# Patient Record
Sex: FEMALE | Race: WHITE | Employment: OTHER | URBAN - METROPOLITAN AREA
[De-identification: names, ages, dates, MRNs, and addresses within clinical notes are randomized per-mention and may not be internally consistent; named-entity substitution may affect disease eponyms.]

---

## 2021-11-05 ENCOUNTER — TELEPHONE (OUTPATIENT)
Dept: FAMILY MEDICINE CLINIC | Age: 81
End: 2021-11-05

## 2021-11-05 NOTE — TELEPHONE ENCOUNTER
----- Message from Jonatan Upton sent at 11/3/2021  3:01 PM EDT -----  Subject: Appointment Request    Reason for Call: New Patient Request Appointment    QUESTIONS  Type of Appointment? New Patient/New to Provider  Reason for appointment request? No appointments available during search  Additional Information for Provider? New pt. establish care. Also needs   Pre Op for surgery on 11/10. Please call pt to schedule  ---------------------------------------------------------------------------  --------------  CALL BACK INFO  What is the best way for the office to contact you? OK to leave message on   voicemail  Preferred Call Back Phone Number? [phone]  ---------------------------------------------------------------------------  --------------  SCRIPT ANSWERS  Relationship to Patient? Self  Specialty Confirmation? Primary Care  Is this the first appointment to establish care for a ? No  Have you been diagnosed with, awaiting test results for, or told that you   are suspected of having COVID-19 (Coronavirus)? (If patient has tested   negative or was tested as a requirement for work, school, or travel and   not based on symptoms, answer no)? No  Within the past two weeks have you developed any of the following symptoms   (answer no if symptoms have been present longer than 2 weeks or began   more than 2 weeks ago)? Fever or Chills, Cough, Shortness of breath or   difficulty breathing, Loss of taste or smell, Sore throat, Nasal   congestion, Sneezing or runny nose, Fatigue or generalized body aches   (answer no if pain is specific to a body part e.g. back pain), Diarrhea,   Headache? No  Have you had close contact with someone with COVID-19 in the last 14 days? No  (Service Expert  click yes below to proceed with MeetMe As Usual   Scheduling)?  Yes

## 2021-11-09 ENCOUNTER — APPOINTMENT (OUTPATIENT)
Dept: CT IMAGING | Age: 81
DRG: 445 | End: 2021-11-09
Payer: MEDICARE

## 2021-11-09 ENCOUNTER — HOSPITAL ENCOUNTER (INPATIENT)
Age: 81
LOS: 2 days | Discharge: ANOTHER HOSPITAL | DRG: 445 | End: 2021-11-11
Attending: INTERNAL MEDICINE | Admitting: HOSPITALIST
Payer: MEDICARE

## 2021-11-09 ENCOUNTER — APPOINTMENT (OUTPATIENT)
Dept: ULTRASOUND IMAGING | Age: 81
DRG: 445 | End: 2021-11-09
Payer: MEDICARE

## 2021-11-09 DIAGNOSIS — K83.1 OBSTRUCTIVE JAUNDICE: Primary | ICD-10-CM

## 2021-11-09 LAB
ALBUMIN SERPL-MCNC: 3.9 G/DL (ref 3.5–5.1)
ALP BLD-CCNC: 483 U/L (ref 38–126)
ALT SERPL-CCNC: 271 U/L (ref 11–66)
AMMONIA: 50 UMOL/L (ref 11–60)
ANION GAP SERPL CALCULATED.3IONS-SCNC: 16 MEQ/L (ref 8–16)
AST SERPL-CCNC: 133 U/L (ref 5–40)
BACTERIA: ABNORMAL /HPF
BASOPHILS # BLD: 0.7 %
BASOPHILS ABSOLUTE: 0.1 THOU/MM3 (ref 0–0.1)
BILIRUB SERPL-MCNC: 19.5 MG/DL (ref 0.3–1.2)
BILIRUBIN DIRECT: > 10 MG/DL (ref 0–0.3)
BILIRUBIN URINE: ABNORMAL
BLOOD, URINE: ABNORMAL
BUN BLDV-MCNC: 13 MG/DL (ref 7–22)
CALCIUM SERPL-MCNC: 9.7 MG/DL (ref 8.5–10.5)
CASTS 2: ABNORMAL /LPF
CASTS UA: ABNORMAL /LPF
CHARACTER, URINE: ABNORMAL
CHLORIDE BLD-SCNC: 103 MEQ/L (ref 98–111)
CO2: 21 MEQ/L (ref 23–33)
COLOR: ABNORMAL
CREAT SERPL-MCNC: 0.8 MG/DL (ref 0.4–1.2)
CRYSTALS, UA: ABNORMAL
EOSINOPHIL # BLD: 0.5 %
EOSINOPHILS ABSOLUTE: 0 THOU/MM3 (ref 0–0.4)
EPITHELIAL CELLS, UA: ABNORMAL /HPF
ERYTHROCYTE [DISTWIDTH] IN BLOOD BY AUTOMATED COUNT: 14.3 % (ref 11.5–14.5)
ERYTHROCYTE [DISTWIDTH] IN BLOOD BY AUTOMATED COUNT: 50.1 FL (ref 35–45)
GFR SERPL CREATININE-BSD FRML MDRD: 69 ML/MIN/1.73M2
GLUCOSE BLD-MCNC: 136 MG/DL (ref 70–108)
GLUCOSE URINE: NEGATIVE MG/DL
HCT VFR BLD CALC: 39.3 % (ref 37–47)
HEMOGLOBIN: 12.7 GM/DL (ref 12–16)
ICTOTEST: POSITIVE
IMMATURE GRANS (ABS): 0.03 THOU/MM3 (ref 0–0.07)
IMMATURE GRANULOCYTES: 0.4 %
INR BLD: 1.04 (ref 0.85–1.13)
KETONES, URINE: NEGATIVE
LEUKOCYTE ESTERASE, URINE: ABNORMAL
LIPASE: 43.9 U/L (ref 5.6–51.3)
LYMPHOCYTES # BLD: 14.6 %
LYMPHOCYTES ABSOLUTE: 1.2 THOU/MM3 (ref 1–4.8)
MCH RBC QN AUTO: 31.1 PG (ref 26–33)
MCHC RBC AUTO-ENTMCNC: 32.3 GM/DL (ref 32.2–35.5)
MCV RBC AUTO: 96.1 FL (ref 81–99)
MISCELLANEOUS 2: ABNORMAL
MONOCYTES # BLD: 8.6 %
MONOCYTES ABSOLUTE: 0.7 THOU/MM3 (ref 0.4–1.3)
NITRITE, URINE: POSITIVE
NUCLEATED RED BLOOD CELLS: 0 /100 WBC
OSMOLALITY CALCULATION: 281.6 MOSMOL/KG (ref 275–300)
PH UA: 5.5 (ref 5–9)
PLATELET # BLD: 392 THOU/MM3 (ref 130–400)
PMV BLD AUTO: 10.9 FL (ref 9.4–12.4)
POTASSIUM SERPL-SCNC: 3.4 MEQ/L (ref 3.5–5.2)
PROTEIN UA: 100
RBC # BLD: 4.09 MILL/MM3 (ref 4.2–5.4)
RBC URINE: ABNORMAL /HPF
RENAL EPITHELIAL, UA: ABNORMAL
SEG NEUTROPHILS: 75.2 %
SEGMENTED NEUTROPHILS ABSOLUTE COUNT: 6 THOU/MM3 (ref 1.8–7.7)
SODIUM BLD-SCNC: 140 MEQ/L (ref 135–145)
SPECIFIC GRAVITY, URINE: 1.02 (ref 1–1.03)
TOTAL PROTEIN: 7.1 G/DL (ref 6.1–8)
UROBILINOGEN, URINE: 0.2 EU/DL (ref 0–1)
WBC # BLD: 8 THOU/MM3 (ref 4.8–10.8)
WBC UA: ABNORMAL /HPF
YEAST: ABNORMAL

## 2021-11-09 PROCEDURE — 87086 URINE CULTURE/COLONY COUNT: CPT

## 2021-11-09 PROCEDURE — 81001 URINALYSIS AUTO W/SCOPE: CPT

## 2021-11-09 PROCEDURE — 2580000003 HC RX 258: Performed by: INTERNAL MEDICINE

## 2021-11-09 PROCEDURE — 82248 BILIRUBIN DIRECT: CPT

## 2021-11-09 PROCEDURE — 85025 COMPLETE CBC W/AUTO DIFF WBC: CPT

## 2021-11-09 PROCEDURE — 80053 COMPREHEN METABOLIC PANEL: CPT

## 2021-11-09 PROCEDURE — 83690 ASSAY OF LIPASE: CPT

## 2021-11-09 PROCEDURE — 85610 PROTHROMBIN TIME: CPT

## 2021-11-09 PROCEDURE — 99285 EMERGENCY DEPT VISIT HI MDM: CPT

## 2021-11-09 PROCEDURE — 76705 ECHO EXAM OF ABDOMEN: CPT

## 2021-11-09 PROCEDURE — 82140 ASSAY OF AMMONIA: CPT

## 2021-11-09 PROCEDURE — 99223 1ST HOSP IP/OBS HIGH 75: CPT | Performed by: HOSPITALIST

## 2021-11-09 PROCEDURE — 36415 COLL VENOUS BLD VENIPUNCTURE: CPT

## 2021-11-09 PROCEDURE — 1200000003 HC TELEMETRY R&B

## 2021-11-09 PROCEDURE — 74176 CT ABD & PELVIS W/O CONTRAST: CPT

## 2021-11-09 RX ORDER — 0.9 % SODIUM CHLORIDE 0.9 %
1000 INTRAVENOUS SOLUTION INTRAVENOUS ONCE
Status: COMPLETED | OUTPATIENT
Start: 2021-11-09 | End: 2021-11-09

## 2021-11-09 RX ADMIN — SODIUM CHLORIDE 1000 ML: 9 INJECTION, SOLUTION INTRAVENOUS at 19:35

## 2021-11-10 ENCOUNTER — ANESTHESIA EVENT (OUTPATIENT)
Dept: ENDOSCOPY | Age: 81
DRG: 445 | End: 2021-11-10
Payer: MEDICARE

## 2021-11-10 ENCOUNTER — ANESTHESIA (OUTPATIENT)
Dept: ENDOSCOPY | Age: 81
DRG: 445 | End: 2021-11-10
Payer: MEDICARE

## 2021-11-10 ENCOUNTER — APPOINTMENT (OUTPATIENT)
Dept: MRI IMAGING | Age: 81
DRG: 445 | End: 2021-11-10
Payer: MEDICARE

## 2021-11-10 ENCOUNTER — APPOINTMENT (OUTPATIENT)
Dept: CT IMAGING | Age: 81
DRG: 445 | End: 2021-11-10
Payer: MEDICARE

## 2021-11-10 ENCOUNTER — APPOINTMENT (OUTPATIENT)
Dept: GENERAL RADIOLOGY | Age: 81
DRG: 445 | End: 2021-11-10
Payer: MEDICARE

## 2021-11-10 VITALS — DIASTOLIC BLOOD PRESSURE: 58 MMHG | SYSTOLIC BLOOD PRESSURE: 105 MMHG | OXYGEN SATURATION: 100 %

## 2021-11-10 LAB
ALBUMIN SERPL-MCNC: 3.4 G/DL (ref 3.5–5.1)
ALP BLD-CCNC: 421 U/L (ref 38–126)
ALT SERPL-CCNC: 228 U/L (ref 11–66)
ANION GAP SERPL CALCULATED.3IONS-SCNC: 13 MEQ/L (ref 8–16)
AST SERPL-CCNC: 107 U/L (ref 5–40)
AVERAGE GLUCOSE: 111 MG/DL (ref 70–126)
BASOPHILS # BLD: 0.8 %
BASOPHILS ABSOLUTE: 0 THOU/MM3 (ref 0–0.1)
BILIRUB SERPL-MCNC: 16.3 MG/DL (ref 0.3–1.2)
BILIRUBIN DIRECT: > 10 MG/DL (ref 0–0.3)
BUN BLDV-MCNC: 11 MG/DL (ref 7–22)
CALCIUM SERPL-MCNC: 9.1 MG/DL (ref 8.5–10.5)
CEA: 1.5 NG/ML (ref 0–5)
CHLORIDE BLD-SCNC: 108 MEQ/L (ref 98–111)
CO2: 22 MEQ/L (ref 23–33)
CREAT SERPL-MCNC: 0.6 MG/DL (ref 0.4–1.2)
EOSINOPHIL # BLD: 1.2 %
EOSINOPHILS ABSOLUTE: 0.1 THOU/MM3 (ref 0–0.4)
ERYTHROCYTE [DISTWIDTH] IN BLOOD BY AUTOMATED COUNT: 14.6 % (ref 11.5–14.5)
ERYTHROCYTE [DISTWIDTH] IN BLOOD BY AUTOMATED COUNT: 50.3 FL (ref 35–45)
GFR SERPL CREATININE-BSD FRML MDRD: > 90 ML/MIN/1.73M2
GLUCOSE BLD-MCNC: 97 MG/DL (ref 70–108)
HBA1C MFR BLD: 5.7 % (ref 4.4–6.4)
HCT VFR BLD CALC: 34 % (ref 37–47)
HEMOGLOBIN: 11.3 GM/DL (ref 12–16)
IMMATURE GRANS (ABS): 0.03 THOU/MM3 (ref 0–0.07)
IMMATURE GRANULOCYTES: 0.5 %
LYMPHOCYTES # BLD: 22.8 %
LYMPHOCYTES ABSOLUTE: 1.3 THOU/MM3 (ref 1–4.8)
MCH RBC QN AUTO: 31.5 PG (ref 26–33)
MCHC RBC AUTO-ENTMCNC: 33.2 GM/DL (ref 32.2–35.5)
MCV RBC AUTO: 94.7 FL (ref 81–99)
MONOCYTES # BLD: 12.7 %
MONOCYTES ABSOLUTE: 0.7 THOU/MM3 (ref 0.4–1.3)
NUCLEATED RED BLOOD CELLS: 0 /100 WBC
PLATELET # BLD: 330 THOU/MM3 (ref 130–400)
PMV BLD AUTO: 10.9 FL (ref 9.4–12.4)
POTASSIUM SERPL-SCNC: 3.9 MEQ/L (ref 3.5–5.2)
RBC # BLD: 3.59 MILL/MM3 (ref 4.2–5.4)
SEG NEUTROPHILS: 62 %
SEGMENTED NEUTROPHILS ABSOLUTE COUNT: 3.7 THOU/MM3 (ref 1.8–7.7)
SODIUM BLD-SCNC: 143 MEQ/L (ref 135–145)
TOTAL PROTEIN: 5.5 G/DL (ref 6.1–8)
WBC # BLD: 5.9 THOU/MM3 (ref 4.8–10.8)

## 2021-11-10 PROCEDURE — C9113 INJ PANTOPRAZOLE SODIUM, VIA: HCPCS | Performed by: STUDENT IN AN ORGANIZED HEALTH CARE EDUCATION/TRAINING PROGRAM

## 2021-11-10 PROCEDURE — 2580000003 HC RX 258: Performed by: STUDENT IN AN ORGANIZED HEALTH CARE EDUCATION/TRAINING PROGRAM

## 2021-11-10 PROCEDURE — 7100000000 HC PACU RECOVERY - FIRST 15 MIN: Performed by: INTERNAL MEDICINE

## 2021-11-10 PROCEDURE — 6360000002 HC RX W HCPCS

## 2021-11-10 PROCEDURE — 6360000002 HC RX W HCPCS: Performed by: NURSE ANESTHETIST, CERTIFIED REGISTERED

## 2021-11-10 PROCEDURE — 6360000002 HC RX W HCPCS: Performed by: INTERNAL MEDICINE

## 2021-11-10 PROCEDURE — 3609015100 HC ERCP STENT PLACEMENT BILIARY/PANCREATIC DUCT: Performed by: INTERNAL MEDICINE

## 2021-11-10 PROCEDURE — C1769 GUIDE WIRE: HCPCS | Performed by: INTERNAL MEDICINE

## 2021-11-10 PROCEDURE — 0FJD8ZZ INSPECTION OF PANCREATIC DUCT, VIA NATURAL OR ARTIFICIAL OPENING ENDOSCOPIC: ICD-10-PCS | Performed by: INTERNAL MEDICINE

## 2021-11-10 PROCEDURE — 74330 X-RAY BILE/PANC ENDOSCOPY: CPT

## 2021-11-10 PROCEDURE — 99232 SBSQ HOSP IP/OBS MODERATE 35: CPT | Performed by: FAMILY MEDICINE

## 2021-11-10 PROCEDURE — 36415 COLL VENOUS BLD VENIPUNCTURE: CPT

## 2021-11-10 PROCEDURE — 2580000003 HC RX 258: Performed by: NURSE PRACTITIONER

## 2021-11-10 PROCEDURE — 2580000003 HC RX 258: Performed by: INTERNAL MEDICINE

## 2021-11-10 PROCEDURE — 6360000002 HC RX W HCPCS: Performed by: ANESTHESIOLOGY

## 2021-11-10 PROCEDURE — 74150 CT ABDOMEN W/O CONTRAST: CPT

## 2021-11-10 PROCEDURE — 83036 HEMOGLOBIN GLYCOSYLATED A1C: CPT

## 2021-11-10 PROCEDURE — 82248 BILIRUBIN DIRECT: CPT

## 2021-11-10 PROCEDURE — A9579 GAD-BASE MR CONTRAST NOS,1ML: HCPCS | Performed by: STUDENT IN AN ORGANIZED HEALTH CARE EDUCATION/TRAINING PROGRAM

## 2021-11-10 PROCEDURE — 3700000000 HC ANESTHESIA ATTENDED CARE: Performed by: INTERNAL MEDICINE

## 2021-11-10 PROCEDURE — 2709999900 HC NON-CHARGEABLE SUPPLY: Performed by: INTERNAL MEDICINE

## 2021-11-10 PROCEDURE — 2100000000 HC CCU R&B

## 2021-11-10 PROCEDURE — 6360000002 HC RX W HCPCS: Performed by: STUDENT IN AN ORGANIZED HEALTH CARE EDUCATION/TRAINING PROGRAM

## 2021-11-10 PROCEDURE — 2500000003 HC RX 250 WO HCPCS: Performed by: NURSE ANESTHETIST, CERTIFIED REGISTERED

## 2021-11-10 PROCEDURE — 86301 IMMUNOASSAY TUMOR CA 19-9: CPT

## 2021-11-10 PROCEDURE — 84080 ASSAY ALKALINE PHOSPHATASES: CPT

## 2021-11-10 PROCEDURE — 6360000004 HC RX CONTRAST MEDICATION: Performed by: STUDENT IN AN ORGANIZED HEALTH CARE EDUCATION/TRAINING PROGRAM

## 2021-11-10 PROCEDURE — 80053 COMPREHEN METABOLIC PANEL: CPT

## 2021-11-10 PROCEDURE — 85025 COMPLETE CBC W/AUTO DIFF WBC: CPT

## 2021-11-10 PROCEDURE — 74183 MRI ABD W/O CNTR FLWD CNTR: CPT

## 2021-11-10 PROCEDURE — 7100000001 HC PACU RECOVERY - ADDTL 15 MIN: Performed by: INTERNAL MEDICINE

## 2021-11-10 PROCEDURE — 99221 1ST HOSP IP/OBS SF/LOW 40: CPT | Performed by: SURGERY

## 2021-11-10 PROCEDURE — 82378 CARCINOEMBRYONIC ANTIGEN: CPT

## 2021-11-10 PROCEDURE — 3700000001 HC ADD 15 MINUTES (ANESTHESIA): Performed by: INTERNAL MEDICINE

## 2021-11-10 PROCEDURE — 6370000000 HC RX 637 (ALT 250 FOR IP): Performed by: NURSE PRACTITIONER

## 2021-11-10 RX ORDER — DEXAMETHASONE SODIUM PHOSPHATE 4 MG/ML
INJECTION, SOLUTION INTRA-ARTICULAR; INTRALESIONAL; INTRAMUSCULAR; INTRAVENOUS; SOFT TISSUE PRN
Status: DISCONTINUED | OUTPATIENT
Start: 2021-11-10 | End: 2021-11-10 | Stop reason: SDUPTHER

## 2021-11-10 RX ORDER — SODIUM CHLORIDE 0.9 % (FLUSH) 0.9 %
5-40 SYRINGE (ML) INJECTION PRN
Status: DISCONTINUED | OUTPATIENT
Start: 2021-11-10 | End: 2021-11-11 | Stop reason: HOSPADM

## 2021-11-10 RX ORDER — HYDRALAZINE HYDROCHLORIDE 20 MG/ML
INJECTION INTRAMUSCULAR; INTRAVENOUS
Status: COMPLETED
Start: 2021-11-10 | End: 2021-11-10

## 2021-11-10 RX ORDER — ACETAMINOPHEN 325 MG/1
650 TABLET ORAL EVERY 6 HOURS PRN
Status: DISCONTINUED | OUTPATIENT
Start: 2021-11-10 | End: 2021-11-11 | Stop reason: HOSPADM

## 2021-11-10 RX ORDER — PANTOPRAZOLE SODIUM 40 MG/10ML
40 INJECTION, POWDER, LYOPHILIZED, FOR SOLUTION INTRAVENOUS DAILY
Status: DISCONTINUED | OUTPATIENT
Start: 2021-11-10 | End: 2021-11-11 | Stop reason: HOSPADM

## 2021-11-10 RX ORDER — SODIUM CHLORIDE 9 MG/ML
25 INJECTION, SOLUTION INTRAVENOUS PRN
Status: DISCONTINUED | OUTPATIENT
Start: 2021-11-10 | End: 2021-11-11 | Stop reason: HOSPADM

## 2021-11-10 RX ORDER — FENTANYL CITRATE 50 UG/ML
INJECTION, SOLUTION INTRAMUSCULAR; INTRAVENOUS PRN
Status: DISCONTINUED | OUTPATIENT
Start: 2021-11-10 | End: 2021-11-10 | Stop reason: SDUPTHER

## 2021-11-10 RX ORDER — SODIUM CHLORIDE, SODIUM LACTATE, POTASSIUM CHLORIDE, CALCIUM CHLORIDE 600; 310; 30; 20 MG/100ML; MG/100ML; MG/100ML; MG/100ML
INJECTION, SOLUTION INTRAVENOUS CONTINUOUS
Status: DISCONTINUED | OUTPATIENT
Start: 2021-11-10 | End: 2021-11-11 | Stop reason: HOSPADM

## 2021-11-10 RX ORDER — POLYETHYLENE GLYCOL 3350 17 G/17G
17 POWDER, FOR SOLUTION ORAL DAILY PRN
Status: DISCONTINUED | OUTPATIENT
Start: 2021-11-10 | End: 2021-11-11 | Stop reason: HOSPADM

## 2021-11-10 RX ORDER — ONDANSETRON 2 MG/ML
4 INJECTION INTRAMUSCULAR; INTRAVENOUS EVERY 6 HOURS PRN
Status: DISCONTINUED | OUTPATIENT
Start: 2021-11-10 | End: 2021-11-11 | Stop reason: HOSPADM

## 2021-11-10 RX ORDER — LIDOCAINE HYDROCHLORIDE 20 MG/ML
INJECTION, SOLUTION INFILTRATION; PERINEURAL PRN
Status: DISCONTINUED | OUTPATIENT
Start: 2021-11-10 | End: 2021-11-10 | Stop reason: SDUPTHER

## 2021-11-10 RX ORDER — DEXTROSE, SODIUM CHLORIDE, AND POTASSIUM CHLORIDE 5; .45; .15 G/100ML; G/100ML; G/100ML
INJECTION INTRAVENOUS CONTINUOUS
Status: DISCONTINUED | OUTPATIENT
Start: 2021-11-10 | End: 2021-11-10

## 2021-11-10 RX ORDER — HYDRALAZINE HYDROCHLORIDE 20 MG/ML
5 INJECTION INTRAMUSCULAR; INTRAVENOUS EVERY 10 MIN PRN
Status: DISCONTINUED | OUTPATIENT
Start: 2021-11-10 | End: 2021-11-10 | Stop reason: HOSPADM

## 2021-11-10 RX ORDER — PROPOFOL 10 MG/ML
INJECTION, EMULSION INTRAVENOUS PRN
Status: DISCONTINUED | OUTPATIENT
Start: 2021-11-10 | End: 2021-11-10 | Stop reason: SDUPTHER

## 2021-11-10 RX ORDER — SUCCINYLCHOLINE CHLORIDE 20 MG/ML
INJECTION INTRAMUSCULAR; INTRAVENOUS PRN
Status: DISCONTINUED | OUTPATIENT
Start: 2021-11-10 | End: 2021-11-10 | Stop reason: SDUPTHER

## 2021-11-10 RX ORDER — CIPROFLOXACIN 2 MG/ML
400 INJECTION, SOLUTION INTRAVENOUS ONCE
Status: COMPLETED | OUTPATIENT
Start: 2021-11-10 | End: 2021-11-10

## 2021-11-10 RX ORDER — SODIUM CHLORIDE 0.9 % (FLUSH) 0.9 %
5-40 SYRINGE (ML) INJECTION EVERY 12 HOURS SCHEDULED
Status: DISCONTINUED | OUTPATIENT
Start: 2021-11-10 | End: 2021-11-11 | Stop reason: HOSPADM

## 2021-11-10 RX ORDER — ACETAMINOPHEN 650 MG/1
650 SUPPOSITORY RECTAL EVERY 6 HOURS PRN
Status: DISCONTINUED | OUTPATIENT
Start: 2021-11-10 | End: 2021-11-11 | Stop reason: HOSPADM

## 2021-11-10 RX ORDER — ONDANSETRON 4 MG/1
4 TABLET, ORALLY DISINTEGRATING ORAL EVERY 8 HOURS PRN
Status: DISCONTINUED | OUTPATIENT
Start: 2021-11-10 | End: 2021-11-11 | Stop reason: HOSPADM

## 2021-11-10 RX ADMIN — SODIUM CHLORIDE, POTASSIUM CHLORIDE, SODIUM LACTATE AND CALCIUM CHLORIDE: 600; 310; 30; 20 INJECTION, SOLUTION INTRAVENOUS at 22:41

## 2021-11-10 RX ADMIN — INDOMETHACIN 100 MG: 50 SUPPOSITORY RECTAL at 14:58

## 2021-11-10 RX ADMIN — GLUCAGON HYDROCHLORIDE 0.5 MG: KIT at 15:26

## 2021-11-10 RX ADMIN — PROPOFOL 100 MG: 10 INJECTION, EMULSION INTRAVENOUS at 14:50

## 2021-11-10 RX ADMIN — CIPROFLOXACIN 400 MG: 2 INJECTION, SOLUTION INTRAVENOUS at 14:55

## 2021-11-10 RX ADMIN — DEXAMETHASONE SODIUM PHOSPHATE 10 MG: 4 INJECTION, SOLUTION INTRAMUSCULAR; INTRAVENOUS at 15:00

## 2021-11-10 RX ADMIN — PIPERACILLIN AND TAZOBACTAM 3375 MG: 3; .375 INJECTION, POWDER, LYOPHILIZED, FOR SOLUTION INTRAVENOUS at 23:50

## 2021-11-10 RX ADMIN — PIPERACILLIN AND TAZOBACTAM 3375 MG: 3; .375 INJECTION, POWDER, LYOPHILIZED, FOR SOLUTION INTRAVENOUS at 17:38

## 2021-11-10 RX ADMIN — SODIUM CHLORIDE: 9 INJECTION, SOLUTION INTRAVENOUS at 14:31

## 2021-11-10 RX ADMIN — GLUCAGON HYDROCHLORIDE 0.5 MG: KIT at 15:27

## 2021-11-10 RX ADMIN — SODIUM CHLORIDE, PRESERVATIVE FREE 10 ML: 5 INJECTION INTRAVENOUS at 08:03

## 2021-11-10 RX ADMIN — HYDRALAZINE HYDROCHLORIDE 5 MG: 20 INJECTION INTRAMUSCULAR; INTRAVENOUS at 17:00

## 2021-11-10 RX ADMIN — PROPOFOL 50 MG: 10 INJECTION, EMULSION INTRAVENOUS at 15:08

## 2021-11-10 RX ADMIN — PANTOPRAZOLE SODIUM 40 MG: 40 INJECTION, POWDER, FOR SOLUTION INTRAVENOUS at 05:12

## 2021-11-10 RX ADMIN — GADOTERIDOL 15 ML: 279.3 INJECTION, SOLUTION INTRAVENOUS at 09:19

## 2021-11-10 RX ADMIN — HYDRALAZINE HYDROCHLORIDE 5 MG: 20 INJECTION INTRAMUSCULAR; INTRAVENOUS at 17:13

## 2021-11-10 RX ADMIN — HYDRALAZINE HYDROCHLORIDE 5 MG: 20 INJECTION INTRAMUSCULAR; INTRAVENOUS at 16:50

## 2021-11-10 RX ADMIN — SODIUM CHLORIDE, PRESERVATIVE FREE 10 ML: 5 INJECTION INTRAVENOUS at 21:53

## 2021-11-10 RX ADMIN — FENTANYL CITRATE 50 MCG: 50 INJECTION, SOLUTION INTRAMUSCULAR; INTRAVENOUS at 15:00

## 2021-11-10 RX ADMIN — SUCCINYLCHOLINE CHLORIDE 120 MG: 20 INJECTION, SOLUTION INTRAMUSCULAR; INTRAVENOUS at 14:50

## 2021-11-10 RX ADMIN — FENTANYL CITRATE 50 MCG: 50 INJECTION, SOLUTION INTRAMUSCULAR; INTRAVENOUS at 14:45

## 2021-11-10 RX ADMIN — LIDOCAINE HYDROCHLORIDE 60 MG: 20 INJECTION, SOLUTION INFILTRATION; PERINEURAL at 14:50

## 2021-11-10 ASSESSMENT — PULMONARY FUNCTION TESTS
PIF_VALUE: 1
PIF_VALUE: 22
PIF_VALUE: 20
PIF_VALUE: 22
PIF_VALUE: 21
PIF_VALUE: 20
PIF_VALUE: 26
PIF_VALUE: 20
PIF_VALUE: 16
PIF_VALUE: 20
PIF_VALUE: 20
PIF_VALUE: 23
PIF_VALUE: 0
PIF_VALUE: 26
PIF_VALUE: 3
PIF_VALUE: 25
PIF_VALUE: 21
PIF_VALUE: 23
PIF_VALUE: 0
PIF_VALUE: 20
PIF_VALUE: 0
PIF_VALUE: 22
PIF_VALUE: 0
PIF_VALUE: 19
PIF_VALUE: 22
PIF_VALUE: 20
PIF_VALUE: 23
PIF_VALUE: 23
PIF_VALUE: 25
PIF_VALUE: 23
PIF_VALUE: 0
PIF_VALUE: 2
PIF_VALUE: 19
PIF_VALUE: 22
PIF_VALUE: 26
PIF_VALUE: 23
PIF_VALUE: 22
PIF_VALUE: 18
PIF_VALUE: 20
PIF_VALUE: 23
PIF_VALUE: 0
PIF_VALUE: 24
PIF_VALUE: 0
PIF_VALUE: 0
PIF_VALUE: 21
PIF_VALUE: 26
PIF_VALUE: 23
PIF_VALUE: 28
PIF_VALUE: 22
PIF_VALUE: 14
PIF_VALUE: 22
PIF_VALUE: 23
PIF_VALUE: 14
PIF_VALUE: 23
PIF_VALUE: 15
PIF_VALUE: 0
PIF_VALUE: 20
PIF_VALUE: 0
PIF_VALUE: 23
PIF_VALUE: 0
PIF_VALUE: 15
PIF_VALUE: 22
PIF_VALUE: 18
PIF_VALUE: 2
PIF_VALUE: 22
PIF_VALUE: 0
PIF_VALUE: 15
PIF_VALUE: 25
PIF_VALUE: 15
PIF_VALUE: 2
PIF_VALUE: 25
PIF_VALUE: 7
PIF_VALUE: 27
PIF_VALUE: 22
PIF_VALUE: 0
PIF_VALUE: 18
PIF_VALUE: 22
PIF_VALUE: 22
PIF_VALUE: 23
PIF_VALUE: 20
PIF_VALUE: 20
PIF_VALUE: 23
PIF_VALUE: 0
PIF_VALUE: 23
PIF_VALUE: 0
PIF_VALUE: 20
PIF_VALUE: 22
PIF_VALUE: 0
PIF_VALUE: 4
PIF_VALUE: 22
PIF_VALUE: 2
PIF_VALUE: 25
PIF_VALUE: 24
PIF_VALUE: 14
PIF_VALUE: 20
PIF_VALUE: 0
PIF_VALUE: 22
PIF_VALUE: 22
PIF_VALUE: 23
PIF_VALUE: 15
PIF_VALUE: 2
PIF_VALUE: 19
PIF_VALUE: 25
PIF_VALUE: 23

## 2021-11-10 ASSESSMENT — PAIN SCALES - GENERAL
PAINLEVEL_OUTOF10: 0

## 2021-11-10 ASSESSMENT — PAIN - FUNCTIONAL ASSESSMENT: PAIN_FUNCTIONAL_ASSESSMENT: 0-10

## 2021-11-10 ASSESSMENT — ENCOUNTER SYMPTOMS
DIARRHEA: 0
RESPIRATORY NEGATIVE: 1
ANAL BLEEDING: 0
BLOOD IN STOOL: 0

## 2021-11-10 NOTE — ANESTHESIA PRE PROCEDURE
Department of Anesthesiology  Preprocedure Note       Name:  Pavel Donohue   Age:  [de-identified] y.o.  :  1940                                          MRN:  [id number]         Date:  11/10/2021      Surgeon: Penelope Bryson):  Jarrett Cruz MD    Procedure: Procedure(s):  ERCP WITH STENT INSERTION    Medications prior to admission:   Prior to Admission medications    Not on File       Current medications:    Current Facility-Administered Medications   Medication Dose Route Frequency Provider Last Rate Last Admin    sodium chloride flush 0.9 % injection 5-40 mL  5-40 mL IntraVENous 2 times per day Patricia Bux, DO   10 mL at 11/10/21 0803    sodium chloride flush 0.9 % injection 5-40 mL  5-40 mL IntraVENous PRN Patricia Bux, DO        0.9 % sodium chloride infusion  25 mL IntraVENous PRN Patricia Bux, DO        ondansetron (ZOFRAN-ODT) disintegrating tablet 4 mg  4 mg Oral Q8H PRN Patricia Bux, DO        Or    ondansetron (ZOFRAN) injection 4 mg  4 mg IntraVENous Q6H PRN Patricia Bux, DO        polyethylene glycol (GLYCOLAX) packet 17 g  17 g Oral Daily PRN Patricia Bux, DO        acetaminophen (TYLENOL) tablet 650 mg  650 mg Oral Q6H PRN Patricia Bux, DO        Or    acetaminophen (TYLENOL) suppository 650 mg  650 mg Rectal Q6H PRN Patricia Bux, DO        pantoprazole (PROTONIX) injection 40 mg  40 mg IntraVENous Daily Patricia Bux, DO   40 mg at 11/10/21 0512    indomethacin (INDOCIN) 50 MG suppository 100 mg  100 mg Rectal Once Willian Hoang APRN - CNP        lactated ringers infusion   IntraVENous Continuous Willian Hoang APRN - CNP           Allergies:  No Known Allergies    Problem List:    Patient Active Problem List   Diagnosis Code    Obstructive jaundice K83.1       Past Medical History:  History reviewed. No pertinent past medical history. Past Surgical History:  History reviewed. No pertinent surgical history.     Social History:    Social History     Tobacco Use    Smoking status: Former Smoker    Smokeless tobacco: Not on file   Substance Use Topics    Alcohol use: Not Currently     Comment: rarely                                Counseling given: Not Answered      Vital Signs (Current):   Vitals:    11/10/21 0311 11/10/21 0759 11/10/21 1200 11/10/21 1356   BP: (!) 145/73 (!) 166/75 (!) 170/73 (!) 176/85   Pulse: 66 66 68 68   Resp: 16 16 14 16   Temp: 98.3 °F (36.8 °C) 97.7 °F (36.5 °C) 98.8 °F (37.1 °C)    TempSrc: Oral Oral Oral    SpO2: 97% 96% 96% 99%   Weight:       Height:                                                  BP Readings from Last 3 Encounters:   11/10/21 (!) 176/85       NPO Status: Time of last liquid consumption: 1130                        Time of last solid consumption: 1130                        Date of last liquid consumption: 11/09/21                        Date of last solid food consumption: 11/09/21    BMI:   Wt Readings from Last 3 Encounters:   11/10/21 163 lb 12.8 oz (74.3 kg)     Body mass index is 24.91 kg/m². CBC:   Lab Results   Component Value Date    WBC 5.9 11/10/2021    RBC 3.59 11/10/2021    HGB 11.3 11/10/2021    HCT 34.0 11/10/2021    MCV 94.7 11/10/2021     11/10/2021       CMP:   Lab Results   Component Value Date     11/10/2021    K 3.9 11/10/2021     11/10/2021    CO2 22 11/10/2021    BUN 11 11/10/2021    CREATININE 0.6 11/10/2021    LABGLOM >90 11/10/2021    GLUCOSE 97 11/10/2021    PROT 5.5 11/10/2021    CALCIUM 9.1 11/10/2021    BILITOT 16.3 11/10/2021    ALKPHOS 421 11/10/2021     11/10/2021     11/10/2021       POC Tests: No results for input(s): POCGLU, POCNA, POCK, POCCL, POCBUN, POCHEMO, POCHCT in the last 72 hours.     Coags:   Lab Results   Component Value Date    INR 1.04 11/09/2021       HCG (If Applicable): No results found for: PREGTESTUR, PREGSERUM, HCG, HCGQUANT     ABGs: No results found for: PHART, PO2ART, HKY9LBS, KNA7XNX, BEART, V9YSWKZR     Type & Screen (If Applicable):  No results found for: LABABO, [address]    Drug/Infectious Status (If Applicable):  No results found for: HIV, HEPCAB    COVID-19 Screening (If Applicable): No results found for: COVID19        Anesthesia Evaluation  Patient summary reviewed and Nursing notes reviewed no history of anesthetic complications:   Airway: Mallampati: II        Dental:          Pulmonary: breath sounds clear to auscultation                             Cardiovascular:            Rhythm: regular  Rate: normal                    Neuro/Psych:               GI/Hepatic/Renal:   (+) liver disease:,           Endo/Other:                     Abdominal:       Abdomen: soft. Vascular: Other Findings:             Anesthesia Plan      general     ASA 2       Induction: intravenous. MIPS: Postoperative opioids intended and Prophylactic antiemetics administered. Anesthetic plan and risks discussed with patient. Plan discussed with CRNA.                   [address] Faye Harris,    11/10/2021

## 2021-11-10 NOTE — PROGRESS NOTES
This RN called Denise Guy, patient's son, to update him on the plan of care and his mother being transferred from endoscopy. All questions answered and son appreciative of call.

## 2021-11-10 NOTE — PROGRESS NOTES
ERCP aborted. Conray dye lot [id number]. Expiration date 08/2022. 5 mL instilled. 45 mL wasted. Sphincterotomy completed with needle knife. Grounding pad removed. Skin intact. Fluro pictures taken. Scope  and  used.

## 2021-11-10 NOTE — ANESTHESIA POSTPROCEDURE EVALUATION
Department of Anesthesiology  Postprocedure Note    Patient: Jennifer Bhardwaj  MRN: [id number]  YOB: 1940  Date of evaluation: 11/10/2021  Time:  6:36 PM     Procedure Summary     Date: 11/10/21 Room / Location: [address] [place] [address] / [address] [place] [address]    Anesthesia Start: 7853 Anesthesia Stop: 4086    Procedure: ERCP WITH STENT INSERTION (N/A ) Diagnosis: (COMMON BILE DUCT STRICTURE)    Surgeons: Ritu Vicente MD Responsible Provider: Kiran Turner DO    Anesthesia Type: general ASA Status: 2          Anesthesia Type: general    Catrachito Phase I: Catrachito Score: 10    Catrachito Phase II:      Last vitals: Reviewed and per EMR flowsheets.        Anesthesia Post Evaluation    Patient location during evaluation: PACU  Patient participation: complete - patient participated  Level of consciousness: awake and alert  Airway patency: patent  Nausea & Vomiting: no nausea  Complications: no  Cardiovascular status: blood pressure returned to baseline and hemodynamically stable  Respiratory status: acceptable and spontaneous ventilation  Hydration status: euvolemic

## 2021-11-10 NOTE — FLOWSHEET NOTE
Patient arrived to unit from PACU via cart. Patient transferred to ICU bed and placed on continuous ICU bedside monitor. Patient admitted for Obstructive jaundice [K83.1]. Vitals obtained. See flowsheets. Patient's IV access includes 22G LAC. Current infusions and rates of infusion include NS @100, zosyn @12.5. Assessment completed by Curly. Two nurse skin assessment completed by curly and Ginette. See flowsheets for assessment details. Policies and procedures of ICU able to be explained to patient at this time. Family member(s)/representative(s) present at time of admission include none. Patient rights explained to family member(s)/representatives and patient, as able. Patient/patient's family member(s)/representative(s) N/A to have physician notified of their admission. All questions posed by patient's family member(s)/representative(s) and patient answered at this time. Pt alert and oriented, no distress.  Skin jaundice

## 2021-11-10 NOTE — H&P
History & Physical       Patient: Berneta Bence  YOB: 1940    MRN: [id number]     Acct: [de-identified]    PCP: No primary care provider on file. Date of Admission: 11/9/2021    Date of Service: Patient seen / examined on 11/10/21 and admitted to Inpatient with expected LOS greater than two midnights due to medical therapy. ASSESSMENT / PLAN:    Obstructive Jaundice: Secondary to choledocholithiasis. Patient notes jaundice the past weeks and worsening since Friday. Bilirubin on arrival 19.5. Cholecystic pattern with Alk Phos>AST, ALT consistent with diagnose. Notes excessive Pruritis. Denies any abd pain and has good oral intake. Does have dark urine and north colored stool. Gallbladder US shows distended gallbladder with gallbladder sludge both intra and extra hepatic biliary duct dilatation. No stones seen. Denies any liver disease. -GI consult for possible ERCP  -Gen Surgery Consult for consideration of elective cholecystectomy  -MRCP ordered  -NPO for possible ERCP today  -SCDS for AG  -No abx ordered due to normal WBC, afebrile, and no wall thickening seen and no para cholecystic edema     GERD- Resume PPI    Asymptomatic UTI: UA on arrival positive for UTI however due to being asymptomatic, will not treat. Chief Complaint:  Jaundice     History of Present Illness:  Berneta Bence is a [de-identified] y.o. female who presents to the emergency department because of jaundice. Patient noticed to have jaundice on Friday. Day before that patient started having pain in her right upper quadrant which lasted few moments. Patient also has been complaining of skin itching, dark urine and north colored stool. There is no fever or chills. There is no bleeding. Past Medical History:    History reviewed. No pertinent past medical history. Past Surgical History:    History reviewed. No pertinent surgical history.    Medications Prior to Admission:   No current facility-administered medications on file prior to encounter. No current outpatient medications on file prior to encounter. Allergies:   Patient has no known allergies. Social History:   Social History     Socioeconomic History    Marital status:      Spouse name: Not on file    Number of children: Not on file    Years of education: Not on file    Highest education level: Not on file   Occupational History    Not on file   Tobacco Use    Smoking status: Former Smoker    Smokeless tobacco: Not on file   Substance and Sexual Activity    Alcohol use: Not Currently     Comment: rarely    Drug use: Not on file    Sexual activity: Not on file   Other Topics Concern    Not on file   Social History Narrative    Not on file     Social Determinants of Health     Financial Resource Strain:     Difficulty of Paying Living Expenses: Not on file   Food Insecurity:     Worried About [address] in the Last Year: Not on file    Roberto of Food in the Last Year: Not on file   Transportation Needs:     Lack of Transportation (Medical): Not on file    Lack of Transportation (Non-Medical):  Not on file   Physical Activity:     Days of Exercise per Week: Not on file    Minutes of Exercise per Session: Not on file   Stress:     Feeling of Stress : Not on file   Social Connections:     Frequency of Communication with Friends and Family: Not on file    Frequency of Social Gatherings with Friends and Family: Not on file    Attends [employer]ism Services: Not on file    Active Member of Clubs or Organizations: Not on file    Attends Club or Organization Meetings: Not on file    Marital Status: Not on file   Intimate Partner Violence:     Fear of Current or Ex-Partner: Not on file    Emotionally Abused: Not on file    Physically Abused: Not on file    Sexually Abused: Not on file   Housing Stability:     Unable to Pay for Housing in the Last Year: Not on file    Number of [place] in the Last Year: Not on file    Unstable Housing in the Last Year: Not on file     Family History:    History reviewed. No pertinent family history. REVIEW OF SYSTEMS:  A 14-point ROS was obtained and negative, with the exception of pertinent positives as listed below    PHYSICAL EXAM:  Vitals:    11/09/21 2030 11/09/21 2136 11/10/21 0130 11/10/21 0311   BP:   (!) 161/83 (!) 145/73   Pulse: 77 71 70 66   Resp:   16 16   Temp:   98.5 °F (36.9 °C) 98.3 °F (36.8 °C)   TempSrc:   Oral Oral   SpO2: 98% 98% 98% 97%   Weight:   163 lb 12.8 oz (74.3 kg)    Height:         General appearance: Alert / well-appearing. Cooperative. NAD. HEENT:  Normocephalic / atraumatic. PERRL. EOM intact. Conjunctivae appear normal.  Neck: Supple. No JVD. Respiratory: Normal respiratory effort on RA. CTAB. No wheezes / rales / rhonchi. Cardiovascular: RRR. Normal S1/S2. No murmurs / rubs / gallops. Abdomen: Soft / non-tender / non-distended. BS present. Musculoskeletal: No cyanosis or edema. Skin: Warm / dry. Normal turgor. Jaundiced  Neurologic: A/O x 3. Speech normal. Answers questions appropriately. CN intact. No obvious focal neurologic deficits. Psychiatric: Thought content / judgment / insight appear appropriate. Capillary refill: Brisk bilaterally. Peripheral pulses: +2 bilaterally.     Labs:   Results for orders placed or performed during the hospital encounter of 11/09/21   CBC auto differential   Result Value Ref Range    WBC 8.0 4.8 - 10.8 thou/mm3    RBC 4.09 (L) 4.20 - 5.40 mill/mm3    Hemoglobin 12.7 12.0 - 16.0 gm/dl    Hematocrit 39.3 37.0 - 47.0 %    MCV 96.1 81.0 - 99.0 fL    MCH 31.1 26.0 - 33.0 pg    MCHC 32.3 32.2 - 35.5 gm/dl    RDW-CV 14.3 11.5 - 14.5 %    RDW-SD 50.1 (H) 35.0 - 45.0 fL    Platelets 119 831 - 912 thou/mm3    MPV 10.9 9.4 - 12.4 fL    Seg Neutrophils 75.2 %    Lymphocytes 14.6 %    Monocytes 8.6 %    Eosinophils 0.5 %    Basophils 0.7 %    Immature Granulocytes 0.4 %    Segs Absolute 6.0 1.8 - 7.7 thou/mm3 Lymphocytes Absolute 1.2 1.0 - 4.8 thou/mm3    Monocytes Absolute 0.7 0.4 - 1.3 thou/mm3    Eosinophils Absolute 0.0 0.0 - 0.4 thou/mm3    Basophils Absolute 0.1 0.0 - 0.1 thou/mm3    Immature Grans (Abs) 0.03 0.00 - 0.07 thou/mm3    nRBC 0 /100 wbc   Basic Metabolic Panel   Result Value Ref Range    Sodium 140 135 - 145 meq/L    Potassium 3.4 (L) 3.5 - 5.2 meq/L    Chloride 103 98 - 111 meq/L    CO2 21 (L) 23 - 33 meq/L    Glucose 136 (H) 70 - 108 mg/dL    BUN 13 7 - 22 mg/dL    CREATININE 0.8 0.4 - 1.2 mg/dL    Calcium 9.7 8.5 - 10.5 mg/dL   Hepatic function panel   Result Value Ref Range    Albumin 3.9 3.5 - 5.1 g/dL    Total Bilirubin 19.5 (H) 0.3 - 1.2 mg/dL    Bilirubin, Direct >10.0 (H) 0.0 - 0.3 mg/dL    Alkaline Phosphatase 483 (H) 38 - 126 U/L     (H) 5 - 40 U/L     (H) 11 - 66 U/L    Total Protein 7.1 6.1 - 8.0 g/dL   Lipase   Result Value Ref Range    Lipase 43.9 5.6 - 51.3 U/L   Ammonia   Result Value Ref Range    Ammonia 50 11 - 60 umol/L   Anion Gap   Result Value Ref Range    Anion Gap 16.0 8.0 - 16.0 meq/L   Glomerular Filtration Rate, Estimated   Result Value Ref Range    Est, Glom Filt Rate 69 (A) ml/min/1.73m2   Osmolality   Result Value Ref Range    Osmolality Calc 281.6 275.0 - 300.0 mOsmol/kg   Protime-INR   Result Value Ref Range    INR 1.04 0.85 - 1.13   Urine with Reflexed Micro   Result Value Ref Range    Glucose, Ur NEGATIVE NEGATIVE mg/dl    Bilirubin Urine LARGE (A) NEGATIVE    Ketones, Urine NEGATIVE NEGATIVE    Specific Gravity, Urine 1.019 1.002 - 1.030    Blood, Urine SMALL (A) NEGATIVE    pH, UA 5.5 5.0 - 9.0    Protein,  (A) NEGATIVE    Urobilinogen, Urine 0.2 0.0 - 1.0 eu/dl    Nitrite, Urine POSITIVE (A) NEGATIVE    Leukocyte Esterase, Urine LARGE (A) NEGATIVE    Color, UA DK YELLOW (A) STRAW-YELLOW    Character, Urine TURBID (A) CLEAR-SL CLOUD    RBC, UA 3-5 0-2/hpf /hpf    WBC, UA 25-50 0-4/hpf /hpf    Epithelial Cells, UA 0-2 3-5/hpf /hpf    Bacteria, UA MANY FEW/NONE SEEN /hpf    Casts UA 8-15 WAXY NONE SEEN /lpf    Crystals, UA NONE SEEN NONE SEEN    Renal Epithelial, UA 0-2 NONE SEEN    Yeast, UA NONE SEEN NONE SEEN    CASTS 2 0-4 WBC NONE SEEN /lpf    MISCELLANEOUS 2 NONE SEEN    Bile Acids, Total   Result Value Ref Range    Ictotest POSITIVE (A) NEGATIVE       EKG / Radiology:     EKG:  Reviewed by me --    CXR:   Reviewed by me --    CT ABDOMEN PELVIS WO CONTRAST Additional Contrast? None    Result Date: 11/9/2021  PROCEDURE: CT ABDOMEN PELVIS WO CONTRAST CLINICAL INFORMATION: Jaundice and abdominal pain . TECHNIQUE: 2-D multiplanar noncontrast images of the abdomen and pelvis All CT scans at this facility use dose modulation, iterative reconstruction, and/or weight-based dosing when appropriate to reduce radiation dose to as low as reasonably achievable. COMPARISON: No prior study. FINDINGS: Limitations: Solid organs and hollow viscera evaluation limited without contrast. Lung bases Lung bases are clear moderate-sized hiatal hernia. Mitral annular calcifications. Abdomen pelvis Hepatomegaly. Distended gallbladder with a fold in the Fundal portion. No stones. Common bile duct at the head of the pancreas is dilated. Spleen is normal. Pancreas is fatty infiltrated. Adrenals are normal. No renal calculi or hydronephrosis. Aorta is unremarkable. Pelvis Severe uncomplicated colonic diverticulosis. Urinary bladder is unremarkable. No abnormal fluid collections. Degenerative changes throughout the thoracic spine and pelvis no suspicious bone lesions dextroscoliosis. Distended gallbladder with dilated common bile duct but no stones. 2. Hepatomegaly. **This report has been created using voice recognition software. It may contain minor errors which are inherent in voice recognition technology. ** Final report electronically signed by Dr. Bin Cui on 11/9/2021 8:59 PM    US GALLBLADDER RUQ    Result Date: 11/9/2021  PROCEDURE: US GALLBLADDER RUQ CLINICAL INFORMATION: Jaundice . TECHNIQUE: Grayscale and color Doppler ultrasound COMPARISON: CT abdomen pelvis same-day FINDINGS: The gallbladder is very distended. There is sludge in the dependent portion of the gallbladder. There is no wall thickening. There is no para cholecystic edema. Common bile duct is dilated up to 1.4 cm. And there is intrahepatic biliary ductal dilatation. Liver - L= 17.6 cm Gallbladder - 9.9 x 4.2 x 4.5 cm Gallbladder Wall - 0.2 cm Common Duct - 1.4 cm Pena's Sign: No     Distended gallbladder with gallbladder sludge both intra and extrahepatic biliary duct dilatation. No stones no para cholecystic edema. **This report has been created using voice recognition software. It may contain minor errors which are inherent in voice recognition technology. ** Final report electronically signed by Dr. Navarro Hubbard on 11/9/2021 9:24 PM    FEN/GI/DVT:  IVF: None  Electrolytes: Monitor and replace per protocols  Diet: NPO  GI PPX: On PPI for GERD  DVT Prophylaxis: SCDs    CODE STATUS:  Full    Thank you No primary care provider on file. for the opportunity to be involved in this patient's care. Electronically signed by Kwame Umanzor DO on 11/10/2021 at 6:34 AM     Addendum: Patient's chart was reviewed, and I discussed care plans with Dr. Kwame Umanzor. I agree with her assessment plan as detailed above.

## 2021-11-10 NOTE — FLOWSHEET NOTE
11/10/21 0645   Provider Notification   Reason for Communication Review case   Provider Name Dr. Seymour Phelps   Provider Notification Physician   Method of Communication Secure Message   Response Waiting for response   Notification Time 0645   new consult sent at this time.

## 2021-11-10 NOTE — PROGRESS NOTES
Hospitalist Progress Note      Patient:  Kayode Abreu      Unit/Bed:6K-24/024-A    YOB: 1940    MRN: [id number]       Acct: [de-identified]     PCP: No primary care provider on file. Date of Admission: 11/9/2021    Assessment/Plan:    1. 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 per MRCP -- apprec GI and surgery assist -- MRCP 11/10/21 with dilated intra and extrahepatic bile ducts, distended gallbladder with a fold in the gallbladder with probable sludge within the gallbladder but no definite evidence of cholelithiasis or choledocholithiasis, possible 9.5 mm long stricture in the distal common bile duct--> thus Per GI ERCP 11/10 (p)  -- cont IVF, avoid hepatotoxins  -- currently denies abd pain  -- RUQ u/s 11/9 = distended GB with sludge and dilated both intra and extra hepatic biliary ductal dilation but no stones noted  -- CEA 1.5 on 11/10  -- afeb, WBC normal, no abd pain/RUQ TTP/GB wall edema on imaging thus hold any antibiotics as less likely ascending cholangitis  2. Elevated LFT/transamitis -- see above - apprec GI assist -- AST/ALT stable with elevated alk phos likely related to #1 -- CT abd 11/9 noted hepatomegaly and distended GB and dilated CBD but no stones --> MRCP with #1 and plan for ERCP 11/10 (p) -- per GI -- cont monitor   -- cont IVF, avoid hepatotoxins  -- INR 11/9 WNL  3. Hypokalemia -- improved to 3.9 on 11/10 w/o replacement from 3.4 on admission -- low likely due to decreased po intake - monitor  4. Pancreatic cystic lesions -- noted per MRCP 11/10/21 with 2.8 mm cystic lesion in the body of the pancreas. 5.7 and 4.6 mm cystic areas in the tail of the pancreas --lipase 11/9 WNL -??Repeat after ERCP  --CA 19-9 (p)  5. Normocytic anemia --hemoglobin down to 11.3 on 11/10 from 12.7 on admission 11/9 --no signs of loss --likely delusional but will continue to monitor closely with above  6.  Abnormal u/a -- u/a on admission with LE, nitrite, many bacteria but pt w/o urinary sx -- monitor off atbx unless sx, fever, leukocytosis  7. GERD/Hiatal hernia -- PPI  8. Unintentional weight loss -- ?related to #1,2 -- GI w/u with above in process - ?check TSH. ? dietician c/s  9. Lumbar spondylosis/dextroscoliosis -- noted on MRI abd 11/10/21 -- pt asx  10. Severe uncomplicated colonic diverticulosis--noted per CT abdomen 11/9    Dispo  -- 11/10/2021 -> apprec GI and surgery assist -- plan for ERCP this pm given MRCP results - cont trend LFT, lytes, renal fxn, BP, h/h,     Chief Complaint: Yellow skin    Hospital Course: Gurjit Bedolla is a [de-identified] y.o. female with no significant past medical history, remote smoker who presented with jaundice. LFT's elevated, bili 19.5 with direct >10CT abdomen noted distended gallbladder with dilated common bile duct but no stones, u/s with distended GB and sludge but not stones. Admitted for further eval and tx. Subjective/HPI:   -- 11/10/2021  --> pt denies any pain --denies any abdominal pain, diarrhea, constipation. She has no nausea or vomiting. States her itching is a little bit better. She is currently n.p.o. She denies any chest pain or shortness of breath. Denies f/c. On RA. Afebrile. Ambulating without difficulty -no lightheaded or dizziness      PMH, SURGICAL HX, FH, SOCIAL HX reviewed and updated as needed.     Medications:  Reviewed    Infusion Medications    sodium chloride      lactated ringers       Scheduled Medications    sodium chloride flush  5-40 mL IntraVENous 2 times per day    pantoprazole  40 mg IntraVENous Daily    indomethacin  100 mg Rectal Once     PRN Meds: sodium chloride flush, sodium chloride, ondansetron **OR** ondansetron, polyethylene glycol, acetaminophen **OR** acetaminophen      Intake/Output Summary (Last 24 hours) at 11/10/2021 1245  Last data filed at 11/10/2021 0311  Gross per 24 hour   Intake 0 ml   Output --   Net 0 ml       Diet:  Diet NPO    Exam:  BP (!) 170/73   Pulse 68   Temp 98.8 °F (37.1 °C) (Oral)   Resp 14   Ht 5' 8\" (1.727 m)   Wt 163 lb 12.8 oz (74.3 kg)   SpO2 96%   BMI 24.91 kg/m²     General appearance: No apparent distress, appears stated age and cooperative. Oriented x 3.  +jaundiced, non-ill appearing  HEENT: Pupils equal, round, and reactive to light.  +scleral icterus. MMM. Neck: Supple, with full range of motion. Trachea midline. Respiratory:  Normal respiratory effort. Clear to auscultation, bilaterally without Rales/Wheezes/Rhonchi. No respiratory distress or accessory muscle use. Cardiovascular: Regular rate and rhythm with normal S1/S2 without murmurs, rubs or gallops. No JVD. Abdomen: Soft, non-tender, non-distended with normal bowel sounds. No rebound or guarding  Musculoskeletal: No clubbing, cyanosis or edema bilaterally. Full range of motion without deformity. No calf tenderness palpation  Skin: Yellow skin diffusely No rashes or lesions. Neurologic:  Neurovascularly intact without any focal sensory/motor deficits. Cranial nerves: II-XII intact, grossly non-focal.  Psychiatric: Alert and oriented, thought content appropriate, normal insight  Capillary Refill: Brisk,< 3 seconds   Peripheral Pulses: +2 palpable, equal bilaterally       All labs reviewed and interpreted by me:  Labs:   Recent Labs     11/09/21  1845 11/10/21  0652   WBC 8.0 5.9   HGB 12.7 11.3*   HCT 39.3 34.0*    330     Recent Labs     11/09/21  1845 11/10/21  0652    143   K 3.4* 3.9    108   CO2 21* 22*   BUN 13 11   CREATININE 0.8 0.6   CALCIUM 9.7 9.1     Recent Labs     11/09/21  1845 11/10/21  0652 11/10/21  0934   * 107*  --    * 228*  --    BILIDIR >10.0*  --  >10.0*   BILITOT 19.5* 16.3*  --    ALKPHOS 483* 421*  --      Recent Labs     11/09/21  1940   INR 1.04     No results for input(s): CKTOTAL, TROPONINT in the last 72 hours.     Urinalysis:      Lab Results   Component Value Date    NITRU POSITIVE 11/09/2021    WBCUA 25-50 11/09/2021    BACTERIA MANY 11/09/2021    RBCUA 3-5 11/09/2021    BLOODU SMALL 11/09/2021    GLUCOSEU NEGATIVE 11/09/2021       All radiology images and reports reviewed and interpreted by me:  Radiology:  MRI ABDOMEN W WO CONTRAST MRCP   Final Result       1. Dilated intra and extrahepatic bile ducts. Distended gallbladder with a fold in the gallbladder. There is probable sludge within the gallbladder. No definite evidence of cholelithiasis or choledocholithiasis. 2. Possible 9.5 mm long stricture in the distal common bile duct. 3. 2.8 mm cystic lesion in the body of the pancreas. 5.7 and 4.6 mm cystic areas in the tail of the pancreas. No dilatation of the pancreatic duct. 4. Hiatal hernia. 5. Lumbar spondylosis. Dextroscoliosis. 6. Otherwise negative MRI scan of the abdomen and MRCP. **This report has been created using voice recognition software. It may contain minor errors which are inherent in voice recognition technology. **      Final report electronically signed by DR Mai Cr on 11/10/2021 10:03 AM      [employer]   Final Result   Distended gallbladder with gallbladder sludge both intra and extrahepatic biliary duct dilatation. No stones no para cholecystic edema. **This report has been created using voice recognition software. It may contain minor errors which are inherent in voice recognition technology. **      Final report electronically signed by Dr. Deangelo Prather on 11/9/2021 9:24 PM      CT ABDOMEN PELVIS WO CONTRAST Additional Contrast? None   Final Result   Distended gallbladder with dilated common bile duct but no stones. 2. Hepatomegaly. **This report has been created using voice recognition software. It may contain minor errors which are inherent in voice recognition technology. **      Final report electronically signed by Dr. Deangelo Prather on 11/9/2021 8:59 PM          Diet: Diet NPO    Colón: No    Microbiology: Urine culture 11/9 (p)    Tele review last 24 hrs:  SR    DVT prophylaxis: [] Lovenox                                 [x] SCDs                                 [] SQ Heparin                                 [] Encourage ambulation           [] Already on Anticoagulation     Disposition:    [] Home       [] TCU       [] Rehab       [] Psych       [] SNF       [] [place]       [] Other-    Code Status: Full Code    PT/OT Eval Status: not needed      Electronically signed by Rudy Capps MD on 11/10/2021 at 12:45 PM

## 2021-11-10 NOTE — PROGRESS NOTES
BRIEF H&P//fENDOSCOPY PREPROCEDURE REPORT     Patient: Mehran Glover  : 1940  Acct#: [de-identified]    Date: 11/10/2021  Indications/Brief History: Obstructive jaundice. TB 17  Anticipated Procedure: ERCP with possible stent    ASA class:  2  Airway Adequate? Yes___x__   No_______    Preprocedure Exam:   Neuro: Alert, oriented. Jaundiced  Heart:  Regular rate and rhythm   Lungs: Clear to ausculation bilaterally, no evidence respiratory distress  Abdomen:  soft. NT    PLAN:  EGD_____   COLONOSCOPY ______     ERCP__x______    The I/R/B/A of the procedure were discussed with the patient. She was informed that risks included but not limited to adv medication reaction, bleeding, perforation, infection and pancreatitis. Estimated risk of pancreatitis 1/10, other risks lower. Following discussion she expressed understanding and did wish to proceed.      Iglesia Upton MD MD  11/10/2021, 2:31 PM

## 2021-11-10 NOTE — CONSULTS
Consult History & Physical      Patient:  Bhavik Cortes  YOB: 1940  MRN: [id number]     Acct: [de-identified]    Chief Complaint:    Chief Complaint   Patient presents with    Jaundice    Heartburn       Date of Service: Pt seen/examined in consultation on 11/10/2021    History Of Present Illness:      [de-identified] y.o. female who we are asked to see/evaluate by Cody Miranda MD for medical management of suspect choledocholithiasis. Patient was admitted on 11/09/2021 for jaundice. PMH significant with . Patient has been notice of juandice since 11/05/21. Associated symptom includes RUQ pain, itching and dark urine with light color stool. Hospital day 2, GI was consulted for possible choledocholithiasis. Liver function test transaminitis with AST//228 and significant Alk phos elevate at 421. CT abdominal (11/9) show Distend gallbladder with di late common bile duct but no stone and hepatomegaly. RUQ US (11/09): show evidence of distened gall bladder with gallbladder sludge both intra and extra hepatic biliary duct dilation with no stone no para cholecystic edema. Upon visit, patient is rest on bed. Patient reported she has been feeling ill Oct 23. Her symptom began with heart burn which she didn't has before. She took 1 or 2 Tums daily but she heart burn didn't completely resolve. She also experience sharp pain in her RUQ which last for about 15 minutes. She recognized that her urine turn to darker color like bloody urine and her stool has pale color. She didn't reconized that her skin get jaundice since she lives at home by herself with yellow light. On Friday, she talk to her pharmacist in which he recognized her skin color change and recommend her to have it check up. Patient didn't have any medical history except cataract (which was schedule for last Monday and it was cancelled due to patient in ED). Take multivitamin daily.      Patient moved to [facility] 5 years ago and didn't have any PCP to to her medicare switch. He reported hx of 10 years smoking when she was young with 1/5 pack/day (5 pack years). Quited smoking  In 1969. Denied any alcohol use, receational drug use. Have 3 vaginal birth without any blood transfusion product. Patient reported has colonoscopy done in 1980 with 2 polyps was removed    Patient is afebrile with normal WBC count thus she will be stable for ERCP     Past Medical History:    History reviewed. No pertinent past medical history. Home Medications:  Prior to Admission medications    Not on File   Multivitamins    Surgical History:  Appendectomy     Family History: Mother with Colon cancer (diagnotic at age 79)  Father with lung and bladder cancer (heavy smoker)      Past GI History:  Colonoscopy (1980) with 2 polyps removal  Heart burn    Allergies:  Patient has no known allergies. Social History:   TOBACCO:   reports that she has quit smoking. She does not have any smokeless tobacco history on file. ETOH:   reports previous alcohol use. Review Of Systems  GENERAL: No fever, chills or weight loss. EYES:  Yes blurred vision, No double vision   CARDIOVASCULAR: No chest pain or palpitations. RESPIRATORY:  No dyspnea or cough. GI:  See HPI  MUSCULOSKELETAL: No new painful or swollen joints or myalgias. :   No dysuria or hematuria. SKIN:  Jaundice, no rash  NEUROLOGIC:  No headaches or seizures, numbness or tingling of arms, or legs. PSYCH:  No anxiety or depression. ENDOCRINE:  No polyuria or polydipsia. BLOOD:  No anemia, bleeding disorder, blood or blood product transfusion. PHYSICAL EXAM:  BP (!) 166/75   Pulse 66   Temp 97.7 °F (36.5 °C) (Oral)   Resp 16   Ht 5' 8\" (1.727 m)   Wt 163 lb 12.8 oz (74.3 kg)   SpO2 96%   BMI 24.91 kg/m²     General appearance: No apparent distress, appears stated age and cooperative. HEENT: Normal cephalic, atraumatic without obvious deformity.  Pupils equal, round, and reactive to light, sclera icterus bilateral  Neck: Supple, with full range of motion. No jugular venous distention. Trachea midline. Respiratory:  Normal respiratory effort. Clear to auscultation, bilaterally without Rales/Wheezes/Rhonchi. Cardiovascular: Regular rate and rhythm without murmurs, rubs or gallops. Abdomen: Soft, non-tender, non-distended with active bowel sounds. Musculoskeletal: No clubbing, cyanosis or edema bilaterally. Skin: Jaundice, warm, dry. No rashes or lesions. Psychiatric: Alert and oriented, thought content appropriate, normal insight    Labs:   Recent Labs     11/10/21  0652   WBC 5.9   HGB 11.3*   HCT 34.0*        Recent Labs     11/10/21  0652      K 3.9      CO2 22*   BUN 11   CREATININE 0.6   CALCIUM 9.1     Recent Labs     11/09/21  1845 11/09/21  1845 11/10/21  0652   *   < > 107*   *   < > 228*   BILIDIR >10.0*  --   --    BILITOT 19.5*   < > 16.3*   ALKPHOS 483*   < > 421*    < > = values in this interval not displayed. Recent Labs     11/09/21  1940   INR 1.04       Radiology:   MRI Abdo (11/10/21):   1. Dilated intra and extrahepatic bile ducts. Distended gallbladder with a fold in the gallbladder. There is probable sludge within the gallbladder. No definite evidence of cholelithiasis or choledocholithiasis. 2. Possible 9.5 mm long stricture in the distal common bile duct. 3. 2.8 mm cystic lesion in the body of the pancreas. 5.7 and 4.6 mm cystic areas in the tail of the pancreas. No dilatation of the pancreatic duct. 4. Hiatal hernia. 5. Lumbar spondylosis. Dextroscoliosis. 6. Otherwise negative MRI scan of the abdomen and MRCP. RUQ US (11/09/21):   1. Distended gallbladder with gallbladder sludge both intra and extrahepatic biliary duct dilatation. 2. No stones no para cholecystic edema. Dr. Sae Naranjo MD    CT abdo (11/9/21): Dr. Sae Naranjo MD  1. Impression Distended gallbladder with dilated common bile duct but no stones.    2. Hepatomegaly. Code Status: Full Code    ASSESSMENT:  1. Jaundice (likely secondary to obstruction)  2. Cystic lesions on Pancreas (2.8 mm cystic lesion in the body of the pancreas. 5.7 and 4.6 mm cystic areas in the tail of the pancreas)  3. Possible 9.5 mm long stricture in the distal common bile duct  4. Primary   5. Hiatal hernia  6. Hepatomegaly  7. Transaminitis  8. Normocytic Anemia   9. Asymptomatic UTI  10. Lumbar spindylosis    PLAN:     Direct and Indirect bili   Alkaline phosphate fraction   CA 19-9   ERCP with stent placement with brushing patho. If negative then may need EUS   Discuss with patient abut ERCP procedure include risks and benefit. All questions were answered.  Indomethacin suppository to be given in Endo         Case reviewed and impression/plan reviewed in collaboration with Dr. Jody Burton  Electronically signed by Yolanda Escalera DO on 11/10/2021 at 9:06 AM    [facility]  Thank you for the consultation.

## 2021-11-10 NOTE — ED PROVIDER NOTES
eMERGENCY dEPARTMENT eNCOUnter      [address] [facility]    Chief Complaint   Patient presents with    Jaundice    Heartburn       HPI    Gutierres Delay is a [de-identified] y.o. female who presents to the emergency department because of jaundice. Patient noticed to have jaundice on Friday. Day before that patient started having pain in her right upper quadrant which lasted few moments. Patient also has been complaining of skin itching, dark urine and north colored stool. There is no fever or chills. There is no bleeding. PAST MEDICAL HISTORY    History reviewed. No pertinent past medical history. SURGICAL HISTORY    History reviewed. No pertinent surgical history. CURRENT MEDICATIONS        ALLERGIES    No Known Allergies    FAMILY HISTORY    History reviewed. No pertinent family history. SOCIAL HISTORY    Social History     Socioeconomic History    Marital status:      Spouse name: None    Number of children: None    Years of education: None    Highest education level: None   Occupational History    None   Tobacco Use    Smoking status: Former Smoker    Smokeless tobacco: None   Substance and Sexual Activity    Alcohol use: Not Currently     Comment: rarely    Drug use: None    Sexual activity: None   Other Topics Concern    None   Social History Narrative    None     Social Determinants of Health     Financial Resource Strain:     Difficulty of Paying Living Expenses: Not on file   Food Insecurity:     Worried About Running Out of Food in the Last Year: Not on file    Roberto of Food in the Last Year: Not on file   Transportation Needs:     Lack of Transportation (Medical): Not on file    Lack of Transportation (Non-Medical):  Not on file   Physical Activity:     Days of Exercise per Week: Not on file    Minutes of Exercise per Session: Not on file   Stress:     Feeling of Stress : Not on file   Social Connections:     Frequency of Communication with Friends and Family: Not on file    Frequency of Social Gatherings with Friends and Family: Not on file    Attends [employer] Services: Not on file    Active Member of Clubs or Organizations: Not on file    Attends Club or Organization Meetings: Not on file    Marital Status: Not on file   Intimate Partner Violence:     Fear of Current or Ex-Partner: Not on file    Emotionally Abused: Not on file    Physically Abused: Not on file    Sexually Abused: Not on file   Housing Stability:     Unable to Pay for Housing in the Last Year: Not on file    Number of [place] in the Last Year: Not on file    Unstable Housing in the Last Year: Not on file       REVIEW OF SYSTEMS    Constitutional:  Denies fever, chills, weight loss or weakness   Eyes:  Denies photophobia or discharge   HENT:  Denies sore throat or ear pain   Respiratory:  Denies cough or shortness of breath   Cardiovascular:  Denies chest pain, palpitations or swelling   GI:  Denies abdominal pain, nausea, vomiting, or diarrhea   Musculoskeletal:  Denies back pain   Skin: Complaining of jaundice and skin  Neurologic:  Denies headache, focal weakness or sensory changes   Endocrine:  Denies polyuria or polydypsia   Lymphatic:  Denies swollen glands   Psychiatric:  Denies depression, suicidal ideation or homicidal ideation   All systems negative except as marked. PHYSICAL EXAM    VITAL SIGNS: BP (!) 148/71   Pulse 71   Temp 98.7 °F (37.1 °C) (Oral)   Resp 16   Ht 5' 8\" (1.727 m)   Wt 165 lb (74.8 kg)   SpO2 98%   BMI 25.09 kg/m²    Constitutional:  Well developed, Well nourished, No acute distress, Non-toxic appearance. HENT:  Normocephalic, Atraumatic, Bilateral external ears normal, Oropharynx moist, No oral exudates, Nose normal. Neck- Normal range of motion, No tenderness, Supple, No stridor. Eyes:  PERRL, EOMI, Conjunctiva normal, No discharge. Respiratory:  Normal breath sounds, No respiratory distress, No wheezing, No chest tenderness.    Cardiovascular: Normal heart rate, Normal rhythm, No murmurs, No rubs, No gallops. GI:  Bowel sounds normal, Soft, No tenderness, No masses, No pulsatile masses. :  No CVA tenderness. Musculoskeletal:  Intact distal pulses, No edema, No tenderness, No cyanosis, No clubbing. Good range of motion in all major joints. No tenderness to palpation or major deformities noted. Back- No tenderness. Integument:  Warm, Dry, No erythema, No rash. Jaundice  Lymphatic:  No lymphadenopathy noted. Neurologic:  Alert & oriented x 3, Normal motor function, Normal sensory function, No focal deficits noted. Psychiatric:  Affect normal, Judgment normal, Mood normal.         RADIOLOGY    US GALLBLADDER RUQ   Final Result   Distended gallbladder with gallbladder sludge both intra and extrahepatic biliary duct dilatation. No stones no para cholecystic edema. **This report has been created using voice recognition software. It may contain minor errors which are inherent in voice recognition technology. **      Final report electronically signed by Dr. Justyna Tan on 11/9/2021 9:24 PM      CT ABDOMEN PELVIS WO CONTRAST Additional Contrast? None   Final Result   Distended gallbladder with dilated common bile duct but no stones. 2. Hepatomegaly. **This report has been created using voice recognition software. It may contain minor errors which are inherent in voice recognition technology. **      Final report electronically signed by Dr. Justyna Tan on 11/9/2021 8:59 PM          LABS  Labs Reviewed   CBC WITH AUTO DIFFERENTIAL - Abnormal; Notable for the following components:       Result Value    RBC 4.09 (*)     RDW-SD 50.1 (*)     All other components within normal limits   BASIC METABOLIC PANEL - Abnormal; Notable for the following components:    Potassium 3.4 (*)     CO2 21 (*)     Glucose 136 (*)     All other components within normal limits   HEPATIC FUNCTION PANEL - Abnormal; Notable for the following components: Total Bilirubin 19.5 (*)     Bilirubin, Direct >10.0 (*)     Alkaline Phosphatase 483 (*)      (*)      (*)     All other components within normal limits   GLOMERULAR FILTRATION RATE, ESTIMATED - Abnormal; Notable for the following components:    Est, Glom Filt Rate 69 (*)     All other components within normal limits   URINE WITH REFLEXED MICRO - Abnormal; Notable for the following components:    Bilirubin Urine LARGE (*)     Blood, Urine SMALL (*)     Protein,  (*)     Nitrite, Urine POSITIVE (*)     Leukocyte Esterase, Urine LARGE (*)     Color, UA DK YELLOW (*)     Character, Urine TURBID (*)     All other components within normal limits   BILE ACIDS, TOTAL - Abnormal; Notable for the following components:    Ictotest POSITIVE (*)     All other components within normal limits   CULTURE, REFLEXED, URINE    Narrative:     Source: Specimen not received       Site:           Current Antibiotics:   LIPASE   AMMONIA   ANION GAP   OSMOLALITY   PROTIME-INR         CONSULTS  Dr Rach Grimaldo from general surgery  Dr Price Hinojosa from gastroenterology  Dr. Jamaal Ram from hospitalist group. ED COURSE & MEDICAL DECISION MAKING    Pertinent Labs & Imaging studies reviewed. (See chart for details)  History patient has obstructive jaundice. Patient CT scan of the abdomen and pelvis and ultrasound confirmed that, there was no stone seen. I talked to Dr. Seymour Phelps from general surgery for the consultation she also advised me that I should talk to the gastroenterologist.  All of patient's labs the testing were reviewed discussed with the findings. FINAL IMPRESSION    1.  Obstructive jaundice             Courtney Francisco MD  11/09/21 0267

## 2021-11-10 NOTE — PROGRESS NOTES
ENDOSCOPY POSTPROCEDURE REPORT     PT NAME: Ewa Brandon  MEDICAL RECORD NUMBER: [id number]  YOB: 1940    PROCEDURE PERFORMED BY : Dea Hubbard MD    PROCEDURE TYPE:   EGD ______   COLONOSCOPY _______   ERCP ________  MEDICATIONS USED :  VERSED _____   FENTANYL_____   PROPOFOL ______  Patient tolerated procedure well. No apparent complications. Estimated blood loss:  Nil  Specimens removed:  Yes_____  No______  Disposition of Specimens:  To Lab      BRIEF  FINDINGS:  1.  2.  3. PRELIMINARY PLAN:  1.  2.  3.    For complete findings and plan, see dictated report. Dea Hubbard MD, MD  11/10/2021  4:13 PM                                            ENDOSCOPY POSTPROCEDURE REPORT     PT NAME: Gregorio [facility]  MEDICAL RECORD NUMBER: [id number]  YOB: 1940    PROCEDURE PERFORMED BY : Dea Hubbard MD    PROCEDURE TYPE:   EGD ______   COLONOSCOPY _______   ERCP ____x____  MEDICATIONS USED :  VERSED _____   FENTANYL_____   GA ___x___  Patient tolerated procedure well. Estimated blood loss:  Nil  Specimens removed:  Yes_____  No__x____  Disposition of Specimens:  To Lab      BRIEF  FINDINGS:  1. Endoscopically normal ampulla, though mildly prominent. 2.  Narrowing of duodenum with erythema and inflammatory changes noted. Difficult initially to advance scope into descending duodenum, but achieved with gentle efforts. 3. Initial cannulation into PD. Appeared mildly dilated o/w unremarkable limited exam.  4. Unable to cannulate the CBD. Therefore, pre-cut needle knife endoscopic sphincterotomy done in standard fashion. However, with the final application of the needle, appearance suggested perforation. Tiny amount of contrast placed into area of sphincterotomy and appeared to be extra-ductal. Effort was made to place a hemoclip across the sphincterotomy site to close it, but unable to deploy the clip appropriately.

## 2021-11-10 NOTE — FLOWSHEET NOTE
11/10/21 0723   Provider Notification   Reason for Communication Review case   Provider Name Dr. Ace Hernandez   Provider Notification Physician   Method of Communication Secure Message   Response Waiting for response   Notification Time 0811   new consult sent at this time.

## 2021-11-10 NOTE — PROGRESS NOTES
ENDOSCOPY POSTPROCEDURE REPORT     PT NAME: Michelle Cruz  MEDICAL RECORD NUMBER: [id number]  YOB: 1940    PROCEDURE PERFORMED BY : Bernabe Dumont MD    PROCEDURE TYPE:   EGD ___x___   COLONOSCOPY _______   ERCP ________  MEDICATIONS USED :  VERSED _____   FENTANYL_____  Sedated on vent in ICU __x____  Patient tolerated procedure well. No apparent complications. Estimated blood loss:  Nil  Specimens removed:  Yes_____  No__x____  Disposition of Specimens:  To Lab      BRIEF  FINDINGS:  1. Mod-large distal esophageal varices. Two fibrin clots over variceal columns and blood in esophagus. However, no active variceal bleeding observed with close inspection. Banding of varices performed in standard fashion w/o difficulty. 4 columns banded, including the two sites suggesting small fibrin clots. 2. Large pool of blood in fundus. Unable to visualize whether gastric varices present. 3. O/w bland exam    PRELIMINARY PLAN:  1. Maintain intensive support in ICU  2. Continue Octreotide and PPI infusion  3. Prognosis remains very guarded    For complete findings and plan, see dictated report.      Bernabe Dumont MD, MD  11/10/2021  6:07 PM

## 2021-11-10 NOTE — CONSULTS
Doroteo markham MD  General Surgery consult   Pt Name: Susan Wakefield  MRN: [id number]  YOB: 1940  Date of evaluation: 11/10/2021  Primary Care Physician: No primary care provider on file. Consulting Physician:  Emergency department   Reason for evaluation: jaundice       Chief complaint:      Chief Complaint   Patient presents with    Jaundice    Heartburn        SUBJECTIVE:     HPI:    Sangeeta Renteria is a [de-identified] y. o.female who presented to the emergency department after noticing she had been jaundice for about 5 days. Patient states in hindsight for approximately a month she has had some intermittent symptoms that have been different. She has had some reflux she has had kind of decreased appetite 10 pound weight loss. Occasionally she will have some right upper quadrant twinges that she says are mild and self-limited. She has not really felt sick just not herself. She presented to the emergency department where she was found to be significantly jaundiced, ultrasound was performed demonstrating biliary sludge. Review of Systems:  Review of Systems   Constitutional: Positive for fatigue and unexpected weight change. Negative for appetite change and chills. HENT: Negative. Respiratory: Negative. Cardiovascular: Negative. Gastrointestinal: Negative for anal bleeding, blood in stool and diarrhea. Genitourinary: Negative. Musculoskeletal: Negative. Skin:        Change in color also exceptionally itchy. Neurological: Negative. Hematological: Negative. Psychiatric/Behavioral: Negative. Past Medical History  History reviewed. No pertinent past medical history. Past Surgical History  History reviewed. No pertinent surgical history.     Medications  Prior to Admission medications    Not on File    Scheduled Meds:   sodium chloride flush  5-40 mL IntraVENous 2 times per day    pantoprazole  40 mg IntraVENous Daily     Continuous Infusions:   sodium chloride       PRN Meds:.sodium chloride flush, sodium chloride, ondansetron **OR** ondansetron, polyethylene glycol, acetaminophen **OR** acetaminophen    Allergies  No Known Allergies     Family History  History reviewed. No pertinent family history. No family history of pancreatic cancer    Social History  Social History     Socioeconomic History    Marital status:      Spouse name: None    Number of children: None    Years of education: None    Highest education level: None   Occupational History    None   Tobacco Use    Smoking status: Former Smoker    Smokeless tobacco: None   Substance and Sexual Activity    Alcohol use: Not Currently     Comment: rarely    Drug use: None    Sexual activity: None   Other Topics Concern    None   Social History Narrative    None     Social Determinants of Health     Financial Resource Strain:     Difficulty of Paying Living Expenses: Not on file   Food Insecurity:     Worried About Running Out of Food in the Last Year: Not on file    Roberto of Food in the Last Year: Not on file   Transportation Needs:     Lack of Transportation (Medical): Not on file    Lack of Transportation (Non-Medical):  Not on file   Physical Activity:     Days of Exercise per Week: Not on file    Minutes of Exercise per Session: Not on file   Stress:     Feeling of Stress : Not on file   Social Connections:     Frequency of Communication with Friends and Family: Not on file    Frequency of Social Gatherings with Friends and Family: Not on file    Attends [employer] Services: Not on file    Active Member of Clubs or Organizations: Not on file    Attends Club or Organization Meetings: Not on file    Marital Status: Not on file   Intimate Partner Violence:     Fear of Current or Ex-Partner: Not on file    Emotionally Abused: Not on file    Physically Abused: Not on file    Sexually Abused: Not on file   Housing Stability:     Unable to Pay for Housing in the Last Year: Not on file    Number of Places Lived in the Last Year: Not on file    Unstable Housing in the Last Year: Not on file         OBJECTIVE:   CURRENT VITALS:  height is 5' 8\" (1.727 m) and weight is 163 lb 12.8 oz (74.3 kg). Her oral temperature is 97.7 °F (36.5 °C). Her blood pressure is 166/75 (abnormal) and her pulse is 66. Her respiration is 16 and oxygen saturation is 96%. Body mass index is 24.91 kg/m². Physical Exam  Vitals reviewed. Constitutional:       Appearance: Normal appearance. She is not ill-appearing. HENT:      Head: Normocephalic. Mouth/Throat:      Mouth: Mucous membranes are dry. Eyes:      General: Scleral icterus present. Pupils: Pupils are equal, round, and reactive to light. Cardiovascular:      Rate and Rhythm: Normal rate. Pulses: Normal pulses. Pulmonary:      Effort: Pulmonary effort is normal. No respiratory distress. Abdominal:      General: There is no distension. Palpations: Abdomen is soft. Tenderness: There is no abdominal tenderness. Musculoskeletal:         General: Normal range of motion. Skin:     General: Skin is warm. Neurological:      General: No focal deficit present. Mental Status: She is alert and oriented to person, place, and time.    Psychiatric:         Mood and Affect: Mood normal.         Behavior: Behavior normal.          LABS:     Recent Labs     11/09/21  1845 11/09/21  1940 11/09/21  1945 11/10/21  0652 11/10/21  0934   WBC 8.0  --   --  5.9  --    HGB 12.7  --   --  11.3*  --    HCT 39.3  --   --  34.0*  --      --   --  330  --      --   --  143  --    K 3.4*  --   --  3.9  --      --   --  108  --    CO2 21*  --   --  22*  --    BUN 13  --   --  11  --    CREATININE 0.8  --   --  0.6  --    CALCIUM 9.7  --   --  9.1  --    INR  --  1.04  --   --   --    *  --   --  107*  --    *  --   --  228*  --    BILITOT 19.5*  --   --  16.3*  --    BILIDIR >10.0*  --   --   -- >10.0*   LIPASE 43.9  --   --   --   --    NITRU  --   --  POSITIVE*  --   --    COLORU  --   --  DK YELLOW*  --   --    BACTERIA  --   --  MANY  --   --        RADIOLOGY:   I have personally reviewed the following films:  MRI ABDOMEN W WO CONTRAST MRCP   Final Result       1. Dilated intra and extrahepatic bile ducts. Distended gallbladder with a fold in the gallbladder. There is probable sludge within the gallbladder. No definite evidence of cholelithiasis or choledocholithiasis. 2. Possible 9.5 mm long stricture in the distal common bile duct. 3. 2.8 mm cystic lesion in the body of the pancreas. 5.7 and 4.6 mm cystic areas in the tail of the pancreas. No dilatation of the pancreatic duct. 4. Hiatal hernia. 5. Lumbar spondylosis. Dextroscoliosis. 6. Otherwise negative MRI scan of the abdomen and MRCP. **This report has been created using voice recognition software. It may contain minor errors which are inherent in voice recognition technology. **      Final report electronically signed by DR Dharmesh Fay on 11/10/2021 10:03 AM      [address] [employer]   Final Result   Distended gallbladder with gallbladder sludge both intra and extrahepatic biliary duct dilatation. No stones no para cholecystic edema. **This report has been created using voice recognition software. It may contain minor errors which are inherent in voice recognition technology. **      Final report electronically signed by Dr. Ben Flood on 11/9/2021 9:24 PM      CT ABDOMEN PELVIS WO CONTRAST Additional Contrast? None   Final Result   Distended gallbladder with dilated common bile duct but no stones. 2. Hepatomegaly. **This report has been created using voice recognition software. It may contain minor errors which are inherent in voice recognition technology. **      Final report electronically signed by Dr. Ben Flood on 11/9/2021 8:59 PM              Assement and recommendation   This is an 41-year-old female who presents with obstructive jaundice. Although she does have gallbladder sludge and it could be secondary to choledocholithiasis from the sludge her clinical begin picture in my opinion is much more concerning for a possible pancreatic malignancy as she presents with a classic painful jaundice. She also has had significant weight loss. She has had minimal to no biliary symptoms. Next back step would be to order a MRCP. Recommended a GI consult when patient was in the emergency department to evaluate for possible ERCP. We will continue to follow along. Should she ultimately be found to have choledocholithiasis ERCP will be done first to clear her ducts then will perform cholecystectomy.                 Electronically signed by Alejandro Wilson MD  on 11/10/2021 at 11:41 AM

## 2021-11-10 NOTE — PROGRESS NOTES
Hospitalist  --Pt seen earlier today by this provider prior to procedure ERCP --- Notified by 6K CARLOS Taylor See that concern for possible need for ICU bed per CT abd reading by radiologist of \"retroperitoneal perforation with a large amount of free air surrounding the right kidney, the head of the pancreas and proximal duodenum tracking inferiorly along the paracolic gutter. Lida Borjas The patient is being transferred to the intensive care unit for further evaluation and clinical management. \"  General surgery PA Izabella Bennett on call for Dr. Ev Brian notified who then talked to Dr. Ev Brian about results. Plans then made by them to transfer to ICU and likely will need transfer to [facility] for further hepatobiliary intervention but will need close monitoring until transfer can be arranged.   Per RN Dr. Anant Yen PA discussed the case with ICU attending Dr. Zac Nj and patient is transferred to the ICU from endoscopy thus attending service will be assumed by ICU team.    Electronically signed by Tyson De La Paz MD on 11/10/2021 at 6:05 PM

## 2021-11-10 NOTE — PROGRESS NOTES
BRIEF H&P//fENDOSCOPY PREPROCEDURE REPORT     Patient: Roscoe Cha  : 1940  Acct#: [de-identified]    Date: 11/10/2021  Indications/Brief History: Critically ill in ICU with ongoing bleeding. Cirrhosis with hx/o varices. Prognosis guarded. Intubated on vent. Pressors. Anticipated Procedure: EGD with possible endoscopic therapy    ASA class:  4  Airway Adequate? Yes__x___   No_______    Preprocedure Exam:   Neuro:Sedated on vent   Heart:  Regular rate and rhythm   Lungs: Clear anteriorly  Abdomen:  soft.       PLAN:  EGD___x__   COLONOSCOPY ______     ERCP________    Milagro Veronica MD MD  11/10/2021, 6:04 PM

## 2021-11-10 NOTE — PROGRESS NOTES
Patient admitted to [facility] Room 24 from ED  Complaint upon arrival to the room Jaundice  IV none infusing into the antecubital left, condition patent and no redness at a rate of 0 mls/ hour with about 0 mls in the bag still. IV site free of s/s of infection or infiltration. Vital signs obtained. Assessment and data collection initiated. Oriented to room. Policies and procedures for 4a explained All questions answered with no further questions at this time. Fall prevention and safety brochure discussed with patient. 2 person skin check completed with [facility].     Patient declines PCP notification  Patient declines

## 2021-11-11 VITALS
RESPIRATION RATE: 26 BRPM | HEIGHT: 68 IN | BODY MASS INDEX: 25.06 KG/M2 | WEIGHT: 165.34 LBS | SYSTOLIC BLOOD PRESSURE: 162 MMHG | OXYGEN SATURATION: 99 % | TEMPERATURE: 98.8 F | DIASTOLIC BLOOD PRESSURE: 68 MMHG | HEART RATE: 71 BPM

## 2021-11-11 LAB
ALBUMIN SERPL-MCNC: 3.6 G/DL (ref 3.5–5.1)
ALP BLD-CCNC: 467 U/L (ref 38–126)
ALT SERPL-CCNC: 237 U/L (ref 11–66)
ANION GAP SERPL CALCULATED.3IONS-SCNC: 16 MEQ/L (ref 8–16)
AST SERPL-CCNC: 117 U/L (ref 5–40)
BASOPHILS # BLD: 0.2 %
BASOPHILS ABSOLUTE: 0 THOU/MM3 (ref 0–0.1)
BILIRUB SERPL-MCNC: 19.9 MG/DL (ref 0.3–1.2)
BUN BLDV-MCNC: 15 MG/DL (ref 7–22)
CA 19-9: 263 U/ML (ref 0–35)
CALCIUM SERPL-MCNC: 9 MG/DL (ref 8.5–10.5)
CHLORIDE BLD-SCNC: 103 MEQ/L (ref 98–111)
CO2: 21 MEQ/L (ref 23–33)
CREAT SERPL-MCNC: 0.7 MG/DL (ref 0.4–1.2)
EOSINOPHIL # BLD: 0 %
EOSINOPHILS ABSOLUTE: 0 THOU/MM3 (ref 0–0.4)
ERYTHROCYTE [DISTWIDTH] IN BLOOD BY AUTOMATED COUNT: 14.6 % (ref 11.5–14.5)
ERYTHROCYTE [DISTWIDTH] IN BLOOD BY AUTOMATED COUNT: 50.5 FL (ref 35–45)
GFR SERPL CREATININE-BSD FRML MDRD: 80 ML/MIN/1.73M2
GLUCOSE BLD-MCNC: 119 MG/DL (ref 70–108)
HCT VFR BLD CALC: 35.8 % (ref 37–47)
HEMOGLOBIN: 11.9 GM/DL (ref 12–16)
IMMATURE GRANS (ABS): 0.04 THOU/MM3 (ref 0–0.07)
IMMATURE GRANULOCYTES: 0.4 %
LYMPHOCYTES # BLD: 10.8 %
LYMPHOCYTES ABSOLUTE: 1.1 THOU/MM3 (ref 1–4.8)
MCH RBC QN AUTO: 31.4 PG (ref 26–33)
MCHC RBC AUTO-ENTMCNC: 33.2 GM/DL (ref 32.2–35.5)
MCV RBC AUTO: 94.5 FL (ref 81–99)
MONOCYTES # BLD: 7.4 %
MONOCYTES ABSOLUTE: 0.8 THOU/MM3 (ref 0.4–1.3)
NUCLEATED RED BLOOD CELLS: 0 /100 WBC
ORGANISM: ABNORMAL
PLATELET # BLD: 390 THOU/MM3 (ref 130–400)
PMV BLD AUTO: 10.8 FL (ref 9.4–12.4)
POTASSIUM SERPL-SCNC: 3.7 MEQ/L (ref 3.5–5.2)
RBC # BLD: 3.79 MILL/MM3 (ref 4.2–5.4)
SEG NEUTROPHILS: 81.2 %
SEGMENTED NEUTROPHILS ABSOLUTE COUNT: 8.4 THOU/MM3 (ref 1.8–7.7)
SODIUM BLD-SCNC: 140 MEQ/L (ref 135–145)
TOTAL PROTEIN: 6 G/DL (ref 6.1–8)
URINE CULTURE REFLEX: ABNORMAL
WBC # BLD: 10.3 THOU/MM3 (ref 4.8–10.8)

## 2021-11-11 PROCEDURE — 80053 COMPREHEN METABOLIC PANEL: CPT

## 2021-11-11 PROCEDURE — 6360000002 HC RX W HCPCS: Performed by: STUDENT IN AN ORGANIZED HEALTH CARE EDUCATION/TRAINING PROGRAM

## 2021-11-11 PROCEDURE — 85025 COMPLETE CBC W/AUTO DIFF WBC: CPT

## 2021-11-11 PROCEDURE — 2580000003 HC RX 258: Performed by: STUDENT IN AN ORGANIZED HEALTH CARE EDUCATION/TRAINING PROGRAM

## 2021-11-11 PROCEDURE — 36415 COLL VENOUS BLD VENIPUNCTURE: CPT

## 2021-11-11 PROCEDURE — C9113 INJ PANTOPRAZOLE SODIUM, VIA: HCPCS | Performed by: STUDENT IN AN ORGANIZED HEALTH CARE EDUCATION/TRAINING PROGRAM

## 2021-11-11 PROCEDURE — 99223 1ST HOSP IP/OBS HIGH 75: CPT | Performed by: INTERNAL MEDICINE

## 2021-11-11 RX ADMIN — PANTOPRAZOLE SODIUM 40 MG: 40 INJECTION, POWDER, FOR SOLUTION INTRAVENOUS at 06:21

## 2021-11-11 RX ADMIN — PIPERACILLIN AND TAZOBACTAM 3375 MG: 3; .375 INJECTION, POWDER, LYOPHILIZED, FOR SOLUTION INTRAVENOUS at 04:47

## 2021-11-11 ASSESSMENT — PAIN DESCRIPTION - LOCATION: LOCATION: ABDOMEN

## 2021-11-11 ASSESSMENT — PAIN SCALES - GENERAL
PAINLEVEL_OUTOF10: 0
PAINLEVEL_OUTOF10: 5
PAINLEVEL_OUTOF10: 0

## 2021-11-11 ASSESSMENT — PAIN DESCRIPTION - ORIENTATION: ORIENTATION: ANTERIOR;MID

## 2021-11-11 ASSESSMENT — PAIN DESCRIPTION - DESCRIPTORS: DESCRIPTORS: DISCOMFORT

## 2021-11-11 ASSESSMENT — PAIN DESCRIPTION - PAIN TYPE: TYPE: ACUTE PAIN

## 2021-11-11 NOTE — CONSULTS
Patient:  Suzi Rosas    Unit/Bed:3A-12/012-A  MRN: [id number]   PCP: No primary care provider on file. Date of Admission: 11/9/2021    Assessment and Plan(All pulmonary edema, renal failure, PE, and respiratory failure diagnoses are acute in nature unless otherwise specified):        1. Retroperitoneal perforation: [place] during ERCP. Zosyn initiated 11/10/2021. Injury occurred 11/10/2021.  2. Obstructive jaundice: Concern for malignancy. Patient referred to higher level facility where more appropriate management can be performed. 3. Cystic pancreatic lesions: Located in the body of the pancreas. 4.6 mm x 5.7 mm. Concern for malignancy present. 4. Moderate protein calorie malnutrition: Currently NPO. Associated with weight loss of 10 pounds in 1 month. 5. Disposition: Awaiting transfer to . Does not require intensive care management. CC: Obstructive jaundice  HPI: Patient is an [de-identified] white female with no prior history of coronary artery disease or hypertension. She presented to the emergency room and was admitted on 11/9/2021 with a 5-day history of progressive jaundice. She has a history of reflux esophagitis and decreased appetite. She has a 10 pound weight loss of about 30 days duration. She has occasional right upper quadrant discomfort which is mild and lasts less than 10 minutes. There was no associated nausea or vomiting. In the emergency room she underwent ultrasound which showed biliary sludge. She was admitted to the hospital and surgery was consulted. There was concern for possible pancreatic malignancy and patient was ordered MRCP/ERCP. GI was consulted and patient underwent ERCP on 11/10/2021. Ampulla and major papula were of normal appearance on endoscopy. Procedure was complicated by retroperitoneal perforation. Procedure was terminated. Patient was started on Zosyn.   Patient was administered lactated Ringer's at 100 cc an hour and kept n.p.o.  Plans were made for transfer to . Patient was placed in the intensive care unit for closer supervision. For further details, please review the assessment and plan. ROS: Patient denies fevers chills nausea vomiting abdominal pain or diarrhea. She does complain that she is hungry and has not eaten since Monday. She complains of the NG tube discomfort. She denies shortness of breath. Does complain of weight loss over the past several weeks. She does complain of discoloration of her skin. No hallucinations. All other review of systems are otherwise negative. PMH:  Per HPI  SHX: Reformed smoker. . FHX: No known premature coronary artery disease  Allergies: No known drug allergies  Medications:     sodium chloride      lactated ringers 100 mL/hr at 11/11/21 0300      sodium chloride flush  5-40 mL IntraVENous 2 times per day    pantoprazole  40 mg IntraVENous Daily    piperacillin-tazobactam  3,375 mg IntraVENous Q6H       Vital Signs:   T: 98.8: P: 73 RR: 16 B/P: 133/89: FiO2: 4L: O2 Sat:100: I/O: 1227/1225 GCS: 15  Body mass index is 25.14 kg/m². Sofia Gull General:   Chronically ill and frail appearing white female with obvious weight loss. HEENT: Temporal wasting. Sofia Gull Positive scleral icterus. PERR  Neck: supple. No Thyromegaly. Loss of supraclavicular fat. Lungs: clear to auscultation. No retractions  Cardiac: RRR. No JVD. Abdomen: soft. Nontender. Nondistended. Extremities:  No clubbing, cyanosis x 4. Vasculature: capillary refill < 3 seconds. Palpable dorsalis pedis pulses. Skin:  warm and dry. Jaundiced. Diminished skin turgor. Psych:  Alert and oriented x3. Affect appropriate  Lymph:  No supraclavicular adenopathy. Neurologic:  No focal deficit. No seizures. No mild clonus. Muscle atrophy noted. Data: (All radiographs, tracings, PFTs, and imaging are personally viewed and interpreted unless otherwise noted).  Telemetry shows sinus rhythm.    CT scan abdomen report 11/10/2021: Retroperitoneal perforation with a large amount of free air surrounding the right kidney.  Sodium 140, potassium 3.7, chloride 103, bicarb 21, BUN 15, creatinine 0.7, glucose 119. Albumin 3.6. Alkaline phosphatase 167. . . Bilirubin 19.9. White blood cell count 10.3, hemoglobin 1.9, platelets 632.  CA 19-9 263. CEA 1.5. Case discussed with Dr. Fadi Boyd. Electronically signed by Jaz Wakefield M.D.

## 2021-11-11 NOTE — PROGRESS NOTES
1720- this RN heard from endo that patient may require ICU for bowel perf. 46- Dr Michele Syed notified. She asked if surgery had been contacted since they were on the case. 1- TAO Frankel notified with surgery and he stated he had not heard anything about this patient. He stated he would call Dr Myron Cuevas to update her about case. TAO Mckeon called this RN regarding patient needed transported to tertiary center when bed is available. Transfer order for ICU until transfer. He stated Dr Myron Cuevas talked with Dr Rosita Aquino, ICU intensivist regarding case. 1750- This RN called PACU to update them on transfer and receive report on procedure. 1755- This RN called report to [facility] on 3A.   1800- This RN updated Brandan José, patient's son, on the transfer and updated plan of care. This RN took patient's belongings to room including clothes, purse, cell phone, and folder with book.

## 2021-11-11 NOTE — CARE COORDINATION
11/11/21, 8:54 AM EST  DISCHARGE PLANNING EVALUATION:    Nadya Hernandez       Admitted: 11/9/2021/ [facility] day: 2   Location: -12/012-A Reason for admit: Obstructive jaundice [K83.1]   PMH:  has no past medical history on file. Procedure:   11/9 CT Abd/pelvis:   Distended gallbladder with dilated common bile duct but no stones. 2. Hepatomegaly     11/9 US GB/RUQ:   Distended gallbladder with gallbladder sludge both intra and extrahepatic biliary duct dilatation. No stones no para cholecystic edema. 11/10 MRCP:   1. Dilated intra and extrahepatic bile ducts. Distended gallbladder with a fold in the gallbladder. There is probable sludge within the gallbladder. No definite evidence of cholelithiasis or choledocholithiasis. 2. Possible 9.5 mm long stricture in the distal common bile duct. 3. 2.8 mm cystic lesion in the body of the pancreas. 5.7 and 4.6 mm cystic areas in the tail of the pancreas. No dilatation of the pancreatic duct. 4. Hiatal hernia. 5. Lumbar spondylosis. Dextroscoliosis. 6. Otherwise negative MRI scan of the abdomen and MRCP     11/10 ERCP: 1.  Endoscopically unremarkable ampulla and major papilla. 2.  Mucosal erythema and edema in the region of the duodenal bulb and  proximal most descending duodenum. Endoscopic appearance benign. 3.  Limited pancreatogram appeared grossly unremarkable though mild  dilation suspected. 4.  No biliary cholangiograms were able to be obtained. Suspected  retroperitoneal perforation    11/10 CT Abd/pelvis:   As suspected, there is retroperitoneal perforation with a large amount of free air surrounding the right kidney, the head of the pancreas and proximal duodenum tracking inferiorly along the paracolic gutter. No free fluid fluid collections are observed.     The patient is being transferred to the intensive care unit for further evaluation and clinical management     Barriers to Discharge: Presented to ED with 5-day history of progressive jaundice. Has had decreased appetite, and a 10# weight loss of about 30 days duration. Reports occasional RUQ discomfort. Admitted to [address] [place] [address]. GI consulted for suspected choledocholithiasis. General Surgery consulted for suspected choledocholithiasis and consideration of elective Cholecystectomy. Taken for ERCP yesterday; procedure was complicated by retroperitoneal perforation and procedure was terminated. Transferred to ICU for closer monitoring. Started on Zosyn. Plan for transfer to . Afebrile. NSR. On room air. Ox4. NPO. Telemetry, NG to LIWS, SCDs. LR 2 100 ml/hr, IV protonix, IV zosyn. Received IV cipro x1 yesterday. Alk phos 483- now 467.  - now 237, bili 19.5 - now 19.9, direct bili >10, hgb 12.7 - now 11.9, CA 19-9 263. Urine +nitrates and large leukocytes - sent for culture. PCP: No primary care provider on file. Readmission Risk Score: 6.8 ( )%    Patient Goals/Plan/Treatment Preferences: Patient was currently being loaded on stretcher for transport to  when this CM arrived to . Transportation/Food Security/Housekeeping Addressed:  No issues identified.

## 2021-11-11 NOTE — PROGRESS NOTES
0917-Received notification that [facility] en route to transport [facility] to [facility]. Patient declined this RN notifying family. States she will update them via personal phone on the way to the facility.

## 2021-11-11 NOTE — PROCEDURES
[address] [place] [address]                                 PROCEDURE NOTE    PATIENT NAME: Dong Cortez             :        1940  MED REC NO:   [id number]                           ROOM:       0012  ACCOUNT NO:   [de-identified]                           ADMIT DATE: 2021  PROVIDER:     MELANIA Becerril Button:  11/10/2021    PROCEDURE TYPE:  ERCP    SURGEON:  Awais Lee M.D. INSTRUMENT:  Olympus therapeutic video duodenoscope. MEDICATIONS:  The patient's procedure was done under general anesthesia. She did receive ciprofloxacin 400 mg IV piggyback and also Indocin  suppositories 50 mg x2 rectally just prior to the procedure. ESTIMATED BLOOD LOSS: nil    BRIEF HISTORY:  The patient is an [de-identified]year-old, currently admitted with  jaundice and a total bilirubin of around 17 mg/dL. MRCP suggested a  common bile duct stricture, being about a centimeter long. The patient  also had some suspected sludge in the gallbladder but no common bile  duct stones seen. Due to her obstructive jaundice, ERCP with endoscopic  sphincterotomy and stent placement was planned. I discussed the  indications, risks, benefits, and alternatives of the procedure with the  patient in the endoscopy department. She was informed that risks  included but were not limited to adverse medication reaction, bleeding,  perforation, infection and pancreatitis. The risk of pancreatitis  estimated to be 1 in 10, and the risk of other complications lower than  this. Following discussion, she expressed understanding and did wish to  proceed. DESCRIPTION OF PROCEDURE:  The patient was in room 14. She was seen by  the nurse anesthetist and placed under general anesthesia. She was  prone on the fluoroscopy table. Subsequently, the patient was given the  Indocin suppositories. She had been given IV Cipro.   Then the was  strongly suspected. Initially an effort was made to place a resolution  clip across the top portion of the endoscopic sphincterotomy in hopes of  closing this defect. However, this proved unsuccessful. An upper  endoscope was obtained and advanced into the descending duodenum, but  again the positioning was not adequate to place a resolution clip. Subsequently, the scope was removed as well as air was suctioned out and  the scope was withdrawn. The patient otherwise tolerated the procedure  well, but again suspected complication of perforation. Photographic documentation was obtained and will be printed. IMPRESSION  1. Endoscopically unremarkable ampulla and major papilla. 2.  Mucosal erythema and edema in the region of the duodenal bulb and  proximal most descending duodenum. Endoscopic appearance benign. 3.  Limited pancreatogram appeared grossly unremarkable though mild  dilation suspected. 4.  No biliary cholangiograms were able to be obtained. Suspected  retroperitoneal perforation. ASSESSMENT:  As described above, the procedure appears to have been  complicated by perforation. I did speak with Dr. Natacha Bean at [address] [place] [address]. The patient received IV antibiotics and will be placed  now on Zosyn 3.375 gm IV piggyback q. 6.  A nasogastric tube was placed  by the nurse anesthetist.  The patient is going down now for a stat CT  scan. I will discuss with her and her children transfer to [facility] for further evaluation and management. PLAN:  See discussion above.         Paco Carter M.D.    D: 11/10/2021 16:36:33       T: 11/10/2021 16:39:14     [email or web address]  Job#: [id number]     Doc#: [id number]    CC:

## 2021-11-12 LAB — ALKALINE PHOSPHATASE BONE FRACTION: NORMAL

## 2021-11-12 NOTE — DISCHARGE SUMMARY
Assessment and Plan(All pulmonary edema, renal failure, PE, and respiratory failure diagnoses are acute in nature unless otherwise specified):        1. Retroperitoneal perforation: [place] during ERCP. Zosyn initiated 11/10/2021. Injury occurred 11/10/2021.  2. Obstructive jaundice: Concern for malignancy. Patient referred to higher level facility where more appropriate management can be performed. 3. Cystic pancreatic lesions: Located in the body of the pancreas. 4.6 mm x 5.7 mm. Concern for malignancy present. 4. Moderate protein calorie malnutrition: Currently NPO. Associated with weight loss of 10 pounds in 1 month. 5. Disposition: Awaiting transfer to . Does not require intensive care management.     CC: Obstructive jaundice  HPI: Patient is an 56-year-old white female with no prior history of coronary artery disease or hypertension. She presented to the emergency room and was admitted on 11/9/2021 with a 5-day history of progressive jaundice. She has a history of reflux esophagitis and decreased appetite. She has a 10 pound weight loss of about 30 days duration. She has occasional right upper quadrant discomfort which is mild and lasts less than 10 minutes. There was no associated nausea or vomiting. In the emergency room she underwent ultrasound which showed biliary sludge. She was admitted to the hospital and surgery was consulted. There was concern for possible pancreatic malignancy and patient was ordered MRCP/ERCP. GI was consulted and patient underwent ERCP on 11/10/2021. Ampulla and major papula were of normal appearance on endoscopy. Procedure was complicated by retroperitoneal perforation. Procedure was terminated. Patient was started on Zosyn. Patient was administered lactated Ringer's at 100 cc an hour and kept n.p.o.  Plans were made for transfer to . Patient was placed in the intensive care unit for closer supervision.   Patient was transferred to  for higher level care.  Patient was stable to transfer. Electronically signed by Dean De Leon MD.

## 2021-12-28 ENCOUNTER — OFFICE VISIT (OUTPATIENT)
Dept: FAMILY MEDICINE CLINIC | Age: 81
End: 2021-12-28
Payer: MEDICARE

## 2021-12-28 ENCOUNTER — NURSE ONLY (OUTPATIENT)
Dept: LAB | Age: 81
End: 2021-12-28

## 2021-12-28 VITALS
BODY MASS INDEX: 24.17 KG/M2 | OXYGEN SATURATION: 96 % | HEART RATE: 72 BPM | TEMPERATURE: 97 F | WEIGHT: 150.4 LBS | DIASTOLIC BLOOD PRESSURE: 56 MMHG | RESPIRATION RATE: 16 BRPM | HEIGHT: 66 IN | SYSTOLIC BLOOD PRESSURE: 100 MMHG

## 2021-12-28 DIAGNOSIS — R79.89 ELEVATED LFTS: ICD-10-CM

## 2021-12-28 DIAGNOSIS — R68.2 DRY MOUTH: ICD-10-CM

## 2021-12-28 DIAGNOSIS — D64.9 NORMOCYTIC ANEMIA: ICD-10-CM

## 2021-12-28 DIAGNOSIS — E80.6 HYPERBILIRUBINEMIA: ICD-10-CM

## 2021-12-28 DIAGNOSIS — I48.91 ATRIAL FIBRILLATION, UNSPECIFIED TYPE (HCC): ICD-10-CM

## 2021-12-28 DIAGNOSIS — C25.9 MALIGNANT NEOPLASM OF PANCREAS, UNSPECIFIED LOCATION OF MALIGNANCY (HCC): Primary | ICD-10-CM

## 2021-12-28 LAB
ALBUMIN SERPL-MCNC: 3.6 G/DL (ref 3.5–5.1)
ALP BLD-CCNC: 192 U/L (ref 38–126)
ALT SERPL-CCNC: 24 U/L (ref 11–66)
ANION GAP SERPL CALCULATED.3IONS-SCNC: 11 MEQ/L (ref 8–16)
AST SERPL-CCNC: 25 U/L (ref 5–40)
BASOPHILS # BLD: 0.8 %
BASOPHILS ABSOLUTE: 0.1 THOU/MM3 (ref 0–0.1)
BILIRUB SERPL-MCNC: 1.9 MG/DL (ref 0.3–1.2)
BUN BLDV-MCNC: 17 MG/DL (ref 7–22)
CALCIUM SERPL-MCNC: 9.3 MG/DL (ref 8.5–10.5)
CHLORIDE BLD-SCNC: 100 MEQ/L (ref 98–111)
CO2: 24 MEQ/L (ref 23–33)
CREAT SERPL-MCNC: 1.4 MG/DL (ref 0.4–1.2)
EOSINOPHIL # BLD: 0.7 %
EOSINOPHILS ABSOLUTE: 0.1 THOU/MM3 (ref 0–0.4)
ERYTHROCYTE [DISTWIDTH] IN BLOOD BY AUTOMATED COUNT: 14.4 % (ref 11.5–14.5)
ERYTHROCYTE [DISTWIDTH] IN BLOOD BY AUTOMATED COUNT: 52.3 FL (ref 35–45)
GFR SERPL CREATININE-BSD FRML MDRD: 36 ML/MIN/1.73M2
GLUCOSE BLD-MCNC: 121 MG/DL (ref 70–108)
HCT VFR BLD CALC: 32.3 % (ref 37–47)
HEMOGLOBIN: 9.9 GM/DL (ref 12–16)
IMMATURE GRANS (ABS): 0.1 THOU/MM3 (ref 0–0.07)
IMMATURE GRANULOCYTES: 0.8 %
LYMPHOCYTES # BLD: 33.5 %
LYMPHOCYTES ABSOLUTE: 4.1 THOU/MM3 (ref 1–4.8)
MCH RBC QN AUTO: 30.2 PG (ref 26–33)
MCHC RBC AUTO-ENTMCNC: 30.7 GM/DL (ref 32.2–35.5)
MCV RBC AUTO: 98.5 FL (ref 81–99)
MONOCYTES # BLD: 9 %
MONOCYTES ABSOLUTE: 1.1 THOU/MM3 (ref 0.4–1.3)
NUCLEATED RED BLOOD CELLS: 0 /100 WBC
PLATELET # BLD: 565 THOU/MM3 (ref 130–400)
PMV BLD AUTO: 10.8 FL (ref 9.4–12.4)
POTASSIUM SERPL-SCNC: 3.8 MEQ/L (ref 3.5–5.2)
RBC # BLD: 3.28 MILL/MM3 (ref 4.2–5.4)
SEG NEUTROPHILS: 55.2 %
SEGMENTED NEUTROPHILS ABSOLUTE COUNT: 6.7 THOU/MM3 (ref 1.8–7.7)
SODIUM BLD-SCNC: 135 MEQ/L (ref 135–145)
T4 FREE: 1.88 NG/DL (ref 0.93–1.76)
TOTAL PROTEIN: 7.2 G/DL (ref 6.1–8)
TSH SERPL DL<=0.05 MIU/L-ACNC: 5.56 UIU/ML (ref 0.4–4.2)
WBC # BLD: 12.1 THOU/MM3 (ref 4.8–10.8)

## 2021-12-28 PROCEDURE — 93000 ELECTROCARDIOGRAM COMPLETE: CPT | Performed by: STUDENT IN AN ORGANIZED HEALTH CARE EDUCATION/TRAINING PROGRAM

## 2021-12-28 PROCEDURE — 99204 OFFICE O/P NEW MOD 45 MIN: CPT | Performed by: STUDENT IN AN ORGANIZED HEALTH CARE EDUCATION/TRAINING PROGRAM

## 2021-12-28 RX ORDER — MULTIVIT WITH MINERALS/LUTEIN
1000 TABLET ORAL DAILY
COMMUNITY
End: 2022-01-19

## 2021-12-28 RX ORDER — TRAMADOL HYDROCHLORIDE 50 MG/1
50 TABLET ORAL EVERY 6 HOURS PRN
COMMUNITY
End: 2022-01-19

## 2021-12-28 RX ORDER — ASPIRIN 81 MG/1
81 TABLET, CHEWABLE ORAL DAILY
COMMUNITY
End: 2022-05-04 | Stop reason: SDUPTHER

## 2021-12-28 RX ORDER — OXYBUTYNIN CHLORIDE 5 MG/1
5 TABLET ORAL 3 TIMES DAILY
COMMUNITY
End: 2022-03-07 | Stop reason: ALTCHOICE

## 2021-12-28 RX ORDER — CHLORAL HYDRATE 500 MG
CAPSULE ORAL
COMMUNITY
End: 2022-01-19

## 2021-12-28 RX ORDER — AMIODARONE HYDROCHLORIDE 200 MG/1
TABLET ORAL
COMMUNITY
Start: 2021-11-16 | End: 2022-01-12 | Stop reason: SDUPTHER

## 2021-12-28 RX ORDER — LANOLIN ALCOHOL/MO/W.PET/CERES
1 CREAM (GRAM) TOPICAL 3 TIMES DAILY
COMMUNITY
End: 2022-01-19

## 2021-12-28 RX ORDER — DOCUSATE SODIUM 100 MG/1
100 CAPSULE, LIQUID FILLED ORAL 2 TIMES DAILY
COMMUNITY

## 2021-12-28 RX ORDER — VITAMIN B COMPLEX
1 CAPSULE ORAL DAILY
COMMUNITY
End: 2022-01-19

## 2021-12-28 RX ORDER — PANTOPRAZOLE SODIUM 40 MG/10ML
40 INJECTION, POWDER, LYOPHILIZED, FOR SOLUTION INTRAVENOUS DAILY
COMMUNITY
End: 2022-01-13 | Stop reason: CLARIF

## 2021-12-28 RX ORDER — AMLODIPINE BESYLATE 10 MG/1
TABLET ORAL
COMMUNITY
Start: 2021-11-16 | End: 2022-01-12 | Stop reason: SDUPTHER

## 2021-12-28 RX ORDER — ONDANSETRON 4 MG/1
4 TABLET, FILM COATED ORAL EVERY 8 HOURS PRN
COMMUNITY
End: 2022-03-07 | Stop reason: SDUPTHER

## 2021-12-28 SDOH — ECONOMIC STABILITY: FOOD INSECURITY: WITHIN THE PAST 12 MONTHS, YOU WORRIED THAT YOUR FOOD WOULD RUN OUT BEFORE YOU GOT MONEY TO BUY MORE.: NEVER TRUE

## 2021-12-28 SDOH — ECONOMIC STABILITY: FOOD INSECURITY: WITHIN THE PAST 12 MONTHS, THE FOOD YOU BOUGHT JUST DIDN'T LAST AND YOU DIDN'T HAVE MONEY TO GET MORE.: NEVER TRUE

## 2021-12-28 ASSESSMENT — PATIENT HEALTH QUESTIONNAIRE - PHQ9
SUM OF ALL RESPONSES TO PHQ9 QUESTIONS 1 & 2: 0
SUM OF ALL RESPONSES TO PHQ QUESTIONS 1-9: 0
1. LITTLE INTEREST OR PLEASURE IN DOING THINGS: 0
2. FEELING DOWN, DEPRESSED OR HOPELESS: 0
SUM OF ALL RESPONSES TO PHQ QUESTIONS 1-9: 0
SUM OF ALL RESPONSES TO PHQ QUESTIONS 1-9: 0

## 2021-12-28 ASSESSMENT — ENCOUNTER SYMPTOMS
ABDOMINAL PAIN: 1
BACK PAIN: 0
DIARRHEA: 0
COUGH: 0
CONSTIPATION: 0
NAUSEA: 0
VOMITING: 0
WHEEZING: 0
SHORTNESS OF BREATH: 0

## 2021-12-28 ASSESSMENT — SOCIAL DETERMINANTS OF HEALTH (SDOH): HOW HARD IS IT FOR YOU TO PAY FOR THE VERY BASICS LIKE FOOD, HOUSING, MEDICAL CARE, AND HEATING?: NOT VERY HARD

## 2021-12-28 NOTE — PROGRESS NOTES
Health Maintenance Due   Topic Date Due    COVID-19 Vaccine (1) Never done    DTaP/Tdap/Td vaccine (1 - Tdap) Never done    Shingles Vaccine (1 of 2) Never done    DEXA (modify frequency per FRAX score)  Never done    Annual Wellness Visit (AWV)  Never done    Pneumococcal 65+ years Vaccine (2 of 2 - PPSV23) 12/21/2021

## 2021-12-28 NOTE — PATIENT INSTRUCTIONS
[facility] with [facility] as planned  Keep appointment with hematology/oncology as planned  Continue home health as planned  Check CMP, free T4/TSH, and CBC at this time  Obtain old records from [facility] regarding postoperative atrial fibrillation  Decrease amlodipine to 5 mg daily Home health to monitor blood pressures.   Decrease oxybutynin to 2.5 mg 3 times daily    Start taking Eliquis (blood thinner) 5 mg twice daily

## 2021-12-28 NOTE — PROGRESS NOTES
SUBJECTIVE     Kashif Peterson is a [de-identified] y. o.female    Chief Complaint   Patient presents with    New Patient     [place] in [place], [facility] care       Chief complaint, [place], and all pertinent details of the case reviewed with the resident. Please see resident's note for specific details discussed at today's visit. Patient Active Problem List   Diagnosis    Obstructive jaundice    Hyperbilirubinemia    Common bile duct stricture    Elevated LFTs    Hypokalemia    Pancreatic cyst    Normocytic anemia    Abnormal urinalysis    Unintentional weight loss    Hiatal hernia    Diverticulosis    Malignant neoplasm of pancreas (HCC)    Atrial fibrillation (HCC)       Current Outpatient Medications   Medication Sig Dispense Refill    amiodarone (CORDARONE) 200 MG tablet       amLODIPine (NORVASC) 10 MG tablet       metoprolol tartrate (LOPRESSOR) 25 MG tablet       aspirin 81 MG chewable tablet Take 81 mg by mouth daily      docusate sodium (COLACE) 100 MG capsule Take 100 mg by mouth 2 times daily      ondansetron (ZOFRAN) 4 MG tablet Take 4 mg by mouth every 8 hours as needed for Nausea or Vomiting      oxybutynin (DITROPAN) 5 MG tablet Take 5 mg by mouth 3 times daily      pantoprazole (PROTONIX) 40 MG injection Infuse 40 mg intravenously daily      traMADol (ULTRAM) 50 MG tablet Take 50 mg by mouth every 6 hours as needed for Pain.       Ascorbic Acid (VITAMIN C) 1000 MG tablet Take 1,000 mg by mouth daily      Cholecalciferol (VITAMIN D3) 125 MCG (5000 UT) CHEW Take by mouth      glucosamine-chondroitin 500-400 MG tablet Take 1 tablet by mouth 3 times daily      b complex vitamins capsule Take 1 capsule by mouth daily      CALCIUM MAGNESIUM ZINC PO Take by mouth      Omega-3 1000 MG CAPS Take by mouth      vitamin E 1000 units capsule Take 1,000 Units by mouth daily      apixaban (ELIQUIS) 5 MG TABS tablet Take 1 tablet by mouth 2 times daily 180 tablet 1     No current facility-administered medications for this visit. Review of Systems per Dr. Willam Metz     BP (!) 100/56 (Site: Right Upper Arm, Position: Sitting, Cuff Size: Medium Adult)   Pulse 72   Temp 97 °F (36.1 °C) (Skin)   Resp 16   Ht 5' 6\" (1.676 m)   Wt 150 lb 6.4 oz (68.2 kg)   SpO2 96%   BMI 24.28 kg/m²   BP Readings from Last 3 Encounters:   12/28/21 (!) 100/56   11/11/21 (!) 162/68   11/10/21 (!) 105/58       Wt Readings from Last 3 Encounters:   12/28/21 150 lb 6.4 oz (68.2 kg)   11/10/21 165 lb 5.5 oz (75 kg)     Body mass index is 24.28 kg/m². Physical Exam  Vitals and nursing note reviewed. Constitutional:       General: She is not in acute distress. Appearance: Normal appearance. She is normal weight. She is not ill-appearing, toxic-appearing or diaphoretic. HENT:      Head: Normocephalic and atraumatic. Nose: Nose normal.   Eyes:      General: No scleral icterus. Right eye: No discharge. Left eye: No discharge. Extraocular Movements: Extraocular movements intact. Conjunctiva/sclera: Conjunctivae normal.      Pupils: Pupils are equal, round, and reactive to light. Neck:      Vascular: No carotid bruit. Cardiovascular:      Rate and Rhythm: Normal rate and regular rhythm. Heart sounds: Normal heart sounds. No murmur heard. No friction rub. No gallop. Pulmonary:      Effort: Pulmonary effort is normal. No respiratory distress. Breath sounds: Normal breath sounds. No stridor. No wheezing, rhonchi or rales. Abdominal:      General: Abdomen is flat. Bowel sounds are normal. There is no distension. Palpations: Abdomen is soft. There is no mass. Tenderness: There is no abdominal tenderness. There is no guarding or rebound. Hernia: No hernia is present. Musculoskeletal:         General: No swelling or signs of injury. Normal range of motion. Cervical back: Normal range of motion. Right lower leg: No edema. Left lower leg: No edema. Skin:     General: Skin is warm and dry. Coloration: Skin is not jaundiced or pale. Findings: No bruising, erythema, lesion or rash. Neurological:      General: No focal deficit present. Mental Status: She is alert and oriented to person, place, and time. Psychiatric:         Mood and Affect: Mood normal.         Behavior: Behavior normal.         Thought Content: Thought content normal.         Judgment: Judgment normal.           No results found for this visit on 12/28/21. Lab Results   Component Value Date    LABA1C 5.7 11/10/2021       No results found for: CHOL, TRIG, HDL, LDLCALC, LDLDIRECT    The ASCVD Risk score (Sánchez Hernández., et al., 2013) failed to calculate for the following reasons: The 2013 ASCVD risk score is only valid for ages 36 to 78    Lab Results   Component Value Date     12/28/2021    K 3.8 12/28/2021     12/28/2021    CO2 24 12/28/2021    BUN 17 12/28/2021    CREATININE 1.4 (H) 12/28/2021    GLUCOSE 121 (H) 12/28/2021    CALCIUM 9.3 12/28/2021    PROT 7.2 12/28/2021    LABALBU 3.6 12/28/2021    BILITOT 1.9 (H) 12/28/2021    ALKPHOS 192 (H) 12/28/2021    AST 25 12/28/2021    ALT 24 12/28/2021    LABGLOM 36 (A) 12/28/2021         Lab Results   Component Value Date    TSH 5.560 (H) 12/28/2021    T4FREE 1.88 (H) 12/28/2021       Lab Results   Component Value Date    WBC 12.1 (H) 12/28/2021    HGB 9.9 (L) 12/28/2021    HCT 32.3 (L) 12/28/2021    MCV 98.5 12/28/2021     (H) 12/28/2021           There is no immunization history on file for this patient.     Health Maintenance   Topic Date Due    COVID-19 Vaccine (1) Never done    DTaP/Tdap/Td vaccine (1 - Tdap) Never done    Shingles Vaccine (1 of 2) Never done    DEXA (modify frequency per FRAX score)  Never done    Annual Wellness Visit (AWV)  Never done    Pneumococcal 65+ years Vaccine (2 of 2 - PPSV23) 12/21/2021    TSH testing  12/28/2022    Flu vaccine  Completed  Hepatitis A vaccine  Aged Out    Hepatitis B vaccine  Aged Out    Hib vaccine  Aged Out    Meningococcal (ACWY) vaccine  Aged Out            Future Appointments   Date Time Provider Jeanne Finch   1/5/2022  2:00 PM Juan Barney MD N Oncology [facility] [email or web address]   1/28/2022  9:20 AM Marcial Rose MD SRPX FM RES [facility] [place] [address]       ASSESSMENT       Diagnosis Orders   1. Malignant neoplasm of pancreas, unspecified location of malignancy ([facility] [address]     2. Hyperbilirubinemia  Comprehensive Metabolic Panel   3. Elevated LFTs  Comprehensive Metabolic Panel   4. Atrial fibrillation, unspecified type (HCC)  TSH    T4, Free    EKG 12 Lead   5. Normocytic anemia  CBC With Auto Differential   6. Dry mouth         PLAN      After discussion with pt and Dr. Humza Andersen, we agreed on plan as follows: [facility] with [facility] as planned  Keep appointment with hematology/oncology as planned  Continue home health as planned  Check CMP, free T4/TSH, and CBC at this time  Obtain old records from [facility] regarding postoperative atrial fibrillation  Decrease amlodipine to 5 mg daily Home health to monitor blood pressures. Decrease oxybutynin to 2.5 mg 3 times daily  Check 12-lead EKG in office today  Discussion had with patient and son regarding long-term anticoagulation in setting of paroxysmal atrial fibrillation and new onset pancreatic cancer-after discussion with patient and son, will start Eliquis 5 mg - 1 pill twice daily. Patient and son warned of risk of bleeding and falls and agree that they would like to proceed in this fashion. Follow-up in 1 month or sooner if any further problems. Attending Physician Statement  I have discussed the case, including pertinent history and exam findings with the resident. I also have seen the patient and performed key portions of the examination. I agree with the documented assessment and plan as documented by the resident.   GC modifier added to this encounter     Electronically signed by Addie Shipley MD on 12/28/2021 at 4:37 PM

## 2021-12-29 ENCOUNTER — TELEPHONE (OUTPATIENT)
Dept: FAMILY MEDICINE CLINIC | Age: 81
End: 2021-12-29

## 2021-12-29 DIAGNOSIS — R79.89 ELEVATED TSH: Primary | ICD-10-CM

## 2021-12-29 DIAGNOSIS — R79.89 ELEVATED SERUM FREE T4 LEVEL: ICD-10-CM

## 2021-12-29 NOTE — TELEPHONE ENCOUNTER
Pt is currently being admitted to Home health today. They have a discrepences with several medications from [place] list and Nursing home list.     1 List form [place] shows 10mg, but pt was taking 5mg at nursing home. Encourage pt to check BP 3 times a day and keep track and report #'s to the office on Monday. Home health will be faxing over nursing home list to compare to our office medication list.     2. Home health is asking if pt should complete the lovenox injections then switch to eliquis? Pt has the lovenox inj, has not picked up the eliquis yet.      8 [facility] care - call for questions [phone]

## 2021-12-29 NOTE — TELEPHONE ENCOUNTER
Please let me know when we receive the medication list from [address] [place] [address] and we will adjust medications as needed. Until then:  1: Yes, continue 5 mg Norvasc and monitor BP. Hold dose if BP <100/60.    2. Yes, ok to complete the Lovenox injections for post-op DVT prophylaxis then transition to Eliquis day after Lovenox is completed. Thank you!

## 2021-12-30 ENCOUNTER — TELEPHONE (OUTPATIENT)
Dept: FAMILY MEDICINE CLINIC | Age: 81
End: 2021-12-30

## 2021-12-30 NOTE — TELEPHONE ENCOUNTER
----- Message from Lazarus Codding, MD sent at 12/29/2021  3:53 PM EST -----  [place],    I have reviewed your lab work. Your liver enzymes and bilirubin look much better. Your thyroid levels are both high - which can be due to a few different reasons. Because of your atrial fibrillation, I would like to do some follow-up blood work and a thyroid ultrasound to further evaluate if this could be contributing. Please get these completed. Also, your sodium and kidney function are also slightly decreased. This could be because you are dehydrated. Please try to increase your water intake to help with this. We will plan to recheck labs with your next visit. Otherwise, the rest of your lab work looks overall fine. There is nothing else we need to change right away, but will continue monitoring moving forward. Please let us know if you have questions. Thank you!

## 2022-01-03 NOTE — TELEPHONE ENCOUNTER
Nurse from [facility] called states pt's BP:    Today 92/48 pulse 60 - dizzy/lightheaded (manual cuff by nurse)  Sunday evening /67 pulse 69 (patient electric cuff)  Sunday morning /77 pulse 66  Saturday evening /69  P 62  Saturday morning /76  P 68  Friday evening /78 P 66   Friday morning /64 P 81    Home health nurse states that does filled the pt's pill box and does not think the pt can follow the BP parameters. Please advise. nurse [phone]- Call with any changes.

## 2022-01-03 NOTE — TELEPHONE ENCOUNTER
Reviewed updated medication list. Please update with our Epic medication list. Would recommend monitoring BP - if it remains low and patient is symptomatic, then hold medication. Otherwise, BP log overall looks stable and I would recommend continuing Norvasc 5 mg daily. Thank you!

## 2022-01-03 NOTE — TELEPHONE ENCOUNTER
Spoke with Rupert with [facility]. Informed her of Dr Baljit Perkins response. Rupert verbalized understanding, no further questions. Tayo Kerr

## 2022-01-05 ENCOUNTER — HOSPITAL ENCOUNTER (OUTPATIENT)
Dept: INFUSION THERAPY | Age: 82
Discharge: HOME | End: 2022-01-05
Payer: MEDICARE

## 2022-01-05 ENCOUNTER — CLINICAL DOCUMENTATION (OUTPATIENT)
Dept: CASE MANAGEMENT | Age: 82
End: 2022-01-05

## 2022-01-05 ENCOUNTER — OFFICE VISIT (OUTPATIENT)
Dept: ONCOLOGY | Age: 82
End: 2022-01-05
Payer: MEDICARE

## 2022-01-05 VITALS
RESPIRATION RATE: 18 BRPM | TEMPERATURE: 98 F | OXYGEN SATURATION: 97 % | DIASTOLIC BLOOD PRESSURE: 51 MMHG | SYSTOLIC BLOOD PRESSURE: 103 MMHG | BODY MASS INDEX: 23.54 KG/M2 | HEART RATE: 68 BPM | WEIGHT: 146.5 LBS | HEIGHT: 66 IN

## 2022-01-05 DIAGNOSIS — Z90.410 HISTORY OF WHIPPLE PROCEDURE: ICD-10-CM

## 2022-01-05 DIAGNOSIS — Z90.49 HISTORY OF WHIPPLE PROCEDURE: ICD-10-CM

## 2022-01-05 DIAGNOSIS — C24.1 PRIMARY ADENOCARCINOMA OF AMPULLA OF VATER (HCC): Primary | ICD-10-CM

## 2022-01-05 PROCEDURE — 99205 OFFICE O/P NEW HI 60 MIN: CPT | Performed by: INTERNAL MEDICINE

## 2022-01-05 PROCEDURE — 99211 OFF/OP EST MAY X REQ PHY/QHP: CPT

## 2022-01-05 NOTE — PROGRESS NOTES
Name: Henrietta Castro  : 1940  MRN: [id number]    Oncology Navigation- Initial Note:    Intake-  Contact Type: Medical Oncology    Diagnosis: GI- malignant    Home Disposition: Home Care is [facility], Lives alone    Patient needs and barriers to care: Knowledge deficit, Symptom Management and Transportation     Referral Source: Outpatient    Receptive to Advanced Care Planning/ Palliative Care:  Deferred    Interventions-   General Interventions: Navigation Welcome Packet given and program explained. Continuum of Care: undecided at this time    Notes:   Met with Kassandra Erickharlan during consultation with Dr. Jessy Rios for her pancreatic cancer-late stage. She had surgery-Whipple- at . Home alone-has drain-very independent-does have Home Health. Son in [facility] and a daughter in [place]. No close friends here-lost son last year or so that was a support person locally. Has had conversation with son regarding moving back to [place] to be close to them prior to diagnosis. Dr. Jessy Rios has urged her to have conversation again and seriously think about doing this whether she has treatment or not. Plan is if chemotherapy, will just do one agent-Gemzar as side effects may not be as severe, but Dr. Jessy Rios is still hesitant do to her age and diagnosis since living alone. Kassandra Padgett drove here today-only lives a few blocks away, but very out of breath and weak upon getting to reception area. Assisted to wheel chair for consultation and remained in one and assisted to car. She will be going to  for follow up tomorrow and she got a ride through [employer]. She has follow up with Dr. Jessy Rios . Explained to her if she needs a ride to notify me and we will have one provided through [facility]. Contact information provided and she knows to call with any questions or concerns.     Electronically signed by Selma Moon RN on 2022 at 4:53 PM

## 2022-01-07 ENCOUNTER — HOSPITAL ENCOUNTER (OUTPATIENT)
Dept: ULTRASOUND IMAGING | Age: 82
Discharge: HOME | End: 2022-01-07
Payer: MEDICARE

## 2022-01-07 DIAGNOSIS — R79.89 ELEVATED SERUM FREE T4 LEVEL: ICD-10-CM

## 2022-01-07 DIAGNOSIS — R79.89 ELEVATED TSH: ICD-10-CM

## 2022-01-07 PROCEDURE — 76536 US EXAM OF HEAD AND NECK: CPT

## 2022-01-12 NOTE — TELEPHONE ENCOUNTER
MD Yris Gooden from [facility] is calling in with request for medication refills and also would like to discuss stopping Oxybutynin due to [facility] does not have any over active bladder issues and is still dizzy after taking 1/2 tab daily. Also for the Protonix script that will only need written for 30 days due to Dr. Virginia Mcgraw [facility] Sur[facility] will be discontinuing this medication on 03/01/2022. Please Advise. Any Questions please call Yris at [phone].

## 2022-01-13 RX ORDER — PANTOPRAZOLE SODIUM 40 MG/10ML
40 INJECTION, POWDER, LYOPHILIZED, FOR SOLUTION INTRAVENOUS DAILY
OUTPATIENT
Start: 2022-01-13

## 2022-01-13 RX ORDER — OXYBUTYNIN CHLORIDE 5 MG/1
2.5 TABLET ORAL 3 TIMES DAILY
Qty: 90 TABLET | OUTPATIENT
Start: 2022-01-13

## 2022-01-13 RX ORDER — PANTOPRAZOLE SODIUM 40 MG/1
40 TABLET, DELAYED RELEASE ORAL
Qty: 30 TABLET | Refills: 0 | Status: SHIPPED | OUTPATIENT
Start: 2022-01-13 | End: 2022-02-23

## 2022-01-13 RX ORDER — AMLODIPINE BESYLATE 5 MG/1
5 TABLET ORAL DAILY
Qty: 30 TABLET | Refills: 1 | Status: SHIPPED | OUTPATIENT
Start: 2022-01-13 | End: 2022-01-28

## 2022-01-13 RX ORDER — AMIODARONE HYDROCHLORIDE 200 MG/1
200 TABLET ORAL DAILY
Qty: 30 TABLET | Refills: 1 | Status: SHIPPED | OUTPATIENT
Start: 2022-01-13 | End: 2022-03-23

## 2022-01-14 ENCOUNTER — HOSPITAL ENCOUNTER (OUTPATIENT)
Age: 82
Discharge: HOME | End: 2022-01-14
Payer: MEDICARE

## 2022-01-14 DIAGNOSIS — R79.89 ELEVATED SERUM FREE T4 LEVEL: ICD-10-CM

## 2022-01-14 DIAGNOSIS — R79.89 ELEVATED TSH: ICD-10-CM

## 2022-01-14 PROCEDURE — 86376 MICROSOMAL ANTIBODY EACH: CPT

## 2022-01-14 PROCEDURE — 84445 ASSAY OF TSI GLOBULIN: CPT

## 2022-01-14 PROCEDURE — 36415 COLL VENOUS BLD VENIPUNCTURE: CPT

## 2022-01-14 PROCEDURE — 86800 THYROGLOBULIN ANTIBODY: CPT

## 2022-01-16 LAB
THYROGLOBULIN AB: < 0.9 IU/ML (ref 0–4)
THYROID PEROXIDASE ANTIBODY: 0.7 IU/ML (ref 0–9)

## 2022-01-17 ENCOUNTER — TELEPHONE (OUTPATIENT)
Dept: FAMILY MEDICINE CLINIC | Age: 82
End: 2022-01-17

## 2022-01-17 NOTE — TELEPHONE ENCOUNTER
----- Message from Corky Rosenthal MD sent at 1/14/2022  5:45 PM EST -----  Malorie Morales,    Your Thyroid ultrasound showed several nodules, but these were all benign appearing. We will wait and see what the additional blood work shows, but for now there is no other treatment needed for this. Thank you!

## 2022-01-19 ENCOUNTER — OFFICE VISIT (OUTPATIENT)
Dept: ONCOLOGY | Age: 82
End: 2022-01-19
Payer: MEDICARE

## 2022-01-19 ENCOUNTER — HOSPITAL ENCOUNTER (OUTPATIENT)
Dept: INFUSION THERAPY | Age: 82
Discharge: HOME | End: 2022-01-19
Payer: MEDICARE

## 2022-01-19 VITALS
DIASTOLIC BLOOD PRESSURE: 58 MMHG | TEMPERATURE: 98.2 F | OXYGEN SATURATION: 99 % | SYSTOLIC BLOOD PRESSURE: 121 MMHG | HEIGHT: 65 IN | BODY MASS INDEX: 24.79 KG/M2 | WEIGHT: 148.8 LBS | RESPIRATION RATE: 16 BRPM | HEART RATE: 58 BPM

## 2022-01-19 DIAGNOSIS — C24.1 PRIMARY ADENOCARCINOMA OF AMPULLA OF VATER (HCC): ICD-10-CM

## 2022-01-19 DIAGNOSIS — Z90.410 HISTORY OF WHIPPLE PROCEDURE: ICD-10-CM

## 2022-01-19 DIAGNOSIS — C25.9 MALIGNANT NEOPLASM OF PANCREAS, UNSPECIFIED LOCATION OF MALIGNANCY (HCC): Primary | ICD-10-CM

## 2022-01-19 DIAGNOSIS — Z90.49 HISTORY OF WHIPPLE PROCEDURE: ICD-10-CM

## 2022-01-19 LAB — THYROID STIMULATING IMMUNOGLOBULIN: < 0.1 IU/L

## 2022-01-19 PROCEDURE — 99211 OFF/OP EST MAY X REQ PHY/QHP: CPT

## 2022-01-19 PROCEDURE — 99214 OFFICE O/P EST MOD 30 MIN: CPT | Performed by: INTERNAL MEDICINE

## 2022-01-19 NOTE — PATIENT INSTRUCTIONS
1.  Referral to Dr. Korina Conner for Mediport placement. The patient is on aspirin and Eliquis she will have to stop in 5 days before port placement  2. RTC to see me for labs: CBC, BMP, LFTs, CEA, CA 19-9 and to start her FOLFOX in 1 week. Put the patient at 9:15 AM  3. Chemo teaching before RTC    Patient Education        fluorouracil (injection)  Pronunciation:  FLOOR oh URE a ad  Brand:  Adrucil  What is the most important information I should know about fluorouracil? Before using fluorouracil tell your doctor about all your medical conditions or allergies, all medicines you use, and if you are pregnant or breastfeeding. What is fluorouracil? Fluorouracil is used to treat cancer of the colon, rectum, breast, stomach, or pancreas. Fluorouracil is often given in combination chemotherapy with other cancer drugs. Fluorouracil may also be used for purposes not listed in this medication guide. What should I discuss with my healthcare provider before receiving fluorouracil? Tell your doctor if you have ever had:  · a metabolic disorder called DPD (dihydropyrimidine dehydrogenase) deficiency;  · heart problems; or  · bone marrow depression. Both men and women using this medicine should use effective birth control to prevent pregnancy. Fluorouracil can harm an unborn baby if the mother or father is using this medicine. Keep using birth control for at least 3 months after your last dose. Tell your doctor right away if a pregnancy occurs while either the mother or the father is using fluorouracil. This medicine may affect fertility (ability to have children) in both men and women. However, it is important to use birth control to prevent pregnancy because fluorouracil can harm an unborn baby. You should not breastfeed while using this medicine. How is fluorouracil given? Fluorouracil is given as an infusion into a vein. A healthcare provider will give you this injection.   You may receive your first dose in a hospital or clinic setting to quickly treat any serious side effects. Fluorouracil is often given in a treatment cycle, and you may need to use the medicine only on certain days of each cycle. Fluorouracil is sometimes given in a continuous infusion over 24 to 46 hours. How often you need fluorouracil injections will depend on many factors, including side effects and how your body responds to the medicine. Your doctor will determine how long to treat you with this medicine. Tell your caregivers if you feel any burning, pain, or swelling around the IV needle when fluorouracil is injected. Fluorouracil can increase your risk of bleeding or infection. You will need frequent medical tests. Your cancer treatments may be delayed based on the results of these tests. What happens if I miss a dose? Call your doctor for instructions if you miss an appointment for your fluorouracil injection. What happens if I overdose? Since this medication is given by a healthcare professional in a medical setting, an overdose is unlikely to occur. What should I avoid while receiving fluorouracil? Avoid being near people who are sick or have infections. Tell your doctor at once if you develop signs of infection. What are the possible side effects of fluorouracil? Get emergency medical help if you have signs of an allergic reaction:  hives; difficult breathing; swelling of your face, lips, tongue, or throat.   Call your doctor at once if you have:  · fever (take your temperature each day while receiving fluorouracil);  · severe or ongoing diarrhea;  · vision problems;  · confusion, problems with balance or muscle movement;  · painful mouth sores, red or swollen gums, trouble swallowing, talking, or eating;  · bone marrow suppression --dizziness, pale lips or fingernail beds, fast heart rate, getting easily tired or short of breath;  · \"hand and foot syndrome\" --pain, blisters, bleeding, or severe rash on the palms of your hands or the soles of your feet;  · heart problems --chest pain or pressure, pain spreading to your jaw or shoulder, irregular heartbeats, nausea, sweating, feeling dizzy or short of breath. Your cancer treatments may be delayed or permanently discontinued if you have certain side effects. Common side effects may include:  · diarrhea;  · mouth sores;  · heart problems; or  · bone marrow suppression. This is not a complete list of side effects and others may occur. Call your doctor for medical advice about side effects. You may report side effects to FDA at 4-923-FDA-9734. What other drugs will affect fluorouracil? If you take a blood thinner (warfarin, Coumadin, [place]), you may need to have more frequent \"INR\" or prothrombin time tests. Other drugs may affect fluorouracil, including prescription and over-the-counter medicines, vitamins, and herbal products. Tell your doctor about all your current medicines and any medicine you start or stop using. Where can I get more information? Your doctor or pharmacist can provide more information about fluorouracil. Remember, keep this and all other medicines out of the reach of children, never share your medicines with others, and use this medication only for the indication prescribed. Every effort has been made to ensure that the information provided by [address] is accurate, up-to-date, and complete, but no guarantee is made to that effect. Drug information contained herein may be time sensitive. [employer] information has been compiled for use by healthcare practitioners and consumers in the [place] and therefore [facility] does not warrant that uses outside of the [place] are appropriate, unless specifically indicated otherwise. [facility] information does not endorse drugs, diagnose patients or recommend therapy.  [facility]'s drug information is an informational resource designed to assist licensed healthcare practitioners in caring for their patients and/or to serve consumers viewing this service as a supplement to, and not a substitute for, the expertise, skill, knowledge and judgment of healthcare practitioners. The absence of a warning for a given drug or drug combination in no way should be construed to indicate that the drug or drug combination is safe, effective or appropriate for any given patient. [facility] does not assume any responsibility for any aspect of healthcare administered with the aid of information [facility] provides. The information contained herein is not intended to cover all possible uses, directions, precautions, warnings, drug interactions, allergic reactions, or adverse effects. If you have questions about the drugs you are taking, check with your doctor, nurse or pharmacist.  Copyright 0928-8151 [address]. Version: 5.01. Revision date: 12/3/2020. Care instructions adapted under license by [facility] ([facility]). If you have questions about a medical condition or this instruction, always ask your healthcare professional. [place] [address] any warranty or liability for your use of this information. Patient Education        oxaliplatin  Pronunciation:  ox AL i ANA tin  Brand:  Eloxatin  What is the most important information I should know about oxaliplatin? Oxaliplatin can cause a severe or life-threatening allergic reaction. Get emergency medical help if you have: rash, hives, itching, sweating; chest pain, warmth or redness in your face, feeling light-headed; sudden cough, difficult breathing; swelling of your face, lips, tongue, or throat. What is oxaliplatin? Oxaliplatin is used together with other cancer medications to treat colon and rectal cancer. Oxaliplatin may also be used for purposes not listed in this medication guide. What should I discuss with my healthcare provider before receiving oxaliplatin?   You should not be treated with this medicine if you have ever had an allergic reaction to oxaliplatin or similar medications such as carboplatin (Paraplatin) or cisplatin (Platinol). Tell your doctor if you have ever had:  · an active or recent infection;  · kidney disease;  · liver disease;  · heart disease, heart rhythm disorder;  · long QT syndrome (in you or a family member);  · an electrolyte imbalance (such as low levels of calcium, potassium, or magnesium in your blood);  · breathing disorder; or  · a nerve problem. Oxaliplatin can harm an unborn baby  if the mother or the father is using this medicine. · If you are a woman, do not use oxaliplatin if you are pregnant. Use effective birth control to prevent pregnancy while you are using this medicine and for at least 9 months after your last dose. · If you are a man, use effective birth control if your sex partner is able to get pregnant. Keep using birth control for at least 6 months after your last dose. · Tell your doctor right away if a pregnancy occurs while either the mother or the father is using oxaliplatin. This medicine may affect fertility (ability to have children) in both men and women. However, it is important to use birth control to prevent pregnancy because oxaliplatin can harm an unborn baby. Do not breastfeed while using this medicine,  and for at least 3 months after your last dose. How is oxaliplatin given? Oxaliplatin is given as an infusion into a vein. A healthcare provider will give you this injection. Oxaliplatin must be given slowly, and the infusion can take at least 2 hours to complete. Oxaliplatin is usually given once every 2 weeks. Your doctor will determine how long to treat you with this medicine. You may be given medication to prevent nausea or vomiting. Receiving oxaliplatin can make you more sensitive to cold, which can cause numbness, tingling, and muscle spasms. This includes exposure to cold temperature and coming into contact with cold objects.  To prevent discomfort, follow these steps:  · do not inhale deeply when you are exposed to cold air;  · cover your skin, head, and face when you are outside in cold temperatures;  · wear gloves when handling cold objects or refrigerated foods;  · do not run an air conditioner at very cool temperature in your home or car (even during hot weather);  · do not drink cold drinks or use ice cubes in drinks;  · do not put ice packs on your body. Chemotherapy often causes nausea or mouth sores. Do not eat ice chips to ease these discomforts because you will be more sensitive to cold. Talk to your doctor about other ways to treat nausea or mouth sores. You may be given other medications to prevent nausea or vomiting while you are receiving oxaliplatin. Oxaliplatin can lower your blood cell counts. Your blood will need to be tested often. Your cancer treatments may be delayed based on the results. Your heart function may need to be checked using an electrocardiograph or ECG (sometimes called an EKG). What happens if I miss a dose? Contact your doctor if you miss an appointment for your oxaliplatin injection. What happens if I overdose? Seek emergency medical attention or call the Poison Help line at 1-979.153.3206. What should I avoid while receiving oxaliplatin? Avoid cold temperatures and cold objects, including ice, cold drinks, and skin exposure to cold temperatures. Avoid being near people who are sick or have infections. Tell your doctor at once if you develop signs of infection. This medicine may cause blurred vision and may impair your reactions. Avoid driving or hazardous activity until you know how this medicine will affect you. What are the possible side effects of oxaliplatin? Oxaliplatin can cause a severe or life-threatening allergic reaction. Some people receiving a oxaliplatin injection have had a reaction to the infusion within minutes after the medicine is injected into the vein.  Tell your caregiver right away if you feel dizzy, short of breath, confused, sweaty, itchy, or have diarrhea, chest pain, warmth or redness in your face, or feel like you might pass out. Get emergency medical help if you have signs of an allergic reaction:  hives; difficult breathing; swelling of your face, lips, tongue, or throat. Call your doctor at once if you have:  · increased sensitivity to cold temperatures and cold objects;  · numbness, tingling, or burning pain that interferes with daily activities;  · severe or ongoing diarrhea or vomiting;  · confusion, change in mental status, vision problems, seizure (convulsions);  · pain or burning when you urinate;  · sudden chest pain or discomfort, wheezing, dry cough, feeling short of breath;  · pain, redness, swelling, or skin changes where the injection was given;  · dehydration symptoms --feeling very thirsty or hot, being unable to urinate, heavy sweating, or hot and dry skin;  · heart problems --headache with chest pain and severe dizziness, fainting, fast or pounding heartbeats;  · liver problems --nausea, upper stomach pain, itching, tiredness, loss of appetite, dark urine, north-colored stools, jaundice (yellowing of the skin or eyes);  · muscle problems --unexplained muscle pain, tenderness, or weakness especially if you also have fever, unusual tiredness, and dark colored urine;  · nerve problems --jaw or chest tightness, eye pain, strange feeling in your tongue, problems with speech or swallowing; or  · low blood cell counts --fever, chills, tiredness, mouth sores, skin sores, easy bruising, unusual bleeding, pale skin, cold hands and feet, feeling light-headed or short of breath. Common side effects may include:  · nausea, vomiting, diarrhea;  · numbness, tingling, burning pain;  · low blood cell counts;  · abnormal liver function tests;  · mouth sores; or  · feeling tired. This is not a complete list of side effects and others may occur. Call your doctor for medical advice about side effects.  You may report side effects to FDA at 8-161-CNX-2951. What other drugs will affect oxaliplatin? Other drugs may affect oxaliplatin, including prescription and over-the-counter medicines, vitamins, and herbal products. Tell your doctor about all your current medicines and any medicine you start or stop using. Where can I get more information? Your doctor or pharmacist can provide more information about oxaliplatin. Remember, keep this and all other medicines out of the reach of children, never share your medicines with others, and use this medication only for the indication prescribed. Every effort has been made to ensure that the information provided by [address] is accurate, up-to-date, and complete, but no guarantee is made to that effect. Drug information contained herein may be time sensitive. [employer] information has been compiled for use by healthcare practitioners and consumers in the [place] and therefore [employer] does not warrant that uses outside of the [place] are appropriate, unless specifically indicated otherwise. [facility]'s drug information does not endorse drugs, diagnose patients or recommend therapy. [employer] drug information is an informational resource designed to assist licensed healthcare practitioners in caring for their patients and/or to serve consumers viewing this service as a supplement to, and not a substitute for, the expertise, skill, knowledge and judgment of healthcare practitioners. The absence of a warning for a given drug or drug combination in no way should be construed to indicate that the drug or drug combination is safe, effective or appropriate for any given patient. [employer] does not assume any responsibility for any aspect of healthcare administered with the aid of information [employer] provides. The information contained herein is not intended to cover all possible uses, directions, precautions, warnings, drug interactions, allergic reactions, or adverse effects. If you have questions about the drugs you are taking, check with your doctor, nurse or pharmacist.  Copyright 6343-7638 [address]. Version: 10.01. Revision date: 7/24/2020. Care instructions adapted under license by [facility] ([facility]). If you have questions about a medical condition or this instruction, always ask your healthcare professional. [place] [address] any warranty or liability for your use of this information.

## 2022-01-20 ENCOUNTER — OFFICE VISIT (OUTPATIENT)
Dept: SURGERY | Age: 82
End: 2022-01-20
Payer: MEDICARE

## 2022-01-20 VITALS
BODY MASS INDEX: 25.01 KG/M2 | HEIGHT: 65 IN | TEMPERATURE: 97.5 F | HEART RATE: 112 BPM | SYSTOLIC BLOOD PRESSURE: 120 MMHG | OXYGEN SATURATION: 98 % | DIASTOLIC BLOOD PRESSURE: 60 MMHG | RESPIRATION RATE: 18 BRPM | WEIGHT: 150.1 LBS

## 2022-01-20 DIAGNOSIS — K44.9 HIATAL HERNIA: ICD-10-CM

## 2022-01-20 DIAGNOSIS — C22.1 CHOLANGIOCARCINOMA (HCC): Primary | ICD-10-CM

## 2022-01-20 DIAGNOSIS — I48.91 ATRIAL FIBRILLATION, UNSPECIFIED TYPE (HCC): ICD-10-CM

## 2022-01-20 PROCEDURE — 99213 OFFICE O/P EST LOW 20 MIN: CPT | Performed by: SURGERY

## 2022-01-20 NOTE — PROGRESS NOTES
Brooke Acuna MD   General Surgery  New Patient Evaluation in Office  Pt Name: Kayla Neal  Date of Birth 1940   Today's Date: 1/20/2022  Medical Record Number: [id number]  Referring Provider: Nidhi Montanez MD  Primary Care Provider: Corby Lehman MD  Chief Complaint:  Chief Complaint   Patient presents with    Surgical Consult     New patient referred Dr. Phani Peres-- Distal Cholangiocarcinoma-- needs mediport placement       ASSESSMENT      1. Cholangiocarcinoma ([address]    2. Atrial fibrillation, unspecified type ([address]    3. Hiatal hernia         PLANS      1. Schedule patient for port placement for chemotherapy with Dr. Phani Peres. Left subclavian placement preferred. 2.Techniques for long term IV access were discussed. Risks, complications and benefits of each were reviewed including bleeding, infection, thrombosis and lung injury were reviewed. The patient was given the opportunity to ask questions. Once answered, informed consent was obtained and the patient scheduled for the procedure. 3.  MAC anesthesia  4. Preoperative testing per anesthesia guidelines  5. Hold Eliquis 48 hours prior to scheduled procedure        Alcira Acosta is a [de-identified]y.o. year old female who is presenting today in the office for evaluation for port placement for planned chemotherapy. Patient presented I believe in November with obstructive jaundice. She was diagnosed with a distal common bile duct tumor. She was referred to [facility] after an ERCP here for surgical treatment. She underwent a pancreaticoduodenectomy there she did have some lymph node involvement. Plan is for single agent therapy with Gemzar due to her age. Request made for port placement. Patient discussed with her children and she believes she is going to start therapy. She has never had a history of deep vein thrombosis or prior upper extremity access. She has no renal issues.   She takes an oral anticoagulant for chronic atrial fibrillation. Past Medical History  Past Medical History:   Diagnosis Date    Cholangiocarcinoma ([address]     Mrozek    Hypertension        Past Surgical History  Past Surgical History:   Procedure Laterality Date    APPENDECTOMY  1959    CHOLECYSTECTOMY  12/04/2021    Dr. Scott Diego    ERCP N/A 11/10/2021    ERCP WITH STENT INSERTION performed by Loree Nagel MD at [facility]  12/04/2021    exp lap, radical celiac and portal lymph node dissection,pylorus preserving pancreatoduodenectomy-Dr Ewing IU       Medications  Current Outpatient Medications   Medication Sig Dispense Refill    amLODIPine (NORVASC) 5 MG tablet Take 1 tablet by mouth daily 30 tablet 1    amiodarone (CORDARONE) 200 MG tablet Take 1 tablet by mouth daily 30 tablet 1    metoprolol tartrate (LOPRESSOR) 25 MG tablet Take 0.5 tablets by mouth 2 times daily 60 tablet 1    pantoprazole (PROTONIX) 40 MG tablet Take 1 tablet by mouth every morning (before breakfast) 30 tablet 0    docusate sodium (COLACE) 100 MG capsule Take 100 mg by mouth 2 times daily      ondansetron (ZOFRAN) 4 MG tablet Take 4 mg by mouth every 8 hours as needed for Nausea or Vomiting       oxybutynin (DITROPAN) 5 MG tablet Take 5 mg by mouth 3 times daily       aspirin 81 MG chewable tablet Take 81 mg by mouth daily (Patient not taking: Reported on 1/20/2022)      apixaban (ELIQUIS) 5 MG TABS tablet Take 1 tablet by mouth 2 times daily (Patient not taking: Reported on 1/20/2022) 180 tablet 1     No current facility-administered medications for this visit.      Allergies   No Known Allergies    Family History  Family History   Problem Relation Age of Onset    Breast Cancer Mother     Colon Cancer Mother     Cancer Father         bladder and lung    Lung Cancer Father     Kidney Disease Brother     No Known Problems Maternal Grandmother     No Known Problems Maternal Grandfather     No Known Problems Paternal Grandmother     No Known Problems Paternal Grandfather        SocialHistory  Social History     Socioeconomic History    Marital status:      Spouse name: Not on file    Number of children: Not on file    Years of education: Not on file    Highest education level: Not on file   Occupational History    Not on file   Tobacco Use    Smoking status: Former Smoker    Smokeless tobacco: Never Used   Vaping Use    Vaping Use: Never used   Substance and Sexual Activity    Alcohol use: Not Currently    Drug use: Never     Types: Marijuana Teresaandreina Remy)    Sexual activity: Not on file   Other Topics Concern    Not on file   Social History Narrative    Not on file     Social Determinants of Health     Financial Resource Strain: Low Risk     Difficulty of Paying Living Expenses: Not very hard   Food Insecurity: No Food Insecurity    Worried About Running Out of Food in the Last Year: Never true    [address] in the Last Year: Never true   Transportation Needs:     Lack of Transportation (Medical): Not on file    Lack of Transportation (Non-Medical):  Not on file   Physical Activity:     Days of Exercise per Week: Not on file    Minutes of Exercise per Session: Not on file   Stress:     Feeling of Stress : Not on file   Social Connections:     Frequency of Communication with Friends and Family: Not on file    Frequency of Social Gatherings with Friends and Family: Not on file    Attends Jain Services: Not on file    Active Member of Clubs or Organizations: Not on file    Attends Club or Organization Meetings: Not on file    Marital Status: Not on file   Intimate Partner Violence:     Fear of Current or Ex-Partner: Not on file    Emotionally Abused: Not on file    Physically Abused: Not on file    Sexually Abused: Not on file   Housing Stability:     Unable to Pay for Housing in the Last Year: Not on file    Number of Places Lived in the Last Year: Not on file    Unstable Housing in the Last Year: Not on file           Review of Systems  Review of Systems   Constitutional: Positive for activity change and fatigue. Negative for chills and fever. HENT: Negative for congestion and trouble swallowing. Respiratory: Positive for shortness of breath. Negative for cough. Cardiovascular: Negative for chest pain and palpitations. Gastrointestinal: Negative for nausea and vomiting. Genitourinary: Negative for decreased urine volume. Musculoskeletal: Negative for back pain. Skin: Negative for color change and rash. Neurological: Negative for dizziness and headaches. Hematological: Negative for adenopathy. Bruises/bleeds easily. Psychiatric/Behavioral: Negative for agitation and confusion. OBJECTIVE     /60 (Site: Right Upper Arm, Position: Sitting, Cuff Size: Medium Adult)   Pulse 112   Temp 97.5 °F (36.4 °C) (Temporal)   Resp 18   Ht 5' 5\" (1.651 m)   Wt 150 lb 1.6 oz (68.1 kg)   SpO2 98%   BMI 24.98 kg/m²      Physical Exam  Vitals reviewed. Constitutional:       General: She is not in acute distress. Appearance: She is well-developed. She is not toxic-appearing or diaphoretic. HENT:      Head: Normocephalic and atraumatic. Nose: No congestion. Eyes:      General: No scleral icterus. Pupils: Pupils are equal, round, and reactive to light. Neck:      Thyroid: No thyromegaly. Vascular: No JVD. Trachea: No tracheal deviation. Cardiovascular:      Rate and Rhythm: Normal rate. Heart sounds: Normal heart sounds. Pulmonary:      Effort: Pulmonary effort is normal. No respiratory distress. Breath sounds: Normal breath sounds. No wheezing. Abdominal:      General: There is no distension. Palpations: Abdomen is soft. Tenderness: There is no abdominal tenderness. There is no guarding or rebound.       Comments: Surgical incision upper midline healing well no drainage or erythema   Musculoskeletal: Cervical back: Normal range of motion and neck supple. Right lower leg: No edema. Left lower leg: No edema. Lymphadenopathy:      Cervical: No cervical adenopathy. Skin:     General: Skin is warm and dry. Coloration: Skin is not jaundiced. Findings: No bruising or rash. Neurological:      Mental Status: She is alert and oriented to person, place, and time. Cranial Nerves: No cranial nerve deficit. Psychiatric:         Mood and Affect: Mood normal.         Thought Content:  Thought content normal.         Lab Results   Component Value Date    WBC 12.1 (H) 12/28/2021    HGB 9.9 (L) 12/28/2021    HCT 32.3 (L) 12/28/2021     (H) 12/28/2021    ALT 24 12/28/2021    AST 25 12/28/2021     12/28/2021    K 3.8 12/28/2021     12/28/2021    CREATININE 1.4 (H) 12/28/2021    BUN 17 12/28/2021    CO2 24 12/28/2021    TSH 5.560 (H) 12/28/2021    INR 1.04 11/09/2021    LABA1C 5.7 11/10/2021

## 2022-01-20 NOTE — PATIENT INSTRUCTIONS
Patient Education        Implanted [facility] for Chemotherapy: Care Instructions  Overview  An implanted port is a device that's placed, in most cases, under the skin of your chest below your collarbone. It's made of plastic, stainless steel, or titanium. The port is about the size of a quarter, but thicker. A thin, flexible tube called a catheter runs from the port under your skin into a large vein. A membrane (septum) similar to a pencil eraser is in the center of the port. A nurse uses a needle to put chemotherapy or other medicine and fluids through the septum into a blood vessel. The port may be used to draw blood for tests only if another vein, such as in the hand or arm, can't be used. An implanted port can be used for months. A special needle (called a Juarez needle) may stay in the port for a short time. The port needs regular care to make sure that it doesn't get blocked. Tell your doctor if you take aspirin or some other blood thinner. These medicines can increase the chance of bleeding inside your body. Follow-up care is a key part of your treatment and safety. Be sure to make and go to all appointments, and call your doctor if you are having problems. It's also a good idea to know your test results and keep a list of the medicines you take. How can you care for yourself at home? · You will probably need to take 1 day off from work and will be able return to normal activities shortly after. This depends on the type of work you do, why you have the port, and how you feel. · You probably will be able to bathe and swim. But you may need to avoid some activities if a Juarez needle is left in the port. Talk to your doctor about any limits on your activity. · Some clothes may rub the skin over the port. Do not wear a bra or suspenders that irritate your skin near the port. · You will get a medical alert card with information about your port. Carry this with you.  It will tell health care workers you have a port in case you need emergency care. · Your port will need regular flushing to keep it open. A nurse or other health professional will do this for you. When should you call for help? Call your doctor now or seek immediate medical care if:    · You have signs of infection, such as:  ? Increased pain, swelling, warmth, or redness near the port. ? Red streaks leading from the port. ? Pus draining from the port. ? A fever.     · You have pain or swelling in your neck or arm.     · You have trouble breathing. Watch closely for changes in your health, and be sure to contact your doctor if:    · You have any problems with your port. Where can you learn more? Go to [email or web address]://[email or web address] org and sign in to your [employer] account. Enter [phone] in the [facility] box to learn more about \"Implanted [facility] for Chemotherapy: Care Instructions. \"     If you do not have an account, please click on the \"Sign Up Now\" link. Current as of: July 1, 2021               Content Version: 13.1  © 7677-3272 Healthwise, Incorporated. Care instructions adapted under license by [facility] ([facility]). If you have questions about a medical condition or this instruction, always ask your healthcare professional. [address] any warranty or liability for your use of this information.

## 2022-01-22 ASSESSMENT — ENCOUNTER SYMPTOMS
SHORTNESS OF BREATH: 1
COUGH: 0
VOMITING: 0
COLOR CHANGE: 0
BACK PAIN: 0
TROUBLE SWALLOWING: 0
NAUSEA: 0

## 2022-01-24 ENCOUNTER — HOSPITAL ENCOUNTER (OUTPATIENT)
Dept: INFUSION THERAPY | Age: 82
Discharge: HOME | End: 2022-01-24
Payer: MEDICARE

## 2022-01-24 DIAGNOSIS — C25.9 MALIGNANT NEOPLASM OF PANCREAS, UNSPECIFIED LOCATION OF MALIGNANCY (HCC): ICD-10-CM

## 2022-01-24 PROCEDURE — 99212 OFFICE O/P EST SF 10 MIN: CPT

## 2022-01-24 NOTE — PROGRESS NOTES
New chemotherapy validation note:    Diagnosis for chemotherapy: Pancreatic adenocarcinoma    Regimen ordered:   oxaliplatin  (175 mg)- changed to 85 mg/m2 (150 mg)  Leucovorin 400 mg/m2 (700 mg)  Fluorouracil 400 mg/m2 (700 mg) and  (5275 mg)-changed to 2400 mg/m2 (4225 mg)      Reference or literature used for validation: NCCN     Date literature or guideline last updated:       Deviation from literature or guideline used: oxaliplatin and continuous fluorouracil dose are higher than recommended by NCCN. Summary of any verbal or telephone information obtained: Discussed with Dr. Jay Jay Jimenez. She would like doses adjusted to follow guideline.      Amie RandhawaD, BCPS 1/24/2022 2:02 PM

## 2022-01-25 ENCOUNTER — ANESTHESIA (OUTPATIENT)
Dept: OPERATING ROOM | Age: 82
End: 2022-01-25
Payer: MEDICARE

## 2022-01-25 ENCOUNTER — ANESTHESIA EVENT (OUTPATIENT)
Dept: OPERATING ROOM | Age: 82
End: 2022-01-25
Payer: MEDICARE

## 2022-01-25 ENCOUNTER — APPOINTMENT (OUTPATIENT)
Dept: GENERAL RADIOLOGY | Age: 82
End: 2022-01-25
Attending: SURGERY
Payer: MEDICARE

## 2022-01-25 ENCOUNTER — HOSPITAL ENCOUNTER (OUTPATIENT)
Age: 82
Setting detail: OUTPATIENT SURGERY
Discharge: HOME | End: 2022-01-25
Attending: SURGERY | Admitting: SURGERY
Payer: MEDICARE

## 2022-01-25 VITALS — SYSTOLIC BLOOD PRESSURE: 119 MMHG | OXYGEN SATURATION: 100 % | DIASTOLIC BLOOD PRESSURE: 54 MMHG

## 2022-01-25 VITALS
DIASTOLIC BLOOD PRESSURE: 57 MMHG | OXYGEN SATURATION: 100 % | HEART RATE: 67 BPM | RESPIRATION RATE: 18 BRPM | BODY MASS INDEX: 24.76 KG/M2 | WEIGHT: 148.6 LBS | SYSTOLIC BLOOD PRESSURE: 121 MMHG | HEIGHT: 65 IN | TEMPERATURE: 97.6 F

## 2022-01-25 PROCEDURE — 77001 FLUOROGUIDE FOR VEIN DEVICE: CPT | Performed by: SURGERY

## 2022-01-25 PROCEDURE — 3700000001 HC ADD 15 MINUTES (ANESTHESIA): Performed by: SURGERY

## 2022-01-25 PROCEDURE — 36561 INSERT TUNNELED CV CATH: CPT | Performed by: SURGERY

## 2022-01-25 PROCEDURE — 2580000003 HC RX 258: Performed by: SURGERY

## 2022-01-25 PROCEDURE — 7100000010 HC PHASE II RECOVERY - FIRST 15 MIN: Performed by: SURGERY

## 2022-01-25 PROCEDURE — 2709999900 HC NON-CHARGEABLE SUPPLY: Performed by: SURGERY

## 2022-01-25 PROCEDURE — 3700000000 HC ANESTHESIA ATTENDED CARE: Performed by: SURGERY

## 2022-01-25 PROCEDURE — 2500000003 HC RX 250 WO HCPCS: Performed by: SURGERY

## 2022-01-25 PROCEDURE — 6360000002 HC RX W HCPCS: Performed by: REGISTERED NURSE

## 2022-01-25 PROCEDURE — C1788 PORT, INDWELLING, IMP: HCPCS | Performed by: SURGERY

## 2022-01-25 PROCEDURE — 3600000012 HC SURGERY LEVEL 2 ADDTL 15MIN: Performed by: SURGERY

## 2022-01-25 PROCEDURE — 6360000002 HC RX W HCPCS: Performed by: SURGERY

## 2022-01-25 PROCEDURE — 77001 FLUOROGUIDE FOR VEIN DEVICE: CPT

## 2022-01-25 PROCEDURE — 3600000002 HC SURGERY LEVEL 2 BASE: Performed by: SURGERY

## 2022-01-25 PROCEDURE — 71045 X-RAY EXAM CHEST 1 VIEW: CPT

## 2022-01-25 PROCEDURE — 7100000011 HC PHASE II RECOVERY - ADDTL 15 MIN: Performed by: SURGERY

## 2022-01-25 DEVICE — PORT INFUS 6FR SLIM POLYUR ATTCH OPN END SGL LUMN VEN CATH: Type: IMPLANTABLE DEVICE | Site: CHEST | Status: FUNCTIONAL

## 2022-01-25 RX ORDER — EPINEPHRINE 1 MG/ML
0.3 INJECTION, SOLUTION, CONCENTRATE INTRAVENOUS PRN
Status: CANCELLED | OUTPATIENT
Start: 2022-01-26

## 2022-01-25 RX ORDER — SODIUM CHLORIDE 9 MG/ML
25 INJECTION, SOLUTION INTRAVENOUS PRN
Status: DISCONTINUED | OUTPATIENT
Start: 2022-01-25 | End: 2022-01-25 | Stop reason: HOSPADM

## 2022-01-25 RX ORDER — SODIUM CHLORIDE 9 MG/ML
INJECTION, SOLUTION INTRAVENOUS ONCE
Status: CANCELLED | OUTPATIENT
Start: 2022-01-26 | End: 2022-01-26

## 2022-01-25 RX ORDER — SODIUM CHLORIDE 9 MG/ML
25 INJECTION, SOLUTION INTRAVENOUS PRN
Status: CANCELLED | OUTPATIENT
Start: 2022-01-26

## 2022-01-25 RX ORDER — MEPERIDINE HYDROCHLORIDE 50 MG/ML
12.5 INJECTION INTRAMUSCULAR; INTRAVENOUS; SUBCUTANEOUS PRN
Status: CANCELLED | OUTPATIENT
Start: 2022-01-26

## 2022-01-25 RX ORDER — DIPHENHYDRAMINE HYDROCHLORIDE 50 MG/ML
50 INJECTION INTRAMUSCULAR; INTRAVENOUS
Status: CANCELLED | OUTPATIENT
Start: 2022-01-26

## 2022-01-25 RX ORDER — ONDANSETRON 2 MG/ML
8 INJECTION INTRAMUSCULAR; INTRAVENOUS
Status: CANCELLED | OUTPATIENT
Start: 2022-01-26

## 2022-01-25 RX ORDER — SODIUM CHLORIDE 9 MG/ML
INJECTION, SOLUTION INTRAVENOUS CONTINUOUS
Status: DISCONTINUED | OUTPATIENT
Start: 2022-01-25 | End: 2022-01-25 | Stop reason: HOSPADM

## 2022-01-25 RX ORDER — SODIUM CHLORIDE 9 MG/ML
5-40 INJECTION INTRAVENOUS PRN
Status: CANCELLED | OUTPATIENT
Start: 2022-01-26

## 2022-01-25 RX ORDER — SODIUM CHLORIDE 0.9 % (FLUSH) 0.9 %
5-40 SYRINGE (ML) INJECTION PRN
Status: CANCELLED | OUTPATIENT
Start: 2022-01-26

## 2022-01-25 RX ORDER — HEPARIN SODIUM (PORCINE) LOCK FLUSH IV SOLN 100 UNIT/ML 100 UNIT/ML
500 SOLUTION INTRAVENOUS PRN
Status: CANCELLED | OUTPATIENT
Start: 2022-01-26

## 2022-01-25 RX ORDER — LIDOCAINE HYDROCHLORIDE AND EPINEPHRINE 10; 10 MG/ML; UG/ML
INJECTION, SOLUTION INFILTRATION; PERINEURAL PRN
Status: DISCONTINUED | OUTPATIENT
Start: 2022-01-25 | End: 2022-01-25 | Stop reason: ALTCHOICE

## 2022-01-25 RX ORDER — MORPHINE SULFATE 2 MG/ML
2 INJECTION, SOLUTION INTRAMUSCULAR; INTRAVENOUS
Status: DISCONTINUED | OUTPATIENT
Start: 2022-01-25 | End: 2022-01-25 | Stop reason: HOSPADM

## 2022-01-25 RX ORDER — SODIUM CHLORIDE 0.9 % (FLUSH) 0.9 %
5-40 SYRINGE (ML) INJECTION EVERY 12 HOURS SCHEDULED
Status: DISCONTINUED | OUTPATIENT
Start: 2022-01-25 | End: 2022-01-25 | Stop reason: HOSPADM

## 2022-01-25 RX ORDER — ALBUTEROL SULFATE 90 UG/1
4 AEROSOL, METERED RESPIRATORY (INHALATION) PRN
Status: CANCELLED | OUTPATIENT
Start: 2022-01-26

## 2022-01-25 RX ORDER — ONDANSETRON 2 MG/ML
4 INJECTION INTRAMUSCULAR; INTRAVENOUS EVERY 6 HOURS PRN
Status: DISCONTINUED | OUTPATIENT
Start: 2022-01-25 | End: 2022-01-25 | Stop reason: HOSPADM

## 2022-01-25 RX ORDER — MORPHINE SULFATE 2 MG/ML
4 INJECTION, SOLUTION INTRAMUSCULAR; INTRAVENOUS
Status: DISCONTINUED | OUTPATIENT
Start: 2022-01-25 | End: 2022-01-25 | Stop reason: HOSPADM

## 2022-01-25 RX ORDER — PALONOSETRON 0.05 MG/ML
0.25 INJECTION, SOLUTION INTRAVENOUS ONCE
Status: CANCELLED | OUTPATIENT
Start: 2022-01-26 | End: 2022-01-26

## 2022-01-25 RX ORDER — ACETAMINOPHEN 325 MG/1
650 TABLET ORAL EVERY 4 HOURS PRN
Status: DISCONTINUED | OUTPATIENT
Start: 2022-01-25 | End: 2022-01-25 | Stop reason: HOSPADM

## 2022-01-25 RX ORDER — SODIUM CHLORIDE 0.9 % (FLUSH) 0.9 %
5-40 SYRINGE (ML) INJECTION PRN
Status: DISCONTINUED | OUTPATIENT
Start: 2022-01-25 | End: 2022-01-25 | Stop reason: HOSPADM

## 2022-01-25 RX ORDER — FLUOROURACIL 50 MG/ML
400 INJECTION, SOLUTION INTRAVENOUS ONCE
Status: CANCELLED | OUTPATIENT
Start: 2022-01-26

## 2022-01-25 RX ORDER — ONDANSETRON 4 MG/1
4 TABLET, ORALLY DISINTEGRATING ORAL EVERY 8 HOURS PRN
Status: DISCONTINUED | OUTPATIENT
Start: 2022-01-25 | End: 2022-01-25 | Stop reason: HOSPADM

## 2022-01-25 RX ORDER — ACETAMINOPHEN 325 MG/1
650 TABLET ORAL
Status: CANCELLED | OUTPATIENT
Start: 2022-01-26

## 2022-01-25 RX ORDER — PROPOFOL 10 MG/ML
INJECTION, EMULSION INTRAVENOUS CONTINUOUS PRN
Status: DISCONTINUED | OUTPATIENT
Start: 2022-01-25 | End: 2022-01-25 | Stop reason: SDUPTHER

## 2022-01-25 RX ORDER — HEPARIN SODIUM (PORCINE) LOCK FLUSH IV SOLN 100 UNIT/ML 100 UNIT/ML
SOLUTION INTRAVENOUS PRN
Status: DISCONTINUED | OUTPATIENT
Start: 2022-01-25 | End: 2022-01-25 | Stop reason: ALTCHOICE

## 2022-01-25 RX ORDER — HYDROCODONE BITARTRATE AND ACETAMINOPHEN 5; 325 MG/1; MG/1
2 TABLET ORAL EVERY 4 HOURS PRN
Status: DISCONTINUED | OUTPATIENT
Start: 2022-01-25 | End: 2022-01-25 | Stop reason: HOSPADM

## 2022-01-25 RX ORDER — CEFAZOLIN SODIUM 2 G/100ML
2000 INJECTION, SOLUTION INTRAVENOUS
Status: COMPLETED | OUTPATIENT
Start: 2022-01-25 | End: 2022-01-25

## 2022-01-25 RX ORDER — SODIUM CHLORIDE 9 MG/ML
INJECTION, SOLUTION INTRAVENOUS CONTINUOUS
Status: CANCELLED | OUTPATIENT
Start: 2022-01-26

## 2022-01-25 RX ORDER — DEXTROSE MONOHYDRATE 50 MG/ML
INJECTION, SOLUTION INTRAVENOUS ONCE
Status: CANCELLED | OUTPATIENT
Start: 2022-01-26 | End: 2022-01-26

## 2022-01-25 RX ORDER — MIDAZOLAM HYDROCHLORIDE 1 MG/ML
INJECTION INTRAMUSCULAR; INTRAVENOUS PRN
Status: DISCONTINUED | OUTPATIENT
Start: 2022-01-25 | End: 2022-01-25 | Stop reason: SDUPTHER

## 2022-01-25 RX ORDER — HYDROCODONE BITARTRATE AND ACETAMINOPHEN 5; 325 MG/1; MG/1
1 TABLET ORAL EVERY 4 HOURS PRN
Status: DISCONTINUED | OUTPATIENT
Start: 2022-01-25 | End: 2022-01-25 | Stop reason: HOSPADM

## 2022-01-25 RX ADMIN — SODIUM CHLORIDE: 9 INJECTION, SOLUTION INTRAVENOUS at 08:47

## 2022-01-25 RX ADMIN — MIDAZOLAM 2 MG: 1 INJECTION INTRAMUSCULAR; INTRAVENOUS at 09:21

## 2022-01-25 RX ADMIN — PROPOFOL 100 MCG/KG/MIN: 10 INJECTION, EMULSION INTRAVENOUS at 09:24

## 2022-01-25 RX ADMIN — CEFAZOLIN SODIUM 2000 MG: 2 INJECTION, SOLUTION INTRAVENOUS at 09:26

## 2022-01-25 ASSESSMENT — PULMONARY FUNCTION TESTS
PIF_VALUE: 1
PIF_VALUE: 0
PIF_VALUE: 1
PIF_VALUE: 0
PIF_VALUE: 2
PIF_VALUE: 0
PIF_VALUE: 1
PIF_VALUE: 2
PIF_VALUE: 0
PIF_VALUE: 2
PIF_VALUE: 0
PIF_VALUE: 1
PIF_VALUE: 0

## 2022-01-25 ASSESSMENT — PAIN SCALES - GENERAL
PAINLEVEL_OUTOF10: 0

## 2022-01-25 ASSESSMENT — PAIN - FUNCTIONAL ASSESSMENT: PAIN_FUNCTIONAL_ASSESSMENT: 0-10

## 2022-01-25 NOTE — PLAN OF CARE
Problem: Musculor/Skeletal Functional Status  Goal: Absence of falls  Outcome: Met This Shift  Note: No falls this admission   Intervention: Fall precautions  Note: Patient aware of fall precautions for here and at home -call light in reach while here       Problem: Intellectual/Education/Knowledge Deficit  Goal: Teaching initiated upon admission  Outcome: Met This Shift  Note: Chemotherapy Teaching     What is Chemotherapy   Drug action [x]   Method of Administration [x]   Handouts given [x]     Side Effects  Nausea/vomiting [x]   Diarrhea [x]   Fatigue [x]   Signs / Symptoms of infection [x]   Neutropenia [x]   Thrombocytopenia [x]   Alopecia [x]   neuropathy [x]   [place] diet &  the importance of fluids [x]       Micellaneous  Importance of nutrition [x]   Importance of oral hygiene [x]   When to call the MD [x]   Monitoring labs [x]   Use of supportive services [x]     Explanation of Drug Regimen / Frequency  Oxaliplatin lelucovorin and 5fu     Comments  Verbalized understanding to drug,action,side effects and when to call MD      Goal: Written Disposition Instruction form completed  Outcome: Met This Shift  Note: Discharge instructions given and reviewed with patient. All questions answered. Patient verbalized understanding   Intervention: Verbal/written education provided  Note: Discharge instruction sheets     Problem: Discharge Planning  Goal: Knowledge of discharge instructions  Description: Knowledge of discharge instructions  Outcome: Met This Shift  Note: Patient able to teach back follow up appointments and when to call the doctor.  Patient offers no questions at this time   Intervention: Interaction with patient/family and care team  Note: All questions and concerns addressed  Intervention: Discharge to appropriate level of care  Note: Discharge home

## 2022-01-25 NOTE — PROGRESS NOTES
Clarify with Dr de la rosa to resume eliquis in 2 days. Pt has met discharge criteria and states she is ready for discharge to home. IV removed, gauze and tape applied. Dressed in own clothes and personal belongings gathered. Discharge instructions (with opioid medication education information) given to pt and family; pt and family verbalized understanding of discharge instructions, prescriptions and follow up appointments. Pt transported to discharge lobby by [place] staff.

## 2022-01-25 NOTE — OP NOTE
[address]  RECORD OF OPERATION     PATIENT NAME: Jacque Carlos               MEDICAL RECORD NO. [id number]                DATE OF PROCEDURE: 1/25/2022  SURGEON: Daniella Woodard MD  PRIMARY CARE PHYSICIAN: Irma Wong MD        PREOPERATIVE DIAGNOSIS:  Need for IV access for chemotherapy . Carcinoma with lymph node metastasis     POSTOPERATIVE DIAGNOSIS:  Same    PROCEDURE PERFORMED: Left subclavian 6 Swedish Smart port with  fluoroscopic assistance. SURGEON:  Dr. Addie LYNCH     ANESTHESIA:  IV sedation with local.     BLOOD LOSS:   5 ml. SPECIMENS:  None. COMPLICATIONS:  None immediately appreciated. DISCUSSION:  Kenji Brown is a [de-identified]y.o.-year-old female who has a diagnosis of cholangiocarcinoma and is to undergo chemotherapy and required semipermanent IV  access for therapy. After a history and physical examination was  performed, potential diagnostic and therapeutic modalities were discussed  with the patient. Variations of IV access techniques were discussed. The  risks, complications and benefits were reviewed. she was given the  opportunity to ask questions. Once answered, informed consent was obtained. The patient was brought to the operating on 1/25/2022  procedure. PROCEDURE:  The patient was brought to the operating room, placed in the  supine position, placed under continuous cardiac telemetry, blood pressure,  pulse oximetry monitoring. Placed under IV sedation with the Anesthesia  department. The left subclavian region was prepped and draped in the  sterile fashion. The infraclavicular region was infiltrated with local  anesthetic and through the anesthetized region, the needle introduced and the  needle inserted into the subclavian vein returning dark red, non-pulsatile  blood. The guidewire was placed with use of the needle and directed into the  superior vena cava via fluoroscopic guidance.   A small incision was made in  the skin using a number 11 scalpel blade and tissue dilator and introducer  sheath placed over-the-top of the guidewire, again in the superior vena cava  via fluoroscopic guidance. The guidewire and dilator were then removed. Flushed catheter was placed down through the center of the introducer again in the  superior vena cava via fluoroscopic guidance. The sheath was then removed. After infiltrating with local anesthesia a pocket was fashioned on the left anterior chest wall after skin incision was made with #15 scalpel blade. The catheter was tunneled into the pocket, cut to appropriate length and attached to the port with the provided flange. The port was secured to the chest wall with 2-0 vicryl suture. The port was aspirated, and flushed appropriately. The skin incisions were then closed using 4-0 Vicryl suture. The wound was then cleansed, sterile dressings were applied. The patient was brought out of sedation. The patient tolerated the procedure well without any immediate  complications evident. All sponge, instrument and needle counts were correct  at the end of the procedure. Postprocedure chest x-ray was obtained. Catheter tip in the distal superior vena cava. There is no evidence of pneumothorax.      Jeffrey Roldan MD  Electronically signed 1/25/2022 at 10:11 AM

## 2022-01-25 NOTE — PROGRESS NOTES
Pt returned to [facility] room 8. Vitals and assessment as charted. 0.9 infusing, @250ml to count from PACU. Pt has juice and muffin. Family at the bedside. Pt and family verbalized understanding of discharge criteria and call light use. Call light in reach.

## 2022-01-25 NOTE — ANESTHESIA POSTPROCEDURE EVALUATION
Department of Anesthesiology  Postprocedure Note    Patient: Lisandro Alcala  MRN: [id number]  YOB: 1940  Date of evaluation: 1/25/2022  Time:  10:55 AM     Procedure Summary     Date: 01/25/22 Room / Location: [place] [id number] / Daniela Mims    Anesthesia Start: 0915 Anesthesia Stop: 9011    Procedure: SINGLE LUMEN SMARTPORT INSERTION (Left Chest) Diagnosis: (CHOLANGIOCARCINOMA)    Surgeons: Vika Ramos MD Responsible Provider: Rogerio Oakley DO    Anesthesia Type: MAC ASA Status: 2          Anesthesia Type: MAC    Catrachito Phase I: Catrachito Score: 10    Catrachito Phase II: Catrachito Score: 10    Last vitals: Reviewed and per EMR flowsheets.        Anesthesia Post Evaluation    Patient location during evaluation: bedside  Patient participation: complete - patient participated  Level of consciousness: awake  Airway patency: patent  Nausea & Vomiting: no nausea  Complications: no  Cardiovascular status: hemodynamically stable  Respiratory status: acceptable  Hydration status: stable

## 2022-01-25 NOTE — ANESTHESIA PRE PROCEDURE
Department of Anesthesiology  Preprocedure Note       Name:  Kenney Nava   Age:  [de-identified] y.o.  :  1940                                          MRN:  [id number]         Date:  2022      Surgeon: Roscoe Peñaloza):  Rambo Mcdowell MD    Procedure: Procedure(s):  SINGLE LUMEN KHUPTESHN INSERTION    Medications prior to admission:   Prior to Admission medications    Medication Sig Start Date End Date Taking?  Authorizing Provider   amLODIPine (NORVASC) 5 MG tablet Take 1 tablet by mouth daily 22   Hien Miller MD   amiodarone (CORDARONE) 200 MG tablet Take 1 tablet by mouth daily 1/13/22 3/14/22  Hien Miller MD   metoprolol tartrate (LOPRESSOR) 25 MG tablet Take 0.5 tablets by mouth 2 times daily 22   Hien Miller MD   pantoprazole (PROTONIX) 40 MG tablet Take 1 tablet by mouth every morning (before breakfast) 22  Hien Miller MD   aspirin 81 MG chewable tablet Take 81 mg by mouth daily     Historical Provider, MD   docusate sodium (COLACE) 100 MG capsule Take 100 mg by mouth 2 times daily    Historical Provider, MD   ondansetron (ZOFRAN) 4 MG tablet Take 4 mg by mouth every 8 hours as needed for Nausea or Vomiting     Historical Provider, MD   oxybutynin (DITROPAN) 5 MG tablet Take 5 mg by mouth 3 times daily     Historical Provider, MD   apixaban (ELIQUIS) 5 MG TABS tablet Take 1 tablet by mouth 2 times daily 21  Hien Miller MD       Current medications:    Current Facility-Administered Medications   Medication Dose Route Frequency Provider Last Rate Last Admin    0.9 % sodium chloride infusion   IntraVENous Continuous Rambo Mcdowell MD        sodium chloride flush 0.9 % injection 5-40 mL  5-40 mL IntraVENous 2 times per day Rambo Mcdowell MD        sodium chloride flush 0.9 % injection 5-40 mL  5-40 mL IntraVENous PRN Rambo Mcdowell MD        0.9 % sodium chloride infusion  25 mL IntraVENous PRN Rambo Mcdowell MD        ceFAZolin (ANCEF) 2000 mg in dextrose 4 % 100 mL IVPB (premix)  2,000 mg IntraVENous On Call to [address] [place] [address] [place], MD           Allergies:  No Known Allergies    Problem List:    Patient Active Problem List   Diagnosis Code    Obstructive jaundice K83.1    Hyperbilirubinemia E80.6    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    Elevated LFTs R79.89    Hypokalemia E87.6    Pancreatic cyst K86.2    Normocytic anemia D64.9    Abnormal urinalysis R82.90    Unintentional weight loss R63.4    Hiatal hernia K44.9    Diverticulosis K57.90    Malignant neoplasm of pancreas (HCC) C25.9    Atrial fibrillation (HCC) I48.91    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 Rosalie Cain    ERCP N/A 11/10/2021    ERCP WITH STENT INSERTION performed by Lily Espinoza MD at [address] [place] [address]  12/04/2021    exp lap, radical celiac and portal lymph node dissection,pylorus preserving pancreatoduodenectomy-Dr Ewing IU       Social History:    Social History     Tobacco Use    Smoking status: Former Smoker    Smokeless tobacco: Never Used   Substance Use Topics    Alcohol use: Not Currently                                Counseling given: Not Answered      Vital Signs (Current):   Vitals:    01/25/22 0800   BP: (!) 145/66   Pulse: 59   Resp: 18   SpO2: 98%   Weight: 148 lb 9.6 oz (67.4 kg)   Height: 5' 5\" (1.651 m)                                              BP Readings from Last 3 Encounters:   01/25/22 (!) 145/66   01/20/22 120/60   01/19/22 (!) 121/58       NPO Status:                                                                                 BMI:   Wt Readings from Last 3 Encounters:   01/25/22 148 lb 9.6 oz (67.4 kg)   01/20/22 150 lb 1.6 oz (68.1 kg)   01/19/22 148 lb 12.8 oz (67.5 kg) Body mass index is 24.73 kg/m². CBC:   Lab Results   Component Value Date    WBC 12.1 12/28/2021    RBC 3.28 12/28/2021    HGB 9.9 12/28/2021    HCT 32.3 12/28/2021    MCV 98.5 12/28/2021     12/28/2021       CMP:   Lab Results   Component Value Date     12/28/2021    K 3.8 12/28/2021     12/28/2021    CO2 24 12/28/2021    BUN 17 12/28/2021    CREATININE 1.4 12/28/2021    LABGLOM 36 12/28/2021    GLUCOSE 121 12/28/2021    PROT 7.2 12/28/2021    CALCIUM 9.3 12/28/2021    BILITOT 1.9 12/28/2021    ALKPHOS 192 12/28/2021    AST 25 12/28/2021    ALT 24 12/28/2021       POC Tests: No results for input(s): POCGLU, POCNA, POCK, POCCL, POCBUN, POCHEMO, POCHCT in the last 72 hours. Coags:   Lab Results   Component Value Date    INR 1.04 11/09/2021       HCG (If Applicable): No results found for: PREGTESTUR, PREGSERUM, HCG, HCGQUANT     ABGs: No results found for: PHART, PO2ART, JLB1YXN, CBH0BKL, BEART, P1TZHZGW     Type & Screen (If Applicable):  No results found for: LABABO, LABRH    Drug/Infectious Status (If Applicable):  No results found for: HIV, HEPCAB    COVID-19 Screening (If Applicable): No results found for: COVID19        Anesthesia Evaluation  Patient summary reviewed and Nursing notes reviewed no history of anesthetic complications:   Airway: Mallampati: II        Dental:          Pulmonary: breath sounds clear to auscultation                             Cardiovascular:  Exercise tolerance: good (>4 METS),   (+) hypertension:, dysrhythmias:,       ECG reviewed  Rhythm: regular  Rate: normal                    Neuro/Psych:               GI/Hepatic/Renal:   (+) hiatal hernia,           Endo/Other:    (+) malignancy/cancer. Abdominal:       Abdomen: soft. Vascular: Other Findings:             Anesthesia Plan      MAC     ASA 2       Induction: intravenous. Anesthetic plan and risks discussed with patient and child/children.       Plan discussed with CRNA.                  Marta Bryson,    1/25/2022

## 2022-01-25 NOTE — PROGRESS NOTES
ADMITTED TO SDS AND ORIENTED TO UNIT. SCDS ON. FALL BAND ON. PT VERBALIZED APPROVAL FOR FIRST NAME, LAST INITIAL AND PHYSICIAN NAME ON UNIT WHITEBOARD. Son aSmantha Carrizales is with the patient.

## 2022-01-25 NOTE — PROGRESS NOTES
Patient in Same Day Surgery with family member present. Patient verified surgical and anesthesia consent. Patient arrived to OR with no hearing aides, glasses, dentures, clothing or jewelry.

## 2022-01-25 NOTE — BRIEF OP NOTE
Brief Postoperative Note      Patient: Tammy Abreu  YOB: 1940  MRN: [id number]    Date of Procedure: 1/25/2022    Pre-Op Diagnosis: CHOLANGIOCARCINOMA    Post-Op Diagnosis: Same       Procedure(s):  SINGLE LUMEN OBRVJWLOC INSERTION    Surgeon(s):  Donta Mckeon MD    Assistant:  * No surgical staff found *    Anesthesia: Monitor Anesthesia Care    Estimated Blood Loss (mL): Minimal    Complications: None    Specimens:   * No specimens in log *    Implants:  Implant Name Type Inv.  Item Serial No.  Lot No. LRB No. Used Action   PORT INFUS 6FR SLIM POLYUR ATTCH OPN END SGL LUMN SREE CATH  PORT INFUS 6FR SLIM POLYUR ATTCH OPN END SGL LUMN SREE CATH  [facility] [id number] Left 1 Implanted         Drains:   [REMOVED] NG/OG/NJ/NE Tube Nasogastric Right nostril (Removed)         Electronically signed by Donta Mckeon MD on 1/25/2022 at 10:06 AM

## 2022-01-25 NOTE — PROGRESS NOTES
Patient instructed on oxaliplatin leucovorin and 5fu and their  Pre-medications. Drug information sheets including side effects, Chemotherapy and you, eating hints,understanding your blood counts, bland diet, importance of hydration, when to call physician sheet discussed and given to patient, reduce risk infection, bleeding precaution and neutropenia precaution sheets also given to patient. Patient instructed to have a  with them for at least the first visit. All questions answered satisfactorily and support given. Patient given a tour of facility.  Approximately 60 minutes spent with patient (daughter listened over the phone)

## 2022-01-25 NOTE — H&P
[address] [address]  History and Physical Update    Pt Name: Maynor Medina  MRN: [id number]  YOB: 1940  Date of evaluation: 1/25/2022    [x] I have examined the patient and reviewed the H&P/Consult and there are no changes to the patient or plans. [] I have examined the patient and reviewed the H&P/Consult and have noted the following changes:        Briana Mullins MD MD  Electronically signed 1/25/2022 at 8:46 Barbara To MD   General Surgery  New Patient Evaluation in Office  Pt Name: Maynor Medina  Date of Birth 1940   Today's Date: 1/20/2022  Medical Record Number: [id number]  Referring Provider: Miguel Uriostegui MD  Primary Care Provider: Nicci Lamb MD  Chief Complaint:       Chief Complaint   Patient presents with    Surgical Consult       New patient referred Dr. Doris Son-- Distal Cholangiocarcinoma-- needs mediport placement         ASSESSMENT   1. Cholangiocarcinoma ([facility] [address]    2. Atrial fibrillation, unspecified type ([facility] [address]    3. Hiatal hernia       PLANS   1. Schedule patient for port placement for chemotherapy with Dr. Doris Son. Left subclavian placement preferred. 2.Techniques for long term IV access were discussed. Risks, complications and benefits of each were reviewed including bleeding, infection, thrombosis and lung injury were reviewed. The patient was given the opportunity to ask questions. Once answered, informed consent was obtained and the patient scheduled for the procedure. 3.  MAC anesthesia  4. Preoperative testing per anesthesia guidelines  5. Hold Eliquis 48 hours prior to scheduled procedure         CAROLINE Nam is a [de-identified]y.o. year old female who is presenting today in the office for evaluation for port placement for planned chemotherapy. Patient presented I believe in November with obstructive jaundice. She was diagnosed with a distal common bile duct tumor.   She was referred to [facility] after an ERCP here for surgical treatment. She underwent a pancreaticoduodenectomy there she did have some lymph node involvement. Plan is for single agent therapy with Gemzar due to her age. Request made for port placement. Patient discussed with her children and she believes she is going to start therapy. She has never had a history of deep vein thrombosis or prior upper extremity access. She has no renal issues.   She takes an oral anticoagulant for chronic atrial fibrillation.     Past Medical History  Past Medical History        Past Medical History:   Diagnosis Date    Cholangiocarcinoma ([address]       [place]    Hypertension            Past Surgical History  Past Surgical History         Past Surgical History:   Procedure Laterality Date    APPENDECTOMY   66    CHOLECYSTECTOMY   12/04/2021     Dr. Mandy Aguilar    ERCP N/A 11/10/2021     ERCP WITH STENT INSERTION performed by Saira Leach MD at [facility]   12/04/2021     exp lap, radical celiac and portal lymph node dissection,pylorus preserving pancreatoduodenectomy-Dr Ewing IU          Medications  Current Facility-Administered Medications          Current Outpatient Medications   Medication Sig Dispense Refill    amLODIPine (NORVASC) 5 MG tablet Take 1 tablet by mouth daily 30 tablet 1    amiodarone (CORDARONE) 200 MG tablet Take 1 tablet by mouth daily 30 tablet 1    metoprolol tartrate (LOPRESSOR) 25 MG tablet Take 0.5 tablets by mouth 2 times daily 60 tablet 1    pantoprazole (PROTONIX) 40 MG tablet Take 1 tablet by mouth every morning (before breakfast) 30 tablet 0    docusate sodium (COLACE) 100 MG capsule Take 100 mg by mouth 2 times daily        ondansetron (ZOFRAN) 4 MG tablet Take 4 mg by mouth every 8 hours as needed for Nausea or Vomiting         oxybutynin (DITROPAN) 5 MG tablet Take 5 mg by mouth 3 times daily         aspirin 81 MG chewable tablet Take 81 mg by mouth daily (Patient not taking: Reported on 1/20/2022)        apixaban (ELIQUIS) 5 MG TABS tablet Take 1 tablet by mouth 2 times daily (Patient not taking: Reported on 1/20/2022) 180 tablet 1      No current facility-administered medications for this visit.         Allergies   No Known Allergies    Family History  Family History         Family History   Problem Relation Age of Onset    Breast Cancer Mother      Colon Cancer Mother      Cancer Father           bladder and lung    Lung Cancer Father      Kidney Disease Brother      No Known Problems Maternal Grandmother      No Known Problems Maternal Grandfather      No Known Problems Paternal Grandmother      No Known Problems Paternal Grandfather            SocialHistory  Social History               Socioeconomic History    Marital status:        Spouse name: Not on file    Number of children: Not on file    Years of education: Not on file    Highest education level: Not on file   Occupational History    Not on file   Tobacco Use    Smoking status: Former Smoker    Smokeless tobacco: Never Used   Vaping Use    Vaping Use: Never used   Substance and Sexual Activity    Alcohol use: Not Currently    Drug use: Never       Types: Marijuana Fredick Copping)    Sexual activity: Not on file   Other Topics Concern    Not on file   Social History Narrative    Not on file      Social Determinants of Health          Financial Resource Strain: Low Risk     Difficulty of Paying Living Expenses: Not very hard   Food Insecurity: No Food Insecurity    Worried About Running Out of Food in the Last Year: Never true    Roberto of Food in the Last Year: Never true   Transportation Needs:     Lack of Transportation (Medical): Not on file    Lack of Transportation (Non-Medical):  Not on file   Physical Activity:     Days of Exercise per Week: Not on file    Minutes of Exercise per Session: Not on file   Stress:     Feeling of Stress : Not on file   Social Connections:     Frequency of Communication with Friends and Family: Not on file    Frequency of Social Gatherings with Friends and Family: Not on file    Attends Quaker Services: Not on file    Active Member of Clubs or Organizations: Not on file    Attends Club or Organization Meetings: Not on file    Marital Status: Not on file   Intimate Partner Violence:     Fear of Current or Ex-Partner: Not on file    Emotionally Abused: Not on file    Physically Abused: Not on file    Sexually Abused: Not on file   Housing Stability:     Unable to Pay for Housing in the Last Year: Not on file    Number of [place] in the Last Year: Not on file    Unstable Housing in the Last Year: Not on file              Review of Systems  Review of Systems   Constitutional: Positive for activity change and fatigue. Negative for chills and fever. HENT: Negative for congestion and trouble swallowing. Respiratory: Positive for shortness of breath. Negative for cough. Cardiovascular: Negative for chest pain and palpitations. Gastrointestinal: Negative for nausea and vomiting. Genitourinary: Negative for decreased urine volume. Musculoskeletal: Negative for back pain. Skin: Negative for color change and rash. Neurological: Negative for dizziness and headaches. Hematological: Negative for adenopathy. Bruises/bleeds easily. Psychiatric/Behavioral: Negative for agitation and confusion.         OBJECTIVE      /60 (Site: Right Upper Arm, Position: Sitting, Cuff Size: Medium Adult)   Pulse 112   Temp 97.5 °F (36.4 °C) (Temporal)   Resp 18   Ht 5' 5\" (1.651 m)   Wt 150 lb 1.6 oz (68.1 kg)   SpO2 98%   BMI 24.98 kg/m²       Physical Exam  Vitals reviewed. Constitutional:       General: She is not in acute distress. Appearance: She is well-developed. She is not toxic-appearing or diaphoretic. HENT:      Head: Normocephalic and atraumatic. Nose: No congestion. Eyes:      General: No scleral icterus.      Pupils: Pupils are equal, round, and reactive to light. Neck:      Thyroid: No thyromegaly. Vascular: No JVD. Trachea: No tracheal deviation. Cardiovascular:      Rate and Rhythm: Normal rate. Heart sounds: Normal heart sounds. Pulmonary:      Effort: Pulmonary effort is normal. No respiratory distress. Breath sounds: Normal breath sounds. No wheezing. Abdominal:      General: There is no distension. Palpations: Abdomen is soft. Tenderness: There is no abdominal tenderness. There is no guarding or rebound. Comments: Surgical incision upper midline healing well no drainage or erythema   Musculoskeletal:      Cervical back: Normal range of motion and neck supple. Right lower leg: No edema. Left lower leg: No edema. Lymphadenopathy:      Cervical: No cervical adenopathy. Skin:     General: Skin is warm and dry. Coloration: Skin is not jaundiced. Findings: No bruising or rash. Neurological:      Mental Status: She is alert and oriented to person, place, and time. Cranial Nerves: No cranial nerve deficit. Psychiatric:         Mood and Affect: Mood normal.         Thought Content:  Thought content normal.                  Lab Results   Component Value Date     WBC 12.1 (H) 12/28/2021     HGB 9.9 (L) 12/28/2021     HCT 32.3 (L) 12/28/2021      (H) 12/28/2021     ALT 24 12/28/2021     AST 25 12/28/2021      12/28/2021     K 3.8 12/28/2021      12/28/2021     CREATININE 1.4 (H) 12/28/2021     BUN 17 12/28/2021     CO2 24 12/28/2021     TSH 5.560 (H) 12/28/2021     INR 1.04 11/09/2021     LABA1C 5.7 11/10/2021

## 2022-01-26 ENCOUNTER — HOSPITAL ENCOUNTER (OUTPATIENT)
Dept: INFUSION THERAPY | Age: 82
Discharge: HOME | End: 2022-01-26

## 2022-01-26 ENCOUNTER — OFFICE VISIT (OUTPATIENT)
Dept: ONCOLOGY | Age: 82
End: 2022-01-26
Payer: MEDICARE

## 2022-01-26 ENCOUNTER — HOSPITAL ENCOUNTER (OUTPATIENT)
Dept: INFUSION THERAPY | Age: 82
Discharge: HOME | End: 2022-01-26
Attending: SURGERY
Payer: MEDICARE

## 2022-01-26 VITALS
HEIGHT: 65 IN | SYSTOLIC BLOOD PRESSURE: 127 MMHG | HEART RATE: 57 BPM | OXYGEN SATURATION: 98 % | DIASTOLIC BLOOD PRESSURE: 60 MMHG | RESPIRATION RATE: 16 BRPM | BODY MASS INDEX: 25.16 KG/M2 | WEIGHT: 151 LBS | TEMPERATURE: 98.6 F

## 2022-01-26 VITALS
RESPIRATION RATE: 16 BRPM | HEIGHT: 65 IN | WEIGHT: 151 LBS | SYSTOLIC BLOOD PRESSURE: 133 MMHG | BODY MASS INDEX: 25.16 KG/M2 | TEMPERATURE: 97.6 F | HEART RATE: 61 BPM | OXYGEN SATURATION: 98 % | DIASTOLIC BLOOD PRESSURE: 64 MMHG

## 2022-01-26 DIAGNOSIS — C25.9 MALIGNANT NEOPLASM OF PANCREAS, UNSPECIFIED LOCATION OF MALIGNANCY (HCC): Primary | ICD-10-CM

## 2022-01-26 DIAGNOSIS — Z90.410 HISTORY OF WHIPPLE PROCEDURE: ICD-10-CM

## 2022-01-26 DIAGNOSIS — Z51.11 ENCOUNTER FOR CHEMOTHERAPY MANAGEMENT: ICD-10-CM

## 2022-01-26 DIAGNOSIS — Z90.49 HISTORY OF WHIPPLE PROCEDURE: ICD-10-CM

## 2022-01-26 LAB
ABSOLUTE IMMATURE GRANULOCYTE: 0.02 THOU/MM3 (ref 0–0.07)
ALBUMIN SERPL-MCNC: 3.7 G/DL (ref 3.5–5.1)
ALP BLD-CCNC: 93 U/L (ref 38–126)
ALT SERPL-CCNC: 17 U/L (ref 11–66)
AST SERPL-CCNC: 21 U/L (ref 5–40)
BASINOPHIL, AUTOMATED: 1 % (ref 0–3)
BASOPHILS ABSOLUTE: 0.1 THOU/MM3 (ref 0–0.1)
BILIRUB SERPL-MCNC: 1 MG/DL (ref 0.3–1.2)
BILIRUBIN DIRECT: 0.4 MG/DL (ref 0–0.3)
BUN, WHOLE BLOOD: 10 MG/DL (ref 8–26)
CHLORIDE, WHOLE BLOOD: 107 MEQ/L (ref 98–109)
CREATININE, WHOLE BLOOD: 1.2 MG/DL (ref 0.5–1.2)
EOSINOPHILS ABSOLUTE: 0.2 THOU/MM3 (ref 0–0.4)
EOSINOPHILS RELATIVE PERCENT: 3 % (ref 0–4)
FERRITIN: 496 NG/ML (ref 10–291)
GFR, ESTIMATED: 46 ML/MIN/1.73M2
GLUCOSE, WHOLE BLOOD: 119 MG/DL (ref 70–108)
HCT VFR BLD CALC: 32.6 % (ref 37–47)
HEMOGLOBIN: 9.9 GM/DL (ref 12–16)
IMMATURE GRANULOCYTES: 0 %
IONIZED CALCIUM, WHOLE BLOOD: 1.14 MMOL/L (ref 1.12–1.32)
IRON SATURATION: 21 % (ref 20–50)
IRON: 38 UG/DL (ref 50–170)
LYMPHOCYTES # BLD: 41 % (ref 15–47)
LYMPHOCYTES ABSOLUTE: 3.3 THOU/MM3 (ref 1–4.8)
MCH RBC QN AUTO: 30.1 PG (ref 26–33)
MCHC RBC AUTO-ENTMCNC: 30.4 GM/DL (ref 32.2–35.5)
MCV RBC AUTO: 99 FL (ref 81–99)
MONOCYTES ABSOLUTE: 0.8 THOU/MM3 (ref 0.4–1.3)
MONOCYTES: 10 % (ref 0–12)
PDW BLD-RTO: 15.1 % (ref 11.5–14.5)
PLATELET # BLD: 305 THOU/MM3 (ref 130–400)
PMV BLD AUTO: 9.8 FL (ref 9.4–12.4)
POTASSIUM, WHOLE BLOOD: 3.8 MEQ/L (ref 3.5–4.9)
RBC # BLD: 3.29 MILL/MM3 (ref 4.2–5.4)
SEG NEUTROPHILS: 45 % (ref 43–75)
SEGMENTED NEUTROPHILS ABSOLUTE COUNT: 3.6 THOU/MM3 (ref 1.8–7.7)
SODIUM, WHOLE BLOOD: 144 MEQ/L (ref 138–146)
TOTAL CO2, WHOLE BLOOD: 25 MEQ/L (ref 23–33)
TOTAL IRON BINDING CAPACITY: 178 UG/DL (ref 171–450)
TOTAL PROTEIN: 6.1 G/DL (ref 6.1–8)
WBC # BLD: 8.1 THOU/MM3 (ref 4.8–10.8)

## 2022-01-26 PROCEDURE — 96413 CHEMO IV INFUSION 1 HR: CPT

## 2022-01-26 PROCEDURE — 85025 COMPLETE CBC W/AUTO DIFF WBC: CPT

## 2022-01-26 PROCEDURE — 36591 DRAW BLOOD OFF VENOUS DEVICE: CPT

## 2022-01-26 PROCEDURE — 83550 IRON BINDING TEST: CPT

## 2022-01-26 PROCEDURE — 6360000002 HC RX W HCPCS: Performed by: INTERNAL MEDICINE

## 2022-01-26 PROCEDURE — 99215 OFFICE O/P EST HI 40 MIN: CPT | Performed by: INTERNAL MEDICINE

## 2022-01-26 PROCEDURE — 96368 THER/DIAG CONCURRENT INF: CPT

## 2022-01-26 PROCEDURE — 96415 CHEMO IV INFUSION ADDL HR: CPT

## 2022-01-26 PROCEDURE — 99211 OFF/OP EST MAY X REQ PHY/QHP: CPT

## 2022-01-26 PROCEDURE — 96375 TX/PRO/DX INJ NEW DRUG ADDON: CPT

## 2022-01-26 PROCEDURE — 83540 ASSAY OF IRON: CPT

## 2022-01-26 PROCEDURE — 82728 ASSAY OF FERRITIN: CPT

## 2022-01-26 PROCEDURE — 96367 TX/PROPH/DG ADDL SEQ IV INF: CPT

## 2022-01-26 PROCEDURE — 80047 BASIC METABLC PNL IONIZED CA: CPT

## 2022-01-26 PROCEDURE — 96411 CHEMO IV PUSH ADDL DRUG: CPT

## 2022-01-26 PROCEDURE — 2580000003 HC RX 258: Performed by: INTERNAL MEDICINE

## 2022-01-26 PROCEDURE — 80076 HEPATIC FUNCTION PANEL: CPT

## 2022-01-26 PROCEDURE — 96416 CHEMO PROLONG INFUSE W/PUMP: CPT

## 2022-01-26 RX ORDER — HEPARIN SODIUM (PORCINE) LOCK FLUSH IV SOLN 100 UNIT/ML 100 UNIT/ML
500 SOLUTION INTRAVENOUS PRN
Status: DISCONTINUED | OUTPATIENT
Start: 2022-01-26 | End: 2022-01-27 | Stop reason: HOSPADM

## 2022-01-26 RX ORDER — LIDOCAINE AND PRILOCAINE 25; 25 MG/G; MG/G
CREAM TOPICAL
Qty: 30 G | Refills: 1 | Status: SHIPPED | OUTPATIENT
Start: 2022-01-26

## 2022-01-26 RX ORDER — DEXAMETHASONE 4 MG/1
8 TABLET ORAL
Qty: 6 TABLET | Refills: 3 | Status: SHIPPED | OUTPATIENT
Start: 2022-01-26 | End: 2022-02-09 | Stop reason: SDUPTHER

## 2022-01-26 RX ORDER — DEXTROSE MONOHYDRATE 50 MG/ML
INJECTION, SOLUTION INTRAVENOUS ONCE
Status: COMPLETED | OUTPATIENT
Start: 2022-01-26 | End: 2022-01-26

## 2022-01-26 RX ORDER — ONDANSETRON 4 MG/1
4 TABLET, ORALLY DISINTEGRATING ORAL 3 TIMES DAILY PRN
Qty: 21 TABLET | Refills: 0 | Status: SHIPPED | OUTPATIENT
Start: 2022-01-26 | End: 2022-02-09

## 2022-01-26 RX ORDER — PALONOSETRON 0.05 MG/ML
0.25 INJECTION, SOLUTION INTRAVENOUS ONCE
Status: COMPLETED | OUTPATIENT
Start: 2022-01-26 | End: 2022-01-26

## 2022-01-26 RX ORDER — SODIUM CHLORIDE 0.9 % (FLUSH) 0.9 %
5-40 SYRINGE (ML) INJECTION PRN
Status: CANCELLED | OUTPATIENT
Start: 2022-01-26

## 2022-01-26 RX ORDER — SODIUM CHLORIDE 0.9 % (FLUSH) 0.9 %
5-40 SYRINGE (ML) INJECTION PRN
Status: DISCONTINUED | OUTPATIENT
Start: 2022-01-26 | End: 2022-01-27 | Stop reason: HOSPADM

## 2022-01-26 RX ORDER — FLUOROURACIL 50 MG/ML
400 INJECTION, SOLUTION INTRAVENOUS ONCE
Status: COMPLETED | OUTPATIENT
Start: 2022-01-26 | End: 2022-01-26

## 2022-01-26 RX ORDER — SODIUM CHLORIDE 9 MG/ML
25 INJECTION, SOLUTION INTRAVENOUS PRN
Status: CANCELLED | OUTPATIENT
Start: 2022-01-26

## 2022-01-26 RX ORDER — HEPARIN SODIUM (PORCINE) LOCK FLUSH IV SOLN 100 UNIT/ML 100 UNIT/ML
500 SOLUTION INTRAVENOUS PRN
Status: CANCELLED | OUTPATIENT
Start: 2022-01-26

## 2022-01-26 RX ADMIN — FLUOROURACIL 3500 MG: 50 INJECTION, SOLUTION INTRAVENOUS at 12:55

## 2022-01-26 RX ADMIN — LEUCOVORIN CALCIUM 350 MG: 350 INJECTION, POWDER, LYOPHILIZED, FOR SOLUTION INTRAMUSCULAR; INTRAVENOUS at 10:19

## 2022-01-26 RX ADMIN — PALONOSETRON 0.25 MG: 0.05 INJECTION, SOLUTION INTRAVENOUS at 10:10

## 2022-01-26 RX ADMIN — FLUOROURACIL 700 MG: 50 INJECTION, SOLUTION INTRAVENOUS at 12:45

## 2022-01-26 RX ADMIN — SODIUM CHLORIDE, PRESERVATIVE FREE 20 ML: 5 INJECTION INTRAVENOUS at 08:46

## 2022-01-26 RX ADMIN — SODIUM CHLORIDE, PRESERVATIVE FREE 10 ML: 5 INJECTION INTRAVENOUS at 08:45

## 2022-01-26 RX ADMIN — DEXAMETHASONE SODIUM PHOSPHATE 12 MG: 4 INJECTION, SOLUTION INTRAMUSCULAR; INTRAVENOUS at 09:52

## 2022-01-26 RX ADMIN — OXALIPLATIN 150 MG: 5 INJECTION, SOLUTION INTRAVENOUS at 10:21

## 2022-01-26 RX ADMIN — DEXTROSE MONOHYDRATE: 50 INJECTION, SOLUTION INTRAVENOUS at 09:51

## 2022-01-26 NOTE — PROGRESS NOTES
Patient assessed for the following post chemotherapy:    Dizziness   No  Lightheadedness  No      Acute nausea/vomiting No  Headache   No  Chest pain/pressure  No  Rash/itching   No  Shortness of breath  No    Patient kept for 20 minutes observation post infusion chemotherapy. Patient tolerated chemotherapy treatment Oxaliplatin/Leucovorin/5FU and CADD without any complications. Last vital signs:   /64   Pulse 61   Temp 97.6 °F (36.4 °C) (Oral)   Resp 16   Ht 5' 5\" (1.651 m)   Wt 151 lb (68.5 kg)   SpO2 98%   BMI 25.13 kg/m²     Physician instructed patient:  1. Proceed with cycle #1 of FOLFOX today. The dose of leucovorin is reduced from 400 mg/m² to 200 mg/m², the dose of continuous 5-FU infusion infusion is reduced from 2400 mg/m² to 2000 mg/m² due to patient's age and anemia. 2.  Patient was instructed to take dexamethasone 8 mg daily for 3 days starting tomorrow. 3.  Prescription is sent to the pharmacy for Zofran ODT and EMLA cream.  4.  She was instructed to use Biotene mouthwash. 5.  Patient was instructed to call us right away with uncontrolled diarrhea and fever above 100.3 °F.  6.  RTC to see me for labs: CBC, BMP, LFTs and next dose of FOLFOX in 2 weeks    Patient instructed if experience any of the above symptoms following today's infusion, she is to notify MD immediately or go to the emergency department. Patient instructed when to call related to CADD and alarms. Discharge instructions given to patient. Verbalizes understanding. Ambulated off unit per self, accompanied son, with belongings and CADD infusing at 5.4ml/hr.

## 2022-01-26 NOTE — PLAN OF CARE
Problem: Musculor/Skeletal Functional Status  Goal: Absence of falls  Outcome: Met This Shift  Note: Patient free from falls while in O.P. Oncology. Intervention: Fall precautions  Note: [facility] Fall band placed on patient. Discussed the need to use the call light for assistance prior to getting up out of chair/bed. Problem: Intellectual/Education/Knowledge Deficit  Goal: Teaching initiated upon admission  Outcome: Met This Shift  Note: Patient verbalizes understanding to verbal information given on Oxaliplatin/Leucovorin/5FU ,action and possible side effects. Aware to call MD if develop complications. Goal: Written Disposition Instruction form completed  Outcome: Met This Shift  Intervention: Verbal/written education provided  Note: Chemotherapy Teaching     What is Chemotherapy   Drug action [x]   Method of Administration [x]   Handouts given []     Side Effects  Nausea/vomiting [x]   Diarrhea [x]   Fatigue [x]   Signs / Symptoms of infection [x]   Neutropenia [x]   Thrombocytopenia [x]   Alopecia [x]   neuropathy [x]   Elwin diet &  the importance of fluids [x]       Micellaneous  Importance of nutrition [x]   Importance of oral hygiene [x]   When to call the MD [x]   Monitoring labs [x]   Use of supportive services []     Explanation of Drug Regimen / Frequency  Oxaliplatin/Leucovorin/5FU:  D1C1     Comments  Verbalized understanding to drug,action,side effects and when to call MD         Problem: Discharge Planning  Goal: Knowledge of discharge instructions  Description: Knowledge of discharge instructions  Outcome: Met This Shift  Note: Verbalized understanding of discharge instructions, follow-up appointments, and when to call the physician. Intervention: Interaction with patient/family and care team  Note: Discuss understanding of discharge instructions,follow-up appointments, and when to call the physician.       Problem: Infection - Central Venous Catheter-Associated Bloodstream Infection:  Goal: Will show no infection signs and symptoms  Description: Will show no infection signs and symptoms  Outcome: Met This Shift  Note: Mediport site with no redness or warmth. Skin over port site intact with no signs of breakdown noted. Patient verbalizes signs/symptoms of port infection and when to notify the physician. Intervention: Infection risk assessment  Description: Infection risk assessment  Note: Discuss port maintenance, infection prevention, signs and when to call Dr Shawn Agrawal reviewed with patient and son. Patient and son verbalize understanding of the plan of care and contribute to goal setting.

## 2022-01-26 NOTE — ONCOLOGY

## 2022-01-26 NOTE — TELEPHONE ENCOUNTER
Pt states medications were discussed at visit and need sent to pharmacy-zofran, emla cream, dexamethasone.

## 2022-01-26 NOTE — PATIENT INSTRUCTIONS
1. Proceed with cycle #1 of FOLFOX today. The dose of leucovorin is reduced from 400 mg/m² to 200 mg/m², the dose of continuous 5-FU infusion infusion is reduced from 2400 mg/m² to 2000 mg/m² due to patient's age and anemia. 2.  Patient was instructed to take dexamethasone 8 mg daily for 3 days starting tomorrow. 3.  Prescription is sent to the pharmacy for Zofran ODT and EMLA cream.  4.  She was instructed to use Biotene mouthwash.   5.  Patient was instructed to call us right away with uncontrolled diarrhea and fever above 100.3 °F.  6.  RTC to see me for labs: CBC, BMP, LFTs and next dose of FOLFOX in 2 weeks

## 2022-01-28 ENCOUNTER — TELEPHONE (OUTPATIENT)
Dept: FAMILY MEDICINE CLINIC | Age: 82
End: 2022-01-28

## 2022-01-28 ENCOUNTER — OFFICE VISIT (OUTPATIENT)
Dept: FAMILY MEDICINE CLINIC | Age: 82
End: 2022-01-28
Payer: MEDICARE

## 2022-01-28 ENCOUNTER — HOSPITAL ENCOUNTER (OUTPATIENT)
Dept: INFUSION THERAPY | Age: 82
Discharge: HOME | End: 2022-01-28
Payer: MEDICARE

## 2022-01-28 VITALS
BODY MASS INDEX: 25.72 KG/M2 | HEIGHT: 65 IN | TEMPERATURE: 97 F | DIASTOLIC BLOOD PRESSURE: 70 MMHG | WEIGHT: 154.4 LBS | HEART RATE: 64 BPM | RESPIRATION RATE: 16 BRPM | SYSTOLIC BLOOD PRESSURE: 122 MMHG | OXYGEN SATURATION: 99 %

## 2022-01-28 VITALS
OXYGEN SATURATION: 99 % | RESPIRATION RATE: 18 BRPM | HEIGHT: 65 IN | HEART RATE: 55 BPM | BODY MASS INDEX: 25.69 KG/M2 | DIASTOLIC BLOOD PRESSURE: 65 MMHG | SYSTOLIC BLOOD PRESSURE: 133 MMHG | TEMPERATURE: 98.6 F

## 2022-01-28 DIAGNOSIS — C25.9 MALIGNANT NEOPLASM OF PANCREAS, UNSPECIFIED LOCATION OF MALIGNANCY (HCC): Primary | ICD-10-CM

## 2022-01-28 DIAGNOSIS — E04.1 THYROID NODULE: ICD-10-CM

## 2022-01-28 DIAGNOSIS — I10 PRIMARY HYPERTENSION: ICD-10-CM

## 2022-01-28 DIAGNOSIS — I48.91 ATRIAL FIBRILLATION, UNSPECIFIED TYPE (HCC): ICD-10-CM

## 2022-01-28 DIAGNOSIS — D64.9 NORMOCYTIC ANEMIA: ICD-10-CM

## 2022-01-28 PROCEDURE — 2580000003 HC RX 258: Performed by: INTERNAL MEDICINE

## 2022-01-28 PROCEDURE — 6360000002 HC RX W HCPCS: Performed by: INTERNAL MEDICINE

## 2022-01-28 PROCEDURE — 99214 OFFICE O/P EST MOD 30 MIN: CPT | Performed by: STUDENT IN AN ORGANIZED HEALTH CARE EDUCATION/TRAINING PROGRAM

## 2022-01-28 RX ORDER — HEPARIN SODIUM (PORCINE) LOCK FLUSH IV SOLN 100 UNIT/ML 100 UNIT/ML
500 SOLUTION INTRAVENOUS PRN
Status: CANCELLED | OUTPATIENT
Start: 2022-01-28

## 2022-01-28 RX ORDER — SODIUM CHLORIDE 0.9 % (FLUSH) 0.9 %
5-40 SYRINGE (ML) INJECTION PRN
Status: CANCELLED | OUTPATIENT
Start: 2022-01-28

## 2022-01-28 RX ORDER — HEPARIN SODIUM (PORCINE) LOCK FLUSH IV SOLN 100 UNIT/ML 100 UNIT/ML
500 SOLUTION INTRAVENOUS PRN
Status: DISCONTINUED | OUTPATIENT
Start: 2022-01-28 | End: 2022-01-29 | Stop reason: HOSPADM

## 2022-01-28 RX ORDER — AMLODIPINE BESYLATE 5 MG/1
2.5 TABLET ORAL DAILY
Qty: 30 TABLET | Refills: 1
Start: 2022-01-28 | End: 2022-05-04 | Stop reason: SDUPTHER

## 2022-01-28 RX ORDER — SODIUM CHLORIDE 0.9 % (FLUSH) 0.9 %
5-40 SYRINGE (ML) INJECTION PRN
Status: DISCONTINUED | OUTPATIENT
Start: 2022-01-28 | End: 2022-01-29 | Stop reason: HOSPADM

## 2022-01-28 RX ORDER — SODIUM CHLORIDE 9 MG/ML
25 INJECTION, SOLUTION INTRAVENOUS PRN
Status: CANCELLED | OUTPATIENT
Start: 2022-01-28

## 2022-01-28 RX ADMIN — HEPARIN 500 UNITS: 100 SYRINGE at 11:43

## 2022-01-28 RX ADMIN — SODIUM CHLORIDE, PRESERVATIVE FREE 10 ML: 5 INJECTION INTRAVENOUS at 11:42

## 2022-01-28 ASSESSMENT — ENCOUNTER SYMPTOMS
DIARRHEA: 0
BACK PAIN: 0
CONSTIPATION: 0
ABDOMINAL PAIN: 0
COUGH: 0
NAUSEA: 0
VOMITING: 0
SHORTNESS OF BREATH: 0
WHEEZING: 0

## 2022-01-28 NOTE — TELEPHONE ENCOUNTER
Patient signed release of medical records from [facility] at visit 12/28/21. Can you please see if we are able to get her records from Cardiology or any Stress test or Echo results? I have received surgical records, but none of the above results. Thank you!

## 2022-01-28 NOTE — PATIENT INSTRUCTIONS
Thank you   1. Thank you for trusting us with your healthcare needs. You may receive a survey regarding today's visit. It would help us out if you would take a few moments to provide your feedback. We value your input. 2. Please bring in ALL medications BOTTLES, including inhalers, herbal supplements, over the counter, prescribed & non-prescribed medicine. The office would like actual medication bottles and a list.   3. Please note our OFFICE POLICIES:   a. Prior to getting your labs drawn, please check with your insurance company for benefits and eligibility of lab services. Often, insurance companies cover certain tests for preventative visits only. It is patient's responsibility to see what is covered. b. We are unable to change a diagnosis after the test has been performed. c. Lab orders will not be re-printed. Please hold onto your original lab orders and take them to your lab to be completed. d. If you no show your scheduled appointment three times, you will be dismissed from this practice. e. Connye Cassette must be completed 24 hours prior to your schedule appointment. 4. If the list below has been completed, PLEASE FAX RECORDS TO OUR OFFICE @ [phone].  Once the records have been received we will update your records at our office:  Health Maintenance Due   Topic Date Due    DTaP/Tdap/Td vaccine (1 - Tdap) Never done    Shingles Vaccine (1 of 2) Never done    DEXA (modify frequency per FRAX score)  Never done    Annual Wellness Visit (AWV)  Never done    Pneumococcal 65+ years Vaccine (2 of 2 - PPSV23) 12/21/2021

## 2022-01-28 NOTE — PROGRESS NOTES
S: [de-identified] y.o. female with   Chief Complaint   Patient presents with    1 Month Follow-Up     also having Foot swelling getting worse       HPI: please see resident note for HPI and ROS. BP Readings from Last 3 Encounters:   01/28/22 122/70   01/26/22 127/60   01/26/22 133/64     Wt Readings from Last 3 Encounters:   01/28/22 154 lb 6.4 oz (70 kg)   01/26/22 151 lb (68.5 kg)   01/26/22 151 lb (68.5 kg)       O: VS:  height is 5' 5\" (1.651 m) and weight is 154 lb 6.4 oz (70 kg). Her temporal temperature is 97 °F (36.1 °C). Her blood pressure is 122/70 and her pulse is 64. Her respiration is 16 and oxygen saturation is 99%. AAO/NAD, appropriate affect for mood       Diagnosis Orders   1. Malignant neoplasm of pancreas, unspecified location of malignancy ([facility] [address]     2. Primary hypertension     3. Atrial fibrillation, unspecified type (HCC)     4. Elevated TSH     5. Elevated serum free T4 level     6. Normocytic anemia         Plan:  Decrease amlodipine to 2.5mg daily. Obtain lexiscan results. Repeat thyroid labs. Continue oncology f/u. Health Maintenance Due   Topic Date Due    DTaP/Tdap/Td vaccine (1 - Tdap) Never done    Shingles Vaccine (1 of 2) Never done    DEXA (modify frequency per FRAX score)  Never done    Annual Wellness Visit (AWV)  Never done    Pneumococcal 65+ years Vaccine (2 of 2 - PPSV23) 12/21/2021       Attending Physician Statement  I have discussed the case, including pertinent history and exam findings with the resident. I also have seen the patient and performed key portions of the examination. I agree with the documented assessment and plan as documented by the resident.         Sulma Galarza DO 1/28/2022 10:32 AM

## 2022-01-28 NOTE — PROGRESS NOTES
Health Maintenance Due   Topic Date Due    DTaP/Tdap/Td vaccine (1 - Tdap) Never done    Shingles Vaccine (1 of 2) Never done    DEXA (modify frequency per FRAX score)  Never done    Annual Wellness Visit (AWV)  Never done    Pneumococcal 65+ years Vaccine (2 of 2 - PPSV23) 12/21/2021

## 2022-01-28 NOTE — PLAN OF CARE
Care plan reviewed with patient. Patient  verbalized understanding of the plan of care and contribute to goal setting. Problem: Intellectual/Education/Knowledge Deficit  Goal: Teaching initiated upon admission  Outcome: Met This Shift  Note: Patient verbalizes understanding to verbal information given on 5 Fu,action and possible side effects. Aware to call MD if develop complications. Intervention: Verbal/written education provided  Note: Chemotherapy Teaching     What is Chemotherapy   Drug action [x]   Method of Administration [x]   Handouts given []     Side Effects  Nausea/vomiting [x]   Diarrhea [x]   Fatigue [x]   Signs / Symptoms of infection [x]   Neutropenia [x]   Thrombocytopenia [x]   Alopecia [x]   neuropathy [x]   Edmonson diet &  the importance of fluids [x]       Micellaneous  Importance of nutrition [x]   Importance of oral hygiene [x]   When to call the MD [x]   Monitoring labs [x]   Use of supportive services []     Explanation of Drug Regimen / Frequency  5 FU     Comments  Verbalized understanding to drug,action,side effects and when to call MD         Problem: Discharge Planning  Goal: Knowledge of discharge instructions  Description: Knowledge of discharge instructions  Outcome: Met This Shift  Note: Verbalized understanding of discharge instructions, follow ups and when to call doctor   Intervention: Discharge to appropriate level of care  Note: Discuss discharge instructions, follow ups and when to call doctor. Problem: Falls - Risk of:  Goal: Will remain free from falls  Description: Will remain free from falls  Outcome: Met This Shift  Note: Free from falls while in infusion center.  Verbalized understanding of fall prevention and to ask for assistance with ambulation   Intervention: Assess risk factors for falls  Description: Assess risk factors for falls  Note: Assess for fall risk, instruct to ask for assistance with ambulation      Problem: Infection - Central Venous Catheter-Associated Bloodstream Infection:  Goal: Will show no infection signs and symptoms  Description: Will show no infection signs and symptoms  Outcome: Met This Shift  Note: Mediport site with no redness or warmth. Skin over port intact with no signs of breakdown noted. Patient verbalizes signs/symptoms of port infection and when to notify the physician.     Intervention: Infection risk assessment  Description: Infection risk assessment  Note: Discuss port maintenance, infection prevention, signs and when to call

## 2022-01-29 ASSESSMENT — ENCOUNTER SYMPTOMS
ABDOMINAL DISTENTION: 0
TROUBLE SWALLOWING: 0
WHEEZING: 0
FACIAL SWELLING: 0
ABDOMINAL PAIN: 0
CHEST TIGHTNESS: 0
BLOOD IN STOOL: 0
COLOR CHANGE: 0
DIARRHEA: 0
VOMITING: 0
COUGH: 0
BACK PAIN: 0
CONSTIPATION: 0
NAUSEA: 0
RECTAL PAIN: 0
EYE DISCHARGE: 0
SHORTNESS OF BREATH: 0
SORE THROAT: 0

## 2022-01-29 NOTE — PROGRESS NOTES
[facility] [address] [facility] [address] [facility] [address]  [facility]  Dept: [phone]  Dept Fax: [phone]: [phone]    Visit Date:1/5/2022     Ashley Morales is a [de-identified] y.o. 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 She is status post Whipple procedure. Patient was admitted to [address] [place] [address] on 11/09/2021 for jaundice and not feeling well. She noticed juandice since 11/05/21. Associated symptom includes RUQ pain, itching and dark urine with light color stool. Hospital day 2, GI was consulted for possible choledocholithiasis. Liver function test transaminitis with AST//228 and significant Alk phos elevate at 421. CT abdominal (11/9) show Distend gallbladder with dilated common bile duct but no stone and hepatomegaly. RUQ US (11/09): show evidence of distened gall bladder with gallbladder sludge both intra and extra hepatic biliary duct dilation with no stone no para cholecystic edema. She underwent ERCP on 11/10/2021. Ampulla and major papula were of normal appearance on endoscopy. Procedure was complicated by retroperitoneal perforation. Procedure was terminated. Patient was started on Zosyn. Patient was administered lactated Ringer's at 100 cc an hour and kept n.p.o. Srinivasa Flores was then transferred to [address] for higher level care on 11/11/21. She underwent initial exploratory surgery, where a bowel perforation was not noted. She was then monitored for elevated LFTs and hyperbilirubinemia. Patient then returned to  for Whipple surgery on 12/4/21 by Dr. Usha Nelson. 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 She tolerated Whipple without complications. She initially had 4 drains present. Currently only one pancreatic drain remains.   Incision is healing well, without drainage, erythema, or warmth.   After discharge, she was in Rehab facility in [place], recently discharged . She is currently back at home, her son is here to stay with her for a couple of days. Patient didn't have any medical history except cataract , run of A. fib during recent hospitalization at [facility].  (which was schedule for last Monday and it was cancelled due to patient in ED). Take multivitamin daily.      Patient moved to [facility] years ago to be with one of her sons, who unfortunately  last year. She reported hx of 10 years smoking when she was young with 1/5 pack/day (5 pack years). Quited smoking  In . Denied any alcohol use, receational drug use. Have 3 vaginal birth without any blood transfusion product.  Patient reported has colonoscopy done in  with 2 polyps was removed      HPI   Past Medical History:   Diagnosis Date    Cholangiocarcinoma ([address]     Mrozek    Hypertension       Past Surgical History:   Procedure Laterality Date    APPENDECTOMY      CHOLECYSTECTOMY  2021    Dr. Torres Sine    ERCP N/A 11/10/2021    ERCP WITH STENT INSERTION performed by Rajan Rivers MD at [address] [place] [address]  2021    exp lap, radical celiac and portal lymph node dissection,pylorus preserving pancreatoduodenectomy-Dr Ewing [facility]    PORT SURGERY Left 2022    SINGLE LUMEN PHKLTUVGU INSERTION performed by Coralie Meigs, MD at [address] [place] [address] History   Problem Relation Age of Onset    Breast Cancer Mother     Colon Cancer Mother     Cancer Father         bladder and lung    Lung Cancer Father     Kidney Disease Brother     No Known Problems Maternal Grandmother     No Known Problems Maternal Grandfather     No Known Problems Paternal Grandmother     No Known Problems Paternal Grandfather       Social History     Tobacco Use    Smoking status: Former Smoker     Years: 10.00     Types: Cigarettes Quit date: 1972     Years since quittin.1    Smokeless tobacco: Never Used    Tobacco comment: social smoker   Substance Use Topics    Alcohol use: Not Currently      Current Outpatient Medications   Medication Sig Dispense Refill    amLODIPine (NORVASC) 5 MG tablet Take 0.5 tablets by mouth daily 30 tablet 1    lidocaine-prilocaine (EMLA) 2.5-2.5 % cream Apply topically as needed. 30 g 1    ondansetron (ZOFRAN-ODT) 4 MG disintegrating tablet Take 1 tablet by mouth 3 times daily as needed for Nausea or Vomiting 21 tablet 0    amiodarone (CORDARONE) 200 MG tablet Take 1 tablet by mouth daily 30 tablet 1    metoprolol tartrate (LOPRESSOR) 25 MG tablet Take 0.5 tablets by mouth 2 times daily 60 tablet 1    pantoprazole (PROTONIX) 40 MG tablet Take 1 tablet by mouth every morning (before breakfast) 30 tablet 0    aspirin 81 MG chewable tablet Take 81 mg by mouth daily       docusate sodium (COLACE) 100 MG capsule Take 100 mg by mouth 2 times daily      ondansetron (ZOFRAN) 4 MG tablet Take 4 mg by mouth every 8 hours as needed for Nausea or Vomiting  (Patient not taking: Reported on 2022)      oxybutynin (DITROPAN) 5 MG tablet Take 5 mg by mouth 3 times daily  (Patient not taking: Reported on 2022)      apixaban (ELIQUIS) 5 MG TABS tablet Take 1 tablet by mouth 2 times daily 180 tablet 1     No current facility-administered medications for this visit.       No Known Allergies   Health Maintenance   Topic Date Due    DTaP/Tdap/Td vaccine (1 - Tdap) Never done    Shingles Vaccine (1 of 2) Never done    DEXA (modify frequency per FRAX score)  Never done    Annual Wellness Visit (AWV)  Never done    Pneumococcal 65+ years Vaccine (2 of 2 - PPSV23) 2021    Depression Screen  2022    TSH testing  2022    Flu vaccine  Completed    COVID-19 Vaccine  Completed    Hepatitis A vaccine  Aged Out    Hepatitis B vaccine  Aged Out    Hib vaccine  Aged C/ Balwinder Wili 19 Meningococcal (ACWY) vaccine  Aged Out        Subjective:   Review of Systems   Constitutional: Positive for appetite change and fatigue. Negative for activity change and fever. HENT: Negative for congestion, dental problem, facial swelling, hearing loss, mouth sores, nosebleeds, sore throat, tinnitus and trouble swallowing. Eyes: Negative for discharge and visual disturbance. Respiratory: Negative for cough, chest tightness, shortness of breath and wheezing. Cardiovascular: Negative for chest pain, palpitations and leg swelling. Gastrointestinal: Negative for abdominal distention, abdominal pain, blood in stool, constipation, diarrhea, nausea, rectal pain and vomiting. Endocrine: Negative for cold intolerance, polydipsia and polyuria. Genitourinary: Negative for decreased urine volume, difficulty urinating, dysuria, flank pain, hematuria and urgency. Musculoskeletal: Negative for arthralgias, back pain, gait problem, joint swelling, myalgias and neck stiffness. Skin: Negative for color change, rash and wound. Neurological: Negative for dizziness, tremors, seizures, speech difficulty, weakness, light-headedness, numbness and headaches. Hematological: Negative for adenopathy. Does not bruise/bleed easily. Psychiatric/Behavioral: Negative for confusion and sleep disturbance. The patient is not nervous/anxious. Objective:   Physical Exam  Vitals reviewed. Constitutional:       General: She is not in acute distress. Appearance: She is well-developed. HENT:      Head: Normocephalic. Mouth/Throat:      Pharynx: No oropharyngeal exudate. Eyes:      General: No scleral icterus. Right eye: No discharge. Left eye: No discharge. Pupils: Pupils are equal, round, and reactive to light. Neck:      Thyroid: No thyromegaly. Vascular: No JVD. Trachea: No tracheal deviation. Cardiovascular:      Rate and Rhythm: Normal rate.       Heart sounds: Normal heart sounds. No murmur heard. No friction rub. No gallop. Pulmonary:      Effort: Pulmonary effort is normal. No respiratory distress. Breath sounds: Normal breath sounds. No stridor. No wheezing or rales. Chest:      Chest wall: No tenderness. Abdominal:      General: Bowel sounds are normal. There is no distension. Palpations: Abdomen is soft. There is no mass. Tenderness: There is no abdominal tenderness. There is no rebound. Musculoskeletal:         General: Normal range of motion. Cervical back: Normal range of motion and neck supple. Comments: Good range of motion in all four extremities. Lymphadenopathy:      Cervical: No cervical adenopathy. Skin:     General: Skin is warm. Findings: No erythema or rash. Neurological:      Mental Status: She is alert and oriented to person, place, and time. Cranial Nerves: No cranial nerve deficit. Motor: No abnormal muscle tone. Deep Tendon Reflexes: Reflexes are normal and symmetric. Psychiatric:         Behavior: Behavior normal.         Thought Content: Thought content normal.         Judgment: Judgment normal.         BP (!) 103/51 (Site: Left Upper Arm, Position: Sitting, Cuff Size: Medium Adult)   Pulse 68   Temp 98 °F (36.7 °C) (Oral)   Resp 18   Ht 5' 6\" (1.676 m)   Wt 146 lb 8 oz (66.5 kg)   SpO2 97%   BMI 23.65 kg/m²      ECOG status is 1    Imaging studies and labs:   US THYROID    Result Date: 1/10/2022  PROCEDURE: US THYROID CLINICAL INFORMATION: Elevated TSH, Elevated serum free T4 level COMPARISON: No prior study. TECHNIQUE: Grayscale and color sonographic imaging of the thyroid gland performed in longitudinal and transverse planes. TECHNICAL DATA: Right Thyroid - 4.14 x 1.24 x 2.07 cm Left Thyroid -5.05 x 1.63 x 1.65 cm Isthmus -0.36 cm FINDINGS: THYROID SIZE: Thyroid gland is normal left thyroid lobe is prominent. NODULES: 1.  Mixed solid and cystic nodule 1.2 cm x 0.8 x 0.9 cm mid right thyroid lobe consistent with a tirad 2 lesion this is not considered suspicious. 2. Mixed solid and cystic nodule measuring 1.0 x 0.8 x 0.8 cm mid right thyroid lobe adjacent to the first nodule. Also a TR 2 lesion. 3. 0.6 x 0.5 x 0.5 cm mixed solid and cystic lesion mid left thyroid lobe. T are too lesion. 4. Completely cystic lesion medial left thyroid lobe 0.9 x 0.6 x 0.7 cm considered a benign lesion. TR 1 lesion. PARENCHYMAL ECHOGENICITY/VASCULARITY: Normal. MICROLITHIASIS: None. CERVICAL LYMPHADENOPATHY: 1. There are no pathologically enlarged lymph nodes adjacent to the thyroid gland. 1. Mild prominence the left thyroid lobe. Multiple mixed solid and cystic lesions which are not suspicious and do not require FNA. Consider follow-up in one to 2 years **This report has been created using voice recognition software. It may contain minor errors which are inherent in voice recognition technology. ** Final report electronically signed by Dr. Carlitos Whitney on 1/10/2022 7:38 AM    XR CHEST PORTABLE    Result Date: 1/25/2022  PROCEDURE: XR CHEST PORTABLE CLINICAL INFORMATION: s/p mediport insertion. COMPARISON: No prior study. TECHNIQUE: AP upright view of the chest. FINDINGS: There is a MediPort overlying the left hemithorax. The tip of the catheter is in the superior vena cava. There is cardiomegaly. .The mediastinum is not widened. There are no pulmonary infiltrates or effusions. The pulmonary vascularity is normal. There is thoracic spondylosis. 1. MediPort overlying the left hemithorax with the tip of the catheter in the superior vena cava. 2. Mild cardiomegaly. 3. Thoracic spondylosis. Marlin Neighbours **This report has been created using voice recognition software. It may contain minor errors which are inherent in voice recognition technology. ** Final report electronically signed by DR Regina Greenfield on 1/25/2022 11:12 AM    FL VASCULAR ACCESS GUIDANCE    Result Date: 1/25/2022  Radiology exam is complete.  No Radiologist dictation. Please follow up with ordering provider. CBC: No results for input(s): WBC, HGB, HCT, MCV, PLT in the last 72 hours. BMP:No results for input(s): NA, K, CL, CO2, PHOS, BUN, CREATININE, CA, GLUCOSE in the last 72 hours. PT/INR: No results for input(s): PROTIME, INR in the last 72 hours. APTT: No results for input(s): APTT in the last 72 hours. Assessment:  1. Stage III B periampullary adenocarcinoma, pancreaticobiliary type. Patient is s/p Whipple procedure on December 4, 2021. Alisson-ampullary cancers represent a group of malignancies distinct from those arising from other hepato-biliary structures. These cancers are pathologically adenocarcinomas, and in an  estimated 80% of cases are amenable to surgical [id number], 26. However this treatment regimen is often augmented by the use of adjuvant chemotherapy, especially amongst those with advanced disease. The pooled overall 5 year survival for alisson-ampullary cancers is between 30 to 50%. Treatment decisions are generally extrapolated from pancreatic chemotherapy protocols and consist mainly of traditional chemotherapy drugs. I discussed with the patient her potential treatment options. They include FOLFIRINOX, FOLFOX or Gemzar and Abraxane. I do not think that due to her age the patient will be able to tolerate FOLFIRINOX since this is a very aggressive regimen. I had a lengthy discussion with the patient about her social situation. She lives alone. She does not have good family support here since her family is in [facility]. I would like to meet with the patient and her family again in 2 weeks to make final recommendation and follow a decision about adjuvant treatment. Patient has follow-up with the surgeon at [facility] next week. .        Diagnosis Orders   1. Primary adenocarcinoma of ampulla of Vater ([facility] [address]     2. History of Whipple procedure          Plan:   Return in about 2 weeks (around 1/19/2022).      Orders Placed: No orders of the defined types were placed in this encounter. Medications Prescribed:   No orders of the defined types were placed in this encounter. Discussed use, benefit, and side effectsof prescribed medications. All patient questions answered. Pt voiced understanding. Instructed to continue current medications, diet and exercise. Patient agreed with treatment plan. Follow up as directed.     Electronically signed by Kun Nick MD on 1/29/22 at 1:58 PM EST

## 2022-01-31 ASSESSMENT — ENCOUNTER SYMPTOMS
COUGH: 0
VOMITING: 0
ABDOMINAL DISTENTION: 0
FACIAL SWELLING: 0
BLOOD IN STOOL: 0
EYE DISCHARGE: 0
SHORTNESS OF BREATH: 0
NAUSEA: 0
CONSTIPATION: 0
SORE THROAT: 0
TROUBLE SWALLOWING: 0
CHEST TIGHTNESS: 0
COLOR CHANGE: 0
ABDOMINAL PAIN: 0
DIARRHEA: 0
BACK PAIN: 0
WHEEZING: 0
RECTAL PAIN: 0

## 2022-02-01 ENCOUNTER — TELEPHONE (OUTPATIENT)
Dept: FAMILY MEDICINE CLINIC | Age: 82
End: 2022-02-01

## 2022-02-01 NOTE — PROGRESS NOTES
[facility], [address] [facility] [address] 200  [address] [facility]  Dept: [phone]  Dept Fax: [phone]: [phone]    Visit Date:1/19/2022     Winnie Dyer is a [de-identified] y.o. female who presents today for:   Chief Complaint   Patient presents with    Follow-up     malignant neoplasm of pancreas        HPI:   This is an 81-year-old patient diagnosed with periampullary adenocarcinoma. She is status post Whipple procedure. Patient was admitted to [facility] on 11/09/2021 for jaundice and not feeling well. She noticed juandice since 11/05/21. Associated symptom includes RUQ pain, itching and dark urine with light color stool. Hospital day 2, GI was consulted for possible choledocholithiasis. Liver function test transaminitis with AST//228 and significant Alk phos elevate at 421. CT abdominal (11/9) show Distend gallbladder with dilated common bile duct but no stone and hepatomegaly. RUQ US (11/09): show evidence of distened gall bladder with gallbladder sludge both intra and extra hepatic biliary duct dilation with no stone no para cholecystic edema. She underwent ERCP on 11/10/2021. Ampulla and major papula were of normal appearance on endoscopy. Procedure was complicated by retroperitoneal perforation. Procedure was terminated. Patient was started on Zosyn. Patient was administered lactated Ringer's at 100 cc an hour and kept n.p.o. Britney Simon was then transferred to [address] for higher level care on 11/11/21. She underwent initial exploratory surgery, where a bowel perforation was not noted. She was then monitored for elevated LFTs and hyperbilirubinemia. Patient then returned to  for Whipple surgery on 12/4/21 by Dr. Harinder Bergeron. 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 She tolerated Whipple without complications. She initially had 4 drains present. Currently only one pancreatic drain remains.   Incision is healing well, without drainage, erythema, or warmth.   After discharge, she was in Rehab facility in [place], recently discharged . She is currently back at home, her son is here to stay with her for a couple of days. Patient didn't have any medical history except cataract , run of A. fib during recent hospitalization at [facility].  (which was schedule for last Monday and it was cancelled due to patient in ED). Take multivitamin daily.      Patient moved to [facility] 5 years ago to be with one of her sons, who unfortunately  last year. She reported hx of 10 years smoking when she was young with 1/5 pack/day (5 pack years). Quited smoking  In . Denied any alcohol use, receational drug use. Have 3 vaginal birth without any blood transfusion product. Patient reported has colonoscopy done in  with 2 polyps was removed      History on 2022:  The patient presents to the medical oncology clinic with her daughter-in-law to discuss further management. The patient was seen by the surgeon at [facility] who remove the last drain and gave clearance for chemotherapy. Denies having any complaints since last visit with me. Her appetite is improving. She denies having any abdominal pain. She reports intermittent diarrhea but overall it is improving.    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 Gerry Singh ERCP N/A 11/10/2021    ERCP WITH STENT INSERTION performed by Ashley Vickers MD at [facility]  2021    exp lap, radical celiac and portal lymph node dissection,pylorus preserving pancreatoduodenectomy-Dr Ewing [facility]    PORT SURGERY Left 2022    SINGLE LUMEN OHJCLCTHE INSERTION performed by Teri Roe MD at [address] [facility] History   Problem Relation Age of Onset    Breast Cancer Mother     Colon Cancer Mother     Cancer Father         bladder and lung    Lung Cancer Father     Kidney Disease Brother     No Known Problems Maternal Grandmother     No Known Problems Maternal Grandfather     No Known Problems Paternal Grandmother     No Known Problems Paternal Grandfather       Social History     Tobacco Use    Smoking status: Former Smoker     Years: 10.00     Types: Cigarettes     Quit date: 1972     Years since quittin.1    Smokeless tobacco: Never Used    Tobacco comment: social smoker   Substance Use Topics    Alcohol use: Not Currently      Current Outpatient Medications   Medication Sig Dispense Refill    amiodarone (CORDARONE) 200 MG tablet Take 1 tablet by mouth daily 30 tablet 1    metoprolol tartrate (LOPRESSOR) 25 MG tablet Take 0.5 tablets by mouth 2 times daily 60 tablet 1    pantoprazole (PROTONIX) 40 MG tablet Take 1 tablet by mouth every morning (before breakfast) 30 tablet 0    aspirin 81 MG chewable tablet Take 81 mg by mouth daily       docusate sodium (COLACE) 100 MG capsule Take 100 mg by mouth 2 times daily      apixaban (ELIQUIS) 5 MG TABS tablet Take 1 tablet by mouth 2 times daily 180 tablet 1    amLODIPine (NORVASC) 5 MG tablet Take 0.5 tablets by mouth daily 30 tablet 1    lidocaine-prilocaine (EMLA) 2.5-2.5 % cream Apply topically as needed. 30 g 1    ondansetron (ZOFRAN-ODT) 4 MG disintegrating tablet Take 1 tablet by mouth 3 times daily as needed for Nausea or Vomiting 21 tablet 0    ondansetron (ZOFRAN) 4 MG tablet Take 4 mg by mouth every 8 hours as needed for Nausea or Vomiting  (Patient not taking: Reported on 2022)      oxybutynin (DITROPAN) 5 MG tablet Take 5 mg by mouth 3 times daily  (Patient not taking: Reported on 2022)       No current facility-administered medications for this visit.       No Known Allergies   Health Maintenance   Topic Date Due    DTaP/Tdap/Td vaccine (1 - Tdap) Never done    Shingles Vaccine (1 of 2) Never done    DEXA (modify frequency per FRAX score)  Never done    Annual Wellness Visit (AWV)  Never done    Pneumococcal 65+ years Vaccine (2 of 2 - PPSV23) 12/21/2021    Depression Screen  12/28/2022    TSH testing  12/28/2022    Flu vaccine  Completed    COVID-19 Vaccine  Completed    Hepatitis A vaccine  Aged Out    Hepatitis B vaccine  Aged Out    Hib vaccine  Aged Out    Meningococcal (ACWY) vaccine  Aged Out        Subjective:   Review of Systems   Constitutional: Positive for appetite change and fatigue. Negative for activity change and fever. HENT: Negative for congestion, dental problem, facial swelling, hearing loss, mouth sores, nosebleeds, sore throat, tinnitus and trouble swallowing. Eyes: Negative for discharge and visual disturbance. Respiratory: Negative for cough, chest tightness, shortness of breath and wheezing. Cardiovascular: Negative for chest pain, palpitations and leg swelling. Gastrointestinal: Negative for abdominal distention, abdominal pain, blood in stool, constipation, diarrhea, nausea, rectal pain and vomiting. Endocrine: Negative for cold intolerance, polydipsia and polyuria. Genitourinary: Negative for decreased urine volume, difficulty urinating, dysuria, flank pain, hematuria and urgency. Musculoskeletal: Negative for arthralgias, back pain, gait problem, joint swelling, myalgias and neck stiffness. Skin: Negative for color change, rash and wound. Neurological: Negative for dizziness, tremors, seizures, speech difficulty, weakness, light-headedness, numbness and headaches. Hematological: Negative for adenopathy. Does not bruise/bleed easily. Psychiatric/Behavioral: Negative for confusion and sleep disturbance. The patient is not nervous/anxious. Objective:   Physical Exam  Vitals reviewed. Constitutional:       General: She is not in acute distress.      Appearance: She is well-developed. HENT:      Head: Normocephalic. Mouth/Throat:      Pharynx: No oropharyngeal exudate. Eyes:      General: No scleral icterus. Right eye: No discharge. Left eye: No discharge. Pupils: Pupils are equal, round, and reactive to light. Neck:      Thyroid: No thyromegaly. Vascular: No JVD. Trachea: No tracheal deviation. Cardiovascular:      Rate and Rhythm: Normal rate. Heart sounds: Normal heart sounds. No murmur heard. No friction rub. No gallop. Pulmonary:      Effort: Pulmonary effort is normal. No respiratory distress. Breath sounds: Normal breath sounds. No stridor. No wheezing or rales. Chest:      Chest wall: No tenderness. Abdominal:      General: Bowel sounds are normal. There is no distension. Palpations: Abdomen is soft. There is no mass. Tenderness: There is no abdominal tenderness. There is no rebound. Musculoskeletal:         General: Normal range of motion. Cervical back: Normal range of motion and neck supple. Comments: Good range of motion in all four extremities. Lymphadenopathy:      Cervical: No cervical adenopathy. Skin:     General: Skin is warm. Findings: No erythema or rash. Neurological:      Mental Status: She is alert and oriented to person, place, and time. Cranial Nerves: No cranial nerve deficit. Motor: No abnormal muscle tone. Deep Tendon Reflexes: Reflexes are normal and symmetric. Psychiatric:         Behavior: Behavior normal.         Thought Content:  Thought content normal.         Judgment: Judgment normal.         BP (!) 121/58 (Site: Left Upper Arm, Position: Sitting, Cuff Size: Medium Adult)   Pulse 58   Temp 98.2 °F (36.8 °C) (Oral)   Resp 16   Ht 5' 5\" (1.651 m)   Wt 148 lb 12.8 oz (67.5 kg)   SpO2 99%   BMI 24.76 kg/m²      ECOG status is 1    Imaging studies and labs:   US THYROID    Result Date: 1/10/2022  PROCEDURE: US THYROID CLINICAL INFORMATION: Elevated TSH, Elevated serum free T4 level COMPARISON: No prior study. TECHNIQUE: Grayscale and color sonographic imaging of the thyroid gland performed in longitudinal and transverse planes. TECHNICAL DATA: Right Thyroid - 4.14 x 1.24 x 2.07 cm Left Thyroid -5.05 x 1.63 x 1.65 cm Isthmus -0.36 cm FINDINGS: THYROID SIZE: Thyroid gland is normal left thyroid lobe is prominent. NODULES: 1. Mixed solid and cystic nodule 1.2 cm x 0.8 x 0.9 cm mid right thyroid lobe consistent with a tirad 2 lesion this is not considered suspicious. 2. Mixed solid and cystic nodule measuring 1.0 x 0.8 x 0.8 cm mid right thyroid lobe adjacent to the first nodule. Also a TR 2 lesion. 3. 0.6 x 0.5 x 0.5 cm mixed solid and cystic lesion mid left thyroid lobe. T are too lesion. 4. Completely cystic lesion medial left thyroid lobe 0.9 x 0.6 x 0.7 cm considered a benign lesion. TR 1 lesion. PARENCHYMAL ECHOGENICITY/VASCULARITY: Normal. MICROLITHIASIS: None. CERVICAL LYMPHADENOPATHY: 1. There are no pathologically enlarged lymph nodes adjacent to the thyroid gland. 1. Mild prominence the left thyroid lobe. Multiple mixed solid and cystic lesions which are not suspicious and do not require FNA. Consider follow-up in one to 2 years **This report has been created using voice recognition software. It may contain minor errors which are inherent in voice recognition technology. ** Final report electronically signed by Dr. Kali Crandall on 1/10/2022 7:38 AM          CBC: No results for input(s): WBC, HGB, HCT, MCV, PLT in the last 72 hours. BMP:No results for input(s): NA, K, CL, CO2, PHOS, BUN, CREATININE, CA, GLUCOSE in the last 72 hours. PT/INR: No results for input(s): PROTIME, INR in the last 72 hours. APTT: No results for input(s): APTT in the last 72 hours. Assessment:  1. Stage III B periampullary adenocarcinoma, pancreaticobiliary type. Patient is s/p Whipple procedure on December 4, 2021.   Leela-ampullary cancers represent a group of malignancies distinct from those arising from other hepato-biliary structures. These cancers are pathologically adenocarcinomas, and in an  estimated 80% of cases are amenable to surgical [id number], 26. However this treatment regimen is often augmented by the use of adjuvant chemotherapy, especially amongst those with advanced disease. The pooled overall 5 year survival for alisson-ampullary cancers is between 30 to 50%. Treatment decisions are generally extrapolated from pancreatic chemotherapy protocols and consist mainly of traditional chemotherapy drugs. I discussed with the patient her potential treatment options. They include FOLFIRINOX, FOLFOX or Gemzar and Abraxane. I do not think that due to her age the patient will be able to tolerate FOLFIRINOX since this is a very aggressive regimen. I had a lengthy discussion with the patient and her daughter in law. I want to be sure that while the patient is going through chemotherapy treatment she has good social and family support. Patient had follow-up with the surgeon at [facility], last drain was removed and the patient received clearance for surgery. In preparation for chemotherapy the patient is referred to the surgeon for Mediport placement. Side effect of FOLFOX include:    Hair loss   Redness, pain or peeling of palms and soles   Rash, increased risk of sunburn, itching   Diarrhea, nausea, vomiting, constipation, loss of appetite   Difficulty swallowing   Sores in mouth   Heartburn   Infection, especially when white blood cell count is low   Anemia which may require a blood transfusion   Bruising, bleeding   Headache   Tiredness   Numbness, tingling or pain, \"pins and needles\" of the hands, feet, arms and legs   Tingling or a loss of feeling in your hands, feet, nose, or tightness in throat or jaw, or difficulty swallowing or breathing which may be made worse by exposure to cold   Cough   Fever, pain  . Diagnosis Orders   1. Malignant neoplasm of pancreas, unspecified location of malignancy [facility])  Ambulatory referral to General Surgery   2. History of Whipple procedure     3. Primary adenocarcinoma of ampulla of Vater [facility])          Plan:   Return in about 1 week (around 1/26/2022). Orders Placed:   Orders Placed This Encounter   Procedures    Ambulatory referral to General Surgery     Referral Priority:   Routine     Referral Type:   Surgical     Referral Reason:   Specialty Services Required     Referred to Provider:   Judi Luong MD     Requested Specialty:   General Surgery     Number of Visits Requested:   1        Medications Prescribed:   No orders of the defined types were placed in this encounter. Discussed use, benefit, and side effectsof prescribed medications. All patient questions answered. Pt voiced understanding. Instructed to continue current medications, diet and exercise. Patient agreed with treatment plan. Follow up as directed.

## 2022-02-01 NOTE — TELEPHONE ENCOUNTER
Yris from [facility] called and was requesting last office note from Dr. Lawyer Collins on 01/28/2022 to be faxed to them. Last Office note has been faxed.

## 2022-02-08 RX ORDER — FLUOROURACIL 50 MG/ML
400 INJECTION, SOLUTION INTRAVENOUS ONCE
Status: CANCELLED | OUTPATIENT
Start: 2022-02-09

## 2022-02-08 RX ORDER — PALONOSETRON 0.05 MG/ML
0.25 INJECTION, SOLUTION INTRAVENOUS ONCE
Status: CANCELLED | OUTPATIENT
Start: 2022-02-09 | End: 2022-02-09

## 2022-02-08 RX ORDER — DIPHENHYDRAMINE HYDROCHLORIDE 50 MG/ML
50 INJECTION INTRAMUSCULAR; INTRAVENOUS
Status: CANCELLED | OUTPATIENT
Start: 2022-02-09

## 2022-02-08 RX ORDER — ACETAMINOPHEN 325 MG/1
650 TABLET ORAL
Status: CANCELLED | OUTPATIENT
Start: 2022-02-09

## 2022-02-08 RX ORDER — ALBUTEROL SULFATE 90 UG/1
4 AEROSOL, METERED RESPIRATORY (INHALATION) PRN
Status: CANCELLED | OUTPATIENT
Start: 2022-02-09

## 2022-02-08 RX ORDER — HEPARIN SODIUM (PORCINE) LOCK FLUSH IV SOLN 100 UNIT/ML 100 UNIT/ML
500 SOLUTION INTRAVENOUS PRN
Status: CANCELLED | OUTPATIENT
Start: 2022-02-09

## 2022-02-08 RX ORDER — SODIUM CHLORIDE 9 MG/ML
INJECTION, SOLUTION INTRAVENOUS CONTINUOUS
Status: CANCELLED | OUTPATIENT
Start: 2022-02-09

## 2022-02-08 RX ORDER — ONDANSETRON 2 MG/ML
8 INJECTION INTRAMUSCULAR; INTRAVENOUS
Status: CANCELLED | OUTPATIENT
Start: 2022-02-09

## 2022-02-08 RX ORDER — MEPERIDINE HYDROCHLORIDE 50 MG/ML
12.5 INJECTION INTRAMUSCULAR; INTRAVENOUS; SUBCUTANEOUS PRN
Status: CANCELLED | OUTPATIENT
Start: 2022-02-09

## 2022-02-08 RX ORDER — DEXTROSE MONOHYDRATE 50 MG/ML
INJECTION, SOLUTION INTRAVENOUS ONCE
Status: CANCELLED | OUTPATIENT
Start: 2022-02-09 | End: 2022-02-09

## 2022-02-08 RX ORDER — SODIUM CHLORIDE 9 MG/ML
5-40 INJECTION INTRAVENOUS PRN
Status: CANCELLED | OUTPATIENT
Start: 2022-02-09

## 2022-02-08 RX ORDER — SODIUM CHLORIDE 9 MG/ML
INJECTION, SOLUTION INTRAVENOUS ONCE
Status: CANCELLED | OUTPATIENT
Start: 2022-02-09 | End: 2022-02-09

## 2022-02-08 RX ORDER — SODIUM CHLORIDE 9 MG/ML
25 INJECTION, SOLUTION INTRAVENOUS PRN
Status: CANCELLED | OUTPATIENT
Start: 2022-02-09

## 2022-02-08 RX ORDER — SODIUM CHLORIDE 0.9 % (FLUSH) 0.9 %
5-40 SYRINGE (ML) INJECTION PRN
Status: CANCELLED | OUTPATIENT
Start: 2022-02-09

## 2022-02-08 RX ORDER — EPINEPHRINE 1 MG/ML
0.3 INJECTION, SOLUTION, CONCENTRATE INTRAVENOUS PRN
Status: CANCELLED | OUTPATIENT
Start: 2022-02-09

## 2022-02-08 ASSESSMENT — ENCOUNTER SYMPTOMS
RECTAL PAIN: 0
BLOOD IN STOOL: 0
EYE DISCHARGE: 0
TROUBLE SWALLOWING: 0
TROUBLE SWALLOWING: 0
SHORTNESS OF BREATH: 0
SORE THROAT: 0
EYE DISCHARGE: 0
CHEST TIGHTNESS: 0
BACK PAIN: 0
COLOR CHANGE: 0
RECTAL PAIN: 0
CHEST TIGHTNESS: 0
BACK PAIN: 0
ABDOMINAL PAIN: 0
NAUSEA: 0
VOMITING: 0
WHEEZING: 0
DIARRHEA: 0
SHORTNESS OF BREATH: 0
COUGH: 0
CONSTIPATION: 0
ABDOMINAL DISTENTION: 0
COUGH: 0
NAUSEA: 0
WHEEZING: 0
ABDOMINAL DISTENTION: 0
DIARRHEA: 0
FACIAL SWELLING: 0
ABDOMINAL PAIN: 0
COLOR CHANGE: 0
CONSTIPATION: 0
FACIAL SWELLING: 0
VOMITING: 0
SORE THROAT: 0
BLOOD IN STOOL: 0

## 2022-02-08 NOTE — PROGRESS NOTES
[facility] [address] [facility], [address] [facility] [address]  Alexeymaury Alfaro  Dept: [phone]  Dept Fax: [phone]: [phone]    Visit Date:2/9/2022     Ras Adam is a [de-identified] y.o. 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 She is status post Whipple procedure. She presents to the medical oncology clinic to continue adjuvant  Patient was admitted to [facility] on 11/09/2021 for jaundice and not feeling well. She noticed juandice since 11/05/21. Associated symptom includes RUQ pain, itching and dark urine with light color stool. Hospital day 2, GI was consulted for possible choledocholithiasis. Liver function test transaminitis with AST//228 and significant Alk phos elevate at 421. CT abdominal (11/9) show Distend gallbladder with dilated common bile duct but no stone and hepatomegaly. RUQ US (11/09): show evidence of distened gall bladder with gallbladder sludge both intra and extra hepatic biliary duct dilation with no stone no para cholecystic edema. She underwent ERCP on 11/10/2021. Ampulla and major papula were of normal appearance on endoscopy. Procedure was complicated by retroperitoneal perforation. Procedure was terminated. Patient was started on Zosyn. Patient was administered lactated Ringer's at 100 cc an hour and kept n.p.o. Mary Gotti was then transferred to [address] for higher level care on 11/11/21. She underwent initial exploratory surgery, where a bowel perforation was not noted. She was then monitored for elevated LFTs and hyperbilirubinemia. Patient then returned to  for Whipple surgery on 12/4/21 by Dr. Jesús Wilde. 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   She tolerated Whipple without complications. She initially had 4 drains present. Currently only one pancreatic drain remains. Incision is healing well, without drainage, erythema, or warmth.   After discharge, she was in Rehab facility in [place], recently discharged . She is currently back at home, her son is here to stay with her for a couple of days. Patient didn't have any medical history except cataract , run of A. fib during recent hospitalization at [facility].  (which was schedule for last Monday and it was cancelled due to patient in ED). Take multivitamin daily.      Patient moved to [facility] 5 years ago to be with one of her sons, who unfortunately  last year. She reported hx of 10 years smoking when she was young with 1/5 pack/day (5 pack years). Quited smoking  In . Denied any alcohol use, receational drug use. Have 3 vaginal birth without any blood transfusion product. Patient reported has colonoscopy done in  with 2 polyps was removed      History on 2022:  The patient presents to the medical oncology clinic for cycle #2 of FOLFOX. She reports that she tolerated FOLFOX very well. She denies having any oral mucositis, no nausea no vomiting. She denies having any peripheral neuropathy. No diarrhea no abdominal pain. Patient denies having any fevers. No skin rash, no palmar and plantar erythema.  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 Kimber Short ERCP N/A 11/10/2021    ERCP WITH STENT INSERTION performed by Loree Nagel MD at [address] [facility]  2021    exp lap, radical celiac and portal lymph node dissection,pylorus preserving pancreatoduodenectomy-Dr Ewing [facility]    PORT SURGERY Left 2022    SINGLE LUMEN IWKBMYKLO INSERTION performed by Ngozi Mcdonald MD at [facility] History   Problem Relation Age of Onset    Breast Cancer Mother     Colon Cancer Mother     Cancer Father         bladder and lung    Lung Cancer Father     Kidney Disease Brother     No Known Problems Maternal Grandmother     No Known Problems Maternal Grandfather     No Known Problems Paternal Grandmother     No Known Problems Paternal Grandfather       Social History     Tobacco Use    Smoking status: Former Smoker     Years: 10.00     Types: Cigarettes     Quit date: 1972     Years since quittin.1    Smokeless tobacco: Never Used    Tobacco comment: social smoker   Substance Use Topics    Alcohol use: Not Currently      Current Outpatient Medications   Medication Sig Dispense Refill    dexamethasone (DECADRON) 4 MG tablet Take 2 tablets by mouth daily (with breakfast) for 3 doses 6 tablet 3    amLODIPine (NORVASC) 5 MG tablet Take 0.5 tablets by mouth daily 30 tablet 1    lidocaine-prilocaine (EMLA) 2.5-2.5 % cream Apply topically as needed.  30 g 1    amiodarone (CORDARONE) 200 MG tablet Take 1 tablet by mouth daily 30 tablet 1    metoprolol tartrate (LOPRESSOR) 25 MG tablet Take 0.5 tablets by mouth 2 times daily 60 tablet 1    pantoprazole (PROTONIX) 40 MG tablet Take 1 tablet by mouth every morning (before breakfast) 30 tablet 0    aspirin 81 MG chewable tablet Take 81 mg by mouth daily       docusate sodium (COLACE) 100 MG capsule Take 100 mg by mouth 2 times daily      apixaban (ELIQUIS) 5 MG TABS tablet Take 1 tablet by mouth 2 times daily 180 tablet 1    ondansetron (ZOFRAN) 4 MG tablet Take 4 mg by mouth every 8 hours as needed for Nausea or Vomiting  (Patient not taking: Reported on 2022)      oxybutynin (DITROPAN) 5 MG tablet Take 5 mg by mouth 3 times daily  (Patient not taking: Reported on 2022)       No current facility-administered medications for this visit. Facility-Administered Medications Ordered in Other Visits   Medication Dose Route Frequency Provider Last Rate Last Admin    sodium chloride flush 0.9 % injection 5-40 mL  5-40 mL IntraVENous PRN Ricco Hernandez MD   20 mL at 02/09/22 0823    heparin flush 100 UNIT/ML injection 500 Units  500 Units IntraCATHeter PRN Ricco Hernandez MD        dextrose 5 % solution   IntraVENous Once Ricco Hernandez MD 20 mL/hr at 02/09/22 0909 New Bag at 02/09/22 0909    oxaliplatin (ELOXATIN) 150 mg in dextrose 5 % 250 mL chemo IVPB  85 mg/m2 (Treatment Plan Recorded) IntraVENous Once Ricco Hernandez  mL/hr at 02/09/22 0943 150 mg at 02/09/22 0943    leucovorin calcium (WELLCOVORIN) 700 mg in dextrose 5 % 250 mL IVPB  400 mg/m2 (Treatment Plan Recorded) IntraVENous Once Ricco Hernandez .5 mL/hr at 02/09/22 0941 700 mg at 02/09/22 0941    fluorouracil (ADRUCIL) chemo syringe 700 mg  400 mg/m2 (Treatment Plan Recorded) IntraVENous Once Ricco Hernandez MD        fluorouracil (ADRUCIL) 4,225 mg in sodium chloride 0.9 % 250 mL chemo infusion  2,400 mg/m2 (Treatment Plan Recorded) IntraVENous Over 46 hours Ricco Hernandez MD          No Known Allergies   Health Maintenance   Topic Date Due    DTaP/Tdap/Td vaccine (1 - Tdap) Never done    Shingles Vaccine (1 of 2) Never done    DEXA (modify frequency per FRAX score)  Never done    Annual Wellness Visit (AWV)  Never done    Pneumococcal 65+ years Vaccine (2 of 2 - PPSV23) 12/21/2021    Depression Screen  12/28/2022    TSH testing  12/28/2022    Flu vaccine  Completed    COVID-19 Vaccine  Completed    Hepatitis A vaccine  Aged Out    Hepatitis B vaccine  Aged Out    Hib vaccine  Aged Out    Meningococcal (ACWY) vaccine  Aged Out        Subjective:   Review of Systems   Constitutional: Positive for appetite change and fatigue. Negative for activity change and fever.    HENT: Negative for congestion, dental problem, facial swelling, hearing loss, mouth sores, nosebleeds, sore throat, tinnitus and trouble swallowing. Eyes: Negative for discharge and visual disturbance. Respiratory: Negative for cough, chest tightness, shortness of breath and wheezing. Cardiovascular: Negative for chest pain, palpitations and leg swelling. Gastrointestinal: Negative for abdominal distention, abdominal pain, blood in stool, constipation, diarrhea, nausea, rectal pain and vomiting. Endocrine: Negative for cold intolerance, polydipsia and polyuria. Genitourinary: Negative for decreased urine volume, difficulty urinating, dysuria, flank pain, hematuria and urgency. Musculoskeletal: Negative for arthralgias, back pain, gait problem, joint swelling, myalgias and neck stiffness. Skin: Negative for color change, rash and wound. Neurological: Negative for dizziness, tremors, seizures, speech difficulty, weakness, light-headedness, numbness and headaches. Hematological: Negative for adenopathy. Does not bruise/bleed easily. Psychiatric/Behavioral: Negative for confusion and sleep disturbance. The patient is not nervous/anxious. Objective:   Physical Exam  Vitals reviewed. Constitutional:       General: She is not in acute distress. Appearance: She is well-developed. HENT:      Head: Normocephalic. Mouth/Throat:      Pharynx: No oropharyngeal exudate. Eyes:      General: No scleral icterus. Right eye: No discharge. Left eye: No discharge. Pupils: Pupils are equal, round, and reactive to light. Neck:      Thyroid: No thyromegaly. Vascular: No JVD. Trachea: No tracheal deviation. Cardiovascular:      Rate and Rhythm: Normal rate. Heart sounds: Normal heart sounds. No murmur heard. No friction rub. No gallop. Pulmonary:      Effort: Pulmonary effort is normal. No respiratory distress. Breath sounds: Normal breath sounds. No stridor. No wheezing or rales.    Chest:      Chest wall: No tenderness. Abdominal:      General: Bowel sounds are normal. There is no distension. Palpations: Abdomen is soft. There is no mass. Tenderness: There is no abdominal tenderness. There is no rebound. Musculoskeletal:         General: Normal range of motion. Cervical back: Normal range of motion and neck supple. Comments: Good range of motion in all four extremities. Lymphadenopathy:      Cervical: No cervical adenopathy. Skin:     General: Skin is warm. Findings: No erythema or rash. Neurological:      Mental Status: She is alert and oriented to person, place, and time. Cranial Nerves: No cranial nerve deficit. Motor: No abnormal muscle tone. Deep Tendon Reflexes: Reflexes are normal and symmetric. Psychiatric:         Behavior: Behavior normal.         Thought Content: Thought content normal.         Judgment: Judgment normal.         BP (!) 114/56 (Site: Right Upper Arm, Position: Sitting, Cuff Size: Medium Adult)   Pulse 55   Temp 98.2 °F (36.8 °C) (Oral)   Resp 16   Ht 5' 5\" (1.651 m)   Wt 145 lb 9.6 oz (66 kg)   SpO2 96%   BMI 24.23 kg/m²      ECOG status is 1    Imaging studies and labs:   US THYROID    Result Date: 1/10/2022  PROCEDURE: US THYROID CLINICAL INFORMATION: Elevated TSH, Elevated serum free T4 level COMPARISON: No prior study. TECHNIQUE: Grayscale and color sonographic imaging of the thyroid gland performed in longitudinal and transverse planes. TECHNICAL DATA: Right Thyroid - 4.14 x 1.24 x 2.07 cm Left Thyroid -5.05 x 1.63 x 1.65 cm Isthmus -0.36 cm FINDINGS: THYROID SIZE: Thyroid gland is normal left thyroid lobe is prominent. NODULES: 1. Mixed solid and cystic nodule 1.2 cm x 0.8 x 0.9 cm mid right thyroid lobe consistent with a tirad 2 lesion this is not considered suspicious. 2. Mixed solid and cystic nodule measuring 1.0 x 0.8 x 0.8 cm mid right thyroid lobe adjacent to the first nodule. Also a TR 2 lesion.  3. 0.6 x 0.5 x 0.5 cm mixed solid and cystic lesion mid left thyroid lobe. T are too lesion. 4. Completely cystic lesion medial left thyroid lobe 0.9 x 0.6 x 0.7 cm considered a benign lesion. TR 1 lesion. PARENCHYMAL ECHOGENICITY/VASCULARITY: Normal. MICROLITHIASIS: None. CERVICAL LYMPHADENOPATHY: 1. There are no pathologically enlarged lymph nodes adjacent to the thyroid gland. 1. Mild prominence the left thyroid lobe. Multiple mixed solid and cystic lesions which are not suspicious and do not require FNA. Consider follow-up in one to 2 years **This report has been created using voice recognition software. It may contain minor errors which are inherent in voice recognition technology. ** Final report electronically signed by Dr. Sara Walls on 1/10/2022 7:38 AM          CBC:   Recent Labs     02/09/22  0830   WBC 5.4   HGB 10.0*   HCT 32.3*   MCV 98        BMP:  Recent Labs     02/09/22  0830      K 3.7   CREATININE 1.2     PT/INR: No results for input(s): PROTIME, INR in the last 72 hours. APTT: No results for input(s): APTT in the last 72 hours. Assessment:  1. Stage III B periampullary adenocarcinoma, pancreaticobiliary type. Patient is s/p Whipple procedure on December 4, 2021. Alisson-ampullary cancers represent a group of malignancies distinct from those arising from other hepato-biliary structures. These cancers are pathologically adenocarcinomas, and in an  estimated 80% of cases are amenable to surgical [id number], 26. However this treatment regimen is often augmented by the use of adjuvant chemotherapy, especially amongst those with advanced disease. The pooled overall 5 year survival for alisson-ampullary cancers is between 30 to 50%. Treatment decisions are generally extrapolated from pancreatic chemotherapy protocols and consist mainly of traditional chemotherapy drugs. I discussed with the patient her potential treatment options. They include FOLFIRINOX, FOLFOX or Gemzar and Abraxane.   I do not think that due to her age the patient will be able to tolerate FOLFIRINOX since this is a very aggressive regimen. I had a lengthy discussion with the patient and her daughter in law. I want to be sure that while the patient is going through chemotherapy treatment she has good social and family support. Patient had follow-up with the surgeon at [facility], last drain was removed and the patient received clearance for surgery. In preparation for chemotherapy the patient underwent successful and uncomplicated Mediport placement. She started FOLFOX on January 26, 2022.  2.  Adjuvant chemotherapy with FOLFOX. Patient tolerated first cycle exceptionally well. She denies having any side effects, specifically no nausea no vomiting no oral mucositis. Patient denies having any diarrhea no abdominal pain. She denies having any skin rash no hand-and-foot erythema. No fevers. Her vital signs are stable her labs are within normal range. We will proceed with cycle #2. Chemotherapy orders were reviewed and signed. The patient was instructed to continue dexamethasone 8 mg daily for 3 days. .   3.  Weight loss. The patient will have dietitian,       Diagnosis Orders   1. Malignant neoplasm of pancreas, unspecified location of malignancy [facility] Medicine - Dietitian   2. History of Whipple procedure  [facility]. [facility] Internal Medicine - Dietitian   3. Encounter for chemotherapy management  [facility]. [facility] Internal Medicine - Dietitian   4. Weight loss          Plan:   Return in about 2 weeks (around 2/23/2022).      Orders Placed:   Orders Placed This Encounter   Procedures   [facility]. [facility] Internal Medicine - Dietitian     Referral Priority:   Routine     Referral Type:   Eval and Treat     Referral Reason:   Specialty Services Required     Requested Specialty:   Dietitian     Number of Visits Requested:   1        Medications Prescribed:   Orders Placed This Encounter

## 2022-02-09 ENCOUNTER — OFFICE VISIT (OUTPATIENT)
Dept: ONCOLOGY | Age: 82
End: 2022-02-09
Payer: MEDICARE

## 2022-02-09 ENCOUNTER — HOSPITAL ENCOUNTER (OUTPATIENT)
Dept: INFUSION THERAPY | Age: 82
Discharge: HOME | End: 2022-02-09
Payer: MEDICARE

## 2022-02-09 VITALS
OXYGEN SATURATION: 98 % | BODY MASS INDEX: 24.26 KG/M2 | HEIGHT: 65 IN | WEIGHT: 145.6 LBS | RESPIRATION RATE: 16 BRPM | SYSTOLIC BLOOD PRESSURE: 148 MMHG | DIASTOLIC BLOOD PRESSURE: 72 MMHG | TEMPERATURE: 98.8 F | HEART RATE: 68 BPM

## 2022-02-09 VITALS
WEIGHT: 145.6 LBS | HEIGHT: 65 IN | HEART RATE: 55 BPM | SYSTOLIC BLOOD PRESSURE: 114 MMHG | DIASTOLIC BLOOD PRESSURE: 56 MMHG | TEMPERATURE: 98.2 F | BODY MASS INDEX: 24.26 KG/M2 | OXYGEN SATURATION: 96 % | RESPIRATION RATE: 16 BRPM

## 2022-02-09 DIAGNOSIS — C25.9 MALIGNANT NEOPLASM OF PANCREAS, UNSPECIFIED LOCATION OF MALIGNANCY (HCC): Primary | ICD-10-CM

## 2022-02-09 DIAGNOSIS — Z51.11 ENCOUNTER FOR CHEMOTHERAPY MANAGEMENT: ICD-10-CM

## 2022-02-09 DIAGNOSIS — Z90.49 HISTORY OF WHIPPLE PROCEDURE: ICD-10-CM

## 2022-02-09 DIAGNOSIS — R63.4 WEIGHT LOSS: ICD-10-CM

## 2022-02-09 DIAGNOSIS — Z90.410 HISTORY OF WHIPPLE PROCEDURE: ICD-10-CM

## 2022-02-09 LAB
ABSOLUTE IMMATURE GRANULOCYTE: 0 THOU/MM3 (ref 0–0.07)
ALBUMIN SERPL-MCNC: 3.5 G/DL (ref 3.5–5.1)
ALP BLD-CCNC: 108 U/L (ref 38–126)
ALT SERPL-CCNC: 33 U/L (ref 11–66)
AST SERPL-CCNC: 37 U/L (ref 5–40)
BASINOPHIL, AUTOMATED: 1 % (ref 0–3)
BASOPHILS ABSOLUTE: 0.1 THOU/MM3 (ref 0–0.1)
BILIRUB SERPL-MCNC: 1.1 MG/DL (ref 0.3–1.2)
BILIRUBIN DIRECT: 0.4 MG/DL (ref 0–0.3)
BUN, WHOLE BLOOD: 17 MG/DL (ref 8–26)
CHLORIDE, WHOLE BLOOD: 107 MEQ/L (ref 98–109)
CREATININE, WHOLE BLOOD: 1.2 MG/DL (ref 0.5–1.2)
EOSINOPHILS ABSOLUTE: 0 THOU/MM3 (ref 0–0.4)
EOSINOPHILS RELATIVE PERCENT: 1 % (ref 0–4)
GFR, ESTIMATED: 46 ML/MIN/1.73M2
GLUCOSE, WHOLE BLOOD: 135 MG/DL (ref 70–108)
HCT VFR BLD CALC: 32.3 % (ref 37–47)
HEMOGLOBIN: 10 GM/DL (ref 12–16)
IMMATURE GRANULOCYTES: 0 %
IONIZED CALCIUM, WHOLE BLOOD: 1.15 MMOL/L (ref 1.12–1.32)
LYMPHOCYTES # BLD: 39 % (ref 15–47)
LYMPHOCYTES ABSOLUTE: 2.1 THOU/MM3 (ref 1–4.8)
MCH RBC QN AUTO: 30.2 PG (ref 26–33)
MCHC RBC AUTO-ENTMCNC: 31 GM/DL (ref 32.2–35.5)
MCV RBC AUTO: 98 FL (ref 81–99)
MONOCYTES ABSOLUTE: 0.5 THOU/MM3 (ref 0.4–1.3)
MONOCYTES: 10 % (ref 0–12)
PDW BLD-RTO: 14.6 % (ref 11.5–14.5)
PLATELET # BLD: 221 THOU/MM3 (ref 130–400)
PMV BLD AUTO: 9.9 FL (ref 9.4–12.4)
POTASSIUM, WHOLE BLOOD: 3.7 MEQ/L (ref 3.5–4.9)
RBC # BLD: 3.31 MILL/MM3 (ref 4.2–5.4)
SEG NEUTROPHILS: 50 % (ref 43–75)
SEGMENTED NEUTROPHILS ABSOLUTE COUNT: 2.7 THOU/MM3 (ref 1.8–7.7)
SODIUM, WHOLE BLOOD: 141 MEQ/L (ref 138–146)
TOTAL CO2, WHOLE BLOOD: 23 MEQ/L (ref 23–33)
TOTAL PROTEIN: 6 G/DL (ref 6.1–8)
WBC # BLD: 5.4 THOU/MM3 (ref 4.8–10.8)

## 2022-02-09 PROCEDURE — 96413 CHEMO IV INFUSION 1 HR: CPT

## 2022-02-09 PROCEDURE — 99211 OFF/OP EST MAY X REQ PHY/QHP: CPT

## 2022-02-09 PROCEDURE — 96416 CHEMO PROLONG INFUSE W/PUMP: CPT

## 2022-02-09 PROCEDURE — 85025 COMPLETE CBC W/AUTO DIFF WBC: CPT

## 2022-02-09 PROCEDURE — 96375 TX/PRO/DX INJ NEW DRUG ADDON: CPT

## 2022-02-09 PROCEDURE — 96411 CHEMO IV PUSH ADDL DRUG: CPT

## 2022-02-09 PROCEDURE — 99214 OFFICE O/P EST MOD 30 MIN: CPT | Performed by: INTERNAL MEDICINE

## 2022-02-09 PROCEDURE — 96367 TX/PROPH/DG ADDL SEQ IV INF: CPT

## 2022-02-09 PROCEDURE — 2580000003 HC RX 258: Performed by: INTERNAL MEDICINE

## 2022-02-09 PROCEDURE — 6360000002 HC RX W HCPCS: Performed by: INTERNAL MEDICINE

## 2022-02-09 PROCEDURE — 80076 HEPATIC FUNCTION PANEL: CPT

## 2022-02-09 PROCEDURE — 36591 DRAW BLOOD OFF VENOUS DEVICE: CPT

## 2022-02-09 PROCEDURE — 96415 CHEMO IV INFUSION ADDL HR: CPT

## 2022-02-09 PROCEDURE — 80047 BASIC METABLC PNL IONIZED CA: CPT

## 2022-02-09 PROCEDURE — 96368 THER/DIAG CONCURRENT INF: CPT

## 2022-02-09 RX ORDER — FLUOROURACIL 50 MG/ML
400 INJECTION, SOLUTION INTRAVENOUS ONCE
Status: COMPLETED | OUTPATIENT
Start: 2022-02-09 | End: 2022-02-09

## 2022-02-09 RX ORDER — SODIUM CHLORIDE 9 MG/ML
25 INJECTION, SOLUTION INTRAVENOUS PRN
Status: CANCELLED | OUTPATIENT
Start: 2022-02-09

## 2022-02-09 RX ORDER — DEXTROSE MONOHYDRATE 50 MG/ML
INJECTION, SOLUTION INTRAVENOUS ONCE
Status: COMPLETED | OUTPATIENT
Start: 2022-02-09 | End: 2022-02-09

## 2022-02-09 RX ORDER — SODIUM CHLORIDE 0.9 % (FLUSH) 0.9 %
5-40 SYRINGE (ML) INJECTION PRN
Status: DISCONTINUED | OUTPATIENT
Start: 2022-02-09 | End: 2022-02-10 | Stop reason: HOSPADM

## 2022-02-09 RX ORDER — HEPARIN SODIUM (PORCINE) LOCK FLUSH IV SOLN 100 UNIT/ML 100 UNIT/ML
500 SOLUTION INTRAVENOUS PRN
Status: DISCONTINUED | OUTPATIENT
Start: 2022-02-09 | End: 2022-02-10 | Stop reason: HOSPADM

## 2022-02-09 RX ORDER — DEXAMETHASONE 4 MG/1
8 TABLET ORAL
Qty: 6 TABLET | Refills: 3 | Status: SHIPPED | OUTPATIENT
Start: 2022-02-09 | End: 2022-04-06 | Stop reason: SDUPTHER

## 2022-02-09 RX ORDER — HEPARIN SODIUM (PORCINE) LOCK FLUSH IV SOLN 100 UNIT/ML 100 UNIT/ML
500 SOLUTION INTRAVENOUS PRN
Status: CANCELLED | OUTPATIENT
Start: 2022-02-09

## 2022-02-09 RX ORDER — PALONOSETRON 0.05 MG/ML
0.25 INJECTION, SOLUTION INTRAVENOUS ONCE
Status: COMPLETED | OUTPATIENT
Start: 2022-02-09 | End: 2022-02-09

## 2022-02-09 RX ORDER — SODIUM CHLORIDE 0.9 % (FLUSH) 0.9 %
5-40 SYRINGE (ML) INJECTION PRN
Status: CANCELLED | OUTPATIENT
Start: 2022-02-09

## 2022-02-09 RX ADMIN — SODIUM CHLORIDE, PRESERVATIVE FREE 10 ML: 5 INJECTION INTRAVENOUS at 08:22

## 2022-02-09 RX ADMIN — FLUOROURACIL 700 MG: 50 INJECTION, SOLUTION INTRAVENOUS at 12:00

## 2022-02-09 RX ADMIN — DEXTROSE MONOHYDRATE: 50 INJECTION, SOLUTION INTRAVENOUS at 09:09

## 2022-02-09 RX ADMIN — PALONOSETRON HYDROCHLORIDE 0.25 MG: 0.25 INJECTION, SOLUTION INTRAVENOUS at 09:13

## 2022-02-09 RX ADMIN — FLUOROURACIL 4225 MG: 50 INJECTION, SOLUTION INTRAVENOUS at 12:05

## 2022-02-09 RX ADMIN — SODIUM CHLORIDE, PRESERVATIVE FREE 20 ML: 5 INJECTION INTRAVENOUS at 12:05

## 2022-02-09 RX ADMIN — SODIUM CHLORIDE, PRESERVATIVE FREE 20 ML: 5 INJECTION INTRAVENOUS at 08:23

## 2022-02-09 RX ADMIN — LEUCOVORIN CALCIUM 700 MG: 350 INJECTION, POWDER, LYOPHILIZED, FOR SOLUTION INTRAMUSCULAR; INTRAVENOUS at 09:41

## 2022-02-09 RX ADMIN — OXALIPLATIN 150 MG: 5 INJECTION, SOLUTION, CONCENTRATE INTRAVENOUS at 09:43

## 2022-02-09 RX ADMIN — DEXAMETHASONE SODIUM PHOSPHATE 12 MG: 4 INJECTION, SOLUTION INTRAMUSCULAR; INTRAVENOUS at 09:17

## 2022-02-09 NOTE — PLAN OF CARE
Problem: Intellectual/Education/Knowledge Deficit  Goal: Teaching initiated upon admission  Outcome: Met This Shift  Note: Patient verbalizes understanding to verbal information given on oxaliplatin, leucovorin and 5FU, including action and possible side effects. Aware to call MD if develop complications. Intervention: Verbal/written education provided  Note: Chemotherapy Teaching     What is Chemotherapy   Drug action [x]   Method of Administration [x]   Handouts given []     Side Effects  Nausea/vomiting [x]   Diarrhea [x]   Fatigue [x]   Signs / Symptoms of infection [x]   Neutropenia [x]   Thrombocytopenia [x]   Alopecia [x]   neuropathy [x]   [place] diet &  the importance of fluids [x]       Micellaneous  Importance of nutrition [x]   Importance of oral hygiene [x]   When to call the MD [x]   Monitoring labs [x]   Use of supportive services []     Explanation of Drug Regimen / Frequency  Oxaliplatin, leucovorin and 5FU     Comments  Verbalized understanding to drug,action,side effects and when to call MD         Problem: Discharge Planning  Goal: Knowledge of discharge instructions  Description: Knowledge of discharge instructions  Outcome: Met This Shift  Note: Patient verbalizes understanding of discharge instructions, follow up appointment, and when to call physician if needed   Intervention: Discharge to appropriate level of care  Note: Discharge instructions given to pt and reviewed. Follow up appointments discussed. Problem: Falls - Risk of:  Goal: Will remain free from falls  Description: Will remain free from falls  Outcome: Met This Shift  Note: Patient free of falls this visit. Intervention: Assess risk factors for falls  Note: Fall risks assessed. Precautions discussed. Call light within reach during visit.       Problem: Infection - Central Venous Catheter-Associated Bloodstream Infection:  Goal: Will show no infection signs and symptoms  Description: Will show no infection signs and symptoms  Outcome: Met This Shift  Note: Mediport site with no redness or warmth. Skin over port site intact with no signs of breakdown noted. Patient verbalizes signs/symptoms of port infection and when to notify the physician. Intervention: Infection risk assessment  Note: Discuss port maintenance, infection prevention, signs of infection, and when to call the doctor. Care plan reviewed with patient. Patient verbalizes understanding of the plan of care and contributes to goal setting.

## 2022-02-09 NOTE — PROGRESS NOTES
Patient tolerated Oxaliplatin, leucovorin , 5FU push and CADD pump without any complications. Denies dizziness, lightheadedness, acute nausea or vomiting, headache, heart palpitations, rash/itching or increased SOB. Last vital signs  BP (!) 148/72   Pulse 68   Temp 98.8 °F (37.1 °C) (Oral)   Resp 16   Ht 5' 5\" (1.651 m)   Wt 145 lb 9.6 oz (66 kg)   SpO2 98%   BMI 24.23 kg/m²     Patient instructed if they experience any of the above symptoms following today's visit, he/she is to notify the Physician or go to the Emergency Dept. Discharge instructions given to patient, Verbalizes understanding. Ambulated off unit per self in stable condition with all belongings.

## 2022-02-09 NOTE — PROGRESS NOTES
[facility], [address] [facility] [address]  [address]  Dept: [phone]  Dept Fax: [phone]: [phone]    Visit Date:1/26/2022     García Zaidi is a [de-identified] y.o. 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 She is status post Whipple procedure. Patient was admitted to [facility] on 11/09/2021 for jaundice and not feeling well. She noticed juandice since 11/05/21. Associated symptom includes RUQ pain, itching and dark urine with light color stool. Hospital day 2, GI was consulted for possible choledocholithiasis. Liver function test transaminitis with AST//228 and significant Alk phos elevate at 421. CT abdominal (11/9) show Distend gallbladder with dilated common bile duct but no stone and hepatomegaly. RUQ US (11/09): show evidence of distened gall bladder with gallbladder sludge both intra and extra hepatic biliary duct dilation with no stone no para cholecystic edema. She underwent ERCP on 11/10/2021. Ampulla and major papula were of normal appearance on endoscopy. Procedure was complicated by retroperitoneal perforation. Procedure was terminated. Patient was started on Zosyn. Patient was administered lactated Ringer's at 100 cc an hour and kept n.p.o. [facility] was then transferred to [address] for higher level care on 11/11/21. She underwent initial exploratory surgery, where a bowel perforation was not noted. She was then monitored for elevated LFTs and hyperbilirubinemia. Patient then returned to  for Whipple surgery on 12/4/21 by Dr. Pari Olguin. 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 She tolerated Whipple without complications. She initially had 4 drains present.   Currently only one pancreatic drain remains. Incision is healing well, without drainage, erythema, or warmth.   After discharge, she was in Rehab facility in [place], recently discharged . She is currently back at home, her son is here to stay with her for a couple of days. Patient didn't have any medical history except cataract , run of A. fib during recent hospitalization at [facility].  (which was schedule for last Monday and it was cancelled due to patient in ED). Take multivitamin daily.      Patient moved to [facility] 5 years ago to be with one of her sons, who unfortunately  last year. She reported hx of 10 years smoking when she was young with 1/5 pack/day (5 pack years). Quited smoking  In . Denied any alcohol use, receational drug use. Have 3 vaginal birth without any blood transfusion product. Patient reported has colonoscopy done in  with 2 polyps was removed      History on 2022:  The patient presents to the medical oncology clinic with her daughter-in-law to start adjuvant chemotherapy. In preparation for chemotherapy the patient underwent successful and uncomplicated Mediport placement. Denies having any complaints since last visit with me. Her appetite is improving. She denies having any abdominal pain. She reports intermittent diarrhea but overall it is improving.    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 Gerry Singh ERCP N/A 11/10/2021    ERCP WITH STENT INSERTION performed by Ashley Vickers MD at [address] [facility]  2021    exp lap, radical celiac and portal lymph node dissection,pylorus preserving pancreatoduodenectomy-Dr Ewing IU    PORT SURGERY Left 2022    SINGLE LUMEN AKAXJTNXS INSERTION performed by Teri Roe MD at [address] [place] [address] History   Problem Relation Age of Onset    Breast Cancer Mother     Colon Cancer Mother     Cancer Father         bladder and lung    Lung Cancer Father     Kidney Disease Brother     No Known Problems Maternal Grandmother     No Known Problems Maternal Grandfather     No Known Problems Paternal Grandmother     No Known Problems Paternal Grandfather       Social History     Tobacco Use    Smoking status: Former Smoker     Years: 10.00     Types: Cigarettes     Quit date: 1972     Years since quittin.1    Smokeless tobacco: Never Used    Tobacco comment: social smoker   Substance Use Topics    Alcohol use: Not Currently      Current Outpatient Medications   Medication Sig Dispense Refill    lidocaine-prilocaine (EMLA) 2.5-2.5 % cream Apply topically as needed. 30 g 1    ondansetron (ZOFRAN-ODT) 4 MG disintegrating tablet Take 1 tablet by mouth 3 times daily as needed for Nausea or Vomiting 21 tablet 0    amiodarone (CORDARONE) 200 MG tablet Take 1 tablet by mouth daily 30 tablet 1    metoprolol tartrate (LOPRESSOR) 25 MG tablet Take 0.5 tablets by mouth 2 times daily 60 tablet 1    pantoprazole (PROTONIX) 40 MG tablet Take 1 tablet by mouth every morning (before breakfast) 30 tablet 0    aspirin 81 MG chewable tablet Take 81 mg by mouth daily       docusate sodium (COLACE) 100 MG capsule Take 100 mg by mouth 2 times daily      ondansetron (ZOFRAN) 4 MG tablet Take 4 mg by mouth every 8 hours as needed for Nausea or Vomiting  (Patient not taking: Reported on 2022)      apixaban (ELIQUIS) 5 MG TABS tablet Take 1 tablet by mouth 2 times daily 180 tablet 1    amLODIPine (NORVASC) 5 MG tablet Take 0.5 tablets by mouth daily 30 tablet 1    oxybutynin (DITROPAN) 5 MG tablet Take 5 mg by mouth 3 times daily  (Patient not taking: Reported on 2022)       No current facility-administered medications for this visit.       No Known Allergies   Health Maintenance   Topic Date Due    DTaP/Tdap/Td vaccine (1 - Tdap) Never done    Shingles Vaccine (1 of 2) Never done    DEXA (modify frequency per FRAX score)  Never done    Annual Wellness Visit (AWV)  Never done    Pneumococcal 65+ years Vaccine (2 of 2 - PPSV23) 12/21/2021    Depression Screen  12/28/2022    TSH testing  12/28/2022    Flu vaccine  Completed    COVID-19 Vaccine  Completed    Hepatitis A vaccine  Aged Out    Hepatitis B vaccine  Aged Out    Hib vaccine  Aged Out    Meningococcal (ACWY) vaccine  Aged Out        Subjective:   Review of Systems   Constitutional: Positive for appetite change and fatigue. Negative for activity change and fever. HENT: Negative for congestion, dental problem, facial swelling, hearing loss, mouth sores, nosebleeds, sore throat, tinnitus and trouble swallowing. Eyes: Negative for discharge and visual disturbance. Respiratory: Negative for cough, chest tightness, shortness of breath and wheezing. Cardiovascular: Negative for chest pain, palpitations and leg swelling. Gastrointestinal: Negative for abdominal distention, abdominal pain, blood in stool, constipation, diarrhea, nausea, rectal pain and vomiting. Endocrine: Negative for cold intolerance, polydipsia and polyuria. Genitourinary: Negative for decreased urine volume, difficulty urinating, dysuria, flank pain, hematuria and urgency. Musculoskeletal: Negative for arthralgias, back pain, gait problem, joint swelling, myalgias and neck stiffness. Skin: Negative for color change, rash and wound. Neurological: Negative for dizziness, tremors, seizures, speech difficulty, weakness, light-headedness, numbness and headaches. Hematological: Negative for adenopathy. Does not bruise/bleed easily. Psychiatric/Behavioral: Negative for confusion and sleep disturbance. The patient is not nervous/anxious. Objective:   Physical Exam  Vitals reviewed. Constitutional:       General: She is not in acute distress. Appearance: She is well-developed. HENT:      Head: Normocephalic. Mouth/Throat:      Pharynx: No oropharyngeal exudate. Eyes:      General: No scleral icterus. Right eye: No discharge. Left eye: No discharge. Pupils: Pupils are equal, round, and reactive to light. Neck:      Thyroid: No thyromegaly. Vascular: No JVD. Trachea: No tracheal deviation. Cardiovascular:      Rate and Rhythm: Normal rate. Heart sounds: Normal heart sounds. No murmur heard. No friction rub. No gallop. Pulmonary:      Effort: Pulmonary effort is normal. No respiratory distress. Breath sounds: Normal breath sounds. No stridor. No wheezing or rales. Chest:      Chest wall: No tenderness. Abdominal:      General: Bowel sounds are normal. There is no distension. Palpations: Abdomen is soft. There is no mass. Tenderness: There is no abdominal tenderness. There is no rebound. Musculoskeletal:         General: Normal range of motion. Cervical back: Normal range of motion and neck supple. Comments: Good range of motion in all four extremities. Lymphadenopathy:      Cervical: No cervical adenopathy. Skin:     General: Skin is warm. Findings: No erythema or rash. Neurological:      Mental Status: She is alert and oriented to person, place, and time. Cranial Nerves: No cranial nerve deficit. Motor: No abnormal muscle tone. Deep Tendon Reflexes: Reflexes are normal and symmetric. Psychiatric:         Behavior: Behavior normal.         Thought Content:  Thought content normal.         Judgment: Judgment normal.         /60 (Site: Right Upper Arm, Position: Sitting, Cuff Size: Medium Adult)   Pulse 57   Temp 98.6 °F (37 °C) (Oral)   Resp 16   Ht 5' 5\" (1.651 m)   Wt 151 lb (68.5 kg)   SpO2 98%   BMI 25.13 kg/m²      ECOG status is 1    Imaging studies and labs:   US THYROID    Result Date: 1/10/2022  PROCEDURE: US THYROID CLINICAL INFORMATION: Elevated TSH, Elevated serum free T4 level COMPARISON: No prior study. TECHNIQUE: Grayscale and color sonographic imaging of the thyroid gland performed in longitudinal and transverse planes. TECHNICAL DATA: Right Thyroid - 4.14 x 1.24 x 2.07 cm Left Thyroid -5.05 x 1.63 x 1.65 cm Isthmus -0.36 cm FINDINGS: THYROID SIZE: Thyroid gland is normal left thyroid lobe is prominent. NODULES: 1. Mixed solid and cystic nodule 1.2 cm x 0.8 x 0.9 cm mid right thyroid lobe consistent with a tirad 2 lesion this is not considered suspicious. 2. Mixed solid and cystic nodule measuring 1.0 x 0.8 x 0.8 cm mid right thyroid lobe adjacent to the first nodule. Also a TR 2 lesion. 3. 0.6 x 0.5 x 0.5 cm mixed solid and cystic lesion mid left thyroid lobe. T are too lesion. 4. Completely cystic lesion medial left thyroid lobe 0.9 x 0.6 x 0.7 cm considered a benign lesion. TR 1 lesion. PARENCHYMAL ECHOGENICITY/VASCULARITY: Normal. MICROLITHIASIS: None. CERVICAL LYMPHADENOPATHY: 1. There are no pathologically enlarged lymph nodes adjacent to the thyroid gland. 1. Mild prominence the left thyroid lobe. Multiple mixed solid and cystic lesions which are not suspicious and do not require FNA. Consider follow-up in one to 2 years **This report has been created using voice recognition software. It may contain minor errors which are inherent in voice recognition technology. ** Final report electronically signed by Dr. Benjamin Burgess on 1/10/2022 7:38 AM          CBC: No results for input(s): WBC, HGB, HCT, MCV, PLT in the last 72 hours. BMP:No results for input(s): NA, K, CL, CO2, PHOS, BUN, CREATININE, CA, GLUCOSE in the last 72 hours. PT/INR: No results for input(s): PROTIME, INR in the last 72 hours. APTT: No results for input(s): APTT in the last 72 hours. Assessment:  1. Stage III B periampullary adenocarcinoma, pancreaticobiliary type. Patient is s/p Whipple procedure on December 4, 2021.   Leela-ampullary cancers especially when white blood cell count is low   Anemia which may require a blood transfusion   Bruising, bleeding   Headache   Tiredness   Numbness, tingling or pain, \"pins and needles\" of the hands, feet, arms and legs   Tingling or a loss of feeling in your hands, feet, nose, or tightness in throat or jaw, or difficulty swallowing or breathing which may be made worse by exposure to cold   Cough   Fever, pain    Patient was instructed to take dexamethasone 8 mg daily for 3 days starting tomorrow. Prescription is sent to the pharmacy for Zofran ODT and EMLA cream.   She was instructed to use Biotene mouthwash. Patient was instructed to call us right away with uncontrolled diarrhea and fever above 100.3 °F. RTC to see me for labs: CBC, BMP, LFTs and next dose of FOLFOX in 2 weeks. Diagnosis Orders   1. Malignant neoplasm of pancreas, unspecified location of malignancy ([address]  CBC Auto Differential    POC PANEL BMP W/IOCA    Hepatic Function Panel   2. History of Whipple procedure     3. Encounter for chemotherapy management          Plan:   No follow-ups on file. Orders Placed:   Orders Placed This Encounter   Procedures    CBC Auto Differential     Standing Status:   Standing     Number of Occurrences:   10     Standing Expiration Date:   1/26/2023    POC PANEL BMP W/IOCA     Standing Status:   Standing     Number of Occurrences:   10     Standing Expiration Date:   1/26/2023    Hepatic Function Panel     Standing Status:   Standing     Number of Occurrences:   10     Standing Expiration Date:   1/26/2023        Medications Prescribed:   Orders Placed This Encounter   Medications    dexamethasone (DECADRON) 4 MG tablet     Sig: Take 2 tablets by mouth daily (with breakfast) for 3 doses     Dispense:  6 tablet     Refill:  3    lidocaine-prilocaine (EMLA) 2.5-2.5 % cream     Sig: Apply topically as needed.      Dispense:  30 g     Refill:  1    ondansetron (ZOFRAN-ODT) 4 MG disintegrating tablet     Sig: Take 1 tablet by mouth 3 times daily as needed for Nausea or Vomiting     Dispense:  21 tablet     Refill:  0            Discussed use, benefit, and side effectsof prescribed medications. All patient questions answered. Pt voiced understanding. Instructed to continue current medications, diet and exercise. Patient agreed with treatment plan. Follow up as directed.

## 2022-02-09 NOTE — ONCOLOGY
Chemotherapy Administration    Pre-assessment Data: Antineoplastic Agents  See toxicity flow sheet for assessment                                          [x]         Interventions:   Chemotherapy SQ injection given []   Taxol administered-VS per protocol []   Blood pressure meds held 12 hours prior to Rituxan/Ruxience []   Rituxan/Ruxience administered- VS and precautions per guidelines []   Emergency drugs available as appropriate [x]   Anaphylaxis assessment completed [x]   Pre-medications administered as ordered [x]   Blood return noted upon initiation of chemotherapy [x]   Blood return noted each 1-2ml of a vesicant medication if given IV push []   Navelbine, Vincristine and Velban given as a monitored wide open drip, blood return noted before during and after infusion.  []   Blood return noted each 2-3ml of a non-vesicant medication if given IV push []   Patient aware of potential Immunotherapy toxicities []   Monitor for signs / symptoms of hypersensitivity reaction [x]   Chemotherapy orders (drug/dose/rate) verified by 2 Chemo certified RNs [x]   Monitor IV site and blood return throughout the infusion of the medication [x]   Document IV site checks on the IV assessment form [x]   Document chemotherapy teaching on the Patient Education tab [x]   Document patient verbalizes understanding of medications being administered- Oxaliplatin, leucovorin, 5FU push and CADD pump [x]   If IV infiltration, see ONS Guidelines []   Other:      []

## 2022-02-09 NOTE — PATIENT INSTRUCTIONS
1. Proceed with cycle #2 of FOLFOX today. 2.  Dietitian consult today.   3.  RTC to see me for labs: CBC, BMP, LFTs and next cycle of chemotherapy in 2 months

## 2022-02-10 ENCOUNTER — CLINICAL DOCUMENTATION (OUTPATIENT)
Dept: NUTRITION | Age: 82
End: 2022-02-10

## 2022-02-11 ENCOUNTER — HOSPITAL ENCOUNTER (OUTPATIENT)
Dept: INFUSION THERAPY | Age: 82
Discharge: HOME | End: 2022-02-11
Payer: MEDICARE

## 2022-02-11 VITALS
RESPIRATION RATE: 18 BRPM | SYSTOLIC BLOOD PRESSURE: 155 MMHG | DIASTOLIC BLOOD PRESSURE: 68 MMHG | TEMPERATURE: 99 F | OXYGEN SATURATION: 96 % | HEART RATE: 52 BPM

## 2022-02-11 DIAGNOSIS — C25.9 MALIGNANT NEOPLASM OF PANCREAS, UNSPECIFIED LOCATION OF MALIGNANCY (HCC): Primary | ICD-10-CM

## 2022-02-11 PROCEDURE — 2580000003 HC RX 258: Performed by: INTERNAL MEDICINE

## 2022-02-11 PROCEDURE — 6360000002 HC RX W HCPCS: Performed by: INTERNAL MEDICINE

## 2022-02-11 RX ORDER — FERROUS SULFATE 325(65) MG
325 TABLET ORAL
COMMUNITY

## 2022-02-11 RX ORDER — SODIUM CHLORIDE 9 MG/ML
25 INJECTION, SOLUTION INTRAVENOUS PRN
Status: CANCELLED | OUTPATIENT
Start: 2022-02-11

## 2022-02-11 RX ORDER — HEPARIN SODIUM (PORCINE) LOCK FLUSH IV SOLN 100 UNIT/ML 100 UNIT/ML
500 SOLUTION INTRAVENOUS PRN
Status: CANCELLED | OUTPATIENT
Start: 2022-02-11

## 2022-02-11 RX ORDER — HEPARIN SODIUM (PORCINE) LOCK FLUSH IV SOLN 100 UNIT/ML 100 UNIT/ML
500 SOLUTION INTRAVENOUS PRN
Status: DISCONTINUED | OUTPATIENT
Start: 2022-02-11 | End: 2022-02-12 | Stop reason: HOSPADM

## 2022-02-11 RX ORDER — SODIUM CHLORIDE 0.9 % (FLUSH) 0.9 %
5-40 SYRINGE (ML) INJECTION PRN
Status: CANCELLED | OUTPATIENT
Start: 2022-02-11

## 2022-02-11 RX ORDER — SODIUM CHLORIDE 0.9 % (FLUSH) 0.9 %
5-40 SYRINGE (ML) INJECTION PRN
Status: DISCONTINUED | OUTPATIENT
Start: 2022-02-11 | End: 2022-02-12 | Stop reason: HOSPADM

## 2022-02-11 RX ADMIN — HEPARIN 500 UNITS: 100 SYRINGE at 11:43

## 2022-02-11 RX ADMIN — SODIUM CHLORIDE, PRESERVATIVE FREE 10 ML: 5 INJECTION INTRAVENOUS at 11:43

## 2022-02-11 NOTE — PROGRESS NOTES
Patient assessed for the following post chemotherapy:    Dizziness   No  Lightheadedness  No      Acute nausea/vomiting No  Headache   No  Chest pain/pressure  No  Rash/itching   No  Shortness of breath  No      Patient tolerated chemotherapy treatment with 5fu without any complications. Last vital signs:   BP (!) 155/68   Pulse 52   Temp 99 °F (37.2 °C) (Oral)   Resp 18   SpO2 96%     . Patient instructed if experience any of the above symptoms following today's infusion,she is to notify MD immediately or go to the emergency department. Discharge instructions given to patient. Verbalizes understanding. Ambulated off unit with daughter and  with belongings.

## 2022-02-11 NOTE — PLAN OF CARE
Problem: Musculor/Skeletal Functional Status  Goal: Absence of falls  Outcome: Met This Shift  Note: No falls this admission   Intervention: Fall precautions  Note: Patient aware of fall precautions for here and at home -call light in reach while here       Problem: Intellectual/Education/Knowledge Deficit  Goal: Teaching initiated upon admission  Outcome: Met This Shift  Note: Reviewed Cadd pump discontinuation and mediport flush with patient, patient offered no questions or concerns. Patient verbalized understanding of drug being administered. Goal: Written Disposition Instruction form completed  Outcome: Met This Shift  Note: Discharge instructions given and reviewed with patient. All questions answered. Patient verbalized understanding   Intervention: Verbal/written education provided  Note: Discharge instruction sheets     Problem: Discharge Planning  Goal: Knowledge of discharge instructions  Description: Knowledge of discharge instructions  Outcome: Met This Shift  Note: Patient and family member able to teach back follow up appointments and when to call the doctor. Patient offers no questions at this time   Intervention: Interaction with patient/family and care team  Note: All questions and concerns addressed  Intervention: Discharge to appropriate level of care  Note: Discharge home     Problem: Infection - Central Venous Catheter-Associated Bloodstream Infection:  Goal: Will show no infection signs and symptoms  Description: Will show no infection signs and symptoms  Outcome: Met This Shift  Note: No signs of infection noted at [facility] site    Intervention: Central line needs assessment  Note:  Patient currently under going chemotherapy with poor venous access   Intervention: Infection risk assessment  Note: Patient aware that is of increased risk for infection due to receiving chemotherapy and having a central venous catheter.  Patient aware of signs and symptoms of infection and when to call the doctor    Care plan reviewed with patient and daughter verbalized understanding of the plan of care and contributed to goal setting.

## 2022-02-15 ENCOUNTER — CLINICAL DOCUMENTATION (OUTPATIENT)
Dept: NUTRITION | Age: 82
End: 2022-02-15

## 2022-02-23 ENCOUNTER — OFFICE VISIT (OUTPATIENT)
Dept: ONCOLOGY | Age: 82
End: 2022-02-23
Payer: MEDICARE

## 2022-02-23 ENCOUNTER — HOSPITAL ENCOUNTER (OUTPATIENT)
Dept: INFUSION THERAPY | Age: 82
Discharge: HOME | End: 2022-02-23
Payer: MEDICARE

## 2022-02-23 VITALS
SYSTOLIC BLOOD PRESSURE: 160 MMHG | HEART RATE: 55 BPM | OXYGEN SATURATION: 99 % | BODY MASS INDEX: 23.86 KG/M2 | RESPIRATION RATE: 16 BRPM | WEIGHT: 143.2 LBS | HEIGHT: 65 IN | DIASTOLIC BLOOD PRESSURE: 74 MMHG | TEMPERATURE: 98.5 F

## 2022-02-23 VITALS
DIASTOLIC BLOOD PRESSURE: 53 MMHG | WEIGHT: 143.2 LBS | TEMPERATURE: 97.4 F | RESPIRATION RATE: 16 BRPM | SYSTOLIC BLOOD PRESSURE: 112 MMHG | HEART RATE: 55 BPM | OXYGEN SATURATION: 99 % | HEIGHT: 65 IN | BODY MASS INDEX: 23.86 KG/M2

## 2022-02-23 DIAGNOSIS — C25.9 MALIGNANT NEOPLASM OF PANCREAS, UNSPECIFIED LOCATION OF MALIGNANCY (HCC): Primary | ICD-10-CM

## 2022-02-23 DIAGNOSIS — C25.9 MALIGNANT NEOPLASM OF PANCREAS, UNSPECIFIED LOCATION OF MALIGNANCY (HCC): ICD-10-CM

## 2022-02-23 DIAGNOSIS — Z90.410 HISTORY OF WHIPPLE PROCEDURE: ICD-10-CM

## 2022-02-23 DIAGNOSIS — Z90.49 HISTORY OF WHIPPLE PROCEDURE: ICD-10-CM

## 2022-02-23 DIAGNOSIS — Z51.11 ENCOUNTER FOR CHEMOTHERAPY MANAGEMENT: Primary | ICD-10-CM

## 2022-02-23 LAB
ABSOLUTE IMMATURE GRANULOCYTE: 0 THOU/MM3 (ref 0–0.07)
ALBUMIN SERPL-MCNC: 3.6 G/DL (ref 3.5–5.1)
ALP BLD-CCNC: 141 U/L (ref 38–126)
ALT SERPL-CCNC: 39 U/L (ref 11–66)
AST SERPL-CCNC: 19 U/L (ref 5–40)
BASINOPHIL, AUTOMATED: 2 % (ref 0–3)
BASOPHILS ABSOLUTE: 0.1 THOU/MM3 (ref 0–0.1)
BILIRUB SERPL-MCNC: 0.9 MG/DL (ref 0.3–1.2)
BILIRUBIN DIRECT: 0.3 MG/DL (ref 0–0.3)
BUN, WHOLE BLOOD: 14 MG/DL (ref 8–26)
CHLORIDE, WHOLE BLOOD: 109 MEQ/L (ref 98–109)
CREATININE, WHOLE BLOOD: 1.5 MG/DL (ref 0.5–1.2)
EOSINOPHILS ABSOLUTE: 0 THOU/MM3 (ref 0–0.4)
EOSINOPHILS RELATIVE PERCENT: 1 % (ref 0–4)
GFR, ESTIMATED: 35 ML/MIN/1.73M2
GLUCOSE, WHOLE BLOOD: 164 MG/DL (ref 70–108)
HCT VFR BLD CALC: 36 % (ref 37–47)
HEMOGLOBIN: 11.1 GM/DL (ref 12–16)
IMMATURE GRANULOCYTES: 0 %
IONIZED CALCIUM, WHOLE BLOOD: 1.16 MMOL/L (ref 1.12–1.32)
LYMPHOCYTES # BLD: 63 % (ref 15–47)
LYMPHOCYTES ABSOLUTE: 2.5 THOU/MM3 (ref 1–4.8)
MCH RBC QN AUTO: 30.4 PG (ref 26–33)
MCHC RBC AUTO-ENTMCNC: 30.8 GM/DL (ref 32.2–35.5)
MCV RBC AUTO: 99 FL (ref 81–99)
MONOCYTES ABSOLUTE: 0.4 THOU/MM3 (ref 0.4–1.3)
MONOCYTES: 11 % (ref 0–12)
PDW BLD-RTO: 16 % (ref 11.5–14.5)
PLATELET # BLD: 208 THOU/MM3 (ref 130–400)
PMV BLD AUTO: 9.4 FL (ref 9.4–12.4)
POTASSIUM, WHOLE BLOOD: 4 MEQ/L (ref 3.5–4.9)
RBC # BLD: 3.65 MILL/MM3 (ref 4.2–5.4)
SEG NEUTROPHILS: 25 % (ref 43–75)
SEGMENTED NEUTROPHILS ABSOLUTE COUNT: 1 THOU/MM3 (ref 1.8–7.7)
SODIUM, WHOLE BLOOD: 144 MEQ/L (ref 138–146)
TOTAL CO2, WHOLE BLOOD: 23 MEQ/L (ref 23–33)
TOTAL PROTEIN: 6.4 G/DL (ref 6.1–8)
WBC # BLD: 3.9 THOU/MM3 (ref 4.8–10.8)

## 2022-02-23 PROCEDURE — 96413 CHEMO IV INFUSION 1 HR: CPT

## 2022-02-23 PROCEDURE — 96415 CHEMO IV INFUSION ADDL HR: CPT

## 2022-02-23 PROCEDURE — 96368 THER/DIAG CONCURRENT INF: CPT

## 2022-02-23 PROCEDURE — 96367 TX/PROPH/DG ADDL SEQ IV INF: CPT

## 2022-02-23 PROCEDURE — 36591 DRAW BLOOD OFF VENOUS DEVICE: CPT

## 2022-02-23 PROCEDURE — 99211 OFF/OP EST MAY X REQ PHY/QHP: CPT

## 2022-02-23 PROCEDURE — 96416 CHEMO PROLONG INFUSE W/PUMP: CPT

## 2022-02-23 PROCEDURE — 96375 TX/PRO/DX INJ NEW DRUG ADDON: CPT

## 2022-02-23 PROCEDURE — 96411 CHEMO IV PUSH ADDL DRUG: CPT

## 2022-02-23 PROCEDURE — 80076 HEPATIC FUNCTION PANEL: CPT

## 2022-02-23 PROCEDURE — 6360000002 HC RX W HCPCS: Performed by: INTERNAL MEDICINE

## 2022-02-23 PROCEDURE — 85025 COMPLETE CBC W/AUTO DIFF WBC: CPT

## 2022-02-23 PROCEDURE — 2580000003 HC RX 258: Performed by: INTERNAL MEDICINE

## 2022-02-23 PROCEDURE — 80047 BASIC METABLC PNL IONIZED CA: CPT

## 2022-02-23 PROCEDURE — 99214 OFFICE O/P EST MOD 30 MIN: CPT | Performed by: INTERNAL MEDICINE

## 2022-02-23 RX ORDER — SODIUM CHLORIDE 0.9 % (FLUSH) 0.9 %
5-40 SYRINGE (ML) INJECTION PRN
Status: DISCONTINUED | OUTPATIENT
Start: 2022-02-23 | End: 2022-02-24 | Stop reason: HOSPADM

## 2022-02-23 RX ORDER — ACETAMINOPHEN 325 MG/1
650 TABLET ORAL
Status: CANCELLED | OUTPATIENT
Start: 2022-02-23

## 2022-02-23 RX ORDER — ALBUTEROL SULFATE 90 UG/1
4 AEROSOL, METERED RESPIRATORY (INHALATION) PRN
Status: CANCELLED | OUTPATIENT
Start: 2022-02-23

## 2022-02-23 RX ORDER — ONDANSETRON 2 MG/ML
8 INJECTION INTRAMUSCULAR; INTRAVENOUS
Status: CANCELLED | OUTPATIENT
Start: 2022-02-23

## 2022-02-23 RX ORDER — SODIUM CHLORIDE 9 MG/ML
5-40 INJECTION INTRAVENOUS PRN
Status: CANCELLED | OUTPATIENT
Start: 2022-02-23

## 2022-02-23 RX ORDER — SODIUM CHLORIDE 0.9 % (FLUSH) 0.9 %
5-40 SYRINGE (ML) INJECTION PRN
Status: CANCELLED | OUTPATIENT
Start: 2022-02-23

## 2022-02-23 RX ORDER — MEPERIDINE HYDROCHLORIDE 50 MG/ML
12.5 INJECTION INTRAMUSCULAR; INTRAVENOUS; SUBCUTANEOUS PRN
Status: CANCELLED | OUTPATIENT
Start: 2022-02-23

## 2022-02-23 RX ORDER — DEXTROSE MONOHYDRATE 50 MG/ML
INJECTION, SOLUTION INTRAVENOUS ONCE
Status: COMPLETED | OUTPATIENT
Start: 2022-02-23 | End: 2022-02-23

## 2022-02-23 RX ORDER — EPINEPHRINE 1 MG/ML
0.3 INJECTION, SOLUTION, CONCENTRATE INTRAVENOUS PRN
Status: CANCELLED | OUTPATIENT
Start: 2022-02-23

## 2022-02-23 RX ORDER — SODIUM CHLORIDE 9 MG/ML
25 INJECTION, SOLUTION INTRAVENOUS PRN
Status: CANCELLED | OUTPATIENT
Start: 2022-02-23

## 2022-02-23 RX ORDER — HEPARIN SODIUM (PORCINE) LOCK FLUSH IV SOLN 100 UNIT/ML 100 UNIT/ML
500 SOLUTION INTRAVENOUS PRN
Status: CANCELLED | OUTPATIENT
Start: 2022-02-23

## 2022-02-23 RX ORDER — SODIUM CHLORIDE 9 MG/ML
INJECTION, SOLUTION INTRAVENOUS CONTINUOUS
Status: CANCELLED | OUTPATIENT
Start: 2022-02-23

## 2022-02-23 RX ORDER — FLUOROURACIL 50 MG/ML
400 INJECTION, SOLUTION INTRAVENOUS ONCE
Status: CANCELLED | OUTPATIENT
Start: 2022-02-23

## 2022-02-23 RX ORDER — FLUOROURACIL 50 MG/ML
200 INJECTION, SOLUTION INTRAVENOUS ONCE
Status: COMPLETED | OUTPATIENT
Start: 2022-02-23 | End: 2022-02-23

## 2022-02-23 RX ORDER — SODIUM CHLORIDE 9 MG/ML
INJECTION, SOLUTION INTRAVENOUS ONCE
Status: CANCELLED | OUTPATIENT
Start: 2022-02-23 | End: 2022-02-23

## 2022-02-23 RX ORDER — PALONOSETRON 0.05 MG/ML
0.25 INJECTION, SOLUTION INTRAVENOUS ONCE
Status: CANCELLED | OUTPATIENT
Start: 2022-02-23 | End: 2022-02-23

## 2022-02-23 RX ORDER — DEXTROSE MONOHYDRATE 50 MG/ML
INJECTION, SOLUTION INTRAVENOUS ONCE
Status: CANCELLED | OUTPATIENT
Start: 2022-02-23 | End: 2022-02-23

## 2022-02-23 RX ORDER — PALONOSETRON 0.05 MG/ML
0.25 INJECTION, SOLUTION INTRAVENOUS ONCE
Status: COMPLETED | OUTPATIENT
Start: 2022-02-23 | End: 2022-02-23

## 2022-02-23 RX ORDER — DIPHENHYDRAMINE HYDROCHLORIDE 50 MG/ML
50 INJECTION INTRAMUSCULAR; INTRAVENOUS
Status: CANCELLED | OUTPATIENT
Start: 2022-02-23

## 2022-02-23 RX ORDER — HEPARIN SODIUM (PORCINE) LOCK FLUSH IV SOLN 100 UNIT/ML 100 UNIT/ML
500 SOLUTION INTRAVENOUS PRN
Status: DISCONTINUED | OUTPATIENT
Start: 2022-02-23 | End: 2022-02-24 | Stop reason: HOSPADM

## 2022-02-23 RX ORDER — FLUOROURACIL 50 MG/ML
200 INJECTION, SOLUTION INTRAVENOUS ONCE
Status: CANCELLED | OUTPATIENT
Start: 2022-02-23

## 2022-02-23 RX ADMIN — PALONOSETRON 0.25 MG: 0.05 INJECTION, SOLUTION INTRAVENOUS at 11:56

## 2022-02-23 RX ADMIN — LEUCOVORIN CALCIUM 350 MG: 350 INJECTION, POWDER, LYOPHILIZED, FOR SOLUTION INTRAMUSCULAR; INTRAVENOUS at 12:00

## 2022-02-23 RX ADMIN — DEXTROSE MONOHYDRATE: 50 INJECTION, SOLUTION INTRAVENOUS at 11:33

## 2022-02-23 RX ADMIN — SODIUM CHLORIDE, PRESERVATIVE FREE 10 ML: 5 INJECTION INTRAVENOUS at 10:25

## 2022-02-23 RX ADMIN — SODIUM CHLORIDE, PRESERVATIVE FREE 20 ML: 5 INJECTION INTRAVENOUS at 10:26

## 2022-02-23 RX ADMIN — OXALIPLATIN 125 MG: 5 INJECTION, SOLUTION INTRAVENOUS at 12:02

## 2022-02-23 RX ADMIN — DEXAMETHASONE SODIUM PHOSPHATE 12 MG: 4 INJECTION, SOLUTION INTRAMUSCULAR; INTRAVENOUS at 11:34

## 2022-02-23 RX ADMIN — FLUOROURACIL 350 MG: 50 INJECTION, SOLUTION INTRAVENOUS at 14:07

## 2022-02-23 RX ADMIN — FLUOROURACIL 3500 MG: 50 INJECTION, SOLUTION INTRAVENOUS at 14:17

## 2022-02-23 ASSESSMENT — ENCOUNTER SYMPTOMS
NAUSEA: 0
BACK PAIN: 0
FACIAL SWELLING: 0
COUGH: 0
CONSTIPATION: 0
DIARRHEA: 0
RECTAL PAIN: 0
ABDOMINAL DISTENTION: 0
CHEST TIGHTNESS: 0
VOMITING: 0
TROUBLE SWALLOWING: 0
SHORTNESS OF BREATH: 0
EYE DISCHARGE: 0
ABDOMINAL PAIN: 0
BLOOD IN STOOL: 0
SORE THROAT: 0
COLOR CHANGE: 0
WHEEZING: 0

## 2022-02-23 NOTE — PROGRESS NOTES
[facility] [address] [facility] [facility] [address]  [place] [address]  Dept: [phone]  Dept Fax: [phone]: [phone]    Visit Date:2/23/2022     Kenney Nava is a [de-identified] y.o. female who presents today for:   Chief Complaint   Patient presents with    Follow-up     Malignant neoplasm of pancreas, unspecified location of malignancy         HPI:   This is an 70-year-old patient diagnosed with periampullary adenocarcinoma. She is status post Whipple procedure. She presents to the medical oncology clinic to continue adjuvant  Patient was admitted to [address] [place] [address] on 11/09/2021 for jaundice and not feeling well. She noticed juandice since 11/05/21. Associated symptom includes RUQ pain, itching and dark urine with light color stool. Hospital day 2, GI was consulted for possible choledocholithiasis. Liver function test transaminitis with AST//228 and significant Alk phos elevate at 421. CT abdominal (11/9) show Distend gallbladder with dilated common bile duct but no stone and hepatomegaly. RUQ US (11/09): show evidence of distened gall bladder with gallbladder sludge both intra and extra hepatic biliary duct dilation with no stone no para cholecystic edema. She underwent ERCP on 11/10/2021. Ampulla and major papula were of normal appearance on endoscopy. Procedure was complicated by retroperitoneal perforation. Procedure was terminated. Patient was started on Zosyn. Patient was administered lactated Ringer's at 100 cc an hour and kept n.p.o. Marlee Delay was then transferred to [address] for higher level care on 11/11/21. She underwent initial exploratory surgery, where a bowel perforation was not noted. She was then monitored for elevated LFTs and hyperbilirubinemia. Patient then returned to  for Whipple surgery on 12/4/21 by Dr. Dinh Horn. 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 She tolerated Whipple without complications. She initially had 4 drains present. Currently only one pancreatic drain remains. Incision is healing well, without drainage, erythema, or warmth.   After discharge, she was in Rehab facility in [place], recently discharged . She is currently back at home, her son is here to stay with her for a couple of days. Patient didn't have any medical history except cataract , run of A. fib during recent hospitalization at [facility].  (which was schedule for last Monday and it was cancelled due to patient in ED). Take multivitamin daily.      Patient moved to [facility] 5 years ago to be with one of her sons, who unfortunately  last year. She reported hx of 10 years smoking when she was young with 1/5 pack/day (5 pack years). Quited smoking  In . Denied any alcohol use, receational drug use. Have 3 vaginal birth without any blood transfusion product. Patient reported has colonoscopy done in  with 2 polyps was removed      History on 2022:  The patient presents to the medical oncology clinic for cycle #3 of FOLFOX. She reports that she tolerated FOLFOX very well. She denies having any oral mucositis, no nausea, no vomiting. She denies having any peripheral neuropathy. No diarrhea no abdominal pain. Patient denies having any fevers. No skin rash, no palmar and plantar erythema. She reports good appetite however she lost 5 pounds since last visit.   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 Castellon [facility]    ERCP N/A 11/10/2021    ERCP WITH STENT INSERTION performed by Micah Irene MD at [facility]  2021    exp lap, radical celiac and portal lymph node dissection,pylorus preserving pancreatoduodenectomy-Dr Ewing [facility]    PORT SURGERY Left 2022    SINGLE LUMEN SMARTPORT INSERTION performed by Magdiel Plasencia MD at [facility] History   Problem Relation Age of Onset    Breast Cancer Mother     Colon Cancer Mother     Cancer Father         bladder and lung    Lung Cancer Father     Kidney Disease Brother     No Known Problems Maternal Grandmother     No Known Problems Maternal Grandfather     No Known Problems Paternal Grandmother     No Known Problems Paternal Grandfather       Social History     Tobacco Use    Smoking status: Former Smoker     Years: 10.00     Types: Cigarettes     Quit date: 1972     Years since quittin.1    Smokeless tobacco: Never Used    Tobacco comment: social smoker   Substance Use Topics    Alcohol use: Not Currently      Current Outpatient Medications   Medication Sig Dispense Refill    ferrous sulfate (IRON 325) 325 (65 Fe) MG tablet Take 325 mg by mouth daily (with breakfast)      amLODIPine (NORVASC) 5 MG tablet Take 0.5 tablets by mouth daily 30 tablet 1    lidocaine-prilocaine (EMLA) 2.5-2.5 % cream Apply topically as needed. 30 g 1    amiodarone (CORDARONE) 200 MG tablet Take 1 tablet by mouth daily 30 tablet 1    metoprolol tartrate (LOPRESSOR) 25 MG tablet Take 0.5 tablets by mouth 2 times daily 60 tablet 1    aspirin 81 MG chewable tablet Take 81 mg by mouth daily       docusate sodium (COLACE) 100 MG capsule Take 100 mg by mouth 2 times daily      ondansetron (ZOFRAN) 4 MG tablet Take 4 mg by mouth every 8 hours as needed for Nausea or Vomiting       apixaban (ELIQUIS) 5 MG TABS tablet Take 1 tablet by mouth 2 times daily 180 tablet 1    oxybutynin (DITROPAN) 5 MG tablet Take 5 mg by mouth 3 times daily  (Patient not taking: Reported on 2022)       No current facility-administered medications for this visit.       No Known Allergies   Health Maintenance   Topic Date Due    DTaP/Tdap/Td vaccine (1 - Tdap) Never done    Shingles Vaccine (1 of 2) Never done    DEXA (modify frequency per FRAX score)  Never done    Annual Wellness Visit (AWV)  Never done    Pneumococcal 65+ years Vaccine (2 of 2 - PPSV23) 12/21/2021    Depression Screen  12/28/2022    TSH testing  12/28/2022    Flu vaccine  Completed    COVID-19 Vaccine  Completed    Hepatitis A vaccine  Aged Out    Hepatitis B vaccine  Aged Out    Hib vaccine  Aged Out    Meningococcal (ACWY) vaccine  Aged Out        Subjective:   Review of Systems   Constitutional: Positive for appetite change and fatigue. Negative for activity change and fever. HENT: Negative for congestion, dental problem, facial swelling, hearing loss, mouth sores, nosebleeds, sore throat, tinnitus and trouble swallowing. Eyes: Negative for discharge and visual disturbance. Respiratory: Negative for cough, chest tightness, shortness of breath and wheezing. Cardiovascular: Negative for chest pain, palpitations and leg swelling. Gastrointestinal: Negative for abdominal distention, abdominal pain, blood in stool, constipation, diarrhea, nausea, rectal pain and vomiting. Endocrine: Negative for cold intolerance, polydipsia and polyuria. Genitourinary: Negative for decreased urine volume, difficulty urinating, dysuria, flank pain, hematuria and urgency. Musculoskeletal: Negative for arthralgias, back pain, gait problem, joint swelling, myalgias and neck stiffness. Skin: Negative for color change, rash and wound. Neurological: Negative for dizziness, tremors, seizures, speech difficulty, weakness, light-headedness, numbness and headaches. Hematological: Negative for adenopathy. Does not bruise/bleed easily. Psychiatric/Behavioral: Negative for confusion and sleep disturbance. The patient is not nervous/anxious. Objective:   Physical Exam  Vitals reviewed. Constitutional:       General: She is not in acute distress. Appearance: She is well-developed. HENT:      Head: Normocephalic. Mouth/Throat:      Pharynx: No oropharyngeal exudate. Eyes:      General: No scleral icterus. Right eye: No discharge. Left eye: No discharge. Pupils: Pupils are equal, round, and reactive to light. Neck:      Thyroid: No thyromegaly. Vascular: No JVD. Trachea: No tracheal deviation. Cardiovascular:      Rate and Rhythm: Normal rate. Heart sounds: Normal heart sounds. No murmur heard. No friction rub. No gallop. Pulmonary:      Effort: Pulmonary effort is normal. No respiratory distress. Breath sounds: Normal breath sounds. No stridor. No wheezing or rales. Chest:      Chest wall: No tenderness. Abdominal:      General: Bowel sounds are normal. There is no distension. Palpations: Abdomen is soft. There is no mass. Tenderness: There is no abdominal tenderness. There is no rebound. Musculoskeletal:         General: Normal range of motion. Cervical back: Normal range of motion and neck supple. Comments: Good range of motion in all four extremities. Lymphadenopathy:      Cervical: No cervical adenopathy. Skin:     General: Skin is warm. Findings: No erythema or rash. Neurological:      Mental Status: She is alert and oriented to person, place, and time. Cranial Nerves: No cranial nerve deficit. Motor: No abnormal muscle tone. Deep Tendon Reflexes: Reflexes are normal and symmetric. Psychiatric:         Behavior: Behavior normal.         Thought Content:  Thought content normal.         Judgment: Judgment normal.         BP (!) 112/53 (Site: Left Upper Arm, Position: Sitting, Cuff Size: Medium Adult)   Pulse 55   Temp 97.4 °F (36.3 °C) (Oral)   Resp 16   Ht 5' 5\" (1.651 m)   Wt 143 lb 3.2 oz (65 kg)   SpO2 99%   BMI 23.83 kg/m²      ECOG status is 1    Imaging studies and labs:   US THYROID    Result Date: 1/10/2022  PROCEDURE: US THYROID CLINICAL INFORMATION: Elevated TSH, Elevated serum free T4 level COMPARISON: No prior study. TECHNIQUE: Grayscale and color sonographic imaging of the thyroid gland performed in longitudinal and transverse planes. TECHNICAL DATA: Right Thyroid - 4.14 x 1.24 x 2.07 cm Left Thyroid -5.05 x 1.63 x 1.65 cm Isthmus -0.36 cm FINDINGS: THYROID SIZE: Thyroid gland is normal left thyroid lobe is prominent. NODULES: 1. Mixed solid and cystic nodule 1.2 cm x 0.8 x 0.9 cm mid right thyroid lobe consistent with a tirad 2 lesion this is not considered suspicious. 2. Mixed solid and cystic nodule measuring 1.0 x 0.8 x 0.8 cm mid right thyroid lobe adjacent to the first nodule. Also a TR 2 lesion. 3. 0.6 x 0.5 x 0.5 cm mixed solid and cystic lesion mid left thyroid lobe. T are too lesion. 4. Completely cystic lesion medial left thyroid lobe 0.9 x 0.6 x 0.7 cm considered a benign lesion. TR 1 lesion. PARENCHYMAL ECHOGENICITY/VASCULARITY: Normal. MICROLITHIASIS: None. CERVICAL LYMPHADENOPATHY: 1. There are no pathologically enlarged lymph nodes adjacent to the thyroid gland. 1. Mild prominence the left thyroid lobe. Multiple mixed solid and cystic lesions which are not suspicious and do not require FNA. Consider follow-up in one to 2 years **This report has been created using voice recognition software. It may contain minor errors which are inherent in voice recognition technology. ** Final report electronically signed by Dr. Lonnie Deluna on 1/10/2022 7:38 AM          CBC:   No results for input(s): WBC, HGB, HCT, MCV, PLT in the last 72 hours. BMP:  No results for input(s): NA, K, CL, CO2, PHOS, BUN, CREATININE, CA, GLUCOSE in the last 72 hours. Assessment:  1. Stage III B periampullary adenocarcinoma, pancreaticobiliary type. Patient is s/p Whipple procedure on December 4, 2021. Leela-ampullary cancers represent a group of malignancies distinct from those arising from other hepato-biliary structures.  These cancers are pathologically adenocarcinomas, and in an  estimated 80% of cases are amenable to surgical [id number], 26. However this treatment regimen is often augmented by the use of adjuvant chemotherapy, especially amongst those with advanced disease. The pooled overall 5 year survival for alisson-ampullary cancers is between 30 to 50%. Treatment decisions are generally extrapolated from pancreatic chemotherapy protocols and consist mainly of traditional chemotherapy drugs. I discussed with the patient her potential treatment options. They include FOLFIRINOX, FOLFOX or Gemzar and Abraxane. I do not think that due to her age the patient will be able to tolerate FOLFIRINOX since this is a very aggressive regimen. I had a lengthy discussion with the patient and her daughter in law. I want to be sure that while the patient is going through chemotherapy treatment she has good social and family support. Patient had follow-up with the surgeon at [facility], last drain was removed and the patient received clearance for surgery. In preparation for chemotherapy the patient underwent successful and uncomplicated Mediport placement. She started FOLFOX on January 26, 2022.  2.  Adjuvant chemotherapy with FOLFOX. Patient tolerated last cycle well. She denies having any side effects, specifically no nausea no vomiting no oral mucositis. Patient denies having any diarrhea no abdominal pain. She denies having any skin rash no hand-and-foot erythema. No fevers. She reports gradually worsening fatigue. Today, her vital signs are stable, her labs are within range allowing for treatment. We will proceed with cycle #3 of FOLFOX. The dose of 5-FU bolus and leucovorin is reduced from 400 mg/m² to 200 mg/m² due to mild leukopenia. Oxaliplatin is reduced from 85 mg/m²  to 70 mg/m² due to mild neutropenia. 5-FU continuous infusion is reduced from 2400 mg/m² to 2000 mg/m² for mild neutropenia         Diagnosis Orders   1.  Encounter for chemotherapy management 2. Malignant neoplasm of pancreas, unspecified location of malignancy ([facility] [address]     3. History of Whipple procedure          Plan:   Return in about 2 weeks (around 3/9/2022). Orders Placed:   No orders of the defined types were placed in this encounter. Medications Prescribed:   No orders of the defined types were placed in this encounter. Discussed use, benefit, and side effectsof prescribed medications. All patient questions answered. Pt voiced understanding. Instructed to continue current medications, diet and exercise. Patient agreed with treatment plan. Follow up as directed.

## 2022-02-23 NOTE — PROGRESS NOTES
Chemotherapy Dose Adjustment Documentation    Day 1 of Cycle 3    Deviation from validated regimen: Decreased oxaliplatin 70 mg/m2  Leucovorin 200 mg/m2  5- mg/mg & 2000 mg/m2    Reason for deviation: Leukopenia and neutropenia     Summary of any verbal, telephone, or guideline information obtained: Dr. Amado Krishnan called to discuss and placed in her appt note    Validated on 2/23/2022 by Varghese Guerrero PharmD, BCPS 2/23/2022 11:25 AM

## 2022-02-23 NOTE — PATIENT INSTRUCTIONS
1. Proceed with cycle #3 of FOLFOX. The dose of 5-FU bolus and leucovorin is reduced from 400 mg/m² to 200 mg/m² due to mild leukopenia  2. Oxaliplatin is reduced from 85 mg/m² percent 70 mg/m² due to mild neutropenia  3.  5-FU continuous infusion is reduced from 2400 mg/m² to 2000 mg/m² for mild neutropenia  4.   RTC to see me for labs: CBC, BMP, LFTs and cycle #4 of FOLFOX in 2 weeks

## 2022-02-23 NOTE — PROGRESS NOTES
Patient returned to the unit at this time from office appointment, accompanied by Dr. Stacey Curry, per self.

## 2022-02-23 NOTE — PROGRESS NOTES
Patient assessed for the following post chemotherapy:    Dizziness   No  Lightheadedness  No      Acute nausea/vomiting No  Headache   No  Chest pain/pressure  No  Rash/itching   No  Shortness of breath  No    Patient kept for 20 minutes observation post infusion chemotherapy. Patient tolerated chemotherapy treatment Oxaliplatin/Leucovorin/5FU and CADD without any complications. Last vital signs:   BP (!) 112/53   Pulse 55   Temp 97.4 °F (36.3 °C) (Oral)   Resp 16   Ht 5' 5\" (1.651 m)   Wt 143 lb 3.2 oz (65 kg)   SpO2 99%   BMI 23.83 kg/m²     Physician instructed patient:  1. Proceed with cycle #3 of FOLFOX. The dose of 5-FU bolus and leucovorin is reduced from 400 mg/m² to 200 mg/m² due to mild leukopenia  2. Oxaliplatin is reduced from 85 mg/m² percent 70 mg/m² due to mild neutropenia  3.  5-FU continuous infusion is reduced from 2400 mg/m² to 2000 mg/m² for mild neutropenia  4. RTC to see me for labs: CBC, BMP, LFTs and cycle #4 of FOLFOX in 2 weeks    Patient instructed if experience any of the above symptoms following today's infusion, she is to notify MD immediately or go to the emergency department. Discharge instructions given to patient. Verbalizes understanding. Ambulated off unit per self, accompanied by family, with belongings and CADD infusing at 5.4ml/hr.

## 2022-02-23 NOTE — PROGRESS NOTES
Lab draw completed from [address] [facility] [facility] on arrival to unit. Patient off the unit at this time to office appointment, accompanied by office staff, per self.

## 2022-02-23 NOTE — PLAN OF CARE
Problem: Musculor/Skeletal Functional Status  Goal: Absence of falls  Note: Patient free from falls while in [facility].P. Oncology. Intervention: Fall precautions  Note: Patient assessed for fall risk [facility]. Fall band placed on patient. Discussed the need to use the call light for assistance prior to getting up out of chair/bed. Problem: Intellectual/Education/Knowledge Deficit  Goal: Teaching initiated upon admission  Note: Patient verbalizes understanding to verbal information given on Oxaliplatin/Leucovorin/5FU ,action and possible side effects. Aware to call MD if develop complications. Intervention: Verbal/written education provided  Note: Chemotherapy Teaching     What is Chemotherapy   Drug action [x]   Method of Administration [x]   Handouts given []     Side Effects  Nausea/vomiting [x]   Diarrhea [x]   Fatigue [x]   Signs / Symptoms of infection [x]   Neutropenia [x]   Thrombocytopenia [x]   Alopecia [x]   neuropathy [x]   [place] diet &  the importance of fluids [x]       Micellaneous  Importance of nutrition [x]   Importance of oral hygiene [x]   When to call the MD [x]   Monitoring labs [x]   Use of supportive services []     Explanation of Drug Regimen / Frequency  Oxaliplatin/Leucovorin/5FU:  [id number]     Comments  Verbalized understanding to drug,action,side effects and when to call MD         Problem: Discharge Planning  Goal: Knowledge of discharge instructions  Description: Knowledge of discharge instructions  Note: Verbalized understanding of discharge instructions, follow-up appointments, and when to call the physician. Intervention: Interaction with patient/family and care team  Note: Discuss understanding of discharge instructions,follow-up appointments, and when to call the physician.       Problem: Infection - Central Venous Catheter-Associated Bloodstream Infection:  Goal: Will show no infection signs and symptoms  Description: Will show no infection signs and symptoms  Note: Mediport site with no redness or warmth. Skin over port site intact with no signs of breakdown noted. Patient verbalizes signs/symptoms of port infection and when to notify the physician. Intervention: Central line needs assessment  Description: Central line needs assessment  Note: Discuss port maintenance, infection prevention, signs and when to call Dr Colten Harper reviewed with patient and family. Patient and family verbalize understanding of the plan of care and contribute to goal setting.

## 2022-02-23 NOTE — ONCOLOGY

## 2022-02-25 ENCOUNTER — HOSPITAL ENCOUNTER (OUTPATIENT)
Dept: INFUSION THERAPY | Age: 82
Discharge: HOME | End: 2022-02-25
Payer: MEDICARE

## 2022-02-25 VITALS
HEART RATE: 55 BPM | TEMPERATURE: 97.6 F | DIASTOLIC BLOOD PRESSURE: 31 MMHG | OXYGEN SATURATION: 99 % | SYSTOLIC BLOOD PRESSURE: 131 MMHG | RESPIRATION RATE: 18 BRPM

## 2022-02-25 DIAGNOSIS — C25.9 MALIGNANT NEOPLASM OF PANCREAS, UNSPECIFIED LOCATION OF MALIGNANCY (HCC): Primary | ICD-10-CM

## 2022-02-25 PROCEDURE — 6360000002 HC RX W HCPCS: Performed by: INTERNAL MEDICINE

## 2022-02-25 PROCEDURE — 2580000003 HC RX 258: Performed by: INTERNAL MEDICINE

## 2022-02-25 RX ORDER — SODIUM CHLORIDE 0.9 % (FLUSH) 0.9 %
5-40 SYRINGE (ML) INJECTION PRN
Status: DISCONTINUED | OUTPATIENT
Start: 2022-02-25 | End: 2022-02-26 | Stop reason: HOSPADM

## 2022-02-25 RX ORDER — SODIUM CHLORIDE 9 MG/ML
25 INJECTION, SOLUTION INTRAVENOUS PRN
Status: CANCELLED | OUTPATIENT
Start: 2022-02-25

## 2022-02-25 RX ORDER — HEPARIN SODIUM (PORCINE) LOCK FLUSH IV SOLN 100 UNIT/ML 100 UNIT/ML
500 SOLUTION INTRAVENOUS PRN
Status: DISCONTINUED | OUTPATIENT
Start: 2022-02-25 | End: 2022-02-26 | Stop reason: HOSPADM

## 2022-02-25 RX ORDER — SODIUM CHLORIDE 0.9 % (FLUSH) 0.9 %
5-40 SYRINGE (ML) INJECTION PRN
Status: CANCELLED | OUTPATIENT
Start: 2022-02-25

## 2022-02-25 RX ORDER — HEPARIN SODIUM (PORCINE) LOCK FLUSH IV SOLN 100 UNIT/ML 100 UNIT/ML
500 SOLUTION INTRAVENOUS PRN
Status: CANCELLED | OUTPATIENT
Start: 2022-02-25

## 2022-02-25 RX ADMIN — Medication 500 UNITS: at 13:38

## 2022-02-25 RX ADMIN — SODIUM CHLORIDE, PRESERVATIVE FREE 10 ML: 5 INJECTION INTRAVENOUS at 13:38

## 2022-02-25 NOTE — PLAN OF CARE
Care plan reviewed with patient. Patient  verbalized understanding of the plan of care and contribute to goal setting. Problem: Intellectual/Education/Knowledge Deficit  Goal: Teaching initiated upon admission  Outcome: Met This Shift  Note: Patient verbalizes understanding to verbal information given on 5 FU,action and possible side effects. Aware to call MD if develop complications. Intervention: Verbal/written education provided  Note: Chemotherapy Teaching     What is Chemotherapy   Drug action [x]   Method of Administration [x]   Handouts given []     Side Effects  Nausea/vomiting [x]   Diarrhea [x]   Fatigue [x]   Signs / Symptoms of infection [x]   Neutropenia [x]   Thrombocytopenia [x]   Alopecia [x]   neuropathy [x]   [place] diet &  the importance of fluids [x]       Micellaneous  Importance of nutrition [x]   Importance of oral hygiene [x]   When to call the MD [x]   Monitoring labs [x]   Use of supportive services []     Explanation of Drug Regimen / Frequency  5 FU     Comments  Verbalized understanding to drug,action,side effects and when to call MD         Problem: Discharge Planning  Goal: Knowledge of discharge instructions  Description: Knowledge of discharge instructions  Outcome: Met This Shift  Note: Verbalized understanding of discharge instructions, follow ups and when to call doctor   Intervention: Discharge to appropriate level of care  Note: Discuss discharge instructions, follow ups and when to call doctor. Problem: Falls - Risk of:  Goal: Will remain free from falls  Description: Will remain free from falls  Outcome: Met This Shift  Note: Free from falls while in infusion center.  Verbalized understanding of fall prevention and to ask for assistance with ambulation   Intervention: Assess risk factors for falls  Description: Assess risk factors for falls  Note: Assess for fall risk, instruct to ask for assistance with ambulation      Problem: Infection - Central Venous Catheter-Associated Bloodstream Infection:  Goal: Will show no infection signs and symptoms  Description: Will show no infection signs and symptoms  Outcome: Met This Shift  Note: Mediport site with no redness or warmth. Skin over port intact with no signs of breakdown noted. Patient verbalizes signs/symptoms of port infection and when to notify the physician.     Intervention: Infection risk assessment  Description: Infection risk assessment  Note: Discuss port maintenance, infection prevention, signs and when to call

## 2022-03-07 ENCOUNTER — OFFICE VISIT (OUTPATIENT)
Dept: FAMILY MEDICINE CLINIC | Age: 82
End: 2022-03-07
Payer: MEDICARE

## 2022-03-07 VITALS
SYSTOLIC BLOOD PRESSURE: 120 MMHG | TEMPERATURE: 96.9 F | WEIGHT: 142.6 LBS | HEIGHT: 65 IN | HEART RATE: 48 BPM | RESPIRATION RATE: 18 BRPM | DIASTOLIC BLOOD PRESSURE: 74 MMHG | BODY MASS INDEX: 23.76 KG/M2 | OXYGEN SATURATION: 99 %

## 2022-03-07 DIAGNOSIS — I48.91 ATRIAL FIBRILLATION, UNSPECIFIED TYPE (HCC): ICD-10-CM

## 2022-03-07 DIAGNOSIS — E04.1 THYROID NODULE: ICD-10-CM

## 2022-03-07 DIAGNOSIS — D64.9 NORMOCYTIC ANEMIA: ICD-10-CM

## 2022-03-07 DIAGNOSIS — C25.9 MALIGNANT NEOPLASM OF PANCREAS, UNSPECIFIED LOCATION OF MALIGNANCY (HCC): Primary | ICD-10-CM

## 2022-03-07 DIAGNOSIS — I10 PRIMARY HYPERTENSION: ICD-10-CM

## 2022-03-07 PROCEDURE — 99214 OFFICE O/P EST MOD 30 MIN: CPT | Performed by: STUDENT IN AN ORGANIZED HEALTH CARE EDUCATION/TRAINING PROGRAM

## 2022-03-07 RX ORDER — ONDANSETRON 4 MG/1
4 TABLET, FILM COATED ORAL EVERY 8 HOURS PRN
Qty: 10 TABLET | Refills: 0 | Status: SHIPPED | OUTPATIENT
Start: 2022-03-07 | End: 2022-03-07

## 2022-03-07 ASSESSMENT — ENCOUNTER SYMPTOMS
DIARRHEA: 0
BACK PAIN: 0
COUGH: 0
NAUSEA: 0
ABDOMINAL PAIN: 0
WHEEZING: 0
SHORTNESS OF BREATH: 0
VOMITING: 0
CONSTIPATION: 0

## 2022-03-07 NOTE — PROGRESS NOTES
S: [de-identified] y.o. female with   Chief Complaint   Patient presents with    1 Month Follow-Up     Pleasant patient with h/o pancreatic CA s/p whipple -- following with oncology. Doing well except for Some fatigue for weeks. 4/12 sessions done for chemo. Some weight loss noted. HTN, Afib with RVR. On amiodarone + BB + amlodipine. Previously not on medication. Wt Readings from Last 10 Encounters:   03/07/22 142 lb 9.6 oz (64.7 kg)   02/23/22 143 lb 3.2 oz (65 kg)   02/23/22 143 lb 3.2 oz (65 kg)   02/09/22 145 lb 9.6 oz (66 kg)   02/09/22 145 lb 9.6 oz (66 kg)   01/28/22 154 lb 6.4 oz (70 kg)   01/26/22 151 lb (68.5 kg)   01/26/22 151 lb (68.5 kg)   01/25/22 148 lb 9.6 oz (67.4 kg)   01/20/22 150 lb 1.6 oz (68.1 kg)       BP Readings from Last 10 Encounters:   03/07/22 120/74   02/25/22 (!) 131/31   02/23/22 (!) 160/74   02/23/22 (!) 112/53   02/11/22 (!) 155/68   02/09/22 (!) 114/56   02/09/22 (!) 148/72   01/28/22 133/65   01/28/22 122/70   01/26/22 127/60     Lab Results   Component Value Date    TSH 5.560 (H) 12/28/2021         O: VS:   Vitals:    03/07/22 1000   BP: 120/74   Site: Left Upper Arm   Position: Sitting   Cuff Size: Medium Adult   Pulse: (!) 48   Resp: 18   Temp: 96.9 °F (36.1 °C)   TempSrc: Skin   SpO2: 99%   Weight: 142 lb 9.6 oz (64.7 kg)   Height: 5' 5\" (1.651 m)     Body mass index is 23.73 kg/m².     AAO/NAD, appropriate affect for mood  Normocephalic, atraumatic, eyes - conjunctiva and sclera normal,   skin no rashes on exposed areas   Insight, judgement normal and in no acute distress    Lab Results   Component Value Date    WBC 3.9 (L) 02/23/2022    HGB 11.1 (L) 02/23/2022    HCT 36.0 (L) 02/23/2022     02/23/2022    AST 19 02/23/2022     02/23/2022    K 4.0 02/23/2022     12/28/2021    CREATININE 1.5 (H) 02/23/2022    BUN 17 12/28/2021    CO2 24 12/28/2021    TSH 5.560 (H) 12/28/2021    INR 1.04 11/09/2021    LABA1C 5.7 11/10/2021    LABGLOM 36 (A) 12/28/2021    CALCIUM 9.3 12/28/2021       No results found. Diagnosis Orders   1. Malignant neoplasm of pancreas, unspecified location of malignancy ([facility] [address]     2. Primary hypertension     3. [facility]     4. Thyroid nodule     5. Normocytic anemia         Plan    First time noted below 50. Besides some fatigue which has been ongoing, no new symptoms. Also under stress with chemotherapy. -- monitor closely, patient may check at home as well if able  -- she will be closely monitored with chemo treatments as well. -- consider removing beta blocker (improve rate and BP) vs removing amiodarone (improve rate but not affect BP too much). Trial off Beta-blockade  -- thyroid follow up as well with amiodarone on board. Return in about 4 weeks (around 4/4/2022). Orders Placed:  No orders of the defined types were placed in this encounter. Medications Prescribed:  Orders Placed This Encounter   Medications    DISCONTD: ondansetron (ZOFRAN) 4 MG tablet     Sig: Take 1 tablet by mouth every 8 hours as needed for Nausea or Vomiting     Dispense:  10 tablet     Refill:  0       Future Appointments   Date Time Provider Jeanne Finch   3/9/2022  9:15 AM STR OUT PT ONC INJ RM 02 STRZ OP ONC [place] HOD   3/9/2022  9:40 AM Fanta Ellsworth MD  Oncology [place] [facility] [place]   4/27/2022  1:00 PM Irina Norman MD [address] [facility] [place] Maintenance Due   Topic Date Due    DTaP/Tdap/Td vaccine (1 - Tdap) Never done    Shingles Vaccine (1 of 2) Never done    DEXA (modify frequency per FRAX score)  Never done    Annual Wellness Visit (AWV)  Never done    Pneumococcal 65+ years Vaccine (2 of 2 - PPSV23) 12/21/2021         Attending Physician Statement  I have discussed the case, including pertinent history and exam findings with the resident. I also have seen the patient and performed key portions of the examination. I agree with the documented assessment and plan as documented by the resident.   GE modifier added to this encounter      Norbert Clark MD 3/7/2022 11:44 AM

## 2022-03-07 NOTE — PATIENT INSTRUCTIONS
Stop Metoprolol  Check with Dr. Brianne Cordova office to see if Thyroid labs can be drawn on Wednesday  If not, please go to lab to have this done

## 2022-03-07 NOTE — PROGRESS NOTES
Ceci Green is a [de-identified] y.o. female who presents today for:  Chief Complaint   Patient presents with    1 Month Follow-Up         HPI:   Ceci Green is [de-identified] y.o. who presents today for 1 month follow-up. She has completed therapy with home health. Living on her own currently and doing well. Driving again without problems. Pancreatic Cancer: Had chemo port placed 1/25/22 with Dr. Yanique Woodson. Following with Dr. Carmelita Murphy for [address] for a course 48 hours q2 weeks - total 12 rounds. Saw Dr. LANGSTON [facility] at  last month - will follow-up PRN from here. He recommended discontinuing Pantoprazole and she is doing well with this. BP: Taking Norvasc 2.5 mg and Lopressor daily. Not checking BP at home currently as home cuff is not accurate. Patient reports only occasional lightheadedness when standing up too quickly, maybe once a week. No side effects with medications. Leg swelling R>L: unchanged from prior. Reports this is worse after standing most of the day. No change since decrease amlodipine last visit. A-fib: Taking Amiodarone and Eliquis daily. No concerns with these medications. Reviewed outside records from , showing Stress Test with perfusion imaging - no ischemic findings, LVEF >65%. Thyroid US: Several nodules, recommend follow-up in 1 year. Thyroid antibody studies negative. No symptoms currently. Repeat thyroid studies to be completed. Anemia: Hgb 11.1 on most recent labs - improving since starting iron supplementation. No increased bleeding. Dr. Carmelita Murphy following.       Objective:     Vitals:    03/07/22 1000   BP: 120/74   Site: Left Upper Arm   Position: Sitting   Cuff Size: Medium Adult   Pulse: (!) 48   Resp: 18   Temp: 96.9 °F (36.1 °C)   TempSrc: Skin   SpO2: 99%   Weight: 142 lb 9.6 oz (64.7 kg)   Height: 5' 5\" (1.651 m)       Wt Readings from Last 3 Encounters:   03/07/22 142 lb 9.6 oz (64.7 kg)   02/23/22 143 lb 3.2 oz (65 kg)   02/23/22 143 lb 3.2 oz (65 kg) BP Readings from Last 3 Encounters:   03/07/22 120/74   02/25/22 (!) 131/31   02/23/22 (!) 160/74       Lab Results   Component Value Date    WBC 3.9 (L) 02/23/2022    HGB 11.1 (L) 02/23/2022    HCT 36.0 (L) 02/23/2022    MCV 99 02/23/2022     02/23/2022     Lab Results   Component Value Date     02/23/2022    K 4.0 02/23/2022     12/28/2021    CO2 24 12/28/2021    BUN 17 12/28/2021    CREATININE 1.5 (H) 02/23/2022    GLUCOSE 121 (H) 12/28/2021    CALCIUM 9.3 12/28/2021    PROT 6.4 02/23/2022    LABALBU 3.6 02/23/2022    BILITOT 0.9 02/23/2022    ALKPHOS 141 (H) 02/23/2022    AST 19 02/23/2022    ALT 39 02/23/2022    LABGLOM 36 (A) 12/28/2021     Lab Results   Component Value Date    TSH 5.560 (H) 12/28/2021    T4FREE 1.88 (H) 12/28/2021     Lab Results   Component Value Date    LABA1C 5.7 11/10/2021     No results found for: EAG  No results found for: CHOL  No results found for: TRIG  No results found for: HDL  No results found for: LDLCHOLESTEROL, LDLCALC    No results found for: LABMICR, [id number]    Review of Systems   Constitutional: Positive for fatigue (improving). Negative for activity change, chills and fever. HENT: Negative for congestion. Eyes: Negative for visual disturbance. Respiratory: Negative for cough, shortness of breath and wheezing. Cardiovascular: Positive for leg swelling (R>L). Negative for chest pain and palpitations. Gastrointestinal: Negative for abdominal pain, constipation, diarrhea, nausea and vomiting. Genitourinary: Negative for dysuria. Musculoskeletal: Negative for back pain. Neurological: Positive for light-headedness (occasional). Negative for dizziness, syncope and headaches. Psychiatric/Behavioral: Negative for dysphoric mood. The patient is not nervous/anxious. Physical Exam  Vitals and nursing note reviewed. Constitutional:       Appearance: Normal appearance. She is normal weight.    HENT:      Head: Normocephalic and atraumatic. Nose: Nose normal.      Mouth/Throat:      Mouth: Mucous membranes are moist.      Pharynx: No oropharyngeal exudate or posterior oropharyngeal erythema. Eyes:      Extraocular Movements: Extraocular movements intact. Conjunctiva/sclera: Conjunctivae normal.      Pupils: Pupils are equal, round, and reactive to light. Cardiovascular:      Rate and Rhythm: Regular rhythm. Bradycardia present. Pulses: Normal pulses. Heart sounds: Murmur (8-6/9 systolic) heard. Pulmonary:      Effort: Pulmonary effort is normal. No respiratory distress. Breath sounds: Normal breath sounds. No wheezing. Abdominal:      General: Abdomen is flat. Bowel sounds are normal. There is no distension. Palpations: Abdomen is soft. There is no mass. Tenderness: There is no abdominal tenderness. Comments: Prior abdominal incisions healing well without signs of infection. No drainage noted. Musculoskeletal:      Cervical back: Neck supple. Right lower leg: Edema (1+ pitting to mid-tibia) present. Left lower leg: Edema (trace pitting to mid-tibia) present. Skin:     General: Skin is warm and dry. Coloration: Skin is not jaundiced or pale. Findings: No bruising or lesion. Neurological:      General: No focal deficit present. Mental Status: She is alert and oriented to person, place, and time.    Psychiatric:         Mood and Affect: Mood normal.         Behavior: Behavior normal.         Immunization History   Administered Date(s) Administered    COVID-19, Reyes Meiers, Primary or Immunocompromised, PF, 100mcg/0.5mL 02/05/2021, 03/05/2021, 01/19/2022    Influenza, High Dose (Fluzone 65 yrs and older) 12/01/2017, 11/12/2018, 11/08/2019    Influenza, High-dose, Quadv, 65 yrs +, IM (Fluzone) 10/27/2020, 10/15/2021    Pneumococcal Conjugate 13-valent (Katherine Crape) 12/21/2020       Health Maintenance Due   Topic Date Due    DTaP/Tdap/Td vaccine (1 - Tdap) Never done    Shingles Vaccine (1 of 2) Never done    DEXA (modify frequency per FRAX score)  Never done    Annual Wellness Visit (AWV)  Never done    Pneumococcal 65+ years Vaccine (2 of 2 - PPSV23) 12/21/2021          Food Insecurity: No Food Insecurity    Worried About Running Out of Food in the Last Year: Never true    Roberto of Food in the Last Year: Never true       Assessment / Plan:   1. Malignant neoplasm of pancreas, unspecified location of malignancy ([facility] [address]  - Follow up with Dr. Siva Dyer as scheduled for Chemotherapy management    2. Primary hypertension  BP well controlled today, having occasional episodes of lightheadedness  - Continue Norvasc to 2.5 mg daily  - Trial discontinuation of Lopressor due to bradycardia, monitor HR and follow-up next visit    3. Atrial fibrillation, unspecified type (HCC)  HR regular today, sinus bradycardia noted with HR 48  - Continue Amiodarone, and Eliquis  - Discontinue Lopressor due to bradycardia  - Nuclear Stress test reviewed from [facility] - showed no notable ischemia, LVEF 65%    4. Thyroid nodule  Noted on US, recommend follow-up in 1 year. TSH and T4 both elevated on prior labs, thyroid antibodies negative  - Will recheck thyroid labs with next scheduled blood work  - TSH; Future  - T4, Free; Future    5. Normocytic anemia, improving  Hgb stable at 11.1, iron studies show low iron, elevated Ferritin. - Continue iron supplementation  - Follow-up with Dr. Siva Dyer as scheduled       Return in about 4 weeks (around 4/4/2022).       Medications Prescribed:  Orders Placed This Encounter   Medications    DISCONTD: ondansetron (ZOFRAN) 4 MG tablet     Sig: Take 1 tablet by mouth every 8 hours as needed for Nausea or Vomiting     Dispense:  10 tablet     Refill:  0       Future Appointments   Date Time Provider Jeanne Finch   3/9/2022  9:15 AM STR OUT PT ONC INJ RM 02 STRZ OP ONC [place] HOD   3/9/2022  9:40 AM Nura Hirsch MD N Oncology [facility] AM [address] [address]   4/27/2022  1:00 PM Gabino Jackson [employer]  [employer]       Patient given educational materials - see patient instructions. Discussed use, benefit, and sideeffects of prescribed medications. All patient questions answered. Pt voiced understanding. Reviewed health maintenance. Instructed to continue current medications, diet and exercise. Patient agreed with treatment plan. Follow up as directed.      Electronically signed by Vida Burger MD on 3/7/2022 at 11:58 AM

## 2022-03-09 ENCOUNTER — OFFICE VISIT (OUTPATIENT)
Dept: ONCOLOGY | Age: 82
End: 2022-03-09
Payer: MEDICARE

## 2022-03-09 ENCOUNTER — HOSPITAL ENCOUNTER (OUTPATIENT)
Dept: INFUSION THERAPY | Age: 82
Discharge: HOME | End: 2022-03-09
Payer: MEDICARE

## 2022-03-09 VITALS
HEART RATE: 65 BPM | RESPIRATION RATE: 16 BRPM | WEIGHT: 143.6 LBS | HEIGHT: 65 IN | OXYGEN SATURATION: 99 % | SYSTOLIC BLOOD PRESSURE: 160 MMHG | BODY MASS INDEX: 23.93 KG/M2 | TEMPERATURE: 97.8 F | DIASTOLIC BLOOD PRESSURE: 70 MMHG

## 2022-03-09 VITALS
HEIGHT: 65 IN | TEMPERATURE: 98.1 F | DIASTOLIC BLOOD PRESSURE: 60 MMHG | OXYGEN SATURATION: 99 % | BODY MASS INDEX: 23.93 KG/M2 | RESPIRATION RATE: 16 BRPM | WEIGHT: 143.6 LBS | SYSTOLIC BLOOD PRESSURE: 141 MMHG | HEART RATE: 66 BPM

## 2022-03-09 DIAGNOSIS — Z90.49 HISTORY OF WHIPPLE PROCEDURE: ICD-10-CM

## 2022-03-09 DIAGNOSIS — Z51.11 ENCOUNTER FOR CHEMOTHERAPY MANAGEMENT: ICD-10-CM

## 2022-03-09 DIAGNOSIS — C25.9 MALIGNANT NEOPLASM OF PANCREAS, UNSPECIFIED LOCATION OF MALIGNANCY (HCC): Primary | ICD-10-CM

## 2022-03-09 DIAGNOSIS — Z90.410 HISTORY OF WHIPPLE PROCEDURE: ICD-10-CM

## 2022-03-09 LAB
ABSOLUTE IMMATURE GRANULOCYTE: 0.01 THOU/MM3 (ref 0–0.07)
ALBUMIN SERPL-MCNC: 3.4 G/DL (ref 3.5–5.1)
ALP BLD-CCNC: 145 U/L (ref 38–126)
ALT SERPL-CCNC: 59 U/L (ref 11–66)
AST SERPL-CCNC: 55 U/L (ref 5–40)
BASINOPHIL, AUTOMATED: 1 % (ref 0–3)
BASOPHILS ABSOLUTE: 0.1 THOU/MM3 (ref 0–0.1)
BILIRUB SERPL-MCNC: 1.2 MG/DL (ref 0.3–1.2)
BILIRUBIN DIRECT: 0.4 MG/DL (ref 0–0.3)
BUN, WHOLE BLOOD: 14 MG/DL (ref 8–26)
CHLORIDE, WHOLE BLOOD: 108 MEQ/L (ref 98–109)
CREATININE, WHOLE BLOOD: 1.3 MG/DL (ref 0.5–1.2)
EOSINOPHILS ABSOLUTE: 0 THOU/MM3 (ref 0–0.4)
EOSINOPHILS RELATIVE PERCENT: 0 % (ref 0–4)
GFR, ESTIMATED: 42 ML/MIN/1.73M2
GLUCOSE, WHOLE BLOOD: 147 MG/DL (ref 70–108)
HCT VFR BLD CALC: 36.3 % (ref 37–47)
HEMOGLOBIN: 11.3 GM/DL (ref 12–16)
IMMATURE GRANULOCYTES: 0 %
IONIZED CALCIUM, WHOLE BLOOD: 1.14 MMOL/L (ref 1.12–1.32)
LYMPHOCYTES # BLD: 57 % (ref 15–47)
LYMPHOCYTES ABSOLUTE: 2.5 THOU/MM3 (ref 1–4.8)
MCH RBC QN AUTO: 30.4 PG (ref 26–33)
MCHC RBC AUTO-ENTMCNC: 31.1 GM/DL (ref 32.2–35.5)
MCV RBC AUTO: 98 FL (ref 81–99)
MONOCYTES ABSOLUTE: 0.6 THOU/MM3 (ref 0.4–1.3)
MONOCYTES: 13 % (ref 0–12)
PDW BLD-RTO: 16.9 % (ref 11.5–14.5)
PLATELET # BLD: 145 THOU/MM3 (ref 130–400)
PMV BLD AUTO: 9.4 FL (ref 9.4–12.4)
POTASSIUM, WHOLE BLOOD: 3.7 MEQ/L (ref 3.5–4.9)
RBC # BLD: 3.72 MILL/MM3 (ref 4.2–5.4)
SEG NEUTROPHILS: 28 % (ref 43–75)
SEGMENTED NEUTROPHILS ABSOLUTE COUNT: 1.2 THOU/MM3 (ref 1.8–7.7)
SODIUM, WHOLE BLOOD: 144 MEQ/L (ref 138–146)
TOTAL CO2, WHOLE BLOOD: 24 MEQ/L (ref 23–33)
TOTAL PROTEIN: 6 G/DL (ref 6.1–8)
WBC # BLD: 4.5 THOU/MM3 (ref 4.8–10.8)

## 2022-03-09 PROCEDURE — 99214 OFFICE O/P EST MOD 30 MIN: CPT | Performed by: INTERNAL MEDICINE

## 2022-03-09 PROCEDURE — 36591 DRAW BLOOD OFF VENOUS DEVICE: CPT

## 2022-03-09 PROCEDURE — 96411 CHEMO IV PUSH ADDL DRUG: CPT

## 2022-03-09 PROCEDURE — 96375 TX/PRO/DX INJ NEW DRUG ADDON: CPT

## 2022-03-09 PROCEDURE — 96416 CHEMO PROLONG INFUSE W/PUMP: CPT

## 2022-03-09 PROCEDURE — 96413 CHEMO IV INFUSION 1 HR: CPT

## 2022-03-09 PROCEDURE — 96367 TX/PROPH/DG ADDL SEQ IV INF: CPT

## 2022-03-09 PROCEDURE — 99211 OFF/OP EST MAY X REQ PHY/QHP: CPT

## 2022-03-09 PROCEDURE — 96368 THER/DIAG CONCURRENT INF: CPT

## 2022-03-09 PROCEDURE — 2580000003 HC RX 258: Performed by: INTERNAL MEDICINE

## 2022-03-09 PROCEDURE — 6360000002 HC RX W HCPCS: Performed by: INTERNAL MEDICINE

## 2022-03-09 PROCEDURE — 80047 BASIC METABLC PNL IONIZED CA: CPT

## 2022-03-09 PROCEDURE — 80076 HEPATIC FUNCTION PANEL: CPT

## 2022-03-09 PROCEDURE — 96415 CHEMO IV INFUSION ADDL HR: CPT

## 2022-03-09 PROCEDURE — 85025 COMPLETE CBC W/AUTO DIFF WBC: CPT

## 2022-03-09 RX ORDER — SODIUM CHLORIDE 9 MG/ML
25 INJECTION, SOLUTION INTRAVENOUS PRN
Status: CANCELLED | OUTPATIENT
Start: 2022-03-09

## 2022-03-09 RX ORDER — SODIUM CHLORIDE 0.9 % (FLUSH) 0.9 %
5-40 SYRINGE (ML) INJECTION PRN
Status: CANCELLED | OUTPATIENT
Start: 2022-03-09

## 2022-03-09 RX ORDER — DIPHENHYDRAMINE HYDROCHLORIDE 50 MG/ML
50 INJECTION INTRAMUSCULAR; INTRAVENOUS
Status: CANCELLED | OUTPATIENT
Start: 2022-03-09

## 2022-03-09 RX ORDER — DEXTROSE MONOHYDRATE 50 MG/ML
INJECTION, SOLUTION INTRAVENOUS ONCE
Status: CANCELLED | OUTPATIENT
Start: 2022-03-09 | End: 2022-03-09

## 2022-03-09 RX ORDER — SODIUM CHLORIDE 9 MG/ML
INJECTION, SOLUTION INTRAVENOUS ONCE
Status: CANCELLED | OUTPATIENT
Start: 2022-03-09 | End: 2022-03-09

## 2022-03-09 RX ORDER — DEXTROSE MONOHYDRATE 50 MG/ML
INJECTION, SOLUTION INTRAVENOUS ONCE
Status: COMPLETED | OUTPATIENT
Start: 2022-03-09 | End: 2022-03-09

## 2022-03-09 RX ORDER — ONDANSETRON 2 MG/ML
8 INJECTION INTRAMUSCULAR; INTRAVENOUS
Status: CANCELLED | OUTPATIENT
Start: 2022-03-09

## 2022-03-09 RX ORDER — PALONOSETRON 0.05 MG/ML
0.25 INJECTION, SOLUTION INTRAVENOUS ONCE
Status: CANCELLED | OUTPATIENT
Start: 2022-03-09 | End: 2022-03-09

## 2022-03-09 RX ORDER — FLUOROURACIL 50 MG/ML
200 INJECTION, SOLUTION INTRAVENOUS ONCE
Status: CANCELLED | OUTPATIENT
Start: 2022-03-09

## 2022-03-09 RX ORDER — MEPERIDINE HYDROCHLORIDE 50 MG/ML
12.5 INJECTION INTRAMUSCULAR; INTRAVENOUS; SUBCUTANEOUS PRN
Status: CANCELLED | OUTPATIENT
Start: 2022-03-09

## 2022-03-09 RX ORDER — FLUOROURACIL 50 MG/ML
200 INJECTION, SOLUTION INTRAVENOUS ONCE
Status: COMPLETED | OUTPATIENT
Start: 2022-03-09 | End: 2022-03-09

## 2022-03-09 RX ORDER — HEPARIN SODIUM (PORCINE) LOCK FLUSH IV SOLN 100 UNIT/ML 100 UNIT/ML
500 SOLUTION INTRAVENOUS PRN
Status: CANCELLED | OUTPATIENT
Start: 2022-03-09

## 2022-03-09 RX ORDER — SODIUM CHLORIDE 9 MG/ML
5-40 INJECTION INTRAVENOUS PRN
Status: CANCELLED | OUTPATIENT
Start: 2022-03-09

## 2022-03-09 RX ORDER — HEPARIN SODIUM (PORCINE) LOCK FLUSH IV SOLN 100 UNIT/ML 100 UNIT/ML
500 SOLUTION INTRAVENOUS PRN
Status: DISCONTINUED | OUTPATIENT
Start: 2022-03-09 | End: 2022-03-10 | Stop reason: HOSPADM

## 2022-03-09 RX ORDER — PALONOSETRON 0.05 MG/ML
0.25 INJECTION, SOLUTION INTRAVENOUS ONCE
Status: COMPLETED | OUTPATIENT
Start: 2022-03-09 | End: 2022-03-09

## 2022-03-09 RX ORDER — ALBUTEROL SULFATE 90 UG/1
4 AEROSOL, METERED RESPIRATORY (INHALATION) PRN
Status: CANCELLED | OUTPATIENT
Start: 2022-03-09

## 2022-03-09 RX ORDER — SODIUM CHLORIDE 9 MG/ML
INJECTION, SOLUTION INTRAVENOUS CONTINUOUS
Status: CANCELLED | OUTPATIENT
Start: 2022-03-09

## 2022-03-09 RX ORDER — ACETAMINOPHEN 325 MG/1
650 TABLET ORAL
Status: CANCELLED | OUTPATIENT
Start: 2022-03-09

## 2022-03-09 RX ORDER — EPINEPHRINE 1 MG/ML
0.3 INJECTION, SOLUTION, CONCENTRATE INTRAVENOUS PRN
Status: CANCELLED | OUTPATIENT
Start: 2022-03-09

## 2022-03-09 RX ORDER — SODIUM CHLORIDE 0.9 % (FLUSH) 0.9 %
5-40 SYRINGE (ML) INJECTION PRN
Status: DISCONTINUED | OUTPATIENT
Start: 2022-03-09 | End: 2022-03-10 | Stop reason: HOSPADM

## 2022-03-09 RX ADMIN — PALONOSETRON 0.25 MG: 0.05 INJECTION, SOLUTION INTRAVENOUS at 10:37

## 2022-03-09 RX ADMIN — SODIUM CHLORIDE, PRESERVATIVE FREE 20 ML: 5 INJECTION INTRAVENOUS at 09:26

## 2022-03-09 RX ADMIN — FLUOROURACIL 3500 MG: 50 INJECTION, SOLUTION INTRAVENOUS at 13:14

## 2022-03-09 RX ADMIN — LEUCOVORIN CALCIUM 350 MG: 350 INJECTION, POWDER, LYOPHILIZED, FOR SOLUTION INTRAMUSCULAR; INTRAVENOUS at 10:42

## 2022-03-09 RX ADMIN — SODIUM CHLORIDE, PRESERVATIVE FREE 10 ML: 5 INJECTION INTRAVENOUS at 09:25

## 2022-03-09 RX ADMIN — OXALIPLATIN 125 MG: 5 INJECTION, SOLUTION, CONCENTRATE INTRAVENOUS at 10:41

## 2022-03-09 RX ADMIN — FLUOROURACIL 350 MG: 50 INJECTION, SOLUTION INTRAVENOUS at 13:00

## 2022-03-09 RX ADMIN — DEXAMETHASONE SODIUM PHOSPHATE 12 MG: 4 INJECTION, SOLUTION INTRAMUSCULAR; INTRAVENOUS at 10:15

## 2022-03-09 RX ADMIN — DEXTROSE MONOHYDRATE: 50 INJECTION, SOLUTION INTRAVENOUS at 10:14

## 2022-03-09 ASSESSMENT — ENCOUNTER SYMPTOMS
RECTAL PAIN: 0
SORE THROAT: 0
ABDOMINAL PAIN: 0
NAUSEA: 0
ABDOMINAL DISTENTION: 0
CONSTIPATION: 0
DIARRHEA: 0
TROUBLE SWALLOWING: 0
FACIAL SWELLING: 0
CHEST TIGHTNESS: 0
EYE DISCHARGE: 0
COLOR CHANGE: 0
BLOOD IN STOOL: 0
WHEEZING: 0
COUGH: 0
SHORTNESS OF BREATH: 0
BACK PAIN: 0
VOMITING: 0

## 2022-03-09 NOTE — PATIENT INSTRUCTIONS
1. Proceed with cycle #4 of FOLFOX today.   2.  RTC to see me for labs: CBC, BMP, LFTs and a next dose of chemo in 2 weeks

## 2022-03-09 NOTE — PROGRESS NOTES
Lab draw completed from [address] [facility] [address] [facility] on arrival to unit. Patient off the unit at this time to office appointment, accompanied by office staff, per self.

## 2022-03-09 NOTE — PROGRESS NOTES
[facility], [address] [facility] [address]  [address]  Dept: [phone]  Dept Fax: [phone]: [phone]    Visit Date:3/9/2022     Ashley Morales is a [de-identified] y.o. 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 She is status post Whipple procedure. She presents to the medical oncology clinic to continue adjuvant  Patient was admitted to [facility] on 11/09/2021 for jaundice and not feeling well. She noticed juandice since 11/05/21. Associated symptom includes RUQ pain, itching and dark urine with light color stool. Hospital day 2, GI was consulted for possible choledocholithiasis. Liver function test transaminitis with AST//228 and significant Alk phos elevate at 421. CT abdominal (11/9) show Distend gallbladder with dilated common bile duct but no stone and hepatomegaly. RUQ US (11/09): show evidence of distened gall bladder with gallbladder sludge both intra and extra hepatic biliary duct dilation with no stone no para cholecystic edema. She underwent ERCP on 11/10/2021. Ampulla and major papula were of normal appearance on endoscopy. Procedure was complicated by retroperitoneal perforation. Procedure was terminated. Patient was started on Zosyn. Patient was administered lactated Ringer's at 100 cc an hour and kept n.p.o. Srinivasa Flores was then transferred to [address] for higher level care on 11/11/21. She underwent initial exploratory surgery, where a bowel perforation was not noted. She was then monitored for elevated LFTs and hyperbilirubinemia. Patient then returned to  for Whipple surgery on 12/4/21 by Dr. Usha Nelson. 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   She tolerated Whipple without complications. She initially had 4 drains present. Currently only one pancreatic drain remains. Incision is healing well, without drainage, erythema, or warmth.   After discharge, she was in Rehab facility in [place], recently discharged . She is currently back at home, her son is here to stay with her for a couple of days. Patient didn't have any medical history except cataract , run of A. fib during recent hospitalization at [facility].  (which was schedule for last Monday and it was cancelled due to patient in ED). Take multivitamin daily.      Patient moved to [facility] 5 years ago to be with one of her sons, who unfortunately  last year. She reported hx of 10 years smoking when she was young with 1/5 pack/day (5 pack years). Quited smoking  In . Denied any alcohol use, receational drug use. Have 3 vaginal birth without any blood transfusion product. Patient reported has colonoscopy done in  with 2 polyps was removed      History on 2022:  The patient presents to the medical oncology clinic for cycle # 4 of FOLFOX. She reports that she tolerated last FOLFOX well. She denies having any oral mucositis, no nausea, no vomiting. She denies having any peripheral neuropathy. No diarrhea no abdominal pain. Patient denies having any fevers. No skin rash, no palmar and plantar erythema. She reports fair appetite, her weight is stable.   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 Rubi Ceja    ERCP N/A 11/10/2021    ERCP WITH STENT INSERTION performed by Ashley Vickers MD at [address] [facility]  2021    exp lap, radical celiac and portal lymph node dissection,pylorus preserving pancreatoduodenectomy-Dr Ewing [facility]    PORT SURGERY Left 2022    SINGLE LUMEN SMARTPORT INSERTION performed by Deo Warren MD at [address] History   Problem Relation Age of Onset    Breast Cancer Mother     Colon Cancer Mother     Cancer Father         bladder and lung    Lung Cancer Father     Kidney Disease Brother     No Known Problems Maternal Grandmother     No Known Problems Maternal Grandfather     No Known Problems Paternal Grandmother     No Known Problems Paternal Grandfather       Social History     Tobacco Use    Smoking status: Former Smoker     Packs/day: 0.00     Years: 10.00     Pack years: 0.00     Types: Cigarettes     Quit date: 1972     Years since quittin.2    Smokeless tobacco: Never Used    Tobacco comment: social smoker   Substance Use Topics    Alcohol use: Not Currently      Current Outpatient Medications   Medication Sig Dispense Refill    ferrous sulfate (IRON 325) 325 (65 Fe) MG tablet Take 325 mg by mouth daily (with breakfast)      amLODIPine (NORVASC) 5 MG tablet Take 0.5 tablets by mouth daily 30 tablet 1    lidocaine-prilocaine (EMLA) 2.5-2.5 % cream Apply topically as needed. 30 g 1    amiodarone (CORDARONE) 200 MG tablet Take 1 tablet by mouth daily 30 tablet 1    aspirin 81 MG chewable tablet Take 81 mg by mouth daily       docusate sodium (COLACE) 100 MG capsule Take 100 mg by mouth 2 times daily      apixaban (ELIQUIS) 5 MG TABS tablet Take 1 tablet by mouth 2 times daily 180 tablet 1     No current facility-administered medications for this visit.       No Known Allergies   Health Maintenance   Topic Date Due    DTaP/Tdap/Td vaccine (1 - Tdap) Never done    Shingles Vaccine (1 of 2) Never done    DEXA (modify frequency per FRAX score)  Never done    Annual Wellness Visit (AWV)  Never done    Pneumococcal 65+ years Vaccine (2 of 2 - PPSV23) 2021    Depression Screen  2022    Flu vaccine  Completed    COVID-19 Vaccine  Completed    Hepatitis A vaccine  Aged Out    Hepatitis B vaccine  Aged Out    Hib vaccine  Aged Out    Meningococcal (ACWY) vaccine  Aged Out        Subjective:   Review of Systems   Constitutional: Positive for appetite change and fatigue. Negative for activity change and fever. HENT: Negative for congestion, dental problem, facial swelling, hearing loss, mouth sores, nosebleeds, sore throat, tinnitus and trouble swallowing. Eyes: Negative for discharge and visual disturbance. Respiratory: Negative for cough, chest tightness, shortness of breath and wheezing. Cardiovascular: Negative for chest pain, palpitations and leg swelling. Gastrointestinal: Negative for abdominal distention, abdominal pain, blood in stool, constipation, diarrhea, nausea, rectal pain and vomiting. Endocrine: Negative for cold intolerance, polydipsia and polyuria. Genitourinary: Negative for decreased urine volume, difficulty urinating, dysuria, flank pain, hematuria and urgency. Musculoskeletal: Negative for arthralgias, back pain, gait problem, joint swelling, myalgias and neck stiffness. Skin: Negative for color change, rash and wound. Neurological: Negative for dizziness, tremors, seizures, speech difficulty, weakness, light-headedness, numbness and headaches. Hematological: Negative for adenopathy. Does not bruise/bleed easily. Psychiatric/Behavioral: Negative for confusion and sleep disturbance. The patient is not nervous/anxious. Objective:   Physical Exam  Vitals reviewed. Constitutional:       General: She is not in acute distress. Appearance: She is well-developed. HENT:      Head: Normocephalic. Mouth/Throat:      Pharynx: No oropharyngeal exudate. Eyes:      General: No scleral icterus. Right eye: No discharge. Left eye: No discharge. Pupils: Pupils are equal, round, and reactive to light. Neck:      Thyroid: No thyromegaly.       Vascular: No JVD. Trachea: No tracheal deviation. Cardiovascular:      Rate and Rhythm: Normal rate. Heart sounds: Normal heart sounds. No murmur heard. No friction rub. No gallop. Pulmonary:      Effort: Pulmonary effort is normal. No respiratory distress. Breath sounds: Normal breath sounds. No stridor. No wheezing or rales. Chest:      Chest wall: No tenderness. Abdominal:      General: Bowel sounds are normal. There is no distension. Palpations: Abdomen is soft. There is no mass. Tenderness: There is no abdominal tenderness. There is no rebound. Musculoskeletal:         General: Normal range of motion. Cervical back: Normal range of motion and neck supple. Comments: Good range of motion in all four extremities. Lymphadenopathy:      Cervical: No cervical adenopathy. Skin:     General: Skin is warm. Findings: No erythema or rash. Neurological:      Mental Status: She is alert and oriented to person, place, and time. Cranial Nerves: No cranial nerve deficit. Motor: No abnormal muscle tone. Deep Tendon Reflexes: Reflexes are normal and symmetric. Psychiatric:         Behavior: Behavior normal.         Thought Content: Thought content normal.         Judgment: Judgment normal.         BP (!) 141/60 (Site: Left Upper Arm, Position: Sitting, Cuff Size: Medium Adult)   Pulse 66   Temp 98.1 °F (36.7 °C) (Oral)   Resp 16   Ht 5' 5\" (1.651 m)   Wt 143 lb 9.6 oz (65.1 kg)   SpO2 99%   BMI 23.90 kg/m²      ECOG status is 1    Imaging studies and labs:   US THYROID    Result Date: 1/10/2022  PROCEDURE: US THYROID CLINICAL INFORMATION: Elevated TSH, Elevated serum free T4 level COMPARISON: No prior study. TECHNIQUE: Grayscale and color sonographic imaging of the thyroid gland performed in longitudinal and transverse planes.  TECHNICAL DATA: Right Thyroid - 4.14 x 1.24 x 2.07 cm Left Thyroid -5.05 x 1.63 x 1.65 cm Isthmus -0.36 cm FINDINGS: THYROID SIZE: Thyroid gland is normal left thyroid lobe is prominent. NODULES: 1. Mixed solid and cystic nodule 1.2 cm x 0.8 x 0.9 cm mid right thyroid lobe consistent with a tirad 2 lesion this is not considered suspicious. 2. Mixed solid and cystic nodule measuring 1.0 x 0.8 x 0.8 cm mid right thyroid lobe adjacent to the first nodule. Also a TR 2 lesion. 3. 0.6 x 0.5 x 0.5 cm mixed solid and cystic lesion mid left thyroid lobe. T are too lesion. 4. Completely cystic lesion medial left thyroid lobe 0.9 x 0.6 x 0.7 cm considered a benign lesion. TR 1 lesion. PARENCHYMAL ECHOGENICITY/VASCULARITY: Normal. MICROLITHIASIS: None. CERVICAL LYMPHADENOPATHY: 1. There are no pathologically enlarged lymph nodes adjacent to the thyroid gland. 1. Mild prominence the left thyroid lobe. Multiple mixed solid and cystic lesions which are not suspicious and do not require FNA. Consider follow-up in one to 2 years **This report has been created using voice recognition software. It may contain minor errors which are inherent in voice recognition technology. ** Final report electronically signed by Dr. Brenda Luna on 1/10/2022 7:38 AM    Lab Results   Component Value Date    WBC 4.5 (L) 03/09/2022    HGB 11.3 (L) 03/09/2022    HCT 36.3 (L) 03/09/2022    MCV 98 03/09/2022     03/09/2022       Chemistry        Component Value Date/Time     03/09/2022 0931     12/28/2021 1031    K 3.7 03/09/2022 0931    K 3.8 12/28/2021 1031     12/28/2021 1031    CO2 24 12/28/2021 1031    BUN 17 12/28/2021 1031    CREATININE 1.3 (H) 03/09/2022 0931    CREATININE 1.4 (H) 12/28/2021 1031        Component Value Date/Time    CALCIUM 9.3 12/28/2021 1031    ALKPHOS 145 (H) 03/09/2022 0930    AST 55 (H) 03/09/2022 0930    ALT 59 03/09/2022 0930    BILITOT 1.2 03/09/2022 0930            Assessment:  1. Stage III B periampullary adenocarcinoma, pancreaticobiliary type. Patient is s/p Whipple procedure on December 4, 2021.   Leela-ampullary neutropenia. 5-FU continuous infusion is reduced from 2400 mg/m² to 2000 mg/m² for mild neutropenia         Diagnosis Orders   1. Malignant neoplasm of pancreas, unspecified location of malignancy ([facility] [address]  CBC with Auto Differential    POC PANEL BMP W/IOCA    Hepatic Function Panel   2. Encounter for chemotherapy management     3. History of Whipple procedure          Plan:   Return in about 2 weeks (around 3/23/2022). Orders Placed:   Orders Placed This Encounter   Procedures    CBC with Auto Differential     Standing Status:   Standing     Number of Occurrences:   6     Standing Expiration Date:   3/9/2023    POC PANEL BMP W/IOCA     Standing Status:   Standing     Number of Occurrences:   6     Standing Expiration Date:   3/9/2023    Hepatic Function Panel     Standing Status:   Standing     Number of Occurrences:   6     Standing Expiration Date:   3/9/2023        Medications Prescribed:   No orders of the defined types were placed in this encounter. Discussed use, benefit, and side effectsof prescribed medications. All patient questions answered. Pt voiced understanding. Instructed to continue current medications, diet and exercise. Patient agreed with treatment plan. Follow up as directed.

## 2022-03-09 NOTE — PROGRESS NOTES
Patient assessed for the following post chemotherapy:    Dizziness   No  Lightheadedness  No      Acute nausea/vomiting No  Headache   No  Chest pain/pressure  No  Rash/itching   No  Shortness of breath  No    Patient kept for 20 minutes observation post infusion chemotherapy. Patient tolerated chemotherapy treatment Oxaliplatin/Leucovorin/5FU /CADD without any complications. Last vital signs:   BP (!) 141/60   Pulse 66   Temp 98.1 °F (36.7 °C) (Oral)   Resp 16   Ht 5' 5\" (1.651 m)   Wt 143 lb 9.6 oz (65.1 kg)   SpO2 99%   BMI 23.90 kg/m²     Physician instructed patient:      Patient instructed if experience any of the above symptoms following today's infusion, she is to notify MD immediately or go to the emergency department. Discharge instructions given to patient. Verbalizes understanding. Ambulated off unit per self, with belongings.

## 2022-03-11 ENCOUNTER — HOSPITAL ENCOUNTER (OUTPATIENT)
Dept: INFUSION THERAPY | Age: 82
Discharge: HOME | End: 2022-03-11
Payer: MEDICARE

## 2022-03-11 VITALS
OXYGEN SATURATION: 97 % | TEMPERATURE: 98.3 F | HEART RATE: 63 BPM | DIASTOLIC BLOOD PRESSURE: 69 MMHG | RESPIRATION RATE: 18 BRPM | SYSTOLIC BLOOD PRESSURE: 148 MMHG

## 2022-03-11 DIAGNOSIS — C25.9 MALIGNANT NEOPLASM OF PANCREAS, UNSPECIFIED LOCATION OF MALIGNANCY (HCC): Primary | ICD-10-CM

## 2022-03-11 PROCEDURE — 6360000002 HC RX W HCPCS: Performed by: INTERNAL MEDICINE

## 2022-03-11 PROCEDURE — 2580000003 HC RX 258: Performed by: INTERNAL MEDICINE

## 2022-03-11 RX ORDER — SODIUM CHLORIDE 0.9 % (FLUSH) 0.9 %
5-40 SYRINGE (ML) INJECTION PRN
Status: DISCONTINUED | OUTPATIENT
Start: 2022-03-11 | End: 2022-03-12 | Stop reason: HOSPADM

## 2022-03-11 RX ORDER — HEPARIN SODIUM (PORCINE) LOCK FLUSH IV SOLN 100 UNIT/ML 100 UNIT/ML
500 SOLUTION INTRAVENOUS PRN
Status: DISCONTINUED | OUTPATIENT
Start: 2022-03-11 | End: 2022-03-12 | Stop reason: HOSPADM

## 2022-03-11 RX ORDER — SODIUM CHLORIDE 0.9 % (FLUSH) 0.9 %
5-40 SYRINGE (ML) INJECTION PRN
Status: CANCELLED | OUTPATIENT
Start: 2022-03-11

## 2022-03-11 RX ORDER — HEPARIN SODIUM (PORCINE) LOCK FLUSH IV SOLN 100 UNIT/ML 100 UNIT/ML
500 SOLUTION INTRAVENOUS PRN
Status: CANCELLED | OUTPATIENT
Start: 2022-03-11

## 2022-03-11 RX ORDER — SODIUM CHLORIDE 9 MG/ML
25 INJECTION, SOLUTION INTRAVENOUS PRN
Status: CANCELLED | OUTPATIENT
Start: 2022-03-11

## 2022-03-11 RX ADMIN — SODIUM CHLORIDE, PRESERVATIVE FREE 10 ML: 5 INJECTION INTRAVENOUS at 11:47

## 2022-03-11 RX ADMIN — HEPARIN 500 UNITS: 100 SYRINGE at 11:47

## 2022-03-11 NOTE — PLAN OF CARE
Problem: Discharge Planning  Goal: Knowledge of discharge instructions  Description: Knowledge of discharge instructions  Outcome: Met This Shift  Note: Verbalized understanding of discharge instructions, follow-up appointments, and when to call the physician. Intervention: Discharge to appropriate level of care  Note: Discuss understanding of discharge instructions,follow-up appointments, and when to call the physician. Problem: Falls - Risk of:  Goal: Will remain free from falls  Description: Will remain free from falls  Outcome: Met This Shift  Note: Patient free from falls while in [facility].P. Oncology. Intervention: Assess risk factors for falls  Description: Assess risk factors for falls  Note: Patient assessed for fall risk on admission to [facility]. Fall band placed on patient. Discussed the need to use the call light for assistance prior to getting up out of chair/bed. Problem: Infection - Central Venous Catheter-Associated Bloodstream Infection:  Goal: Will show no infection signs and symptoms  Description: Will show no infection signs and symptoms  Outcome: Met This Shift  Note: Mediport site with no redness or warmth. Skin over port site intact with no signs of breakdown noted. Patient verbalizes signs/symptoms of port infection and when to notify the physician. Intervention: Infection risk assessment  Description: Infection risk assessment  Note: Discuss port maintenance, infection prevention, signs and when to call Dr Ed Geller reviewed with patient and daughter. Patient and daughter verbalize understanding of the plan of care and contribute to goal setting.

## 2022-03-11 NOTE — PROGRESS NOTES
Patient tolerated chemotherapy treatment pump off without any complications. Last vital signs:   BP (!) 148/69   Pulse 63   Temp 98.3 °F (36.8 °C) (Oral)   Resp 18   SpO2 97%     Patient instructed if experience any symptoms following today's infusion pump off ,she is to notify MD immediately or go to the emergency department. Discharge instructions given to patient. Verbalizes understanding. Ambulated off unit per self, accompanied by daughter, with belongings.

## 2022-03-15 ENCOUNTER — CLINICAL DOCUMENTATION (OUTPATIENT)
Dept: NUTRITION | Age: 82
End: 2022-03-15

## 2022-03-15 NOTE — PROGRESS NOTES
Nutrition Note:  3/15/22 - Patient phoned and reports that she has been busy and did not make our scheduled appointment. Patient reports that her weight has leveled off and is pretty stable now (143# on 3/8/22) but would like to put some weight back on. Patient also reports that her taste and appetite have improved some. Patient admits that she does like ONS (ensure and carnation breakfast essential) and says that she just needs to get in the habit of drinking them. Patient also report that she has found that she can drink propel water and citrus tea well for hydration. Patient denies specific nutrition questions at this time. Patient is open to receive Nutrition and Pancreatic Cancer booklet via mail along with ensure coupons. Encouraged to call RD with needs/questions; patient agrees. Electronically signed by Oliver Espinal RD, LAURA on 3/15/2022 at 10:40 AM    2/15/22 - Patient did not show for scheduled appointment today. Phone call placed to patient to reschedule, no answer to call so voice message left with RD return call information. Electronically signed by Oliver Espinal RD, LAURA on 2/15/2022 at 2:26 PM        2/10/22 - Received referral on 2/9 for patient regarding weight loss; phoned patient on 2/10 to schedule appointment.  Patient reached and appointment set for Tues 2/15 at 1:30.   Electronically signed Fred Chao, [address] [place] [address], [place] [phone] 2/10/2022 at 3:45 PM

## 2022-03-22 NOTE — TELEPHONE ENCOUNTER
Patient's last appointment was : 3/7/2022  Patient's next appointment is : 4/27/2022  Last refilled:1/13/22

## 2022-03-23 ENCOUNTER — HOSPITAL ENCOUNTER (OUTPATIENT)
Dept: INFUSION THERAPY | Age: 82
Discharge: HOME | End: 2022-03-23
Payer: MEDICARE

## 2022-03-23 ENCOUNTER — OFFICE VISIT (OUTPATIENT)
Dept: ONCOLOGY | Age: 82
End: 2022-03-23
Payer: MEDICARE

## 2022-03-23 VITALS
SYSTOLIC BLOOD PRESSURE: 167 MMHG | BODY MASS INDEX: 23.49 KG/M2 | RESPIRATION RATE: 16 BRPM | OXYGEN SATURATION: 98 % | WEIGHT: 141 LBS | HEIGHT: 65 IN | HEART RATE: 69 BPM | DIASTOLIC BLOOD PRESSURE: 74 MMHG | TEMPERATURE: 98.3 F

## 2022-03-23 VITALS
BODY MASS INDEX: 23.49 KG/M2 | RESPIRATION RATE: 18 BRPM | HEART RATE: 89 BPM | DIASTOLIC BLOOD PRESSURE: 56 MMHG | OXYGEN SATURATION: 98 % | SYSTOLIC BLOOD PRESSURE: 115 MMHG | HEIGHT: 65 IN | WEIGHT: 141 LBS | TEMPERATURE: 97.7 F

## 2022-03-23 DIAGNOSIS — Z51.11 ENCOUNTER FOR CHEMOTHERAPY MANAGEMENT: ICD-10-CM

## 2022-03-23 DIAGNOSIS — D64.9 ANEMIA, UNSPECIFIED TYPE: ICD-10-CM

## 2022-03-23 DIAGNOSIS — D72.819 LEUKOPENIA, UNSPECIFIED TYPE: ICD-10-CM

## 2022-03-23 DIAGNOSIS — C25.9 MALIGNANT NEOPLASM OF PANCREAS, UNSPECIFIED LOCATION OF MALIGNANCY (HCC): Primary | ICD-10-CM

## 2022-03-23 PROBLEM — D69.6 THROMBOCYTOPENIA (HCC): Status: ACTIVE | Noted: 2022-03-23

## 2022-03-23 LAB
ABSOLUTE IMMATURE GRANULOCYTE: 0 THOU/MM3 (ref 0–0.07)
ALBUMIN SERPL-MCNC: 3.5 G/DL (ref 3.5–5.1)
ALP BLD-CCNC: 125 U/L (ref 38–126)
ALT SERPL-CCNC: 38 U/L (ref 11–66)
AST SERPL-CCNC: 37 U/L (ref 5–40)
BASINOPHIL, AUTOMATED: 1 % (ref 0–3)
BASOPHILS ABSOLUTE: 0 THOU/MM3 (ref 0–0.1)
BILIRUB SERPL-MCNC: 0.9 MG/DL (ref 0.3–1.2)
BILIRUBIN DIRECT: 0.3 MG/DL (ref 0–0.3)
BUN, WHOLE BLOOD: 14 MG/DL (ref 8–26)
CHLORIDE, WHOLE BLOOD: 111 MEQ/L (ref 98–109)
CREATININE, WHOLE BLOOD: 1.2 MG/DL (ref 0.5–1.2)
EOSINOPHILS ABSOLUTE: 0 THOU/MM3 (ref 0–0.4)
EOSINOPHILS RELATIVE PERCENT: 1 % (ref 0–4)
GFR, ESTIMATED: 46 ML/MIN/1.73M2
GLUCOSE, WHOLE BLOOD: 126 MG/DL (ref 70–108)
HCT VFR BLD CALC: 35.5 % (ref 37–47)
HEMOGLOBIN: 11.1 GM/DL (ref 12–16)
IMMATURE GRANULOCYTES: 0 %
IONIZED CALCIUM, WHOLE BLOOD: 1.17 MMOL/L (ref 1.12–1.32)
LYMPHOCYTES # BLD: 60 % (ref 15–47)
LYMPHOCYTES ABSOLUTE: 2.6 THOU/MM3 (ref 1–4.8)
MCH RBC QN AUTO: 31.2 PG (ref 26–33)
MCHC RBC AUTO-ENTMCNC: 31.3 GM/DL (ref 32.2–35.5)
MCV RBC AUTO: 100 FL (ref 81–99)
MONOCYTES ABSOLUTE: 0.6 THOU/MM3 (ref 0.4–1.3)
MONOCYTES: 14 % (ref 0–12)
PDW BLD-RTO: 18.5 % (ref 11.5–14.5)
PLATELET # BLD: 159 THOU/MM3 (ref 130–400)
PMV BLD AUTO: 9.7 FL (ref 9.4–12.4)
POTASSIUM, WHOLE BLOOD: 3.7 MEQ/L (ref 3.5–4.9)
RBC # BLD: 3.56 MILL/MM3 (ref 4.2–5.4)
SEG NEUTROPHILS: 25 % (ref 43–75)
SEGMENTED NEUTROPHILS ABSOLUTE COUNT: 1.1 THOU/MM3 (ref 1.8–7.7)
SODIUM, WHOLE BLOOD: 147 MEQ/L (ref 138–146)
TOTAL CO2, WHOLE BLOOD: 25 MEQ/L (ref 23–33)
TOTAL PROTEIN: 5.9 G/DL (ref 6.1–8)
WBC # BLD: 4.3 THOU/MM3 (ref 4.8–10.8)

## 2022-03-23 PROCEDURE — 96367 TX/PROPH/DG ADDL SEQ IV INF: CPT

## 2022-03-23 PROCEDURE — 80047 BASIC METABLC PNL IONIZED CA: CPT

## 2022-03-23 PROCEDURE — 96375 TX/PRO/DX INJ NEW DRUG ADDON: CPT

## 2022-03-23 PROCEDURE — 99211 OFF/OP EST MAY X REQ PHY/QHP: CPT

## 2022-03-23 PROCEDURE — 96368 THER/DIAG CONCURRENT INF: CPT

## 2022-03-23 PROCEDURE — 96411 CHEMO IV PUSH ADDL DRUG: CPT

## 2022-03-23 PROCEDURE — 85025 COMPLETE CBC W/AUTO DIFF WBC: CPT

## 2022-03-23 PROCEDURE — 96416 CHEMO PROLONG INFUSE W/PUMP: CPT

## 2022-03-23 PROCEDURE — 96415 CHEMO IV INFUSION ADDL HR: CPT

## 2022-03-23 PROCEDURE — 36591 DRAW BLOOD OFF VENOUS DEVICE: CPT

## 2022-03-23 PROCEDURE — 99214 OFFICE O/P EST MOD 30 MIN: CPT | Performed by: NURSE PRACTITIONER

## 2022-03-23 PROCEDURE — 2580000003 HC RX 258: Performed by: INTERNAL MEDICINE

## 2022-03-23 PROCEDURE — 96413 CHEMO IV INFUSION 1 HR: CPT

## 2022-03-23 PROCEDURE — 6360000002 HC RX W HCPCS: Performed by: INTERNAL MEDICINE

## 2022-03-23 PROCEDURE — 80076 HEPATIC FUNCTION PANEL: CPT

## 2022-03-23 RX ORDER — MEPERIDINE HYDROCHLORIDE 25 MG/ML
12.5 INJECTION INTRAMUSCULAR; INTRAVENOUS; SUBCUTANEOUS PRN
Status: CANCELLED | OUTPATIENT
Start: 2022-03-23

## 2022-03-23 RX ORDER — ALBUTEROL SULFATE 90 UG/1
4 AEROSOL, METERED RESPIRATORY (INHALATION) PRN
Status: CANCELLED | OUTPATIENT
Start: 2022-03-23

## 2022-03-23 RX ORDER — SODIUM CHLORIDE 9 MG/ML
25 INJECTION, SOLUTION INTRAVENOUS PRN
Status: CANCELLED | OUTPATIENT
Start: 2022-03-23

## 2022-03-23 RX ORDER — SODIUM CHLORIDE 0.9 % (FLUSH) 0.9 %
5-40 SYRINGE (ML) INJECTION PRN
Status: DISCONTINUED | OUTPATIENT
Start: 2022-03-23 | End: 2022-03-24 | Stop reason: HOSPADM

## 2022-03-23 RX ORDER — ACETAMINOPHEN 325 MG/1
650 TABLET ORAL
Status: CANCELLED | OUTPATIENT
Start: 2022-03-23

## 2022-03-23 RX ORDER — SODIUM CHLORIDE 9 MG/ML
INJECTION, SOLUTION INTRAVENOUS CONTINUOUS
Status: CANCELLED | OUTPATIENT
Start: 2022-03-23

## 2022-03-23 RX ORDER — DEXTROSE MONOHYDRATE 50 MG/ML
INJECTION, SOLUTION INTRAVENOUS ONCE
Status: COMPLETED | OUTPATIENT
Start: 2022-03-23 | End: 2022-03-23

## 2022-03-23 RX ORDER — SODIUM CHLORIDE 0.9 % (FLUSH) 0.9 %
5-40 SYRINGE (ML) INJECTION PRN
Status: CANCELLED | OUTPATIENT
Start: 2022-03-23

## 2022-03-23 RX ORDER — ONDANSETRON 2 MG/ML
8 INJECTION INTRAMUSCULAR; INTRAVENOUS
Status: CANCELLED | OUTPATIENT
Start: 2022-03-23

## 2022-03-23 RX ORDER — AMIODARONE HYDROCHLORIDE 200 MG/1
TABLET ORAL
Qty: 30 TABLET | Refills: 0 | Status: SHIPPED | OUTPATIENT
Start: 2022-03-23 | End: 2022-05-04 | Stop reason: SDUPTHER

## 2022-03-23 RX ORDER — SODIUM CHLORIDE 9 MG/ML
INJECTION, SOLUTION INTRAVENOUS ONCE
Status: CANCELLED | OUTPATIENT
Start: 2022-03-23 | End: 2022-03-23

## 2022-03-23 RX ORDER — HEPARIN SODIUM (PORCINE) LOCK FLUSH IV SOLN 100 UNIT/ML 100 UNIT/ML
500 SOLUTION INTRAVENOUS PRN
Status: DISCONTINUED | OUTPATIENT
Start: 2022-03-23 | End: 2022-03-24 | Stop reason: HOSPADM

## 2022-03-23 RX ORDER — HEPARIN SODIUM (PORCINE) LOCK FLUSH IV SOLN 100 UNIT/ML 100 UNIT/ML
500 SOLUTION INTRAVENOUS PRN
Status: CANCELLED | OUTPATIENT
Start: 2022-03-23

## 2022-03-23 RX ORDER — PALONOSETRON 0.05 MG/ML
0.25 INJECTION, SOLUTION INTRAVENOUS ONCE
Status: COMPLETED | OUTPATIENT
Start: 2022-03-23 | End: 2022-03-23

## 2022-03-23 RX ORDER — DIPHENHYDRAMINE HYDROCHLORIDE 50 MG/ML
50 INJECTION INTRAMUSCULAR; INTRAVENOUS
Status: CANCELLED | OUTPATIENT
Start: 2022-03-23

## 2022-03-23 RX ORDER — FLUOROURACIL 50 MG/ML
200 INJECTION, SOLUTION INTRAVENOUS ONCE
Status: COMPLETED | OUTPATIENT
Start: 2022-03-23 | End: 2022-03-23

## 2022-03-23 RX ORDER — SODIUM CHLORIDE 9 MG/ML
5-40 INJECTION INTRAVENOUS PRN
Status: CANCELLED | OUTPATIENT
Start: 2022-03-23

## 2022-03-23 RX ADMIN — DEXAMETHASONE SODIUM PHOSPHATE 12 MG: 4 INJECTION, SOLUTION INTRAMUSCULAR; INTRAVENOUS at 09:20

## 2022-03-23 RX ADMIN — DEXTROSE MONOHYDRATE: 50 INJECTION, SOLUTION INTRAVENOUS at 09:19

## 2022-03-23 RX ADMIN — OXALIPLATIN 125 MG: 5 INJECTION, SOLUTION INTRAVENOUS at 09:46

## 2022-03-23 RX ADMIN — FLUOROURACIL 350 MG: 50 INJECTION, SOLUTION INTRAVENOUS at 12:00

## 2022-03-23 RX ADMIN — LEUCOVORIN CALCIUM 350 MG: 350 INJECTION, POWDER, LYOPHILIZED, FOR SOLUTION INTRAMUSCULAR; INTRAVENOUS at 09:44

## 2022-03-23 RX ADMIN — FLUOROURACIL 3500 MG: 50 INJECTION, SOLUTION INTRAVENOUS at 12:08

## 2022-03-23 RX ADMIN — SODIUM CHLORIDE, PRESERVATIVE FREE 20 ML: 5 INJECTION INTRAVENOUS at 08:46

## 2022-03-23 RX ADMIN — SODIUM CHLORIDE, PRESERVATIVE FREE 10 ML: 5 INJECTION INTRAVENOUS at 08:45

## 2022-03-23 RX ADMIN — PALONOSETRON 0.25 MG: 0.05 INJECTION, SOLUTION INTRAVENOUS at 09:39

## 2022-03-23 NOTE — PATIENT INSTRUCTIONS
1.  Ok to proceed with treatment today   2. Return to clinic on Friday for pump removal  3. Return to clinic next week for labs, nurse eval  4. Return to clinic in 2 weeks to see Dr. Geronimo Cote with labs:  CBC, BMP, LFTs  5.   Return to clinic in 2 weeks for next cycle of treatment

## 2022-03-23 NOTE — PROGRESS NOTES
[facility] [address] [facility], [address] [facility] [address]  [address] [address]  Dept: [phone]  Dept Fax: [phone]: [phone]      Visit Date:3/23/2022     Jose Castro is a [de-identified] y.o. female who presents today for:   Chief Complaint   Patient presents with    Follow-up     Malignant neoplasm of pancreas, unspecified location of malignancy [facility])        HPI:   Jose Castro is a [de-identified] y.o. female who follows with Dr. Helen Gonsalez with pancreatic cancer. HPI per Dr. 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 She presents to the medical oncology clinic to continue adjuvant  Patient was admitted to [facility] on 11/09/2021 for jaundice and not feeling well. She noticed juandice since 11/05/21. Associated symptom includes RUQ pain, itching and dark urine with light color stool. Hospital day 2, GI was consulted for possible choledocholithiasis. Liver function test transaminitis with AST//228 and significant Alk phos elevate at 421. CT abdominal (11/9) show Distend gallbladder with dilated common bile duct but no stone and hepatomegaly. RUQ US (11/09): show evidence of distened gall bladder with gallbladder sludge both intra and extra hepatic biliary duct dilation with no stone no para cholecystic edema. She underwent ERCP on 11/10/2021.  Ampulla and major papula were of normal appearance on endoscopy.  Procedure was complicated by retroperitoneal perforation.  Procedure was terminated.  Patient was started on Zosyn.  Patient was administered lactated Ringer's at 100 cc an hour and kept n.p.o. Alice Duran was then transferred to [address] for higher level care on 11/11/21. She underwent initial exploratory surgery, where a bowel perforation was not noted. She was then monitored for elevated LFTs and hyperbilirubinemia.  Patient then returned to  for Whipple surgery on 12/4/21 by  Deepak Gildardo.  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  She tolerated Whipple without complications.  She initially had 4 drains present.  Currently only one pancreatic drain remains.  Incision is healing well, without drainage, erythema, or warmth.   After discharge, she was in [place], recently discharged .Leeanna Ricci is currently back at home, her son is here to stay with her for a couple of days.    Patient didn't have any medical history except cataract , run of A. fib during recent hospitalization at [facility].  (which was schedule for last Monday and it was cancelled due to patient in ED). Take multivitamin daily.      Patient moved to [facility] 5 years ago to be with one of her sons, who unfortunately  last year. She reported hx of 10 years smoking when she was young with 1/5 pack/day (5 pack years). Quited smoking  In . Denied any alcohol use, receational drug use. Have 3 vaginal birth without any blood transfusion product. Patient reported has colonoscopy done in  with 2 polyps was removed. Interval History 3/23/2022:   The patient presents to the office today for follow up and evaluation of pancreatic cancer, as well as cycle #5 of FOLFOX. The patient reports she feels well. She is down 2#s but eating/drinking ok at home. She reports chronic peripheral edema, slightly worsened from baseline. She reports her dr recently stopped one of her meds and she needs to call to see if he wants it restarted. Advised pt to call today. She reports mild tingling in bilateral hands, very mild. She reports mild blood noted in nares, no gushing. She denies fever/chills, s/s infections, headaches, dizziness, cough, SOB, CP, heart palpitations, abdominal pain, N/V/C/D, other s/s bleeding, mouth sores, skin rash. No palmar/plantar erythema.         PMH, SH, and FH:  I reviewed the patient's medication and allergy lists as noted on the electronic medical record. The PMH, SH, and FH were also reviewed as noted on the EMR. 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 Merry Mcardle    ERCP N/A 11/10/2021    ERCP WITH STENT INSERTION performed by Gustavo Sullivan MD at [address] [facility]  2021    exp lap, radical celiac and portal lymph node dissection,pylorus preserving pancreatoduodenectomy-Dr Ewing [facility]    PORT SURGERY Left 2022    SINGLE LUMEN GSOSTUMDP INSERTION performed by Edwardo Bagley MD at [facility] History   Problem Relation Age of Onset    Breast Cancer Mother     Colon Cancer Mother     Cancer Father         bladder and lung    Lung Cancer Father     Kidney Disease Brother     No Known Problems Maternal Grandmother     No Known Problems Maternal Grandfather     No Known Problems Paternal Grandmother     No Known Problems Paternal Grandfather       Social History     Tobacco Use    Smoking status: Former Smoker     Packs/day: 0.00     Years: 10.00     Pack years: 0.00     Types: Cigarettes     Quit date: 1972     Years since quittin.2    Smokeless tobacco: Never Used    Tobacco comment: social smoker   Substance Use Topics    Alcohol use: Not Currently      Current Outpatient Medications   Medication Sig Dispense Refill    amiodarone (CORDARONE) 200 MG tablet Take 1 tablet by mouth once daily 30 tablet 0    ferrous sulfate (IRON 325) 325 (65 Fe) MG tablet Take 325 mg by mouth daily (with breakfast)      amLODIPine (NORVASC) 5 MG tablet Take 0.5 tablets by mouth daily 30 tablet 1    lidocaine-prilocaine (EMLA) 2.5-2.5 % cream Apply topically as needed.  30 g 1    aspirin 81 MG chewable tablet Take 81 mg by mouth daily       docusate sodium (COLACE) 100 MG capsule Take 100 mg by mouth 2 times daily      apixaban (ELIQUIS) 5 MG TABS tablet Take 1 tablet by mouth 2 times daily 180 tablet 1     No current facility-administered medications for this visit. Facility-Administered Medications Ordered in Other Visits   Medication Dose Route Frequency Provider Last Rate Last Admin    fluorouracil (ADRUCIL) 3,500 mg in sodium chloride 0.9 % 250 mL chemo infusion  3,500 mg IntraVENous Over 46 hours Lexi Barnes MD   3,500 mg at 03/23/22 1208    sodium chloride flush 0.9 % injection 5-40 mL  5-40 mL IntraVENous PRN Lexi Barnes MD   20 mL at 03/23/22 0846    heparin flush 100 UNIT/ML injection 500 Units  500 Units IntraCATHeter PRN Lexi Barnes MD          No Known Allergies       Review of Systems:   Review of Systems   Pertinent review of systems noted in HPI, all other ROS negative. Objective:   Physical Exam   BP (!) 115/56 (Site: Left Upper Arm, Position: Sitting, Cuff Size: Medium Adult)   Pulse 89   Temp 97.7 °F (36.5 °C) (Oral)   Resp 18   Ht 5' 5\" (1.651 m)   Wt 141 lb (64 kg)   SpO2 98%   BMI 23.46 kg/m²    General appearance: No apparent distress, calm and cooperative. HEENT: Pupils equal, round, and reactive to light. Conjunctivae/corneas clear. Oral mucosa moist.  Neck: Supple, with full range of motion. Trachea midline. Respiratory:  Normal respiratory effort. Clear to auscultation all lung fields. Cardiovascular:  RRR, S1/S2. Abdomen: Soft, non-tender, non-distended with active BS x 4. Musculoskeletal: No clubbing, cyanosis bilaterally. Peripheral edema to BLE noted, chronic but worsened from baseline. She is able to ambulate in office without difficulty. Skin: Skin color, texture normal.  No visible rashes or lesions. Neurologic:  Neurovascularly intact without any focal sensory/motor deficits.  Cranial nerves: II-XII intact, grossly non-focal.  Psychiatric: Alert and oriented x 3, thought content appropriate, normal insight  Capillary Refill: < 3 seconds Peripheral Pulses: +2 palpable, equal bilaterally       Imaging Studies and Labs:   CBC:   Lab Results   Component Value Date    WBC 4.3 (L) 03/23/2022    HGB 11.1 (L) 03/23/2022    HCT 35.5 (L) 03/23/2022     (H) 03/23/2022     03/23/2022     BMP:   Lab Results   Component Value Date     03/23/2022     12/28/2021    K 3.7 03/23/2022    K 3.8 12/28/2021     12/28/2021    CO2 24 12/28/2021    BUN 17 12/28/2021    CREATININE 1.2 03/23/2022    CREATININE 1.4 12/28/2021    GLUCOSE 121 12/28/2021    CALCIUM 9.3 12/28/2021      LFT:   Lab Results   Component Value Date    ALT 38 03/23/2022    AST 37 03/23/2022    ALKPHOS 125 03/23/2022    BILITOT 0.9 03/23/2022         Assessment and Plan:     1. Malignant neoplasm of pancreas, unspecified location of malignancy ([facility] [address]  Initially presented with jaundice, RUQ pain, itching, dark urine and light colored stool. She underwent ERCP on 38/65/2527 with complication of possible retroperitoneal perforation. She was transferred to higher level of care and underwent exploratory laparotomy, bowel perforation not found. S/p Whipple procedure with pathology (+) pre-ampullary adenocarcinoma with 7/27 lymph nodes (+) for metastatic disease. 2. Encounter for chemotherapy management  Current treatment recommendations include FOLFOX. She is tolerating treatment well without significant side effects. Labs today:  WBC 4.3, ANC ~ 1075. H/H 11.1/35. 5. Platelets 763. After discussion with Dr. Emanuel Peterson regarding neutropenia/ANC ~ [phone], ok to proceed with treatment. No need for dose reduction. Will see patient back for lab check next week. 3. Leukopenia, unspecified type  WBC 4.3, ANC ~ 1075. She denies fever/chills, s/s infections. Trend. 4. Anemia, unspecified type  H/H 11.1/35.5. She denies s/s bleeding. Trend. Return in about 1 week (around 3/30/2022). All patient questions answered. Pt voiced understanding.  Patient agreed with treatment plan. Follow up as directed. Patient instructed to call for questions or concerns.       Electronically signed by   ASIA Abraham CNP

## 2022-03-23 NOTE — PROGRESS NOTES
Patient returned to the unit at this time from office appointment, accompanied by Tina Obregon CNP, per self/cane.

## 2022-03-23 NOTE — ONCOLOGY

## 2022-03-23 NOTE — PROGRESS NOTES
Lab draw completed from [address] [facility] [address] [facility] on arrival to unit. Patient off the unit at this time to office appointment, accompanied by office staff, per self.

## 2022-03-23 NOTE — PROGRESS NOTES
Patient assessed for the following post chemotherapy:    Dizziness   No  Lightheadedness  No      Acute nausea/vomiting No  Headache   No  Chest pain/pressure  No  Rash/itching   No  Shortness of breath  No    Patient kept for 20 minutes observation post infusion chemotherapy. Patient tolerated chemotherapy treatment Oxaliplatin/Leucovorin/5FU/CADD without any complications. Last vital signs:   BP (!) 167/74   Pulse 69   Temp 98.3 °F (36.8 °C) (Oral)   Resp 16   Ht 5' 5\" (1.651 m)   Wt 141 lb (64 kg)   SpO2 98%   BMI 23.46 kg/m²     Physician instructed patient:  1. Ok to proceed with treatment today   2. Return to clinic on Friday for pump removal  3. Return to clinic next week for labs, nurse eval  4. Return to clinic in 2 weeks to see Dr. Sol Bautista with labs:  CBC, BMP, LFTs  5. Return to clinic in 2 weeks for next cycle of treatment    Patient instructed if experience any of the above symptoms following today's infusion, she is to notify MD immediately or go to the emergency department. Discharge instructions given to patient. Verbalizes understanding. Ambulated off unit per self/cane, with belongings and CADD infusing at 5.4ml/hr.

## 2022-03-23 NOTE — PLAN OF CARE
Problem: Discharge Planning  Goal: Knowledge of discharge instructions  Description: Knowledge of discharge instructions  Outcome: Met This Shift  Note: Verbalized understanding of discharge instructions, follow-up appointments, and when to call the physician. Intervention: Discharge to appropriate level of care  Note: Discuss understanding of discharge instructions,follow-up appointments, and when to call the physician. Problem: Intellectual/Education/Knowledge Deficit  Goal: Teaching initiated upon admission  Outcome: Met This Shift  Note: Patient verbalizes understanding to verbal information given on Oxaliplatin /Leucovorin /5FU /CADD ,action and possible side effects. Aware to call MD if develop complications. Intervention: Verbal/written education provided  Note: Chemotherapy Teaching     What is Chemotherapy   Drug action [x]   Method of Administration [x]   Handouts given []     Side Effects  Nausea/vomiting [x]   Diarrhea [x]   Fatigue [x]   Signs / Symptoms of infection [x]   Neutropenia [x]   Thrombocytopenia [x]   Alopecia [x]   neuropathy [x]   [place] diet &  the importance of fluids [x]       Micellaneous  Importance of nutrition [x]   Importance of oral hygiene [x]   When to call the MD [x]   Monitoring labs [x]   Use of supportive services []     Explanation of Drug Regimen / Frequency  Oxaliplatin /Leucovorin/5FU/CADD:  [id number]     Comments  Verbalized understanding to drug,action,side effects and when to call MD         Problem: Falls - Risk of:  Goal: Will remain free from falls  Description: Will remain free from falls  Outcome: Met This Shift  Note: Patient free from falls while in [facility].P. Oncology. Intervention: Assess risk factors for falls  Description: Assess risk factors for falls  Note: Patient assessed for fall risk on admission to [facility]. Fall band placed on patient. Discussed the need to use the call light for assistance prior to getting up out of chair/bed.       Problem: Infection - Central Venous Catheter-Associated Bloodstream Infection:  Goal: Will show no infection signs and symptoms  Description: Will show no infection signs and symptoms  Outcome: Met This Shift  Note: Mediport site with no redness or warmth. Skin over port site intact with no signs of breakdown noted. Patient verbalizes signs/symptoms of port infection and when to notify the physician. Intervention: Infection risk assessment  Description: Infection risk assessment  Note: Discuss port maintenance, infection prevention, signs and when to call Dr Netta Soriano reviewed with patient. Patient verbalize understanding of the plan of care and contribute to goal setting.

## 2022-03-25 ENCOUNTER — HOSPITAL ENCOUNTER (OUTPATIENT)
Dept: INFUSION THERAPY | Age: 82
Discharge: HOME | End: 2022-03-25
Payer: MEDICARE

## 2022-03-25 VITALS
OXYGEN SATURATION: 99 % | TEMPERATURE: 98.9 F | RESPIRATION RATE: 16 BRPM | SYSTOLIC BLOOD PRESSURE: 135 MMHG | HEART RATE: 71 BPM | DIASTOLIC BLOOD PRESSURE: 67 MMHG

## 2022-03-25 DIAGNOSIS — C25.9 MALIGNANT NEOPLASM OF PANCREAS, UNSPECIFIED LOCATION OF MALIGNANCY (HCC): Primary | ICD-10-CM

## 2022-03-25 PROCEDURE — 6360000002 HC RX W HCPCS: Performed by: INTERNAL MEDICINE

## 2022-03-25 PROCEDURE — 2580000003 HC RX 258: Performed by: INTERNAL MEDICINE

## 2022-03-25 RX ORDER — HEPARIN SODIUM (PORCINE) LOCK FLUSH IV SOLN 100 UNIT/ML 100 UNIT/ML
500 SOLUTION INTRAVENOUS PRN
Status: CANCELLED | OUTPATIENT
Start: 2022-03-25

## 2022-03-25 RX ORDER — HEPARIN SODIUM (PORCINE) LOCK FLUSH IV SOLN 100 UNIT/ML 100 UNIT/ML
500 SOLUTION INTRAVENOUS PRN
Status: DISCONTINUED | OUTPATIENT
Start: 2022-03-25 | End: 2022-03-26 | Stop reason: HOSPADM

## 2022-03-25 RX ORDER — SODIUM CHLORIDE 0.9 % (FLUSH) 0.9 %
5-40 SYRINGE (ML) INJECTION PRN
Status: CANCELLED | OUTPATIENT
Start: 2022-03-25

## 2022-03-25 RX ORDER — SODIUM CHLORIDE 9 MG/ML
25 INJECTION, SOLUTION INTRAVENOUS PRN
Status: CANCELLED | OUTPATIENT
Start: 2022-03-25

## 2022-03-25 RX ORDER — SODIUM CHLORIDE 0.9 % (FLUSH) 0.9 %
5-40 SYRINGE (ML) INJECTION PRN
Status: DISCONTINUED | OUTPATIENT
Start: 2022-03-25 | End: 2022-03-26 | Stop reason: HOSPADM

## 2022-03-25 RX ADMIN — HEPARIN 500 UNITS: 100 SYRINGE at 09:14

## 2022-03-25 RX ADMIN — SODIUM CHLORIDE, PRESERVATIVE FREE 20 ML: 5 INJECTION INTRAVENOUS at 09:14

## 2022-03-25 NOTE — PROGRESS NOTES
Patient tolerated CADD pump off without any complications. Discharge instructions given to patient-verbalizes understanding. Ambulated off unit per self with belongings.

## 2022-03-25 NOTE — PLAN OF CARE
Problem: Discharge Planning  Goal: Knowledge of discharge instructions  Description: Knowledge of discharge instructions  Outcome: Met This Shift  Note: Verbalize understanding of discharge instructions, follow up appointments, and when to call Physician. Intervention: Discharge to appropriate level of care  Note: Discuss understanding of discharge instructions, follow up appointments and when to call Physician. Problem: Falls - Risk of:  Goal: Will remain free from falls  Description: Will remain free from falls  Outcome: Met This Shift  Note: No falls occurred with visit today. Intervention: Assess risk factors for falls  Note: Discussed the need to use the call light for assistance when getting up to ambulate. Problem: Infection - Central Venous Catheter-Associated Bloodstream Infection:  Goal: Will show no infection signs and symptoms  Description: Will show no infection signs and symptoms  Outcome: Met This Shift  Note: Mediport site with no redness or warmth. Skin over port site intact with no signs of breakdown noted. Patient verbalizes signs/symptoms of port infection and when to notify the physician. Intervention: Infection risk assessment  Note: Discuss port maintenance,infection prevention, sign of infection and when to call MD.     Care plan reviewed with patient. Patient verbalizes understanding of the plan of care and contributes to goal setting.

## 2022-03-28 ENCOUNTER — HOSPITAL ENCOUNTER (OUTPATIENT)
Dept: INFUSION THERAPY | Age: 82
Discharge: HOME | End: 2022-03-28
Payer: MEDICARE

## 2022-03-28 VITALS
BODY MASS INDEX: 24.07 KG/M2 | SYSTOLIC BLOOD PRESSURE: 138 MMHG | WEIGHT: 144.44 LBS | HEIGHT: 65 IN | HEART RATE: 75 BPM | RESPIRATION RATE: 16 BRPM | DIASTOLIC BLOOD PRESSURE: 61 MMHG | TEMPERATURE: 97.6 F | OXYGEN SATURATION: 99 %

## 2022-03-28 DIAGNOSIS — C25.9 MALIGNANT NEOPLASM OF PANCREAS, UNSPECIFIED LOCATION OF MALIGNANCY (HCC): Primary | ICD-10-CM

## 2022-03-28 LAB
ABSOLUTE IMMATURE GRANULOCYTE: 0.01 THOU/MM3 (ref 0–0.07)
ALBUMIN SERPL-MCNC: 3.1 G/DL (ref 3.5–5.1)
ALP BLD-CCNC: 156 U/L (ref 38–126)
ALT SERPL-CCNC: 63 U/L (ref 11–66)
AST SERPL-CCNC: 56 U/L (ref 5–40)
BASINOPHIL, AUTOMATED: 0 % (ref 0–3)
BASOPHILS ABSOLUTE: 0 THOU/MM3 (ref 0–0.1)
BILIRUB SERPL-MCNC: 1.2 MG/DL (ref 0.3–1.2)
BILIRUBIN DIRECT: 0.5 MG/DL (ref 0–0.3)
BUN, WHOLE BLOOD: 13 MG/DL (ref 8–26)
CHLORIDE, WHOLE BLOOD: 106 MEQ/L (ref 98–109)
CREATININE, WHOLE BLOOD: 0.8 MG/DL (ref 0.5–1.2)
EOSINOPHILS ABSOLUTE: 0 THOU/MM3 (ref 0–0.4)
EOSINOPHILS RELATIVE PERCENT: 0 % (ref 0–4)
GFR, ESTIMATED: 73 ML/MIN/1.73M2
GLUCOSE, WHOLE BLOOD: 183 MG/DL (ref 70–108)
HCT VFR BLD CALC: 34.5 % (ref 37–47)
HEMOGLOBIN: 10.9 GM/DL (ref 12–16)
IMMATURE GRANULOCYTES: 0 %
IONIZED CALCIUM, WHOLE BLOOD: 1.14 MMOL/L (ref 1.12–1.32)
LYMPHOCYTES # BLD: 65 % (ref 15–47)
LYMPHOCYTES ABSOLUTE: 1.9 THOU/MM3 (ref 1–4.8)
MCH RBC QN AUTO: 31.3 PG (ref 26–33)
MCHC RBC AUTO-ENTMCNC: 31.6 GM/DL (ref 32.2–35.5)
MCV RBC AUTO: 99 FL (ref 81–99)
MONOCYTES ABSOLUTE: 0.1 THOU/MM3 (ref 0.4–1.3)
MONOCYTES: 4 % (ref 0–12)
PDW BLD-RTO: 17.6 % (ref 11.5–14.5)
PLATELET # BLD: 121 THOU/MM3 (ref 130–400)
PMV BLD AUTO: 10.4 FL (ref 9.4–12.4)
POTASSIUM, WHOLE BLOOD: 3.1 MEQ/L (ref 3.5–4.9)
RBC # BLD: 3.48 MILL/MM3 (ref 4.2–5.4)
SEG NEUTROPHILS: 30 % (ref 43–75)
SEGMENTED NEUTROPHILS ABSOLUTE COUNT: 0.9 THOU/MM3 (ref 1.8–7.7)
SODIUM, WHOLE BLOOD: 146 MEQ/L (ref 138–146)
TOTAL CO2, WHOLE BLOOD: 27 MEQ/L (ref 23–33)
TOTAL PROTEIN: 5.4 G/DL (ref 6.1–8)
WBC # BLD: 3 THOU/MM3 (ref 4.8–10.8)

## 2022-03-28 PROCEDURE — 36591 DRAW BLOOD OFF VENOUS DEVICE: CPT

## 2022-03-28 PROCEDURE — 85025 COMPLETE CBC W/AUTO DIFF WBC: CPT

## 2022-03-28 PROCEDURE — 2580000003 HC RX 258: Performed by: INTERNAL MEDICINE

## 2022-03-28 PROCEDURE — 80076 HEPATIC FUNCTION PANEL: CPT

## 2022-03-28 PROCEDURE — 6360000002 HC RX W HCPCS: Performed by: INTERNAL MEDICINE

## 2022-03-28 PROCEDURE — 80047 BASIC METABLC PNL IONIZED CA: CPT

## 2022-03-28 RX ORDER — SODIUM CHLORIDE 0.9 % (FLUSH) 0.9 %
5-40 SYRINGE (ML) INJECTION PRN
Status: DISCONTINUED | OUTPATIENT
Start: 2022-03-28 | End: 2022-03-29 | Stop reason: HOSPADM

## 2022-03-28 RX ORDER — SODIUM CHLORIDE 9 MG/ML
25 INJECTION, SOLUTION INTRAVENOUS PRN
Status: CANCELLED | OUTPATIENT
Start: 2022-03-28

## 2022-03-28 RX ORDER — HEPARIN SODIUM (PORCINE) LOCK FLUSH IV SOLN 100 UNIT/ML 100 UNIT/ML
500 SOLUTION INTRAVENOUS PRN
Status: DISCONTINUED | OUTPATIENT
Start: 2022-03-28 | End: 2022-03-29 | Stop reason: HOSPADM

## 2022-03-28 RX ORDER — POTASSIUM CHLORIDE 20 MEQ/1
20 TABLET, EXTENDED RELEASE ORAL DAILY
Qty: 10 TABLET | Refills: 2 | Status: SHIPPED | OUTPATIENT
Start: 2022-03-28 | End: 2022-05-02

## 2022-03-28 RX ORDER — HEPARIN SODIUM (PORCINE) LOCK FLUSH IV SOLN 100 UNIT/ML 100 UNIT/ML
500 SOLUTION INTRAVENOUS PRN
Status: CANCELLED | OUTPATIENT
Start: 2022-03-28

## 2022-03-28 RX ORDER — SODIUM CHLORIDE 0.9 % (FLUSH) 0.9 %
5-40 SYRINGE (ML) INJECTION PRN
Status: CANCELLED | OUTPATIENT
Start: 2022-03-28

## 2022-03-28 RX ADMIN — SODIUM CHLORIDE, PRESERVATIVE FREE 10 ML: 5 INJECTION INTRAVENOUS at 10:29

## 2022-03-28 RX ADMIN — SODIUM CHLORIDE, PRESERVATIVE FREE 20 ML: 5 INJECTION INTRAVENOUS at 10:30

## 2022-03-28 RX ADMIN — HEPARIN 500 UNITS: 100 SYRINGE at 11:24

## 2022-03-28 NOTE — PROGRESS NOTES
Patient tolerated  Lab draw from [address] [facility] [address] [facility] without any complications. Discharge instructions given to patient-verbalizes understanding. Patient instructed to take oral potassium pills that were e-scribed to her pharmacy. Ambulated off unit per self with belongings.

## 2022-04-06 ENCOUNTER — HOSPITAL ENCOUNTER (OUTPATIENT)
Dept: INFUSION THERAPY | Age: 82
Discharge: HOME | End: 2022-04-06
Payer: MEDICARE

## 2022-04-06 ENCOUNTER — HOSPITAL ENCOUNTER (OUTPATIENT)
Dept: INTERVENTIONAL RADIOLOGY/VASCULAR | Age: 82
Discharge: HOME | End: 2022-04-06
Payer: MEDICARE

## 2022-04-06 ENCOUNTER — TELEPHONE (OUTPATIENT)
Dept: ONCOLOGY | Age: 82
End: 2022-04-06

## 2022-04-06 ENCOUNTER — OFFICE VISIT (OUTPATIENT)
Dept: ONCOLOGY | Age: 82
End: 2022-04-06
Payer: MEDICARE

## 2022-04-06 VITALS
SYSTOLIC BLOOD PRESSURE: 140 MMHG | HEIGHT: 65 IN | OXYGEN SATURATION: 100 % | RESPIRATION RATE: 16 BRPM | WEIGHT: 143.6 LBS | HEART RATE: 78 BPM | BODY MASS INDEX: 23.93 KG/M2 | TEMPERATURE: 98.1 F | DIASTOLIC BLOOD PRESSURE: 62 MMHG

## 2022-04-06 VITALS
OXYGEN SATURATION: 100 % | RESPIRATION RATE: 16 BRPM | HEIGHT: 65 IN | BODY MASS INDEX: 23.82 KG/M2 | DIASTOLIC BLOOD PRESSURE: 58 MMHG | SYSTOLIC BLOOD PRESSURE: 122 MMHG | WEIGHT: 143 LBS | HEART RATE: 80 BPM | TEMPERATURE: 98 F

## 2022-04-06 DIAGNOSIS — C25.9 MALIGNANT NEOPLASM OF PANCREAS, UNSPECIFIED LOCATION OF MALIGNANCY (HCC): Primary | ICD-10-CM

## 2022-04-06 DIAGNOSIS — Z51.11 ENCOUNTER FOR CHEMOTHERAPY MANAGEMENT: ICD-10-CM

## 2022-04-06 DIAGNOSIS — C25.9 MALIGNANT NEOPLASM OF PANCREAS, UNSPECIFIED LOCATION OF MALIGNANCY (HCC): ICD-10-CM

## 2022-04-06 DIAGNOSIS — R22.43 LOCALIZED SWELLING OF BOTH LOWER LEGS: ICD-10-CM

## 2022-04-06 DIAGNOSIS — Z90.49 HISTORY OF WHIPPLE PROCEDURE: ICD-10-CM

## 2022-04-06 DIAGNOSIS — Z90.410 HISTORY OF WHIPPLE PROCEDURE: ICD-10-CM

## 2022-04-06 DIAGNOSIS — D64.9 ANEMIA, UNSPECIFIED TYPE: ICD-10-CM

## 2022-04-06 LAB
ABSOLUTE IMMATURE GRANULOCYTE: 0.01 THOU/MM3 (ref 0–0.07)
ALBUMIN SERPL-MCNC: 2.9 G/DL (ref 3.5–5.1)
ALP BLD-CCNC: 162 U/L (ref 38–126)
ALT SERPL-CCNC: 31 U/L (ref 11–66)
AST SERPL-CCNC: 32 U/L (ref 5–40)
BASINOPHIL, AUTOMATED: 1 % (ref 0–3)
BASOPHILS ABSOLUTE: 0 THOU/MM3 (ref 0–0.1)
BILIRUB SERPL-MCNC: 0.9 MG/DL (ref 0.3–1.2)
BILIRUBIN DIRECT: 0.3 MG/DL (ref 0–0.3)
BUN, WHOLE BLOOD: 11 MG/DL (ref 8–26)
CHLORIDE, WHOLE BLOOD: 109 MEQ/L (ref 98–109)
CREATININE, WHOLE BLOOD: 1.2 MG/DL (ref 0.5–1.2)
EOSINOPHILS ABSOLUTE: 0 THOU/MM3 (ref 0–0.4)
EOSINOPHILS RELATIVE PERCENT: 0 % (ref 0–4)
GFR, ESTIMATED: 46 ML/MIN/1.73M2
GLUCOSE, WHOLE BLOOD: 126 MG/DL (ref 70–108)
HCT VFR BLD CALC: 34.3 % (ref 37–47)
HEMOGLOBIN: 10.9 GM/DL (ref 12–16)
IMMATURE GRANULOCYTES: 0 %
IONIZED CALCIUM, WHOLE BLOOD: 1.17 MMOL/L (ref 1.12–1.32)
LYMPHOCYTES # BLD: 64 % (ref 15–47)
LYMPHOCYTES ABSOLUTE: 2.7 THOU/MM3 (ref 1–4.8)
MCH RBC QN AUTO: 32 PG (ref 26–33)
MCHC RBC AUTO-ENTMCNC: 31.8 GM/DL (ref 32.2–35.5)
MCV RBC AUTO: 101 FL (ref 81–99)
MONOCYTES ABSOLUTE: 0.5 THOU/MM3 (ref 0.4–1.3)
MONOCYTES: 12 % (ref 0–12)
PDW BLD-RTO: 17.4 % (ref 11.5–14.5)
PLATELET # BLD: 181 THOU/MM3 (ref 130–400)
PMV BLD AUTO: 9.6 FL (ref 9.4–12.4)
POTASSIUM, WHOLE BLOOD: 4.4 MEQ/L (ref 3.5–4.9)
RBC # BLD: 3.41 MILL/MM3 (ref 4.2–5.4)
SEG NEUTROPHILS: 24 % (ref 43–75)
SEGMENTED NEUTROPHILS ABSOLUTE COUNT: 1 THOU/MM3 (ref 1.8–7.7)
SODIUM, WHOLE BLOOD: 144 MEQ/L (ref 138–146)
TOTAL CO2, WHOLE BLOOD: 23 MEQ/L (ref 23–33)
TOTAL PROTEIN: 5.6 G/DL (ref 6.1–8)
WBC # BLD: 4.2 THOU/MM3 (ref 4.8–10.8)

## 2022-04-06 PROCEDURE — 80076 HEPATIC FUNCTION PANEL: CPT

## 2022-04-06 PROCEDURE — 36591 DRAW BLOOD OFF VENOUS DEVICE: CPT

## 2022-04-06 PROCEDURE — 96375 TX/PRO/DX INJ NEW DRUG ADDON: CPT

## 2022-04-06 PROCEDURE — 93970 EXTREMITY STUDY: CPT

## 2022-04-06 PROCEDURE — 96367 TX/PROPH/DG ADDL SEQ IV INF: CPT

## 2022-04-06 PROCEDURE — 85025 COMPLETE CBC W/AUTO DIFF WBC: CPT

## 2022-04-06 PROCEDURE — 96416 CHEMO PROLONG INFUSE W/PUMP: CPT

## 2022-04-06 PROCEDURE — 96413 CHEMO IV INFUSION 1 HR: CPT

## 2022-04-06 PROCEDURE — 2580000003 HC RX 258: Performed by: INTERNAL MEDICINE

## 2022-04-06 PROCEDURE — 96411 CHEMO IV PUSH ADDL DRUG: CPT

## 2022-04-06 PROCEDURE — 96368 THER/DIAG CONCURRENT INF: CPT

## 2022-04-06 PROCEDURE — 99214 OFFICE O/P EST MOD 30 MIN: CPT | Performed by: INTERNAL MEDICINE

## 2022-04-06 PROCEDURE — 6360000002 HC RX W HCPCS: Performed by: INTERNAL MEDICINE

## 2022-04-06 PROCEDURE — 99211 OFF/OP EST MAY X REQ PHY/QHP: CPT

## 2022-04-06 PROCEDURE — 80047 BASIC METABLC PNL IONIZED CA: CPT

## 2022-04-06 PROCEDURE — 86301 IMMUNOASSAY TUMOR CA 19-9: CPT

## 2022-04-06 PROCEDURE — 96415 CHEMO IV INFUSION ADDL HR: CPT

## 2022-04-06 RX ORDER — SODIUM CHLORIDE 9 MG/ML
INJECTION, SOLUTION INTRAVENOUS ONCE
Status: CANCELLED | OUTPATIENT
Start: 2022-04-06 | End: 2022-04-06

## 2022-04-06 RX ORDER — HEPARIN SODIUM (PORCINE) LOCK FLUSH IV SOLN 100 UNIT/ML 100 UNIT/ML
500 SOLUTION INTRAVENOUS PRN
Status: CANCELLED | OUTPATIENT
Start: 2022-04-06

## 2022-04-06 RX ORDER — MEPERIDINE HYDROCHLORIDE 50 MG/ML
12.5 INJECTION INTRAMUSCULAR; INTRAVENOUS; SUBCUTANEOUS PRN
Status: CANCELLED | OUTPATIENT
Start: 2022-04-06

## 2022-04-06 RX ORDER — ACETAMINOPHEN 325 MG/1
650 TABLET ORAL
Status: CANCELLED | OUTPATIENT
Start: 2022-04-06

## 2022-04-06 RX ORDER — ALBUTEROL SULFATE 90 UG/1
4 AEROSOL, METERED RESPIRATORY (INHALATION) PRN
Status: CANCELLED | OUTPATIENT
Start: 2022-04-06

## 2022-04-06 RX ORDER — SODIUM CHLORIDE 9 MG/ML
25 INJECTION, SOLUTION INTRAVENOUS PRN
Status: CANCELLED | OUTPATIENT
Start: 2022-04-06

## 2022-04-06 RX ORDER — SODIUM CHLORIDE 9 MG/ML
5-40 INJECTION INTRAVENOUS PRN
Status: CANCELLED | OUTPATIENT
Start: 2022-04-06

## 2022-04-06 RX ORDER — SODIUM CHLORIDE 0.9 % (FLUSH) 0.9 %
5-40 SYRINGE (ML) INJECTION PRN
Status: DISCONTINUED | OUTPATIENT
Start: 2022-04-06 | End: 2022-04-07 | Stop reason: HOSPADM

## 2022-04-06 RX ORDER — DEXTROSE MONOHYDRATE 50 MG/ML
INJECTION, SOLUTION INTRAVENOUS ONCE
Status: COMPLETED | OUTPATIENT
Start: 2022-04-06 | End: 2022-04-06

## 2022-04-06 RX ORDER — DIPHENHYDRAMINE HYDROCHLORIDE 50 MG/ML
50 INJECTION INTRAMUSCULAR; INTRAVENOUS
Status: CANCELLED | OUTPATIENT
Start: 2022-04-06

## 2022-04-06 RX ORDER — PALONOSETRON 0.05 MG/ML
0.25 INJECTION, SOLUTION INTRAVENOUS ONCE
Status: CANCELLED | OUTPATIENT
Start: 2022-04-06

## 2022-04-06 RX ORDER — SODIUM CHLORIDE 0.9 % (FLUSH) 0.9 %
5-40 SYRINGE (ML) INJECTION PRN
Status: CANCELLED | OUTPATIENT
Start: 2022-04-06

## 2022-04-06 RX ORDER — DEXTROSE MONOHYDRATE 50 MG/ML
INJECTION, SOLUTION INTRAVENOUS ONCE
Status: CANCELLED | OUTPATIENT
Start: 2022-04-06

## 2022-04-06 RX ORDER — HEPARIN SODIUM (PORCINE) LOCK FLUSH IV SOLN 100 UNIT/ML 100 UNIT/ML
500 SOLUTION INTRAVENOUS PRN
Status: DISCONTINUED | OUTPATIENT
Start: 2022-04-06 | End: 2022-04-07 | Stop reason: HOSPADM

## 2022-04-06 RX ORDER — SODIUM CHLORIDE 9 MG/ML
INJECTION, SOLUTION INTRAVENOUS CONTINUOUS
Status: CANCELLED | OUTPATIENT
Start: 2022-04-06

## 2022-04-06 RX ORDER — PALONOSETRON 0.05 MG/ML
0.25 INJECTION, SOLUTION INTRAVENOUS ONCE
Status: COMPLETED | OUTPATIENT
Start: 2022-04-06 | End: 2022-04-06

## 2022-04-06 RX ORDER — FLUOROURACIL 50 MG/ML
200 INJECTION, SOLUTION INTRAVENOUS ONCE
Status: CANCELLED | OUTPATIENT
Start: 2022-04-06

## 2022-04-06 RX ORDER — ONDANSETRON 2 MG/ML
8 INJECTION INTRAMUSCULAR; INTRAVENOUS
Status: CANCELLED | OUTPATIENT
Start: 2022-04-06

## 2022-04-06 RX ORDER — EPINEPHRINE 1 MG/ML
0.3 INJECTION, SOLUTION, CONCENTRATE INTRAVENOUS PRN
Status: CANCELLED | OUTPATIENT
Start: 2022-04-06

## 2022-04-06 RX ORDER — DEXAMETHASONE 4 MG/1
8 TABLET ORAL
Qty: 6 TABLET | Refills: 3 | Status: SHIPPED | OUTPATIENT
Start: 2022-04-06 | End: 2022-04-09

## 2022-04-06 RX ORDER — FLUOROURACIL 50 MG/ML
200 INJECTION, SOLUTION INTRAVENOUS ONCE
Status: COMPLETED | OUTPATIENT
Start: 2022-04-06 | End: 2022-04-06

## 2022-04-06 RX ADMIN — FLUOROURACIL 350 MG: 50 INJECTION, SOLUTION INTRAVENOUS at 13:04

## 2022-04-06 RX ADMIN — FLUOROURACIL 3500 MG: 50 INJECTION, SOLUTION INTRAVENOUS at 13:10

## 2022-04-06 RX ADMIN — PALONOSETRON 0.25 MG: 0.05 INJECTION, SOLUTION INTRAVENOUS at 10:23

## 2022-04-06 RX ADMIN — LEUCOVORIN CALCIUM 350 MG: 350 INJECTION, POWDER, LYOPHILIZED, FOR SOLUTION INTRAMUSCULAR; INTRAVENOUS at 10:30

## 2022-04-06 RX ADMIN — SODIUM CHLORIDE, PRESERVATIVE FREE 20 ML: 5 INJECTION INTRAVENOUS at 08:35

## 2022-04-06 RX ADMIN — SODIUM CHLORIDE, PRESERVATIVE FREE 10 ML: 5 INJECTION INTRAVENOUS at 08:34

## 2022-04-06 RX ADMIN — DEXAMETHASONE SODIUM PHOSPHATE 12 MG: 4 INJECTION, SOLUTION INTRAMUSCULAR; INTRAVENOUS at 09:56

## 2022-04-06 RX ADMIN — SODIUM CHLORIDE, PRESERVATIVE FREE 20 ML: 5 INJECTION INTRAVENOUS at 13:10

## 2022-04-06 RX ADMIN — DEXTROSE MONOHYDRATE: 50 INJECTION, SOLUTION INTRAVENOUS at 09:52

## 2022-04-06 RX ADMIN — OXALIPLATIN 125 MG: 5 INJECTION, SOLUTION INTRAVENOUS at 10:31

## 2022-04-06 ASSESSMENT — ENCOUNTER SYMPTOMS
RECTAL PAIN: 0
ABDOMINAL DISTENTION: 0
CHEST TIGHTNESS: 0
CONSTIPATION: 0
WHEEZING: 0
SORE THROAT: 0
COUGH: 0
BACK PAIN: 0
FACIAL SWELLING: 0
EYE DISCHARGE: 0
VOMITING: 0
SHORTNESS OF BREATH: 0
BLOOD IN STOOL: 0
DIARRHEA: 0
NAUSEA: 0
ABDOMINAL PAIN: 0
TROUBLE SWALLOWING: 0
COLOR CHANGE: 0

## 2022-04-06 NOTE — PROGRESS NOTES
[facility] [address] [facility], [address] [facility] [address]  Ibrahima Alfonso  Dept: [phone]  Dept Fax: [phone]: [phone]    Visit Date:4/6/2022     Tamir Godwin is a [de-identified] y.o. 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 She is status post Whipple procedure. She presents to the medical oncology clinic to continue adjuvant  Patient was admitted to [facility] on 11/09/2021 for jaundice and not feeling well. She noticed juandice since 11/05/21. Associated symptom includes RUQ pain, itching and dark urine with light color stool. Hospital day 2, GI was consulted for possible choledocholithiasis. Liver function test transaminitis with AST//228 and significant Alk phos elevate at 421. CT abdominal (11/9) show Distend gallbladder with dilated common bile duct but no stone and hepatomegaly. RUQ US (11/09): show evidence of distened gall bladder with gallbladder sludge both intra and extra hepatic biliary duct dilation with no stone no para cholecystic edema. She underwent ERCP on 11/10/2021. Ampulla and major papula were of normal appearance on endoscopy. Procedure was complicated by retroperitoneal perforation. Procedure was terminated. Patient was started on Zosyn. Patient was administered lactated Ringer's at 100 cc an hour and kept n.p.o. Diana Padilla was then transferred to [address] for higher level care on 11/11/21. She underwent initial exploratory surgery, where a bowel perforation was not noted. She was then monitored for elevated LFTs and hyperbilirubinemia. Patient then returned to  for Whipple surgery on 12/4/21 by Dr. Jhonny Hensley. 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   She tolerated Whipple without complications. She initially had 4 drains present. Currently only one pancreatic drain remains. Incision is healing well, without drainage, erythema, or warmth.   After discharge, she was in Rehab facility in [place], recently discharged . She is currently back at home, her son is here to stay with her for a couple of days. Patient didn't have any medical history except cataract , run of A. fib during recent hospitalization at [facility].  (which was schedule for last Monday and it was cancelled due to patient in ED). Take multivitamin daily.      Patient moved to [facility] 5 years ago to be with one of her sons, who unfortunately  last year. She reported hx of 10 years smoking when she was young with 1/5 pack/day (5 pack years). Quited smoking  In . Denied any alcohol use, receational drug use. Have 3 vaginal birth without any blood transfusion product. Patient reported has colonoscopy done in  with 2 polyps was removed      History on 2022:  The patient presents to the medical oncology clinic for cycle # 6 of FOLFOX. She reports that she tolerated last FOLFOX well. She denies having any oral mucositis, no nausea, no vomiting. She denies having any peripheral neuropathy. No diarrhea no abdominal pain. Patient denies having any fevers. No skin rash, no palmar and plantar erythema. She reports fair appetite, her weight is stable.   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 Erin Clay    ERCP N/A 11/10/2021    ERCP WITH STENT INSERTION performed by Day Ledesma MD at [facility]  2021    exp lap, radical celiac and portal lymph node dissection,pylorus preserving pancreatoduodenectomy-Dr Ewing IU    PORT SURGERY Left 2022    SINGLE LUMEN SMARTPORT INSERTION performed by Keren Rojo MD at [facility] History   Problem Relation Age of Onset    Breast Cancer Mother     Colon Cancer Mother     Cancer Father         bladder and lung    Lung Cancer Father     Kidney Disease Brother     No Known Problems Maternal Grandmother     No Known Problems Maternal Grandfather     No Known Problems Paternal Grandmother     No Known Problems Paternal Grandfather       Social History     Tobacco Use    Smoking status: Former Smoker     Packs/day: 0.00     Years: 10.00     Pack years: 0.00     Types: Cigarettes     Quit date: 1972     Years since quittin.3    Smokeless tobacco: Never Used    Tobacco comment: social smoker   Substance Use Topics    Alcohol use: Not Currently      Current Outpatient Medications   Medication Sig Dispense Refill    potassium chloride (KLOR-CON M) 20 MEQ extended release tablet Take 1 tablet by mouth daily for 10 days (Patient not taking: Reported on 2022) 10 tablet 2    amiodarone (CORDARONE) 200 MG tablet Take 1 tablet by mouth once daily 30 tablet 0    ferrous sulfate (IRON 325) 325 (65 Fe) MG tablet Take 325 mg by mouth daily (with breakfast)      amLODIPine (NORVASC) 5 MG tablet Take 0.5 tablets by mouth daily 30 tablet 1    lidocaine-prilocaine (EMLA) 2.5-2.5 % cream Apply topically as needed. 30 g 1    aspirin 81 MG chewable tablet Take 81 mg by mouth daily       docusate sodium (COLACE) 100 MG capsule Take 100 mg by mouth 2 times daily      apixaban (ELIQUIS) 5 MG TABS tablet Take 1 tablet by mouth 2 times daily 180 tablet 1    ondansetron (ZOFRAN) 4 MG tablet Take 4 mg by mouth       No current facility-administered medications for this visit.      Facility-Administered Medications Ordered in Other Visits   Medication Dose Route Frequency Provider Last Rate Last Admin    sodium chloride flush 0.9 % injection 5-40 mL  5-40 mL IntraVENous PRN Madison Ramos MD   20 mL at 04/20/22 1109    heparin flush 100 UNIT/ML injection 500 Units  500 Units IntraCATHeter PRN Madison Ramos MD          No Known Allergies   Health Maintenance   Topic Date Due    DTaP/Tdap/Td vaccine (1 - Tdap) Never done    Shingles Vaccine (1 of 2) Never done    DEXA (modify frequency per FRAX score)  Never done    Annual Wellness Visit (AWV)  Never done    Pneumococcal 65+ years Vaccine (2 - PPSV23 or PCV20) 12/21/2021    Depression Screen  12/28/2022    TSH  12/28/2022    Flu vaccine  Completed    COVID-19 Vaccine  Completed    Hepatitis A vaccine  Aged Out    Hepatitis B vaccine  Aged Out    Hib vaccine  Aged Out    Meningococcal (ACWY) vaccine  Aged Out        Subjective:   Review of Systems   Constitutional: Positive for appetite change and fatigue. Negative for activity change and fever. HENT: Negative for congestion, dental problem, facial swelling, hearing loss, mouth sores, nosebleeds, sore throat, tinnitus and trouble swallowing. Eyes: Negative for discharge and visual disturbance. Respiratory: Negative for cough, chest tightness, shortness of breath and wheezing. Cardiovascular: Negative for chest pain, palpitations and leg swelling. Gastrointestinal: Negative for abdominal distention, abdominal pain, blood in stool, constipation, diarrhea, nausea, rectal pain and vomiting. Endocrine: Negative for cold intolerance, polydipsia and polyuria. Genitourinary: Negative for decreased urine volume, difficulty urinating, dysuria, flank pain, hematuria and urgency. Musculoskeletal: Negative for arthralgias, back pain, gait problem, joint swelling, myalgias and neck stiffness. Skin: Negative for color change, rash and wound. Neurological: Negative for dizziness, tremors, seizures, speech difficulty, weakness, light-headedness, numbness and headaches. Hematological: Negative for adenopathy.  Does not bruise/bleed easily. Psychiatric/Behavioral: Negative for confusion and sleep disturbance. The patient is not nervous/anxious. Objective:   Physical Exam  Vitals reviewed. Constitutional:       General: She is not in acute distress. Appearance: She is well-developed. HENT:      Head: Normocephalic. Mouth/Throat:      Pharynx: No oropharyngeal exudate. Eyes:      General: No scleral icterus. Right eye: No discharge. Left eye: No discharge. Pupils: Pupils are equal, round, and reactive to light. Neck:      Thyroid: No thyromegaly. Vascular: No JVD. Trachea: No tracheal deviation. Cardiovascular:      Rate and Rhythm: Normal rate. Heart sounds: Normal heart sounds. No murmur heard. No friction rub. No gallop. Pulmonary:      Effort: Pulmonary effort is normal. No respiratory distress. Breath sounds: Normal breath sounds. No stridor. No wheezing or rales. Chest:      Chest wall: No tenderness. Abdominal:      General: Bowel sounds are normal. There is no distension. Palpations: Abdomen is soft. There is no mass. Tenderness: There is no abdominal tenderness. There is no rebound. Musculoskeletal:         General: Normal range of motion. Cervical back: Normal range of motion and neck supple. Comments: Good range of motion in all four extremities. Lymphadenopathy:      Cervical: No cervical adenopathy. Skin:     General: Skin is warm. Findings: No erythema or rash. Neurological:      Mental Status: She is alert and oriented to person, place, and time. Cranial Nerves: No cranial nerve deficit. Motor: No abnormal muscle tone. Deep Tendon Reflexes: Reflexes are normal and symmetric. Psychiatric:         Behavior: Behavior normal.         Thought Content:  Thought content normal.         Judgment: Judgment normal.         BP (!) 122/58   Pulse 80   Temp 98 °F (36.7 °C) (Oral)   Resp 16   Ht 5' 5\" (1.651 m)   Wt 143 lb (64.9 kg)   SpO2 100%   BMI 23.80 kg/m²      ECOG status is 1    Imaging studies and labs:   US THYROID    Result Date: 1/10/2022  PROCEDURE: US THYROID CLINICAL INFORMATION: Elevated TSH, Elevated serum free T4 level COMPARISON: No prior study. TECHNIQUE: Grayscale and color sonographic imaging of the thyroid gland performed in longitudinal and transverse planes. TECHNICAL DATA: Right Thyroid - 4.14 x 1.24 x 2.07 cm Left Thyroid -5.05 x 1.63 x 1.65 cm Isthmus -0.36 cm FINDINGS: THYROID SIZE: Thyroid gland is normal left thyroid lobe is prominent. NODULES: 1. Mixed solid and cystic nodule 1.2 cm x 0.8 x 0.9 cm mid right thyroid lobe consistent with a tirad 2 lesion this is not considered suspicious. 2. Mixed solid and cystic nodule measuring 1.0 x 0.8 x 0.8 cm mid right thyroid lobe adjacent to the first nodule. Also a TR 2 lesion. 3. 0.6 x 0.5 x 0.5 cm mixed solid and cystic lesion mid left thyroid lobe. T are too lesion. 4. Completely cystic lesion medial left thyroid lobe 0.9 x 0.6 x 0.7 cm considered a benign lesion. TR 1 lesion. PARENCHYMAL ECHOGENICITY/VASCULARITY: Normal. MICROLITHIASIS: None. CERVICAL LYMPHADENOPATHY: 1. There are no pathologically enlarged lymph nodes adjacent to the thyroid gland. 1. Mild prominence the left thyroid lobe. Multiple mixed solid and cystic lesions which are not suspicious and do not require FNA. Consider follow-up in one to 2 years **This report has been created using voice recognition software. It may contain minor errors which are inherent in voice recognition technology. ** Final report electronically signed by Dr. Jerad Saucedo on 1/10/2022 7:38 AM    Lab Results   Component Value Date    WBC 3.0 (L) 04/20/2022    HGB 10.4 (L) 04/20/2022    HCT 32.3 (L) 04/20/2022     (H) 04/20/2022     04/20/2022       Chemistry        Component Value Date/Time     (H) 04/20/2022 1108     12/28/2021 1031    K 3.6 04/20/2022 1108    K 3.8 12/28/2021 1031     12/28/2021 1031    CO2 24 12/28/2021 1031    BUN 17 12/28/2021 1031    CREATININE 1.3 (H) 04/20/2022 1108    CREATININE 1.4 (H) 12/28/2021 1031        Component Value Date/Time    CALCIUM 9.3 12/28/2021 1031    ALKPHOS 162 (H) 04/06/2022 0837    AST 32 04/06/2022 0837    ALT 31 04/06/2022 0837    BILITOT 0.9 04/06/2022 0837            Assessment:  1. Stage III B periampullary adenocarcinoma, pancreaticobiliary type. Patient is s/p Whipple procedure on December 4, 2021. Alisson-ampullary cancers represent a group of malignancies distinct from those arising from other hepato-biliary structures. These cancers are pathologically adenocarcinomas, and in an  estimated 80% of cases are amenable to surgical [id number], 26. However this treatment regimen is often augmented by the use of adjuvant chemotherapy, especially amongst those with advanced disease. The pooled overall 5 year survival for alisson-ampullary cancers is between 30 to 50%. Treatment decisions are generally extrapolated from pancreatic chemotherapy protocols and consist mainly of traditional chemotherapy drugs. I discussed with the patient her potential treatment options. They include FOLFIRINOX, FOLFOX or Gemzar and Abraxane. I do not think that due to her age the patient will be able to tolerate FOLFIRINOX since this is a very aggressive regimen. I had a lengthy discussion with the patient and her daughter in law. I want to be sure that while the patient is going through chemotherapy treatment she has good social and family support. Patient had follow-up with the surgeon at [facility], last drain was removed and the patient received clearance for surgery. In preparation for chemotherapy the patient underwent successful and uncomplicated Mediport placement. She started FOLFOX on January 26, 2022.  2.  Adjuvant chemotherapy with FOLFOX. Patient tolerated last cycle well.   She denies having any side effects, specifically no nausea no vomiting no oral mucositis. Patient denies having any diarrhea no abdominal pain. She denies having any skin rash no hand-and-foot erythema. No fevers. She reports gradually worsening fatigue. Today, her vital signs are stable, her labs are within range allowing for treatment. We will proceed with cycle #6 of FOLFOX. The dose of 5-FU bolus and leucovorin is reduced from 400 mg/m² to 200 mg/m² due to mild leukopenia. Oxaliplatin is reduced from 85 mg/m²  to 70 mg/m² due to mild neutropenia. 5-FU continuous infusion is reduced from 2400 mg/m² to 2000 mg/m² for mild neutropenia. Proceed with treatment today. 3. Bilateral leg swelling. The patient had venous Doppler today that was negative for evidence of DVT. CT of the abdomen pelvis and chest in 10 days    RTC to see me for labs: CBC, BMP, LFTs and next dose of chemotherapy         Diagnosis Orders   1. Malignant neoplasm of pancreas, unspecified location of malignancy ([facility] [address]  VL DUP LOWER EXTREMITY VENOUS BILATERAL    CT ABDOMEN PELVIS W IV CONTRAST Additional Contrast? None    CT CHEST W CONTRAST   2. Localized swelling of both lower legs  VL DUP LOWER EXTREMITY VENOUS BILATERAL   3. Encounter for chemotherapy management     4. Anemia, unspecified type     5. History of Whipple procedure          Plan:   Return in about 2 weeks (around 4/20/2022).      Orders Placed:   Orders Placed This Encounter   Procedures    VL DUP LOWER EXTREMITY VENOUS BILATERAL     Standing Status:   Future     Number of Occurrences:   1     Standing Expiration Date:   4/6/2023    CT ABDOMEN PELVIS W IV CONTRAST Additional Contrast? None     Standing Status:   Future     Number of Occurrences:   1     Standing Expiration Date:   4/6/2023     Order Specific Question:   Additional Contrast?     Answer:   None     Order Specific Question:   STAT Creatinine as needed:     Answer:   No    CT CHEST W CONTRAST     Standing Status:   Future     Number of Occurrences:   1     Standing Expiration Date:   4/6/2023     Order Specific Question:   STAT Creatinine as needed:     Answer:   No        Medications Prescribed:   No orders of the defined types were placed in this encounter. Discussed use, benefit, and side effectsof prescribed medications. All patient questions answered. Pt voiced understanding. Instructed to continue current medications, diet and exercise. Patient agreed with treatment plan. Follow up as directed.

## 2022-04-06 NOTE — PLAN OF CARE
Problem: Discharge Planning  Goal: Knowledge of discharge instructions  Description: Knowledge of discharge instructions  Outcome: Met This Shift  Note: Verbalize understanding of discharge instructions, follow up appointments, and when to call Physician. Intervention: Discharge to appropriate level of care  Note: Discuss understanding of discharge instructions, follow up appointments and when to call Physician. Problem: Intellectual/Education/Knowledge Deficit  Goal: Teaching initiated upon admission  Outcome: Met This Shift  Note: Patient verbalizes understanding to verbal information given on Oxaliplatin, 5FU push and CADD pump,action and possible side effects. Aware to call MD if develop complications. Intervention: Verbal/written education provided  Note: Chemotherapy Teaching     What is Chemotherapy   Drug action [x]   Method of Administration [x]   Handouts given []     Side Effects  Nausea/vomiting [x]   Diarrhea [x]   Fatigue [x]   Signs / Symptoms of infection [x]   Neutropenia [x]   Thrombocytopenia [x]   Alopecia [x]   neuropathy [x]   Shelby diet &  the importance of fluids [x]       Micellaneous  Importance of nutrition [x]   Importance of oral hygiene [x]   When to call the MD [x]   Monitoring labs [x]   Use of supportive services []     Explanation of Drug Regimen / Frequency  Day 1 cycle 6  Oxaliplatin, 5FU push and CADD pump     Comments  Verbalized understanding to drug,action,side effects and when to call MD         Problem: Falls - Risk of:  Goal: Will remain free from falls  Description: Will remain free from falls  Outcome: Met This Shift  Note: Free from falls while in O.P. Oncology. Intervention: Assess risk factors for falls  Note: Discussed the need to use the call light for assistance when getting up to ambulate.       Problem: Infection - Central Venous Catheter-Associated Bloodstream Infection:  Goal: Will show no infection signs and symptoms  Description: Will show no infection signs and symptoms  Outcome: Met This Shift  Note: Discuss port maintenance,infection prevention, sign of infection and when to call MD.   Intervention: Infection risk assessment  Note: Mediport site with no redness or warmth. Skin over port site intact with no signs of breakdown noted. Patient verbalizes signs/symptoms of port infection and when to notify the physician. Care plan reviewed with patient. Patient verbalize understanding of the plan of care and contribute to goal setting.

## 2022-04-06 NOTE — ONCOLOGY
Chemotherapy Administration    Pre-assessment Data: Antineoplastic Agents  See toxicity flow sheet for assessment                                          [x]         Interventions:   Chemotherapy SQ injection given []   Taxol administered-VS per protocol []   Blood pressure meds held 12 hours prior to Rituxan/Ruxience []   Rituxan/Ruxience administered- VS and precautions per guidelines []   Emergency drugs available as appropriate [x]   Anaphylaxis assessment completed [x]   Pre-medications administered as ordered [x]   Blood return noted upon initiation of chemotherapy [x]   Blood return noted each 1-2ml of a vesicant medication if given IV push []   Navelbine, Vincristine and Velban given as a monitored wide open drip, blood return noted before during and after infusion.  []   Blood return noted each 2-3ml of a non-vesicant medication if given IV push [x]   Patient aware of potential Immunotherapy toxicities []   Monitor for signs / symptoms of hypersensitivity reaction [x]   Chemotherapy orders (drug/dose/rate) verified by 2 Chemo certified RNs [x]   Monitor IV site and blood return throughout the infusion of the medication [x]   Document IV site checks on the IV assessment form [x]   Document chemotherapy teaching on the Patient Education tab [x]   Document patient verbalizes understanding of medications being administered-   Oxaliplatin, 5FU push and CADD pump [x]   If IV infiltration, see ONS Guidelines []   Other:      []

## 2022-04-06 NOTE — PROGRESS NOTES
Patient tolerated  Oxaliplatin, 5FU push and CADD pump without any complications. Denies dizziness, lightheadedness, acute nausea or vomiting, headache, heart palpitations, rash/itching or increased SOB. CADD pump 5FU added via Medi-port to infuse at 5.4ml/hr over 46 hours. Connections secured and tape to chest. Patient and family instructed on pump- it's usage,what to do if alarm goes off, and who to call if any questions. Verified pump running before discharge. Last vital signs  BP (!) 140/62   Pulse 78   Temp 98.1 °F (36.7 °C) (Oral)   Resp 16   Ht 5' 5\" (1.651 m)   Wt 143 lb 9.6 oz (65.1 kg)   SpO2 100%   BMI 23.90 kg/m²     Patient instructed if they experience any of the above symptoms following today's visit, he/she is to notify the Physician or go to the Emergency Dept. Discharge instructions given to patient, Verbalizes understanding. Ambulated off unit per self in stable condition with all belongings.

## 2022-04-06 NOTE — PATIENT INSTRUCTIONS
1. Proceed with treatment today  2. Bilateral venous Doppler because of bilateral leg swelling today or tomorrow  3. CT of the abdomen pelvis and chest in 10 days  4.   RTC to see me for labs: CBC, BMP, LFTs and next dose of chemotherapy

## 2022-04-07 LAB — CA 19-9: 39 U/ML (ref 0–35)

## 2022-04-08 ENCOUNTER — HOSPITAL ENCOUNTER (OUTPATIENT)
Dept: INFUSION THERAPY | Age: 82
Discharge: HOME | End: 2022-04-08
Payer: MEDICARE

## 2022-04-08 VITALS
OXYGEN SATURATION: 100 % | DIASTOLIC BLOOD PRESSURE: 74 MMHG | HEART RATE: 74 BPM | TEMPERATURE: 98.2 F | RESPIRATION RATE: 16 BRPM | SYSTOLIC BLOOD PRESSURE: 139 MMHG

## 2022-04-08 DIAGNOSIS — C25.9 MALIGNANT NEOPLASM OF PANCREAS, UNSPECIFIED LOCATION OF MALIGNANCY (HCC): Primary | ICD-10-CM

## 2022-04-08 PROCEDURE — 2580000003 HC RX 258: Performed by: INTERNAL MEDICINE

## 2022-04-08 PROCEDURE — 6360000002 HC RX W HCPCS: Performed by: INTERNAL MEDICINE

## 2022-04-08 RX ORDER — HEPARIN SODIUM (PORCINE) LOCK FLUSH IV SOLN 100 UNIT/ML 100 UNIT/ML
500 SOLUTION INTRAVENOUS PRN
Status: DISCONTINUED | OUTPATIENT
Start: 2022-04-08 | End: 2022-04-09 | Stop reason: HOSPADM

## 2022-04-08 RX ORDER — SODIUM CHLORIDE 9 MG/ML
25 INJECTION, SOLUTION INTRAVENOUS PRN
Status: CANCELLED | OUTPATIENT
Start: 2022-04-08

## 2022-04-08 RX ORDER — HEPARIN SODIUM (PORCINE) LOCK FLUSH IV SOLN 100 UNIT/ML 100 UNIT/ML
500 SOLUTION INTRAVENOUS PRN
Status: CANCELLED | OUTPATIENT
Start: 2022-04-08

## 2022-04-08 RX ORDER — SODIUM CHLORIDE 0.9 % (FLUSH) 0.9 %
5-40 SYRINGE (ML) INJECTION PRN
Status: DISCONTINUED | OUTPATIENT
Start: 2022-04-08 | End: 2022-04-09 | Stop reason: HOSPADM

## 2022-04-08 RX ORDER — SODIUM CHLORIDE 0.9 % (FLUSH) 0.9 %
5-40 SYRINGE (ML) INJECTION PRN
Status: CANCELLED | OUTPATIENT
Start: 2022-04-08

## 2022-04-08 RX ADMIN — SODIUM CHLORIDE, PRESERVATIVE FREE 20 ML: 5 INJECTION INTRAVENOUS at 12:16

## 2022-04-08 RX ADMIN — Medication 500 UNITS: at 12:16

## 2022-04-08 NOTE — PLAN OF CARE
Problem: Discharge Planning  Goal: Knowledge of discharge instructions  Description: Knowledge of discharge instructions  Outcome: Met This Shift  Note: Verbalized understanding of discharge instructions, follow-up appointments, and when to call the physician. Intervention: Discharge to appropriate level of care  Note: Discuss understanding of discharge instructions,follow-up appointments, and when to call the physician. Problem: Falls - Risk of:  Goal: Will remain free from falls  Description: Will remain free from falls  Outcome: Met This Shift  Note: Patient free from falls while in O.P. Oncology. Intervention: Assess risk factors for falls  Description: Assess risk factors for falls  Note: Patient assessed for fall risk on admission to [address] [place] [address]. Fall band placed on patient. Discussed the need to use the call light for assistance prior to getting up out of chair/bed. Problem: Infection - Central Venous Catheter-Associated Bloodstream Infection:  Goal: Will show no infection signs and symptoms  Description: Will show no infection signs and symptoms  Outcome: Met This Shift  Note: Mediport site with no redness or warmth. Skin over port site intact with no signs of breakdown noted. Patient verbalizes signs/symptoms of port infection and when to notify the physician. Intervention: Infection risk assessment  Description: Infection risk assessment  Note: Discuss port maintenance, infection prevention, signs and when to call Dr Deena Hurd reviewed with patient. Patient verbalize understanding of the plan of care and contribute to goal setting.

## 2022-04-13 ENCOUNTER — HOSPITAL ENCOUNTER (OUTPATIENT)
Dept: CT IMAGING | Age: 82
Discharge: HOME | End: 2022-04-13
Payer: MEDICARE

## 2022-04-13 DIAGNOSIS — C25.9 MALIGNANT NEOPLASM OF PANCREAS, UNSPECIFIED LOCATION OF MALIGNANCY (HCC): ICD-10-CM

## 2022-04-13 PROCEDURE — 71260 CT THORAX DX C+: CPT

## 2022-04-13 PROCEDURE — 74177 CT ABD & PELVIS W/CONTRAST: CPT

## 2022-04-13 PROCEDURE — 6360000004 HC RX CONTRAST MEDICATION: Performed by: INTERNAL MEDICINE

## 2022-04-13 RX ADMIN — IOPAMIDOL 100 ML: 755 INJECTION, SOLUTION INTRAVENOUS at 13:45

## 2022-04-19 RX ORDER — SODIUM CHLORIDE 9 MG/ML
5-40 INJECTION INTRAVENOUS PRN
Status: CANCELLED | OUTPATIENT
Start: 2022-05-02

## 2022-04-19 RX ORDER — SODIUM CHLORIDE 9 MG/ML
INJECTION, SOLUTION INTRAVENOUS ONCE
Status: CANCELLED | OUTPATIENT
Start: 2022-05-02 | End: 2022-04-20

## 2022-04-19 RX ORDER — SODIUM CHLORIDE 9 MG/ML
INJECTION, SOLUTION INTRAVENOUS CONTINUOUS
Status: CANCELLED | OUTPATIENT
Start: 2022-05-02

## 2022-04-19 RX ORDER — FLUOROURACIL 50 MG/ML
200 INJECTION, SOLUTION INTRAVENOUS ONCE
Status: CANCELLED | OUTPATIENT
Start: 2022-05-02

## 2022-04-19 RX ORDER — ACETAMINOPHEN 325 MG/1
650 TABLET ORAL
Status: CANCELLED | OUTPATIENT
Start: 2022-05-02

## 2022-04-19 RX ORDER — DIPHENHYDRAMINE HYDROCHLORIDE 50 MG/ML
50 INJECTION INTRAMUSCULAR; INTRAVENOUS
Status: CANCELLED | OUTPATIENT
Start: 2022-05-02

## 2022-04-19 RX ORDER — ONDANSETRON 2 MG/ML
8 INJECTION INTRAMUSCULAR; INTRAVENOUS
Status: CANCELLED | OUTPATIENT
Start: 2022-05-02

## 2022-04-19 RX ORDER — EPINEPHRINE 1 MG/ML
0.3 INJECTION, SOLUTION, CONCENTRATE INTRAVENOUS PRN
Status: CANCELLED | OUTPATIENT
Start: 2022-05-02

## 2022-04-19 RX ORDER — SODIUM CHLORIDE 0.9 % (FLUSH) 0.9 %
5-40 SYRINGE (ML) INJECTION PRN
Status: CANCELLED | OUTPATIENT
Start: 2022-05-02

## 2022-04-19 RX ORDER — ALBUTEROL SULFATE 90 UG/1
4 AEROSOL, METERED RESPIRATORY (INHALATION) PRN
Status: CANCELLED | OUTPATIENT
Start: 2022-05-02

## 2022-04-19 RX ORDER — DEXTROSE MONOHYDRATE 50 MG/ML
INJECTION, SOLUTION INTRAVENOUS ONCE
Status: CANCELLED | OUTPATIENT
Start: 2022-05-02

## 2022-04-19 RX ORDER — SODIUM CHLORIDE 9 MG/ML
25 INJECTION, SOLUTION INTRAVENOUS PRN
Status: CANCELLED | OUTPATIENT
Start: 2022-05-02

## 2022-04-19 RX ORDER — PALONOSETRON 0.05 MG/ML
0.25 INJECTION, SOLUTION INTRAVENOUS ONCE
Status: CANCELLED | OUTPATIENT
Start: 2022-05-02

## 2022-04-19 RX ORDER — MEPERIDINE HYDROCHLORIDE 50 MG/ML
12.5 INJECTION INTRAMUSCULAR; INTRAVENOUS; SUBCUTANEOUS PRN
Status: CANCELLED | OUTPATIENT
Start: 2022-05-02

## 2022-04-19 RX ORDER — HEPARIN SODIUM (PORCINE) LOCK FLUSH IV SOLN 100 UNIT/ML 100 UNIT/ML
500 SOLUTION INTRAVENOUS PRN
Status: CANCELLED | OUTPATIENT
Start: 2022-05-02

## 2022-04-20 ENCOUNTER — OFFICE VISIT (OUTPATIENT)
Dept: ONCOLOGY | Age: 82
End: 2022-04-20
Payer: MEDICARE

## 2022-04-20 ENCOUNTER — HOSPITAL ENCOUNTER (OUTPATIENT)
Dept: INFUSION THERAPY | Age: 82
Discharge: HOME | End: 2022-04-20
Payer: MEDICARE

## 2022-04-20 VITALS
SYSTOLIC BLOOD PRESSURE: 135 MMHG | HEIGHT: 65 IN | TEMPERATURE: 98.1 F | RESPIRATION RATE: 16 BRPM | HEART RATE: 64 BPM | OXYGEN SATURATION: 99 % | DIASTOLIC BLOOD PRESSURE: 69 MMHG | BODY MASS INDEX: 22.59 KG/M2 | WEIGHT: 135.6 LBS

## 2022-04-20 VITALS
OXYGEN SATURATION: 99 % | HEART RATE: 67 BPM | RESPIRATION RATE: 16 BRPM | SYSTOLIC BLOOD PRESSURE: 144 MMHG | BODY MASS INDEX: 22.59 KG/M2 | HEIGHT: 65 IN | WEIGHT: 135.6 LBS | DIASTOLIC BLOOD PRESSURE: 68 MMHG | TEMPERATURE: 98.1 F

## 2022-04-20 DIAGNOSIS — N17.9 AKI (ACUTE KIDNEY INJURY) (HCC): ICD-10-CM

## 2022-04-20 DIAGNOSIS — Z90.410 HISTORY OF WHIPPLE PROCEDURE: ICD-10-CM

## 2022-04-20 DIAGNOSIS — C25.9 MALIGNANT NEOPLASM OF PANCREAS, UNSPECIFIED LOCATION OF MALIGNANCY (HCC): Primary | ICD-10-CM

## 2022-04-20 DIAGNOSIS — D72.819 LEUKOPENIA, UNSPECIFIED TYPE: ICD-10-CM

## 2022-04-20 DIAGNOSIS — Z51.11 ENCOUNTER FOR CHEMOTHERAPY MANAGEMENT: ICD-10-CM

## 2022-04-20 DIAGNOSIS — Z90.49 HISTORY OF WHIPPLE PROCEDURE: ICD-10-CM

## 2022-04-20 LAB
ABSOLUTE IMMATURE GRANULOCYTE: 0 THOU/MM3 (ref 0–0.07)
ALBUMIN SERPL-MCNC: 2.8 G/DL (ref 3.5–5.1)
ALP BLD-CCNC: 128 U/L (ref 38–126)
ALT SERPL-CCNC: 23 U/L (ref 11–66)
AST SERPL-CCNC: 25 U/L (ref 5–40)
BASINOPHIL, AUTOMATED: 1 % (ref 0–3)
BASOPHILS ABSOLUTE: 0 THOU/MM3 (ref 0–0.1)
BILIRUB SERPL-MCNC: 0.9 MG/DL (ref 0.3–1.2)
BILIRUBIN DIRECT: 0.4 MG/DL (ref 0–0.3)
BUN, WHOLE BLOOD: 19 MG/DL (ref 8–26)
CHLORIDE, WHOLE BLOOD: 113 MEQ/L (ref 98–109)
CREATININE, WHOLE BLOOD: 1.3 MG/DL (ref 0.5–1.2)
EOSINOPHILS ABSOLUTE: 0 THOU/MM3 (ref 0–0.4)
EOSINOPHILS RELATIVE PERCENT: 0 % (ref 0–4)
GFR, ESTIMATED: 42 ML/MIN/1.73M2
GLUCOSE, WHOLE BLOOD: 108 MG/DL (ref 70–108)
HCT VFR BLD CALC: 32.3 % (ref 37–47)
HEMOGLOBIN: 10.4 GM/DL (ref 12–16)
IMMATURE GRANULOCYTES: 0 %
IONIZED CALCIUM, WHOLE BLOOD: 1.2 MMOL/L (ref 1.12–1.32)
LYMPHOCYTES # BLD: 64 % (ref 15–47)
LYMPHOCYTES ABSOLUTE: 1.9 THOU/MM3 (ref 1–4.8)
MCH RBC QN AUTO: 32.9 PG (ref 26–33)
MCHC RBC AUTO-ENTMCNC: 32.2 GM/DL (ref 32.2–35.5)
MCV RBC AUTO: 102 FL (ref 81–99)
MONOCYTES ABSOLUTE: 0.4 THOU/MM3 (ref 0.4–1.3)
MONOCYTES: 13 % (ref 0–12)
PDW BLD-RTO: 17.3 % (ref 11.5–14.5)
PLATELET # BLD: 186 THOU/MM3 (ref 130–400)
PMV BLD AUTO: 9.4 FL (ref 9.4–12.4)
POTASSIUM, WHOLE BLOOD: 3.6 MEQ/L (ref 3.5–4.9)
RBC # BLD: 3.16 MILL/MM3 (ref 4.2–5.4)
SEG NEUTROPHILS: 22 % (ref 43–75)
SEGMENTED NEUTROPHILS ABSOLUTE COUNT: 0.7 THOU/MM3 (ref 1.8–7.7)
SODIUM, WHOLE BLOOD: 148 MEQ/L (ref 138–146)
TOTAL CO2, WHOLE BLOOD: 23 MEQ/L (ref 23–33)
TOTAL PROTEIN: 5.6 G/DL (ref 6.1–8)
WBC # BLD: 3 THOU/MM3 (ref 4.8–10.8)

## 2022-04-20 PROCEDURE — 80047 BASIC METABLC PNL IONIZED CA: CPT

## 2022-04-20 PROCEDURE — 6360000002 HC RX W HCPCS: Performed by: INTERNAL MEDICINE

## 2022-04-20 PROCEDURE — 36591 DRAW BLOOD OFF VENOUS DEVICE: CPT

## 2022-04-20 PROCEDURE — 2580000003 HC RX 258: Performed by: INTERNAL MEDICINE

## 2022-04-20 PROCEDURE — 99211 OFF/OP EST MAY X REQ PHY/QHP: CPT

## 2022-04-20 PROCEDURE — 99214 OFFICE O/P EST MOD 30 MIN: CPT | Performed by: INTERNAL MEDICINE

## 2022-04-20 PROCEDURE — 85025 COMPLETE CBC W/AUTO DIFF WBC: CPT

## 2022-04-20 PROCEDURE — 80076 HEPATIC FUNCTION PANEL: CPT

## 2022-04-20 PROCEDURE — 96360 HYDRATION IV INFUSION INIT: CPT

## 2022-04-20 RX ORDER — SODIUM CHLORIDE 9 MG/ML
25 INJECTION, SOLUTION INTRAVENOUS PRN
Status: CANCELLED | OUTPATIENT
Start: 2022-04-20

## 2022-04-20 RX ORDER — SODIUM CHLORIDE 0.9 % (FLUSH) 0.9 %
5-40 SYRINGE (ML) INJECTION PRN
Status: CANCELLED | OUTPATIENT
Start: 2022-04-20

## 2022-04-20 RX ORDER — 0.9 % SODIUM CHLORIDE 0.9 %
500 INTRAVENOUS SOLUTION INTRAVENOUS ONCE
Status: COMPLETED | OUTPATIENT
Start: 2022-04-20 | End: 2022-04-20

## 2022-04-20 RX ORDER — 0.9 % SODIUM CHLORIDE 0.9 %
500 INTRAVENOUS SOLUTION INTRAVENOUS ONCE
Status: CANCELLED | OUTPATIENT
Start: 2022-04-20 | End: 2022-04-20

## 2022-04-20 RX ORDER — HEPARIN SODIUM (PORCINE) LOCK FLUSH IV SOLN 100 UNIT/ML 100 UNIT/ML
500 SOLUTION INTRAVENOUS PRN
Status: CANCELLED | OUTPATIENT
Start: 2022-04-20

## 2022-04-20 RX ORDER — SODIUM CHLORIDE 0.9 % (FLUSH) 0.9 %
5-40 SYRINGE (ML) INJECTION PRN
Status: DISCONTINUED | OUTPATIENT
Start: 2022-04-20 | End: 2022-04-21 | Stop reason: HOSPADM

## 2022-04-20 RX ORDER — ONDANSETRON 4 MG/1
4 TABLET, FILM COATED ORAL EVERY 8 HOURS PRN
COMMUNITY
Start: 2022-03-07

## 2022-04-20 RX ORDER — HEPARIN SODIUM (PORCINE) LOCK FLUSH IV SOLN 100 UNIT/ML 100 UNIT/ML
500 SOLUTION INTRAVENOUS PRN
Status: DISCONTINUED | OUTPATIENT
Start: 2022-04-20 | End: 2022-04-21 | Stop reason: HOSPADM

## 2022-04-20 RX ADMIN — SODIUM CHLORIDE, PRESERVATIVE FREE 10 ML: 5 INJECTION INTRAVENOUS at 11:04

## 2022-04-20 RX ADMIN — Medication 500 UNITS: at 13:19

## 2022-04-20 RX ADMIN — SODIUM CHLORIDE 500 ML: 9 INJECTION, SOLUTION INTRAVENOUS at 12:00

## 2022-04-20 RX ADMIN — SODIUM CHLORIDE, PRESERVATIVE FREE 20 ML: 5 INJECTION INTRAVENOUS at 11:09

## 2022-04-20 ASSESSMENT — ENCOUNTER SYMPTOMS
TROUBLE SWALLOWING: 0
COUGH: 0
EYE DISCHARGE: 0
CONSTIPATION: 0
CHEST TIGHTNESS: 0
ABDOMINAL DISTENTION: 0
ABDOMINAL PAIN: 0
SORE THROAT: 0
FACIAL SWELLING: 0
BACK PAIN: 0
DIARRHEA: 0
VOMITING: 0
NAUSEA: 0
COLOR CHANGE: 0
BLOOD IN STOOL: 0
RECTAL PAIN: 0
WHEEZING: 0
SHORTNESS OF BREATH: 0

## 2022-04-20 NOTE — PLAN OF CARE
Problem: Discharge Planning  Goal: Knowledge of discharge instructions  Description: Knowledge of discharge instructions  Outcome: Adequate for Discharge  Note: Verbalize understanding of discharge instructions, follow up appointments, and when to call Physician. Intervention: Discharge to appropriate level of care  Note: Discuss understanding of discharge instructions, follow up appointments and when to call Physician. Problem: Falls - Risk of:  Goal: Will remain free from falls  Description: Will remain free from falls  Outcome: Adequate for Discharge  Note: No falls occurred with visit today. Intervention: Assess risk factors for falls  Note: Discussed the need to use the call light for assistance when getting up to ambulate. Problem: Infection - Central Venous Catheter-Associated Bloodstream Infection:  Goal: Will show no infection signs and symptoms  Description: Will show no infection signs and symptoms  Outcome: Adequate for Discharge  Note: Mediport site with no redness or warmth. Skin over port site intact with no signs of breakdown noted. Patient verbalizes signs/symptoms of port infection and when to notify the physician. Intervention: Infection risk assessment  Note: Discuss port maintenance,infection prevention, sign of infection and when to call MD.    Care plan reviewed with patient. Patient verbalizes understanding of the plan of care and contributes to goal setting.

## 2022-04-20 NOTE — PATIENT INSTRUCTIONS
1.  Hold treatment today due to neutropenia. Will receive 500 MLS of normal saline over 1 hour due to rising creatinine. 2.  She will return to chemotherapy suite on Monday, April 25, 2022 for labs: CBC CMP start next cycle of FOLFOX.    3.  I will see the patient on May 9, 2022 on the day labs: CBC, BMP, LFT

## 2022-04-20 NOTE — PROGRESS NOTES
[facility] [address] [facility] [address], [address] [facility] [address]  [facility]  Dept: [phone]  Dept Fax: [phone]: [phone]    Visit Date:4/20/2022     Cameron Vallejo is a 81 y.o. 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 She is status post Whipple procedure. She presents to the medical oncology clinic to continue adjuvant  Patient was admitted to [facility] on 11/09/2021 for jaundice and not feeling well. She noticed juandice since 11/05/21. Associated symptom includes RUQ pain, itching and dark urine with light color stool. Hospital day 2, GI was consulted for possible choledocholithiasis. Liver function test transaminitis with AST//228 and significant Alk phos elevate at 421. CT abdominal (11/9) show Distend gallbladder with dilated common bile duct but no stone and hepatomegaly. RUQ US (11/09): show evidence of distened gall bladder with gallbladder sludge both intra and extra hepatic biliary duct dilation with no stone no para cholecystic edema. She underwent ERCP on 11/10/2021. Ampulla and major papula were of normal appearance on endoscopy. Procedure was complicated by retroperitoneal perforation. Procedure was terminated. Patient was started on Zosyn. Patient was administered lactated Ringer's at 100 cc an hour and kept n.p.o. Nilo White was then transferred to [address] for higher level care on 11/11/21. She underwent initial exploratory surgery, where a bowel perforation was not noted. She was then monitored for elevated LFTs and hyperbilirubinemia. Patient then returned to  for Whipple surgery on 12/4/21 by Dr. Elly Clark. 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 She tolerated Whipple without complications. She initially had 4 drains present. Currently only one pancreatic drain remains. Incision is healing well, without drainage, erythema, or warmth.   After discharge, she was in Rehab facility in [place], recently discharged . She is currently back at home, her son is here to stay with her for a couple of days. Patient didn't have any medical history except cataract , run of A. fib during recent hospitalization at [facility].  (which was schedule for last Monday and it was cancelled due to patient in ED). Take multivitamin daily.      Patient moved to [address] [place] [address] 5 years ago to be with one of her sons, who unfortunately  last year. She reported hx of 10 years smoking when she was young with 1/5 pack/day (5 pack years). Quited smoking  In . Denied any alcohol use, receational drug use. Have 3 vaginal birth without any blood transfusion product. Patient reported has colonoscopy done in  with 2 polyps was removed      History on 2022:  The patient presents to the medical oncology clinic for cycle #74 of FOLFOX. She reports that she tolerated last FOLFOX well. She denies having any oral mucositis, no nausea, no vomiting. She denies having any peripheral neuropathy. No diarrhea no abdominal pain. Patient denies having any fevers. No skin rash, no palmar and plantar erythema. She reports fair appetite, her weight is stable. She reports grade 2 fatigue.   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 Niko Silverio ERCP N/A 11/10/2021    ERCP WITH STENT INSERTION performed by Day Ledesma MD at [facility]  2021    exp lap, radical celiac and portal lymph node dissection,pylorus preserving pancreatoduodenectomy-Dr Ewing [facility]    PORT SURGERY Left 2022    SINGLE LUMEN UNRXRUQOE INSERTION performed by Elizabeth Leary MD at [address] [place] [address] History   Problem Relation Age of Onset    Breast Cancer Mother     Colon Cancer Mother     Cancer Father         bladder and lung    Lung Cancer Father     Kidney Disease Brother     No Known Problems Maternal Grandmother     No Known Problems Maternal Grandfather     No Known Problems Paternal Grandmother     No Known Problems Paternal Grandfather       Social History     Tobacco Use    Smoking status: Former Smoker     Packs/day: 0.00     Years: 10.00     Pack years: 0.00     Types: Cigarettes     Quit date: 1972     Years since quittin.4    Smokeless tobacco: Never Used    Tobacco comment: social smoker   Substance Use Topics    Alcohol use: Not Currently      Current Outpatient Medications   Medication Sig Dispense Refill    ondansetron (ZOFRAN) 4 MG tablet Take 4 mg by mouth every 8 hours as needed       ferrous sulfate (IRON 325) 325 (65 Fe) MG tablet Take 325 mg by mouth daily (with breakfast)      lidocaine-prilocaine (EMLA) 2.5-2.5 % cream Apply topically as needed. 30 g 1    docusate sodium (COLACE) 100 MG capsule Take 100 mg by mouth 2 times daily      dexamethasone (DECADRON) 4 MG tablet Finish 2022      apixaban (ELIQUIS) 5 MG TABS tablet Take 1 tablet by mouth 2 times daily 180 tablet 1    amLODIPine (NORVASC) 2.5 MG tablet Take 1 tablet by mouth daily 90 tablet 1    amiodarone (CORDARONE) 200 MG tablet Take 1 tablet by mouth daily 90 tablet 1    aspirin 81 MG chewable tablet Take 1 tablet by mouth daily 90 tablet 1     No current facility-administered medications for this visit.       No Known Allergies   Health Maintenance   Topic Date Due    Annual Wellness Visit (AWV)  Never done    DTaP/Tdap/Td vaccine (1 - Tdap) Never done    Shingles vaccine (1 of 2) Never done    DEXA (modify frequency per FRAX score) Never done    Pneumococcal 65+ years Vaccine (2 - PPSV23 or PCV20) 12/21/2021    Depression Screen  12/28/2022    Flu vaccine  Completed    COVID-19 Vaccine  Completed    Hepatitis A vaccine  Aged Out    Hepatitis B vaccine  Aged Out    Hib vaccine  Aged Out    Meningococcal (ACWY) vaccine  Aged Out        Subjective:   Review of Systems   Constitutional: Positive for appetite change and fatigue. Negative for activity change and fever. HENT: Negative for congestion, dental problem, facial swelling, hearing loss, mouth sores, nosebleeds, sore throat, tinnitus and trouble swallowing. Eyes: Negative for discharge and visual disturbance. Respiratory: Negative for cough, chest tightness, shortness of breath and wheezing. Cardiovascular: Negative for chest pain, palpitations and leg swelling. Gastrointestinal: Negative for abdominal distention, abdominal pain, blood in stool, constipation, diarrhea, nausea, rectal pain and vomiting. Endocrine: Negative for cold intolerance, polydipsia and polyuria. Genitourinary: Negative for decreased urine volume, difficulty urinating, dysuria, flank pain, hematuria and urgency. Musculoskeletal: Negative for arthralgias, back pain, gait problem, joint swelling, myalgias and neck stiffness. Skin: Negative for color change, rash and wound. Neurological: Negative for dizziness, tremors, seizures, speech difficulty, weakness, light-headedness, numbness and headaches. Hematological: Negative for adenopathy. Does not bruise/bleed easily. Psychiatric/Behavioral: Negative for confusion and sleep disturbance. The patient is not nervous/anxious. Objective:   Physical Exam  Vitals reviewed. Constitutional:       General: She is not in acute distress. Appearance: She is well-developed. HENT:      Head: Normocephalic. Mouth/Throat:      Pharynx: No oropharyngeal exudate. Eyes:      General: No scleral icterus. Right eye: No discharge. Left eye: No discharge. Pupils: Pupils are equal, round, and reactive to light. Neck:      Thyroid: No thyromegaly. Vascular: No JVD. Trachea: No tracheal deviation. Cardiovascular:      Rate and Rhythm: Normal rate. Heart sounds: Normal heart sounds. No murmur heard. No friction rub. No gallop. Pulmonary:      Effort: Pulmonary effort is normal. No respiratory distress. Breath sounds: Normal breath sounds. No stridor. No wheezing or rales. Chest:      Chest wall: No tenderness. Abdominal:      General: Bowel sounds are normal. There is no distension. Palpations: Abdomen is soft. There is no mass. Tenderness: There is no abdominal tenderness. There is no rebound. Musculoskeletal:         General: Normal range of motion. Cervical back: Normal range of motion and neck supple. Comments: Good range of motion in all four extremities. Lymphadenopathy:      Cervical: No cervical adenopathy. Skin:     General: Skin is warm. Findings: No erythema or rash. Neurological:      Mental Status: She is alert and oriented to person, place, and time. Cranial Nerves: No cranial nerve deficit. Motor: No abnormal muscle tone. Deep Tendon Reflexes: Reflexes are normal and symmetric. Psychiatric:         Behavior: Behavior normal.         Thought Content: Thought content normal.         Judgment: Judgment normal.         BP (!) 144/68 (Site: Right Upper Arm, Position: Sitting, Cuff Size: Medium Adult)   Pulse 67   Temp 98.1 °F (36.7 °C) (Oral)   Resp 16   Ht 5' 5\" (1.651 m)   Wt 135 lb 9.6 oz (61.5 kg)   SpO2 99%   BMI 22.57 kg/m²      ECOG status is 1    Imaging studies and labs:   US THYROID    Result Date: 1/10/2022  PROCEDURE: US THYROID CLINICAL INFORMATION: Elevated TSH, Elevated serum free T4 level COMPARISON: No prior study.  TECHNIQUE: Grayscale and color sonographic imaging of the thyroid gland performed in longitudinal and transverse planes. TECHNICAL DATA: Right Thyroid - 4.14 x 1.24 x 2.07 cm Left Thyroid -5.05 x 1.63 x 1.65 cm Isthmus -0.36 cm FINDINGS: THYROID SIZE: Thyroid gland is normal left thyroid lobe is prominent. NODULES: 1. Mixed solid and cystic nodule 1.2 cm x 0.8 x 0.9 cm mid right thyroid lobe consistent with a tirad 2 lesion this is not considered suspicious. 2. Mixed solid and cystic nodule measuring 1.0 x 0.8 x 0.8 cm mid right thyroid lobe adjacent to the first nodule. Also a TR 2 lesion. 3. 0.6 x 0.5 x 0.5 cm mixed solid and cystic lesion mid left thyroid lobe. T are too lesion. 4. Completely cystic lesion medial left thyroid lobe 0.9 x 0.6 x 0.7 cm considered a benign lesion. TR 1 lesion. PARENCHYMAL ECHOGENICITY/VASCULARITY: Normal. MICROLITHIASIS: None. CERVICAL LYMPHADENOPATHY: 1. There are no pathologically enlarged lymph nodes adjacent to the thyroid gland. 1. Mild prominence the left thyroid lobe. Multiple mixed solid and cystic lesions which are not suspicious and do not require FNA. Consider follow-up in one to 2 years **This report has been created using voice recognition software. It may contain minor errors which are inherent in voice recognition technology. ** Final report electronically signed by Dr. Deandre Smith on 1/10/2022 7:38 AM    Lab Results   Component Value Date    WBC 6.5 05/02/2022    HGB 11.5 (L) 05/02/2022    HCT 36.4 (L) 05/02/2022     (H) 05/02/2022     05/02/2022       Chemistry        Component Value Date/Time     05/02/2022 0954     12/28/2021 1031    K 3.5 05/02/2022 0954    K 3.8 12/28/2021 1031     12/28/2021 1031    CO2 24 12/28/2021 1031    BUN 17 12/28/2021 1031    CREATININE 1.2 05/02/2022 0954    CREATININE 1.4 (H) 12/28/2021 1031        Component Value Date/Time    CALCIUM 9.3 12/28/2021 1031    ALKPHOS 154 (H) 05/02/2022 0954    AST 31 05/02/2022 0954    ALT 22 05/02/2022 0954    BILITOT 0.9 05/02/2022 0954        CT of the chest, abdomen and pelvis on April 13, 2022 showed:  1. Postoperative changes are seen in the upper abdomen involving the gallbladder, pancreas and duodenum. A gastroenterostomy is noted. .   2. Extensive pneumobilia. 3. A 1.5 cm left ovarian cyst. Follow-up pelvic ultrasound can be obtained. 4. Colonic diverticulosis. 5. Other findings as described above. Assessment:  1. Stage III B periampullary adenocarcinoma, pancreaticobiliary type. Patient is s/p Whipple procedure on December 4, 2021. Alisson-ampullary cancers represent a group of malignancies distinct from those arising from other hepato-biliary structures. These cancers are pathologically adenocarcinomas, and in an  estimated 80% of cases are amenable to surgical [id number], 26. However this treatment regimen is often augmented by the use of adjuvant chemotherapy, especially amongst those with advanced disease. The pooled overall 5 year survival for alisson-ampullary cancers is between 30 to 50%. Treatment decisions are generally extrapolated from pancreatic chemotherapy protocols and consist mainly of traditional chemotherapy drugs. I discussed with the patient her potential treatment options. They include FOLFIRINOX, FOLFOX or Gemzar and Abraxane. I do not think that due to her age the patient will be able to tolerate FOLFIRINOX since this is a very aggressive regimen. I had a lengthy discussion with the patient and her daughter in law. I want to be sure that while the patient is going through chemotherapy treatment she has good social and family support. Patient had follow-up with the surgeon at [facility], last drain was removed and the patient received clearance for surgery. In preparation for chemotherapy the patient underwent successful and uncomplicated Mediport placement. She started FOLFOX on January 26, 2022.  2.  Adjuvant chemotherapy with FOLFOX. Patient tolerated last cycle well.   She denies having any side effects, specifically no nausea no vomiting no oral mucositis. Patient denies having any diarrhea no abdominal pain. She denies having any skin rash no hand-and-foot erythema. No fevers. She reports gradually worsening fatigue. Today, her vital signs are stable, her labs are within range allowing for treatment. We will proceed with cycle #4 of FOLFOX. The dose of 5-FU bolus and leucovorin was reduced from 400 mg/m² to 200 mg/m² due to mild leukopenia. Oxaliplatin was reduced from 85 mg/m²  to 70 mg/m² due to mild neutropenia. 5-FU continuous infusion reduced from 2400 mg/m² to 2000 mg/m² for mild neutropenia. She had restaging CT scan after 6 cycles of adjuvant chemotherapy. No evidence of recurrent or metastatic disease. Results were reviewed and discussed with the patient. We will continue adjuvant therapy, however no treatment today due to neutropenia. 3.  Elevated creatinine. 1.3 today. She will receive 500 MLS of normal saline over 1 hour. Instructed to increase oral fluid intake. She will return to chemotherapy suite on Monday, April 25, 2022 for labs: CBC CMP start next cycle of FOLFOX. Diagnosis Orders   1. Malignant neoplasm of pancreas, unspecified location of malignancy ([facility] [address]  CBC with Auto Differential    Hepatic Function Panel   2. Encounter for chemotherapy management     3. History of Whipple procedure     4. Leukopenia, unspecified type     5. SIRIA (acute kidney injury) ([facility] [address]          Plan:   Return in about 19 days (around 5/9/2022). Orders Placed:   Orders Placed This Encounter   Procedures    CBC with Auto Differential     Standing Status:   Future     Number of Occurrences:   1     Standing Expiration Date:   4/20/2023    Hepatic Function Panel     Standing Status:   Future     Number of Occurrences:   1     Standing Expiration Date:   4/20/2023        Medications Prescribed:   No orders of the defined types were placed in this encounter.            Discussed use, benefit, and side effectsof prescribed medications. All patient questions answered. Pt voiced understanding. Instructed to continue current medications, diet and exercise. Patient agreed with treatment plan. Follow up as directed.

## 2022-04-20 NOTE — PROGRESS NOTES
Patient assessed for the following post 500 ML FLUID bolus:    Dizziness   No  Lightheadedness  No      Acute nausea/vomiting No  Headache   No  Chest pain/pressure  No  Rash/itching   No  Shortness of breath  No    Patient kept for 20 minutes observation post infusion. Patient tolerated fluid bolus  without any complications. Last vital signs:   /69   Pulse 64   Temp 98.1 °F (36.7 °C) (Oral)   Resp 16   Ht 5' 5\" (1.651 m)   Wt 135 lb 9.6 oz (61.5 kg)   SpO2 99%   BMI 22.57 kg/m²         Patient instructed if experience any of the above symptoms following today's infusion,he/she is to notify MD immediately or go to the emergency department. Discharge instructions given to patient. Verbalizes understanding. Ambulated off unit per self with belongings. 1.  Hold treatment today due to neutropenia. Will receive 500 MLS of normal saline over 1 hour due to rising creatinine. 2.  She will return to chemotherapy suite on Monday, April 25, 2022 for labs: CBC CMP start next cycle of FOLFOX.    3.  I will see the patient on May 9, 2022 on the day labs: CBC, BMP, LFT

## 2022-04-20 NOTE — PROGRESS NOTES
Patient to be receiving 500 ml of normal saline over 1 hour today. Fluid hydration ordered today due to rising creatinine and poor PO intake at home. Patient symptomatic with complaints of weakness and fatigue.  Electronically signed by Nydia Lucio RN on 4/20/2022 at 12:02 PM  .

## 2022-04-25 ENCOUNTER — HOSPITAL ENCOUNTER (OUTPATIENT)
Dept: INFUSION THERAPY | Age: 82
Discharge: HOME | End: 2022-04-25
Payer: MEDICARE

## 2022-04-25 VITALS
HEIGHT: 65 IN | RESPIRATION RATE: 16 BRPM | OXYGEN SATURATION: 98 % | WEIGHT: 139 LBS | BODY MASS INDEX: 23.16 KG/M2 | DIASTOLIC BLOOD PRESSURE: 60 MMHG | TEMPERATURE: 98 F | HEART RATE: 80 BPM | SYSTOLIC BLOOD PRESSURE: 109 MMHG

## 2022-04-25 DIAGNOSIS — N17.9 AKI (ACUTE KIDNEY INJURY) (HCC): ICD-10-CM

## 2022-04-25 DIAGNOSIS — C25.9 MALIGNANT NEOPLASM OF PANCREAS, UNSPECIFIED LOCATION OF MALIGNANCY (HCC): Primary | ICD-10-CM

## 2022-04-25 LAB
ABSOLUTE IMMATURE GRANULOCYTE: 0.01 THOU/MM3 (ref 0–0.07)
ALBUMIN SERPL-MCNC: 3 G/DL (ref 3.5–5.1)
ALP BLD-CCNC: 135 U/L (ref 38–126)
ALT SERPL-CCNC: 20 U/L (ref 11–66)
AST SERPL-CCNC: 30 U/L (ref 5–40)
BASINOPHIL, AUTOMATED: 1 % (ref 0–3)
BASOPHILS ABSOLUTE: 0 THOU/MM3 (ref 0–0.1)
BILIRUB SERPL-MCNC: 0.9 MG/DL (ref 0.3–1.2)
BILIRUBIN DIRECT: 0.3 MG/DL (ref 0–0.3)
BUN, WHOLE BLOOD: 12 MG/DL (ref 8–26)
CHLORIDE, WHOLE BLOOD: 113 MEQ/L (ref 98–109)
CREATININE, WHOLE BLOOD: 1.2 MG/DL (ref 0.5–1.2)
EOSINOPHILS ABSOLUTE: 0 THOU/MM3 (ref 0–0.4)
EOSINOPHILS RELATIVE PERCENT: 0 % (ref 0–4)
GFR, ESTIMATED: 46 ML/MIN/1.73M2
GLUCOSE, WHOLE BLOOD: 181 MG/DL (ref 70–108)
HCT VFR BLD CALC: 35.3 % (ref 37–47)
HEMOGLOBIN: 11.2 GM/DL (ref 12–16)
IMMATURE GRANULOCYTES: 0 %
IONIZED CALCIUM, WHOLE BLOOD: 1.19 MMOL/L (ref 1.12–1.32)
LYMPHOCYTES # BLD: 60 % (ref 15–47)
LYMPHOCYTES ABSOLUTE: 2.5 THOU/MM3 (ref 1–4.8)
MCH RBC QN AUTO: 32.6 PG (ref 26–33)
MCHC RBC AUTO-ENTMCNC: 31.7 GM/DL (ref 32.2–35.5)
MCV RBC AUTO: 103 FL (ref 81–99)
MONOCYTES ABSOLUTE: 0.5 THOU/MM3 (ref 0.4–1.3)
MONOCYTES: 13 % (ref 0–12)
PDW BLD-RTO: 16.1 % (ref 11.5–14.5)
PLATELET # BLD: 251 THOU/MM3 (ref 130–400)
PMV BLD AUTO: 9.9 FL (ref 9.4–12.4)
POTASSIUM, WHOLE BLOOD: 3.5 MEQ/L (ref 3.5–4.9)
RBC # BLD: 3.44 MILL/MM3 (ref 4.2–5.4)
SEG NEUTROPHILS: 25 % (ref 43–75)
SEGMENTED NEUTROPHILS ABSOLUTE COUNT: 1.1 THOU/MM3 (ref 1.8–7.7)
SODIUM, WHOLE BLOOD: 148 MEQ/L (ref 138–146)
TOTAL CO2, WHOLE BLOOD: 22 MEQ/L (ref 23–33)
TOTAL PROTEIN: 5.8 G/DL (ref 6.1–8)
WBC # BLD: 4.2 THOU/MM3 (ref 4.8–10.8)

## 2022-04-25 PROCEDURE — 36591 DRAW BLOOD OFF VENOUS DEVICE: CPT

## 2022-04-25 PROCEDURE — 85025 COMPLETE CBC W/AUTO DIFF WBC: CPT

## 2022-04-25 PROCEDURE — 2580000003 HC RX 258: Performed by: INTERNAL MEDICINE

## 2022-04-25 PROCEDURE — 80047 BASIC METABLC PNL IONIZED CA: CPT

## 2022-04-25 PROCEDURE — 6360000002 HC RX W HCPCS: Performed by: INTERNAL MEDICINE

## 2022-04-25 PROCEDURE — 80076 HEPATIC FUNCTION PANEL: CPT

## 2022-04-25 RX ORDER — HEPARIN SODIUM (PORCINE) LOCK FLUSH IV SOLN 100 UNIT/ML 100 UNIT/ML
500 SOLUTION INTRAVENOUS PRN
Status: CANCELLED | OUTPATIENT
Start: 2022-04-25

## 2022-04-25 RX ORDER — SODIUM CHLORIDE 0.9 % (FLUSH) 0.9 %
5-40 SYRINGE (ML) INJECTION PRN
Status: DISCONTINUED | OUTPATIENT
Start: 2022-04-25 | End: 2022-04-26 | Stop reason: HOSPADM

## 2022-04-25 RX ORDER — SODIUM CHLORIDE 0.9 % (FLUSH) 0.9 %
5-40 SYRINGE (ML) INJECTION PRN
Status: CANCELLED | OUTPATIENT
Start: 2022-04-25

## 2022-04-25 RX ORDER — SODIUM CHLORIDE 9 MG/ML
25 INJECTION, SOLUTION INTRAVENOUS PRN
Status: CANCELLED | OUTPATIENT
Start: 2022-04-25

## 2022-04-25 RX ORDER — HEPARIN SODIUM (PORCINE) LOCK FLUSH IV SOLN 100 UNIT/ML 100 UNIT/ML
500 SOLUTION INTRAVENOUS PRN
Status: DISCONTINUED | OUTPATIENT
Start: 2022-04-25 | End: 2022-04-26 | Stop reason: HOSPADM

## 2022-04-25 RX ADMIN — SODIUM CHLORIDE, PRESERVATIVE FREE 10 ML: 5 INJECTION INTRAVENOUS at 09:25

## 2022-04-25 RX ADMIN — SODIUM CHLORIDE, PRESERVATIVE FREE 20 ML: 5 INJECTION INTRAVENOUS at 09:26

## 2022-04-25 RX ADMIN — Medication 500 UNITS: at 10:10

## 2022-04-25 NOTE — PROGRESS NOTES
Patient tolerated  Lab work from [employer] without any complications. Discharge instructions given to patient-verbalizes understanding. Ambulated off unit per self with belongings.

## 2022-04-25 NOTE — PLAN OF CARE
Problem: Discharge Planning  Goal: Knowledge of discharge instructions  Description: Knowledge of discharge instructions  Outcome: Adequate for Discharge  Note: Verbalize understanding of discharge instructions, follow up appointments, and when to call Physician. Intervention: Discharge to appropriate level of care  Note: Discuss understanding of discharge instructions, follow up appointments and when to call Physician. Problem: Falls - Risk of:  Goal: Will remain free from falls  Description: Will remain free from falls  Outcome: Adequate for Discharge  Note: Free from falls while in [facility].P. Oncology. Intervention: Assess risk factors for falls  Note: Discussed the need to use the call light for assistance when getting up to ambulate. Problem: Infection - Central Venous Catheter-Associated Bloodstream Infection:  Goal: Will show no infection signs and symptoms  Description: Will show no infection signs and symptoms  Outcome: Adequate for Discharge  Note: Discuss port maintenance,infection prevention, sign of infection and when to call MD.   Intervention: Infection risk assessment  Note: Mediport site with no redness or warmth. Skin over port site intact with no signs of breakdown noted. Patient verbalizes signs/symptoms of port infection and when to notify the physician. Care plan reviewed with patient. Patient verbalize understanding of the plan of care and contribute to goal setting.
No

## 2022-04-27 ENCOUNTER — HOSPITAL ENCOUNTER (OUTPATIENT)
Dept: INFUSION THERAPY | Age: 82
Discharge: HOME | End: 2022-04-27
Payer: MEDICARE

## 2022-04-27 VITALS
BODY MASS INDEX: 23.63 KG/M2 | TEMPERATURE: 98.5 F | HEIGHT: 65 IN | OXYGEN SATURATION: 99 % | HEART RATE: 62 BPM | DIASTOLIC BLOOD PRESSURE: 62 MMHG | SYSTOLIC BLOOD PRESSURE: 129 MMHG | WEIGHT: 141.8 LBS | RESPIRATION RATE: 18 BRPM

## 2022-04-27 DIAGNOSIS — C25.9 MALIGNANT NEOPLASM OF PANCREAS, UNSPECIFIED LOCATION OF MALIGNANCY (HCC): Primary | ICD-10-CM

## 2022-04-27 LAB
ABSOLUTE IMMATURE GRANULOCYTE: 0.01 THOU/MM3 (ref 0–0.07)
ALBUMIN SERPL-MCNC: 2.8 G/DL (ref 3.5–5.1)
ALP BLD-CCNC: 126 U/L (ref 38–126)
ALT SERPL-CCNC: 18 U/L (ref 11–66)
AST SERPL-CCNC: 28 U/L (ref 5–40)
BASINOPHIL, AUTOMATED: 1 % (ref 0–3)
BASOPHILS ABSOLUTE: 0 THOU/MM3 (ref 0–0.1)
BILIRUB SERPL-MCNC: 0.9 MG/DL (ref 0.3–1.2)
BILIRUBIN DIRECT: 0.3 MG/DL (ref 0–0.3)
BUN, WHOLE BLOOD: 12 MG/DL (ref 8–26)
CHLORIDE, WHOLE BLOOD: 111 MEQ/L (ref 98–109)
CREATININE, WHOLE BLOOD: 1.1 MG/DL (ref 0.5–1.2)
EOSINOPHILS ABSOLUTE: 0 THOU/MM3 (ref 0–0.4)
EOSINOPHILS RELATIVE PERCENT: 0 % (ref 0–4)
GFR, ESTIMATED: 51 ML/MIN/1.73M2
GLUCOSE, WHOLE BLOOD: 130 MG/DL (ref 70–108)
HCT VFR BLD CALC: 33.5 % (ref 37–47)
HEMOGLOBIN: 10.6 GM/DL (ref 12–16)
IMMATURE GRANULOCYTES: 0 %
IONIZED CALCIUM, WHOLE BLOOD: 1.21 MMOL/L (ref 1.12–1.32)
LYMPHOCYTES # BLD: 58 % (ref 15–47)
LYMPHOCYTES ABSOLUTE: 1.8 THOU/MM3 (ref 1–4.8)
MCH RBC QN AUTO: 32.5 PG (ref 26–33)
MCHC RBC AUTO-ENTMCNC: 31.6 GM/DL (ref 32.2–35.5)
MCV RBC AUTO: 103 FL (ref 81–99)
MONOCYTES ABSOLUTE: 0.4 THOU/MM3 (ref 0.4–1.3)
MONOCYTES: 15 % (ref 0–12)
PDW BLD-RTO: 16 % (ref 11.5–14.5)
PLATELET # BLD: 195 THOU/MM3 (ref 130–400)
PMV BLD AUTO: 10 FL (ref 9.4–12.4)
POTASSIUM, WHOLE BLOOD: 3.5 MEQ/L (ref 3.5–4.9)
RBC # BLD: 3.26 MILL/MM3 (ref 4.2–5.4)
SEG NEUTROPHILS: 26 % (ref 43–75)
SEGMENTED NEUTROPHILS ABSOLUTE COUNT: 0.8 THOU/MM3 (ref 1.8–7.7)
SODIUM, WHOLE BLOOD: 148 MEQ/L (ref 138–146)
TOTAL CO2, WHOLE BLOOD: 24 MEQ/L (ref 23–33)
TOTAL PROTEIN: 5.3 G/DL (ref 6.1–8)
WBC # BLD: 3 THOU/MM3 (ref 4.8–10.8)

## 2022-04-27 PROCEDURE — 36591 DRAW BLOOD OFF VENOUS DEVICE: CPT

## 2022-04-27 PROCEDURE — 99211 OFF/OP EST MAY X REQ PHY/QHP: CPT

## 2022-04-27 PROCEDURE — 2580000003 HC RX 258: Performed by: INTERNAL MEDICINE

## 2022-04-27 PROCEDURE — 80047 BASIC METABLC PNL IONIZED CA: CPT

## 2022-04-27 PROCEDURE — 6360000002 HC RX W HCPCS: Performed by: INTERNAL MEDICINE

## 2022-04-27 PROCEDURE — 80076 HEPATIC FUNCTION PANEL: CPT

## 2022-04-27 PROCEDURE — 85025 COMPLETE CBC W/AUTO DIFF WBC: CPT

## 2022-04-27 RX ORDER — HEPARIN SODIUM (PORCINE) LOCK FLUSH IV SOLN 100 UNIT/ML 100 UNIT/ML
500 SOLUTION INTRAVENOUS PRN
Status: CANCELLED | OUTPATIENT
Start: 2022-04-27

## 2022-04-27 RX ORDER — SODIUM CHLORIDE 0.9 % (FLUSH) 0.9 %
5-40 SYRINGE (ML) INJECTION PRN
Status: CANCELLED | OUTPATIENT
Start: 2022-04-27

## 2022-04-27 RX ORDER — SODIUM CHLORIDE 9 MG/ML
25 INJECTION, SOLUTION INTRAVENOUS PRN
Status: CANCELLED | OUTPATIENT
Start: 2022-04-27

## 2022-04-27 RX ORDER — SODIUM CHLORIDE 0.9 % (FLUSH) 0.9 %
5-40 SYRINGE (ML) INJECTION PRN
Status: DISCONTINUED | OUTPATIENT
Start: 2022-04-27 | End: 2022-04-28 | Stop reason: HOSPADM

## 2022-04-27 RX ORDER — HEPARIN SODIUM (PORCINE) LOCK FLUSH IV SOLN 100 UNIT/ML 100 UNIT/ML
500 SOLUTION INTRAVENOUS PRN
Status: DISCONTINUED | OUTPATIENT
Start: 2022-04-27 | End: 2022-04-28 | Stop reason: HOSPADM

## 2022-04-27 RX ADMIN — SODIUM CHLORIDE, PRESERVATIVE FREE 10 ML: 5 INJECTION INTRAVENOUS at 09:55

## 2022-04-27 RX ADMIN — Medication 500 UNITS: at 11:09

## 2022-04-27 RX ADMIN — SODIUM CHLORIDE, PRESERVATIVE FREE 10 ML: 5 INJECTION INTRAVENOUS at 11:08

## 2022-04-27 NOTE — PLAN OF CARE
Problem: Discharge Planning  Goal: Knowledge of discharge instructions  Description: Knowledge of discharge instructions  Outcome: Adequate for Discharge  Note: Patient able to teach back follow up appointments and when to call the doctor. Patient offers no questions at this time   Intervention: Discharge to appropriate level of care  Note: Discharge instruction sheets      Problem: Falls - Risk of:  Goal: Will remain free from falls  Description: Will remain free from falls  Outcome: Adequate for Discharge  Note: No falls this admission   Intervention: Assess risk factors for falls  Note: Patient aware of fall precautions for here and at home -call light in reach while here       Problem: Infection - Central Venous Catheter-Associated Bloodstream Infection:  Goal: Will show no infection signs and symptoms  Description: Will show no infection signs and symptoms  Outcome: Adequate for Discharge  Note: No signs of infection noted at [facility] site    Intervention: Infection risk assessment  Note: Patient aware that is of increased risk for infection due to receiving chemotherapy and having a central venous catheter. Patient aware of signs and symptoms of infection and when to call the doctor      Problem: Chronic Conditions and Co-morbidities  Goal: Patient's chronic conditions and co-morbidity symptoms are monitored and maintained or improved  Outcome: Adequate for Discharge  Flowsheets (Taken 4/27/2022 [id number])  Care Plan - Patient's Chronic Conditions and Co-Morbidity Symptoms are Monitored and Maintained or Improved: Monitor and assess patient's chronic conditions and comorbid symptoms for stability, deterioration, or improvement  Note: Reviewed [facility] blood draw and flush  with patient, patient offered no questions or concerns. Patient verbalized understanding of drug being administered.  Reviewed neutropenia precautions and when to call the doctor   Care plan reviewed with patient and she verbalized understanding of the plan of care and contributed to goal setting.

## 2022-04-27 NOTE — PROGRESS NOTES
Patient informed that white blood cell count is even lower than Monday and will not be getting treatment today. Patient ;informed that will be moving patient's appointment up with Dr Mary Schilling to next week due to continued low white blood count.  Patient verbalized understanding of this and is agreeable to plan of care

## 2022-04-27 NOTE — PROGRESS NOTES
Pt discharged in stable condition with verbalization of discharge instructions all questions answered and all  belongings sent with patient. Patient tolerated mediport blood draw and fluslh  without any complications or signs of a reaction. Reviewed neutropenia precautions with patient prior to discharge.

## 2022-05-01 DIAGNOSIS — C25.9 MALIGNANT NEOPLASM OF PANCREAS, UNSPECIFIED LOCATION OF MALIGNANCY (HCC): Primary | ICD-10-CM

## 2022-05-01 DIAGNOSIS — Z51.11 ENCOUNTER FOR CHEMOTHERAPY MANAGEMENT: ICD-10-CM

## 2022-05-02 ENCOUNTER — HOSPITAL ENCOUNTER (OUTPATIENT)
Dept: INFUSION THERAPY | Age: 82
Discharge: HOME | End: 2022-05-02
Payer: MEDICARE

## 2022-05-02 ENCOUNTER — OFFICE VISIT (OUTPATIENT)
Dept: ONCOLOGY | Age: 82
End: 2022-05-02
Payer: MEDICARE

## 2022-05-02 VITALS
OXYGEN SATURATION: 98 % | TEMPERATURE: 97.9 F | SYSTOLIC BLOOD PRESSURE: 113 MMHG | RESPIRATION RATE: 16 BRPM | HEART RATE: 83 BPM | DIASTOLIC BLOOD PRESSURE: 59 MMHG | WEIGHT: 144 LBS | HEIGHT: 65 IN | BODY MASS INDEX: 23.99 KG/M2

## 2022-05-02 VITALS
DIASTOLIC BLOOD PRESSURE: 83 MMHG | HEART RATE: 69 BPM | BODY MASS INDEX: 23.99 KG/M2 | WEIGHT: 144 LBS | RESPIRATION RATE: 16 BRPM | HEIGHT: 65 IN | TEMPERATURE: 98.8 F | SYSTOLIC BLOOD PRESSURE: 157 MMHG | OXYGEN SATURATION: 96 %

## 2022-05-02 DIAGNOSIS — D64.9 ANEMIA, UNSPECIFIED TYPE: ICD-10-CM

## 2022-05-02 DIAGNOSIS — Z90.49 HISTORY OF WHIPPLE PROCEDURE: ICD-10-CM

## 2022-05-02 DIAGNOSIS — Z51.11 ENCOUNTER FOR CHEMOTHERAPY MANAGEMENT: ICD-10-CM

## 2022-05-02 DIAGNOSIS — C25.9 MALIGNANT NEOPLASM OF PANCREAS, UNSPECIFIED LOCATION OF MALIGNANCY (HCC): Primary | ICD-10-CM

## 2022-05-02 DIAGNOSIS — Z90.410 HISTORY OF WHIPPLE PROCEDURE: ICD-10-CM

## 2022-05-02 LAB
ABSOLUTE IMMATURE GRANULOCYTE: 0.01 THOU/MM3 (ref 0–0.07)
ALBUMIN SERPL-MCNC: 2.8 G/DL (ref 3.5–5.1)
ALP BLD-CCNC: 154 U/L (ref 38–126)
ALT SERPL-CCNC: 22 U/L (ref 11–66)
AST SERPL-CCNC: 31 U/L (ref 5–40)
BASINOPHIL, AUTOMATED: 1 % (ref 0–3)
BASOPHILS ABSOLUTE: 0.1 THOU/MM3 (ref 0–0.1)
BILIRUB SERPL-MCNC: 0.9 MG/DL (ref 0.3–1.2)
BILIRUBIN DIRECT: 0.3 MG/DL (ref 0–0.3)
BUN, WHOLE BLOOD: 11 MG/DL (ref 8–26)
CHLORIDE, WHOLE BLOOD: 111 MEQ/L (ref 98–109)
CREATININE, WHOLE BLOOD: 1.2 MG/DL (ref 0.5–1.2)
EOSINOPHILS ABSOLUTE: 0 THOU/MM3 (ref 0–0.4)
EOSINOPHILS RELATIVE PERCENT: 0 % (ref 0–4)
GFR, ESTIMATED: 46 ML/MIN/1.73M2
GLUCOSE, WHOLE BLOOD: 112 MG/DL (ref 70–108)
HCT VFR BLD CALC: 36.4 % (ref 37–47)
HEMOGLOBIN: 11.5 GM/DL (ref 12–16)
IMMATURE GRANULOCYTES: 0 %
IONIZED CALCIUM, WHOLE BLOOD: 1.17 MMOL/L (ref 1.12–1.32)
LYMPHOCYTES # BLD: 43 % (ref 15–47)
LYMPHOCYTES ABSOLUTE: 2.8 THOU/MM3 (ref 1–4.8)
MCH RBC QN AUTO: 32.4 PG (ref 26–33)
MCHC RBC AUTO-ENTMCNC: 31.6 GM/DL (ref 32.2–35.5)
MCV RBC AUTO: 103 FL (ref 81–99)
MONOCYTES ABSOLUTE: 0.8 THOU/MM3 (ref 0.4–1.3)
MONOCYTES: 12 % (ref 0–12)
PDW BLD-RTO: 14.8 % (ref 11.5–14.5)
PLATELET # BLD: 226 THOU/MM3 (ref 130–400)
PMV BLD AUTO: 10.2 FL (ref 9.4–12.4)
POTASSIUM, WHOLE BLOOD: 3.5 MEQ/L (ref 3.5–4.9)
RBC # BLD: 3.55 MILL/MM3 (ref 4.2–5.4)
SEG NEUTROPHILS: 44 % (ref 43–75)
SEGMENTED NEUTROPHILS ABSOLUTE COUNT: 2.9 THOU/MM3 (ref 1.8–7.7)
SODIUM, WHOLE BLOOD: 146 MEQ/L (ref 138–146)
TOTAL CO2, WHOLE BLOOD: 21 MEQ/L (ref 23–33)
TOTAL PROTEIN: 5.5 G/DL (ref 6.1–8)
WBC # BLD: 6.5 THOU/MM3 (ref 4.8–10.8)

## 2022-05-02 PROCEDURE — 96415 CHEMO IV INFUSION ADDL HR: CPT

## 2022-05-02 PROCEDURE — 99211 OFF/OP EST MAY X REQ PHY/QHP: CPT

## 2022-05-02 PROCEDURE — 36591 DRAW BLOOD OFF VENOUS DEVICE: CPT

## 2022-05-02 PROCEDURE — 96375 TX/PRO/DX INJ NEW DRUG ADDON: CPT

## 2022-05-02 PROCEDURE — 96368 THER/DIAG CONCURRENT INF: CPT

## 2022-05-02 PROCEDURE — 6360000002 HC RX W HCPCS: Performed by: INTERNAL MEDICINE

## 2022-05-02 PROCEDURE — 96367 TX/PROPH/DG ADDL SEQ IV INF: CPT

## 2022-05-02 PROCEDURE — 96413 CHEMO IV INFUSION 1 HR: CPT

## 2022-05-02 PROCEDURE — 96416 CHEMO PROLONG INFUSE W/PUMP: CPT

## 2022-05-02 PROCEDURE — 96411 CHEMO IV PUSH ADDL DRUG: CPT

## 2022-05-02 PROCEDURE — 80076 HEPATIC FUNCTION PANEL: CPT

## 2022-05-02 PROCEDURE — 2580000003 HC RX 258: Performed by: INTERNAL MEDICINE

## 2022-05-02 PROCEDURE — 80047 BASIC METABLC PNL IONIZED CA: CPT

## 2022-05-02 PROCEDURE — 85025 COMPLETE CBC W/AUTO DIFF WBC: CPT

## 2022-05-02 PROCEDURE — 99214 OFFICE O/P EST MOD 30 MIN: CPT | Performed by: INTERNAL MEDICINE

## 2022-05-02 RX ORDER — HEPARIN SODIUM (PORCINE) LOCK FLUSH IV SOLN 100 UNIT/ML 100 UNIT/ML
500 SOLUTION INTRAVENOUS PRN
Status: CANCELLED | OUTPATIENT
Start: 2022-05-02

## 2022-05-02 RX ORDER — DEXTROSE MONOHYDRATE 50 MG/ML
INJECTION, SOLUTION INTRAVENOUS ONCE
Status: COMPLETED | OUTPATIENT
Start: 2022-05-02 | End: 2022-05-02

## 2022-05-02 RX ORDER — PALONOSETRON 0.05 MG/ML
0.25 INJECTION, SOLUTION INTRAVENOUS ONCE
Status: COMPLETED | OUTPATIENT
Start: 2022-05-02 | End: 2022-05-02

## 2022-05-02 RX ORDER — HEPARIN SODIUM (PORCINE) LOCK FLUSH IV SOLN 100 UNIT/ML 100 UNIT/ML
500 SOLUTION INTRAVENOUS PRN
Status: DISCONTINUED | OUTPATIENT
Start: 2022-05-02 | End: 2022-05-03 | Stop reason: HOSPADM

## 2022-05-02 RX ORDER — SODIUM CHLORIDE 0.9 % (FLUSH) 0.9 %
5-40 SYRINGE (ML) INJECTION PRN
Status: DISCONTINUED | OUTPATIENT
Start: 2022-05-02 | End: 2022-05-03 | Stop reason: HOSPADM

## 2022-05-02 RX ORDER — FLUOROURACIL 50 MG/ML
200 INJECTION, SOLUTION INTRAVENOUS ONCE
Status: COMPLETED | OUTPATIENT
Start: 2022-05-02 | End: 2022-05-02

## 2022-05-02 RX ORDER — SODIUM CHLORIDE 9 MG/ML
25 INJECTION, SOLUTION INTRAVENOUS PRN
Status: CANCELLED | OUTPATIENT
Start: 2022-05-02

## 2022-05-02 RX ORDER — SODIUM CHLORIDE 0.9 % (FLUSH) 0.9 %
5-40 SYRINGE (ML) INJECTION PRN
Status: CANCELLED | OUTPATIENT
Start: 2022-05-02

## 2022-05-02 RX ADMIN — OXALIPLATIN 125 MG: 5 INJECTION, SOLUTION INTRAVENOUS at 12:12

## 2022-05-02 RX ADMIN — DEXAMETHASONE SODIUM PHOSPHATE 12 MG: 4 INJECTION, SOLUTION INTRAMUSCULAR; INTRAVENOUS at 11:33

## 2022-05-02 RX ADMIN — DEXTROSE MONOHYDRATE: 50 INJECTION, SOLUTION INTRAVENOUS at 11:31

## 2022-05-02 RX ADMIN — SODIUM CHLORIDE, PRESERVATIVE FREE 20 ML: 5 INJECTION INTRAVENOUS at 09:46

## 2022-05-02 RX ADMIN — FLUOROURACIL 3000 MG: 50 INJECTION, SOLUTION INTRAVENOUS at 14:50

## 2022-05-02 RX ADMIN — SODIUM CHLORIDE, PRESERVATIVE FREE 10 ML: 5 INJECTION INTRAVENOUS at 09:45

## 2022-05-02 RX ADMIN — LEUCOVORIN CALCIUM 350 MG: 200 INJECTION, POWDER, LYOPHILIZED, FOR SUSPENSION INTRAMUSCULAR; INTRAVENOUS at 12:10

## 2022-05-02 RX ADMIN — FLUOROURACIL 350 MG: 50 INJECTION, SOLUTION INTRAVENOUS at 14:39

## 2022-05-02 RX ADMIN — PALONOSETRON 0.25 MG: 0.25 INJECTION, SOLUTION INTRAVENOUS at 11:57

## 2022-05-02 NOTE — ONCOLOGY
Chemotherapy Administration    Pre-assessment Data: Antineoplastic Agents  See toxicity flow sheet for assessment                                          [x]         Interventions:   Chemotherapy SQ injection given []   Taxol administered-VS per protocol []   Blood pressure meds held 12 hours prior to Rituxan/Ruxience []   Rituxan/Ruxience administered- VS and precautions per guidelines []   Emergency drugs available as appropriate [x]   Anaphylaxis assessment completed [x]   Pre-medications administered as ordered [x]   Blood return noted upon initiation of chemotherapy [x]   Blood return noted each 1-2ml of a vesicant medication if given IV push []   Navelbine, Vincristine and Velban given as a monitored wide open drip, blood return noted before during and after infusion.  []   Blood return noted each 2-3ml of a non-vesicant medication if given IV push [x]   Patient aware of potential Immunotherapy toxicities []   Monitor for signs / symptoms of hypersensitivity reaction [x]   Chemotherapy orders (drug/dose/rate) verified by 2 Chemo certified RNs [x]   Monitor IV site and blood return throughout the infusion of the medication [x]   Document IV site checks on the IV assessment form [x]   Document chemotherapy teaching on the Patient Education tab [x]   Document patient verbalizes understanding of medications being administered [x]   If IV infiltration, see ONS Guidelines []   Other:      []

## 2022-05-02 NOTE — PATIENT INSTRUCTIONS
1. Proceed with treatment today. The dose of 5-FU is reduced to 1800 mg/m² due to neutropenia. 2.  RTC to see me in 2 weeks.   On RTC labs: CBC, BMP, LFT

## 2022-05-02 NOTE — PLAN OF CARE
Problem: Discharge Planning  Goal: Knowledge of discharge instructions  Description: Knowledge of discharge instructions  Outcome: Adequate for Discharge  Note: Verbalize understanding of discharge instructions, follow up appointments, and when to call Physician. Intervention: Discharge to appropriate level of care  Note: Discuss understanding of discharge instructions, follow up appointments and when to call Physician. Problem: Intellectual/Education/Knowledge Deficit  Goal: Teaching initiated upon admission  Outcome: Adequate for Discharge  Note: Patient verbalizes understanding to verbal information given on Oxaliplatin and Adrucil,action and possible side effects. Aware to call MD if develop complications. Intervention: Verbal/written education provided  Note: Chemotherapy Teaching     What is Chemotherapy   Drug action [x]   Method of Administration [x]   Handouts given []     Side Effects  Nausea/vomiting [x]   Diarrhea [x]   Fatigue [x]   Signs / Symptoms of infection [x]   Neutropenia [x]   Thrombocytopenia [x]   Alopecia [x]   neuropathy [x]   McClain diet &  the importance of fluids [x]       Micellaneous  Importance of nutrition [x]   Importance of oral hygiene [x]   When to call the MD [x]   Monitoring labs [x]   Use of supportive services []     Explanation of Drug Regimen / Frequency  Oxaliplatin and Adrucil     Comments  Verbalized understanding to drug,action,side effects and when to call MD         Problem: Falls - Risk of:  Goal: Will remain free from falls  Description: Will remain free from falls  Outcome: Adequate for Discharge  Note: Free from falls while in O.P. Oncology. Intervention: Assess risk factors for falls  Note: Discussed the need to use the call light for assistance when getting up to ambulate.       Problem: Infection - Central Venous Catheter-Associated Bloodstream Infection:  Goal: Will show no infection signs and symptoms  Description: Will show no infection signs and symptoms  Outcome: Adequate for Discharge  Note: Mediport site with no redness or warmth. Skin over port site intact with no signs of breakdown noted. Patient verbalizes signs/symptoms of port infection and when to notify the physician. Intervention: Infection risk assessment  Note: Discuss port maintenance,infection prevention, sign of infection and when to call MD.    Care plan reviewed with patient. Patient  verbalize understanding of the plan of care and contribute to goal setting.

## 2022-05-02 NOTE — PROGRESS NOTES
Patient assessed for the following post chemotherapy:    Dizziness   No  Lightheadedness  No      Acute nausea/vomiting No  Headache   No  Chest pain/pressure  No  Rash/itching   No  Shortness of breath  No    Patient kept for 20 minutes observation post infusion chemotherapy. Patient tolerated chemotherapy treatment Eloxatin and Adrucil without any complications. Last vital signs:   BP (!) 113/59   Pulse 83   Temp 97.9 °F (36.6 °C) (Oral)   Resp 16   Ht 5' 5\" (1.651 m)   Wt 144 lb (65.3 kg)   SpO2 98%   BMI 23.96 kg/m²     CADD pump 5FU added vi3a mediport to infuse at 5.4ml/hr over 46 hours. Connections secured and tape to chest. Patient and family instructed on pump- it's usage,what to do if alarm goes off, and who to call if any questions. Verified pump running before discharge. Patient instructed if experience any of the above symptoms following today's infusion,he/she is to notify MD immediately or go to the emergency department. Discharge instructions given to patient. Verbalizes understanding. Ambulated off unit per self with belongings.

## 2022-05-04 ENCOUNTER — HOSPITAL ENCOUNTER (OUTPATIENT)
Dept: INFUSION THERAPY | Age: 82
Discharge: HOME | End: 2022-05-04
Payer: MEDICARE

## 2022-05-04 ENCOUNTER — OFFICE VISIT (OUTPATIENT)
Dept: FAMILY MEDICINE CLINIC | Age: 82
End: 2022-05-04
Payer: MEDICARE

## 2022-05-04 VITALS
DIASTOLIC BLOOD PRESSURE: 77 MMHG | WEIGHT: 144 LBS | HEART RATE: 71 BPM | OXYGEN SATURATION: 96 % | HEIGHT: 65 IN | RESPIRATION RATE: 16 BRPM | TEMPERATURE: 98.7 F | BODY MASS INDEX: 23.99 KG/M2 | SYSTOLIC BLOOD PRESSURE: 167 MMHG

## 2022-05-04 VITALS
BODY MASS INDEX: 24.22 KG/M2 | TEMPERATURE: 98.1 F | HEIGHT: 65 IN | HEART RATE: 75 BPM | RESPIRATION RATE: 16 BRPM | DIASTOLIC BLOOD PRESSURE: 72 MMHG | SYSTOLIC BLOOD PRESSURE: 130 MMHG | OXYGEN SATURATION: 99 % | WEIGHT: 145.4 LBS

## 2022-05-04 DIAGNOSIS — L60.8 ACQUIRED DEFORMITY OF TOENAIL: ICD-10-CM

## 2022-05-04 DIAGNOSIS — C25.9 MALIGNANT NEOPLASM OF PANCREAS, UNSPECIFIED LOCATION OF MALIGNANCY (HCC): Primary | ICD-10-CM

## 2022-05-04 DIAGNOSIS — D64.9 NORMOCYTIC ANEMIA: ICD-10-CM

## 2022-05-04 DIAGNOSIS — I10 PRIMARY HYPERTENSION: ICD-10-CM

## 2022-05-04 DIAGNOSIS — I48.91 ATRIAL FIBRILLATION, UNSPECIFIED TYPE (HCC): ICD-10-CM

## 2022-05-04 DIAGNOSIS — E04.1 THYROID NODULE: ICD-10-CM

## 2022-05-04 PROCEDURE — 6360000002 HC RX W HCPCS: Performed by: INTERNAL MEDICINE

## 2022-05-04 PROCEDURE — 2580000003 HC RX 258: Performed by: INTERNAL MEDICINE

## 2022-05-04 PROCEDURE — 99214 OFFICE O/P EST MOD 30 MIN: CPT | Performed by: STUDENT IN AN ORGANIZED HEALTH CARE EDUCATION/TRAINING PROGRAM

## 2022-05-04 RX ORDER — AMIODARONE HYDROCHLORIDE 200 MG/1
200 TABLET ORAL DAILY
Qty: 90 TABLET | Refills: 1 | Status: SHIPPED | OUTPATIENT
Start: 2022-05-04 | End: 2022-10-31

## 2022-05-04 RX ORDER — HEPARIN SODIUM (PORCINE) LOCK FLUSH IV SOLN 100 UNIT/ML 100 UNIT/ML
500 SOLUTION INTRAVENOUS PRN
Status: CANCELLED | OUTPATIENT
Start: 2022-05-04

## 2022-05-04 RX ORDER — SODIUM CHLORIDE 0.9 % (FLUSH) 0.9 %
5-40 SYRINGE (ML) INJECTION PRN
Status: CANCELLED | OUTPATIENT
Start: 2022-05-04

## 2022-05-04 RX ORDER — HEPARIN SODIUM (PORCINE) LOCK FLUSH IV SOLN 100 UNIT/ML 100 UNIT/ML
500 SOLUTION INTRAVENOUS PRN
Status: DISCONTINUED | OUTPATIENT
Start: 2022-05-04 | End: 2022-05-05 | Stop reason: HOSPADM

## 2022-05-04 RX ORDER — AMLODIPINE BESYLATE 2.5 MG/1
2.5 TABLET ORAL DAILY
Qty: 90 TABLET | Refills: 1 | Status: SHIPPED | OUTPATIENT
Start: 2022-05-04

## 2022-05-04 RX ORDER — SODIUM CHLORIDE 9 MG/ML
25 INJECTION, SOLUTION INTRAVENOUS PRN
Status: CANCELLED | OUTPATIENT
Start: 2022-05-04

## 2022-05-04 RX ORDER — SODIUM CHLORIDE 0.9 % (FLUSH) 0.9 %
5-40 SYRINGE (ML) INJECTION PRN
Status: DISCONTINUED | OUTPATIENT
Start: 2022-05-04 | End: 2022-05-05 | Stop reason: HOSPADM

## 2022-05-04 RX ORDER — DEXAMETHASONE 4 MG/1
TABLET ORAL
COMMUNITY
Start: 2022-05-02 | End: 2022-06-30 | Stop reason: ALTCHOICE

## 2022-05-04 RX ORDER — ASPIRIN 81 MG/1
81 TABLET, CHEWABLE ORAL DAILY
Qty: 90 TABLET | Refills: 1 | Status: SHIPPED | OUTPATIENT
Start: 2022-05-04

## 2022-05-04 RX ADMIN — HEPARIN 500 UNITS: 100 SYRINGE at 13:18

## 2022-05-04 RX ADMIN — SODIUM CHLORIDE, PRESERVATIVE FREE 10 ML: 5 INJECTION INTRAVENOUS at 13:18

## 2022-05-04 ASSESSMENT — ENCOUNTER SYMPTOMS
BACK PAIN: 0
COUGH: 0
WHEEZING: 0
CONSTIPATION: 0
DIARRHEA: 0
VOMITING: 0
NAUSEA: 0
SHORTNESS OF BREATH: 0
ABDOMINAL PAIN: 0

## 2022-05-04 NOTE — PROGRESS NOTES
[id number] [address]  Dept: [phone]  Loc: [phone]      Please see Resident HPI. ROS per Resident    Lab Results   Component Value Date    WBC 6.5 05/02/2022    HGB 11.5 (L) 05/02/2022    HCT 36.4 (L) 05/02/2022     (H) 05/02/2022     05/02/2022     Lab Results   Component Value Date     05/02/2022    K 3.5 05/02/2022     12/28/2021    CO2 24 12/28/2021    BUN 17 12/28/2021    CREATININE 1.2 05/02/2022    GLUCOSE 121 (H) 12/28/2021    CALCIUM 9.3 12/28/2021    PROT 5.5 (L) 05/02/2022    LABALBU 2.8 (L) 05/02/2022    BILITOT 0.9 05/02/2022    ALKPHOS 154 (H) 05/02/2022    AST 31 05/02/2022    ALT 22 05/02/2022    LABGLOM 36 (A) 12/28/2021     Lab Results   Component Value Date    LABA1C 5.7 11/10/2021     No results found for: EAG  No results found for: LABMICR, [id number]  Lab Results   Component Value Date    TSH 5.560 (H) 12/28/2021    T4FREE 1.88 (H) 12/28/2021     No results found for: CHOL  No results found for: TRIG  No results found for: HDL  No results found for: LDLCHOLESTEROL, LDLCALC  No results found for: LABVLDL, VLDL  No results found for: CHOLHDLRATIO  No results found for: PSA, PSADIA    Health Maintenance Due   Topic Date Due    Annual Wellness Visit (AWV)  Never done    DTaP/Tdap/Td vaccine (1 - Tdap) Never done    Shingles vaccine (1 of 2) Never done    DEXA (modify frequency per FRAX score)  Never done    Pneumococcal 65+ years Vaccine (2 - PPSV23 or PCV20) 12/21/2021     Physical Exam per Resident       ICD-10-CM    1. Malignant neoplasm of pancreas, unspecified location of malignancy ([facility] [address]  C25.9    2. Primary hypertension  I10 amLODIPine (NORVASC) 2.5 MG tablet   3. Atrial fibrillation, unspecified type (HCC)  I48.91 apixaban (ELIQUIS) 5 MG TABS tablet     amiodarone (CORDARONE) 200 MG tablet     aspirin 81 MG chewable tablet   4.  Thyroid nodule E04.1    5. Normocytic anemia  D64.9    6.  Acquired deformity of toenail  L60.8 CHELSEA - Joshua Marks DPM, Podiatry, [employer]  I participated in the discussion and care of this patient   Pancreatic cancer mangament per Dr. Adrianna Ridley   BP controlled, Continue amlodipine  Afib controlled, continue amiodarone and eliquis  Repeat thyroid labs   Refer to Podiatry

## 2022-05-04 NOTE — PATIENT INSTRUCTIONS
Thank you   1. Thank you for trusting us with your healthcare needs. You may receive a survey regarding today's visit. It would help us out if you would take a few moments to provide your feedback. We value your input. 2. Please bring in ALL medications BOTTLES, including inhalers, herbal supplements, over the counter, prescribed & non-prescribed medicine. The office would like actual medication bottles and a list.   3. Please note our OFFICE POLICIES:   a. Prior to getting your labs drawn, please check with your insurance company for benefits and eligibility of lab services. Often, insurance companies cover certain tests for preventative visits only. It is patient's responsibility to see what is covered. b. We are unable to change a diagnosis after the test has been performed. c. Lab orders will not be re-printed. Please hold onto your original lab orders and take them to your lab to be completed. d. If you no show your scheduled appointment three times, you will be dismissed from this practice. e. Tania Enter must be completed 24 hours prior to your schedule appointment. 4. If the list below has been completed, PLEASE FAX RECORDS TO OUR OFFICE @ [phone].  Once the records have been received we will update your records at our office:  Health Maintenance Due   Topic Date Due    Annual Wellness Visit (AWV)  Never done    DTaP/Tdap/Td vaccine (1 - Tdap) Never done    Shingles vaccine (1 of 2) Never done    DEXA (modify frequency per FRAX score)  Never done    Pneumococcal 65+ years Vaccine (2 - PPSV23 or PCV20) 12/21/2021

## 2022-05-04 NOTE — PROGRESS NOTES
Health Maintenance Due   Topic Date Due    Annual Wellness Visit (AWV)  Never done    DTaP/Tdap/Td vaccine (1 - Tdap) Never done    Shingles vaccine (1 of 2) Never done    DEXA (modify frequency per FRAX score)  Never done    Pneumococcal 65+ years Vaccine (2 - PPSV23 or PCV20) 12/21/2021

## 2022-05-04 NOTE — PROGRESS NOTES
S: 81 y.o. female with   Chief Complaint   Patient presents with    1 Month Follow-Up     still having bilateral ankle swelling and discuss Eliquis due to cost       HPI: please see resident note for HPI and ROS. BP Readings from Last 3 Encounters:   05/04/22 130/72   05/04/22 (!) 167/77   05/02/22 (!) 113/59     Wt Readings from Last 3 Encounters:   05/04/22 145 lb 6.4 oz (66 kg)   05/04/22 144 lb (65.3 kg)   05/02/22 144 lb (65.3 kg)       O: VS:  height is 5' 5\" (1.651 m) and weight is 145 lb 6.4 oz (66 kg). Her oral temperature is 98.1 °F (36.7 °C). Her blood pressure is 130/72 and her pulse is 75. Her respiration is 16 and oxygen saturation is 99%. AAO/NAD, appropriate affect for mood       Diagnosis Orders   1. Malignant neoplasm of pancreas, unspecified location of malignancy ([facility] [address]     2. Primary hypertension  amLODIPine (NORVASC) 2.5 MG tablet   3. Atrial fibrillation, unspecified type (HCC)  apixaban (ELIQUIS) 5 MG TABS tablet    amiodarone (CORDARONE) 200 MG tablet    aspirin 81 MG chewable tablet   4. Thyroid nodule     5. Normocytic anemia     6. Acquired deformity of toenail  CHELSEA - Joshua Marks DPM, Podiatry, [facility] [place]       Plan:  Continue amiodarone and eliquis and amlodipine. Use compression stockings. Refer to podiatry. Labs as ordered. Health Maintenance Due   Topic Date Due    Annual Wellness Visit (AWV)  Never done    DTaP/Tdap/Td vaccine (1 - Tdap) Never done    Shingles vaccine (1 of 2) Never done    DEXA (modify frequency per FRAX score)  Never done    Pneumococcal 65+ years Vaccine (2 - PPSV23 or PCV20) 12/21/2021       Attending Physician Statement  I have discussed the case, including pertinent history and exam findings with the resident. I also have seen the patient and performed key portions of the examination. I agree with the documented assessment and plan as documented by the resident.         Fidel Toure DO 5/4/2022 3:32 PM

## 2022-05-04 NOTE — PROGRESS NOTES
Kathleen Borwn is a 81 y.o. female who presents today for:  Chief Complaint   Patient presents with    1 Month Follow-Up     still having bilateral ankle swelling and discuss Eliquis due to cost         HPI:   Kathleen Brown is 81 y.o. who presents today for follow-up. Pancreatic Cancer: Follows with Dr. Pita Blanco. Finished round 7 of chemo today. Dr. Pita Blanco monitoring labs. She is doing well with this. Notes mild numbness/tingling in hands after this treatment, but this is tolerable for her. Atrial fibrillation: Patient has been feeling well since stopping Lopressor. She denies palpitations, lightheadedness, or fatigue. She is taking Amiodarone and Eliquis as prescribed without issue. HTN: Not monitoring at home. BP well controlled today. She continues to take 2.5 mg Norvasc. Patient does note increased bilateral leg swelling. Swelling is chronic, but has mildly worsened lately. She has compression stockings, and is planning to try this. Otherwise, patient notes losing 2 toenails recently. She is wondering about seeing a foot doctor. Patient also due for follow-up thyroid labs, but has not yet had these completed.       Objective:     Vitals:    05/04/22 1428   BP: 130/72   Site: Right Upper Arm   Position: Sitting   Cuff Size: Medium Adult   Pulse: 75   Resp: 16   Temp: 98.1 °F (36.7 °C)   TempSrc: Oral   SpO2: 99%   Weight: 145 lb 6.4 oz (66 kg)   Height: 5' 5\" (1.651 m)       Wt Readings from Last 3 Encounters:   05/04/22 145 lb 6.4 oz (66 kg)   05/04/22 144 lb (65.3 kg)   05/02/22 144 lb (65.3 kg)       BP Readings from Last 3 Encounters:   05/04/22 130/72   05/04/22 (!) 167/77   05/02/22 (!) 113/59       Lab Results   Component Value Date    WBC 6.5 05/02/2022    HGB 11.5 (L) 05/02/2022    HCT 36.4 (L) 05/02/2022     (H) 05/02/2022     05/02/2022     Lab Results   Component Value Date     05/02/2022    K 3.5 05/02/2022     12/28/2021    CO2 24 12/28/2021    BUN 17 12/28/2021    CREATININE 1.2 05/02/2022    GLUCOSE 121 (H) 12/28/2021    CALCIUM 9.3 12/28/2021    PROT 5.5 (L) 05/02/2022    LABALBU 2.8 (L) 05/02/2022    BILITOT 0.9 05/02/2022    ALKPHOS 154 (H) 05/02/2022    AST 31 05/02/2022    ALT 22 05/02/2022    LABGLOM 36 (A) 12/28/2021     Lab Results   Component Value Date    TSH 5.560 (H) 12/28/2021    T4FREE 1.88 (H) 12/28/2021     Lab Results   Component Value Date    LABA1C 5.7 11/10/2021     No results found for: EAG  No results found for: CHOL  No results found for: TRIG  No results found for: HDL  No results found for: LDLCHOLESTEROL, LDLCALC    No results found for: LABMICR, XILH20LYL    Review of Systems   Constitutional: Negative for activity change, chills, fatigue and fever. HENT: Negative for congestion. Eyes: Negative for visual disturbance. Respiratory: Negative for cough, shortness of breath and wheezing. Cardiovascular: Positive for leg swelling (bilateral). Negative for chest pain and palpitations. Gastrointestinal: Negative for abdominal pain, constipation, diarrhea, nausea and vomiting. Genitourinary: Negative for dysuria. Musculoskeletal: Negative for back pain. Neurological: Positive for numbness (bilateral hands). Negative for dizziness, syncope, light-headedness and headaches. Psychiatric/Behavioral: Negative for dysphoric mood. The patient is not nervous/anxious. Physical Exam  Vitals and nursing note reviewed. Constitutional:       Appearance: Normal appearance. She is normal weight. HENT:      Head: Normocephalic and atraumatic. Nose: Nose normal.      Mouth/Throat:      Mouth: Mucous membranes are moist.      Pharynx: No oropharyngeal exudate or posterior oropharyngeal erythema. Eyes:      Extraocular Movements: Extraocular movements intact. Conjunctiva/sclera: Conjunctivae normal.      Pupils: Pupils are equal, round, and reactive to light.    Cardiovascular:      Rate and Rhythm: Normal rate and regular rhythm. Pulses: Normal pulses. Heart sounds: Murmur (5-3/0 systolic) heard. Pulmonary:      Effort: Pulmonary effort is normal. No respiratory distress. Breath sounds: Normal breath sounds. No wheezing. Abdominal:      General: Abdomen is flat. Bowel sounds are normal. There is no distension. Palpations: Abdomen is soft. There is no mass. Tenderness: There is no abdominal tenderness. Comments: Abdominal incisions well healed   Musculoskeletal:      Cervical back: Neck supple. Right lower leg: Edema (2+ pitting to mid tibia) present. Left lower leg: Edema (2+ pitting to mid tibia) present. Skin:     General: Skin is warm and dry. Neurological:      General: No focal deficit present. Mental Status: She is alert and oriented to person, place, and time. Psychiatric:         Mood and Affect: Mood normal.         Behavior: Behavior normal.         Immunization History   Administered Date(s) Administered    COVID-19, Melina Ok, Primary or Immunocompromised, PF, 100mcg/0.5mL 02/05/2021, 03/05/2021, 01/19/2022    Influenza, High Dose (Fluzone 65 yrs and older) 12/01/2017, 11/12/2018, 11/08/2019    Influenza, High-dose, Quadv, 65 yrs +, IM (Fluzone) 10/27/2020, 10/15/2021    Pneumococcal Conjugate 13-valent Ric Dine) 12/21/2020       Health Maintenance Due   Topic Date Due    Annual Wellness Visit (AWV)  Never done    DTaP/Tdap/Td vaccine (1 - Tdap) Never done    Shingles vaccine (1 of 2) Never done    DEXA (modify frequency per FRAX score)  Never done    Pneumococcal 65+ years Vaccine (2 - PPSV23 or PCV20) 12/21/2021          Food Insecurity: No Food Insecurity    Worried About Running Out of Food in the Last Year: Never true    Roberto of Food in the Last Year: Never true       Assessment / Plan:   1. Malignant neoplasm of pancreas, unspecified location of malignancy [facility])  Patient doing well.    - Continue following up with Oncology as scheduled    2. Primary hypertension  BP well controlled currently. Will monitor BP with leg swelling and may need to adjust medications at the next visit  - Encourage patient to try Compression Stockings for leg swelling  - amLODIPine (NORVASC) 2.5 MG tablet; Take 1 tablet by mouth daily  Dispense: 90 tablet; Refill: 1    3. Atrial fibrillation, unspecified type [facility])  Patient doing well off Lopressor. Will continue Eliquis and Amiodarone  - apixaban (ELIQUIS) 5 MG TABS tablet; Take 1 tablet by mouth 2 times daily  Dispense: 180 tablet; Refill: 1  - amiodarone (CORDARONE) 200 MG tablet; Take 1 tablet by mouth daily  Dispense: 90 tablet; Refill: 1  - aspirin 81 MG chewable tablet; Take 1 tablet by mouth daily  Dispense: 90 tablet; Refill: 1    4. Thyroid nodule  - Encouraged patient to complete thyroid labs as ordered previously    5. Normocytic anemia  Follow up with Oncology as scheduled for labs    6. Acquired deformity of toenail  - AFL - Joshua Marks DPM, [facility]iatry, [facility] [place] [place] [place]           Return in about 8 weeks (around 6/29/2022) for AWV. Medications Prescribed:  Orders Placed This Encounter   Medications    apixaban (ELIQUIS) 5 MG TABS tablet     Sig: Take 1 tablet by mouth 2 times daily     Dispense:  180 tablet     Refill:  1    amLODIPine (NORVASC) 2.5 MG tablet     Sig: Take 1 tablet by mouth daily     Dispense:  90 tablet     Refill:  1    amiodarone (CORDARONE) 200 MG tablet     Sig: Take 1 tablet by mouth daily     Dispense:  90 tablet     Refill:  1    aspirin 81 MG chewable tablet     Sig: Take 1 tablet by mouth daily     Dispense:  90 tablet     Refill:  1       Future Appointments   Date Time Provider Jeanne Finch   5/16/2022  9:15 AM STR OUT PT ONC INJ RM [address] HOD   5/16/2022  9:40 AM Mk Barraza MD N Oncology [facility] [place]   6/30/2022  2:20 PM Mirella Armenta MD SRPX FM RES [facility] [place] [address]       Patient given educational materials - see patient instructions. Discussed use, benefit, and sideeffects of prescribed medications. All patient questions answered. Pt voiced understanding. Reviewed health maintenance. Instructed to continue current medications, diet and exercise. Patient agreed with treatment plan. Follow up as directed.      Electronically signed by Tricia Waite MD on 5/4/2022 at 5:26 PM

## 2022-05-04 NOTE — PROGRESS NOTES
Patient tolerated  Pump off without any complications. Discharge instructions given to patient-verbalizes understanding. Ambulated off unit per self with belongings.

## 2022-05-15 RX ORDER — FAMOTIDINE 10 MG/ML
20 INJECTION, SOLUTION INTRAVENOUS
OUTPATIENT
Start: 2022-07-06

## 2022-05-15 RX ORDER — FLUOROURACIL 50 MG/ML
200 INJECTION, SOLUTION INTRAVENOUS ONCE
OUTPATIENT
Start: 2022-07-06

## 2022-05-15 RX ORDER — PALONOSETRON 0.05 MG/ML
0.25 INJECTION, SOLUTION INTRAVENOUS ONCE
OUTPATIENT
Start: 2022-07-06

## 2022-05-15 RX ORDER — DIPHENHYDRAMINE HYDROCHLORIDE 50 MG/ML
50 INJECTION INTRAMUSCULAR; INTRAVENOUS
OUTPATIENT
Start: 2022-07-06

## 2022-05-15 RX ORDER — ACETAMINOPHEN 325 MG/1
650 TABLET ORAL
OUTPATIENT
Start: 2022-07-06

## 2022-05-15 RX ORDER — EPINEPHRINE 1 MG/ML
0.3 INJECTION, SOLUTION, CONCENTRATE INTRAVENOUS PRN
OUTPATIENT
Start: 2022-07-06

## 2022-05-15 RX ORDER — SODIUM CHLORIDE 9 MG/ML
5-40 INJECTION INTRAVENOUS PRN
OUTPATIENT
Start: 2022-07-06

## 2022-05-15 RX ORDER — ALBUTEROL SULFATE 90 UG/1
4 AEROSOL, METERED RESPIRATORY (INHALATION) PRN
OUTPATIENT
Start: 2022-07-06

## 2022-05-15 RX ORDER — HEPARIN SODIUM (PORCINE) LOCK FLUSH IV SOLN 100 UNIT/ML 100 UNIT/ML
500 SOLUTION INTRAVENOUS PRN
OUTPATIENT
Start: 2022-07-06

## 2022-05-15 RX ORDER — DEXTROSE MONOHYDRATE 50 MG/ML
INJECTION, SOLUTION INTRAVENOUS ONCE
OUTPATIENT
Start: 2022-07-06

## 2022-05-15 RX ORDER — SODIUM CHLORIDE 9 MG/ML
25 INJECTION, SOLUTION INTRAVENOUS PRN
OUTPATIENT
Start: 2022-07-06

## 2022-05-15 RX ORDER — ONDANSETRON 2 MG/ML
8 INJECTION INTRAMUSCULAR; INTRAVENOUS
OUTPATIENT
Start: 2022-07-06

## 2022-05-15 RX ORDER — SODIUM CHLORIDE 9 MG/ML
INJECTION, SOLUTION INTRAVENOUS ONCE
OUTPATIENT
Start: 2022-07-06 | End: 2022-05-16

## 2022-05-15 RX ORDER — SODIUM CHLORIDE 0.9 % (FLUSH) 0.9 %
5-40 SYRINGE (ML) INJECTION PRN
OUTPATIENT
Start: 2022-07-06

## 2022-05-15 RX ORDER — SODIUM CHLORIDE 9 MG/ML
INJECTION, SOLUTION INTRAVENOUS CONTINUOUS
OUTPATIENT
Start: 2022-07-06

## 2022-05-15 RX ORDER — MEPERIDINE HYDROCHLORIDE 50 MG/ML
12.5 INJECTION INTRAMUSCULAR; INTRAVENOUS; SUBCUTANEOUS PRN
OUTPATIENT
Start: 2022-07-06

## 2022-05-16 ENCOUNTER — HOSPITAL ENCOUNTER (OUTPATIENT)
Dept: INFUSION THERAPY | Age: 82
Discharge: HOME | DRG: 441 | End: 2022-05-16
Payer: MEDICARE

## 2022-05-16 ENCOUNTER — OFFICE VISIT (OUTPATIENT)
Dept: ONCOLOGY | Age: 82
End: 2022-05-16
Payer: MEDICARE

## 2022-05-16 ENCOUNTER — HOSPITAL ENCOUNTER (INPATIENT)
Age: 82
LOS: 3 days | Discharge: HOME HEALTH | DRG: 441 | End: 2022-05-19
Attending: HOSPITALIST | Admitting: HOSPITALIST
Payer: MEDICARE

## 2022-05-16 VITALS
BODY MASS INDEX: 23.29 KG/M2 | HEART RATE: 79 BPM | HEIGHT: 65 IN | WEIGHT: 139.8 LBS | RESPIRATION RATE: 16 BRPM | TEMPERATURE: 98.4 F | DIASTOLIC BLOOD PRESSURE: 55 MMHG | OXYGEN SATURATION: 97 % | SYSTOLIC BLOOD PRESSURE: 109 MMHG

## 2022-05-16 VITALS
TEMPERATURE: 98.4 F | WEIGHT: 139.8 LBS | BODY MASS INDEX: 23.29 KG/M2 | HEIGHT: 65 IN | DIASTOLIC BLOOD PRESSURE: 55 MMHG | SYSTOLIC BLOOD PRESSURE: 109 MMHG | RESPIRATION RATE: 16 BRPM | OXYGEN SATURATION: 97 % | HEART RATE: 79 BPM

## 2022-05-16 DIAGNOSIS — Z90.410 HISTORY OF WHIPPLE PROCEDURE: ICD-10-CM

## 2022-05-16 DIAGNOSIS — Z51.11 ENCOUNTER FOR CHEMOTHERAPY MANAGEMENT: ICD-10-CM

## 2022-05-16 DIAGNOSIS — E87.6 HYPOKALEMIA: ICD-10-CM

## 2022-05-16 DIAGNOSIS — Z90.49 HISTORY OF WHIPPLE PROCEDURE: ICD-10-CM

## 2022-05-16 DIAGNOSIS — C25.9 MALIGNANT NEOPLASM OF PANCREAS, UNSPECIFIED LOCATION OF MALIGNANCY (HCC): Primary | ICD-10-CM

## 2022-05-16 DIAGNOSIS — R17 JAUNDICE: ICD-10-CM

## 2022-05-16 DIAGNOSIS — D64.9 ANEMIA, UNSPECIFIED TYPE: ICD-10-CM

## 2022-05-16 LAB
ABSOLUTE IMMATURE GRANULOCYTE: 0.02 THOU/MM3 (ref 0–0.07)
ALBUMIN SERPL-MCNC: 2.9 G/DL (ref 3.5–5.1)
ALP BLD-CCNC: 248 U/L (ref 38–126)
ALT SERPL-CCNC: 79 U/L (ref 11–66)
AST SERPL-CCNC: 90 U/L (ref 5–40)
BASINOPHIL, AUTOMATED: 1 % (ref 0–3)
BASOPHILS ABSOLUTE: 0 THOU/MM3 (ref 0–0.1)
BILIRUB SERPL-MCNC: 4.2 MG/DL (ref 0.3–1.2)
BILIRUBIN DIRECT: 3 MG/DL (ref 0–0.3)
BUN, WHOLE BLOOD: 12 MG/DL (ref 8–26)
CHLORIDE, WHOLE BLOOD: 104 MEQ/L (ref 98–109)
CREATININE, WHOLE BLOOD: 1.2 MG/DL (ref 0.5–1.2)
EOSINOPHILS ABSOLUTE: 0 THOU/MM3 (ref 0–0.4)
EOSINOPHILS RELATIVE PERCENT: 1 % (ref 0–4)
GFR, ESTIMATED: 46 ML/MIN/1.73M2
GLUCOSE, WHOLE BLOOD: 98 MG/DL (ref 70–108)
HCT VFR BLD CALC: 31.9 % (ref 37–47)
HEMOGLOBIN: 10.3 GM/DL (ref 12–16)
IMMATURE GRANULOCYTES: 0 %
IONIZED CALCIUM, WHOLE BLOOD: 1.11 MMOL/L (ref 1.12–1.32)
LYMPHOCYTES # BLD: 26 % (ref 15–47)
LYMPHOCYTES ABSOLUTE: 1.5 THOU/MM3 (ref 1–4.8)
MCH RBC QN AUTO: 33.1 PG (ref 26–33)
MCHC RBC AUTO-ENTMCNC: 32.3 GM/DL (ref 32.2–35.5)
MCV RBC AUTO: 103 FL (ref 81–99)
MONOCYTES ABSOLUTE: 0.9 THOU/MM3 (ref 0.4–1.3)
MONOCYTES: 15 % (ref 0–12)
PDW BLD-RTO: 14.6 % (ref 11.5–14.5)
PLATELET # BLD: 199 THOU/MM3 (ref 130–400)
PMV BLD AUTO: 10.7 FL (ref 9.4–12.4)
POTASSIUM, WHOLE BLOOD: 2.9 MEQ/L (ref 3.5–4.9)
RBC # BLD: 3.11 MILL/MM3 (ref 4.2–5.4)
SEG NEUTROPHILS: 58 % (ref 43–75)
SEGMENTED NEUTROPHILS ABSOLUTE COUNT: 3.3 THOU/MM3 (ref 1.8–7.7)
SODIUM, WHOLE BLOOD: 141 MEQ/L (ref 138–146)
TOTAL CO2, WHOLE BLOOD: 21 MEQ/L (ref 23–33)
TOTAL PROTEIN: 5.5 G/DL (ref 6.1–8)
WBC # BLD: 5.8 THOU/MM3 (ref 4.8–10.8)

## 2022-05-16 PROCEDURE — 1200000000 HC SEMI PRIVATE

## 2022-05-16 PROCEDURE — 96365 THER/PROPH/DIAG IV INF INIT: CPT

## 2022-05-16 PROCEDURE — 2580000003 HC RX 258: Performed by: INTERNAL MEDICINE

## 2022-05-16 PROCEDURE — 2580000003 HC RX 258: Performed by: PHYSICIAN ASSISTANT

## 2022-05-16 PROCEDURE — 96366 THER/PROPH/DIAG IV INF ADDON: CPT

## 2022-05-16 PROCEDURE — 36591 DRAW BLOOD OFF VENOUS DEVICE: CPT

## 2022-05-16 PROCEDURE — 80076 HEPATIC FUNCTION PANEL: CPT

## 2022-05-16 PROCEDURE — 80047 BASIC METABLC PNL IONIZED CA: CPT

## 2022-05-16 PROCEDURE — 99222 1ST HOSP IP/OBS MODERATE 55: CPT | Performed by: PHYSICIAN ASSISTANT

## 2022-05-16 PROCEDURE — 99211 OFF/OP EST MAY X REQ PHY/QHP: CPT

## 2022-05-16 PROCEDURE — 6360000002 HC RX W HCPCS: Performed by: INTERNAL MEDICINE

## 2022-05-16 PROCEDURE — 85025 COMPLETE CBC W/AUTO DIFF WBC: CPT

## 2022-05-16 PROCEDURE — 99215 OFFICE O/P EST HI 40 MIN: CPT | Performed by: INTERNAL MEDICINE

## 2022-05-16 RX ORDER — HEPARIN SODIUM (PORCINE) LOCK FLUSH IV SOLN 100 UNIT/ML 100 UNIT/ML
500 SOLUTION INTRAVENOUS PRN
OUTPATIENT
Start: 2022-05-16

## 2022-05-16 RX ORDER — SODIUM CHLORIDE 9 MG/ML
INJECTION, SOLUTION INTRAVENOUS PRN
Status: DISCONTINUED | OUTPATIENT
Start: 2022-05-16 | End: 2022-05-19 | Stop reason: HOSPADM

## 2022-05-16 RX ORDER — SODIUM CHLORIDE 9 MG/ML
5-250 INJECTION, SOLUTION INTRAVENOUS PRN
OUTPATIENT
Start: 2022-06-13

## 2022-05-16 RX ORDER — SODIUM CHLORIDE 0.9 % (FLUSH) 0.9 %
5-40 SYRINGE (ML) INJECTION PRN
Status: CANCELLED | OUTPATIENT
Start: 2022-05-16

## 2022-05-16 RX ORDER — POTASSIUM CHLORIDE 7.45 MG/ML
10 INJECTION INTRAVENOUS PRN
Status: DISCONTINUED | OUTPATIENT
Start: 2022-05-16 | End: 2022-05-19 | Stop reason: HOSPADM

## 2022-05-16 RX ORDER — ONDANSETRON 2 MG/ML
4 INJECTION INTRAMUSCULAR; INTRAVENOUS EVERY 6 HOURS PRN
Status: DISCONTINUED | OUTPATIENT
Start: 2022-05-16 | End: 2022-05-19 | Stop reason: HOSPADM

## 2022-05-16 RX ORDER — ACETAMINOPHEN 650 MG/1
650 SUPPOSITORY RECTAL EVERY 6 HOURS PRN
Status: DISCONTINUED | OUTPATIENT
Start: 2022-05-16 | End: 2022-05-17

## 2022-05-16 RX ORDER — FERROUS SULFATE 325(65) MG
325 TABLET ORAL
Status: DISCONTINUED | OUTPATIENT
Start: 2022-05-17 | End: 2022-05-19 | Stop reason: HOSPADM

## 2022-05-16 RX ORDER — ONDANSETRON 4 MG/1
4 TABLET, ORALLY DISINTEGRATING ORAL EVERY 8 HOURS PRN
Status: DISCONTINUED | OUTPATIENT
Start: 2022-05-16 | End: 2022-05-19 | Stop reason: HOSPADM

## 2022-05-16 RX ORDER — SODIUM CHLORIDE 9 MG/ML
5-250 INJECTION, SOLUTION INTRAVENOUS PRN
Status: DISCONTINUED | OUTPATIENT
Start: 2022-05-16 | End: 2022-05-17 | Stop reason: HOSPADM

## 2022-05-16 RX ORDER — HEPARIN SODIUM (PORCINE) LOCK FLUSH IV SOLN 100 UNIT/ML 100 UNIT/ML
500 SOLUTION INTRAVENOUS PRN
OUTPATIENT
Start: 2022-06-13

## 2022-05-16 RX ORDER — ASPIRIN 81 MG/1
81 TABLET, CHEWABLE ORAL DAILY
Status: DISCONTINUED | OUTPATIENT
Start: 2022-05-17 | End: 2022-05-19 | Stop reason: HOSPADM

## 2022-05-16 RX ORDER — SODIUM CHLORIDE 0.9 % (FLUSH) 0.9 %
5-40 SYRINGE (ML) INJECTION PRN
Status: DISCONTINUED | OUTPATIENT
Start: 2022-05-16 | End: 2022-05-19 | Stop reason: HOSPADM

## 2022-05-16 RX ORDER — SODIUM CHLORIDE 9 MG/ML
25 INJECTION, SOLUTION INTRAVENOUS PRN
Status: CANCELLED | OUTPATIENT
Start: 2022-05-16

## 2022-05-16 RX ORDER — SODIUM CHLORIDE 0.9 % (FLUSH) 0.9 %
5-40 SYRINGE (ML) INJECTION PRN
Status: DISCONTINUED | OUTPATIENT
Start: 2022-05-16 | End: 2022-05-17 | Stop reason: HOSPADM

## 2022-05-16 RX ORDER — MAGNESIUM SULFATE IN WATER 40 MG/ML
2000 INJECTION, SOLUTION INTRAVENOUS PRN
Status: DISCONTINUED | OUTPATIENT
Start: 2022-05-16 | End: 2022-05-19 | Stop reason: HOSPADM

## 2022-05-16 RX ORDER — HEPARIN SODIUM (PORCINE) LOCK FLUSH IV SOLN 100 UNIT/ML 100 UNIT/ML
500 SOLUTION INTRAVENOUS PRN
Status: DISCONTINUED | OUTPATIENT
Start: 2022-05-16 | End: 2022-05-17 | Stop reason: HOSPADM

## 2022-05-16 RX ORDER — AMIODARONE HYDROCHLORIDE 200 MG/1
200 TABLET ORAL DAILY
Status: DISCONTINUED | OUTPATIENT
Start: 2022-05-17 | End: 2022-05-19 | Stop reason: HOSPADM

## 2022-05-16 RX ORDER — HEPARIN SODIUM (PORCINE) LOCK FLUSH IV SOLN 100 UNIT/ML 100 UNIT/ML
500 SOLUTION INTRAVENOUS PRN
Status: CANCELLED | OUTPATIENT
Start: 2022-05-16

## 2022-05-16 RX ORDER — AMLODIPINE BESYLATE 2.5 MG/1
2.5 TABLET ORAL DAILY
Status: DISCONTINUED | OUTPATIENT
Start: 2022-05-17 | End: 2022-05-19 | Stop reason: HOSPADM

## 2022-05-16 RX ORDER — POLYETHYLENE GLYCOL 3350 17 G/17G
17 POWDER, FOR SOLUTION ORAL DAILY PRN
Status: DISCONTINUED | OUTPATIENT
Start: 2022-05-16 | End: 2022-05-19 | Stop reason: HOSPADM

## 2022-05-16 RX ORDER — SODIUM CHLORIDE 0.9 % (FLUSH) 0.9 %
5-40 SYRINGE (ML) INJECTION PRN
OUTPATIENT
Start: 2022-06-13

## 2022-05-16 RX ORDER — SODIUM CHLORIDE 0.9 % (FLUSH) 0.9 %
5-40 SYRINGE (ML) INJECTION EVERY 12 HOURS SCHEDULED
Status: DISCONTINUED | OUTPATIENT
Start: 2022-05-16 | End: 2022-05-19 | Stop reason: HOSPADM

## 2022-05-16 RX ORDER — ACETAMINOPHEN 325 MG/1
650 TABLET ORAL EVERY 6 HOURS PRN
Status: DISCONTINUED | OUTPATIENT
Start: 2022-05-16 | End: 2022-05-17

## 2022-05-16 RX ORDER — SODIUM CHLORIDE 0.9 % (FLUSH) 0.9 %
5-40 SYRINGE (ML) INJECTION PRN
OUTPATIENT
Start: 2022-05-16

## 2022-05-16 RX ORDER — DOCUSATE SODIUM 100 MG/1
100 CAPSULE, LIQUID FILLED ORAL 2 TIMES DAILY
Status: DISCONTINUED | OUTPATIENT
Start: 2022-05-16 | End: 2022-05-19 | Stop reason: HOSPADM

## 2022-05-16 RX ORDER — POTASSIUM CHLORIDE 20 MEQ/1
40 TABLET, EXTENDED RELEASE ORAL PRN
Status: DISCONTINUED | OUTPATIENT
Start: 2022-05-16 | End: 2022-05-19 | Stop reason: HOSPADM

## 2022-05-16 RX ADMIN — SODIUM CHLORIDE, PRESERVATIVE FREE 20 ML: 5 INJECTION INTRAVENOUS at 09:16

## 2022-05-16 RX ADMIN — SODIUM CHLORIDE, PRESERVATIVE FREE 20 ML: 5 INJECTION INTRAVENOUS at 13:16

## 2022-05-16 RX ADMIN — POTASSIUM CHLORIDE: 2 INJECTION, SOLUTION, CONCENTRATE INTRAVENOUS at 11:08

## 2022-05-16 RX ADMIN — SODIUM CHLORIDE, PRESERVATIVE FREE 10 ML: 5 INJECTION INTRAVENOUS at 09:15

## 2022-05-16 RX ADMIN — SODIUM CHLORIDE 20 ML/HR: 9 INJECTION, SOLUTION INTRAVENOUS at 10:41

## 2022-05-16 RX ADMIN — SODIUM CHLORIDE, PRESERVATIVE FREE 10 ML: 5 INJECTION INTRAVENOUS at 22:58

## 2022-05-16 RX ADMIN — HEPARIN 500 UNITS: 100 SYRINGE at 13:16

## 2022-05-16 ASSESSMENT — ENCOUNTER SYMPTOMS
TROUBLE SWALLOWING: 0
VOMITING: 0
EYE DISCHARGE: 0
SORE THROAT: 0
ABDOMINAL DISTENTION: 0
COLOR CHANGE: 0
DIARRHEA: 0
BACK PAIN: 0
CONSTIPATION: 0
FACIAL SWELLING: 0
COUGH: 0
ABDOMINAL PAIN: 0
NAUSEA: 0
BLOOD IN STOOL: 0
CHEST TIGHTNESS: 0
RECTAL PAIN: 0
SHORTNESS OF BREATH: 0
WHEEZING: 0

## 2022-05-16 NOTE — H&P
[email or web address]    Smokeless tobacco: Never Used    Tobacco comment: social smoker   Vaping Use    Vaping Use: Never used   Substance and Sexual Activity    Alcohol use: Yes     Comment: social- been a long time since last drink    Drug use: Never    Sexual activity: Not on file   Other Topics Concern    Not on file   Social History Narrative    Not on file     Social Determinants of Health     Financial Resource Strain: Low Risk     Difficulty of Paying Living Expenses: Not very hard   Food Insecurity: No Food Insecurity    Worried About Running Out of Food in the Last Year: Never true    Roberto of Food in the Last Year: Never true   Transportation Needs:     Lack of Transportation (Medical): Not on file    Lack of Transportation (Non-Medical):  Not on file   Physical Activity:     Days of Exercise per Week: Not on file    Minutes of Exercise per Session: Not on file   Stress:     Feeling of Stress : Not on file   Social Connections:     Frequency of Communication with Friends and Family: Not on file    Frequency of Social Gatherings with Friends and Family: Not on file    Attends [employer] Services: Not on file    Active Member of Clubs or Organizations: Not on file    Attends Club or Organization Meetings: Not on file    Marital Status: Not on file   Intimate Partner Violence:     Fear of Current or Ex-Partner: Not on file    Emotionally Abused: Not on file    Physically Abused: Not on file    Sexually Abused: Not on file   Housing Stability:     Unable to Pay for Housing in the Last Year: Not on file    Number of [place] in the Last Year: Not on file    Unstable Housing in the Last Year: Not on file     FHX:   Family History   Problem Relation Age of Onset    Breast Cancer Mother     Colon Cancer Mother     Cancer Father         bladder and lung    Lung Cancer Father     Kidney Disease Brother     No Known Problems Maternal Grandmother     No Known Problems Maternal Grandfather     No Known Problems Paternal Grandmother     No Known Problems Paternal Grandfather      Allergies: No Known Allergies  Medications:     sodium chloride        amiodarone  200 mg Oral Daily    amLODIPine  2.5 mg Oral Daily    apixaban  5 mg Oral BID    aspirin  81 mg Oral Daily    docusate sodium  100 mg Oral BID    [START ON 5/17/2022] ferrous sulfate  325 mg Oral Daily with breakfast    sodium chloride flush  5-40 mL IntraVENous 2 times per day     sodium chloride flush, 5-40 mL, PRN  sodium chloride, , PRN  ondansetron, 4 mg, Q8H PRN   Or  ondansetron, 4 mg, Q6H PRN  polyethylene glycol, 17 g, Daily PRN  acetaminophen, 650 mg, Q6H PRN   Or  acetaminophen, 650 mg, Q6H PRN  potassium chloride, 40 mEq, PRN   Or  potassium alternative oral replacement, 40 mEq, PRN   Or  potassium chloride, 10 mEq, PRN  magnesium sulfate, 2,000 mg, PRN        Labs:   Recent Results (from the past 24 hour(s))   CBC with Auto Differential    Collection Time: 05/16/22  9:23 AM   Result Value Ref Range    WBC 5.8 4.8 - 10.8 thou/mm3    RBC 3.11 (L) 4.20 - 5.40 mill/mm3    Hemoglobin 10.3 (L) 12.0 - 16.0 gm/dl    Hematocrit 31.9 (L) 37.0 - 47.0 %     (H) 81 - 99 fL    MCH 33.1 (H) 26.0 - 33.0 pg    MCHC 32.3 32.2 - 35.5 gm/dl    RDW 14.6 (H) 11.5 - 14.5 %    Platelets 373 824 - 722 thou/mm3    MPV 10.7 9.4 - 12.4 fL    Seg Neutrophils 58 43 - 75 %    Lymphocytes 26 15 - 47 %    Monocytes 15 (H) 0 - 12 %    Eosinophils % 1 0 - 4 %    BASINOPHIL, AUTOMATED 1 0 - 3 %    Immature Granulocytes 0 %    Segs Absolute 3.3 1.8 - 7.7 thou/mm3    Lymphocytes Absolute 1.5 1.0 - 4.8 thou/mm3    Monocytes Absolute 0.9 0.4 - 1.3 thou/mm3    Eosinophils Absolute 0.0 0.0 - 0.4 thou/mm3    Basophils Absolute 0.0 0.0 - 0.1 thou/mm3    Absolute Immature Granulocyte 0.02 0.00 - 0.07 thou/mm3   Hepatic Function Panel    Collection Time: 05/16/22  9:23 AM   Result Value Ref Range    Albumin 2.9 (L) 3.5 - 5.1 g/dL    Total Bilirubin 4.2 (H) 0.3 - 1.2 mg/dL    Bilirubin, Direct 3.0 (H) 0.0 - 0.3 mg/dL    Alkaline Phosphatase 248 (H) 38 - 126 U/L    AST 90 (H) 5 - 40 U/L    ALT 79 (H) 11 - 66 U/L    Total Protein 5.5 (L) 6.1 - 8.0 g/dL   POC PANEL BMP W/IOCA    Collection Time: 05/16/22  9:24 AM   Result Value Ref Range    Sodium, Whole Blood 141 138 - 146 meq/l    Potassium, Whole Blood 2.9 (L) 3.5 - 4.9 meq/l    Chloride, Whole Blood 104 98 - 109 meq/l    TOTAL CO2, WHOLE BLOOD 21 (L) 23 - 33 meq/l    Glucose, Whole Blood 98 70 - 108 mg/dl    BUN, WHOLE BLOOD 12 8 - 26 mg/dl    CREATININE, WHOLE BLOOD 1.2 0.5 - 1.2 mg/dl    Ionized Calcium, WB 1.11 (L) 1.12 - 1.32 mmol/L   Glomerular Filtration Rate, Estimated    Collection Time: 05/16/22  9:24 AM   Result Value Ref Range    GFR, Estimated 46 ml/min/1.73m2         Vital Signs: T: 99.2F P: 76 RR: 16 B/P: 116/73: FiO2: RA: O2 Sat:99%: I/O: No intake or output data in the 24 hours ending 05/16/22 1914      General:   Thin, no acute distress  HEENT:  normocephalic and atraumatic. No scleral icterus. PEARLA, mucous membranes moist  Neck: supple. Trachea midline. No JVD. Full ROM, no meningismus. Lungs: clear to auscultation. No retractions, no accessory muscle use. Cardiac: RRR, no murmur, 2+ pulses  Abdomen: soft. Nontender. Bowel sounds active  Extremities:  No clubbing, cyanosis x 4, no edema    Vasculature: capillary refill < 3 seconds. Skin:  warm and dry. no visible rashes  Psych:  Alert and oriented x3. Affect appropriate  Lymph:  No supraclavicular adenopathy. Neurologic:  CN II-XII grossly intact. No focal deficit. Data: (All radiographs, tracings, PFTs, and imaging are personally viewed and interpreted unless otherwise noted).     EKG: none this encounter        Electronically signed by  Yrn Montanez PA-C Oriented - self; Oriented - place; Oriented - time

## 2022-05-16 NOTE — PLAN OF CARE
Problem: Discharge Planning  Goal: Knowledge of discharge instructions  Description: Knowledge of discharge instructions  Outcome: Adequate for Discharge  Note: Verbalize understanding of discharge instructions, follow up appointments, and when to call Physician. Intervention: Discharge to appropriate level of care  Note: Discuss understanding of discharge instructions, follow up appointments and when to call Physician. Problem: Falls - Risk of:  Goal: Will remain free from falls  Description: Will remain free from falls  Outcome: Adequate for Discharge  Note: Free from falls while in O.P. Oncology. Intervention: Assess risk factors for falls  Note: Discussed the need to use the call light for assistance when getting up to ambulate. Problem: Infection - Central Venous Catheter-Associated Bloodstream Infection:  Goal: Will show no infection signs and symptoms  Description: Will show no infection signs and symptoms  Outcome: Adequate for Discharge  Note: Mediport site with no redness or warmth. Skin over port site intact with no signs of breakdown noted. Patient verbalizes signs/symptoms of port infection and when to notify the physician. Intervention: Infection risk assessment  Note: Discuss port maintenance,infection prevention, sign of infection and when to call MD.    Care plan reviewed with patient. Patient  verbalize understanding of the plan of care and contribute to goal setting.

## 2022-05-16 NOTE — PROGRESS NOTES
Patient here for IV hydration of 40meq of potassium  ml over 2 hr's,  Due to potassium of 2.9.   Per Dr. Chandrika Taylor

## 2022-05-16 NOTE — PROGRESS NOTES
Patient coming in as direct admit. Shayna Stewart conferenced with Dr. Enoch Chen to accept the patient.

## 2022-05-16 NOTE — PROGRESS NOTES
Please contact your Oncologist if you have any questions regarding the IV infusion of potassium that you received today. Patient instructed if experience any of the symptoms following today's infusion / to notify MD immediately or go to emergency department.     * dizziness/lightheadedness  *acute nausea/vomiting - not relieved with medication  *headache - not relieved from Tylenol/pain medication  *chest pain/pressure  *rash/itching  *shortness of breath        Drink fluids - 48oz fluids daily  Call if develop fever/ chills/ signs or symptoms of infection

## 2022-05-16 NOTE — PROGRESS NOTES
Patient tolerated infusion of 40meq of IV potassium over two hours. Patient will be admitted to the hospital per Dr. Osborn Shone for further testing. Patient stated she would continue to drink fluids. Patient denies pain or any complaints at this time. Vitals logged.

## 2022-05-17 ENCOUNTER — APPOINTMENT (OUTPATIENT)
Dept: MRI IMAGING | Age: 82
DRG: 441 | End: 2022-05-17
Attending: HOSPITALIST
Payer: MEDICARE

## 2022-05-17 LAB
ALBUMIN SERPL-MCNC: 2.3 G/DL (ref 3.5–5.1)
ALP BLD-CCNC: 199 U/L (ref 38–126)
ALT SERPL-CCNC: 56 U/L (ref 11–66)
ANION GAP SERPL CALCULATED.3IONS-SCNC: 10 MEQ/L (ref 8–16)
AST SERPL-CCNC: 58 U/L (ref 5–40)
BASOPHILS # BLD: 0.7 %
BASOPHILS ABSOLUTE: 0 THOU/MM3 (ref 0–0.1)
BILIRUB SERPL-MCNC: 2.9 MG/DL (ref 0.3–1.2)
BUN BLDV-MCNC: 13 MG/DL (ref 7–22)
CALCIUM SERPL-MCNC: 8.2 MG/DL (ref 8.5–10.5)
CHLORIDE BLD-SCNC: 108 MEQ/L (ref 98–111)
CO2: 23 MEQ/L (ref 23–33)
CREAT SERPL-MCNC: 0.9 MG/DL (ref 0.4–1.2)
EOSINOPHIL # BLD: 1.1 %
EOSINOPHILS ABSOLUTE: 0 THOU/MM3 (ref 0–0.4)
ERYTHROCYTE [DISTWIDTH] IN BLOOD BY AUTOMATED COUNT: 14.7 % (ref 11.5–14.5)
ERYTHROCYTE [DISTWIDTH] IN BLOOD BY AUTOMATED COUNT: 55.2 FL (ref 35–45)
GFR SERPL CREATININE-BSD FRML MDRD: 60 ML/MIN/1.73M2
GLUCOSE BLD-MCNC: 72 MG/DL (ref 70–108)
HCT VFR BLD CALC: 25.9 % (ref 37–47)
HEMOGLOBIN: 8.4 GM/DL (ref 12–16)
IMMATURE GRANS (ABS): 0.01 THOU/MM3 (ref 0–0.07)
IMMATURE GRANULOCYTES: 0.4 %
LYMPHOCYTES # BLD: 45.6 %
LYMPHOCYTES ABSOLUTE: 1.3 THOU/MM3 (ref 1–4.8)
MAGNESIUM: 1.4 MG/DL (ref 1.6–2.4)
MCH RBC QN AUTO: 32.9 PG (ref 26–33)
MCHC RBC AUTO-ENTMCNC: 32.4 GM/DL (ref 32.2–35.5)
MCV RBC AUTO: 101.6 FL (ref 81–99)
MONOCYTES # BLD: 16.5 %
MONOCYTES ABSOLUTE: 0.5 THOU/MM3 (ref 0.4–1.3)
NUCLEATED RED BLOOD CELLS: 0 /100 WBC
PLATELET # BLD: 136 THOU/MM3 (ref 130–400)
PMV BLD AUTO: 10.3 FL (ref 9.4–12.4)
POTASSIUM REFLEX MAGNESIUM: 3.3 MEQ/L (ref 3.5–5.2)
RBC # BLD: 2.55 MILL/MM3 (ref 4.2–5.4)
SEG NEUTROPHILS: 35.7 %
SEGMENTED NEUTROPHILS ABSOLUTE COUNT: 1 THOU/MM3 (ref 1.8–7.7)
SODIUM BLD-SCNC: 141 MEQ/L (ref 135–145)
TOTAL PROTEIN: 4.5 G/DL (ref 6.1–8)
WBC # BLD: 2.9 THOU/MM3 (ref 4.8–10.8)

## 2022-05-17 PROCEDURE — 6360000004 HC RX CONTRAST MEDICATION: Performed by: PHYSICIAN ASSISTANT

## 2022-05-17 PROCEDURE — 80053 COMPREHEN METABOLIC PANEL: CPT

## 2022-05-17 PROCEDURE — 6370000000 HC RX 637 (ALT 250 FOR IP): Performed by: PHYSICIAN ASSISTANT

## 2022-05-17 PROCEDURE — 85025 COMPLETE CBC W/AUTO DIFF WBC: CPT

## 2022-05-17 PROCEDURE — 99222 1ST HOSP IP/OBS MODERATE 55: CPT | Performed by: PHYSICIAN ASSISTANT

## 2022-05-17 PROCEDURE — A9579 GAD-BASE MR CONTRAST NOS,1ML: HCPCS | Performed by: PHYSICIAN ASSISTANT

## 2022-05-17 PROCEDURE — 83735 ASSAY OF MAGNESIUM: CPT

## 2022-05-17 PROCEDURE — 1200000000 HC SEMI PRIVATE

## 2022-05-17 PROCEDURE — 74183 MRI ABD W/O CNTR FLWD CNTR: CPT

## 2022-05-17 PROCEDURE — 2580000003 HC RX 258: Performed by: PHYSICIAN ASSISTANT

## 2022-05-17 PROCEDURE — 99232 SBSQ HOSP IP/OBS MODERATE 35: CPT | Performed by: HOSPITALIST

## 2022-05-17 PROCEDURE — 2580000003 HC RX 258: Performed by: HOSPITALIST

## 2022-05-17 RX ORDER — ACETAMINOPHEN 650 MG/1
325 SUPPOSITORY RECTAL EVERY 6 HOURS PRN
Status: DISCONTINUED | OUTPATIENT
Start: 2022-05-17 | End: 2022-05-19 | Stop reason: HOSPADM

## 2022-05-17 RX ORDER — ACETAMINOPHEN 500 MG
500 TABLET ORAL EVERY 6 HOURS PRN
Status: DISCONTINUED | OUTPATIENT
Start: 2022-05-17 | End: 2022-05-19 | Stop reason: HOSPADM

## 2022-05-17 RX ORDER — SODIUM CHLORIDE 9 MG/ML
INJECTION, SOLUTION INTRAVENOUS CONTINUOUS
Status: DISCONTINUED | OUTPATIENT
Start: 2022-05-17 | End: 2022-05-18

## 2022-05-17 RX ADMIN — DOCUSATE SODIUM 100 MG: 100 CAPSULE, LIQUID FILLED ORAL at 08:32

## 2022-05-17 RX ADMIN — SODIUM CHLORIDE: 9 INJECTION, SOLUTION INTRAVENOUS at 09:40

## 2022-05-17 RX ADMIN — SODIUM CHLORIDE, PRESERVATIVE FREE 10 ML: 5 INJECTION INTRAVENOUS at 23:05

## 2022-05-17 RX ADMIN — ASPIRIN 81 MG 81 MG: 81 TABLET ORAL at 08:32

## 2022-05-17 RX ADMIN — POTASSIUM CHLORIDE 40 MEQ: 1500 TABLET, EXTENDED RELEASE ORAL at 08:31

## 2022-05-17 RX ADMIN — SODIUM CHLORIDE, PRESERVATIVE FREE 10 ML: 5 INJECTION INTRAVENOUS at 08:32

## 2022-05-17 RX ADMIN — GADOTERIDOL 10 ML: 279.3 INJECTION, SOLUTION INTRAVENOUS at 17:34

## 2022-05-17 RX ADMIN — APIXABAN 5 MG: 5 TABLET, FILM COATED ORAL at 22:59

## 2022-05-17 RX ADMIN — AMIODARONE HYDROCHLORIDE 200 MG: 200 TABLET ORAL at 08:32

## 2022-05-17 RX ADMIN — APIXABAN 5 MG: 5 TABLET, FILM COATED ORAL at 08:32

## 2022-05-17 RX ADMIN — AMLODIPINE BESYLATE 2.5 MG: 2.5 TABLET ORAL at 08:32

## 2022-05-17 RX ADMIN — FERROUS SULFATE TAB 325 MG (65 MG ELEMENTAL FE) 325 MG: 325 (65 FE) TAB at 23:00

## 2022-05-17 RX ADMIN — DOCUSATE SODIUM 100 MG: 100 CAPSULE, LIQUID FILLED ORAL at 22:58

## 2022-05-17 ASSESSMENT — ENCOUNTER SYMPTOMS
ALLERGIC/IMMUNOLOGIC NEGATIVE: 1
COLOR CHANGE: 1
CONSTIPATION: 1
RESPIRATORY NEGATIVE: 1
EYES NEGATIVE: 1

## 2022-05-17 NOTE — PLAN OF CARE
Problem: Safety - Adult  Goal: Free from fall injury  Outcome: Progressing  Note: Call light within reach, bed alarm on, non skid socks when ambulating. No falls     Problem: Pain  Goal: Verbalizes/displays adequate comfort level or baseline comfort level  Outcome: Progressing  Note: Pain assessed, patient reports no pain, pain reassessed     Problem: Discharge Planning  Goal: Discharge to home or other facility with appropriate resources  Outcome: Progressing  Note: Patient awaits MRI/MRCP. Anticipates to discharge back home.

## 2022-05-17 NOTE — FLOWSHEET NOTE
Advance Care Planning     Advance Care Planning Inpatient Note  [facility] Department    Today's Date: 2022  Unit: [facility]    Received request from IDT Member and family. Upon review of chart and communication with care team, patient's decision making abilities are not in question. . Patient was/were present in the room during visit. Goals of ACP Conversation:  Discuss advance care planning documents  Facilitate a discussion related to patient's goals of care as they align with the patient's values and beliefs. Health Care Decision Makers:     Summary:  Documented Next of Kin, per patient report    Advance Care Planning Documents (Patient Wishes):  None  Considering options. Assessment:  Consult from both family and via nursing- AD completion. Ashwini Cavazos is an 82 yo unmarried lady with a Hx of cancer who is currently in chemo. She has two surviving children after losing her son who lived locally last year. She realized that this son and her  brother were the executor and successor executor of her will, and this got her and her daughter thinking about updating documents. Ashwini Cavazos was unfamiliar with the HCPOA, so we had an extensive conversation about the document and it's role in her healthcare. Discussed conversation with her children/agents about her wishes including both EOL and non-EOL choices. Ashwini Cavazos spent some time discussing the many people she has lost over the past ten years, most importantly her son. Interventions:  Reviewed but did not complete ACP document   See above. Provided Care notes on decision making as well as grief ( loss of adult child, getting through annual reminders), OP  brochure, s/s grief, and a blank copy of the HCPOA document. Care Preferences Communicated:   Patient has not considered this prior. Reviewed Code Status options for her consideration and further conversation with her providers.      Outcomes/Plan:  ACP Discussion: Completed   Patient wants to review with her children. She may complete while here or possibly follow up as OP.      Electronically signed by Lauren Tavera on 5/16/2022 at 10:45 PM

## 2022-05-17 NOTE — CONSULTS
Consult History & Physical      Patient:  Brianna Mckinney  YOB: 1940  MRN: [id number]     Acct: [de-identified]    Chief Complaint:  Hyperbilirubinemia    Date of Service: Pt seen/examined in consultation on 5/17/2022    History Of Present Illness:      81 y.o. female who we are asked to see/evaluate by Velvet Daniel MD for medical management of hyperbilirubinemia. She was sent to the ED yesterday by oncology for jaundice and hyperbilirubinemia. 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 She follows with Dr. Ranjeet Rdz. Noted to have elevated LFTs: alk phos 248, ALT/AST 79/90, bilirubin 4.2 which are trending down. Denies abdominal pain, nausea, vomiting, diarrhea, constipation, melena, and hematochezia. She endorses a decreased appetite and poor oral intake. She endorses generalize weakness. Her daughter is interested in setting up home health services, therapy, and help with keeping her \"hydrated and increasing her oral intake. 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 ERCP was complicated by possible perforation therefore she was transferred to . However, upon evaluation at  a perforation was not found. Past Medical History:    Past Medical History:   Diagnosis Date    Cholangiocarcinoma ([address]     [place]    Hypertension        Home Medications:  Prior to Admission medications    Medication Sig Start Date End Date Taking?  Authorizing Provider   dexamethasone (DECADRON) 4 MG tablet Finish 05/05/2022 5/2/22   Historical Provider, MD   apixaban (ELIQUIS) 5 MG TABS tablet Take 1 tablet by mouth 2 times daily 5/4/22 10/31/22  Talia Kwan MD   amLODIPine (NORVASC) 2.5 MG tablet Take 1 tablet by mouth daily 5/4/22   Talia Kwan MD   amiodarone (CORDARONE) 200 MG tablet Take 1 tablet by mouth daily 5/4/22   Talia Kwan MD   aspirin 81 MG chewable tablet Take 1 tablet by mouth daily 5/4/22   Neha Grady MD   ondansetron (ZOFRAN) 4 MG tablet Take 4 mg by mouth every 8 hours as needed  3/7/22   Historical Provider, MD   ferrous sulfate (IRON 325) 325 (65 Fe) MG tablet Take 325 mg by mouth daily (with breakfast)    Historical Provider, MD   lidocaine-prilocaine (EMLA) 2.5-2.5 % cream Apply topically as needed. 1/26/22   Dixon Maddox MD   docusate sodium (COLACE) 100 MG capsule Take 100 mg by mouth 2 times daily    Historical Provider, MD       Surgical History:  Past Surgical History:   Procedure Laterality Date   [address]  12/04/2021    Dr. Wing Morse    ERCP N/A 11/10/2021    ERCP WITH STENT INSERTION performed by Concha Echavarria MD at [address] [place] [address]  12/04/2021    exp lap, radical celiac and portal lymph node dissection,pylorus preserving pancreatoduodenectomy-Dr [facility]    PORT SURGERY Left 1/25/2022    SINGLE LUMEN [id number] INSERTION performed by Yolanda Boss MD at [place] [address] History:  Family History   Problem Relation Age of Onset    Breast Cancer Mother     Colon Cancer Mother     Cancer Father         bladder and lung    Lung Cancer Father     Kidney Disease Brother     No Known Problems Maternal Grandmother     No Known Problems Maternal Grandfather     No Known Problems Paternal Grandmother     No Known Problems Paternal Grandfather        Past GI History:  Periampullary adenocarcinoma s/p Whipple, ERCP, GERD, polyps, colonoscopy    Dr. Rosaline Osler patient  Whipple done at     Allergies:  Patient has no known allergies. Social History:   TOBACCO:   reports that she quit smoking about 50 years ago. Her smoking use included cigarettes. She smoked 0.00 packs per day for 10.00 years. She has never used smokeless tobacco.  ETOH:   reports current alcohol use. Review Of Systems  GENERAL: No fever, chills or weight loss.   EYES:  No blurred vision, double vision   CARDIOVASCULAR: No chest pain or palpitations. RESPIRATORY:  No dyspnea or cough. GI:  See HPI  MUSCULOSKELETAL: No new painful or swollen joints or myalgias. :   No dysuria or hematuria. SKIN:  No rashes or jaundice. NEUROLOGIC:  No headaches or seizures, numbness or tingling of arms, or legs. PSYCH:  No anxiety or depression. ENDOCRINE:  No polyuria or polydipsia. BLOOD:  +anemia    PHYSICAL EXAM:  BP (!) 166/71   Pulse 65   Temp 97.8 °F (36.6 °C) (Oral)   Resp 16   SpO2 98%     General appearance: Chronically ill appearing female  HEENT: Normal cephalic, atraumatic without obvious deformity. Pupils equal, round, and reactive to light. Dry mucus membranes  Neck: Supple, with full range of motion. No jugular venous distention. Trachea midline. Respiratory:  Normal respiratory effort. Clear to auscultation, bilaterally without Rales/Wheezes/Rhonchi. Cardiovascular: Regular rate and rhythm without murmurs, rubs or gallops. Mediport to left upper chest.  Abdomen: Soft, non-tender, non-distended with active bowel sounds. Musculoskeletal: No clubbing, cyanosis bilaterally. BLE edema  Skin: Pink, warm, dry. No rashes or lesions. Psychiatric: Alert and oriented, thought content appropriate, normal insight    Labs:   Recent Labs     05/17/22  0600   WBC 2.9*   HGB 8.4*   HCT 25.9*        Recent Labs     05/17/22  0600      K 3.3*      CO2 23   BUN 13   CREATININE 0.9   CALCIUM 8.2*     Recent Labs     05/16/22  0923 05/16/22  0923 05/17/22  0600   AST 90*   < > 58*   ALT 79*   < > 56   BILIDIR 3.0*  --   --    BILITOT 4.2*   < > 2.9*   ALKPHOS 248*   < > 199*    < > = values in this interval not displayed. No results for input(s): INR in the last 72 hours. Radiology:   None  Code Status: Full Code    ASSESSMENT:  1. Painless jaundice- resolved  2. Elevated LFTs- trending down  3. Hyperbilirubinemia- trending down  4. Hypoalbuminemia  5. Malnutrition  6. Hypokalemia  7. Leukopenia  8. Macrocytic anemia- no GI bleeding noted  9. Hypomagnesemia  10. Hypocalcemia  11. Afib- on Eliquis  12. 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 H/O HTN  14. Poor oral intake  15. Fluid overload with BLE edema    PLAN:     Monitor H & H, transfuse prn   NPO for MRCP, okay for diet after MRCP   Avoid hepatotoxic meds, okay for Tylenol 2g limit   Await MRCP results   Monitor HFP   Consult dietitian   Consult social work   PT/OT   Oncology on board   RN updated   Supportive care per primary team  Will follow       Case reviewed and impression/plan reviewed in collaboration with Dr. Melvin Ball  Electronically signed by ASIA Green - CNP on 5/17/2022 at 10:53 AM    [facility]  Thank you for the consultation.

## 2022-05-17 NOTE — CARE COORDINATION
5/17/22, 1:20 PM EDT  DISCHARGE PLANNING EVALUATION:    Gabbi Richter       Admitted: 5/16/2022/ 4 Dorothy Luis Armandodianagavi day: 1   Location: [facility]16/016-A Reason for admit: Hyperbilirubinemia [E80.6]   PMH:  has a past medical history of Cholangiocarcinoma ([address] and Hypertension. Procedure: MRCP planned  Barriers to Discharge:  Patient was a direct admit from Dr. Vida Juárez office due to hyperbilirubinemia, bilirubin 4.2 on admission, 2.9 today. Sherry Nuñez has a history of pancreatic cancer, S/P whipple procedure. She follows with Dr. Kiah Webb. Chemotherapy now on hold. Consults to GI and Oncology, IV fluids, Eliquis, prn Tylenol and Zofran, Potassium replacement protocol, daily CBC and CMP, PT/OT, SS, Dietician, up with assistance. PCP: Sam Acevedo MD  Readmission Risk Score: 17.9 ( )%  Patient's Healthcare Decision Maker: Named in [facility] just completed with [facility] care. Patient Goals/Plan/Treatment Preferences: Met with Sherry Nuñez and her daughter present at bedside. Sherry Nuñez resides at home alone. Her daughter lives in [facility] (she is driving home today) and her son resides in [place]. She has a sister that resides in [place] and is not in good health. Sherry Nuñez verifies her insurance and PCP. She is able to afford her medications and has the DME needed at home, walker, cane, shower chair and blood pressure cuff. Sherry Nuñez is an active . She is able to get her groceries and takes herself to her appointments. She requests [facility] at discharge through [facility]. She has had their services in the past. SS has been consulted. Transportation/Food Security/Housekeeping Addressed:  No issues identified.

## 2022-05-17 NOTE — PROGRESS NOTES
Hospitalist Progress Note    Patient:  Pina Paniagua      Unit/Bed:5K-16/016-A    YOB: 1940    MRN: [id number]       Acct: [de-identified]     PCP: Shannon Vasquez MD    Date of Admission: 5/16/2022    Assessment/Plan:    1. Pancreatic cancer: Oncology consulted. Chemotherapy held for jaundice. 2. Hyperbilirubinemia: Patient is jaundiced. History of pancreatic cancer status post Whipple procedure on chemotherapy. GI has been consulted. MRCP pending this afternoon. Currently NPO. We will start IV fluid hydration. 3. Atrial fibrillation:  Continue with amiodarone. Eliquis for anticoagulation. 4. Hypertension: BP elevated, recheck after morning meds. Continue with amlodipine and just as needed. 5. Hypomagnesemia: Magnesium replacement  6. Hypokalemia: Potassium replacement protocol. Subjective (past 24 hours):   Patient seen and examined at bedside. Patient denies any abdominal pain, nausea or vomiting. No new complaints or acute overnight events. Awaiting MRCP later today.       Medications:  Reviewed    Infusion Medications    sodium chloride 75 mL/hr at 05/17/22 0940    sodium chloride       Scheduled Medications    amiodarone  200 mg Oral Daily    amLODIPine  2.5 mg Oral Daily    apixaban  5 mg Oral BID    aspirin  81 mg Oral Daily    docusate sodium  100 mg Oral BID    ferrous sulfate  325 mg Oral Daily with breakfast    sodium chloride flush  5-40 mL IntraVENous 2 times per day     PRN Meds: sodium chloride flush, sodium chloride, ondansetron **OR** ondansetron, polyethylene glycol, acetaminophen **OR** acetaminophen, potassium chloride **OR** potassium alternative oral replacement **OR** potassium chloride, magnesium sulfate      Intake/Output Summary (Last 24 hours) at 5/17/2022 1047  Last data filed at 5/17/2022 1045  Gross per 24 hour   Intake 5 ml   Output 400 ml   Net -395 ml       Diet:  Diet NPO    Exam:  BP (!) 166/71   Pulse 65   Temp 97.8 °F (36.6 °C) (Oral)   Resp 16   SpO2 98%     General appearance: No apparent distress, appears stated age and cooperative. Respiratory:  Normal respiratory effort. Clear to auscultation, bilaterally without Rales/Wheezes/Rhonchi. Cardiovascular: Regular rate and rhythm with normal S1/S2 without murmurs, rubs or gallops. Abdomen: Soft, non-tender, non-distended with normal bowel sounds. Extremities: no pedal edema  Skin:  jaundice    Labs:   Recent Labs     05/16/22 0923 05/17/22  0600   WBC 5.8 2.9*   HGB 10.3* 8.4*   HCT 31.9* 25.9*    136     Recent Labs     05/16/22 0924 05/17/22  0600    141   K 2.9* 3.3*   CL  --  108   CO2  --  23   BUN  --  13   CREATININE 1.2 0.9   CALCIUM  --  8.2*     Recent Labs     05/16/22 0923 05/17/22  0600   AST 90* 58*   ALT 79* 56   BILIDIR 3.0*  --    BILITOT 4.2* 2.9*   ALKPHOS 248* 199*     No results for input(s): INR in the last 72 hours. No results for input(s): Teagan Beat in the last 72 hours. No results for input(s): PROCAL in the last 72 hours. Microbiology:      Urinalysis:      Lab Results   Component Value Date    NITRU POSITIVE 11/09/2021    WBCUA 25-50 11/09/2021    BACTERIA MANY 11/09/2021    RBCUA 3-5 11/09/2021    BLOODU SMALL 11/09/2021    GLUCOSEU NEGATIVE 11/09/2021       Radiology:  No results found.     DVT prophylaxis: [] Lovenox                                 [] SCDs                                 [] SQ Heparin                                 [] Encourage ambulation           [x] Already on Anticoagulation     Code Status: Full Code    Tele:   [x] yes             [] no    Active Hospital Problems    Diagnosis Date Noted    Malignant neoplasm of [address] [address] [C25.9] 12/28/2021    Atrial fibrillation ([facility] [address] [I48.91] 12/28/2021    Hyperbilirubinemia [E80.6]        Electronically signed by Heidi Kang MD on 5/17/2022 at 10:47 AM

## 2022-05-17 NOTE — PROGRESS NOTES
MRI ABDOMEN W WO CONTRAST MRCP- order placed 5/16. MRI screening completed this AM and MRI team notified. MRI told this RN that scan would happen this afternoon. Patient has been told that when MRI results, if no intervention is necessary that patient can have a diet order. Call placed to MRI around 1300 to check on timing of MRI, and notified that patient was next on list. Patient has not been down to MRI as of 1530. Call placed to MRI again to check on timing. This RN was notified that there are 3 ER holds that need scanned prior to this patient and time can not be provided. Patient updated.

## 2022-05-17 NOTE — CARE COORDINATION
DISCHARGE/PLANNING EVALUATION  5/17/22, 2:41 PM EDT    Reason for Referral: requesting home health, had [facility] in the past, requesting meals on wheels  Mental Status: alert and answers questions appropriately  Decision Making: self  Family/Social/Home Environment: lives at home alone, has a daughter that lives in [facility] and a son in [address] [place] [address] including food security, transportation and housekeeping: patient reports she does do her own driving, reports her legs have been weak lately and she has a hard time with cooking. SW did provide information on home delivered meals. Current Equipment:walker, cane, shower chair  Payment Source:[facility] Medicare  Concerns or Barriers to Discharge: none  Post acute provider list with quality measures, geographic area and applicable managed care information provided. Questions regarding selection process answered:provided, would like to use [facility], has used them in the past    Teach Back Method used with Fara Huertasfrank regarding care plan and needs  Patient verbalize understanding of the plan of care and contribute to goal setting. Patient goals, treatment preferences and discharge plan: Return home with new [address] 18 did call [facility], referral made, they asked to be updated on when pt is going home. WALT did ask pt if she is seeing a patient navigator at cancer center, reports she is unsure, reports she may have had information at first, but also explains there was so much to take in at that time. WALT did let pt know she would reach out to patient nativigator there to see if they can visit on her next appointment, pt appreciative of this. WALT did call [facility], left a voicemail regarding pt. WALT did provide pt with information on [address] as well.      Electronically signed by MEGAN Bae on 5/17/2022 at 2:41 PM

## 2022-05-17 NOTE — ACP (ADVANCE CARE PLANNING)
Advance Care Planning     Advance Care Planning Inpatient Note  [facility] Department    Today's Date: 5/17/2022  Unit: [facility] 5K    Received request from IDT Member. Upon review of chart and communication with care team, patient's decision making abilities are not in question. . Patient and Child/Children was/were present in the room during visit. Goals of ACP Conversation:  Discuss advance care planning documents  Facilitate a discussion related to patient's goals of care as they align with the patient's values and beliefs. Health Care Decision Makers:       Primary Decision Maker: Andreas Heck - Child - [phone]    Secondary Decision Maker: Keyona Naranjo - Child - [phone]    Summary:  Completed New Documents    Advance Care Planning Documents (Patient Wishes):  Healthcare Power of /Advance Directive Appointment of Health Care Agent     Assessment:   responded to call from pt's nurse requesting that  come and complete AD forms with pt while pt's daughter was present.  completed HCPOA with pt. Pt's daughter is the primary agent. Original and two copies were returned to pt. Copy placed on pt's chart and faxed to medical records. Interventions:  Provided education on documents for clarity and greater understanding  Assisted in the completion of documents according to patient's wishes at this time  Encouraged ongoing ACP conversation with future decision makers and loved ones    Care Preferences Communicated:   Ventilation:   If the patient, in their present state of health, suddenly became very ill and unable to breathe on their own,     the patient would desire the use of a ventilator (breathing machine). If their health worsens and it becomes clear that the change of recovery is unlikely,     the patient would desire the use of a ventilator (breathing machine).     Resuscitation:  In the event the patient's heart stopped as a result of an underlying serious health condition, the patient communicates a preference for      resuscitative attempts (CPR). Outcomes/Plan:  New advance directive completed. Returned original document(s) to patient, as well as copies for distribution to appointed agents  Copy of advance directive given to staff to scan into medical record. Routed ACP note to attending provider or other IDT member.   Teach Back Method used to verify the patient's and/or Healthcare Decision Maker's understanding of key information in the advance directive documents    Electronically signed by Chaplain Carmelita on 5/17/2022 at 2:46 PM

## 2022-05-17 NOTE — PLAN OF CARE
Problem: Discharge Planning  Goal: Discharge to home or other facility with appropriate resources  5/17/2022 1438 by MEGAN Carrion  Outcome: Progressing  Flowsheets (Taken 5/17/2022 0827 by Radha Ennis, CARLOS)  Discharge to home or other facility with appropriate resources:   Identify barriers to discharge with patient and caregiver   Arrange for needed discharge resources and transportation as appropriate   Identify discharge learning needs (meds, wound care, etc)  5/17/2022 0253 by Isidro Padilla, CARLOS  Outcome: Progressing  Note: Patient awaits MRI/MRCP. Anticipates to discharge back home. SW consult received. See SW note 5/17/22.

## 2022-05-18 PROBLEM — E43 SEVERE MALNUTRITION (HCC): Chronic | Status: ACTIVE | Noted: 2022-05-18

## 2022-05-18 LAB
ALBUMIN SERPL-MCNC: 2.4 G/DL (ref 3.5–5.1)
ALP BLD-CCNC: 215 U/L (ref 38–126)
ALT SERPL-CCNC: 46 U/L (ref 11–66)
ANION GAP SERPL CALCULATED.3IONS-SCNC: 10 MEQ/L (ref 8–16)
AST SERPL-CCNC: 41 U/L (ref 5–40)
BASOPHILS # BLD: 1 %
BASOPHILS ABSOLUTE: 0 THOU/MM3 (ref 0–0.1)
BILIRUB SERPL-MCNC: 2.2 MG/DL (ref 0.3–1.2)
BUN BLDV-MCNC: 11 MG/DL (ref 7–22)
CALCIUM SERPL-MCNC: 8 MG/DL (ref 8.5–10.5)
CHLORIDE BLD-SCNC: 110 MEQ/L (ref 98–111)
CO2: 22 MEQ/L (ref 23–33)
CREAT SERPL-MCNC: 0.8 MG/DL (ref 0.4–1.2)
EOSINOPHIL # BLD: 1 %
EOSINOPHILS ABSOLUTE: 0 THOU/MM3 (ref 0–0.4)
ERYTHROCYTE [DISTWIDTH] IN BLOOD BY AUTOMATED COUNT: 14.7 % (ref 11.5–14.5)
ERYTHROCYTE [DISTWIDTH] IN BLOOD BY AUTOMATED COUNT: 55.1 FL (ref 35–45)
GFR SERPL CREATININE-BSD FRML MDRD: 69 ML/MIN/1.73M2
GLUCOSE BLD-MCNC: 101 MG/DL (ref 70–108)
HCT VFR BLD CALC: 28.3 % (ref 37–47)
HEMOGLOBIN: 8.8 GM/DL (ref 12–16)
IMMATURE GRANS (ABS): 0 THOU/MM3 (ref 0–0.07)
IMMATURE GRANULOCYTES: 0 %
LYMPHOCYTES # BLD: 41 %
LYMPHOCYTES ABSOLUTE: 0.8 THOU/MM3 (ref 1–4.8)
MCH RBC QN AUTO: 32 PG (ref 26–33)
MCHC RBC AUTO-ENTMCNC: 31.1 GM/DL (ref 32.2–35.5)
MCV RBC AUTO: 102.9 FL (ref 81–99)
MONOCYTES # BLD: 17.9 %
MONOCYTES ABSOLUTE: 0.4 THOU/MM3 (ref 0.4–1.3)
NUCLEATED RED BLOOD CELLS: 0 /100 WBC
PATHOLOGIST REVIEW: ABNORMAL
PLATELET # BLD: 151 THOU/MM3 (ref 130–400)
PLATELET ESTIMATE: ADEQUATE
PMV BLD AUTO: 10.3 FL (ref 9.4–12.4)
POTASSIUM SERPL-SCNC: 3.6 MEQ/L (ref 3.5–5.2)
RBC # BLD: 2.75 MILL/MM3 (ref 4.2–5.4)
SCAN OF BLOOD SMEAR: NORMAL
SEG NEUTROPHILS: 39.1 %
SEGMENTED NEUTROPHILS ABSOLUTE COUNT: 0.8 THOU/MM3 (ref 1.8–7.7)
SODIUM BLD-SCNC: 142 MEQ/L (ref 135–145)
TOTAL PROTEIN: 4.6 G/DL (ref 6.1–8)
WBC # BLD: 2 THOU/MM3 (ref 4.8–10.8)

## 2022-05-18 PROCEDURE — 80053 COMPREHEN METABOLIC PANEL: CPT

## 2022-05-18 PROCEDURE — 6360000002 HC RX W HCPCS

## 2022-05-18 PROCEDURE — 85025 COMPLETE CBC W/AUTO DIFF WBC: CPT

## 2022-05-18 PROCEDURE — 99232 SBSQ HOSP IP/OBS MODERATE 35: CPT

## 2022-05-18 PROCEDURE — 94760 N-INVAS EAR/PLS OXIMETRY 1: CPT

## 2022-05-18 PROCEDURE — 1200000000 HC SEMI PRIVATE

## 2022-05-18 PROCEDURE — 2580000003 HC RX 258: Performed by: PHYSICIAN ASSISTANT

## 2022-05-18 PROCEDURE — 6370000000 HC RX 637 (ALT 250 FOR IP): Performed by: PHYSICIAN ASSISTANT

## 2022-05-18 PROCEDURE — 2580000003 HC RX 258: Performed by: HOSPITALIST

## 2022-05-18 RX ORDER — FUROSEMIDE 10 MG/ML
20 INJECTION INTRAMUSCULAR; INTRAVENOUS ONCE
Status: COMPLETED | OUTPATIENT
Start: 2022-05-18 | End: 2022-05-18

## 2022-05-18 RX ADMIN — FUROSEMIDE 20 MG: 10 INJECTION, SOLUTION INTRAMUSCULAR; INTRAVENOUS at 11:57

## 2022-05-18 RX ADMIN — APIXABAN 5 MG: 5 TABLET, FILM COATED ORAL at 21:52

## 2022-05-18 RX ADMIN — DOCUSATE SODIUM 100 MG: 100 CAPSULE, LIQUID FILLED ORAL at 08:59

## 2022-05-18 RX ADMIN — DOCUSATE SODIUM 100 MG: 100 CAPSULE, LIQUID FILLED ORAL at 21:50

## 2022-05-18 RX ADMIN — ASPIRIN 81 MG 81 MG: 81 TABLET ORAL at 08:59

## 2022-05-18 RX ADMIN — FERROUS SULFATE TAB 325 MG (65 MG ELEMENTAL FE) 325 MG: 325 (65 FE) TAB at 09:00

## 2022-05-18 RX ADMIN — SODIUM CHLORIDE: 9 INJECTION, SOLUTION INTRAVENOUS at 03:39

## 2022-05-18 RX ADMIN — AMIODARONE HYDROCHLORIDE 200 MG: 200 TABLET ORAL at 09:00

## 2022-05-18 RX ADMIN — SODIUM CHLORIDE, PRESERVATIVE FREE 10 ML: 5 INJECTION INTRAVENOUS at 21:53

## 2022-05-18 RX ADMIN — SODIUM CHLORIDE, PRESERVATIVE FREE 10 ML: 5 INJECTION INTRAVENOUS at 09:00

## 2022-05-18 RX ADMIN — APIXABAN 5 MG: 5 TABLET, FILM COATED ORAL at 09:00

## 2022-05-18 RX ADMIN — AMLODIPINE BESYLATE 2.5 MG: 2.5 TABLET ORAL at 09:00

## 2022-05-18 NOTE — PROGRESS NOTES
Hospitalist Progress Note      Patient:  Desmond Whitfield    Unit/Bed:5K-16/016-A  YOB: 1940  MRN: [id number]   Acct: [de-identified]   PCP: Stephania Vicente MD  Date of Admission: 5/16/2022    Assessment/Plan:    1. Pancreatic cancer:   · Oncology consulted on admission. Chemotherapy held for jaundice. 2. Hyperbilirubinemia:   · Patient jaundice on admission, history of pancreatic cancer s/p Whipple procedure on chemotherapy. GI was consulted on admission. MRCP obtained 05/17. Bilirubin has trended down. GI okay with discharge. 3. Atrial fibrillation:   · Continue home amiodarone, Eliquis OAC. 4. Essential HTN:   · BP stable, continue medications and adjust as needed  5. Hypokalemia, resolved:   · Replaced now WNL, repeat BMP in the morning. 6. Physical deconditioning:  · Patient reports that she has been having difficulty obtaining food as she does not have family members around to help her grocery shop. Patient notes that she has generalized weakness, PT/OT consulted to eval and treat. Patient may need PT/OT with home health orders on discharge. Patient is medically stable we are awaiting PT/OT to evaluate. Chief Complaint: Hyperbilirubinemia    Initial H and P:-    Per chart review: \"Patient admitted from home with jaundice and abnormal labs. The patient had outpatient chemotherapy today. She was noted to be jaundice. She had labs that revealed elevated bilirubin - 4.2 from 0.9 two weeks prior.     Patient is not having any new pain.     Patient admitted for MRCP and further evaluation of abnormal labs. \"    Subjective (past 24 hours):   Patient resting in bed in no acute distress this morning, she notes that she is feeling well. Denying abdominal pain, nausea, vomiting, chest pain, shortness of breath. Patient does endorse generalized weakness, would like PT/OT to evaluate. May need PT/OT home health on discharge.   She is medically stable for discharge per GI standpoint, awaiting PT OT to evaluate. Past medical history, family history, social history and allergies reviewed again and is unchanged since admission. ROS (All review of systems completed. Pertinent positives noted. Otherwise All other systems reviewed and negative.)     Medications:  Reviewed    Infusion Medications    sodium chloride 75 mL/hr at 05/18/22 7471    sodium chloride       Scheduled Medications    amiodarone  200 mg Oral Daily    amLODIPine  2.5 mg Oral Daily    apixaban  5 mg Oral BID    aspirin  81 mg Oral Daily    docusate sodium  100 mg Oral BID    ferrous sulfate  325 mg Oral Daily with breakfast    sodium chloride flush  5-40 mL IntraVENous 2 times per day     PRN Meds: acetaminophen **OR** acetaminophen, sodium chloride flush, sodium chloride, ondansetron **OR** ondansetron, polyethylene glycol, potassium chloride **OR** potassium alternative oral replacement **OR** potassium chloride, magnesium sulfate      Intake/Output Summary (Last 24 hours) at 5/18/2022 0839  Last data filed at 5/18/2022 3368  Gross per 24 hour   Intake 1421.18 ml   Output 500 ml   Net 921.18 ml       Diet:  ADULT DIET; Regular    Exam:  /63   Pulse 62   Temp 98.3 °F (36.8 °C) (Oral)   Resp 17   SpO2 97%   General appearance: No apparent distress, appears stated age and cooperative. HEENT: Pupils equal, round, and reactive to light. Conjunctivae/corneas clear. Neck: Supple, with full range of motion. No jugular venous distention. Trachea midline. Respiratory:  Normal respiratory effort. Clear to auscultation, bilaterally without Rales/Wheezes/Rhonchi. Cardiovascular: Regular rate and rhythm with normal S1/S2 without murmurs, rubs or gallops. Abdomen: Soft, non-tender, non-distended with normal bowel sounds. Musculoskeletal: passive and active ROM x 4 extremities. Skin: Skin color, texture, turgor normal.  No rashes or lesions.   Neurologic: Neurovascularly intact without any focal sensory/motor deficits. Cranial nerves: II-XII intact, grossly non-focal.  Psychiatric: Alert and oriented, thought content appropriate, normal insight  Capillary Refill: Brisk,< 3 seconds   Peripheral Pulses: +2 palpable, equal bilaterally     Labs:   Recent Labs     05/16/22 0923 05/17/22  0600   WBC 5.8 2.9*   HGB 10.3* 8.4*   HCT 31.9* 25.9*    136     Recent Labs     05/16/22 0924 05/17/22 0600 05/18/22  0755    141 142   K 2.9* 3.3* 3.6   CL  --  108 110   CO2  --  23 22*   BUN  --  13 11   CREATININE 1.2 0.9 0.8   CALCIUM  --  8.2* 8.0*     Recent Labs     05/16/22 0923 05/17/22 0600 05/18/22  0755   AST 90* 58* 41*   ALT 79* 56 46   BILIDIR 3.0*  --   --    BILITOT 4.2* 2.9* 2.2*   ALKPHOS 248* 199* 215*     No results for input(s): INR in the last 72 hours. No results for input(s): [place] [employer] in the last 72 hours. Microbiology:    Blood culture #1: No results found for: BC    Blood culture #2:No results found for: BLOODCULT2    Organism:  Lab Results   Component Value Date    ORG Mixed Growth 11/09/2021       No results found for: LABGRAM    MRSA culture only:No results found for: [facility]    Urine culture: No results found for: LABURIN    Respiratory culture: No results found for: CULTRESP    Aerobic and Anaerobic :  No results found for: LABAERO  No results found for: LABANAE    Urinalysis:      Lab Results   Component Value Date    NITRU POSITIVE 11/09/2021    WBCUA 25-50 11/09/2021    BACTERIA MANY 11/09/2021    RBCUA 3-5 11/09/2021    BLOODU SMALL 11/09/2021    GLUCOSEU NEGATIVE 11/09/2021       Radiology:  MRI ABDOMEN W WO CONTRAST MRCP   Final Result   Impression:   1. Status post Whipple procedure. 2. Moderate ascites with worsening. Edema of the soft tissues with    worsening. 3. Mildly dilated intrahepatic and extrahepatic biliary tree. Pneumobilia    is present and this does not appear to represent an obstructive process. 4. There is apparent edema of the pancreatic mesentery. This may be    secondary to generalized increased fluid status or pancreatitis. 5. There is thickening of the visualized: This may be secondary to    increased fluid status or colitis. 6. There are 2 small pancreatic cysts. This document has been electronically signed by: Kaz Mcneil MD on    05/17/2022 06:14 PM        MRI ABDOMEN W WO CONTRAST MRCP    Result Date: 5/17/2022  MRI MRCP without contrast Comparison: 4/13/2022 Findings: There has been a prior Whipple procedure. There are 2 small cysts within the proximal pancreas, the larger measuring approximately 8 mm in size. There is no pancreatic duct dilatation. There is edema of the pancreatic mesentery. There is severe pneumobilia and mild dilatation of the intra-and extrahepatic biliary tree. There is no evidence of biliary obstruction. The liver, spleen and adrenal glands are unremarkable. There are small bilateral kidney cysts. No hydronephrosis. Probable prior hiatal hernia repair. Mild colonic thickening. Borderline dilatation of segments of small bowel within the right upper quadrant without overall findings to suggest an obstructive process. There is a moderate amount of free fluid with interval worsening. No suspicious bone lesion. Impression: 1. Status post Whipple procedure. 2. Moderate ascites with worsening. Edema of the soft tissues with worsening. 3. Mildly dilated intrahepatic and extrahepatic biliary tree. Pneumobilia is present and this does not appear to represent an obstructive process. 4. There is apparent edema of the pancreatic mesentery. This may be secondary to generalized increased fluid status or pancreatitis. 5. There is thickening of the visualized: This may be secondary to increased fluid status or colitis. 6. There are 2 small pancreatic cysts.  This document has been electronically signed by: Kaz Mcneil MD on 05/17/2022 06:14 PM      Electronically signed by Bill Hamilton PA-C on 5/18/2022 at 8:39 AM

## 2022-05-18 NOTE — PROGRESS NOTES
Gastroenterology Progress Note:     Patient Name:  Surendra Barry   MRN: [id number]  [id number]  YOB: 1940  Admit Date: 5/16/2022  6:05 PM  Primary Care Physician: Eugenio Thompson MD   5K-16/016-A     Patient seen and examined. 24 hours events and chart reviewed. Subjective: Patient resting in bed. Denies abd pain, n/v. She ate 75% of her breakfast. She endorses a good appetite, just poor access to food. MRCP results reviewed. Social work is setting up home health, awaiting PT/OT evals. Objective:  BP (!) 178/77   Pulse 66   Temp 99.1 °F (37.3 °C) (Oral)   Resp 18   SpO2 98%     Physical Exam:    General:  Chronically ill appearing female  HEENT: Atraumatic, normocephalic. Moist oral mucous membranes. Neck: Supple without adenopathy, JVD, thyromegaly or masses. Trachea midline. CV: Heart RRR, no murmurs, rubs, gallops. Mediport to left upper chest.  Resp: Even, easy without cough or accessory use. Lungs clear to ascultation bilaterally. Abd: Round, soft, nontender. No hepatosplenomegaly or mass present. Active bowel sounds heard. No distention noted. Ext:  Without cyanosis, clubbing. BLE edema. Skin: Pink, warm, dry  Neuro:  Alert, oriented x 3 with no obvious deficits.        Rectal: deferred    Labs:   CBC:   Lab Results   Component Value Date    WBC 2.9 05/17/2022    HGB 8.4 05/17/2022    HCT 25.9 05/17/2022    .6 05/17/2022     05/17/2022     BMP:   Lab Results   Component Value Date     05/18/2022    K 3.6 05/18/2022    K 3.3 05/17/2022     05/18/2022    CO2 22 05/18/2022    BUN 11 05/18/2022    CREATININE 0.8 05/18/2022    CALCIUM 8.0 05/18/2022     PT/INR:   Lab Results   Component Value Date    INR 1.04 11/09/2021     Lipids:   Lab Results   Component Value Date    ALKPHOS 215 05/18/2022    ALT 46 05/18/2022    AST 41 05/18/2022    BILITOT 2.2 05/18/2022    BILIDIR 3.0 05/16/2022    LABALBU 2.4 05/18/2022    LIPASE 43.9 11/09/2021 Significant Diagnostic Studies:   MRCP 05/17/22  Findings: There has been a prior Whipple procedure. There are 2 small cysts within    the proximal pancreas, the larger measuring approximately 8 mm in size. There is no pancreatic duct dilatation. There is edema of the pancreatic    mesentery. There is severe pneumobilia and mild dilatation of the intra-and    extrahepatic biliary tree. There is no evidence of biliary obstruction. The liver, spleen and adrenal glands are unremarkable. There are small bilateral kidney cysts. No hydronephrosis. Probable prior hiatal hernia repair. Mild colonic thickening. Borderline    dilatation of segments of small bowel within the right upper quadrant    without overall findings to suggest an obstructive process. There is a moderate amount of free fluid with interval worsening.       No suspicious bone lesion.           Impression   Impression:   1. Status post Whipple procedure. 2. Moderate ascites with worsening. Edema of the soft tissues with    worsening. 3. Mildly dilated intrahepatic and extrahepatic biliary tree. Pneumobilia    is present and this does not appear to represent an obstructive process. 4. There is apparent edema of the pancreatic mesentery. This may be    secondary to generalized increased fluid status or pancreatitis. 5. There is thickening of the visualized: This may be secondary to    increased fluid status or colitis. 6. There are 2 small pancreatic cysts. Current Meds:  Scheduled Meds:   amiodarone  200 mg Oral Daily    amLODIPine  2.5 mg Oral Daily    apixaban  5 mg Oral BID    aspirin  81 mg Oral Daily    docusate sodium  100 mg Oral BID    ferrous sulfate  325 mg Oral Daily with breakfast    sodium chloride flush  5-40 mL IntraVENous 2 times per day     Continuous Infusions:   sodium chloride 75 mL/hr at 05/18/22 0639    sodium chloride         Assessment:  1. Painless jaundice- resolved  2.  Elevated LFTs- trending down, likely secondary to hepatic congestion  3. Hyperbilirubinemia- trending down, likely secondary to hepatic congestion  4. Hypoalbuminemia  5. Malnutrition  6. Hypokalemia- resolved  7. Leukopenia  8. Macrocytic anemia- no GI bleeding noted  9. Hypomagnesemia  10. Hypocalcemia  11. Afib- on Eliquis  12. 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 H/O HTN  14. Poor oral intake- patient states it is from no access to food  15.  Fluid overload with BLE edema, ascites    Plan:    · Monitor H & H, transfuse prn  · Diet as tolerated  · Avoid hepatotoxic meds, okay for Tylenol 2g limit  · Monitor HFP  · Consult dietitian  · Recommend IV diuresis  · PT/OT  · Oncology on board  · RN updated  · Case discussed at length with primary team, Janae BURGER  · Supportive care per primary team   Okay to discharge from GI standpoint   Follow-up with GI as needed  GI signing off    Case reviewed and impression/plan reviewed in collaboration with Dr. Charlene Marcos  Electronically signed by Weyman Schirmer, APRN - CNP on 5/18/2022 at 9:36 AM    [address]

## 2022-05-18 NOTE — PROGRESS NOTES
Comprehensive Nutrition Assessment    Type and Reason for Visit:  Initial,Positive Nutrition Screen,Consult (Poor appetite, dehydration, s/p Whipple in 12/2021, on chemo; weight loss, poor appetite and intake)    Nutrition Recommendations/Plan:   1. Recommend diet as tolerated. 2. Will send Ensure Enlive TID. 3. Question if pt. Would benefit from appetite stimulant. 4. Encouraged pt. To set alarm on phone as reminder to eat, drink ONS (when discharged)     Malnutrition Assessment:  Malnutrition Status:  Severe malnutrition (05/18/22 1220)    Context:  Chronic Illness     Findings of the 6 clinical characteristics of malnutrition:  Energy Intake:  75% or less estimated energy requirements for 1 month or longer  Weight Loss:  10% over 6 months (-15.5% in 6 months)     Body Fat Loss:  Severe body fat loss Orbital,Buccal region   Muscle Mass Loss:  Severe muscle mass loss Temples (temporalis)  Fluid Accumulation:  Unable to assess     Strength:  Not Performed    Nutrition Assessment:      Pt. severely malnourished AEB criteria listed above. At risk for further nutritional compromise r/t catabolic illness (pancreatitic cancer), lives alone - forgets to eat as not hungry and underlying medical condition (hx pancreatic cancer, Whipple). Nutrition Related Findings:      Wound Type: None     Pt. Report/Treatments/Miscellaneous: pt. Seen - states weight loss, poor appetite; admits to eating only 1 meal/day; states has ONS at home but forgets to drink them; [facility] has been weak and u/a to stand to prepare meals or go shopping; [facility] people have offered help but has been reluctant to accept help; LSW provided info on home delivered meals  GI Status: BM 5/18  Pertinent Labs: 5/18: Glucose 101, BUN 11, Cr 0.8  Pertinent Meds: Glycolax, Colace, Zofran      Current Nutrition Intake & Therapies:          ADULT DIET;  Regular  ADULT ORAL NUTRITION SUPPLEMENT; Breakfast, Lunch, Dinner; Standard High Calorie/High Protein Oral Supplement    Anthropometric Measures:  Height: 5' 5\" (165.1 cm)  Ideal Body Weight (IBW): 125 lbs (57 kg)    Admission Body Weight: 139 lb 12.8 oz (63.4 kg) (5/16 +3 edema)  Current Body Weight: 139 lb 12.8 oz (63.4 kg) (5/16 +3 edema),   IBW. Current BMI (kg/m2): 23.3  Usual Body Weight:  (per pt. 190's prior to 11/21; per EMR: 11/10/21: 165# 5.5 oz, 4/20/22: 135# 9.6 oz, 5/4/22: 145# 6.4 oz)                       BMI Categories: Normal Weight (BMI 22.0 to 24.9) age over 72    Estimated Daily Nutrient Needs:  Energy Requirements Based On: Kcal/kg  Weight Used for Energy Requirements: Other (Comment) (63 kgm)  Energy (kcal/day): 6040-6770 kcals (28-32)  Weight Used for Protein Requirements: Other (Comment) (63 kgm)  Protein (g/day): 76-88 gm (1.2-1.4)       Nutrition Diagnosis:   · Severe malnutrition related to inadequate protein-energy intake as evidenced by poor intake prior to admission,weight loss,severe loss of subcutaneous fat,severe muscle loss      Nutrition Interventions:   Food and/or Nutrient Delivery: Continue Current Diet,Start Oral Nutrition Supplement  Nutrition Education/Counseling: Education initiated (5/18 Encouraged po, ONS, small, frequent meals at best efforts.   Discussed setting alarm on phone as reminder to eat, drink ONS.)  Coordination of Nutrition Care: Continue to monitor while inpatient       Goals:     Goals: PO intake 75% or greater,by next RD assessment       Nutrition Monitoring and Evaluation:      Food/Nutrient Intake Outcomes: Diet Advancement/Tolerance,Food and Nutrient Intake,Supplement Intake  Physical Signs/Symptoms Outcomes: Biochemical Data,Chewing or Swallowing,GI Status,Fluid Status or Edema,Nutrition Focused Physical Findings,Skin,Weight    Discharge Planning:    Continue Oral Nutrition Supplement     Marcia Norris RD, LAURA  Contact: [phone]

## 2022-05-18 NOTE — PLAN OF CARE
Problem: Safety - Adult  Goal: Free from fall injury  Outcome: Progressing  Note: Call light within reach, bed alarm on, non skid socks when ambulating, no falls     Problem: Pain  Goal: Verbalizes/displays adequate comfort level or baseline comfort level  Outcome: Progressing  Flowsheets (Taken 5/17/2022 0937)  Verbalizes/displays adequate comfort level or baseline comfort level:   Encourage patient to monitor pain and request assistance   Assess pain using appropriate pain scale   Administer analgesics based on type and severity of pain and evaluate response   Implement non-pharmacological measures as appropriate and evaluate response   Consider cultural and social influences on pain and pain management   Notify Licensed Independent Practitioner if interventions unsuccessful or patient reports new pain  Note: Pain assessed, patient reports no pain     Problem: Discharge Planning  Goal: Discharge to home or other facility with appropriate resources  5/18/2022 0412 by Isidro Padilla RN  Outcome: Progressing  Note: Patient on iv fluids, awaits review by GI and oncology with MRCP results.  Anticipates discharge home with [place]

## 2022-05-18 NOTE — PLAN OF CARE
Problem: Safety - Adult  Goal: Free from fall injury  5/18/2022 1419 by Homar Chong RN  Outcome: Progressing  5/18/2022 0412 by Genesis Pichardo RN  Outcome: Progressing  Note: Call light within reach, bed alarm on, non skid socks when ambulating, no falls     Problem: Pain  Goal: Verbalizes/displays adequate comfort level or baseline comfort level  5/18/2022 1419 by Homar Chong RN  Outcome: Progressing  Flowsheets (Taken 5/18/2022 0857)  Verbalizes/displays adequate comfort level or baseline comfort level:   Encourage patient to monitor pain and request assistance   Administer analgesics based on type and severity of pain and evaluate response  5/18/2022 0412 by Genesis Pichardo RN  Outcome: Progressing  Flowsheets (Taken 5/17/2022 2245)  Verbalizes/displays adequate comfort level or baseline comfort level:   Encourage patient to monitor pain and request assistance   Assess pain using appropriate pain scale   Administer analgesics based on type and severity of pain and evaluate response   Implement non-pharmacological measures as appropriate and evaluate response   Consider cultural and social influences on pain and pain management   Notify Licensed Independent Practitioner if interventions unsuccessful or patient reports new pain  Note: Pain assessed, patient reports no pain     Problem: Discharge Planning  Goal: Discharge to home or other facility with appropriate resources  5/18/2022 1419 by Homar Chong RN  Outcome: Progressing  Flowsheets (Taken 5/18/2022 0857)  Discharge to home or other facility with appropriate resources: Identify barriers to discharge with patient and caregiver  5/18/2022 0412 by Genesis Pichardo RN  Outcome: Progressing  Note: Patient on iv fluids, awaits review by GI and oncology with MRCP results.  Anticipates discharge home with [facility]     Problem: Cardiovascular - Adult  Goal: Maintains optimal cardiac output and hemodynamic stability  5/18/2022 1419 by Paul Hoist, RN  Outcome: Progressing  Flowsheets (Taken 5/18/2022 3489)  Maintains optimal cardiac output and hemodynamic stability: Monitor blood pressure and heart rate  5/18/2022 0412 by Yolanda Busch RN  Outcome: Progressing     Problem: Skin/Tissue Integrity - Adult  Goal: Oral mucous membranes remain intact  5/18/2022 1419 by Paul Kahn RN  Outcome: Progressing  Flowsheets (Taken 5/18/2022 0857)  Oral Mucous Membranes Remain Intact: Assess oral mucosa and hygiene practices  5/18/2022 0412 by Yolanda Busch RN  Outcome: Progressing     Problem: Genitourinary - Adult  Goal: Absence of urinary retention  5/18/2022 1419 by Paul Kahn RN  Outcome: Progressing  Flowsheets (Taken 5/18/2022 0857)  Absence of urinary retention: Assess patients ability to void and empty bladder  5/18/2022 0412 by Yolanda Busch RN  Outcome: Progressing     Problem: Nutrition Deficit:  Goal: Optimize nutritional status  5/18/2022 1419 by Paul Kahn RN  Outcome: Progressing  5/18/2022 1223 by Cal Cat RD, LD  Outcome: Progressing

## 2022-05-19 VITALS
BODY MASS INDEX: 23.26 KG/M2 | DIASTOLIC BLOOD PRESSURE: 72 MMHG | SYSTOLIC BLOOD PRESSURE: 144 MMHG | HEIGHT: 65 IN | TEMPERATURE: 97.7 F | RESPIRATION RATE: 16 BRPM | OXYGEN SATURATION: 100 % | HEART RATE: 72 BPM

## 2022-05-19 LAB
ALBUMIN SERPL-MCNC: 2.4 G/DL (ref 3.5–5.1)
ALP BLD-CCNC: 204 U/L (ref 38–126)
ALT SERPL-CCNC: 39 U/L (ref 11–66)
ANION GAP SERPL CALCULATED.3IONS-SCNC: 10 MEQ/L (ref 8–16)
AST SERPL-CCNC: 34 U/L (ref 5–40)
BASOPHILS # BLD: 0.8 %
BASOPHILS ABSOLUTE: 0 THOU/MM3 (ref 0–0.1)
BILIRUB SERPL-MCNC: 1.6 MG/DL (ref 0.3–1.2)
BUN BLDV-MCNC: 8 MG/DL (ref 7–22)
CALCIUM SERPL-MCNC: 8 MG/DL (ref 8.5–10.5)
CHLORIDE BLD-SCNC: 109 MEQ/L (ref 98–111)
CO2: 23 MEQ/L (ref 23–33)
CREAT SERPL-MCNC: 0.8 MG/DL (ref 0.4–1.2)
EOSINOPHIL # BLD: 1.2 %
EOSINOPHILS ABSOLUTE: 0 THOU/MM3 (ref 0–0.4)
ERYTHROCYTE [DISTWIDTH] IN BLOOD BY AUTOMATED COUNT: 14.7 % (ref 11.5–14.5)
ERYTHROCYTE [DISTWIDTH] IN BLOOD BY AUTOMATED COUNT: 54.5 FL (ref 35–45)
GFR SERPL CREATININE-BSD FRML MDRD: 69 ML/MIN/1.73M2
GLUCOSE BLD-MCNC: 80 MG/DL (ref 70–108)
HCT VFR BLD CALC: 28 % (ref 37–47)
HEMOGLOBIN: 8.7 GM/DL (ref 12–16)
IMMATURE GRANS (ABS): 0.01 THOU/MM3 (ref 0–0.07)
IMMATURE GRANULOCYTES: 0.4 %
LYMPHOCYTES # BLD: 56.6 %
LYMPHOCYTES ABSOLUTE: 1.4 THOU/MM3 (ref 1–4.8)
MCH RBC QN AUTO: 31.8 PG (ref 26–33)
MCHC RBC AUTO-ENTMCNC: 31.1 GM/DL (ref 32.2–35.5)
MCV RBC AUTO: 102.2 FL (ref 81–99)
MONOCYTES # BLD: 19.3 %
MONOCYTES ABSOLUTE: 0.5 THOU/MM3 (ref 0.4–1.3)
NUCLEATED RED BLOOD CELLS: 0 /100 WBC
PLATELET # BLD: 151 THOU/MM3 (ref 130–400)
PMV BLD AUTO: 10.5 FL (ref 9.4–12.4)
POTASSIUM SERPL-SCNC: 3 MEQ/L (ref 3.5–5.2)
POTASSIUM SERPL-SCNC: 4.1 MEQ/L (ref 3.5–5.2)
RBC # BLD: 2.74 MILL/MM3 (ref 4.2–5.4)
SEG NEUTROPHILS: 21.7 %
SEGMENTED NEUTROPHILS ABSOLUTE COUNT: 0.5 THOU/MM3 (ref 1.8–7.7)
SODIUM BLD-SCNC: 142 MEQ/L (ref 135–145)
TOTAL PROTEIN: 4.6 G/DL (ref 6.1–8)
WBC # BLD: 2.4 THOU/MM3 (ref 4.8–10.8)

## 2022-05-19 PROCEDURE — 84132 ASSAY OF SERUM POTASSIUM: CPT

## 2022-05-19 PROCEDURE — 2580000003 HC RX 258: Performed by: PHYSICIAN ASSISTANT

## 2022-05-19 PROCEDURE — 6360000002 HC RX W HCPCS

## 2022-05-19 PROCEDURE — 97535 SELF CARE MNGMENT TRAINING: CPT

## 2022-05-19 PROCEDURE — 6370000000 HC RX 637 (ALT 250 FOR IP): Performed by: PHYSICIAN ASSISTANT

## 2022-05-19 PROCEDURE — 85025 COMPLETE CBC W/AUTO DIFF WBC: CPT

## 2022-05-19 PROCEDURE — 80053 COMPREHEN METABOLIC PANEL: CPT

## 2022-05-19 PROCEDURE — 97165 OT EVAL LOW COMPLEX 30 MIN: CPT

## 2022-05-19 PROCEDURE — 6360000002 HC RX W HCPCS: Performed by: PHYSICIAN ASSISTANT

## 2022-05-19 PROCEDURE — 99239 HOSP IP/OBS DSCHRG MGMT >30: CPT

## 2022-05-19 PROCEDURE — 99231 SBSQ HOSP IP/OBS SF/LOW 25: CPT | Performed by: PHYSICIAN ASSISTANT

## 2022-05-19 RX ORDER — HEPARIN SODIUM (PORCINE) LOCK FLUSH IV SOLN 100 UNIT/ML 100 UNIT/ML
500 SOLUTION INTRAVENOUS PRN
Status: DISCONTINUED | OUTPATIENT
Start: 2022-05-19 | End: 2022-05-19 | Stop reason: HOSPADM

## 2022-05-19 RX ADMIN — POTASSIUM CHLORIDE 10 MEQ: 7.46 INJECTION, SOLUTION INTRAVENOUS at 09:46

## 2022-05-19 RX ADMIN — DOCUSATE SODIUM 100 MG: 100 CAPSULE, LIQUID FILLED ORAL at 07:58

## 2022-05-19 RX ADMIN — POTASSIUM CHLORIDE 10 MEQ: 7.46 INJECTION, SOLUTION INTRAVENOUS at 06:40

## 2022-05-19 RX ADMIN — SODIUM CHLORIDE: 9 INJECTION, SOLUTION INTRAVENOUS at 07:48

## 2022-05-19 RX ADMIN — ASPIRIN 81 MG 81 MG: 81 TABLET ORAL at 07:58

## 2022-05-19 RX ADMIN — APIXABAN 5 MG: 5 TABLET, FILM COATED ORAL at 07:58

## 2022-05-19 RX ADMIN — AMIODARONE HYDROCHLORIDE 200 MG: 200 TABLET ORAL at 07:58

## 2022-05-19 RX ADMIN — SODIUM CHLORIDE 200 ML: 9 INJECTION, SOLUTION INTRAVENOUS at 06:37

## 2022-05-19 RX ADMIN — FERROUS SULFATE TAB 325 MG (65 MG ELEMENTAL FE) 325 MG: 325 (65 FE) TAB at 07:58

## 2022-05-19 RX ADMIN — AMLODIPINE BESYLATE 2.5 MG: 2.5 TABLET ORAL at 07:58

## 2022-05-19 RX ADMIN — POTASSIUM CHLORIDE 10 MEQ: 7.46 INJECTION, SOLUTION INTRAVENOUS at 07:47

## 2022-05-19 RX ADMIN — POTASSIUM CHLORIDE 10 MEQ: 7.46 INJECTION, SOLUTION INTRAVENOUS at 08:45

## 2022-05-19 RX ADMIN — POTASSIUM CHLORIDE 10 MEQ: 7.46 INJECTION, SOLUTION INTRAVENOUS at 11:43

## 2022-05-19 RX ADMIN — HEPARIN SODIUM (PORCINE) LOCK FLUSH IV SOLN 100 UNIT/ML 500 UNITS: 100 SOLUTION at 15:53

## 2022-05-19 RX ADMIN — POTASSIUM CHLORIDE 10 MEQ: 7.46 INJECTION, SOLUTION INTRAVENOUS at 10:41

## 2022-05-19 ASSESSMENT — ENCOUNTER SYMPTOMS
BACK PAIN: 0
ABDOMINAL PAIN: 0
DIARRHEA: 0
ABDOMINAL DISTENTION: 0
FACIAL SWELLING: 0
SORE THROAT: 0
EYE DISCHARGE: 0
RECTAL PAIN: 0
WHEEZING: 0
NAUSEA: 0
TROUBLE SWALLOWING: 0
COLOR CHANGE: 0
SHORTNESS OF BREATH: 0
VOMITING: 0
CONSTIPATION: 0
BLOOD IN STOOL: 0
COUGH: 0
CHEST TIGHTNESS: 0

## 2022-05-19 NOTE — PLAN OF CARE
Problem: Safety - Adult  Goal: Free from fall injury  5/19/2022 1443 by Kaushik Heredia RN  Outcome: Completed  Flowsheets (Taken 5/19/2022 0859)  Free From Fall Injury: Instruct family/caregiver on patient safety  5/19/2022 0609 by Christianne Lopez RN  Outcome: Progressing  Note: Call light within reach, bed alarm on, non skid socks on, no falls     Problem: Pain  Goal: Verbalizes/displays adequate comfort level or baseline comfort level  5/19/2022 1443 by Kaushik Heredia RN  Outcome: Completed  Flowsheets (Taken 5/19/2022 0740)  Verbalizes/displays adequate comfort level or baseline comfort level:   Encourage patient to monitor pain and request assistance   Assess pain using appropriate pain scale   Administer analgesics based on type and severity of pain and evaluate response   Implement non-pharmacological measures as appropriate and evaluate response  5/19/2022 0609 by Christianne Lopez RN  Outcome: Progressing  Flowsheets (Taken 5/18/2022 2145)  Verbalizes/displays adequate comfort level or baseline comfort level:   Encourage patient to monitor pain and request assistance   Assess pain using appropriate pain scale   Administer analgesics based on type and severity of pain and evaluate response   Implement non-pharmacological measures as appropriate and evaluate response   Consider cultural and social influences on pain and pain management   Notify Licensed Independent Practitioner if interventions unsuccessful or patient reports new pain  Note: Pain assessed, patient reports no pain, pain reassessed     Problem: Discharge Planning  Goal: Discharge to home or other facility with appropriate resources  5/19/2022 1443 by Kaushik Heredia RN  Outcome: Completed  Flowsheets (Taken 5/19/2022 0740)  Discharge to home or other facility with appropriate resources:   Identify barriers to discharge with patient and caregiver   Arrange for needed discharge resources and transportation as appropriate   Identify discharge learning needs (meds, wound care, etc)   Refer to discharge planning if patient needs post-hospital services based on physician order or complex needs related to functional status, cognitive ability or social support system  5/19/2022 0609 by Thien Gardner RN  Outcome: Progressing  Note: Patient awaits PT/OT review and recommendations.  Anticipates discharge to home with Home Health     Problem: Cardiovascular - Adult  Goal: Maintains optimal cardiac output and hemodynamic stability  5/19/2022 1443 by Jeevan Carrasco RN  Outcome: Completed  Flowsheets (Taken 5/19/2022 0740)  Maintains optimal cardiac output and hemodynamic stability:   Monitor blood pressure and heart rate   Monitor urine output and notify Licensed Independent Practitioner for values outside of normal range   Assess for signs of decreased cardiac output  5/19/2022 0609 by Thien Gardner RN  Outcome: Progressing     Problem: Skin/Tissue Integrity - Adult  Goal: Oral mucous membranes remain intact  5/19/2022 1443 by Jeevan Carrasco RN  Outcome: Completed  Flowsheets (Taken 5/19/2022 0740)  Oral Mucous Membranes Remain Intact:   Assess oral mucosa and hygiene practices   Implement preventative oral hygiene regimen  5/19/2022 0609 by Thien Gardner RN  Outcome: Progressing     Problem: Genitourinary - Adult  Goal: Absence of urinary retention  5/19/2022 1443 by Jeevan Carrasco RN  Outcome: Completed  Flowsheets (Taken 5/19/2022 0740)  Absence of urinary retention:   Assess patients ability to void and empty bladder   Monitor intake/output and perform bladder scan as needed  5/19/2022 0609 by Thien Gardner RN  Outcome: Progressing     Problem: Nutrition Deficit:  Goal: Optimize nutritional status  5/19/2022 1443 by Jeevan Carrasco RN  Outcome: Completed  5/19/2022 0609 by Thien Gardner RN  Outcome: Progressing     Problem: Neurosensory - Adult  Goal: Achieves maximal functionality and self care  Outcome: Completed  Flowsheets (Taken 5/19/2022 0740)  Achieves maximal functionality and self care: Encourage and assist patient to increase activity and self care with guidance from physical therapy/occupational therapy     Problem: ABCDS Injury Assessment  Goal: Absence of physical injury  Outcome: Completed   Care plan reviewed with patient. Patient verbalize understanding of the plan of care and contribute to goal setting.

## 2022-05-19 NOTE — PROGRESS NOTES
[facility], [address] [facility] [address]  [facility]  Dept: [phone]  Dept Fax: [phone]: [phone]    Visit Date:5/2/2022     Ric Glover is a 81 y.o. 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 She is status post Whipple procedure. She presents to the medical oncology clinic to continue adjuvant  Patient was admitted to [address] [place] [address] on 11/09/2021 for jaundice and not feeling well. She noticed juandice since 11/05/21. Associated symptom includes RUQ pain, itching and dark urine with light color stool. Hospital day 2, GI was consulted for possible choledocholithiasis. Liver function test transaminitis with AST//228 and significant Alk phos elevate at 421. CT abdominal (11/9) show Distend gallbladder with dilated common bile duct but no stone and hepatomegaly. RUQ US (11/09): show evidence of distened gall bladder with gallbladder sludge both intra and extra hepatic biliary duct dilation with no stone no para cholecystic edema. She underwent ERCP on 11/10/2021. Ampulla and major papula were of normal appearance on endoscopy. Procedure was complicated by retroperitoneal perforation. Procedure was terminated. Patient was started on Zosyn. Patient was administered lactated Ringer's at 100 cc an hour and kept n.p.o. Ericka Amin was then transferred to [address] for higher level care on 11/11/21. She underwent initial exploratory surgery, where a bowel perforation was not noted. She was then monitored for elevated LFTs and hyperbilirubinemia. Patient then returned to  for Whipple surgery on 12/4/21 by Dr. Eleanor Haley. 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 She tolerated Whipple without complications. She initially had 4 drains present. Currently only one pancreatic drain remains. Incision is healing well, without drainage, erythema, or warmth.   After discharge, she was in Rehab facility in [place], recently discharged . She is currently back at home, her son is here to stay with her for a couple of days. Patient didn't have any medical history except cataract , run of A. fib during recent hospitalization at [facility].  (which was schedule for last Monday and it was cancelled due to patient in ED). Take multivitamin daily.      Patient moved to [address] [place] [address] 5 years ago to be with one of her sons, who unfortunately  last year. She reported hx of 10 years smoking when she was young with 1/5 pack/day (5 pack years). Quited smoking  In . Denied any alcohol use, receational drug use. Have 3 vaginal birth without any blood transfusion product. Patient reported has colonoscopy done in  with 2 polyps was removed      History on 2022:  The patient presents to the medical oncology clinic for cycle #7 of FOLFOX. She reports that she tolerated last FOLFOX well. After cycle 6 at the patient had prolonged neutropenia. They her WBC and ANC have recovered. She denies having any oral mucositis, no nausea, no vomiting. She denies having any peripheral neuropathy. No diarrhea no abdominal pain. Patient denies having any fevers. No skin rash, no palmar and plantar erythema. She reports fair appetite, her weight is stable. She reports grade 2 fatigue.   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 Angel Godfrey ERCP N/A 11/10/2021    ERCP WITH STENT INSERTION performed by Teodora Chao MD at [facility]  2021 exp lap, radical celiac and portal lymph node dissection,pylorus preserving pancreatoduodenectomy-Dr Ewing IU    PORT SURGERY Left 2022    SINGLE LUMEN SMARTPORT INSERTION performed by Llana Schilder, MD at [address] [place] [address] History   Problem Relation Age of Onset    Breast Cancer Mother     Colon Cancer Mother     Cancer Father         bladder and lung    Lung Cancer Father     Kidney Disease Brother     No Known Problems Maternal Grandmother     No Known Problems Maternal Grandfather     No Known Problems Paternal Grandmother     No Known Problems Paternal Grandfather       Social History     Tobacco Use    Smoking status: Former Smoker     Packs/day: 0.00     Years: 10.00     Pack years: 0.00     Types: Cigarettes     Quit date: 1972     Years since quittin.4    Smokeless tobacco: Never Used    Tobacco comment: social smoker   Substance Use Topics    Alcohol use: Yes     Comment: social- been a long time since last drink      No current facility-administered medications for this visit. No current outpatient medications on file.      Facility-Administered Medications Ordered in Other Visits   Medication Dose Route Frequency Provider Last Rate Last Admin    acetaminophen (TYLENOL) tablet 500 mg  500 mg Oral Q6H PRN Nonda Bound, APRN - CNP        Or    acetaminophen (TYLENOL) suppository 325 mg  325 mg Rectal Q6H PRN Nonda Bound, APRN - CNP        amiodarone (CORDARONE) tablet 200 mg  200 mg Oral Daily [employer], PA-C   200 mg at 22 0758    amLODIPine (NORVASC) tablet 2.5 mg  2.5 mg Oral Daily Erendira R Brown, PA-C   2.5 mg at 22 0758    apixaban (ELIQUIS) tablet 5 mg  5 mg Oral BID [employer], PA-C   5 mg at 22 0758    aspirin chewable tablet 81 mg  81 mg Oral Daily [employer], PA-C   81 mg at 22 0758    docusate sodium (COLACE) capsule 100 mg  100 mg Oral BID [employer], PA-C   100 mg at 22 0758    ferrous sulfate (IRON 325) tablet 325 mg  325 mg Oral Daily with breakfast TAO Floyd-C   325 mg at 05/19/22 9276    sodium chloride flush 0.9 % injection 5-40 mL  5-40 mL IntraVENous 2 times per day Jennifer Sercathy, PA-C   10 mL at 05/18/22 2153    sodium chloride flush 0.9 % injection 5-40 mL  5-40 mL IntraVENous PRN Watson Sercathy, PA-C        0.9 % sodium chloride infusion   IntraVENous PRN Jennifer Sercathy, PA-C 20 mL/hr at 05/19/22 0748 New Bag at 05/19/22 0748    ondansetron (ZOFRAN-ODT) disintegrating tablet 4 mg  4 mg Oral Q8H PRN Jennifer Nair PA-C        Or    ondansetron (ZOFRAN) injection 4 mg  4 mg IntraVENous Q6H PRN Erendira Silva PA-C        polyethylene glycol (GLYCOLAX) packet 17 g  17 g Oral Daily PRN Jennifer Nair PA-C        potassium chloride (KLOR-CON M) extended release tablet 40 mEq  40 mEq Oral PRN Watson Sercathy, PA-C   40 mEq at 05/17/22 0831    Or    potassium bicarb-citric acid (EFFER-K) effervescent tablet 40 mEq  40 mEq Oral PRN Watsonwilliam Nair, PA-C        Or    potassium chloride 10 mEq/100 mL IVPB (Peripheral Line)  10 mEq IntraVENous PRN Watson Sercathy, PA-C 100 mL/hr at 05/19/22 1041 10 mEq at 05/19/22 1041    magnesium sulfate 2000 mg in 50 mL IVPB premix  2,000 mg IntraVENous PRN Erendira Marx PA-C          No Known Allergies   Health Maintenance   Topic Date Due    Annual Wellness Visit (AWV)  Never done    DTaP/Tdap/Td vaccine (1 - Tdap) Never done    Shingles vaccine (1 of 2) Never done    DEXA (modify frequency per FRAX score)  Never done    Pneumococcal 65+ years Vaccine (2 - PPSV23 or PCV20) 12/21/2021    Depression Screen  12/28/2022    Flu vaccine  Completed    COVID-19 Vaccine  Completed    Hepatitis A vaccine  Aged Out    Hepatitis B vaccine  Aged Out    Hib vaccine  Aged Out    Meningococcal (ACWY) vaccine  Aged Out        Subjective:   Review of Systems   Constitutional: Positive for appetite change and fatigue. Negative for activity change and fever.    HENT: Negative for congestion, dental problem, facial swelling, hearing loss, mouth sores, nosebleeds, sore throat, tinnitus and trouble swallowing. Eyes: Negative for discharge and visual disturbance. Respiratory: Negative for cough, chest tightness, shortness of breath and wheezing. Cardiovascular: Negative for chest pain, palpitations and leg swelling. Gastrointestinal: Negative for abdominal distention, abdominal pain, blood in stool, constipation, diarrhea, nausea, rectal pain and vomiting. Endocrine: Negative for cold intolerance, polydipsia and polyuria. Genitourinary: Negative for decreased urine volume, difficulty urinating, dysuria, flank pain, hematuria and urgency. Musculoskeletal: Negative for arthralgias, back pain, gait problem, joint swelling, myalgias and neck stiffness. Skin: Negative for color change, rash and wound. Neurological: Negative for dizziness, tremors, seizures, speech difficulty, weakness, light-headedness, numbness and headaches. Hematological: Negative for adenopathy. Does not bruise/bleed easily. Psychiatric/Behavioral: Negative for confusion and sleep disturbance. The patient is not nervous/anxious. Objective:   Physical Exam  Vitals reviewed. Constitutional:       General: She is not in acute distress. Appearance: She is well-developed. HENT:      Head: Normocephalic. Mouth/Throat:      Pharynx: No oropharyngeal exudate. Eyes:      General: No scleral icterus. Right eye: No discharge. Left eye: No discharge. Pupils: Pupils are equal, round, and reactive to light. Neck:      Thyroid: No thyromegaly. Vascular: No JVD. Trachea: No tracheal deviation. Cardiovascular:      Rate and Rhythm: Normal rate. Heart sounds: Normal heart sounds. No murmur heard. No friction rub. No gallop. Pulmonary:      Effort: Pulmonary effort is normal. No respiratory distress. Breath sounds: Normal breath sounds. No stridor.  No wheezing or rales. Chest:      Chest wall: No tenderness. Abdominal:      General: Bowel sounds are normal. There is no distension. Palpations: Abdomen is soft. There is no mass. Tenderness: There is no abdominal tenderness. There is no rebound. Musculoskeletal:         General: Normal range of motion. Cervical back: Normal range of motion and neck supple. Comments: Good range of motion in all four extremities. Lymphadenopathy:      Cervical: No cervical adenopathy. Skin:     General: Skin is warm. Findings: No erythema or rash. Neurological:      Mental Status: She is alert and oriented to person, place, and time. Cranial Nerves: No cranial nerve deficit. Motor: No abnormal muscle tone. Deep Tendon Reflexes: Reflexes are normal and symmetric. Psychiatric:         Behavior: Behavior normal.         Thought Content: Thought content normal.         Judgment: Judgment normal.         BP (!) 113/59 (Site: Right Upper Arm, Position: Sitting, Cuff Size: Medium Adult)   Pulse 83   Temp 97.9 °F (36.6 °C) (Oral)   Resp 16   Ht 5' 5\" (1.651 m)   Wt 144 lb (65.3 kg)   SpO2 98%   BMI 23.96 kg/m²      ECOG status is 1    Imaging studies and labs:   US THYROID    Result Date: 1/10/2022  PROCEDURE: US THYROID CLINICAL INFORMATION: Elevated TSH, Elevated serum free T4 level COMPARISON: No prior study. TECHNIQUE: Grayscale and color sonographic imaging of the thyroid gland performed in longitudinal and transverse planes. TECHNICAL DATA: Right Thyroid - 4.14 x 1.24 x 2.07 cm Left Thyroid -5.05 x 1.63 x 1.65 cm Isthmus -0.36 cm FINDINGS: THYROID SIZE: Thyroid gland is normal left thyroid lobe is prominent. NODULES: 1. Mixed solid and cystic nodule 1.2 cm x 0.8 x 0.9 cm mid right thyroid lobe consistent with a tirad 2 lesion this is not considered suspicious. 2. Mixed solid and cystic nodule measuring 1.0 x 0.8 x 0.8 cm mid right thyroid lobe adjacent to the first nodule. Also a TR 2 lesion. 3. 0.6 x 0.5 x 0.5 cm mixed solid and cystic lesion mid left thyroid lobe. T are too lesion. 4. Completely cystic lesion medial left thyroid lobe 0.9 x 0.6 x 0.7 cm considered a benign lesion. TR 1 lesion. PARENCHYMAL ECHOGENICITY/VASCULARITY: Normal. MICROLITHIASIS: None. CERVICAL LYMPHADENOPATHY: 1. There are no pathologically enlarged lymph nodes adjacent to the thyroid gland. 1. Mild prominence the left thyroid lobe. Multiple mixed solid and cystic lesions which are not suspicious and do not require FNA. Consider follow-up in one to 2 years **This report has been created using voice recognition software. It may contain minor errors which are inherent in voice recognition technology. ** Final report electronically signed by Dr. lAie Valera on 1/10/2022 7:38 AM    Lab Results   Component Value Date    WBC 2.4 (L) 05/19/2022    HGB 8.7 (L) 05/19/2022    HCT 28.0 (L) 05/19/2022    .2 (H) 05/19/2022     05/19/2022       Chemistry        Component Value Date/Time     05/19/2022 0415    K 3.0 (L) 05/19/2022 0415    K 3.3 (L) 05/17/2022 0600     05/19/2022 0415    CO2 23 05/19/2022 0415    BUN 8 05/19/2022 0415    CREATININE 0.8 05/19/2022 0415        Component Value Date/Time    CALCIUM 8.0 (L) 05/19/2022 0415    ALKPHOS 204 (H) 05/19/2022 0415    AST 34 05/19/2022 0415    ALT 39 05/19/2022 0415    BILITOT 1.6 (H) 05/19/2022 0415        CT of the chest, abdomen and pelvis on April 13, 2022 showed:  1. Postoperative changes are seen in the upper abdomen involving the gallbladder, pancreas and duodenum. A gastroenterostomy is noted. .   2. Extensive pneumobilia. 3. A 1.5 cm left ovarian cyst. Follow-up pelvic ultrasound can be obtained. 4. Colonic diverticulosis. 5. Other findings as described above. Assessment:  1. Stage III B periampullary adenocarcinoma, pancreaticobiliary type. Patient is s/p Whipple procedure on December 4, 2021.   Leela-ampullary cancers represent a group of malignancies distinct from those arising from other hepato-biliary structures. These cancers are pathologically adenocarcinomas, and in an  estimated 80% of cases are amenable to surgical [id number], 26. However this treatment regimen is often augmented by the use of adjuvant chemotherapy, especially amongst those with advanced disease. The pooled overall 5 year survival for alisson-ampullary cancers is between 30 to 50%. Treatment decisions are generally extrapolated from pancreatic chemotherapy protocols and consist mainly of traditional chemotherapy drugs. I discussed with the patient her potential treatment options. They include FOLFIRINOX, FOLFOX or Gemzar and Abraxane. I do not think that due to her age the patient will be able to tolerate FOLFIRINOX since this is a very aggressive regimen. I had a lengthy discussion with the patient and her daughter in law. I want to be sure that while the patient is going through chemotherapy treatment she has good social and family support. Patient had follow-up with the surgeon at [facility], last drain was removed and the patient received clearance for surgery. In preparation for chemotherapy the patient underwent successful and uncomplicated Mediport placement. She started FOLFOX on January 26, 2022.  2.  Adjuvant chemotherapy with FOLFOX. Patient tolerated last cycle well. She denies having any side effects, specifically no nausea no vomiting no oral mucositis. Patient denies having any diarrhea no abdominal pain. She denies having any skin rash no hand-and-foot erythema. No fevers. She reports gradually worsening fatigue. Today, her vital signs are stable, her labs are within range allowing for treatment. We will proceed with cycle #4 of FOLFOX. The dose of 5-FU bolus and leucovorin was reduced from 400 mg/m² to 200 mg/m² due to mild leukopenia.   Oxaliplatin was reduced from 85 mg/m²  to 70 mg/m² due to mild neutropenia. 5-FU continuous infusion reduced from 2400 mg/m² to 2000 mg/m² for mild neutropenia. She had restaging CT scan after 6 cycles of adjuvant chemotherapy. No evidence of recurrent or metastatic disease. Results were reviewed and discussed with the patient. We will continue adjuvant therapy, however patient had prolonged neutropenia after her last treatment. Today her WBC and ANC are in range allowing for treatment. Chemotherapy orders were reviewed and signed. The dose of 5-FU is reduced to 1800 mg/m² due to neutropenia. We will proceed with cycle #7 today. 3.  Elevated creatinine. Today her creatinine is improved to 1.2. Diagnosis Orders   1. Malignant neoplasm of pancreas, unspecified location of malignancy ([facility] [address]     2. Encounter for chemotherapy management     3. History of Whipple procedure     4. Anemia, unspecified type          Plan:   Return in about 2 weeks (around 5/16/2022). Orders Placed:   No orders of the defined types were placed in this encounter. Medications Prescribed:   No orders of the defined types were placed in this encounter. Discussed use, benefit, and side effectsof prescribed medications. All patient questions answered. Pt voiced understanding. Instructed to continue current medications, diet and exercise. Patient agreed with treatment plan. Follow up as directed.

## 2022-05-19 NOTE — ACP (ADVANCE CARE PLANNING)
5/19/22, 3:07 PM EDT    DISCHARGE ON [address] day: 3  Location: [facility]16/016A Reason for admit: Hyperbilirubinemia [E80.6]  Barriers to Discharge: Discharge orders on chart. PCP: Angus Langston MD  Readmission Risk Score: 17.8 ( )%  Patient Goals/Plan/Treatment Preferences: Met with Jf Donnelly. She is in [facility]. Pt verbalized understanding and gave permission for possible discharge within 4 hours of receiving IMM.

## 2022-05-19 NOTE — PROGRESS NOTES
Discharge education and instructions provided to patient. Verbalized understanding. No questions at this time. Mediport de-acessed and heparin locked. All personal belongings and AVS present at time of discharge. Transport to car via wheelchair.

## 2022-05-19 NOTE — PLAN OF CARE
Problem: Safety - Adult  Goal: Free from fall injury  Outcome: Progressing  Note: Call light within reach, bed alarm on, non skid socks on, no falls     Problem: Pain  Goal: Verbalizes/displays adequate comfort level or baseline comfort level  Outcome: Progressing  Flowsheets (Taken 5/18/2022 2142)  Verbalizes/displays adequate comfort level or baseline comfort level:   Encourage patient to monitor pain and request assistance   Assess pain using appropriate pain scale   Administer analgesics based on type and severity of pain and evaluate response   Implement non-pharmacological measures as appropriate and evaluate response   Consider cultural and social influences on pain and pain management   Notify Licensed Independent Practitioner if interventions unsuccessful or patient reports new pain  Note: Pain assessed, patient reports no pain, pain reassessed     Problem: Discharge Planning  Goal: Discharge to home or other facility with appropriate resources  Outcome: Progressing  Note: Patient awaits PT/OT review and recommendations.  Anticipates discharge to home with Home Health

## 2022-05-19 NOTE — PROGRESS NOTES
[facility]    Patient - Surendra Barry   MRN -  [id number]   [place] # - [de-identified]   - 1940      Date of Admission -  2022  6:05 PM  Date of evaluation -  2022  [facility] Day - 3  Consulting - Adelina Molina Primary Care Physician - Eugenio Thompson MD       Reason for Consult    Pancreatic cancer on chemotherapy   [address] Problems    Diagnosis Date Noted    Severe malnutrition ([address] Lianne Warner 2022     Priority: Medium     Class: Chronic    Hypomagnesemia [E83.42]      Priority: Medium    Macrocytic anemia [D53.9]      Priority: Medium    Malignant neoplasm of pancreas ([address] [C25.9] 2021    Atrial fibrillation ([facility] [address] [I48.91] 2021    Hyperbilirubinemia [E80.6]      HPI/Subjective   Surendra Barry is a 81 y.o. female admitted for hyperbilirubinemia. 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 She was seen by Dr. John Arreola on 22 for cycle #8 of FOLFOX and was noted to have jaundice, reported dark urine. Her LFT revealed an increase in bilirubin to 4.2 compared to 0.9 two weeks ago. She was directly admitted under the Hospitalist Service. GI was consulted and MRCP ordered. Oncology consult requested to follow up on above. 22:   The patient is currently resting in bed. She reports she is feeling better since admission. She affirms increase in oral intake and has been drinking a lot of as per dietitian. The patient affirms increase in urination following diuresis. She denies fever, chills, chest pain, shortness of breath, nausea or vomiting. Oncology History   Per Dr. Bri Leach note on 22  periampullary adenocarcinoma. Iqra Moss is status post Whipple procedure.  She presents to the medical oncology clinic to continue adjuvant chemotherapy. Patient was admitted to [facility] on 2021 for jaundice and not feeling well.  She noticed courtneymassielice since 11/05/21. Associated symptom includes RUQ pain, itching and dark urine with light color stool. Hospital day 2, GI was consulted for possible choledocholithiasis. Liver function test transaminitis with AST//228 and significant Alk phos elevate at 421. CT abdominal (11/9) show Distend gallbladder with dilated common bile duct but no stone and hepatomegaly. RUQ US (11/09): show evidence of distened gall bladder with gallbladder sludge both intra and extra hepatic biliary duct dilation with no stone no para cholecystic edema. She underwent ERCP on 11/10/2021.  Ampulla and major papula were of normal appearance on endoscopy.  Procedure was complicated by retroperitoneal perforation.  Procedure was terminated.  Patient was started on Zosyn.  Patient was administered lactated Ringer's at 100 cc an hour and kept n.p.o. Karime Albert was then transferred to [address] for higher level care on 11/11/21. She underwent initial exploratory surgery, where a bowel perforation was not noted. She was then monitored for elevated LFTs and hyperbilirubinemia. Patient then returned to  for Whipple surgery on 12/4/21 by Dr. Karma Barajas showed pre ampullary adenocarcinoma with 7/27 lymph nodes positive for cancer.  She tolerated Whipple without complications.  She initially had 4 drains present.  Currently only one pancreatic drain remains.  Incision is healing well, without drainage, erythema, or warmth.   After discharge, she was in [place], recently discharged .Blake Sandoval is currently back at home, her son is here to stay with her for a couple of days.    Patient didn't have any medical history except cataract , run of A. fib during recent hospitalization at [facility].  (which was schedule for last Monday and it was cancelled due to patient in ED). Take multivitamin daily.     Meds    Current Medications    amiodarone  200 mg Oral Daily    amLODIPine  2.5 mg Oral Daily  apixaban  5 mg Oral BID    aspirin  81 mg Oral Daily    docusate sodium  100 mg Oral BID    ferrous sulfate  325 mg Oral Daily with breakfast    sodium chloride flush  5-40 mL IntraVENous 2 times per day     acetaminophen **OR** acetaminophen, sodium chloride flush, sodium chloride, ondansetron **OR** ondansetron, polyethylene glycol, potassium chloride **OR** potassium alternative oral replacement **OR** potassium chloride, magnesium sulfate  IV Drips/Infusions   sodium chloride Stopped (22 1300)     Past Medical History         Diagnosis Date    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 Vishnu Price    ERCP N/A 11/10/2021    ERCP WITH STENT INSERTION performed by Kanika Walker MD at [facility]  2021    exp lap, radical celiac and portal lymph node dissection,pylorus preserving pancreatoduodenectomy-Dr Ewing IU    PORT SURGERY Left 2022    SINGLE LUMEN UTFFOZPRY INSERTION performed by Argentina Vela MD at [address]; Regular  ADULT ORAL NUTRITION SUPPLEMENT; Breakfast, Lunch, Dinner; Standard High Calorie/High Protein Oral Supplement  Allergies    Patient has no known allergies.   Social History     Social History     Socioeconomic History    Marital status:      Spouse name: Not on file    Number of children: Not on file    Years of education: Not on file    Highest education level: Not on file   Occupational History    Not on file   Tobacco Use    Smoking status: Former Smoker     Packs/day: 0.00     Years: 10.00     Pack years: 0.00     Types: Cigarettes     Quit date: 1972     Years since quittin.4    Smokeless tobacco: Never Used    Tobacco comment: social smoker   Vaping Use    Vaping Use: Never used   Substance and Sexual Activity    Alcohol use: Yes     Comment: social- been a long time since last drink    Drug use: Never    Sexual activity: Not on file   Other Topics Concern    Not on file   Social History Narrative    Not on file     Social Determinants of Health     Financial Resource Strain: Low Risk     Difficulty of Paying Living Expenses: Not very hard   Food Insecurity: No Food Insecurity    Worried About Running Out of Food in the Last Year: Never true    Roberto of Food in the Last Year: Never true   Transportation Needs:     Lack of Transportation (Medical): Not on file    Lack of Transportation (Non-Medical):  Not on file   Physical Activity:     Days of Exercise per Week: Not on file    Minutes of Exercise per Session: Not on file   Stress:     Feeling of Stress : Not on file   Social Connections:     Frequency of Communication with Friends and Family: Not on file    Frequency of Social Gatherings with Friends and Family: Not on file    Attends [employer] Services: Not on file    Active Member of [address] [place] [address] or Organizations: Not on file    Attends Club or Organization Meetings: Not on file    Marital Status: Not on file   Intimate Partner Violence:     Fear of Current or Ex-Partner: Not on file    Emotionally Abused: Not on file    Physically Abused: Not on file    Sexually Abused: Not on file   Housing Stability:     Unable to Pay for Housing in the Last Year: Not on file    Number of [place] in the Last Year: Not on file    Unstable Housing in the Last Year: Not on file     Family History          Problem Relation Age of Onset    Breast Cancer Mother     Colon Cancer Mother     Cancer Father         bladder and lung    Lung Cancer Father     Kidney Disease Brother     No Known Problems Maternal Grandmother     No Known Problems Maternal Grandfather     No Known Problems Paternal Grandmother     No Known Problems Paternal Grandfather      ROS     Review of Systems   Pertinent review of systems noted in HPI, all other ROS negative. Vitals     height is 5' 5\" (1.651 m). Her oral temperature is 97.9 °F (36.6 °C). Her blood pressure is 162/80 (abnormal) and her pulse is 62. Her respiration is 16 and oxygen saturation is 100%. Exam   Physical Exam   General appearance: No apparent distress, chronically ill appearing, elderly, resting in bed and cooperative. HEENT: Pupils equal, round, and reactive to light. Conjunctivae/corneas clear. Oral mucosa moist. No scleral icterus noted. Neck: Supple, with full range of motion. Trachea midline. Respiratory:  Normal respiratory effort. Clear to auscultation, bilaterally without Rales/Wheezes/Rhonchi. Cardiovascular: Regular rate and rhythm with normal S1/S2   Abdomen: Soft, active bowel sounds. Musculoskeletal: bilateral LE edema. Skin: Skin color, texture, turgor normal.  No rashes or lesions. Neurologic:  Neurovascularly intact without any focal sensory/motor deficits. Psychiatric: Alert and oriented       Labs   CBC  Recent Labs     05/17/22  0600 05/18/22 0755 05/19/22 0415   WBC 2.9* 2.0* 2.4*   RBC 2.55* 2.75* 2.74*   HGB 8.4* 8.8* 8.7*   HCT 25.9* 28.3* 28.0*   .6* 102.9* 102.2*   MCH 32.9 32.0 31.8   MCHC 32.4 31.1* 31.1*    151 151   MPV 10.3 10.3 10.5      BMP  Recent Labs     05/17/22  0600 05/18/22 0755 05/19/22 0415    142 142   K 3.3* 3.6 3.0*    110 109   CO2 23 22* 23   BUN 13 11 8   CREATININE 0.9 0.8 0.8   GLUCOSE 72 101 80   MG 1.4*  --   --    CALCIUM 8.2* 8.0* 8.0*     LFT  Recent Labs     05/17/22  0600 05/18/22  0755 05/19/22  0415   AST 58* 41* 34   ALT 56 46 39   BILITOT 2.9* 2.2* 1.6*   ALKPHOS 199* 215* 204*     INR  No results for input(s): INR, PROTIME in the last 72 hours. PTT  No results for input(s): APTT in the last 72 hours. Radiology      MRI ABDOMEN W WO CONTRAST MRCP    Result Date: 5/17/2022  MRI MRCP without contrast Comparison: 4/13/2022 Findings: There has been a prior Whipple procedure. There are 2 small cysts within the proximal pancreas, the larger measuring approximately 8 mm in size. There is no pancreatic duct dilatation. There is edema of the pancreatic mesentery. There is severe pneumobilia and mild dilatation of the intra-and extrahepatic biliary tree. There is no evidence of biliary obstruction. The liver, spleen and adrenal glands are unremarkable. There are small bilateral kidney cysts. No hydronephrosis. Probable prior hiatal hernia repair. Mild colonic thickening. Borderline dilatation of segments of small bowel within the right upper quadrant without overall findings to suggest an obstructive process. There is a moderate amount of free fluid with interval worsening. No suspicious bone lesion. Impression: 1. Status post Whipple procedure. 2. Moderate ascites with worsening. Edema of the soft tissues with worsening. 3. Mildly dilated intrahepatic and extrahepatic biliary tree. Pneumobilia is present and this does not appear to represent an obstructive process. 4. There is apparent edema of the pancreatic mesentery. This may be secondary to generalized increased fluid status or pancreatitis. 5. There is thickening of the visualized: This may be secondary to increased fluid status or colitis. 6. There are 2 small pancreatic cysts. This document has been electronically signed by: Yamil Rosales MD on 05/17/2022 06:14 PM      Assessment/Recommendations    1. Stage IIIB Periampullary Adenocarcinoma, pancreaticobiliary type  Oncology history as above. S/p Whipple 12/4/2021. She is on adjuvant chemotherapy with FOLFOX. She was due for chemotherapy on 5/16/22 and held due to hyperbilirubinemia.     2. Hyperbilirubinemia - bilirubin improved to 1.6 on 5/19/22 compared to 0.9 on 5/2/22. MRCP on 5/17/22 as noted above. GI followed and felt secondary to hepatic congestion. IV diuresis was recommended.       3. Elevated LFT - Improving. MRCP noted on 5/17/22. Hamill related to hepatic congestion. ALT, AST back to normal limits on 5/19/22.      4. Hypokalemia, Hypomagnesemia - Improved. K 3.0 on 5/19/22, received replacement per protocol. Likely related to IV diuresis and poor oral intake.      5. Macrocytic Anemia - Hgb 8.7, Hct 28, . 2. chronic anemia since 11/2021 with recent baseline 10-11's. Likely chemo induced component. Monitor. Recommend transfusion for Hgb 7.0 or less. The patient is anticipated discharge today. She is being discharged with [facility]. Follow up scheduled with Dr. Shawn Abbott on 5/31/2022. Case discussed with nurse and patient. Questions and concerns addressed.   Plan made in collaboration with Dr. Tai Couch    Electronically signed by   Shasta Reyes PA-C on 5/19/2022 at 1:51 PM

## 2022-05-19 NOTE — DISCHARGE SUMMARY
Hospitalist Discharge Summary        Patient: Tyree Mi  YOB: 1940  MRN: [id number]   Acct: [de-identified]    Primary Care Physician: Dasia Shepherd MD    Admit date  5/16/2022    Discharge date: 5/19/2022  4:30 PM    Chief Complaint on presentation :-Hyperbilirubinemia    Discharge Assessment and Plan:-   1. Pancreatic cancer:   ? Oncology consulted on admission. Chemotherapy held for jaundice. Patient to follow-up with oncology outpatient as scheduled. 2. Hyperbilirubinemia, improved:   ? Patient jaundiced on admission, history of pancreatic cancer s/p Whipple procedure on chemotherapy. GI was consulted on admission, felt this was secondary to hepatic congestion. Patient did received 1 dose IV diuresis as recommended by GI. MRCP obtained 05/17. LFTs trended down, Bilirubin trended down. GI okay with discharge. Patient in stable condition on day of discharge. 3. Atrial fibrillation:   ? Resume home medications on discharge  4. Essential HTN:   ? BP stable, resume home medications on discharge  5. Hypokalemia, resolved:   ? 3.0 this morning, was replaced and repeat potassium at 4.0  6. Physical deconditioning, improved:  ? Patient reports that she has been having difficulty obtaining food as she does not have family members around to help her grocery shop. Patient notes that she has generalized weakness, PT/OT consulted to eval and treat. ? Patient to be discharged with home health, PT/OT, home health aide, . Patient is very independent and feels that her overall physical condition has improved since admission due to much rest.       Initial H and P and Hospital course:-  Per chart review: \"Patient admitted from home with jaundice and abnormal labs.  The patient had outpatient chemotherapy today. Jeff Mejia was noted to be jaundice.  She had labs that revealed elevated bilirubin - 4.2 from 0.9 two weeks prior. Patient is not having any new pain.  Patient admitted for MRCP and further evaluation of abnormal labs. \"     5/18: Patient resting in bed in no acute distress this morning, she notes that she is feeling well. Denying abdominal pain, nausea, vomiting, chest pain, shortness of breath. Patient does endorse generalized weakness, would like PT/OT to evaluate. May need PT/OT home health on discharge. She is medically stable for discharge per GI standpoint, awaiting PT OT to evaluate. 5/19: Patient resting in bed in no acute distress, no acute events overnight. Patient reports that she is doing very well, denies any acute concerns, no chest pain, shortness of breath, abdominal pain, nausea, vomiting. Patient's potassium low this a.m., replace and repeat potassium at 4.1. Bilirubin is improved, GI followed patient throughout stay and felt this was secondary to hepatic congestion. GI signed off, okay for discharge. I discussed return precautions with patient and she voiced understanding. Patient is to be discharged with home health, PT/OT/home health aide/. Physical Exam:-  General appearance: No apparent distress, appears stated age and cooperative. HEENT: Pupils equal, round, and reactive to light. Conjunctivae/corneas clear. Neck: Supple, with full range of motion. No jugular venous distention. Trachea midline. Respiratory:  Normal respiratory effort. Clear to auscultation, bilaterally without Rales/Wheezes/Rhonchi. Cardiovascular: Regular rate and rhythm with normal S1/S2 without murmurs, rubs or gallops. Abdomen: Soft, non-tender, non-distended with normal bowel sounds. Musculoskeletal: passive and active ROM x 4 extremities. Skin: Skin color, texture, turgor normal.  No rashes or lesions. Neurologic:  Neurovascularly intact without any focal sensory/motor deficits.  Cranial nerves: II-XII intact, grossly non-focal.  Psychiatric: Alert and oriented, thought content appropriate, normal insight  Capillary Refill: Brisk,< 3 seconds Peripheral Pulses: +2 palpable, equal bilaterally   ]  Vitals:   No data found. Weight:       24 hour intake/output:   No intake or output data in the 24 hours ending 05/21/22 1438        Discharge Medications:-      Medication List      CONTINUE taking these medications    amiodarone 200 MG tablet  Commonly known as: CORDARONE  Take 1 tablet by mouth daily     amLODIPine 2.5 MG tablet  Commonly known as: NORVASC  Take 1 tablet by mouth daily     apixaban 5 MG Tabs tablet  Commonly known as: Eliquis  Take 1 tablet by mouth 2 times daily     aspirin 81 MG chewable tablet  Take 1 tablet by mouth daily     dexamethasone 4 MG tablet  Commonly known as: DECADRON     docusate sodium 100 MG capsule  Commonly known as: COLACE     ferrous sulfate 325 (65 Fe) MG tablet  Commonly known as: IRON 325     lidocaine-prilocaine 2.5-2.5 % cream  Commonly known as: EMLA  Apply topically as needed.      ondansetron 4 MG tablet  Commonly known as: [place] Cordial :-  Recent Results (from the past 72 hour(s))   Comprehensive Metabolic Panel    Collection Time: 05/19/22  4:15 AM   Result Value Ref Range    Glucose 80 70 - 108 mg/dL    CREATININE 0.8 0.4 - 1.2 mg/dL    BUN 8 7 - 22 mg/dL    Sodium 142 135 - 145 meq/L    Potassium 3.0 (L) 3.5 - 5.2 meq/L    Chloride 109 98 - 111 meq/L    CO2 23 23 - 33 meq/L    Calcium 8.0 (L) 8.5 - 10.5 mg/dL    AST 34 5 - 40 U/L    Alkaline Phosphatase 204 (H) 38 - 126 U/L    Total Protein 4.6 (L) 6.1 - 8.0 g/dL    Albumin 2.4 (L) 3.5 - 5.1 g/dL    Total Bilirubin 1.6 (H) 0.3 - 1.2 mg/dL    ALT 39 11 - 66 U/L   CBC with Auto Differential    Collection Time: 05/19/22  4:15 AM   Result Value Ref Range    WBC 2.4 (L) 4.8 - 10.8 thou/mm3    RBC 2.74 (L) 4.20 - 5.40 mill/mm3    Hemoglobin 8.7 (L) 12.0 - 16.0 gm/dl    Hematocrit 28.0 (L) 37.0 - 47.0 %    .2 (H) 81.0 - 99.0 fL    MCH 31.8 26.0 - 33.0 pg    MCHC 31.1 (L) 32.2 - 35.5 gm/dl    RDW-CV 14.7 (H) 11.5 - 14.5 %    RDW-SD 54.5 (H) 35.0 - 45.0 fL    Platelets 903 775 - 822 thou/mm3    MPV 10.5 9.4 - 12.4 fL    Seg Neutrophils 21.7 %    Lymphocytes 56.6 %    Monocytes 19.3 %    Eosinophils 1.2 %    Basophils 0.8 %    Immature Granulocytes 0.4 %    Segs Absolute 0.5 (L) 1.8 - 7.7 thou/mm3    Lymphocytes Absolute 1.4 1.0 - 4.8 thou/mm3    Monocytes Absolute 0.5 0.4 - 1.3 thou/mm3    Eosinophils Absolute 0.0 0.0 - 0.4 thou/mm3    Basophils Absolute 0.0 0.0 - 0.1 thou/mm3    Immature Grans (Abs) 0.01 0.00 - 0.07 thou/mm3    nRBC 0 /100 wbc   Anion Gap    Collection Time: 05/19/22  4:15 AM   Result Value Ref Range    Anion Gap 10.0 8.0 - 16.0 meq/L   Glomerular Filtration Rate, Estimated    Collection Time: 05/19/22  4:15 AM   Result Value Ref Range    Est, Glom Filt Rate 69 (A) ml/min/1.73m2   Potassium    Collection Time: 05/19/22  2:10 PM   Result Value Ref Range    Potassium 4.1 3.5 - 5.2 meq/L        Microbiology:    Blood culture #1: No results found for: BC    Blood culture #2:No results found for: BLOODCULT2    Organism:  No results found for: LABGRAM    MRSA culture only:No results found for: [facility]    Urine culture: No results found for: LABURIN  Lab Results   Component Value Date    ORG Mixed Growth 11/09/2021        Respiratory culture: No results found for: CULTRESP    Aerobic and Anaerobic :  No results found for: LABAERO  No results found for: LABANAE    Urinalysis:      Lab Results   Component Value Date    NITRU POSITIVE 11/09/2021    WBCUA 25-50 11/09/2021    BACTERIA MANY 11/09/2021    RBCUA 3-5 11/09/2021    BLOODU SMALL 11/09/2021    Kathrine [place] 994 NEGATIVE 11/09/2021       Radiology:-  MRI ABDOMEN W WO CONTRAST MRCP    Result Date: 5/17/2022  MRI MRCP without contrast Comparison: 4/13/2022 Findings: There has been a prior Whipple procedure. There are 2 small cysts within the proximal pancreas, the larger measuring approximately 8 mm in size. There is no pancreatic duct dilatation. There is edema of the pancreatic mesentery.  There is severe pneumobilia and mild dilatation of the intra-and extrahepatic biliary tree. There is no evidence of biliary obstruction. The liver, spleen and adrenal glands are unremarkable. There are small bilateral kidney cysts. No hydronephrosis. Probable prior hiatal hernia repair. Mild colonic thickening. Borderline dilatation of segments of small bowel within the right upper quadrant without overall findings to suggest an obstructive process. There is a moderate amount of free fluid with interval worsening. No suspicious bone lesion. Impression: 1. Status post Whipple procedure. 2. Moderate ascites with worsening. Edema of the soft tissues with worsening. 3. Mildly dilated intrahepatic and extrahepatic biliary tree. Pneumobilia is present and this does not appear to represent an obstructive process. 4. There is apparent edema of the pancreatic mesentery. This may be secondary to generalized increased fluid status or pancreatitis. 5. There is thickening of the visualized: This may be secondary to increased fluid status or colitis. 6. There are 2 small pancreatic cysts.  This document has been electronically signed by: Leonel León MD on 05/17/2022 06:14 PM       Follow-up scheduled after discharge :-    in 1 weeks with Talia Kwan MD  As scheduled with oncology    Consultations during this hospital stay:-  [] NONE [] Cardiology  [] Nephrology  [] Hemo onco   [x] GI   [] ID  [] Endocrine  [] Pulm    [] Neuro    [] Psych   [] Urology  [] ENT   [] G SURGERY   []Ortho    []CV surg    [] Palliative  [] Hospice [] Pain management   []    []TCU   [] PT/OT  OTHERS:-Oncology  Disposition: home  Condition at Discharge: Stable    Time Spent:- 50 minutes    Electronically signed by Bryanna Adrian PA-C on 5/19/22 at 11:51 AM EDT   Discharging Hospitalist

## 2022-05-19 NOTE — PROGRESS NOTES
Eliceo Floyd 61  INPATIENT OCCUPATIONAL THERAPY  [facility] ONC MED 5K  EVALUATION    Time:    Time In: 3758  Time Out: 1212  Timed Code Treatment Minutes: 10 Minutes  Minutes: 21          Date: 2022  Patient Name: Gabbi Richter,   Gender: female      MRN: [id number]  : 1940  (81 y.o.)  Referring Practitioner: ASIA Barrera CNP  Diagnosis: hyperbilirubinemia  Additional Pertinent Hx: Per oncology note:82 y.o. female admitted for hyperbilirubinemia. 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 She was seen by Dr. Eduardo Sahni on 22 for cycle #8 of FOLFOX and was noted to have jaundice, reported dark urine. Her LFT revealed an increase in bilirubin to 4.2 compared to 0.9 two weeks ago. She was directly admitted under the Hospitalist Service. GI was consulted and MRCP ordered. Restrictions/Precautions:  Restrictions/Precautions: Fall Risk    Subjective  Chart Reviewed: Kati Vicente and Physical  Patient assessed for rehabilitation services?: Yes  Family / Caregiver Present: No    Subjective: cooperative, pleasant    Pain: 0/10: no c/o pain during session    Vitals: Vitals not assessed per clinical judgement, see nursing flowsheet    Social/Functional History:  Lives With: Alone  Type of Home: Condo  Home Layout: One level  Home Access:  (1-2)  Home Equipment: Cane,Walker, rolling   Bathroom Shower/Tub: Shower chair with back,Walk-in shower  Bathroom Equipment: Grab bars in shower       ADL Assistance: Independent  Homemaking Assistance: Needs assistance (children A prn)  Ambulation Assistance: Independent  Transfer Assistance: Independent          Additional Comments: Pt was using cane at home, RW for longer distances at times.     VISION:Corrected    HEARING:  WFL    COGNITION: WFL    RANGE OF MOTION:  Bilateral Upper Extremity:  WFL    STRENGTH:  Bilateral Upper Extremity:  WFL    SENSATION:   Reports tingling in hands with chemo tx    ADL:   Lower Extremity Dressing: Supervision. demo ability to cross BLE to adjust slipper socks. **Initiated education on energy conservation strategies for IADLs    BALANCE:  Sitting Balance:  Independent. Standing Balance: Stand By Assistance. BED MOBILITY:  Supine to Sit: Modified Independent      TRANSFERS:  Sit to Stand:  Stand By Assistance. Stand to Sit: Stand By Assistance. FUNCTIONAL MOBILITY:  Assistive Device: Straight Cane  Assist Level:  Stand By Assistance. Distance: 10ft in room to recliner from EOB   steady without LOB       Activity Tolerance:  Patient tolerance of  treatment: fair. Assessment:  Assessment: Pt demo decreased endurance for IADLs. Continued OT recommended to educate Pt on safety & adaptive strategies for returning to IADLs at home. Performance deficits / Impairments: Decreased endurance,Decreased high-level IADLs  Prognosis: Fair  REQUIRES OT FOLLOW-UP: Yes  Decision Making: Medium Complexity    Treatment Initiated: Treatment and education initiated within context of evaluation. Evaluation time included review of current medical information, gathering information related to past medical, social and functional history, completion of standardized testing, formal and informal observation of tasks, assessment of data and development of plan of care and goals. Treatment time included skilled education and facilitation of tasks to increase safety and independence with ADL's for improved functional independence and quality of life.     Discharge Recommendations:  Home with assist PRN,Other (comment) (Pt would benefit from aide for IADLs)    Patient Education:     Patient Education  Education Given To: Patient  Education Provided: Role of Therapy,Plan of Care,Energy Conservation  Education Method: Verbal  Barriers to Learning: None  Education Outcome: Continued education needed,Verbalized understanding    Equipment Recommendations:  Equipment Needed: No    Plan:  Times per Week: 3-5x  Current Treatment Recommendations: Self-Care / ADL,Safety education & training,Home management training. See long-term goal time frame for expected duration of plan of care. If no long-term goals established, a short length of stay is anticipated. Goals:  Patient goals : go home  Short Term Goals  Time Frame for Short term goals: until discharge  Short Term Goal 1: Pt will complete 8-10 min dynamic standing task with mod I & good safety awareness. Short Term Goal 2: Pt will demo good understanding of energy conservation strategies as evidenced by id 2 strategies to use with IADLs with 0 vcs  Long Term Goals  Time Frame for Long term goals : No LTG set d/t short ELOS         Following session, patient left in safe position with all fall risk precautions in place.

## 2022-05-19 NOTE — PROGRESS NOTES
Physician Progress Note      PATIENT:               Alana Lora  CSN #:                  [id number]  :                       1940  ADMIT DATE:       2022 6:05 PM  [address] [place] DATE:  RESPONDING  PROVIDER #:        Romario Shi          QUERY TEXT:    Pt admitted with Hyperbilirubinemia. Noted documentation of Severe   malnutrition (22 1220) by dietician consultant. If possible, please   document in progress notes and discharge summary:    The medical record reflects the following:  Risk Factors: Per dietitian, pt meets ASPEN criteria for  severe malnutrition. Clinical Indicators: Nutrition Assessment:  Pt. severely malnourished AEB criteria listed above. At risk for further   nutritional compromise r/t catabolic illness (pancreatitic cancer), lives   alone - forgets to eat as not hungry and underlying medical condition (hx   pancreatic cancer, Whipple). Treatment:  Recommend diet as tolerated. Will send Ensure Enlive TID. Question if pt. Would benefit from appetite stimulant Encouraged pt. To set alarm on phone as   reminder to eat, drink ONS (when discharged)    Thank you. Please call if you have any questions. (p) [phone]. Signed   by Bud Jacobs RN Clinical , CRCR  Options provided:  -- Severe Protein Calorie Malnutrition confirmed POA  -- Severe Protein Calorie Malnutrition confirmed not POA  -- Severe Protein Calorie Malnutrition ruled out  -- Other - I will add my own diagnosis  -- Disagree - Not applicable / Not valid  -- Disagree - Clinically unable to determine / Unknown  -- Refer to Clinical Documentation Reviewer    PROVIDER RESPONSE TEXT:    The diagnosis of Severe Protein Calorie Malnutrition was confirmed not POA.     Query created by: Darryl Narayanan on 2022 8:22 PM      Electronically signed by:  Romario Shi 2022 7:08 AM

## 2022-05-19 NOTE — CARE COORDINATION
5/19/22, 11:50 AM EDT    Patient goals/plan/ treatment preferences discussed by  and . Patient goals/plan/ treatment preferences reviewed with patient/ family. Patient/ family verbalize understanding of discharge plan and are in agreement with goal/plan/treatment preferences. Understanding was demonstrated using the teach back method. AVS provided by RN at time of discharge, which includes all necessary medical information pertaining to the patients current course of illness, treatment, post-discharge goals of care, and treatment preferences. Services At/After Discharge: [address]       IMM Letter  IMM Letter given to Patient/Family/Significant other/Guardian/POA/by[de-identified] Copy delivered to patient by . IMM Letter date given[de-identified] 05/19/22  IMM Letter time given[de-identified] 1440           Call to [facility], spoke with [facility], updated on probable discharge today.  WALT fax H and P, current med list, and order to [phone].    3:09 PM  WALT fax AVS to [facility]

## 2022-05-20 ENCOUNTER — CARE COORDINATION (OUTPATIENT)
Dept: CASE MANAGEMENT | Age: 82
End: 2022-05-20

## 2022-05-20 NOTE — CARE COORDINATION
Care Transitions Outreach Attempt    Call within 2 business days of discharge: Yes   Patient: Jessica Culp Patient : 1940 MRN: [id number]    Last Discharge [facility]       Complaint Diagnosis Description Type Department Provider    22   Admission (Discharged) HAYDER Rosas PA-C        Was this an external facility discharge? No [facility]    Noted following upcoming appointments from discharge chart review:   [facility] follow up appointment(s):   Future Appointments   Date Time Provider Jeanne Finch   2022 10:00 AM DO BRANDY [facility] [address]   2022  1:00 PM Giles Boyd MD [facility] [address] [facility]   2022 11:00 AM STR OUT PT ONC BED 1 HAYDER JACKSON [facility]   2022  2:20 PM [facility] [address] [place] [address]     1st attempt to reach for Care Transition discharge call unsuccessful. HIPAA compliant message left requesting call back. [facility] Notified agency of hosp discharge. Patient being admitted now.

## 2022-05-23 ENCOUNTER — CARE COORDINATION (OUTPATIENT)
Dept: CASE MANAGEMENT | Age: 82
End: 2022-05-23

## 2022-05-23 NOTE — CARE COORDINATION
Care Transitions Outreach Attempt    Call within 2 business days of discharge: Yes   Patient: Nafisa Soto Patient : 1940 MRN: [id number]    Last Discharge [facility]       Complaint Diagnosis Description Type Department Provider    22   Admission (Discharged) HAYDER 5K Kyle Rodriguez PA-C        Was this an external facility discharge? No   Discharge Facility:     Noted following upcoming appointments from discharge chart review:   [facility] follow up appointment(s):   Future Appointments   Date Time Provider Jeanne Finch   2022 10:00 AM Silas Simpson,  N Oncology [address]   2022  1:00 PM Neha Grady MD SRP[facility] [place]   2022 11:00 AM STR OUT PT ONC BED 1 STRVA OP [facility]   2022  2:20 PM [facility]ha Grady MD [address] [facility] [place]     2nd unsuccessful attempt to reach for Care Transition discharge call. HIPAA compliant message left requesting call back. Episode closed per protocol, no further outreach scheduled. [facility] was verified upon last call.    [address] [place] [address], [facility] [phone]

## 2022-05-23 NOTE — ADT AUTH CERT
Gastroenterology [address] Day 2 (5/17/2022) by Taqueria Garcia RN       Review Entered Review Status   5/20/2022 15:16 Completed      Criteria Review      Care Day: 2 Care Date: 5/17/2022 Level of Care: Inpatient Floor    Guideline Day 2    Clinical Status    ( ) * No ICU or intermediate care needs    Interventions    (X) Inpatient interventions continue    5/18/2022 12:01 PM EDT by Randy Haji      Chemotherapy now on hold. Consults to GI and Oncology, IV fluids. Daily Labs  PT/OT, SS    * Milestone   Additional Notes   DATE: 5/17       Relevant baselines: (lab values, vitals, o2 amount/delivery, etc.)  Bilirubin: 0.9      Pertinent Updates:   GI and Oncology following   Chemotherapy now on hold. For MRCP    IV fluids   Daily Labs   PT/OT, SS       Vitals:  97.8 (36.6) 16 65 166/71    98% RA      Abnl/Pertinent Labs/Radiology/Diagnostic Studies:   5/17/2022 06:00   Potassium: 3.3 (L)   Est, Glom Filt Rate: 60 (A)   Magnesium: 1.4 (L)   CALCIUM, SERUM, 103995: 8.2 (L)   Total Protein: 4.5 (L)   Albumin: 2.3 (L)   Alk Phos: 199 (H)   AST: 58 (H)   Bilirubin: 2.9 (H)   WBC: 2.9 (L)   RBC: 2.55 (L)   Hemoglobin Quant: 8.4 (L)   Hematocrit: 25.9 (L)   MCV: 101.6 (H)   RDW-CV: 14.7 (H)   RDW-SD: 55.2 (H)   Segs Absolute: 1.0 (L)      5/17/2022 17:29   MRI ABDOMEN W WO CONTRAST MRCP   1. Status post Whipple procedure. 2. Moderate ascites with worsening. Edema of the soft tissues with worsening. 3. Mildly dilated intrahepatic and extrahepatic biliary tree. Pneumobilia is present and this does not appear to represent an obstructive process. 4. There is apparent edema of the pancreatic mesentery. This may be secondary to generalized increased fluid status or pancreatitis. 5. There is thickening of the visualized: This may be secondary to increased fluid status or colitis. 6. There are 2 small pancreatic cysts.           Physical Exam:   Abdomen: Soft, non-tender, non-distended with normal bowel sounds. Extremities: no pedal edema   Skin:  jaundice         MD Consults/Assessments & Plans:   Per IM   Assessment/Plan:    1. Pancreatic cancer: Oncology consulted. Fallon Kearns held for jaundice. 2. Hyperbilirubinemia: Patient is jaundiced.  History of pancreatic cancer status post Whipple procedure on chemotherapy. Holly Mccurdy has been consulted.  MRCP pending this afternoon.  Currently NPO.  We will start IV fluid hydration. 3. Atrial fibrillation:  Continue with amiodarone.  Eliquis for anticoagulation. 4. Hypertension: BP elevated, recheck after morning meds.  Continue with amlodipine and just as needed. 5. Hypomagnesemia: Magnesium replacement   6. Hypokalemia: Potassium replacement protocol.          Per GI   ASSESSMENT:   1. Painless jaundice- resolved   2. Elevated LFTs- trending down   3. Hyperbilirubinemia- trending down   4. Hypoalbuminemia   5. Malnutrition   6. Hypokalemia   7. Leukopenia   8. Macrocytic anemia- no GI bleeding noted   9. Hypomagnesemia   10. Hypocalcemia   11. Afib- on Eliquis   12. Periampullary adenocarcinoma s/p Whipple at  12/2021   13. H/O HTN   14. Poor oral intake   15. Fluid overload with BLE edema       PLAN:    · Monitor H & H, transfuse prn   · NPO for MRCP, okay for diet after MRCP   · Avoid hepatotoxic meds, okay for Tylenol 2g limit   · Await MRCP results   · Monitor HFP   · Consult dietitian   · Consult social work   · PT/OT   · Oncology on board   · RN updated   · Supportive care per primary team         Per [address]   Assessment/Recommendations    1. Stage IIIB Periampullary Adenocarcinoma, pancreaticobiliary type   Oncology history as above. S/p Whipple 12/4/2021. She is on adjuvant chemotherapy with FOLFOX. She was due for chemotherapy yesterday on 5/16/22 and held due to hyperbilirubinemia.       2. Hyperbilirubinemia - bilirubin increased to 4.2 on 5/16/22 compared to 0.9 on 5/2/22. Bilirubin improved to 2.9 today on 5/17/22. MRCP ordered and GI consulted. Follow MRCP       3. Elevated LFT - LFT on 5/2/22 ALT, AST, bilirubin within normal limits. On 5/16/22 ALT 79, AST 90, bilirubin 4.2 as above. Follow MRCP, GI recommendations.        4. Hypokalemia, Hypomagnesemia - Improved. K 2.9 on 5/16/22, improved to 3.3. Mg 1.4. replacement per protocol as indicated. Likely related to poor oral intake. Dietician consulted.        5. Macrocytic Anemia - Hgb 8.4, Hct 25.9, . 6. chronic anemia since 11/2021 with recent baseline 10-11's. Likely chemo induced component. Monitor. Recommend transfusion for Hgb 7.0 or less.           Medications:   Infusions Meds   · 0.9 sodium chloride 75 mL/hr        Scheduled Medications    · amiodarone 200 mg Oral Daily   · amLODIPine 2.5 mg Oral Daily   · apixaban 5 mg Oral BID   · aspirin 81 mg Oral Daily   · docusate sodium 100 mg Oral BID   · ferrous sulfate 325 mg Oral Daily with breakfast      PRN Meds   potassium chloride (KLOR-CON M) extended release tablet 40 mEq x1          Orders:   Inpatient consult to Oncology    AM Labs   Inpatient consult to Dietitian    PT/OT Eval and Tx    NPO to ADULT DIET;  Regular

## 2022-05-23 NOTE — ADT AUTH CERT
Gastroenterology [address] Day 2 (5/17/2022) by Suzanna Reilly RN       Review Entered Review Status   5/20/2022 15:16 Completed      Criteria Review      Care Day: 2 Care Date: 5/17/2022 Level of Care: Inpatient Floor    Guideline Day 2    Clinical Status    ( ) * No ICU or intermediate care needs    Interventions    (X) Inpatient interventions continue    5/18/2022 12:01 PM EDT by Jammie Cha      Chemotherapy now on hold. Consults to GI and Oncology, IV fluids. Daily Labs  PT/OT, SS    * Milestone   Additional Notes   DATE: 5/17       Relevant baselines: (lab values, vitals, o2 amount/delivery, etc.)  Bilirubin: 0.9      Pertinent Updates:   GI and Oncology following   Chemotherapy now on hold. For MRCP    IV fluids   Daily Labs   PT/OT, SS       Vitals:  97.8 (36.6) 16 65 166/71    98% RA      Abnl/Pertinent Labs/Radiology/Diagnostic Studies:   5/17/2022 06:00   Potassium: 3.3 (L)   Est, Glom Filt Rate: 60 (A)   Magnesium: 1.4 (L)   CALCIUM, SERUM, 408764: 8.2 (L)   Total Protein: 4.5 (L)   Albumin: 2.3 (L)   Alk Phos: 199 (H)   AST: 58 (H)   Bilirubin: 2.9 (H)   WBC: 2.9 (L)   RBC: 2.55 (L)   Hemoglobin Quant: 8.4 (L)   Hematocrit: 25.9 (L)   MCV: 101.6 (H)   RDW-CV: 14.7 (H)   RDW-SD: 55.2 (H)   Segs Absolute: 1.0 (L)      5/17/2022 17:29   MRI ABDOMEN W WO CONTRAST MRCP   1. Status post Whipple procedure. 2. Moderate ascites with worsening. Edema of the soft tissues with worsening. 3. Mildly dilated intrahepatic and extrahepatic biliary tree. Pneumobilia is present and this does not appear to represent an obstructive process. 4. There is apparent edema of the pancreatic mesentery. This may be secondary to generalized increased fluid status or pancreatitis. 5. There is thickening of the visualized: This may be secondary to increased fluid status or colitis. 6. There are 2 small pancreatic cysts.           Physical Exam:   Abdomen: Soft, non-tender, non-distended with normal bowel sounds. Extremities: no pedal edema   Skin:  jaundice         MD Consults/Assessments & Plans:   Per IM   Assessment/Plan:    1. Pancreatic cancer: Oncology consulted. Alana Russ held for jaundice. 2. Hyperbilirubinemia: Patient is jaundiced.  History of pancreatic cancer status post Whipple procedure on chemotherapy. Brent Knight has been consulted.  MRCP pending this afternoon.  Currently NPO.  We will start IV fluid hydration. 3. Atrial fibrillation:  Continue with amiodarone.  Eliquis for anticoagulation. 4. Hypertension: BP elevated, recheck after morning meds.  Continue with amlodipine and just as needed. 5. Hypomagnesemia: Magnesium replacement   6. Hypokalemia: Potassium replacement protocol.          Per GI   ASSESSMENT:   1. Painless jaundice- resolved   2. Elevated LFTs- trending down   3. Hyperbilirubinemia- trending down   4. Hypoalbuminemia   5. Malnutrition   6. Hypokalemia   7. Leukopenia   8. Macrocytic anemia- no GI bleeding noted   9. Hypomagnesemia   10. Hypocalcemia   11. Afib- on Eliquis   12. Periampullary adenocarcinoma s/p Whipple at  12/2021   13. H/O HTN   14. Poor oral intake   15. Fluid overload with BLE edema       PLAN:    · Monitor H & H, transfuse prn   · NPO for MRCP, okay for diet after MRCP   · Avoid hepatotoxic meds, okay for Tylenol 2g limit   · Await MRCP results   · Monitor HFP   · Consult dietitian   · Consult social work   · PT/OT   · Oncology on board   · RN updated   · Supportive care per primary team         Per [address]   Assessment/Recommendations    1. Stage IIIB Periampullary Adenocarcinoma, pancreaticobiliary type   Oncology history as above. S/p Whipple 12/4/2021. She is on adjuvant chemotherapy with FOLFOX. She was due for chemotherapy yesterday on 5/16/22 and held due to hyperbilirubinemia.       2. Hyperbilirubinemia - bilirubin increased to 4.2 on 5/16/22 compared to 0.9 on 5/2/22. Bilirubin improved to 2.9 today on 5/17/22. MRCP ordered and GI consulted. Follow MRCP       3. Elevated LFT - LFT on 5/2/22 ALT, AST, bilirubin within normal limits. On 5/16/22 ALT 79, AST 90, bilirubin 4.2 as above. Follow MRCP, GI recommendations.        4. Hypokalemia, Hypomagnesemia - Improved. K 2.9 on 5/16/22, improved to 3.3. Mg 1.4. replacement per protocol as indicated. Likely related to poor oral intake. Dietician consulted.        5. Macrocytic Anemia - Hgb 8.4, Hct 25.9, . 6. chronic anemia since 11/2021 with recent baseline 10-11's. Likely chemo induced component. Monitor. Recommend transfusion for Hgb 7.0 or less.           Medications:   Infusions Meds   · 0.9 sodium chloride 75 mL/hr        Scheduled Medications    · amiodarone 200 mg Oral Daily   · amLODIPine 2.5 mg Oral Daily   · apixaban 5 mg Oral BID   · aspirin 81 mg Oral Daily   · docusate sodium 100 mg Oral BID   · ferrous sulfate 325 mg Oral Daily with breakfast      PRN Meds   potassium chloride (KLOR-CON M) extended release tablet 40 mEq x1          Orders:   Inpatient consult to Oncology    AM Labs   Inpatient consult to Dietitian    PT/OT Eval and Tx    NPO to ADULT DIET;  Regular

## 2022-05-27 DIAGNOSIS — C25.9 MALIGNANT NEOPLASM OF PANCREAS, UNSPECIFIED LOCATION OF MALIGNANCY (HCC): Primary | ICD-10-CM

## 2022-05-31 ENCOUNTER — OFFICE VISIT (OUTPATIENT)
Dept: ONCOLOGY | Age: 82
End: 2022-05-31
Payer: MEDICARE

## 2022-05-31 ENCOUNTER — HOSPITAL ENCOUNTER (OUTPATIENT)
Dept: INFUSION THERAPY | Age: 82
Discharge: HOME | End: 2022-05-31
Payer: MEDICARE

## 2022-05-31 ENCOUNTER — OFFICE VISIT (OUTPATIENT)
Dept: FAMILY MEDICINE CLINIC | Age: 82
End: 2022-05-31
Payer: MEDICARE

## 2022-05-31 VITALS
HEIGHT: 65 IN | OXYGEN SATURATION: 97 % | TEMPERATURE: 97.9 F | SYSTOLIC BLOOD PRESSURE: 114 MMHG | RESPIRATION RATE: 12 BRPM | DIASTOLIC BLOOD PRESSURE: 64 MMHG | HEART RATE: 75 BPM | WEIGHT: 141.2 LBS | BODY MASS INDEX: 23.53 KG/M2

## 2022-05-31 VITALS
WEIGHT: 143.6 LBS | TEMPERATURE: 98.5 F | HEIGHT: 65 IN | SYSTOLIC BLOOD PRESSURE: 138 MMHG | RESPIRATION RATE: 16 BRPM | DIASTOLIC BLOOD PRESSURE: 64 MMHG | OXYGEN SATURATION: 97 % | BODY MASS INDEX: 23.93 KG/M2 | HEART RATE: 69 BPM

## 2022-05-31 VITALS
HEIGHT: 65 IN | OXYGEN SATURATION: 97 % | SYSTOLIC BLOOD PRESSURE: 138 MMHG | HEART RATE: 69 BPM | WEIGHT: 143.6 LBS | BODY MASS INDEX: 23.93 KG/M2 | RESPIRATION RATE: 16 BRPM | TEMPERATURE: 98.5 F | DIASTOLIC BLOOD PRESSURE: 64 MMHG

## 2022-05-31 DIAGNOSIS — Z09 HOSPITAL DISCHARGE FOLLOW-UP: ICD-10-CM

## 2022-05-31 DIAGNOSIS — C25.9 MALIGNANT NEOPLASM OF PANCREAS, UNSPECIFIED LOCATION OF MALIGNANCY (HCC): ICD-10-CM

## 2022-05-31 DIAGNOSIS — C25.9 MALIGNANT NEOPLASM OF PANCREAS, UNSPECIFIED LOCATION OF MALIGNANCY (HCC): Primary | ICD-10-CM

## 2022-05-31 DIAGNOSIS — D64.9 ANEMIA, UNSPECIFIED TYPE: ICD-10-CM

## 2022-05-31 DIAGNOSIS — Z51.11 ENCOUNTER FOR CHEMOTHERAPY MANAGEMENT: ICD-10-CM

## 2022-05-31 DIAGNOSIS — M79.89 LEG SWELLING: ICD-10-CM

## 2022-05-31 DIAGNOSIS — E80.6 HYPERBILIRUBINEMIA: Primary | ICD-10-CM

## 2022-05-31 LAB
ABSOLUTE IMMATURE GRANULOCYTE: 0.01 THOU/MM3 (ref 0–0.07)
ALBUMIN SERPL-MCNC: 2.8 G/DL (ref 3.5–5.1)
ALP BLD-CCNC: 181 U/L (ref 38–126)
ALT SERPL-CCNC: 19 U/L (ref 11–66)
AST SERPL-CCNC: 31 U/L (ref 5–40)
BASINOPHIL, AUTOMATED: 1 % (ref 0–3)
BASOPHILS ABSOLUTE: 0.1 THOU/MM3 (ref 0–0.1)
BILIRUB SERPL-MCNC: 1.1 MG/DL (ref 0.3–1.2)
BILIRUBIN DIRECT: 0.4 MG/DL (ref 0–0.3)
BUN, WHOLE BLOOD: 14 MG/DL (ref 8–26)
CHLORIDE, WHOLE BLOOD: 110 MEQ/L (ref 98–109)
CREATININE, WHOLE BLOOD: 1.3 MG/DL (ref 0.5–1.2)
EOSINOPHILS ABSOLUTE: 0 THOU/MM3 (ref 0–0.4)
EOSINOPHILS RELATIVE PERCENT: 1 % (ref 0–4)
GFR, ESTIMATED: 42 ML/MIN/1.73M2
GLUCOSE, WHOLE BLOOD: 105 MG/DL (ref 70–108)
HCT VFR BLD CALC: 32.3 % (ref 37–47)
HEMOGLOBIN: 10 GM/DL (ref 12–16)
IMMATURE GRANULOCYTES: 0 %
IONIZED CALCIUM, WHOLE BLOOD: 1.2 MMOL/L (ref 1.12–1.32)
LYMPHOCYTES # BLD: 45 % (ref 15–47)
LYMPHOCYTES ABSOLUTE: 2.3 THOU/MM3 (ref 1–4.8)
MCH RBC QN AUTO: 31.8 PG (ref 26–33)
MCHC RBC AUTO-ENTMCNC: 31 GM/DL (ref 32.2–35.5)
MCV RBC AUTO: 103 FL (ref 81–99)
MONOCYTES ABSOLUTE: 0.5 THOU/MM3 (ref 0.4–1.3)
MONOCYTES: 10 % (ref 0–12)
PDW BLD-RTO: 13.9 % (ref 11.5–14.5)
PLATELET # BLD: 206 THOU/MM3 (ref 130–400)
PMV BLD AUTO: 9.4 FL (ref 9.4–12.4)
POTASSIUM, WHOLE BLOOD: 3.7 MEQ/L (ref 3.5–4.9)
RBC # BLD: 3.14 MILL/MM3 (ref 4.2–5.4)
SEG NEUTROPHILS: 42 % (ref 43–75)
SEGMENTED NEUTROPHILS ABSOLUTE COUNT: 2.1 THOU/MM3 (ref 1.8–7.7)
SODIUM, WHOLE BLOOD: 142 MEQ/L (ref 138–146)
TOTAL CO2, WHOLE BLOOD: 21 MEQ/L (ref 23–33)
TOTAL PROTEIN: 5.5 G/DL (ref 6.1–8)
WBC # BLD: 5 THOU/MM3 (ref 4.8–10.8)

## 2022-05-31 PROCEDURE — 36591 DRAW BLOOD OFF VENOUS DEVICE: CPT

## 2022-05-31 PROCEDURE — 99214 OFFICE O/P EST MOD 30 MIN: CPT | Performed by: NURSE PRACTITIONER

## 2022-05-31 PROCEDURE — 1111F DSCHRG MED/CURRENT MED MERGE: CPT | Performed by: STUDENT IN AN ORGANIZED HEALTH CARE EDUCATION/TRAINING PROGRAM

## 2022-05-31 PROCEDURE — 6360000002 HC RX W HCPCS: Performed by: INTERNAL MEDICINE

## 2022-05-31 PROCEDURE — 99211 OFF/OP EST MAY X REQ PHY/QHP: CPT

## 2022-05-31 PROCEDURE — 80076 HEPATIC FUNCTION PANEL: CPT

## 2022-05-31 PROCEDURE — 2580000003 HC RX 258: Performed by: INTERNAL MEDICINE

## 2022-05-31 PROCEDURE — 85025 COMPLETE CBC W/AUTO DIFF WBC: CPT

## 2022-05-31 PROCEDURE — 99495 TRANSJ CARE MGMT MOD F2F 14D: CPT | Performed by: STUDENT IN AN ORGANIZED HEALTH CARE EDUCATION/TRAINING PROGRAM

## 2022-05-31 PROCEDURE — 80047 BASIC METABLC PNL IONIZED CA: CPT

## 2022-05-31 PROCEDURE — 1124F ACP DISCUSS-NO DSCNMKR DOCD: CPT | Performed by: NURSE PRACTITIONER

## 2022-05-31 RX ORDER — HEPARIN SODIUM (PORCINE) LOCK FLUSH IV SOLN 100 UNIT/ML 100 UNIT/ML
500 SOLUTION INTRAVENOUS PRN
Status: DISCONTINUED | OUTPATIENT
Start: 2022-05-31 | End: 2022-06-01 | Stop reason: HOSPADM

## 2022-05-31 RX ORDER — POTASSIUM CHLORIDE 20 MEQ/1
20 TABLET, EXTENDED RELEASE ORAL DAILY
Qty: 30 TABLET | Refills: 0 | Status: SHIPPED | OUTPATIENT
Start: 2022-05-31 | End: 2022-07-11 | Stop reason: SDUPTHER

## 2022-05-31 RX ORDER — SODIUM CHLORIDE 0.9 % (FLUSH) 0.9 %
5-40 SYRINGE (ML) INJECTION PRN
Status: DISCONTINUED | OUTPATIENT
Start: 2022-05-31 | End: 2022-06-01 | Stop reason: HOSPADM

## 2022-05-31 RX ORDER — HEPARIN SODIUM (PORCINE) LOCK FLUSH IV SOLN 100 UNIT/ML 100 UNIT/ML
500 SOLUTION INTRAVENOUS PRN
Status: CANCELLED | OUTPATIENT
Start: 2022-05-31

## 2022-05-31 RX ORDER — SODIUM CHLORIDE 9 MG/ML
25 INJECTION, SOLUTION INTRAVENOUS PRN
Status: CANCELLED | OUTPATIENT
Start: 2022-05-31

## 2022-05-31 RX ORDER — SODIUM CHLORIDE 0.9 % (FLUSH) 0.9 %
5-40 SYRINGE (ML) INJECTION PRN
Status: CANCELLED | OUTPATIENT
Start: 2022-05-31

## 2022-05-31 RX ORDER — FUROSEMIDE 20 MG/1
20 TABLET ORAL DAILY
Qty: 30 TABLET | Refills: 3 | Status: SHIPPED | OUTPATIENT
Start: 2022-05-31 | End: 2022-07-11 | Stop reason: SDUPTHER

## 2022-05-31 RX ADMIN — Medication 500 UNITS: at 10:28

## 2022-05-31 RX ADMIN — SODIUM CHLORIDE, PRESERVATIVE FREE 10 ML: 5 INJECTION INTRAVENOUS at 09:45

## 2022-05-31 RX ADMIN — SODIUM CHLORIDE, PRESERVATIVE FREE 20 ML: 5 INJECTION INTRAVENOUS at 09:46

## 2022-05-31 ASSESSMENT — ENCOUNTER SYMPTOMS
BACK PAIN: 0
DIARRHEA: 0
COLOR CHANGE: 0
SHORTNESS OF BREATH: 0
COUGH: 0
VOMITING: 0
ABDOMINAL PAIN: 0
CONSTIPATION: 0
WHEEZING: 0
NAUSEA: 0

## 2022-05-31 ASSESSMENT — PATIENT HEALTH QUESTIONNAIRE - PHQ9
SUM OF ALL RESPONSES TO PHQ QUESTIONS 1-9: 0
1. LITTLE INTEREST OR PLEASURE IN DOING THINGS: 0
SUM OF ALL RESPONSES TO PHQ QUESTIONS 1-9: 0
SUM OF ALL RESPONSES TO PHQ QUESTIONS 1-9: 0
2. FEELING DOWN, DEPRESSED OR HOPELESS: 0
SUM OF ALL RESPONSES TO PHQ9 QUESTIONS 1 & 2: 0
SUM OF ALL RESPONSES TO PHQ QUESTIONS 1-9: 0

## 2022-05-31 NOTE — PROGRESS NOTES
S: 81 y.o. female with   Chief Complaint   Patient presents with    Follow-Up from Hospital     Pt presents for a hospital f/u for Hyperbilirubinemia. Pt states she has been doing better. Had a whipple due to pancreatic cancer - the bilirubin was higher - and they said congestion - no symptoms now    Has always had some pitting edema - worse the last few weeks - backed off amlodipine and no help - is tight and aching now    Echo in the fall - ef was normal    BP Readings from Last 3 Encounters:   05/31/22 114/64   05/31/22 138/64   05/31/22 138/64     Wt Readings from Last 3 Encounters:   05/31/22 141 lb 3.2 oz (64 kg)   05/31/22 143 lb 9.6 oz (65.1 kg)   05/31/22 143 lb 9.6 oz (65.1 kg)           O: VS:   Vitals:    05/31/22 1307   BP: 114/64   Pulse: 75   Resp: 12   Temp: 97.9 °F (36.6 °C)   SpO2: 97%   Weight: 141 lb 3.2 oz (64 kg)   Height: 5' 5\" (1.651 m)     Body mass index is 23.5 kg/m². AAO/NAD, appropriate affect for mood  Normocephalic, atraumatic, eyes - conjunctiva and sclera normal,   skin no rashes on exposed areas   Insight, judgement normal and in no acute distress      Lab Results   Component Value Date    WBC 5.0 05/31/2022    HGB 10.0 (L) 05/31/2022    HCT 32.3 (L) 05/31/2022     05/31/2022    AST 31 05/31/2022     05/31/2022    K 3.7 05/31/2022     05/19/2022    CREATININE 1.3 (H) 05/31/2022    BUN 8 05/19/2022    CO2 23 05/19/2022    TSH 5.560 (H) 12/28/2021    INR 1.04 11/09/2021    LABA1C 5.7 11/10/2021    LABGLOM 69 (A) 05/19/2022    MG 1.4 (L) 05/17/2022    CALCIUM 8.0 (L) 05/19/2022       MRI ABDOMEN W WO CONTRAST MRCP    Result Date: 5/17/2022  MRI MRCP without contrast Comparison: 4/13/2022 Findings: There has been a prior Whipple procedure. There are 2 small cysts within the proximal pancreas, the larger measuring approximately 8 mm in size. There is no pancreatic duct dilatation. There is edema of the pancreatic mesentery.  There is severe pneumobilia and mild dilatation of the intra-and extrahepatic biliary tree. There is no evidence of biliary obstruction. The liver, spleen and adrenal glands are unremarkable. There are small bilateral kidney cysts. No hydronephrosis. Probable prior hiatal hernia repair. Mild colonic thickening. Borderline dilatation of segments of small bowel within the right upper quadrant without overall findings to suggest an obstructive process. There is a moderate amount of free fluid with interval worsening. No suspicious bone lesion. Impression:   1. Status post Whipple procedure. 2. Moderate ascites with worsening. Edema of the soft tissues with worsening. 3. Mildly dilated intrahepatic and extrahepatic biliary tree. Pneumobilia is present and this does not appear to represent an obstructive process. 4. There is apparent edema of the pancreatic mesentery. This may be secondary to generalized increased fluid status or pancreatitis. 5. There is thickening of the visualized: This may be secondary to increased fluid status or colitis. 6. There are 2 small pancreatic cysts. This document has been electronically signed by: Curtis Hawkins MD on 05/17/2022 06:14 PM           Diagnosis Orders   1. Hyperbilirubinemia     2. Malignant neoplasm of pancreas, unspecified location of malignancy ([facility] [address]     3. Leg swelling         Plan  Will do less salt - add some diuretics as needed, compression socks    Will add the lasix and the potassium    Will do the mychart messages with daily weights    Will see you back in a month    Will follow with oncology - monthly labs           No follow-ups on file. Orders Placed:  No orders of the defined types were placed in this encounter. Medications Prescribed:  No orders of the defined types were placed in this encounter.       Future Appointments   Date Time Provider Jeanne Finch   6/13/2022 11:00 AM STR OUT PT ONC BED 1 STRZ OP ONC Rupa CHENG   6/22/2022 10:30 AM STR OUT PT ONC BED 1 STRZ OP Chris Rodriguez [facility]   6/22/2022 11:00 AM Mumtaz Mitchell DO N Oncology [facility] [place] [address] [place] [address]   6/30/2022  2:20 PM Giles Boyd MD [address] [facility] - [place] Veronika Maintenance Due   Topic Date Due    Annual Wellness Visit (AWV)  Never done    DTaP/Tdap/Td vaccine (1 - Tdap) Never done    Shingles vaccine (1 of 2) Never done    DEXA (modify frequency per FRAX score)  Never done    Pneumococcal 65+ years Vaccine (2 - PPSV23 or PCV20) 12/21/2021         Attending Physician Statement  I have discussed the case, including pertinent history and exam findings with the resident. I also have seen the patient and performed key portions of the examination. I agree with the documented assessment and plan as documented by the resident.   GE modifier added to this encounter      Tiney Collet, DO 5/31/2022 1:50 PM

## 2022-05-31 NOTE — PROGRESS NOTES
Patient tolerated  Lab draw from [address] [facility] without any complications. Discharge instructions given to patient-verbalizes understanding. Ambulated off unit per self with belongings.

## 2022-05-31 NOTE — PATIENT INSTRUCTIONS
1.  No treatment today  2.   Return to clinic in 2-3 weeks to see Dr. Henrry Parada with labs:  CBC, CMP

## 2022-05-31 NOTE — PROGRESS NOTES
After pharmacist chart review, the following recommendations are made:  - If patient's BP remains elevated, may consider increasing amlodipine to 5 mg daily.  - Patient is also due for Tdap, pneumonia, and shingles vaccine.     For Pharmacy [address] in place:  No   Recommendation Provided To: Provider: 2 via Note to Provider   Intervention Detail: Dose Adjustment: 1, reason: Therapy Optimization and Vaccine Recommended/Administered   Gap Closed?: No    Time Spent (min): Raj Mcmillan PharmD   5/31/2022, 8:54 AM

## 2022-05-31 NOTE — PLAN OF CARE
Problem: Discharge Planning  Goal: Knowledge of discharge instructions  Description: Knowledge of discharge instructions  Outcome: Adequate for Discharge  Note: Verbalized understanding of discharge instructions, follow-up appointments, and when to call the physician. Intervention: Discharge to appropriate level of care  Note: Discuss understanding of discharge instructions,follow-up appointments, and when to call the physician. Problem: Falls - Risk of:  Goal: Will remain free from falls  Description: Will remain free from falls  Outcome: Adequate for Discharge  Note: No falls occurred with visit today. Intervention: Assess risk factors for falls  Description: Assess risk factors for falls  Note: Verbalized understanding of fall prevention to ask for assistance with ambulation. Call light within reach. Problem: Infection - Central Venous Catheter-Associated Bloodstream Infection:  Goal: Will show no infection signs and symptoms  Description: Will show no infection signs and symptoms  Outcome: Adequate for Discharge  Note: Mediport site with no redness or warmth. Skin over port site intact with no signs of breakdown noted. Patient verbalizes signs/symptoms of port infection and when to notify the physician. Intervention: Infection risk assessment  Description: Infection risk assessment  Note: Discuss port maintenance,infection prevention, sign of infection and when to call MD.    Care plan reviewed with patient and family. Patient and family verbalize understanding of the plan of care and contribute to goal setting.

## 2022-05-31 NOTE — PROGRESS NOTES
S: 81 y.o. female with   Chief Complaint   Patient presents with    Follow-Up from Hospital     Pt presents for a hospital f/u for Hyperbilirubinemia. Pt states she has been doing better. HPI: please see resident note for HPI and ROS. Doing well    BP Readings from Last 3 Encounters:   05/31/22 114/64   05/31/22 138/64   05/31/22 138/64     Wt Readings from Last 3 Encounters:   05/31/22 141 lb 3.2 oz (64 kg)   05/31/22 143 lb 9.6 oz (65.1 kg)   05/31/22 143 lb 9.6 oz (65.1 kg)       O: VS:  height is 5' 5\" (1.651 m) and weight is 141 lb 3.2 oz (64 kg). Her temperature is 97.9 °F (36.6 °C). Her blood pressure is 114/64 and her pulse is 75. Her respiration is 12 and oxygen saturation is 97%. Diagnosis Orders   1. Hyperbilirubinemia     2.  Malignant neoplasm of pancreas, unspecified location of malignancy ([facility] [address]     3. Leg swelling  furosemide (LASIX) 20 MG tablet    potassium chloride (KLOR-CON M) 20 MEQ extended release tablet    Basic Metabolic Panel    Magnesium       Plan:  Lasix, potassium replacement  Check labs  Follow up in one month    Health Maintenance Due   Topic Date Due    Annual Wellness Visit (AWV)  Never done    DTaP/Tdap/Td vaccine (1 - Tdap) Never done    Shingles vaccine (1 of 2) Never done    DEXA (modify frequency per FRAX score)  Never done    Pneumococcal 65+ years Vaccine (2 - PPSV23 or PCV20) 12/21/2021         Marielos Alas MD 5/31/2022 2:05 PM

## 2022-05-31 NOTE — PROGRESS NOTES
Bay Joel is a 81 y.o. female who presents today for:  Chief Complaint   Patient presents with    Follow-Up from Hospital     Pt presents for a hospital f/u for Hyperbilirubinemia. Pt states she has been doing better. HPI:   Bay Joel is 81 y.o. who presents today for hospital follow-up. Patient recently admitted from 5/16/2022 - 5/19/2022 due to hyperbilirubinemia. Patient had outpatient labs completed, and was noted to have elevated bilirubin to 4.2, from baseline 0.92 weeks prior. Patient was not having any new pain at the time. However, she had noticed increasing jaundiced appearance of her skin. MRCP was completed, without acute findings. GI was consulted and felt that elevated bilirubin was secondary to hepatic congestion. Patient's bilirubin improved throughout admission, and she was discharged home with instructions to follow-up outpatient. She was also discharged home with PT/OT/home health on discharge. Patient follow-up with Oncology today. Labs significantly improved from prior. Plan to hold chemo for another couple of weeks. Patient and family are planning to hold further chemo treatments until end of July, due to upcoming travel. Leg swelling: Patient reports bilateral leg swelling has worsened recently. She has tried compression stockings and elevation, but without improvement. Ankles are aching because of this, and notes that it is hard to bend her feet. During hospitalization, she received IV diuretics which helped improve symptoms. She denies chest pain, shortness of breath, palpitations, dyspnea on exertion, or orthopnea.       Objective:     Vitals:    05/31/22 1307   BP: 114/64   Pulse: 75   Resp: 12   Temp: 97.9 °F (36.6 °C)   SpO2: 97%   Weight: 141 lb 3.2 oz (64 kg)   Height: 5' 5\" (1.651 m)       Wt Readings from Last 3 Encounters:   05/31/22 141 lb 3.2 oz (64 kg)   05/31/22 143 lb 9.6 oz (65.1 kg)   05/31/22 143 lb 9.6 oz (65.1 kg) BP Readings from Last 3 Encounters:   05/31/22 114/64   05/31/22 138/64   05/31/22 138/64       Lab Results   Component Value Date    WBC 5.0 05/31/2022    HGB 10.0 (L) 05/31/2022    HCT 32.3 (L) 05/31/2022     (H) 05/31/2022     05/31/2022     Lab Results   Component Value Date     05/31/2022    K 3.7 05/31/2022     05/19/2022    CO2 23 05/19/2022    BUN 8 05/19/2022    CREATININE 1.3 (H) 05/31/2022    GLUCOSE 80 05/19/2022    CALCIUM 8.0 (L) 05/19/2022    PROT 5.5 (L) 05/31/2022    LABALBU 2.8 (L) 05/31/2022    BILITOT 1.1 05/31/2022    ALKPHOS 181 (H) 05/31/2022    AST 31 05/31/2022    ALT 19 05/31/2022    LABGLOM 69 (A) 05/19/2022     Lab Results   Component Value Date    TSH 5.560 (H) 12/28/2021    T4FREE 1.88 (H) 12/28/2021     Lab Results   Component Value Date    LABA1C 5.7 11/10/2021     No results found for: EAG  No results found for: CHOL  No results found for: TRIG  No results found for: HDL  No results found for: LDLCHOLESTEROL, LDLCALC    No results found for: LABMICR, [id number]    Review of Systems   Constitutional: Negative for activity change, chills, fatigue and fever. HENT: Negative for congestion. Eyes: Negative for visual disturbance. Respiratory: Negative for cough, shortness of breath and wheezing. Cardiovascular: Positive for leg swelling. Negative for chest pain and palpitations. Gastrointestinal: Negative for abdominal pain, constipation, diarrhea, nausea and vomiting. Genitourinary: Negative for dysuria. Musculoskeletal: Negative for back pain. Skin: Negative for color change and rash. Neurological: Negative for dizziness, syncope, light-headedness and headaches. Psychiatric/Behavioral: Negative for dysphoric mood. The patient is not nervous/anxious. Physical Exam  Vitals and nursing note reviewed. Constitutional:       Appearance: Normal appearance. She is normal weight. HENT:      Head: Normocephalic and atraumatic. Right Ear: Tympanic membrane and ear canal normal.      Left Ear: Tympanic membrane and ear canal normal.      Nose: Nose normal.      Mouth/Throat:      Mouth: Mucous membranes are moist.      Pharynx: No oropharyngeal exudate or posterior oropharyngeal erythema. Eyes:      Extraocular Movements: Extraocular movements intact. Conjunctiva/sclera: Conjunctivae normal.      Pupils: Pupils are equal, round, and reactive to light. Cardiovascular:      Rate and Rhythm: Normal rate and regular rhythm. Pulses: Normal pulses. Heart sounds: Murmur heard. Pulmonary:      Effort: Pulmonary effort is normal. No respiratory distress. Breath sounds: Normal breath sounds. No wheezing. Abdominal:      General: Abdomen is flat. Bowel sounds are normal. There is no distension. Palpations: Abdomen is soft. There is no mass. Tenderness: There is no abdominal tenderness. Musculoskeletal:      Cervical back: Neck supple. Right lower leg: Edema present. Left lower leg: Edema present. Comments: 2+ pitting edema to the knees bilaterally   Skin:     General: Skin is warm and dry. Neurological:      General: No focal deficit present. Mental Status: She is alert and oriented to person, place, and time.    Psychiatric:         Mood and Affect: Mood normal.         Behavior: Behavior normal.         Immunization History   Administered Date(s) Administered    COVID-19, Rosa Maria Mahoney, Primary or Immunocompromised, PF, 100mcg/0.5mL 02/05/2021, 03/05/2021, 01/19/2022    Influenza, High Dose (Fluzone 65 yrs and older) 12/01/2017, 11/12/2018, 11/08/2019    Influenza, High-dose, Quadv, 65 yrs +, IM (Fluzone) 10/27/2020, 10/15/2021    Pneumococcal Conjugate 13-valent Bev Quiroz) 12/21/2020       Health Maintenance Due   Topic Date Due    Annual Wellness Visit (AWV)  Never done    DTaP/Tdap/Td vaccine (1 - Tdap) Never done    Shingles vaccine (1 of 2) Never done    DEXA (modify frequency per FRAX score)  Never done    Pneumococcal 65+ years Vaccine (2 - PPSV23 or PCV20) 12/21/2021          Food Insecurity: No Food Insecurity    Worried About Running Out of Food in the Last Year: Never true    Roberto of Food in the Last Year: Never true       Assessment / Plan:   1. Hyperbilirubinemia  2. Malignant neoplasm of pancreas, unspecified location of malignancy [facility])  Patient doing well after hospitalization. Bilirubin has normalized on repeat labs. -Continue follow-up with oncology as scheduled  -Monitor LFTs via oncology    3. Leg swelling  Patient with increased bilateral pitting edema compared to prior.  -Will treat with Lasix and potassium supplement x3 days.  -Patient to monitor daily weights and BP with treatment  -Patient instructed to send a Vet[employer] message with results of diuresis in 3 days.  -Recheck BMP in 1 week with diuresis  -Use compression stockings and elevate legs as able  -Work on limiting salt intake  -2 L fluid restriction daily  - furosemide (LASIX) 20 MG tablet; Take 1 tablet by mouth daily  Dispense: 30 tablet; Refill: 3  - potassium chloride (KLOR-CON M) 20 MEQ extended release tablet; Take 1 tablet by mouth daily  Dispense: 30 tablet; Refill: 0  - Basic Metabolic Panel; Future  - Magnesium; Future    4. Hospital discharge follow-up  Patient following up after hospital discharge for hyperbilirubinemia as noted above. Return in about 4 weeks (around 6/28/2022).         Medications Prescribed:  Orders Placed This Encounter   Medications    furosemide (LASIX) 20 MG tablet     Sig: Take 1 tablet by mouth daily     Dispense:  30 tablet     Refill:  3    potassium chloride (KLOR-CON M) 20 MEQ extended release tablet     Sig: Take 1 tablet by mouth daily     Dispense:  30 tablet     Refill:  0       Future Appointments   Date Time Provider Jeanne Finch   6/13/2022 11:00 AM STR OUT PT ONC BED 1 STRZ OP ONC Rupa CHENG   6/22/2022 10:30 AM STR OUT PT ONC BED 1 STRZ OP ONC Carmelita Kim   6/22/2022 11:00 AM DO BRANDY [facility]wood On[facility] [place] [address]   6/30/2022  2:20 PM Davidson Quiroz MD SRPX  RES [facility] [place]       Patient given educational materials - see patient instructions. Discussed use, benefit, and sideeffects of prescribed medications. All patient questions answered. Pt voiced understanding. Reviewed health maintenance. Instructed to continue current medications, diet and exercise. Patient agreed with treatment plan. Follow up as directed.      Electronically signed by Davidson Quiroz MD on 5/31/2022 at 3:33 PM

## 2022-05-31 NOTE — PROGRESS NOTES
[facility], [address] [facility] [address]  [address] [address]  Dept: [phone]  Dept Fax: [phone]: [phone]      Visit Date:5/31/2022     Garret Hopson is a 81 y.o. female who presents today for:   Chief Complaint   Patient presents with    Follow-up     Malignant neoplasm of pancreas, unspecified location of malignancy [facility])        HPI:   Garret Hopson is a 81 y.o. 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 HPI per Dr. Mcghee Quick note on 5/16/2022:  She is status post Whipple procedure. She presents to the medical oncology clinic to continue adjuvant chemotherapy. Patient was admitted to [facility] on 11/09/2021 for jaundice and not feeling well. She noticed juandice since 11/05/21. Associated symptom includes RUQ pain, itching and dark urine with light color stool. Hospital day 2, GI was consulted for possible choledocholithiasis. Liver function test transaminitis with AST//228 and significant Alk phos elevate at 421. CT abdominal (11/9) show Distend gallbladder with dilated common bile duct but no stone and hepatomegaly. RUQ US (11/09): show evidence of distened gall bladder with gallbladder sludge both intra and extra hepatic biliary duct dilation with no stone no para cholecystic edema. She underwent ERCP on 11/10/2021.  Ampulla and major papula were of normal appearance on endoscopy.  Procedure was complicated by retroperitoneal perforation.  Procedure was terminated.  Patient was started on Zosyn.  Patient was administered lactated Ringer's at 100 cc an hour and kept n.p.o. Yaquelin Gauthier was then transferred to [address] for higher level care on 11/11/21. She underwent initial exploratory surgery, where a bowel perforation was not noted. She was then monitored for elevated LFTs and hyperbilirubinemia.  Patient then returned to  for Whipple surgery on 12/4/21 by Dr. 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  She tolerated Whipple without complications.  She initially had 4 drains present.  Currently only one pancreatic drain remains.  Incision is healing well, without drainage, erythema, or warmth.   After discharge, she was in [employer] [employer], recently discharged .Darell Victor is currently back at home, her son is here to stay with her for a couple of days.    Patient didn't have any medical history except cataract , run of A. fib during recent hospitalization at [facility].  (which was schedule for last Monday and it was cancelled due to patient in ED). Take multivitamin daily.      Patient moved to [facility] 5 years ago to be with one of her sons, who unfortunately  last year. She reported hx of 10 years smoking when she was young with 1/5 pack/day (5 pack years). Quited smoking  In . Denied any alcohol use, receational drug use. Have 3 vaginal birth without any blood transfusion product. Patient reported has colonoscopy done in  with 2 polyps was removed      Interval History 2022:   The patient presents to the office today for follow up and evaluation of periampullary adenocarcinoma, as well as next cycle of treatment. The patient was hospitalized from 2022-2022 for hyperbilirubinemia. She reports chronic fatigue and peripheral neuropathy remains in fingertips, mildly in feet. She reports jaundice, dark urine, diarrhea have all resolved. No fevers/chills, s/s infections, headaches, dizziness, cough, SOB, CP, heart palpitations, abdominal pain, N/V/C/D, skin itching, oral mucositis. She reports chronic peripheral edema in bilateral feet. She was put on diuretics during hospital admission and has f/u today with PCP. Diuretics not continued at discharge and edema has worsened again. She is not wearing her compression stockings. No s/s bleeding. No treatment today d/t hyperbilirubinemia.   Will allow time for recovery from hospital per Dr. Osborn Shone and she will see Dr. Bailey  in 2-3 weeks for confirmation of plan of care, stopping treatment/surveillance vs continuing treatment. Pt & son discuss upcoming plans out of state in July and they are considering holding treatment until she returns regardless to allow her to build her strength for trip. PMH, SH, and FH:  I reviewed the patient's medication and allergy lists as noted on the electronic medical record. The PMH, SH, and FH were also reviewed as noted on the EMR.         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 Kenia Wilson    ERCP N/A 11/10/2021    ERCP WITH STENT INSERTION performed by Marnie Nazario MD at [address] [place] [address]  2021    exp lap, radical celiac and portal lymph node dissection,pylorus preserving pancreatoduodenectomy-Dr Ewing IU    PORT SURGERY Left 2022    SINGLE LUMEN LKEGOILRK INSERTION performed by Carie Rivers MD at [address] [place] [address] History   Problem Relation Age of Onset    Breast Cancer Mother     Colon Cancer Mother     Cancer Father         bladder and lung    Lung Cancer Father     Kidney Disease Brother     No Known Problems Maternal Grandmother     No Known Problems Maternal Grandfather     No Known Problems Paternal Grandmother     No Known Problems Paternal Grandfather       Social History     Tobacco Use    Smoking status: Former Smoker     Packs/day: 0.00     Years: 10.00     Pack years: 0.00     Types: Cigarettes     Quit date: 1972     Years since quittin.4    Smokeless tobacco: Never Used    Tobacco comment: social smoker   Substance Use Topics    Alcohol use: Yes     Comment: social- been a long time since last drink      Current Outpatient Medications   Medication Sig Dispense Refill    Handicap Placard MISC by Does not apply route Diagnosis: Pancreatic cancer, deconditioned, weakness  Expiration: 5/31/2023 1 each 0    dexamethasone (DECADRON) 4 MG tablet Finish 05/05/2022      apixaban (ELIQUIS) 5 MG TABS tablet Take 1 tablet by mouth 2 times daily 180 tablet 1    amLODIPine (NORVASC) 2.5 MG tablet Take 1 tablet by mouth daily 90 tablet 1    amiodarone (CORDARONE) 200 MG tablet Take 1 tablet by mouth daily 90 tablet 1    aspirin 81 MG chewable tablet Take 1 tablet by mouth daily 90 tablet 1    ferrous sulfate (IRON 325) 325 (65 Fe) MG tablet Take 325 mg by mouth daily (with breakfast)      lidocaine-prilocaine (EMLA) 2.5-2.5 % cream Apply topically as needed. 30 g 1    docusate sodium (COLACE) 100 MG capsule Take 100 mg by mouth 2 times daily      ondansetron (ZOFRAN) 4 MG tablet Take 4 mg by mouth every 8 hours as needed  (Patient not taking: Reported on 5/31/2022)       No current facility-administered medications for this visit. Facility-Administered Medications Ordered in Other Visits   Medication Dose Route Frequency Provider Last Rate Last Admin    sodium chloride flush 0.9 % injection 5-40 mL  5-40 mL IntraVENous PRN Madison Ramos MD   20 mL at 05/31/22 0946    heparin flush 100 UNIT/ML injection 500 Units  500 Units IntraCATHeter PRN Madison Ramos MD   500 Units at 05/31/22 1028      No Known Allergies       Review of Systems:   Review of Systems   Pertinent review of systems noted in HPI, all other ROS negative. Objective:   Physical Exam   /64 (Site: Right Upper Arm, Position: Sitting, Cuff Size: Medium Adult)   Pulse 69   Temp 98.5 °F (36.9 °C) (Oral)   Resp 16   Ht 5' 5\" (1.651 m)   Wt 143 lb 9.6 oz (65.1 kg)   SpO2 97%   BMI 23.90 kg/m²    General appearance: No apparent distress, calm and cooperative. HEENT: Pupils equal, round, and reactive to light. Conjunctivae/corneas clear.  Oral mucosa moist.  Neck: Supple, with full range of motion. Trachea midline. Respiratory:  Normal respiratory effort. Clear to auscultation all lung fields. Cardiovascular:  RRR, S1/S2. Abdomen: Soft, non-tender, non-distended with active BS x 4. Musculoskeletal: No clubbing, cyanosis bilaterally. Peripheral edema in BLE. She is able to ambulate with use of cane. Skin: Skin color, texture, turgor normal.  No visible rashes or lesions. Neurologic:  Neurovascularly intact without any focal sensory/motor deficits. Cranial nerves: II-XII intact, grossly non-focal.  Psychiatric: Alert and oriented x 3, thought content appropriate, normal insight  Capillary Refill: < 3 seconds   Peripheral Pulses: +2 palpable, equal bilaterally       Imaging Studies and Labs:   CBC:   Lab Results   Component Value Date    WBC 5.0 05/31/2022    HGB 10.0 (L) 05/31/2022    HCT 32.3 (L) 05/31/2022     (H) 05/31/2022     05/31/2022     BMP:   Lab Results   Component Value Date     05/31/2022     05/19/2022    K 3.7 05/31/2022    K 4.1 05/19/2022    K 3.3 05/17/2022     05/19/2022    CO2 23 05/19/2022    BUN 8 05/19/2022    CREATININE 1.3 05/31/2022    CREATININE 0.8 05/19/2022    GLUCOSE 80 05/19/2022    CALCIUM 8.0 05/19/2022      LFT:   Lab Results   Component Value Date    ALT 39 05/19/2022    AST 34 05/19/2022    ALKPHOS 204 (H) 05/19/2022    BILITOT 1.6 (H) 05/19/2022         Assessment and Plan:     1. Malignant neoplasm of pancreas, unspecified location of malignancy ([facility] [address]  Initially presented with jaundice, RUQ pain, itching, dark urine and light colored stool. She underwent ERCP on 59/27/7318 with complication of possible retroperitoneal perforation. She was transferred to higher level of care and underwent exploratory laparotomy, bowel perforation not found. S/p Whipple procedure with pathology (+) pre-ampullary adenocarcinoma with 7/27 lymph nodes (+) for metastatic disease.       2. Encounter for chemotherapy management  Current treatment recommendations include FOLFOX. She was hospitalized from 5/16/2022-5/21/2022 for hyperbilirubinemia. Symptomatic improvement with downtrending bilirubin. Pt remains weak. After discussion with Dr. Sharyn Lara, will HOLD treatment today and allow for recovery from acute hospitalization. Pt to return in 2-3 weeks to see Dr. Deisi Diaz. Peripheral edema in BLE with PCP managing. She has f/u with PCP today. 3. Anemia, unspecified type  H/H improved at 10/32. 3. She denies s/s bleeding. Trend. No follow-ups on file. All patient questions answered. Pt voiced understanding. Patient agreed with treatment plan. Follow up as directed. Patient instructed to call for questions or concerns. Electronically signed by   ASIA Andrews CNP     I spent a total of 34 minutes on the day of the visit.

## 2022-06-06 ENCOUNTER — TELEPHONE (OUTPATIENT)
Dept: FAMILY MEDICINE CLINIC | Age: 82
End: 2022-06-06

## 2022-06-06 NOTE — TELEPHONE ENCOUNTER
Pt called stating she was told to call and update Dr. Amado Oseguera about the lasix and the potassium after she takes it for a few days. She states nothing has changed. Her ankles are still stiff, her weight stayed the same, she also hasn't had to urinate any more than before. Please advise. She said she was going to try and get labs done tomorrow.

## 2022-06-06 NOTE — TELEPHONE ENCOUNTER
Please tell patient to increase Lasix to 40 mg daily (2 tablets) with Potassium supplement daily. Try this for 3 days and see if this helps with swelling. Thank you!

## 2022-06-08 ENCOUNTER — NURSE ONLY (OUTPATIENT)
Dept: LAB | Age: 82
End: 2022-06-08

## 2022-06-08 DIAGNOSIS — M79.89 LEG SWELLING: ICD-10-CM

## 2022-06-08 LAB
ANION GAP SERPL CALCULATED.3IONS-SCNC: 13 MEQ/L (ref 8–16)
BUN BLDV-MCNC: 14 MG/DL (ref 7–22)
CALCIUM SERPL-MCNC: 8.8 MG/DL (ref 8.5–10.5)
CHLORIDE BLD-SCNC: 104 MEQ/L (ref 98–111)
CO2: 26 MEQ/L (ref 23–33)
CREAT SERPL-MCNC: 1.2 MG/DL (ref 0.4–1.2)
GFR SERPL CREATININE-BSD FRML MDRD: 43 ML/MIN/1.73M2
GLUCOSE BLD-MCNC: 93 MG/DL (ref 70–108)
MAGNESIUM: 1.6 MG/DL (ref 1.6–2.4)
POTASSIUM SERPL-SCNC: 4 MEQ/L (ref 3.5–5.2)
SODIUM BLD-SCNC: 143 MEQ/L (ref 135–145)

## 2022-06-09 ENCOUNTER — TELEPHONE (OUTPATIENT)
Dept: FAMILY MEDICINE CLINIC | Age: 82
End: 2022-06-09

## 2022-06-09 NOTE — TELEPHONE ENCOUNTER
----- Message from Talia Kwan MD sent at 6/9/2022  4:16 PM EDT -----  Arturo Nava,    Your repeat labs that we checked due to the water pills look good. No need to make changes currently. Thank you!

## 2022-06-13 ENCOUNTER — HOSPITAL ENCOUNTER (OUTPATIENT)
Dept: INFUSION THERAPY | Age: 82
Discharge: HOME | End: 2022-06-13

## 2022-06-13 VITALS
BODY MASS INDEX: 21.79 KG/M2 | OXYGEN SATURATION: 98 % | RESPIRATION RATE: 16 BRPM | WEIGHT: 130.8 LBS | TEMPERATURE: 98.3 F | HEIGHT: 65 IN | DIASTOLIC BLOOD PRESSURE: 60 MMHG | HEART RATE: 70 BPM | SYSTOLIC BLOOD PRESSURE: 123 MMHG

## 2022-06-13 NOTE — PROGRESS NOTES
Discussed with Violeta GONZALEZ- determined patient should have not been here. Violeta scheduled appointment to see Yue Barnes CNP next week for lab and possible treatment. Labs drawn 6/8

## 2022-06-13 NOTE — PROGRESS NOTES
Patient instructed to continue fluids at home and taking her medications. Discharge instructions given to patient-verbalizes understanding. Ambulated off unit per self with belongings.

## 2022-06-13 NOTE — PLAN OF CARE
Problem: Discharge Planning  Goal: Knowledge of discharge instructions  Description: Knowledge of discharge instructions  Outcome: Adequate for Discharge  Note: Verbalized understanding of discharge instructions, follow-up appointments, and when to call the physician. Intervention: Discharge to appropriate level of care  Note: Discuss understanding of discharge instructions,follow-up appointments, and when to call the physician. Problem: Falls - Risk of:  Goal: Will remain free from falls  Description: Will remain free from falls  Outcome: Adequate for Discharge  Note: No falls occurred with visit today. Intervention: Assess risk factors for falls  Description: Assess risk factors for falls  Note: Verbalized understanding of fall prevention to ask for assistance with ambulation. Call light within reach. Care plan reviewed with patient. Patient  verbalize understanding of the plan of care and contribute to goal setting.

## 2022-06-15 ENCOUNTER — TELEPHONE (OUTPATIENT)
Dept: ONCOLOGY | Age: 82
End: 2022-06-15

## 2022-06-15 ENCOUNTER — APPOINTMENT (OUTPATIENT)
Dept: CT IMAGING | Age: 82
End: 2022-06-15
Payer: MEDICARE

## 2022-06-15 ENCOUNTER — HOSPITAL ENCOUNTER (EMERGENCY)
Age: 82
Discharge: ANOTHER HOSPITAL | End: 2022-06-16
Payer: MEDICARE

## 2022-06-15 DIAGNOSIS — E80.6 HYPERBILIRUBINEMIA: Primary | ICD-10-CM

## 2022-06-15 DIAGNOSIS — N30.00 ACUTE CYSTITIS WITHOUT HEMATURIA: ICD-10-CM

## 2022-06-15 LAB
ALBUMIN SERPL-MCNC: 2.8 G/DL (ref 3.5–5.1)
ALP BLD-CCNC: 368 U/L (ref 38–126)
ALT SERPL-CCNC: 57 U/L (ref 11–66)
ANION GAP SERPL CALCULATED.3IONS-SCNC: 13 MEQ/L (ref 8–16)
AST SERPL-CCNC: 77 U/L (ref 5–40)
BACTERIA: ABNORMAL
BASOPHILS # BLD: 0.2 %
BASOPHILS ABSOLUTE: 0 THOU/MM3 (ref 0–0.1)
BILIRUB SERPL-MCNC: 7.9 MG/DL (ref 0.3–1.2)
BILIRUBIN DIRECT: 6.1 MG/DL (ref 0–0.3)
BILIRUBIN URINE: ABNORMAL
BLOOD, URINE: NEGATIVE
BUN BLDV-MCNC: 19 MG/DL (ref 7–22)
CALCIUM SERPL-MCNC: 8.7 MG/DL (ref 8.5–10.5)
CASTS: ABNORMAL /LPF
CASTS: ABNORMAL /LPF
CHARACTER, URINE: ABNORMAL
CHLORIDE BLD-SCNC: 99 MEQ/L (ref 98–111)
CO2: 24 MEQ/L (ref 23–33)
COLOR: ABNORMAL
CREAT SERPL-MCNC: 1.2 MG/DL (ref 0.4–1.2)
CRYSTALS: ABNORMAL
EKG ATRIAL RATE: 81 BPM
EKG P AXIS: 41 DEGREES
EKG P-R INTERVAL: 184 MS
EKG Q-T INTERVAL: 428 MS
EKG QRS DURATION: 96 MS
EKG QTC CALCULATION (BAZETT): 497 MS
EKG R AXIS: -69 DEGREES
EKG T AXIS: 46 DEGREES
EKG VENTRICULAR RATE: 81 BPM
EOSINOPHIL # BLD: 0 %
EOSINOPHILS ABSOLUTE: 0 THOU/MM3 (ref 0–0.4)
EPITHELIAL CELLS, UA: ABNORMAL /HPF
ERYTHROCYTE [DISTWIDTH] IN BLOOD BY AUTOMATED COUNT: 14.7 % (ref 11.5–14.5)
ERYTHROCYTE [DISTWIDTH] IN BLOOD BY AUTOMATED COUNT: 53.2 FL (ref 35–45)
GFR SERPL CREATININE-BSD FRML MDRD: 43 ML/MIN/1.73M2
GLUCOSE BLD-MCNC: 117 MG/DL (ref 70–108)
GLUCOSE, URINE: NEGATIVE MG/DL
HCT VFR BLD CALC: 33.5 % (ref 37–47)
HEMOGLOBIN: 10.9 GM/DL (ref 12–16)
ICTOTEST: POSITIVE
IMMATURE GRANS (ABS): 0.12 THOU/MM3 (ref 0–0.07)
IMMATURE GRANULOCYTES: 0.6 %
KETONES, URINE: NEGATIVE
LACTIC ACID: 1.8 MMOL/L (ref 0.5–2)
LEUKOCYTE ESTERASE, URINE: ABNORMAL
LIPASE: 10.4 U/L (ref 5.6–51.3)
LYMPHOCYTES # BLD: 5 %
LYMPHOCYTES ABSOLUTE: 1 THOU/MM3 (ref 1–4.8)
MCH RBC QN AUTO: 32.1 PG (ref 26–33)
MCHC RBC AUTO-ENTMCNC: 32.5 GM/DL (ref 32.2–35.5)
MCV RBC AUTO: 98.5 FL (ref 81–99)
MISCELLANEOUS LAB TEST RESULT: ABNORMAL
MONOCYTES # BLD: 5.1 %
MONOCYTES ABSOLUTE: 1 THOU/MM3 (ref 0.4–1.3)
NITRITE, URINE: POSITIVE
NUCLEATED RED BLOOD CELLS: 0 /100 WBC
OSMOLALITY CALCULATION: 275.2 MOSMOL/KG (ref 275–300)
PH UA: 5.5 (ref 5–9)
PLATELET # BLD: 238 THOU/MM3 (ref 130–400)
PMV BLD AUTO: 10.6 FL (ref 9.4–12.4)
POTASSIUM SERPL-SCNC: 3.7 MEQ/L (ref 3.5–5.2)
PROTEIN UA: NEGATIVE MG/DL
RBC # BLD: 3.4 MILL/MM3 (ref 4.2–5.4)
RBC URINE: ABNORMAL /HPF
RENAL EPITHELIAL, UA: ABNORMAL
SEG NEUTROPHILS: 89.1 %
SEGMENTED NEUTROPHILS ABSOLUTE COUNT: 17.1 THOU/MM3 (ref 1.8–7.7)
SODIUM BLD-SCNC: 136 MEQ/L (ref 135–145)
SPECIFIC GRAVITY UA: 1.02 (ref 1–1.03)
TOTAL PROTEIN: 5.8 G/DL (ref 6.1–8)
UROBILINOGEN, URINE: 1 EU/DL (ref 0–1)
WBC # BLD: 19.2 THOU/MM3 (ref 4.8–10.8)
WBC UA: ABNORMAL /HPF
YEAST: ABNORMAL

## 2022-06-15 PROCEDURE — 93010 ELECTROCARDIOGRAM REPORT: CPT | Performed by: NUCLEAR MEDICINE

## 2022-06-15 PROCEDURE — 6360000002 HC RX W HCPCS: Performed by: NURSE PRACTITIONER

## 2022-06-15 PROCEDURE — 74177 CT ABD & PELVIS W/CONTRAST: CPT

## 2022-06-15 PROCEDURE — 83690 ASSAY OF LIPASE: CPT

## 2022-06-15 PROCEDURE — 36415 COLL VENOUS BLD VENIPUNCTURE: CPT

## 2022-06-15 PROCEDURE — 87077 CULTURE AEROBIC IDENTIFY: CPT

## 2022-06-15 PROCEDURE — 96365 THER/PROPH/DIAG IV INF INIT: CPT

## 2022-06-15 PROCEDURE — 6360000004 HC RX CONTRAST MEDICATION: Performed by: NURSE PRACTITIONER

## 2022-06-15 PROCEDURE — 82248 BILIRUBIN DIRECT: CPT

## 2022-06-15 PROCEDURE — 83605 ASSAY OF LACTIC ACID: CPT

## 2022-06-15 PROCEDURE — 2580000003 HC RX 258: Performed by: NURSE PRACTITIONER

## 2022-06-15 PROCEDURE — 87040 BLOOD CULTURE FOR BACTERIA: CPT

## 2022-06-15 PROCEDURE — 81001 URINALYSIS AUTO W/SCOPE: CPT

## 2022-06-15 PROCEDURE — 87186 SC STD MICRODIL/AGAR DIL: CPT

## 2022-06-15 PROCEDURE — 87801 DETECT AGNT MULT DNA AMPLI: CPT

## 2022-06-15 PROCEDURE — 80053 COMPREHEN METABOLIC PANEL: CPT

## 2022-06-15 PROCEDURE — 96366 THER/PROPH/DIAG IV INF ADDON: CPT

## 2022-06-15 PROCEDURE — 99285 EMERGENCY DEPT VISIT HI MDM: CPT

## 2022-06-15 PROCEDURE — 85025 COMPLETE CBC W/AUTO DIFF WBC: CPT

## 2022-06-15 PROCEDURE — 93005 ELECTROCARDIOGRAM TRACING: CPT | Performed by: NURSE PRACTITIONER

## 2022-06-15 RX ORDER — 0.9 % SODIUM CHLORIDE 0.9 %
1000 INTRAVENOUS SOLUTION INTRAVENOUS ONCE
Status: COMPLETED | OUTPATIENT
Start: 2022-06-15 | End: 2022-06-15

## 2022-06-15 RX ADMIN — IOPAMIDOL 80 ML: 755 INJECTION, SOLUTION INTRAVENOUS at 16:50

## 2022-06-15 RX ADMIN — PIPERACILLIN AND TAZOBACTAM 3375 MG: 3; .375 INJECTION, POWDER, LYOPHILIZED, FOR SOLUTION INTRAVENOUS at 17:00

## 2022-06-15 RX ADMIN — SODIUM CHLORIDE 1000 ML: 9 INJECTION, SOLUTION INTRAVENOUS at 17:30

## 2022-06-15 ASSESSMENT — PAIN - FUNCTIONAL ASSESSMENT: PAIN_FUNCTIONAL_ASSESSMENT: NONE - DENIES PAIN

## 2022-06-15 ASSESSMENT — ENCOUNTER SYMPTOMS
CHEST TIGHTNESS: 0
NAUSEA: 1
ABDOMINAL PAIN: 1
VOMITING: 0
SORE THROAT: 0
COUGH: 0
COLOR CHANGE: 1
RHINORRHEA: 0
DIARRHEA: 0
SHORTNESS OF BREATH: 0

## 2022-06-15 NOTE — ED PROVIDER NOTES
[facility] Emergency Department    CHIEF COMPLAINT       Chief Complaint   Patient presents with    Jaundice       Nurses Notes reviewed and I agree except as noted in the HPI. HISTORY OF PRESENT ILLNESS    Cameron Vallejo is a 81 y.o. female who presents to the ED for evaluation of jaundice. Patient notes increase in jaundice over the last couple of days, this morning she noted a fever of 102. She is noted decreased appetite, nausea, denies any vomiting or diarrhea. Notes her urine is been slightly darker than normal.  She had some pretty intense abdominal pain this morning but has now resolved. Notes it was located in the upper abdomen, unable to describe further. She has a history of pancreatic cancer, she had a Whipple procedure done at , she follows with a local oncologist, unfortunately they are changing. She was seen in the office on 5/31/2022, and was doing fairly well. They were not doing chemotherapy, patient wanted to recover for a possible trip in July. She was admitted to the hospital on 5/16/2022 for hyperbilirubinemia, she had MRI of the abdomen which found hepatic congestion, but no clear obstruction. She was given diuretics, and liver enzymes appeared to trend downward. She denies any other significant medical history. Per electronic record she does have a new onset of A. fib, taking amiodarone, and Eliquis. HPI was provided by the patient. REVIEW OF SYSTEMS     Review of Systems   Constitutional: Positive for appetite change, chills, fatigue and fever. Negative for activity change. HENT: Negative for congestion, rhinorrhea and sore throat. Respiratory: Negative for cough, chest tightness and shortness of breath. Cardiovascular: Negative for chest pain. Gastrointestinal: Positive for abdominal pain and nausea. Negative for diarrhea and vomiting. Genitourinary: Negative for decreased urine volume, difficulty urinating, dysuria and flank pain. Urine color change noted   Musculoskeletal: Negative for arthralgias, gait problem and joint swelling. Skin: Positive for color change. Negative for rash and wound. Allergic/Immunologic: Positive for immunocompromised state. Neurological: Negative for dizziness, weakness, light-headedness and numbness. Hematological: Does not bruise/bleed easily. Psychiatric/Behavioral: Negative for agitation, behavioral problems and confusion. 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     CURRENT MEDICATIONS       Discharge Medication List as of 6/16/2022  9:38 AM      CONTINUE these medications which have NOT CHANGED    Details   [facility] Starting Tue 5/31/2022, Disp-1 each, R-0, PrintDiagnosis: Pancreatic cancer, deconditioned, weakness Expiration: 5/31/2023      furosemide (LASIX) 20 MG tablet Take 1 tablet by mouth daily, Disp-30 tablet, R-3Normal      potassium chloride (KLOR-CON M) 20 MEQ extended release tablet Take 1 tablet by mouth daily, Disp-30 tablet, R-0Normal      dexamethasone (DECADRON) 4 MG tablet Finish 05/05/2022Historical Med      apixaban (ELIQUIS) 5 MG TABS tablet Take 1 tablet by mouth 2 times daily, Disp-180 tablet, R-1Normal      amLODIPine (NORVASC) 2.5 MG tablet Take 1 tablet by mouth daily, Disp-90 tablet, R-1Normal      amiodarone (CORDARONE) 200 MG tablet Take 1 tablet by mouth daily, Disp-90 tablet, R-1Normal      aspirin 81 MG chewable tablet Take 1 tablet by mouth daily, Disp-90 tablet, R-1Normal      ondansetron (ZOFRAN) 4 MG tablet Take 4 mg by mouth every 8 hours as needed Historical Med      ferrous sulfate (IRON 325) 325 (65 Fe) MG tablet Take 325 mg by mouth daily (with breakfast)Historical Med      lidocaine-prilocaine (EMLA) 2.5-2.5 % cream Apply topically as needed. , Disp-30 g, R-1, Normal      docusate sodium (COLACE) 100 MG capsule Take 100 mg by mouth 2 times dailyHistorical Med             ALLERGIES     has No Known Allergies. FAMILY HISTORY     She indicated that her mother is . She indicated that her father is . She indicated that her sister is alive. She indicated that her brother is . She indicated that her maternal grandmother is . She indicated that her maternal grandfather is . She indicated that her paternal grandmother is . She indicated that her paternal grandfather is . family history includes Breast Cancer in her mother; Cancer in her father; Nelly Hurl in her mother; Kidney Disease in her brother; Angelica Liter in her father; No Known Problems in her maternal grandfather, maternal grandmother, paternal grandfather, and paternal grandmother.     SOCIAL HISTORY       Social History     Socioeconomic History    Marital status:      Spouse name: Not on file    Number of children: Not on file    Years of education: Not on file    Highest education level: Not on file   Occupational History    Not on file   Tobacco Use    Smoking status: Former Smoker     Packs/day: 0.00     Years: 10.00     Pack years: 0.00     Types: Cigarettes     Quit date: 1972     Years since quittin.5    Smokeless tobacco: Never Used    Tobacco comment: social smoker   Vaping Use    Vaping Use: Never used   Substance and Sexual Activity    Alcohol use: Yes     Comment: social- been a long time since last drink    Drug use: Never    Sexual activity: Not on file   Other Topics Concern    Not on file   Social History Narrative    Not on file     Social Determinants of Health     Financial Resource Strain: Low Risk     Difficulty of Paying Living Expenses: Not very hard   Food Insecurity: No Food Insecurity    Worried About Running Out of Food in the Last Year: Never true    Roberto of Food in the Last Year: Never true   Transportation Needs:     Lack of Transportation (Medical): Not on file    Lack of Transportation (Non-Medical): Not on file   Physical Activity:     Days of Exercise per Week: Not on file    Minutes of Exercise per Session: Not on file   Stress:     Feeling of Stress : Not on file   Social Connections:     Frequency of Communication with Friends and Family: Not on file    Frequency of Social Gatherings with Friends and Family: Not on file    Attends Latter day Services: Not on file    Active Member of [address] [place] [address] [employer] or Organizations: Not on file    Attends Club or Organization Meetings: Not on file    Marital Status: Not on file   Intimate Partner Violence:     Fear of Current or Ex-Partner: Not on file    Emotionally Abused: Not on file    Physically Abused: Not on file    Sexually Abused: Not on file   Housing Stability:     Unable to Pay for Housing in the Last Year: Not on file    Number of [place] in the Last Year: Not on file    Unstable Housing in the Last Year: Not on file       PHYSICAL EXAM     INITIAL VITALS:  height is 5' 5\" (1.651 m) and weight is 130 lb (59 kg). Her oral temperature is 98.3 °F (36.8 °C). Her blood pressure is 145/70 (abnormal) and her pulse is 66. Her respiration is 16 and oxygen saturation is 100%. Physical Exam  Vitals and nursing note reviewed. Constitutional:       Appearance: Normal appearance. She is well-developed. HENT:      Head: Normocephalic. Mouth/Throat:      Pharynx: Uvula midline. Eyes:      Conjunctiva/sclera: Conjunctivae normal.   Cardiovascular:      Rate and Rhythm: Normal rate and regular rhythm. Heart sounds: Normal heart sounds, S1 normal and S2 normal.   Pulmonary:      Effort: Pulmonary effort is normal. No respiratory distress. Breath sounds: Normal breath sounds.    Chest:      Chest wall: No tenderness. Abdominal:      General: Bowel sounds are normal. There is no distension. Palpations: Abdomen is soft. Tenderness: There is no abdominal tenderness. There is no right CVA tenderness, guarding or rebound. Musculoskeletal:         General: Normal range of motion. Cervical back: Normal range of motion and neck supple. Lymphadenopathy:      Cervical: No cervical adenopathy. Skin:     General: Skin is warm and dry. Capillary Refill: Capillary refill takes less than 2 seconds. Coloration: Skin is jaundiced. Neurological:      Mental Status: She is alert and oriented to person, place, and time. Psychiatric:         Speech: Speech normal.         Behavior: Behavior normal.         Thought Content: Thought content normal.         DIFFERENTIAL DIAGNOSIS:   Hyperbilirubinemia, pancreatitis, biliary obstruction, acute abdominal infection,  DIAGNOSTIC RESULTS     RADIOLOGY: non-plainfilm images(s) such as CT, Ultrasound and MRI are read by the radiologist.  Plain radiographic images are visualized and preliminarily interpreted by the emergency physician unless otherwise stated below. CT ABDOMEN PELVIS W IV CONTRAST Additional Contrast? None   Final Result   . IMPRESSION:  Interval development of mild ascites. No diverticulitis along the left colon cannot be excluded. There are no other changes from the previous exam.            **This report has been created using voice recognition software. It may contain minor errors which are inherent in voice recognition technology. **      Final report electronically signed by Dr. Segundo Meneses on 6/15/2022 5:03 PM            LABS:   Labs Reviewed   CULTURE, BLOOD 1 - Abnormal; Notable for the following components:       Result Value    Blood Culture, Routine No growth-preliminary  (*)     Organism Enterococcus durans - (Group D) (*)     All other components within normal limits    Narrative:     Source: blood-Adult-suboptimal <5.5oz./set volume       Site: Peripheral Vein            Current Antibiotics: not stated   CBC WITH AUTO DIFFERENTIAL - Abnormal; Notable for the following components:    WBC 19.2 (*)     RBC 3.40 (*)     Hemoglobin 10.9 (*)     Hematocrit 33.5 (*)     RDW-CV 14.7 (*)     RDW-SD 53.2 (*)     Segs Absolute 17.1 (*)     Immature Grans (Abs) 0.12 (*)     All other components within normal limits   BASIC METABOLIC PANEL - Abnormal; Notable for the following components:    Glucose 117 (*)     All other components within normal limits   HEPATIC FUNCTION PANEL - Abnormal; Notable for the following components:    Albumin 2.8 (*)     Total Bilirubin 7.9 (*)     Bilirubin, Direct 6.1 (*)     Alkaline Phosphatase 368 (*)     AST 77 (*)     Total Protein 5.8 (*)     All other components within normal limits   URINALYSIS WITH MICROSCOPIC - Abnormal; Notable for the following components:    Bilirubin Urine LARGE (*)     Nitrite, Urine POSITIVE (*)     Leukocyte Esterase, Urine MODERATE (*)     Character, Urine CLOUDY (*)     All other components within normal limits   GLOMERULAR FILTRATION RATE, ESTIMATED - Abnormal; Notable for the following components:    Est, Glom Filt Rate 43 (*)     All other components within normal limits   ICTOTEST, URINE - Abnormal; Notable for the following components:    Ictotest POSITIVE (*)     All other components within normal limits   BLOOD ID PANEL, MOLECULAR - Abnormal; Notable for the following components:    ENTEROCOCCUS FILM ARRAY Detected (*)     All other components within normal limits   CULTURE, BLOOD 2    Narrative:     Source: blood-Adult-suboptimal <5.5oz./set volume       Site: (single bottle)Peripheral Vein            Current Antibiotics: not stated   LIPASE   LACTIC ACID   ANION GAP   OSMOLALITY       EMERGENCY DEPARTMENT COURSE:   Vitals:    Vitals:    06/16/22 0530 06/16/22 0615 06/16/22 0736 06/16/22 0903   BP: 127/62 128/62 136/62 (!) 145/70   Pulse: 54 59 71 66   Resp: 13 15 18 16   Temp: TempSrc:       SpO2: 98% 98% 100% 100%   Weight:       Height:           MDM    Patient was seen and evaluated in the emergency department, patient appeared to be in no acute distress, vital signs reviewed, no significant findings were noted. Physical exam was completed, abdomen was soft, no significant tenderness noted, no significant pain at this point. Obvious jaundice noted. Labs and imaging were ordered. Noted significant leukocytosis, and hyperbilirubinemia. Zosyn initiated. IV fluid bolus given. CT scan was reviewed, no significant change was noted. I discussed the case with my ER attending, Dr. Mariama Phan, this is an 80 yo female with known pancreatic CA, Status post whipple with fever and hyperbilirubinemia. He wanted me to admit the patient here for continued treatment. Discussed case with Hospitalitis service. They request gi being on board before accepting for admission. Spoke with Dr. Trinidad White who requests transfer to . My shift ended and patient was handed off to [facility]. Please see her note for final impression and plan of care. Medications   0.9 % sodium chloride bolus (0 mLs IntraVENous Stopped 6/15/22 1939)   piperacillin-tazobactam (ZOSYN) 3,375 mg in dextrose 5 % 50 mL IVPB (mini-bag) (0 mg IntraVENous Stopped 6/15/22 1938)   iopamidol (ISOVUE-370) 76 % injection 80 mL (80 mLs IntraVENous Given 6/15/22 1650)   piperacillin-tazobactam (ZOSYN) 3,375 mg in dextrose 5 % 50 mL IVPB (mini-bag) (0 mg IntraVENous Stopped 6/16/22 6342)       Patient was seenindependently by myself. The patient's final impression and disposition and plan was determined by myself. CRITICAL CARE:   None    CONSULTS:  Hospitalist  GI    PROCEDURES:  None    FINAL IMPRESSION     1. Hyperbilirubinemia    2. Acute cystitis without hematuria          DISPOSITION/PLAN   Please see the note of [facility]. PATIENT REFERREDTO:  No follow-up provider specified.     DISCHARGE MEDICATIONS:  Discharge Medication List as of 6/16/2022  9:38 AM          (Please note that portions of this note were completed with a voice recognition program.  Efforts were made to edit the dictations but occasionally words are mis-transcribed.)      Provider:  I personally performed the services described in the documentation,reviewed and edited the documentation which was dictated to the scribe in my presence, and it accurately records my words and actions.     Rajiv Lopez CNP 06/20/22 6:50 AM    [facility], APRN - CNP        [employer], APRN - CNP  06/20/22 0770

## 2022-06-15 NOTE — ED NOTES
Pt given food box and water per erasto from [place], [place].  [facility] bedside discussing transfer at this time      Sean Carballo  06/15/22 1940

## 2022-06-15 NOTE — TELEPHONE ENCOUNTER
Bebo Alvarez from [facility] called and said she called Catrachita Gardner this morning to set up a nurse visit. Patient told her she was not feeling well. Bebo Alvarez just arrived to see patient and states patient is very jaundiced. Complaining of severe abdominal pain off and on and not eating today. BP 82/42-92. Temp 100.1. Patient was thinking of going to the ER. I spoke with [facility] and she advised she go to the ER. Bebo Alvarez voiced understanding.

## 2022-06-15 NOTE — ED TRIAGE NOTES
Presents to ER with complaints of jaundice and emesis. PT states this morning she started vomiting. States her home health nurse came today and told pt she was turning yellow. Pt denies any other concerns. States last time she was jaundice it went away on its own. EKG completed.

## 2022-06-15 NOTE — ED NOTES
Pt assisted to the bathroom at this time via wheelchair. Pt requesting food at this time, denies other needs.  Call light remains in place      Aakash Mart  06/15/22 1932

## 2022-06-16 VITALS
SYSTOLIC BLOOD PRESSURE: 145 MMHG | RESPIRATION RATE: 16 BRPM | DIASTOLIC BLOOD PRESSURE: 70 MMHG | WEIGHT: 130 LBS | BODY MASS INDEX: 21.66 KG/M2 | HEART RATE: 66 BPM | TEMPERATURE: 98.3 F | OXYGEN SATURATION: 100 % | HEIGHT: 65 IN

## 2022-06-16 LAB
ACINETOBACTER BAUMANNII FILM ARRAY: NOT DETECTED
BOTTLE TYPE: ABNORMAL
CANDIDA ALBICANS FILM ARRAY: NOT DETECTED
CANDIDA GLABRATA FILM ARRAY: NOT DETECTED
CANDIDA KRUSEI FILM ARRAY: NOT DETECTED
CANDIDA PARAPSILOSIS FILM ARRAY: NOT DETECTED
CANDIDA TROPICALIS FILM ARRAY: NOT DETECTED
CARBAPENEM RESITANT FILM ARRAY: ABNORMAL
ENTERBACTER CLOACAE FILM ARRAY: NOT DETECTED
ENTERBACTERIACEAE FILM ARRAY: NOT DETECTED
ENTEROCOCCUS FILM ARRAY: DETECTED
ESCHERICHIA COLI FILM ARRAY: NOT DETECTED
HAEMOPHILUS INFLUENZA FILM ARRAY: NOT DETECTED
KLEBSIELLA OXYTOCA FILM ARRAY: NOT DETECTED
KLEBSIELLA PNEUMONIAE FILM ARRAY: NOT DETECTED
LISTERIA MONOCYTOGENES FILM ARRAY: NOT DETECTED
METHICILLIN RESISTANT FILM ARRAY: ABNORMAL
NEISSERIA MENIGITIDIS FILM ARRAY: NOT DETECTED
PROTEUS FILM ARRAY: NOT DETECTED
PSEUDOMONAS AERUGINOSA FILM ARRAY: NOT DETECTED
SERRATIA MARCESCENS FILM ARRAY: NOT DETECTED
SOURCE OF BLOOD CULTURE: ABNORMAL
STAPH AUREUS FILM ARRAY: NOT DETECTED
STAPHYLOCOCCUS FILM ARRAY: NOT DETECTED
STREP AGALACTIAE FILM ARRAY: NOT DETECTED
STREP PNEUMONIAE FILM ARRAY: NOT DETECTED
STREP PYOCGENES FILM ARRAY: NOT DETECTED
STREPTOCOCCUS FILM ARRAY: NOT DETECTED
VANCOMYCIN RESISTANT FILM ARRAY: NOT DETECTED

## 2022-06-16 PROCEDURE — 6360000002 HC RX W HCPCS: Performed by: NURSE PRACTITIONER

## 2022-06-16 PROCEDURE — 2580000003 HC RX 258: Performed by: NURSE PRACTITIONER

## 2022-06-16 RX ADMIN — PIPERACILLIN AND TAZOBACTAM 3375 MG: 3; .375 INJECTION, POWDER, LYOPHILIZED, FOR SOLUTION INTRAVENOUS at 05:35

## 2022-06-16 ASSESSMENT — PAIN - FUNCTIONAL ASSESSMENT
PAIN_FUNCTIONAL_ASSESSMENT: NONE - DENIES PAIN

## 2022-06-16 NOTE — ED NOTES
RN in to attain pt vital signs. Vitals taken and charted. Pt asked for breakfast and RN called nutrition. Pt denies any further njeeds at this time.      Leslie Delacruz  06/16/22 0782

## 2022-06-16 NOTE — ED NOTES
Pt medicated per MAR. Patient resting in bed. Respirations easy and unlabored. Denies further needs at this time. Call light within reach.        Kelsea Andrew RN  06/16/22 1731

## 2022-06-16 NOTE — ED NOTES
Pt assisted to bathroom via wheelchair at this time.  Updated on POC, denies additional needs at this time, call light in place      Kallie Else  06/15/22 1767

## 2022-06-16 NOTE — ED NOTES
Pt resting in bed with eyes closed and unlabored respirs at this time.  Call light remains in reach with VSS     Ramiro Miner  06/15/22 7021

## 2022-06-16 NOTE — ED NOTES
Report received from Eugene Leonard RN. Pt up to bathroom and back resting in bed. Denies further needs at this time. Call light within reach.       Vida Romano RN  06/16/22 4775 Surgery Off-Premise Visit Arrived

## 2022-06-16 NOTE — ED NOTES
Pt resting on cot in stable condition with eyes closed. VSS. Telemetry in place.  Will continue to monitor      Dany Reyez RN  06/16/22 5119

## 2022-06-16 NOTE — ED PROVIDER NOTES
Patient was signed out to me by Fe Lopez CNP at end of shift pending attempt at transfer. Patient has history of pancreatic cancer, has followed with  for Whipple procedure and currently receiving local chemotherapy. She had admission 1 to 2 months ago for jaundice and was started on diuretics 1 month ago with adequate response. Over the past 2 days, her jaundice has recurred and this morning she had an episode of severe lower abdominal pain associate with 102 °F temperature for which she presented to the ED. She was noted to have white blood cell count 19,000, total bilirubin 7.9 with CT of the abdomen showing interval development of mild ascites, otherwise no acute changes. Question of diverticulitis which is stable in comparison to previous. She has urinalysis showing nitrite positive, moderate leukocytes, 10-15 white blood cells with 5-10 epithelial cells and no bacteria on microscopy. On discussion with patient, she does not have any acute urinary symptoms. She reports some ongoing urinary frequency that started with Lasix, improved after being on the medication for 1 week. She has received Zosyn per previous provider. She has remained hemodynamically stable here and she was informed of her results today. I discussed the case with CAMELIA Kelly at  would like the patient transferred down via care of the hospitalist service for further investigation and treatment. Patient was agreeable with this. Discussed with Dr. Hayde Danielle of the hospitalist service who accepted the patient for transfer. Patient denied further complaints at this time. She was comfortable, denied any recurrence of the abdominal pain. 1. Hyperbilirubinemia    2.  Acute cystitis without hematuria           Tamiko Yañez PA-C  06/15/22 2024

## 2022-06-16 NOTE — ED NOTES
Bedside report given to [facility], RN.  Pt denies any needs at this time, vitals remain stable     Edmond Batres  06/16/22 0116

## 2022-06-16 NOTE — ED NOTES
Pt updated on transfer to [place] [address]. Pt notified of waiting on a call back from [facility] for an ETA for transfer. Pt taken in wheelchair to bathroom.      Radha Lewis  06/16/22 0908

## 2022-06-16 NOTE — ED NOTES
Pt provided with breakfast tray at this time. Pt denies any further needs.      Coit Keys  06/16/22 2809

## 2022-06-16 NOTE — ED NOTES
Have You Had Fillers Before?: has had fillers Patient resting in bed with eyes closed. Respirations easy and unlabored. No distress noted. Call light within reach.        Jacquelyn Keita RN  06/16/22 2886

## 2022-06-16 NOTE — ED NOTES
Patient resting in bed. Respirations easy and unlabored. No distress noted. Call light within reach.        Lisandro Valdez RN  06/16/22 7481

## 2022-06-16 NOTE — ED NOTES
[facility]     Valerie Del Toro RN  06/16/22 0831

## 2022-06-16 NOTE — ED NOTES
Ilia BURGER notified of culture result. After reviewing chart bacteria covered by zosyn per Ilia BURGER. No new orders.  Primary RN notified       Jose Chung RN  06/16/22 5346

## 2022-06-16 NOTE — ED NOTES
Nurse to nurse report given at this time. Poly Westbrook was notified of pt status, reason for transfer, and departure of [facility] to [facility]. Pt departed now with [facility]. Respirations even and unlabored.      Mohan Posada  06/16/22 0739

## 2022-06-16 NOTE — ED NOTES
ED nurse-to-nurse bedside report    Chief Complaint   Patient presents with    Jaundice      LOC: alert and orientated to name, place, date  Vital signs   Vitals:    06/16/22 0355 06/16/22 0445 06/16/22 0530 06/16/22 0615   BP: (!) 110/58 137/64 127/62 128/62   Pulse: 57 56 54 59   Resp: 16 14 13 15   Temp:       TempSrc:       SpO2: 96% 98% 98% 98%   Weight:       Height:          Pain:    Pain Interventions: rest/reposition  Pain Goal: 0  Oxygen: No    Current needs required 0   Telemetry: Yes  LDAs:   Peripheral IV 06/15/22 Left Forearm (Active)   Site Assessment Clean, dry & intact 06/16/22 0355   Phlebitis Assessment No symptoms 06/16/22 0355   Infiltration Assessment 0 06/16/22 0355     Continuous Infusions:   Mobility: Requires assistance * 1  Stafford Fall Risk Score:    Fall Risk 12/28/2021   2 or more falls in past year? no   Fall with injury in past year? no     Fall Interventions: siderails x2, call light within reach  Report given to: CARLOS Banerjee and CARLOS Cabrera RN  06/16/22 0128

## 2022-06-18 LAB
BLOOD CULTURE, ROUTINE: ABNORMAL
BLOOD CULTURE, ROUTINE: ABNORMAL
ORGANISM: ABNORMAL

## 2022-06-21 DIAGNOSIS — C25.9 MALIGNANT NEOPLASM OF PANCREAS, UNSPECIFIED LOCATION OF MALIGNANCY (HCC): Primary | ICD-10-CM

## 2022-06-21 LAB — BLOOD CULTURE, ROUTINE: NORMAL

## 2022-06-21 NOTE — PROGRESS NOTES
Called pt for update. She is currently at  for treatment of hyperbilirubinemia. She had stent placed with removal scheduled in 3 mos. Infectious Disease put her on antibiotic for (+) blood culture. Pt reports weakness, going home with walker and continued HH. Pt unsure if being discharged today or tomorrow. She wishes to hold treatment for a few weeks to get stronger, complete antibiotics, and make it to [facility] service for son who unexpectedly passed away. [facility] Service is 7/30.   We will see pt back in August.      Electronically signed by ASIA Barnett CNP on 6/21/2022 at 2:51 PM

## 2022-06-22 ENCOUNTER — HOSPITAL ENCOUNTER (OUTPATIENT)
Dept: INFUSION THERAPY | Age: 82
End: 2022-06-22

## 2022-06-24 ENCOUNTER — HOSPITAL ENCOUNTER (OUTPATIENT)
Dept: INFUSION THERAPY | Age: 82
Discharge: HOME | End: 2022-06-24
Payer: MEDICARE

## 2022-06-24 VITALS
HEIGHT: 65 IN | HEART RATE: 72 BPM | OXYGEN SATURATION: 98 % | BODY MASS INDEX: 23.32 KG/M2 | TEMPERATURE: 98.3 F | RESPIRATION RATE: 18 BRPM | SYSTOLIC BLOOD PRESSURE: 138 MMHG | WEIGHT: 140 LBS | DIASTOLIC BLOOD PRESSURE: 64 MMHG

## 2022-06-24 DIAGNOSIS — C25.9 MALIGNANT NEOPLASM OF PANCREAS, UNSPECIFIED LOCATION OF MALIGNANCY (HCC): Primary | ICD-10-CM

## 2022-06-24 DIAGNOSIS — C22.1 CHOLANGIOCARCINOMA (HCC): ICD-10-CM

## 2022-06-24 LAB
ABSOLUTE IMMATURE GRANULOCYTE: 0.03 THOU/MM3 (ref 0–0.07)
ALBUMIN SERPL-MCNC: 3 G/DL (ref 3.5–5.1)
ALP BLD-CCNC: 393 U/L (ref 38–126)
ALT SERPL-CCNC: 31 U/L (ref 11–66)
AST SERPL-CCNC: 46 U/L (ref 5–40)
BASINOPHIL, AUTOMATED: 0 % (ref 0–3)
BASOPHILS ABSOLUTE: 0 THOU/MM3 (ref 0–0.1)
BILIRUB SERPL-MCNC: 2.8 MG/DL (ref 0.3–1.2)
BILIRUBIN DIRECT: 1.6 MG/DL (ref 0–0.3)
BUN, WHOLE BLOOD: 11 MG/DL (ref 8–26)
CHLORIDE, WHOLE BLOOD: 109 MEQ/L (ref 98–109)
CREATININE, WHOLE BLOOD: 1.2 MG/DL (ref 0.5–1.2)
EOSINOPHILS ABSOLUTE: 0.1 THOU/MM3 (ref 0–0.4)
EOSINOPHILS RELATIVE PERCENT: 0 % (ref 0–4)
GFR, ESTIMATED: 46 ML/MIN/1.73M2
GLUCOSE, WHOLE BLOOD: 147 MG/DL (ref 70–108)
HCT VFR BLD CALC: 30.8 % (ref 37–47)
HEMOGLOBIN: 9.9 GM/DL (ref 12–16)
IMMATURE GRANULOCYTES: 0 %
IONIZED CALCIUM, WHOLE BLOOD: 1.11 MMOL/L (ref 1.12–1.32)
LYMPHOCYTES # BLD: 17 % (ref 15–47)
LYMPHOCYTES ABSOLUTE: 2 THOU/MM3 (ref 1–4.8)
MCH RBC QN AUTO: 32 PG (ref 26–33)
MCHC RBC AUTO-ENTMCNC: 32.1 GM/DL (ref 32.2–35.5)
MCV RBC AUTO: 100 FL (ref 81–99)
MONOCYTES ABSOLUTE: 0.7 THOU/MM3 (ref 0.4–1.3)
MONOCYTES: 6 % (ref 0–12)
PDW BLD-RTO: 16 % (ref 11.5–14.5)
PLATELET # BLD: 324 THOU/MM3 (ref 130–400)
PMV BLD AUTO: 10.4 FL (ref 9.4–12.4)
POTASSIUM, WHOLE BLOOD: 4.1 MEQ/L (ref 3.5–4.9)
RBC # BLD: 3.09 MILL/MM3 (ref 4.2–5.4)
SEG NEUTROPHILS: 76 % (ref 43–75)
SEGMENTED NEUTROPHILS ABSOLUTE COUNT: 8.7 THOU/MM3 (ref 1.8–7.7)
SODIUM, WHOLE BLOOD: 142 MEQ/L (ref 138–146)
TOTAL CO2, WHOLE BLOOD: 21 MEQ/L (ref 23–33)
TOTAL PROTEIN: 5.9 G/DL (ref 6.1–8)
WBC # BLD: 11.5 THOU/MM3 (ref 4.8–10.8)

## 2022-06-24 PROCEDURE — 96361 HYDRATE IV INFUSION ADD-ON: CPT

## 2022-06-24 PROCEDURE — 80076 HEPATIC FUNCTION PANEL: CPT

## 2022-06-24 PROCEDURE — 2580000003 HC RX 258: Performed by: INTERNAL MEDICINE

## 2022-06-24 PROCEDURE — 6360000002 HC RX W HCPCS: Performed by: INTERNAL MEDICINE

## 2022-06-24 PROCEDURE — 36591 DRAW BLOOD OFF VENOUS DEVICE: CPT

## 2022-06-24 PROCEDURE — 85025 COMPLETE CBC W/AUTO DIFF WBC: CPT

## 2022-06-24 PROCEDURE — 80047 BASIC METABLC PNL IONIZED CA: CPT

## 2022-06-24 PROCEDURE — 99211 OFF/OP EST MAY X REQ PHY/QHP: CPT

## 2022-06-24 PROCEDURE — 96360 HYDRATION IV INFUSION INIT: CPT

## 2022-06-24 PROCEDURE — 2580000003 HC RX 258: Performed by: PHYSICIAN ASSISTANT

## 2022-06-24 RX ORDER — SODIUM CHLORIDE 9 MG/ML
25 INJECTION, SOLUTION INTRAVENOUS PRN
Status: CANCELLED | OUTPATIENT
Start: 2022-06-24

## 2022-06-24 RX ORDER — HEPARIN SODIUM (PORCINE) LOCK FLUSH IV SOLN 100 UNIT/ML 100 UNIT/ML
500 SOLUTION INTRAVENOUS PRN
Status: DISCONTINUED | OUTPATIENT
Start: 2022-06-24 | End: 2022-06-25 | Stop reason: HOSPADM

## 2022-06-24 RX ORDER — SODIUM CHLORIDE 0.9 % (FLUSH) 0.9 %
5-40 SYRINGE (ML) INJECTION PRN
Status: DISCONTINUED | OUTPATIENT
Start: 2022-06-24 | End: 2022-06-25 | Stop reason: HOSPADM

## 2022-06-24 RX ORDER — SODIUM CHLORIDE 0.9 % (FLUSH) 0.9 %
5-40 SYRINGE (ML) INJECTION PRN
Status: CANCELLED | OUTPATIENT
Start: 2022-06-24

## 2022-06-24 RX ORDER — AMOXICILLIN AND CLAVULANATE POTASSIUM 875; 125 MG/1; MG/1
1 TABLET, FILM COATED ORAL 2 TIMES DAILY
COMMUNITY
End: 2022-06-30 | Stop reason: ALTCHOICE

## 2022-06-24 RX ORDER — HEPARIN SODIUM (PORCINE) LOCK FLUSH IV SOLN 100 UNIT/ML 100 UNIT/ML
500 SOLUTION INTRAVENOUS PRN
Status: CANCELLED | OUTPATIENT
Start: 2022-06-24

## 2022-06-24 RX ORDER — 0.9 % SODIUM CHLORIDE 0.9 %
500 INTRAVENOUS SOLUTION INTRAVENOUS ONCE
Status: COMPLETED | OUTPATIENT
Start: 2022-06-24 | End: 2022-06-24

## 2022-06-24 RX ADMIN — SODIUM CHLORIDE, PRESERVATIVE FREE 10 ML: 5 INJECTION INTRAVENOUS at 12:57

## 2022-06-24 RX ADMIN — Medication 500 UNITS: at 12:57

## 2022-06-24 RX ADMIN — SODIUM CHLORIDE, PRESERVATIVE FREE 10 ML: 5 INJECTION INTRAVENOUS at 09:01

## 2022-06-24 RX ADMIN — SODIUM CHLORIDE 500 ML: 9 INJECTION, SOLUTION INTRAVENOUS at 09:25

## 2022-06-24 RX ADMIN — SODIUM CHLORIDE, PRESERVATIVE FREE 10 ML: 5 INJECTION INTRAVENOUS at 09:00

## 2022-06-24 NOTE — PROGRESS NOTES
Patient instructed to monitor vitals and watch for dizziness, lightheaded ness, fever, chills or inability to maintain fluid intake. IF BP drops or having worsening of these symptoms she is to report to ER. Verbalized understanding. Ambulated off unit per self.

## 2022-06-24 NOTE — ONCOLOGY
Patient was on Infusion Schedule as nurse evaluation, possible hydration. CBC and BMP completed. Cr 1.2 at baseline, sodium, potassium within normal limits. WBC count improved to 11.5. Vitals shows orthostatic hypotension. Patient reports fatigue. Will give IV fluids and reassess vitals after completion of fluids.      Electronically signed by   Mariluz Culver PA-C on 6/24/2022 at 9:19 AM

## 2022-06-24 NOTE — PLAN OF CARE
Problem: Discharge Planning  Goal: Knowledge of discharge instructions  Description: Knowledge of discharge instructions  Outcome: Adequate for Discharge  Note: Verbalized understanding of discharge instructions, follow-up appointments, and when to call the physician. Intervention: Discharge to appropriate level of care  Note: Discuss discharge instructions, follow ups, and when to call the doctor. Problem: Intellectual/Education/Knowledge Deficit  Goal: Teaching initiated upon admission  Outcome: Adequate for Discharge  Note: Patient verbalizes understanding to verbal information given on IV fluids,action and possible side effects. Aware to call MD if develop complications. Intervention: Verbal/written education provided  Note: Discussed IV fluids and when to call the doctor. Problem: Falls - Risk of:  Goal: Will remain free from falls  Description: Will remain free from falls  Outcome: Adequate for Discharge  Note: Patient verbalizes understanding of fall precautions. Patient free from falls this visit. Intervention: Assess risk factors for falls  Note: Discussed fall precautions, call light within reach. Problem: Infection - Central Venous Catheter-Associated Bloodstream Infection:  Goal: Will show no infection signs and symptoms  Description: Will show no infection signs and symptoms  Outcome: Adequate for Discharge  Note: Mediport site with no redness or warmth. Skin over port intact with no signs of breakdown noted. Patient verbalizes signs/symptoms of port infection and when to notify the physician. Intervention: Infection risk assessment  Note: Discussed port maintenance, infection prevention, signs and when to call the doctor     Care plan reviewed with patient. Patient verbalizes understanding of the plan of care and contribute to goal setting.

## 2022-06-27 ENCOUNTER — HOSPITAL ENCOUNTER (OUTPATIENT)
Dept: INFUSION THERAPY | Age: 82
Discharge: HOME | End: 2022-06-27
Payer: MEDICARE

## 2022-06-27 VITALS
HEART RATE: 70 BPM | WEIGHT: 134.25 LBS | TEMPERATURE: 98 F | DIASTOLIC BLOOD PRESSURE: 52 MMHG | SYSTOLIC BLOOD PRESSURE: 110 MMHG | OXYGEN SATURATION: 98 % | BODY MASS INDEX: 22.37 KG/M2 | RESPIRATION RATE: 18 BRPM | HEIGHT: 65 IN

## 2022-06-27 DIAGNOSIS — C25.9 MALIGNANT NEOPLASM OF PANCREAS, UNSPECIFIED LOCATION OF MALIGNANCY (HCC): Primary | ICD-10-CM

## 2022-06-27 LAB
ABSOLUTE IMMATURE GRANULOCYTE: 0.01 THOU/MM3 (ref 0–0.07)
BASINOPHIL, AUTOMATED: 1 % (ref 0–3)
BASOPHILS ABSOLUTE: 0.1 THOU/MM3 (ref 0–0.1)
BUN, WHOLE BLOOD: 12 MG/DL (ref 8–26)
CHLORIDE, WHOLE BLOOD: 110 MEQ/L (ref 98–109)
CREATININE, WHOLE BLOOD: 1.3 MG/DL (ref 0.5–1.2)
EOSINOPHILS ABSOLUTE: 0.1 THOU/MM3 (ref 0–0.4)
EOSINOPHILS RELATIVE PERCENT: 2 % (ref 0–4)
GFR, ESTIMATED: 42 ML/MIN/1.73M2
GLUCOSE, WHOLE BLOOD: 136 MG/DL (ref 70–108)
HCT VFR BLD CALC: 29.3 % (ref 37–47)
HEMOGLOBIN: 9.3 GM/DL (ref 12–16)
IMMATURE GRANULOCYTES: 0 %
IONIZED CALCIUM, WHOLE BLOOD: 1.14 MMOL/L (ref 1.12–1.32)
LYMPHOCYTES # BLD: 29 % (ref 15–47)
LYMPHOCYTES ABSOLUTE: 1.4 THOU/MM3 (ref 1–4.8)
MCH RBC QN AUTO: 31.6 PG (ref 26–33)
MCHC RBC AUTO-ENTMCNC: 31.7 GM/DL (ref 32.2–35.5)
MCV RBC AUTO: 100 FL (ref 81–99)
MONOCYTES ABSOLUTE: 0.4 THOU/MM3 (ref 0.4–1.3)
MONOCYTES: 9 % (ref 0–12)
PDW BLD-RTO: 15.6 % (ref 11.5–14.5)
PLATELET # BLD: 259 THOU/MM3 (ref 130–400)
PMV BLD AUTO: 9.8 FL (ref 9.4–12.4)
POTASSIUM, WHOLE BLOOD: 3.8 MEQ/L (ref 3.5–4.9)
RBC # BLD: 2.94 MILL/MM3 (ref 4.2–5.4)
SEG NEUTROPHILS: 59 % (ref 43–75)
SEGMENTED NEUTROPHILS ABSOLUTE COUNT: 2.9 THOU/MM3 (ref 1.8–7.7)
SODIUM, WHOLE BLOOD: 144 MEQ/L (ref 138–146)
TOTAL CO2, WHOLE BLOOD: 23 MEQ/L (ref 23–33)
WBC # BLD: 4.9 THOU/MM3 (ref 4.8–10.8)

## 2022-06-27 PROCEDURE — 85025 COMPLETE CBC W/AUTO DIFF WBC: CPT

## 2022-06-27 PROCEDURE — 2580000003 HC RX 258: Performed by: INTERNAL MEDICINE

## 2022-06-27 PROCEDURE — 6360000002 HC RX W HCPCS: Performed by: INTERNAL MEDICINE

## 2022-06-27 PROCEDURE — 36591 DRAW BLOOD OFF VENOUS DEVICE: CPT

## 2022-06-27 PROCEDURE — 80047 BASIC METABLC PNL IONIZED CA: CPT

## 2022-06-27 RX ORDER — SODIUM CHLORIDE 0.9 % (FLUSH) 0.9 %
5-40 SYRINGE (ML) INJECTION PRN
Status: DISCONTINUED | OUTPATIENT
Start: 2022-06-27 | End: 2022-06-28 | Stop reason: HOSPADM

## 2022-06-27 RX ORDER — HEPARIN SODIUM (PORCINE) LOCK FLUSH IV SOLN 100 UNIT/ML 100 UNIT/ML
500 SOLUTION INTRAVENOUS PRN
Status: DISCONTINUED | OUTPATIENT
Start: 2022-06-27 | End: 2022-06-28 | Stop reason: HOSPADM

## 2022-06-27 RX ORDER — HEPARIN SODIUM (PORCINE) LOCK FLUSH IV SOLN 100 UNIT/ML 100 UNIT/ML
500 SOLUTION INTRAVENOUS PRN
Status: CANCELLED | OUTPATIENT
Start: 2022-06-27

## 2022-06-27 RX ORDER — SODIUM CHLORIDE 0.9 % (FLUSH) 0.9 %
5-40 SYRINGE (ML) INJECTION PRN
Status: CANCELLED | OUTPATIENT
Start: 2022-06-27

## 2022-06-27 RX ORDER — SODIUM CHLORIDE 9 MG/ML
25 INJECTION, SOLUTION INTRAVENOUS PRN
Status: CANCELLED | OUTPATIENT
Start: 2022-06-27

## 2022-06-27 RX ADMIN — SODIUM CHLORIDE, PRESERVATIVE FREE 20 ML: 5 INJECTION INTRAVENOUS at 12:17

## 2022-06-27 RX ADMIN — SODIUM CHLORIDE, PRESERVATIVE FREE 10 ML: 5 INJECTION INTRAVENOUS at 12:16

## 2022-06-27 RX ADMIN — HEPARIN 500 UNITS: 100 SYRINGE at 13:06

## 2022-06-27 NOTE — PROGRESS NOTES
Patient tolerated  Lab draw from [address] without any complications. Discharge instructions given to patient-verbalizes understanding. Ambulated off unit per self with belongings.

## 2022-06-29 ENCOUNTER — TELEPHONE (OUTPATIENT)
Dept: FAMILY MEDICINE CLINIC | Age: 82
End: 2022-06-29

## 2022-06-30 ENCOUNTER — OFFICE VISIT (OUTPATIENT)
Dept: FAMILY MEDICINE CLINIC | Age: 82
End: 2022-06-30
Payer: MEDICARE

## 2022-06-30 VITALS
WEIGHT: 134.2 LBS | HEIGHT: 65 IN | BODY MASS INDEX: 22.36 KG/M2 | DIASTOLIC BLOOD PRESSURE: 60 MMHG | HEART RATE: 80 BPM | SYSTOLIC BLOOD PRESSURE: 104 MMHG

## 2022-06-30 DIAGNOSIS — E80.6 HYPERBILIRUBINEMIA: ICD-10-CM

## 2022-06-30 DIAGNOSIS — I10 PRIMARY HYPERTENSION: ICD-10-CM

## 2022-06-30 DIAGNOSIS — I48.91 ATRIAL FIBRILLATION, UNSPECIFIED TYPE (HCC): ICD-10-CM

## 2022-06-30 DIAGNOSIS — K83.1 OBSTRUCTIVE JAUNDICE: Primary | ICD-10-CM

## 2022-06-30 DIAGNOSIS — C25.9 MALIGNANT NEOPLASM OF PANCREAS, UNSPECIFIED LOCATION OF MALIGNANCY (HCC): ICD-10-CM

## 2022-06-30 DIAGNOSIS — M79.89 LEG SWELLING: ICD-10-CM

## 2022-06-30 DIAGNOSIS — R94.6 ABNORMAL RESULTS OF THYROID FUNCTION STUDIES: ICD-10-CM

## 2022-06-30 PROCEDURE — 1124F ACP DISCUSS-NO DSCNMKR DOCD: CPT | Performed by: STUDENT IN AN ORGANIZED HEALTH CARE EDUCATION/TRAINING PROGRAM

## 2022-06-30 PROCEDURE — 99214 OFFICE O/P EST MOD 30 MIN: CPT | Performed by: STUDENT IN AN ORGANIZED HEALTH CARE EDUCATION/TRAINING PROGRAM

## 2022-06-30 ASSESSMENT — ENCOUNTER SYMPTOMS
SHORTNESS OF BREATH: 0
DIARRHEA: 0
ABDOMINAL PAIN: 0
BACK PAIN: 0
VOMITING: 0
NAUSEA: 0
WHEEZING: 0
COUGH: 0
CONSTIPATION: 0

## 2022-06-30 NOTE — PROGRESS NOTES
Brianna Mckinney is a 81 y.o. female who presents today for:  Chief Complaint   Patient presents with    Other     8 week follow up   [address] from Hospital     Patient was discharged from [facility] on 6/21/2022 for jaundice. HPI:   Brianna Mckinney is 81 y.o. who presents today for hospital follow-up    Patient initially presented to [facility] ED 6/1522 due to worsening jaundice and fever up to 102. She had also had decreased appetite, nausea with this. Patient noted to have WBC 19.2, significant hyperbilirubinemia 7.9, direct bilirubin 6.1. LFTs also elevated. Due to history of previous [place], patient was transferred to  for further management. Patient was admitted to  from 6/16/2022 - 6/21/2022, and treated for obstructive jaundice, sepsis due to Enterococcus bacteremia. Patient treated with antibiotics, and discharged with instructions to complete 7-day course of Augmentin. Patient has since completed antibiotic course without complications. During hospitalization, ERCP also done due to hyperbilirubinemia. Stent was placed in biliary duct, and patient will need to follow-up in 3 months to have stent exchange. Since discharge, patient is feeling significantly improved. No further issues with jaundice. She has not yet followed up with oncology since discharge. Patient's home Lasix dose was held during hospitalization due to sepsis and IV fluid therapy. She does report ongoing leg swelling bilaterally since discharge. Since discharge, patient has been taking Lasix 40 mg daily, and potassium supplement. She has not been wearing her compression stockings as they are difficult for her to get on. Patient being seen by [facility] for nursing, PT, and OT. Blood pressure has been well controlled since hospitalization, and she has been taking Norvasc 2.5 mg daily. She denies lightheadedness, or dizziness.   She had previously been off of Norvasc due to leg swelling, however leg swelling unchanged with stopping Norvasc in the past.    Patient continues to take medications without any notable side effects. She is otherwise doing well, and denies other acute concerns at this time. Objective:     Vitals:    06/30/22 1418   BP: 104/60   Site: Left Upper Arm   Position: Sitting   Cuff Size: Medium Adult   Pulse: 80   Weight: 134 lb 3.2 oz (60.9 kg)   Height: 5' 5\" (1.651 m)       Wt Readings from Last 3 Encounters:   06/30/22 134 lb 3.2 oz (60.9 kg)   06/27/22 134 lb 4 oz (60.9 kg)   06/24/22 140 lb (63.5 kg)       BP Readings from Last 3 Encounters:   06/30/22 104/60   06/27/22 (!) 110/52   06/24/22 138/64       Lab Results   Component Value Date    WBC 4.9 06/27/2022    HGB 9.3 (L) 06/27/2022    HCT 29.3 (L) 06/27/2022     (H) 06/27/2022     06/27/2022     Lab Results   Component Value Date     06/27/2022    K 3.8 06/27/2022    CL 99 06/15/2022    CO2 24 06/15/2022    BUN 19 06/15/2022    CREATININE 1.3 (H) 06/27/2022    GLUCOSE 117 (H) 06/15/2022    CALCIUM 8.7 06/15/2022    PROT 5.9 (L) 06/24/2022    LABALBU 3.0 (L) 06/24/2022    BILITOT 2.8 (H) 06/24/2022    ALKPHOS 393 (H) 06/24/2022    AST 46 (H) 06/24/2022    ALT 31 06/24/2022    LABGLOM 43 (A) 06/15/2022     Lab Results   Component Value Date    TSH 5.560 (H) 12/28/2021    T4FREE 1.88 (H) 12/28/2021     Lab Results   Component Value Date    LABA1C 5.7 11/10/2021     No results found for: EAG  No results found for: CHOL  No results found for: TRIG  No results found for: HDL  No results found for: LDLCHOLESTEROL, LDLCALC    No results found for: LABMICR, [id number]    Review of Systems   Constitutional: Negative for appetite change, chills, fatigue and fever. HENT: Negative for congestion. Eyes: Negative for visual disturbance. Respiratory: Negative for cough, shortness of breath and wheezing. Cardiovascular: Positive for leg swelling. Negative for chest pain and palpitations. Gastrointestinal: Negative for abdominal pain, constipation, diarrhea, nausea and vomiting. Genitourinary: Negative for dysuria. Musculoskeletal: Negative for back pain. Neurological: Negative for dizziness, syncope, light-headedness and headaches. Psychiatric/Behavioral: Negative for dysphoric mood. The patient is not nervous/anxious. Physical Exam  Vitals and nursing note reviewed. Constitutional:       Appearance: Normal appearance. She is normal weight. HENT:      Head: Normocephalic and atraumatic. Nose: Nose normal.      Mouth/Throat:      Mouth: Mucous membranes are moist.      Pharynx: No oropharyngeal exudate or posterior oropharyngeal erythema. Eyes:      Extraocular Movements: Extraocular movements intact. Conjunctiva/sclera: Conjunctivae normal.      Pupils: Pupils are equal, round, and reactive to light. Cardiovascular:      Rate and Rhythm: Normal rate and regular rhythm. Pulses: Normal pulses. Heart sounds: Murmur (1-2/6 ARACELIS, loudest RUSB) heard. Pulmonary:      Effort: Pulmonary effort is normal. No respiratory distress. Breath sounds: Normal breath sounds. No wheezing. Abdominal:      General: Abdomen is flat. Bowel sounds are normal. There is no distension. Palpations: Abdomen is soft. There is no mass. Tenderness: There is no abdominal tenderness. Musculoskeletal:      Cervical back: Neck supple. Right lower leg: Edema (1+ pitting) present. Left lower leg: Edema (1+ pitting) present. Skin:     General: Skin is warm and dry. Coloration: Skin is not jaundiced. Neurological:      General: No focal deficit present. Mental Status: She is alert and oriented to person, place, and time.    Psychiatric:         Mood and Affect: Mood normal.         Behavior: Behavior normal.         Immunization History   Administered Date(s) Administered    COVID-19, MODERNA BLUE border, Primary or Immunocompromised, (age 12y+), IM, 100 mcg/0.5mL 02/05/2021, 03/05/2021, 01/19/2022    Influenza, High Dose (Fluzone 65 yrs and older) 12/01/2017, 11/12/2018, 11/08/2019    Influenza, High-dose, Quadv, 65 yrs +, IM (Fluzone) 10/27/2020, 10/15/2021    Pneumococcal Conjugate 13-valent Cyrus Goodwin) 12/21/2020       Health Maintenance Due   Topic Date Due    Annual Wellness Visit (AWV)  Never done    DTaP/Tdap/Td vaccine (1 - Tdap) Never done    Shingles vaccine (1 of 2) Never done    DEXA (modify frequency per FRAX score)  Never done    Pneumococcal 65+ years Vaccine (2 - PPSV23 or PCV20) 12/21/2021          Food Insecurity: No Food Insecurity    Worried About Running Out of Food in the Last Year: Never true    Roberto of Food in the Last Year: Never true       Assessment / Plan:   1. Obstructive jaundice  2. Malignant neoplasm of pancreas, unspecified location of malignancy ([facility] [address]  3. Hyperbilirubinemia  Recently admitted due to obstructive jaundice. Labs improved prior to discharge.  -Follow-up with GI at  as scheduled for repeat ERCP in 3 months  -Will recheck lab work to monitor LFTs and bilirubin  - CBC with Auto Differential; Future  - Comprehensive Metabolic Panel; Future    4. Leg swelling  Patient with continued bilateral lower extremity pitting edema. Improved from prior.  -Continue 40 mg Lasix daily, with 20 mEq of potassium daily  -Will recheck CMP in 1 week to monitor renal function with this  -Encourage compression stockings daily  -Encourage elevation of legs at least 3 times daily  -Continue daily weights, log these and bring to next visit  -Follow-up in 2 weeks to reevaluate leg swelling    5. Atrial fibrillation, unspecified type [facility])  Patient with history of paroxysmal atrial fibrillation on previous hospitalization. Initially treated with amiodarone and metoprolol. Metoprolol discontinued due to bradycardia. She has been tolerating amiodarone without problem.   -Due to ongoing amiodarone, will refer to cardiology for further recommendations on whether amiodarone is needed long-term  -Continue Eliquis daily  - Eddie Lilly MD, Cardiology, [address] [place] [address]    6. Primary hypertension  BP well controlled currently. Patient restarted on Norvasc 2.5 mg daily on discharge from hospital.  -Will continue Norvasc currently, monitor BP closely. May consider discontinuing this if BP remains well controlled. 7. Abnormal results of thyroid function studies   Patient due for repeat lab work  - TSH; Future  - T4, Free; Future           Return in about 2 weeks (around 7/14/2022) for leg swelling. Medications Prescribed:  No orders of the defined types were placed in this encounter. Future Appointments   Date Time Provider Jeanne Finch   7/5/2022  8:15 AM Danny Jenkins MD N [facility] Heart [address]   7/14/2022  4:00 PM Katia Thornton MD [facility] FM RES [address]   8/9/2022  8:00 AM STR OUT PT ONC [facility]   8/9/2022  8:20 AM Shivani Og MD N Oncology [address]       Patient given educational materials - see patient instructions. Discussed use, benefit, and sideeffects of prescribed medications. All patient questions answered. Pt voiced understanding. Reviewed health maintenance. Instructed to continue current medications, diet and exercise. Patient agreed with treatment plan. Follow up as directed.      Electronically signed by Ktaia Thornton MD on 6/30/2022 at 5:32 PM

## 2022-06-30 NOTE — PROGRESS NOTES
SUBJECTIVE     Raleigh Lepe is a 81 y. o.female    Chief Complaint   Patient presents with    Other     8 week follow up   [address] from Hospital     Patient was discharged from [facility] on 6/21/2022 for jaundice. Chief complaint, [place], and all pertinent details of the case reviewed with the resident. Please see resident's note for specific details discussed at today's visit. Patient Active Problem List   Diagnosis    Obstructive jaundice    Hyperbilirubinemia    Common bile duct stricture    Elevated LFTs    Hypokalemia    Pancreatic cyst    Normocytic anemia    Abnormal urinalysis    Unintentional weight loss    Hiatal hernia    Diverticulosis    Malignant neoplasm of pancreas (HCC)    Atrial fibrillation (HCC)    Cholangiocarcinoma (HCC)    Thrombocytopenia (HCC)    SIRIA [facility]acute kidney injury) ([facility] [address]    Hypomagnesemia    Macrocytic anemia    Severe malnutrition (HCC)       Current Outpatient Medications   Medication Sig Dispense Refill    Handicap Placard MISC by Does not apply route Diagnosis: Pancreatic cancer, deconditioned, weakness  Expiration: 5/31/2023 1 each 0    furosemide (LASIX) 20 MG tablet Take 1 tablet by mouth daily 30 tablet 3    potassium chloride (KLOR-CON M) 20 MEQ extended release tablet Take 1 tablet by mouth daily 30 tablet 0    apixaban (ELIQUIS) 5 MG TABS tablet Take 1 tablet by mouth 2 times daily 180 tablet 1    amLODIPine (NORVASC) 2.5 MG tablet Take 1 tablet by mouth daily 90 tablet 1    amiodarone (CORDARONE) 200 MG tablet Take 1 tablet by mouth daily 90 tablet 1    aspirin 81 MG chewable tablet Take 1 tablet by mouth daily 90 tablet 1    ondansetron (ZOFRAN) 4 MG tablet Take 4 mg by mouth every 8 hours as needed       ferrous sulfate (IRON 325) 325 (65 Fe) MG tablet Take 325 mg by mouth daily (with breakfast)      lidocaine-prilocaine (EMLA) 2.5-2.5 % cream Apply topically as needed.  30 g 1    docusate sodium (COLACE) 100 MG capsule Take 100 mg by mouth 2 times daily       No current facility-administered medications for this visit. Review of Systems per resident provider    OBJECTIVE     /60 (Site: Left Upper Arm, Position: Sitting, Cuff Size: Medium Adult)   Pulse 80   Ht 5' 5\" (1.651 m)   Wt 134 lb 3.2 oz (60.9 kg)   BMI 22.33 kg/m²   BP Readings from Last 3 Encounters:   06/30/22 104/60   06/27/22 (!) 110/52   06/24/22 138/64       Wt Readings from Last 3 Encounters:   06/30/22 134 lb 3.2 oz (60.9 kg)   06/27/22 134 lb 4 oz (60.9 kg)   06/24/22 140 lb (63.5 kg)     Body mass index is 22.33 kg/m². Physical Exam  Vitals and nursing note reviewed. Constitutional:       General: She is not in acute distress. Appearance: Normal appearance. She is normal weight. She is not ill-appearing, toxic-appearing or diaphoretic. HENT:      Head: Normocephalic and atraumatic. Nose: Nose normal.   Eyes:      General: No scleral icterus. Right eye: No discharge. Left eye: No discharge. Extraocular Movements: Extraocular movements intact. Conjunctiva/sclera: Conjunctivae normal.      Pupils: Pupils are equal, round, and reactive to light. Neck:      Vascular: No carotid bruit. Cardiovascular:      Rate and Rhythm: Normal rate and regular rhythm. Heart sounds: Murmur (1-2/ 6 ARACELIS @ RUSB) heard. No friction rub. No gallop. Pulmonary:      Effort: Pulmonary effort is normal. No respiratory distress. Breath sounds: Normal breath sounds. No stridor. No wheezing, rhonchi or rales. Abdominal:      General: Abdomen is flat. Bowel sounds are normal. There is no distension. Palpations: Abdomen is soft. There is no mass. Tenderness: There is no abdominal tenderness. There is no guarding or rebound. Hernia: No hernia is present. Musculoskeletal:         General: No swelling or signs of injury. Normal range of motion. Cervical back: Normal range of motion.       Right lower leg: No edema. Left lower leg: No edema. Skin:     General: Skin is warm and dry. Coloration: Skin is not jaundiced or pale. Findings: No bruising, erythema, lesion or rash. Neurological:      General: No focal deficit present. Mental Status: She is alert and oriented to person, place, and time. Psychiatric:         Mood and Affect: Mood normal.         Behavior: Behavior normal.         Thought Content: Thought content normal.         Judgment: Judgment normal.          No results found for this visit on 06/30/22. Lab Results   Component Value Date    LABA1C 5.7 11/10/2021     No results found for: CHOL, TRIG, HDL, LDLCALC, LDLDIRECT    The ASCVD Risk score (Emily Hunt., et al., 2013) failed to calculate for the following reasons: The 2013 ASCVD risk score is only valid for ages 36 to 78    Lab Results   Component Value Date     06/27/2022    K 3.8 06/27/2022    CL 99 06/15/2022    CO2 24 06/15/2022    BUN 19 06/15/2022    CREATININE 1.3 (H) 06/27/2022    GLUCOSE 117 (H) 06/15/2022    CALCIUM 8.7 06/15/2022    PROT 5.9 (L) 06/24/2022    LABALBU 3.0 (L) 06/24/2022    BILITOT 2.8 (H) 06/24/2022    ALKPHOS 393 (H) 06/24/2022    AST 46 (H) 06/24/2022    ALT 31 06/24/2022    LABGLOM 43 (A) 06/15/2022     estimated creatinine clearance is 31 mL/min (A) (based on SCr of 1.3 mg/dL (H)).     No results found for: Jamal Sonya    Lab Results   Component Value Date    TSH 5.560 (H) 12/28/2021    T4FREE 1.88 (H) 12/28/2021       Lab Results   Component Value Date    WBC 4.9 06/27/2022    HGB 9.3 (L) 06/27/2022    HCT 29.3 (L) 06/27/2022     (H) 06/27/2022     06/27/2022           Immunization History   Administered Date(s) Administered    COVID-19, MODERNA BLUE border, Primary or Immunocompromised, (age 12y+), IM, 100 mcg/0.5mL 02/05/2021, 03/05/2021, 01/19/2022    Influenza, High Dose (Fluzone 65 yrs and older) 12/01/2017, 11/12/2018, 11/08/2019    Influenza, High-dose, Quadv, 65 yrs +, IM (Fluzone) 10/27/2020, 10/15/2021    Pneumococcal Conjugate 13-valent Hilariadada Floody) 12/21/2020       Health Maintenance   Topic Date Due    Annual Wellness Visit (AWV)  Never done    DTaP/Tdap/Td vaccine (1 - Tdap) Never done    Shingles vaccine (1 of 2) Never done    DEXA (modify frequency per FRAX score)  Never done    Pneumococcal 65+ years Vaccine (2 - PPSV23 or PCV20) 12/21/2021    Depression Screen  05/31/2023    Flu vaccine  Completed    COVID-19 Vaccine  Completed    Hepatitis A vaccine  Aged Out    Hepatitis B vaccine  Aged Out    Hib vaccine  Aged Out    Meningococcal (ACWY) vaccine  Aged Out            Future Appointments   Date Time Provider Jeanne Finch   7/5/2022  8:15 AM Ulices Hernandez MD N [facility] Heart [address]   7/14/2022  4:00 PM Florencio Camacho MD [facility] FM RES [address]   8/9/2022  8:00 AM STR OUT PT ONC [facility]   8/9/2022  8:20 AM Bridger Glez MD N Oncology [address]       ASSESSMENT       Diagnosis Orders   1. Obstructive jaundice  Comprehensive Metabolic Panel   2. Malignant neoplasm of pancreas, unspecified location of malignancy ([facility] [address]  CBC with Auto Differential   3. Hyperbilirubinemia  Comprehensive Metabolic Panel   4. Leg swelling     5. Atrial fibrillation, unspecified type [facility])  Yusra Lira MD, Cardiology, [address] [place] [address]   6. Primary hypertension     7. Abnormal results of thyroid function studies   TSH    T4, Free       PLAN      After discussion with pt and resident provider, we agreed on plan as follows:       Follow-up with [facility] as planned for recheck ERCP  Continue Lasix 40 mg daily  Continue K-Dur 20 milliequivalents daily  Restart lower extremity compression stockings as tolerated  Elevate legs above the level of the heart 3 times a day  Will refer to cardiology for amiodarone management  Check CMP, free T4/TSH, and CBC  Continue home health, PT/OT  Continue current medications otherwise  Follow-up in 1 to 2 weeks or sooner if any further problems          Attending Physician Statement  I have discussed the case, including pertinent history and exam findings with the resident. I also have seen the patient and performed key portions of the examination. I agree with the documented assessment and plan as documented by the resident.   GC modifier added to this encounter     Electronically signed by Michelle Leija MD on 6/30/2022 at 5:45 PM

## 2022-07-05 ENCOUNTER — TELEPHONE (OUTPATIENT)
Dept: CARDIOLOGY CLINIC | Age: 82
End: 2022-07-05

## 2022-07-05 NOTE — TELEPHONE ENCOUNTER
Patient called in and cancelled her new patient appt scheduled for today 7/5/2022 with Dr Sangeeta Cifuentes because she woke up not feeling well. Please call her back to reschedule as a new patient.

## 2022-07-07 ENCOUNTER — TELEPHONE (OUTPATIENT)
Dept: FAMILY MEDICINE CLINIC | Age: 82
End: 2022-07-07

## 2022-07-07 ENCOUNTER — NURSE ONLY (OUTPATIENT)
Dept: LAB | Age: 82
End: 2022-07-07

## 2022-07-07 DIAGNOSIS — K83.1 OBSTRUCTIVE JAUNDICE: ICD-10-CM

## 2022-07-07 DIAGNOSIS — E80.6 HYPERBILIRUBINEMIA: ICD-10-CM

## 2022-07-07 DIAGNOSIS — R94.6 ABNORMAL RESULTS OF THYROID FUNCTION STUDIES: ICD-10-CM

## 2022-07-07 DIAGNOSIS — C25.9 MALIGNANT NEOPLASM OF PANCREAS, UNSPECIFIED LOCATION OF MALIGNANCY (HCC): ICD-10-CM

## 2022-07-07 LAB
ALBUMIN SERPL-MCNC: 3.5 G/DL (ref 3.5–5.1)
ALP BLD-CCNC: 315 U/L (ref 38–126)
ALT SERPL-CCNC: 29 U/L (ref 11–66)
ANION GAP SERPL CALCULATED.3IONS-SCNC: 13 MEQ/L (ref 8–16)
AST SERPL-CCNC: 46 U/L (ref 5–40)
BASOPHILS # BLD: 0.8 %
BASOPHILS ABSOLUTE: 0.1 THOU/MM3 (ref 0–0.1)
BILIRUB SERPL-MCNC: 1.8 MG/DL (ref 0.3–1.2)
BUN BLDV-MCNC: 18 MG/DL (ref 7–22)
CALCIUM SERPL-MCNC: 9.2 MG/DL (ref 8.5–10.5)
CHLORIDE BLD-SCNC: 103 MEQ/L (ref 98–111)
CO2: 25 MEQ/L (ref 23–33)
CREAT SERPL-MCNC: 1.3 MG/DL (ref 0.4–1.2)
EOSINOPHIL # BLD: 1.1 %
EOSINOPHILS ABSOLUTE: 0.1 THOU/MM3 (ref 0–0.4)
ERYTHROCYTE [DISTWIDTH] IN BLOOD BY AUTOMATED COUNT: 14.2 % (ref 11.5–14.5)
ERYTHROCYTE [DISTWIDTH] IN BLOOD BY AUTOMATED COUNT: 51.5 FL (ref 35–45)
GFR SERPL CREATININE-BSD FRML MDRD: 39 ML/MIN/1.73M2
GLUCOSE BLD-MCNC: 93 MG/DL (ref 70–108)
HCT VFR BLD CALC: 34.4 % (ref 37–47)
HEMOGLOBIN: 10.9 GM/DL (ref 12–16)
IMMATURE GRANS (ABS): 0.03 THOU/MM3 (ref 0–0.07)
IMMATURE GRANULOCYTES: 0.3 %
LYMPHOCYTES # BLD: 48.5 %
LYMPHOCYTES ABSOLUTE: 4.7 THOU/MM3 (ref 1–4.8)
MCH RBC QN AUTO: 31.6 PG (ref 26–33)
MCHC RBC AUTO-ENTMCNC: 31.7 GM/DL (ref 32.2–35.5)
MCV RBC AUTO: 99.7 FL (ref 81–99)
MONOCYTES # BLD: 8.2 %
MONOCYTES ABSOLUTE: 0.8 THOU/MM3 (ref 0.4–1.3)
NUCLEATED RED BLOOD CELLS: 0 /100 WBC
PLATELET # BLD: 312 THOU/MM3 (ref 130–400)
PMV BLD AUTO: 11.2 FL (ref 9.4–12.4)
POTASSIUM SERPL-SCNC: 4.4 MEQ/L (ref 3.5–5.2)
RBC # BLD: 3.45 MILL/MM3 (ref 4.2–5.4)
SEG NEUTROPHILS: 41.1 %
SEGMENTED NEUTROPHILS ABSOLUTE COUNT: 4 THOU/MM3 (ref 1.8–7.7)
SODIUM BLD-SCNC: 141 MEQ/L (ref 135–145)
T4 FREE: 1.62 NG/DL (ref 0.93–1.76)
TOTAL PROTEIN: 6.7 G/DL (ref 6.1–8)
TSH SERPL DL<=0.05 MIU/L-ACNC: 5.18 UIU/ML (ref 0.4–4.2)
WBC # BLD: 9.7 THOU/MM3 (ref 4.8–10.8)

## 2022-07-08 ENCOUNTER — TELEPHONE (OUTPATIENT)
Dept: FAMILY MEDICINE CLINIC | Age: 82
End: 2022-07-08

## 2022-07-11 DIAGNOSIS — M79.89 LEG SWELLING: ICD-10-CM

## 2022-07-11 RX ORDER — POTASSIUM CHLORIDE 20 MEQ/1
20 TABLET, EXTENDED RELEASE ORAL DAILY
Qty: 30 TABLET | Refills: 0 | Status: SHIPPED | OUTPATIENT
Start: 2022-07-11 | End: 2022-07-25 | Stop reason: SDUPTHER

## 2022-07-11 RX ORDER — FUROSEMIDE 20 MG/1
20 TABLET ORAL DAILY
Qty: 30 TABLET | Refills: 0 | Status: SHIPPED | OUTPATIENT
Start: 2022-07-11 | End: 2022-07-19

## 2022-07-11 NOTE — TELEPHONE ENCOUNTER
Patient's last appointment was : 6/30/2022  Patient's next appointment is :   Future Appointments   Date Time Provider Jeanne Finch   7/14/2022  4:00 PM Bonnie Berry MD [facility] [address]   7/19/2022 10:00 AM Tim Elder MD N [facility] Heart [facility]   7/25/2022  4:00 PM Gina Flores MD [facility] FM [facility]   8/9/2022  8:00 AM STR OUT PT ONC [facility]   8/9/2022  8:20 AM Marjorie Alvarez MD N Oncology [address]     Last refilled:    Lab Results   Component Value Date    LABA1C 5.7 11/10/2021     No results found for: CHOL, TRIG, HDL, LDLCALC, LDLDIRECT  Lab Results   Component Value Date     07/07/2022    K 4.4 07/07/2022     07/07/2022    CO2 25 07/07/2022    BUN 18 07/07/2022    CREATININE 1.3 (H) 07/07/2022    GLUCOSE 93 07/07/2022    CALCIUM 9.2 07/07/2022    PROT 6.7 07/07/2022    LABALBU 3.5 07/07/2022    BILITOT 1.8 (H) 07/07/2022    ALKPHOS 315 (H) 07/07/2022    AST 46 (H) 07/07/2022    ALT 29 07/07/2022    LABGLOM 39 (A) 07/07/2022     Lab Results   Component Value Date    TSH 5.180 (H) 07/07/2022    T4FREE 1.62 07/07/2022     Lab Results   Component Value Date    WBC 9.7 07/07/2022    HGB 10.9 (L) 07/07/2022    HCT 34.4 (L) 07/07/2022    MCV 99.7 (H) 07/07/2022     07/07/2022

## 2022-07-13 ENCOUNTER — TELEPHONE (OUTPATIENT)
Dept: FAMILY MEDICINE CLINIC | Age: 82
End: 2022-07-13

## 2022-07-13 NOTE — TELEPHONE ENCOUNTER
----- Message from Jose Rafael Odom MD sent at 7/8/2022 11:38 AM EDT -----  Fuad Even,    Your labs overall look stable from previous. There are no changes we need to make at this time, and we can discuss further at your next visit. Thank you!

## 2022-07-18 ENCOUNTER — TELEPHONE (OUTPATIENT)
Dept: ONCOLOGY | Age: 82
End: 2022-07-18

## 2022-07-18 ENCOUNTER — TELEPHONE (OUTPATIENT)
Dept: FAMILY MEDICINE CLINIC | Age: 82
End: 2022-07-18

## 2022-07-18 NOTE — TELEPHONE ENCOUNTER
Patient has not been seen in office recently. Note reviewed on 6/21/22 and patient to follow up in August. Recommend patient is evaluated if having new symptoms. I can see her this week.

## 2022-07-18 NOTE — TELEPHONE ENCOUNTER
Pt calling in stating she is having some numbness and tingling in her fingers and feet. Can she get gabapentin sent to her pharmacy?

## 2022-07-19 ENCOUNTER — OFFICE VISIT (OUTPATIENT)
Dept: CARDIOLOGY CLINIC | Age: 82
End: 2022-07-19
Payer: MEDICARE

## 2022-07-19 ENCOUNTER — TELEPHONE (OUTPATIENT)
Dept: CARDIOLOGY CLINIC | Age: 82
End: 2022-07-19

## 2022-07-19 VITALS
BODY MASS INDEX: 21.36 KG/M2 | SYSTOLIC BLOOD PRESSURE: 112 MMHG | DIASTOLIC BLOOD PRESSURE: 57 MMHG | HEIGHT: 65 IN | HEART RATE: 71 BPM | WEIGHT: 128.2 LBS

## 2022-07-19 DIAGNOSIS — I48.91 ATRIAL FIBRILLATION, UNSPECIFIED TYPE (HCC): Primary | ICD-10-CM

## 2022-07-19 PROCEDURE — 99204 OFFICE O/P NEW MOD 45 MIN: CPT | Performed by: INTERNAL MEDICINE

## 2022-07-19 PROCEDURE — 1124F ACP DISCUSS-NO DSCNMKR DOCD: CPT | Performed by: INTERNAL MEDICINE

## 2022-07-19 RX ORDER — BUMETANIDE 1 MG/1
1 TABLET ORAL DAILY
Qty: 30 TABLET | Refills: 3 | Status: SHIPPED | OUTPATIENT
Start: 2022-07-19

## 2022-07-19 NOTE — PROGRESS NOTES
[id number] [facility] [address]  [place] [address]  Dept: [phone]  Dept Fax: [phone]  Loc: [phone]    Visit Date: 7/19/2022    Ms. Richard Cuevas is a 81 y.o. female  who presented for:  Chief Complaint   Patient presents with    New Patient     afib       HPI:   80 yo F c hx of cholangiocarcinoma s/p whipple surgery and Afib post op is referred here for Afib. Patient had whipple surgery in 12/2021 in [place]. And Afib was diagnosed post exploratory surgery. She is on eliquis and amiodarone. EKG showed SR, incomplete RBBB, LAFB. She is currently on chemotherapy. Does report some neuropathy type of pain. Current Outpatient Medications:     potassium chloride (KLOR-CON M) 20 MEQ extended release tablet, Take 1 tablet by mouth daily, Disp: 30 tablet, Rfl: 0    furosemide (LASIX) 20 MG tablet, Take 1 tablet by mouth daily, Disp: 30 tablet, Rfl: 0    Handicap Placard MISC, by Does not apply route Diagnosis: Pancreatic cancer, deconditioned, weakness Expiration: 5/31/2023, Disp: 1 each, Rfl: 0    apixaban (ELIQUIS) 5 MG TABS tablet, Take 1 tablet by mouth 2 times daily, Disp: 180 tablet, Rfl: 1    amLODIPine (NORVASC) 2.5 MG tablet, Take 1 tablet by mouth daily, Disp: 90 tablet, Rfl: 1    amiodarone (CORDARONE) 200 MG tablet, Take 1 tablet by mouth daily, Disp: 90 tablet, Rfl: 1    aspirin 81 MG chewable tablet, Take 1 tablet by mouth daily, Disp: 90 tablet, Rfl: 1    ondansetron (ZOFRAN) 4 MG tablet, Take 4 mg by mouth every 8 hours as needed , Disp: , Rfl:     ferrous sulfate (IRON 325) 325 (65 Fe) MG tablet, Take 325 mg by mouth daily (with breakfast), Disp: , Rfl:     lidocaine-prilocaine (EMLA) 2.5-2.5 % cream, Apply topically as needed. , Disp: 30 g, Rfl: 1    docusate sodium (COLACE) 100 MG capsule, Take 100 mg by mouth 2 times daily, Disp: , Rfl:     Past Medical History  Theodore Dickerson  has a past medical history of Cholangiocarcinoma ([address] and Hypertension. Social History  Keven Acuna  reports that she quit smoking about 50 years ago. Her smoking use included cigarettes. She has never used smokeless tobacco. She reports current alcohol use. She reports that she does not use drugs. Family History  Keven Acuna family history includes Breast Cancer in her mother; Cancer in her father; Natalio Santos in her mother; Kidney Disease in her brother; Georgana Gearing in her father; No Known Problems in her maternal grandfather, maternal grandmother, paternal grandfather, and paternal grandmother. Past Surgical History   Past Surgical History:   Procedure Laterality Date    APPENDECTOMY  1959    CHOLECYSTECTOMY  12/04/2021    Dr. Anna Hines    ERCP N/A 11/10/2021    ERCP WITH STENT INSERTION performed by Brissa Wiley MD at [address] [place] [address] Drive  12/04/2021    exp lap, radical celiac and portal lymph node dissection,pylorus preserving pancreatoduodenectomy-Dr [facility]    PORT SURGERY Left 1/25/2022    SINGLE LUMEN SWZMRIEUD INSERTION performed by Kateryna Esquivel MD at [address] [place] [address] Place:     REVIEW OF SYSTEMS  Constitutional: denies sweats, chills and fever  HENT: denies  congestion, sinus pressure, sneezing and sore throat. Eyes: denies  pain, discharge, redness and itching. Respiratory: denies apnea, cough  Gastrointestinal: denies blood in stool, constipation, diarrhea   Endocrine: denies cold intolerance, heat intolerance, polydipsia. Genitourinary: denies dysuria, enuresis, flank pain and hematuria. Musculoskeletal: denies arthralgias, joint swelling and neck pain. Neurological: denies numbness and headaches. Psychiatric/Behavioral: denies agitation, confusion, decreased concentration and dysphoric mood    All others reviewed and are negative.    Objective:     BP (!) 112/57   Pulse 71   Ht 5' 5\" (1.651 m)   Wt 128 lb 3.2 oz (58.2 kg)   BMI 21.33 kg/m²     Wt 07/07/2022 04:47 PM    AST 46 07/07/2022 04:47 PM    PROT 6.7 07/07/2022 04:47 PM    BILITOT 1.8 07/07/2022 04:47 PM    BILIDIR 1.6 06/24/2022 09:01 AM    LABALBU 3.5 07/07/2022 04:47 PM       Lab Results   Component Value Date/Time    MG 1.6 06/08/2022 04:56 PM       Lab Results   Component Value Date    INR 1.04 11/09/2021         Lab Results   Component Value Date/Time    LABA1C 5.7 11/10/2021 12:25 PM       No results found for: TRIG, HDL, LDLCALC, LDLDIRECT, LABVLDL    Lab Results   Component Value Date/Time    TSH 5.180 07/07/2022 04:47 PM         Testing Reviewed:      I haveindividually reviewed the below cardiac tests    EKG:    ECHO: No results found for this or any previous visit. STRESS:    CATH:    Assessment/Plan       Diagnosis Orders   1. Atrial fibrillation, unspecified type (HCC)          Afib ? Post op  Cholangiocarcionam s/p frederic, now on chemotherapy    Currently on Amiodarone and eliquis  Recently EKG showed SR  ? Post op Afib  Had Echo at , will try to obtain  Will continue amiodarone and eliquis for now  Post chemo will d/c amiodarone and get evnet monitor  The patient is asked to make an attempt to improve diet and exercise patterns to aid in medical management of this problem. Advised more plant based nutrition/meditarrean diet   Advised patient to call office or seek immediate medical attention if there is any new onset of  any chest pain, sob, palpitations, lightheadedness, dizziness, orthopnea, PND or pedal edema. All medication side effects were discussed in details. Thank youfor allowing me to participate in the care of this patient. Please do not hesitate to contact me for any further questions. Return in about 3 months (around 10/19/2022), or if symptoms worsen or fail to improve, for Review testing, Regular follow up.        Electronically signed by Kelvin Dickey MD [facility]  7/19/2022 at 11:13 AM EDT

## 2022-07-19 NOTE — TELEPHONE ENCOUNTER
Pt was seen 7-19-22 by Dr. Greg Lee. Sent record request from [facility] in [place] for most recent echo. Fax- [phone]  Please send to Dr. Greg Lee once we receive echo.

## 2022-07-21 ENCOUNTER — OFFICE VISIT (OUTPATIENT)
Dept: ONCOLOGY | Age: 82
End: 2022-07-21
Payer: MEDICARE

## 2022-07-21 ENCOUNTER — HOSPITAL ENCOUNTER (OUTPATIENT)
Dept: INFUSION THERAPY | Age: 82
Discharge: HOME | End: 2022-07-21
Payer: MEDICARE

## 2022-07-21 VITALS
HEIGHT: 65 IN | HEART RATE: 80 BPM | WEIGHT: 130 LBS | RESPIRATION RATE: 16 BRPM | SYSTOLIC BLOOD PRESSURE: 140 MMHG | OXYGEN SATURATION: 98 % | BODY MASS INDEX: 21.66 KG/M2 | DIASTOLIC BLOOD PRESSURE: 62 MMHG | TEMPERATURE: 99.1 F

## 2022-07-21 VITALS
DIASTOLIC BLOOD PRESSURE: 62 MMHG | SYSTOLIC BLOOD PRESSURE: 140 MMHG | RESPIRATION RATE: 16 BRPM | TEMPERATURE: 99.1 F | HEART RATE: 80 BPM

## 2022-07-21 DIAGNOSIS — T45.1X5A CHEMOTHERAPY-INDUCED NEUROPATHY (HCC): ICD-10-CM

## 2022-07-21 DIAGNOSIS — G62.0 CHEMOTHERAPY-INDUCED NEUROPATHY (HCC): ICD-10-CM

## 2022-07-21 DIAGNOSIS — C24.1 PRIMARY ADENOCARCINOMA OF AMPULLA OF VATER (HCC): Primary | ICD-10-CM

## 2022-07-21 PROCEDURE — 1124F ACP DISCUSS-NO DSCNMKR DOCD: CPT | Performed by: PHYSICIAN ASSISTANT

## 2022-07-21 PROCEDURE — 99211 OFF/OP EST MAY X REQ PHY/QHP: CPT

## 2022-07-21 PROCEDURE — 99213 OFFICE O/P EST LOW 20 MIN: CPT | Performed by: PHYSICIAN ASSISTANT

## 2022-07-21 RX ORDER — GABAPENTIN 100 MG/1
100 CAPSULE ORAL 2 TIMES DAILY
Qty: 60 CAPSULE | Refills: 0 | Status: SHIPPED | OUTPATIENT
Start: 2022-07-21 | End: 2022-08-17

## 2022-07-21 NOTE — PROGRESS NOTES
complications. Patient didn't have any medical history except cataract , run of A. fib during recent hospitalization at [facility]. The patient was recommended adjuvant chemotherapy with FOLFOX. Interval History 7/21/2022:   The patient is here for follow up evaluation. The patient was previously followed by Dr. Genesis Lyons and receiving adjuvant chemotherapy with FOLFOX. On 5/16/2022 the patient was noted to have hyperbilirubinemia and was sent to ED for evaluation. She was hospitalized 5/16/2022 to 5/21/2022. GI evaluated during hospitalization and hyperbilirubinemia felt related to hepatic congestion. MRCP completed showing moderate ascites with worsening edema of the soft tissues, mildly dilated intrahepatic and extrahepatic biliary tree. Pneumobilia is present and this does not appear to be an obstructive process. There is apparent edema of the pancreatic mesentery which may be secondary to generalized increased fluid status or pancreatitis. Bilirubin improved upon discharge. She presented back to the ED on 6/15/2022 for jaundice and fever of 102. Bilirubin in the ED on 6/15/22 7.9. The patient was transferred to . She was treated for obstructive jaundice, sepsis due to Enterococcus bacteremia. She had stent placement in biliary duct. Patient called the clinic on 6/21/2022 and wished to hold treatment until August 2022. Most recent LFT on 7/7/22 bilirubin 1.8. The patient called the clinic this week reporting neuropathy and visit made today. She states neuropathy involves hands and feet. Symptoms are persistent. She is able to dress herself, walk but reports altered sensation. She reports she is at home and continuing to do home health. Affirms improvement in overall strength and mobility. Denies fever, chills, chest pain, worsening LAKE, abdominal pain, nausea, vomiting, bowel or urinary changes. Denies recent abnormal bleeding. Affirms bilateral LE edema.  She was placed on Bumex per Cardiology on 7/19/22 which she has not yet started. Instructed to take as instructed per Cardio. PMH, SH, and FH:  I reviewed the patients medication list and allergy list as noted on the electronic medical record. The PMH, SH and FH were also reviewed as noted on the EMR. Review of Systems:   Review of Systems   Pertinent review of systems noted in HPI, all other ROS negative. Objective:   Physical Exam   BP (!) 140/62 (Site: Left Upper Arm, Position: Sitting, Cuff Size: Medium Adult)   Pulse 80   Temp 99.1 °F (37.3 °C) (Oral)   Resp 16   Ht 5' 5\" (1.651 m)   Wt 130 lb (59 kg)   SpO2 98%   BMI 21.63 kg/m²    General appearance: No apparent distress, chronically ill appearing, and cooperative. HEENT: Pupils equal, round, and reactive to light. Conjunctivae/corneas clear. Neck: Supple, with full range of motion. Trachea midline. Respiratory:  Normal respiratory effort. Clear to auscultation, bilaterally. No wheezes, rales or rhonchi. Cardiovascular: Regular rate and rhythm with normal S1/S2   Abdomen: Soft, non-tender, non-distended with active bowel sounds. Musculoskeletal: bilateral lower extremity edema. Ambulates with walker in office. Skin: Skin color, texture, turgor normal.  No visible rashes or lesions. Neurologic:  Neurovascularly intact without any focal sensory/motor deficits.    Psychiatric: Alert and oriented      Imaging Studies and Labs:   CBC:   Lab Results   Component Value Date    WBC 9.7 07/07/2022    HGB 10.9 (L) 07/07/2022    HCT 34.4 (L) 07/07/2022    MCV 99.7 (H) 07/07/2022     07/07/2022     BMP:   Lab Results   Component Value Date/Time     07/07/2022 04:47 PM    K 4.4 07/07/2022 04:47 PM    K 3.3 05/17/2022 06:00 AM     07/07/2022 04:47 PM    CO2 25 07/07/2022 04:47 PM    BUN 18 07/07/2022 04:47 PM    CREATININE 1.3 07/07/2022 04:47 PM    GLUCOSE 93 07/07/2022 04:47 PM    CALCIUM 9.2 07/07/2022 04:47 PM      LFT:   Lab Results   Component

## 2022-07-21 NOTE — PATIENT INSTRUCTIONS
Return to clinic as scheduled with Dr. Edgar Contreras on 8/10/2022  Labs on RTC: CBC, BMP, LFT, CA 19-9  Please call for questions or concerns. *Please request all records from [facility] in June 2022.

## 2022-07-25 ENCOUNTER — OFFICE VISIT (OUTPATIENT)
Dept: FAMILY MEDICINE CLINIC | Age: 82
End: 2022-07-25
Payer: MEDICARE

## 2022-07-25 VITALS
RESPIRATION RATE: 16 BRPM | HEIGHT: 65 IN | BODY MASS INDEX: 21.49 KG/M2 | TEMPERATURE: 98 F | DIASTOLIC BLOOD PRESSURE: 68 MMHG | WEIGHT: 129 LBS | HEART RATE: 71 BPM | SYSTOLIC BLOOD PRESSURE: 112 MMHG | OXYGEN SATURATION: 98 %

## 2022-07-25 DIAGNOSIS — E43 SEVERE MALNUTRITION (HCC): Primary | ICD-10-CM

## 2022-07-25 DIAGNOSIS — E87.6 HYPOKALEMIA: ICD-10-CM

## 2022-07-25 DIAGNOSIS — M79.89 LEG SWELLING: ICD-10-CM

## 2022-07-25 DIAGNOSIS — E03.8 SUBCLINICAL HYPOTHYROIDISM: ICD-10-CM

## 2022-07-25 PROCEDURE — 1124F ACP DISCUSS-NO DSCNMKR DOCD: CPT

## 2022-07-25 PROCEDURE — 99214 OFFICE O/P EST MOD 30 MIN: CPT

## 2022-07-25 RX ORDER — POTASSIUM CHLORIDE 20 MEQ/1
20 TABLET, EXTENDED RELEASE ORAL DAILY
Qty: 30 TABLET | Refills: 0 | Status: SHIPPED | OUTPATIENT
Start: 2022-07-25 | End: 2022-09-26

## 2022-07-25 ASSESSMENT — ENCOUNTER SYMPTOMS
SHORTNESS OF BREATH: 0
WHEEZING: 0
BLOOD IN STOOL: 0
ABDOMINAL PAIN: 0
COUGH: 0
PHOTOPHOBIA: 0

## 2022-07-25 NOTE — PROGRESS NOTES
[address] [facility] [address] [facility] [address]  Dept: [phone]  Loc: [address] [place] [address] Dr Jesica Banda is a 81 y.o. female who presents today for:  No chief complaint on file. Goals    None         HPI:     HPI  20-year-old female with history of obstructive jaundice secondary to pancreatic cancer, leg swelling, a fib, here for follow-up of leg swelling. Patient recently discharged from  with planned follow up for ERCP. Was recently seen by cardiology 7/19/22 for afib and is on amiodarone and Eliquis. They would like to continue that for now. Also started her on Bumex 1 mg daily. She was seen by oncology 7/21/22. She currently has peripheral neuropathy, likely secondary to chemotherapy and started on gabapentin 100 mg twice daily. She is to follow-up with Dr. Dana Edmond on 8/10/22. Current Outpatient Medications   Medication Sig Dispense Refill    gabapentin (NEURONTIN) 100 MG capsule Take 1 capsule by mouth in the morning and 1 capsule before bedtime. Do all this for 30 days. Intended supply: 30 days. 60 capsule 0    bumetanide (BUMEX) 1 MG tablet Take 1 tablet by mouth in the morning.  . (Patient not taking: Reported on 7/21/2022) 30 tablet 3    potassium chloride (KLOR-CON M) 20 MEQ extended release tablet Take 1 tablet by mouth daily 30 tablet 0    Handicap Placard MISC by Does not apply route Diagnosis: Pancreatic cancer, deconditioned, weakness  Expiration: 5/31/2023 1 each 0    apixaban (ELIQUIS) 5 MG TABS tablet Take 1 tablet by mouth 2 times daily 180 tablet 1    amLODIPine (NORVASC) 2.5 MG tablet Take 1 tablet by mouth daily 90 tablet 1    amiodarone (CORDARONE) 200 MG tablet Take 1 tablet by mouth daily 90 tablet 1    aspirin 81 MG chewable tablet Take 1 tablet by mouth daily 90 tablet 1    ondansetron (ZOFRAN) 4 MG tablet Take 4 mg by mouth every 8 hours as needed  (Patient not taking: Reported on 7/21/2022)      ferrous sulfate (IRON 325) 325 (65 Fe) MG tablet Take 325 mg by mouth daily (with breakfast)      lidocaine-prilocaine (EMLA) 2.5-2.5 % cream Apply topically as needed. 30 g 1    docusate sodium (COLACE) 100 MG capsule Take 100 mg by mouth 2 times daily       No current facility-administered medications for this visit. Food Insecurity: No Food Insecurity    Worried About [address] in the Last Year: Never true    Ran Out of Food in the Last Year: Never true       Health Maintenance   Topic Date Due    Annual Wellness Visit (AWV)  Never done    DTaP/Tdap/Td vaccine (1 - Tdap) Never done    Shingles vaccine (1 of 2) Never done    DEXA (modify frequency per FRAX score)  Never done    Pneumococcal 65+ years Vaccine (2 - PPSV23 or PCV20) 12/21/2021    COVID-19 Vaccine (4 - Booster for Moderna series) 05/19/2022    Flu vaccine (1) 09/01/2022    Depression Screen  05/31/2023    Hepatitis A vaccine  Aged Out    Hepatitis B vaccine  Aged Out    Hib vaccine  Aged Out    Meningococcal (ACWY) vaccine  Aged Out       ROS:      Review of Systems      Objective: There were no vitals filed for this visit. There is no height or weight on file to calculate BMI. Wt Readings from Last 3 Encounters:   07/21/22 130 lb (59 kg)   07/19/22 128 lb 3.2 oz (58.2 kg)   06/30/22 134 lb 3.2 oz (60.9 kg)     BP Readings from Last 3 Encounters:   07/21/22 (!) 140/62   07/21/22 (!) 140/62   07/19/22 (!) 112/57       Physical Exam    Assessment / Plan:     There are no diagnoses linked to this encounter.     Health Maintenance Due   Topic Date Due    Annual Wellness Visit (AWV)  Never done    DTaP/Tdap/Td vaccine (1 - Tdap) Never done    Shingles vaccine (1 of 2) Never done    DEXA (modify frequency per FRAX score)  Never done    Pneumococcal 65+ years Vaccine (2 - PPSV23 or PCV20) 12/21/2021    COVID-19 Vaccine (4 - Booster for Moderna series) 05/19/2022           No follow-ups on file.      Medications Prescribed:  No orders of the defined types were placed in this encounter. Future Appointments   Date Time Provider Jeanne Josette   7/25/2022  4:00 PM [facility] [place]   8/10/2022  7:45 AM STR OUT PT ONC INJ RM 03 STRZ OP ONC [place] H[place]   8/10/2022  8:00 AM STR OUT PT ONC CMA STRZ OP ONC [place] H[facility]   8/10/2022  8:20 AM Buddy Peterson MD N Oncology [facility] [place]   10/31/2022  2:45 PM Nohemi Shepherd MD N SRPX Heart [address]       Patient given educational materials - see patient instructions. Discussed use, benefit, and side effects of prescribed medications. All patient questions answered. Patient voiced understanding. Reviewed health maintenance. Instructed to continue current medications, diet and exercise. Patient agreed with treatment plan. Follow up as directed.      Electronically signed by Lore Tony MD on 7/25/2022 at 11:05 AM

## 2022-07-25 NOTE — PROGRESS NOTES
S: 81 y.o. female with   Chief Complaint   Patient presents with    Follow-up     Follow up of jaundice and better health. HPI: please see resident note for HPI and ROS. BP Readings from Last 3 Encounters:   07/25/22 112/68   07/21/22 (!) 140/62   07/21/22 (!) 140/62     Wt Readings from Last 3 Encounters:   07/25/22 129 lb (58.5 kg)   07/21/22 130 lb (59 kg)   07/19/22 128 lb 3.2 oz (58.2 kg)       O: VS:  height is 5' 5\" (1.651 m) and weight is 129 lb (58.5 kg). Her skin temperature is 98 °F (36.7 °C). Her blood pressure is 112/68 and her pulse is 71. Her respiration is 16 and oxygen saturation is 98%. AAO/NAD, appropriate affect for mood  CV:  RRR, no murmur  Resp: CTAB       Diagnosis Orders   1. Severe malnutrition (HCC)  potassium chloride (KLOR-CON M) 20 MEQ extended release tablet    TSH      2. Leg swelling        3. Subclinical hypothyroidism  TSH      4. Hypokalemia  potassium chloride (KLOR-CON M) 20 MEQ extended release tablet          Plan:  Please refer to resident note for full plan. 81-year-old female presents the office for routine follow-up. Patient has a pertinent past medical history of pancreatic cancer and atrial fibrillation. Is currently following up with oncology on a regular basis status post Whipple procedure and currently on chemotherapy. Patient is also status post stenting secondary to jaundice. Overall patient is doing well plans on follow-up with oncology as scheduled. No acute concerns at this time. Patient is also continue to follow-up with cardiology for atrial fibrillation is currently well controlled on combination of amiodarone, amlodipine, Eliquis, Bumex. Denies any concerns this time and states overall things are doing well. We will plan to refill patient's potassium supplements secondary to being on Bumex. Patient did have some subclinical hypothyroidism with elevated TSH on recent blood work.   We will plan to repeat thyroid function in the next 4 to 8 weeks. No acute concerns this time is currently asymptomatic. May be related to current cancer therapy. Plan to follow-up in the next 2 to 3 months after seeing oncology and cardiology to further discuss plan. At that time we will get Medicare annual wellness completed. Continue to work on diet for malnutrition, weight stable. Health Maintenance Due   Topic Date Due    Annual Wellness Visit (AWV)  Never done    DTaP/Tdap/Td vaccine (1 - Tdap) Never done    Shingles vaccine (1 of 2) Never done    DEXA (modify frequency per FRAX score)  Never done    Pneumococcal 65+ years Vaccine (2 - PPSV23 or PCV20) 12/21/2021    COVID-19 Vaccine (4 - Booster for Berton Gals series) 05/19/2022       Attending Physician Statement  I have discussed the case, including pertinent history and exam findings with the resident. I also have seen the patient and performed key portions of the examination. I agree with the documented assessment and plan as documented by the resident.   Edie Tay 89., DO 7/25/2022 5:05 PM

## 2022-07-25 NOTE — PATIENT INSTRUCTIONS
Continue good diet for weight gain, limit water to less 2 L  Continue exercise for strength  Check thyroid labs in the next month or so  Follow up with oncology, keep following cariology  Come back to family medicine in about 6 weeks for annual wellness examine

## 2022-07-25 NOTE — PROGRESS NOTES
Silvana Greer Sex: female  Date of Birth:[birth year] Age:82 y.o. Date:7/25/2022   Chief Complaint: Follow-up (Follow up of jaundice and better health. )    RODOLFO Donnelly is a 81 y.o. female who presents today for follow up on her edema and malnutrition. She complains of weakness and fatigue, as well as leg swelling with neuropathy. This as impacted her mobility and her ability to exercise as she would like. Of note, she has stage IIIb pancreatic cancer. Patient History    Past Medical History:   has a past medical history of Cholangiocarcinoma ([facility] [address] and Hypertension. Past Surgical History:   Procedure Laterality Date    APPENDECTOMY  1959    CHOLECYSTECTOMY  12/04/2021    Dr. Mary Diallo    ERCP N/A 11/10/2021    ERCP WITH STENT INSERTION performed by Mikal Reed MD at [address] [place] [address] Drive  12/04/2021    exp lap, radical celiac and portal lymph node dissection,pylorus preserving pancreatoduodenectomy-Dr [facility]    PORT SURGERY Left 1/25/2022    SINGLE LUMEN MCMEEJRQH INSERTION performed by Michael Daly MD at [address] [place] [address] History:   reports that she quit smoking about 50 years ago. Her smoking use included cigarettes. She has never used smokeless tobacco. She reports current alcohol use. She reports that she does not use drugs. Family History   Problem Relation Age of Onset    Breast Cancer Mother     Colon Cancer Mother     Cancer Father         bladder and lung    Lung Cancer Father     Kidney Disease Brother     No Known Problems Maternal Grandmother     No Known Problems Maternal Grandfather     No Known Problems Paternal Grandmother     No Known Problems Paternal Grandfather        Review of Systems   Review of Systems   Constitutional:  Positive for activity change and fatigue. Negative for appetite change. HENT:  Negative for hearing loss and sneezing.     Eyes:  Negative for photophobia and visual disturbance. Respiratory:  Negative for cough, shortness of breath and wheezing. Cardiovascular:  Positive for leg swelling. Negative for chest pain and palpitations. Gastrointestinal:  Negative for abdominal pain and blood in stool. Genitourinary:  Negative for difficulty urinating, dysuria and hematuria. Musculoskeletal:  Positive for gait problem. Negative for neck pain and neck stiffness. Walking with walker with seat   Skin:  Negative for rash and wound. Neurological:  Positive for weakness. Negative for dizziness and headaches. Psychiatric/Behavioral:  Negative for behavioral problems and dysphoric mood. Medications     Current Outpatient Medications   Medication Sig Dispense Refill    potassium chloride (KLOR-CON M) 20 MEQ extended release tablet Take 1 tablet by mouth in the morning. 30 tablet 0    gabapentin (NEURONTIN) 100 MG capsule Take 1 capsule by mouth in the morning and 1 capsule before bedtime. Do all this for 30 days. Intended supply: 30 days. 60 capsule 0    Handicap Placard MISC by Does not apply route Diagnosis: Pancreatic cancer, deconditioned, weakness  Expiration: 5/31/2023 1 each 0    apixaban (ELIQUIS) 5 MG TABS tablet Take 1 tablet by mouth 2 times daily 180 tablet 1    amLODIPine (NORVASC) 2.5 MG tablet Take 1 tablet by mouth daily 90 tablet 1    amiodarone (CORDARONE) 200 MG tablet Take 1 tablet by mouth daily 90 tablet 1    aspirin 81 MG chewable tablet Take 1 tablet by mouth daily 90 tablet 1    ondansetron (ZOFRAN) 4 MG tablet Take 4 mg by mouth every 8 hours as needed      ferrous sulfate (IRON 325) 325 (65 Fe) MG tablet Take 325 mg by mouth daily (with breakfast)      lidocaine-prilocaine (EMLA) 2.5-2.5 % cream Apply topically as needed. 30 g 1    docusate sodium (COLACE) 100 MG capsule Take 100 mg by mouth 2 times daily      bumetanide (BUMEX) 1 MG tablet Take 1 tablet by mouth in the morning.  . (Patient not taking: Reported on 7/21/2022) 30 tablet 3 No current facility-administered medications for this visit. Vitals & Physical Examination     Vitals:    07/25/22 1603   BP: 112/68   Site: Right Upper Arm   Position: Sitting   Cuff Size: Medium Adult   Pulse: 71   Resp: 16   Temp: 98 °F (36.7 °C)   TempSrc: Skin   SpO2: 98%   Weight: 129 lb (58.5 kg)   Height: 5' 5\" (1.651 m)    Body mass index is 21.47 kg/m². Physical Exam  Vitals reviewed. Constitutional:       General: She is not in acute distress. Appearance: Normal appearance. She is not ill-appearing or toxic-appearing. HENT:      Head: Normocephalic and atraumatic. Nose: Nose normal.      Mouth/Throat:      Mouth: Mucous membranes are moist.      Pharynx: Oropharynx is clear. Eyes:      General: No scleral icterus. Extraocular Movements: Extraocular movements intact. Cardiovascular:      Rate and Rhythm: Normal rate and regular rhythm. Heart sounds: Normal heart sounds. No murmur heard. No friction rub. No gallop. Pulmonary:      Effort: Pulmonary effort is normal. No respiratory distress. Breath sounds: Normal breath sounds. No stridor. No wheezing, rhonchi or rales. Chest:      Chest wall: No tenderness. Abdominal:      General: Abdomen is flat. Bowel sounds are normal. There is no distension. Palpations: Abdomen is soft. There is no mass. Tenderness: There is no abdominal tenderness. Musculoskeletal:         General: No signs of injury. Cervical back: Normal range of motion and neck supple. No rigidity. Right lower leg: Edema present. Left lower leg: Edema present. Skin:     General: Skin is warm and dry. Coloration: Skin is not jaundiced or pale. Neurological:      Mental Status: She is alert and oriented to person, place, and time. Motor: Weakness present.       Gait: Gait abnormal.      Comments: Patient uses seated walker   Psychiatric:         Mood and Affect: Mood normal.         Behavior: Behavior normal. Thought Content: Thought content normal.         Judgment: Judgment normal.     Results & Diagnostics   Most Recent:  Lab Results   Component Value Date    WBC 9.7 07/07/2022    HGB 10.9 (L) 07/07/2022    HCT 34.4 (L) 07/07/2022     07/07/2022    ALT 29 07/07/2022    AST 46 (H) 07/07/2022     07/07/2022     07/07/2022    K 4.4 07/07/2022    CREATININE 1.3 (H) 07/07/2022    BUN 18 07/07/2022    CO2 25 07/07/2022    TSH 5.180 (H) 07/07/2022    INR 1.04 11/09/2021    LABA1C 5.7 11/10/2021       Imaging & Electrocardiogram:  No results found. Assessment & Plan:          ICD-10-CM    1. Severe malnutrition (HCC)  E43 potassium chloride (KLOR-CON M) 20 MEQ extended release tablet     TSH   Patient reports that her weight has stabilized around 130 lbs. She understands her dietary requirements fairly well, and I spent time giving more patient education to this end. We also talked about her exercise to improve her strength and mobility. 2. Leg swelling  M79.89    The patient had bilateral lower leg edema; this is painful for the patient when pressing on exam and sometimes uncomfortable to walk on. She is on Bumex, and I spent time to educate her on her fluid intake with plans to modify her medications during follow up visit if her fluid overload worsens. 3. Subclinical hypothyroidism  E03.8 TSH   Elevated TSH early July, unclear etiology at this time. Plan to recheck and address results on follow up   4. Hypokalemia  E87.6 potassium chloride (KLOR-CON M) 20 MEQ extended release tablet   Reordering potassium supplements as needed by the patient       Return in about 6 weeks (around 9/5/2022) for Annual wellness visit. Medical Decision Making:  Please see my italicized comments above.     On this date 7/25/2022 I have spent 30 minutes reviewing previous notes, test results and face to face with the patient discussing the diagnosis and importance of compliance with the treatment plan as well

## 2022-07-28 ENCOUNTER — TELEPHONE (OUTPATIENT)
Dept: FAMILY MEDICINE CLINIC | Age: 82
End: 2022-07-28

## 2022-07-29 ENCOUNTER — NURSE ONLY (OUTPATIENT)
Dept: LAB | Age: 82
End: 2022-07-29

## 2022-07-29 DIAGNOSIS — I48.91 ATRIAL FIBRILLATION, UNSPECIFIED TYPE (HCC): ICD-10-CM

## 2022-07-29 LAB
ANION GAP SERPL CALCULATED.3IONS-SCNC: 13 MEQ/L (ref 8–16)
BUN BLDV-MCNC: 23 MG/DL (ref 7–22)
CALCIUM SERPL-MCNC: 8.9 MG/DL (ref 8.5–10.5)
CHLORIDE BLD-SCNC: 109 MEQ/L (ref 98–111)
CO2: 24 MEQ/L (ref 23–33)
CREAT SERPL-MCNC: 1.5 MG/DL (ref 0.4–1.2)
GFR SERPL CREATININE-BSD FRML MDRD: 33 ML/MIN/1.73M2
GLUCOSE BLD-MCNC: 97 MG/DL (ref 70–108)
POTASSIUM SERPL-SCNC: 3.6 MEQ/L (ref 3.5–5.2)
SODIUM BLD-SCNC: 146 MEQ/L (ref 135–145)

## 2022-08-08 ENCOUNTER — TELEPHONE (OUTPATIENT)
Dept: FAMILY MEDICINE CLINIC | Age: 82
End: 2022-08-08

## 2022-08-10 ENCOUNTER — TELEPHONE (OUTPATIENT)
Dept: ONCOLOGY | Age: 82
End: 2022-08-10

## 2022-08-10 NOTE — TELEPHONE ENCOUNTER
Alley (Check in) attempted to call pt regarding appt changes and left a vm     Pt did come into the office.  The situation was explained and pt stated she did see she had a call but wasn't to nargis who it was     Pt oked the new appt date and time

## 2022-08-12 ENCOUNTER — OFFICE VISIT (OUTPATIENT)
Dept: ONCOLOGY | Age: 82
End: 2022-08-12
Payer: MEDICARE

## 2022-08-12 ENCOUNTER — HOSPITAL ENCOUNTER (OUTPATIENT)
Dept: INFUSION THERAPY | Age: 82
Discharge: HOME | End: 2022-08-12
Payer: MEDICARE

## 2022-08-12 VITALS
TEMPERATURE: 97.7 F | WEIGHT: 132 LBS | SYSTOLIC BLOOD PRESSURE: 144 MMHG | DIASTOLIC BLOOD PRESSURE: 65 MMHG | HEART RATE: 76 BPM | HEIGHT: 65 IN | OXYGEN SATURATION: 98 % | BODY MASS INDEX: 21.99 KG/M2 | RESPIRATION RATE: 16 BRPM

## 2022-08-12 VITALS
SYSTOLIC BLOOD PRESSURE: 144 MMHG | DIASTOLIC BLOOD PRESSURE: 65 MMHG | HEART RATE: 76 BPM | BODY MASS INDEX: 22.08 KG/M2 | TEMPERATURE: 97.7 F | HEIGHT: 65 IN | OXYGEN SATURATION: 98 % | WEIGHT: 132.5 LBS | RESPIRATION RATE: 16 BRPM

## 2022-08-12 DIAGNOSIS — C25.9 MALIGNANT NEOPLASM OF PANCREAS, UNSPECIFIED LOCATION OF MALIGNANCY (HCC): Primary | ICD-10-CM

## 2022-08-12 DIAGNOSIS — C24.1 PRIMARY ADENOCARCINOMA OF AMPULLA OF VATER (HCC): ICD-10-CM

## 2022-08-12 LAB
ABSOLUTE IMMATURE GRANULOCYTE: 0.01 THOU/MM3 (ref 0–0.07)
ALBUMIN SERPL-MCNC: 3.5 G/DL (ref 3.5–5.1)
ALP BLD-CCNC: 269 U/L (ref 38–126)
ALT SERPL-CCNC: 28 U/L (ref 11–66)
AST SERPL-CCNC: 43 U/L (ref 5–40)
BASINOPHIL, AUTOMATED: 1 % (ref 0–3)
BASOPHILS ABSOLUTE: 0 THOU/MM3 (ref 0–0.1)
BILIRUB SERPL-MCNC: 0.8 MG/DL (ref 0.3–1.2)
BILIRUBIN DIRECT: 0.4 MG/DL (ref 0–0.3)
BUN, WHOLE BLOOD: 17 MG/DL (ref 8–26)
CHLORIDE, WHOLE BLOOD: 108 MEQ/L (ref 98–109)
CREATININE, WHOLE BLOOD: 1.4 MG/DL (ref 0.5–1.2)
EOSINOPHILS ABSOLUTE: 0.1 THOU/MM3 (ref 0–0.4)
EOSINOPHILS RELATIVE PERCENT: 1 % (ref 0–4)
GFR, ESTIMATED: 38 ML/MIN/1.73M2
GLUCOSE, WHOLE BLOOD: 82 MG/DL (ref 70–108)
HCT VFR BLD CALC: 33.1 % (ref 37–47)
HEMOGLOBIN: 10.5 GM/DL (ref 12–16)
IMMATURE GRANULOCYTES: 0 %
IONIZED CALCIUM, WHOLE BLOOD: 1.19 MMOL/L (ref 1.12–1.32)
LYMPHOCYTES # BLD: 34 % (ref 15–47)
LYMPHOCYTES ABSOLUTE: 1.8 THOU/MM3 (ref 1–4.8)
MCH RBC QN AUTO: 30.7 PG (ref 26–33)
MCHC RBC AUTO-ENTMCNC: 31.7 GM/DL (ref 32.2–35.5)
MCV RBC AUTO: 97 FL (ref 81–99)
MONOCYTES ABSOLUTE: 0.6 THOU/MM3 (ref 0.4–1.3)
MONOCYTES: 12 % (ref 0–12)
PDW BLD-RTO: 13.8 % (ref 11.5–14.5)
PLATELET # BLD: 230 THOU/MM3 (ref 130–400)
PMV BLD AUTO: 10.3 FL (ref 9.4–12.4)
POTASSIUM, WHOLE BLOOD: 4.2 MEQ/L (ref 3.5–4.9)
RBC # BLD: 3.42 MILL/MM3 (ref 4.2–5.4)
SEG NEUTROPHILS: 52 % (ref 43–75)
SEGMENTED NEUTROPHILS ABSOLUTE COUNT: 2.7 THOU/MM3 (ref 1.8–7.7)
SODIUM, WHOLE BLOOD: 144 MEQ/L (ref 138–146)
TOTAL CO2, WHOLE BLOOD: 29 MEQ/L (ref 23–33)
TOTAL PROTEIN: 6.6 G/DL (ref 6.1–8)
WBC # BLD: 5.3 THOU/MM3 (ref 4.8–10.8)

## 2022-08-12 PROCEDURE — 2580000003 HC RX 258: Performed by: INTERNAL MEDICINE

## 2022-08-12 PROCEDURE — 85025 COMPLETE CBC W/AUTO DIFF WBC: CPT

## 2022-08-12 PROCEDURE — 36591 DRAW BLOOD OFF VENOUS DEVICE: CPT

## 2022-08-12 PROCEDURE — 1124F ACP DISCUSS-NO DSCNMKR DOCD: CPT | Performed by: INTERNAL MEDICINE

## 2022-08-12 PROCEDURE — 6360000002 HC RX W HCPCS: Performed by: INTERNAL MEDICINE

## 2022-08-12 PROCEDURE — 80076 HEPATIC FUNCTION PANEL: CPT

## 2022-08-12 PROCEDURE — 86301 IMMUNOASSAY TUMOR CA 19-9: CPT

## 2022-08-12 PROCEDURE — 99211 OFF/OP EST MAY X REQ PHY/QHP: CPT

## 2022-08-12 PROCEDURE — 80047 BASIC METABLC PNL IONIZED CA: CPT

## 2022-08-12 PROCEDURE — 99215 OFFICE O/P EST HI 40 MIN: CPT | Performed by: INTERNAL MEDICINE

## 2022-08-12 RX ORDER — SODIUM CHLORIDE 9 MG/ML
25 INJECTION, SOLUTION INTRAVENOUS PRN
Status: CANCELLED | OUTPATIENT
Start: 2022-08-12

## 2022-08-12 RX ORDER — SODIUM CHLORIDE 0.9 % (FLUSH) 0.9 %
5-40 SYRINGE (ML) INJECTION PRN
Status: CANCELLED | OUTPATIENT
Start: 2022-08-12

## 2022-08-12 RX ORDER — SODIUM CHLORIDE 0.9 % (FLUSH) 0.9 %
5-40 SYRINGE (ML) INJECTION PRN
Status: DISCONTINUED | OUTPATIENT
Start: 2022-08-12 | End: 2022-08-13 | Stop reason: HOSPADM

## 2022-08-12 RX ORDER — HEPARIN SODIUM (PORCINE) LOCK FLUSH IV SOLN 100 UNIT/ML 100 UNIT/ML
500 SOLUTION INTRAVENOUS PRN
Status: DISCONTINUED | OUTPATIENT
Start: 2022-08-12 | End: 2022-08-13 | Stop reason: HOSPADM

## 2022-08-12 RX ORDER — HEPARIN SODIUM (PORCINE) LOCK FLUSH IV SOLN 100 UNIT/ML 100 UNIT/ML
500 SOLUTION INTRAVENOUS PRN
Status: CANCELLED | OUTPATIENT
Start: 2022-08-12

## 2022-08-12 RX ADMIN — SODIUM CHLORIDE, PRESERVATIVE FREE 20 ML: 5 INJECTION INTRAVENOUS at 08:44

## 2022-08-12 RX ADMIN — SODIUM CHLORIDE, PRESERVATIVE FREE 10 ML: 5 INJECTION INTRAVENOUS at 09:31

## 2022-08-12 RX ADMIN — Medication 500 UNITS: at 09:31

## 2022-08-12 RX ADMIN — SODIUM CHLORIDE, PRESERVATIVE FREE 10 ML: 5 INJECTION INTRAVENOUS at 08:43

## 2022-08-12 NOTE — DISCHARGE INSTRUCTIONS
Please contact your Oncologist if you have any questions regarding the port flush  that you received today. Patient instructed if experience any of the symptoms following port flush  / to notify MD immediately or go to emergency department.     * dizziness/lightheadedness  *acute nausea/vomiting - not relieved with medication  *headache - not relieved from Tylenol/pain medication  *chest pain/pressure  *rash/itching  *shortness of breath        Drink fluids - 48oz fluids daily  Call if develop fever/ chills/ signs or symptoms of infection

## 2022-08-12 NOTE — PATIENT INSTRUCTIONS
Patient will follow-up with me on October 11.   On October 4 patient will have labs including CBC, CMP CA 19-9 and CT of the abdomen pelvis with IV and oral contrast.  Creatinine will be checked and if her creatinine is abnormal she will have the CT done without IV contrast.

## 2022-08-12 NOTE — PROGRESS NOTES
[address] [facility]  [address] [place] [address]  Dept: [phone]  Loc: [phone]   Hematology/Oncology Progress Note [facility])        Sebastian Greer  1940 8/12/2022     No ref. provider found   Kamille Morrow MD     Diagnosis:   -Periampullary adenocarcinoma      Treatment:   -Whipple procedure [facility] 12/4/2021  (7 of 27 lymph nodes positive for cancer.  -Adjuvant therapy: FOLFOX last administered 5/2. First treatment began 1/26/2022. Dose reductions including oxaliplatin to 70 mg per metered squared. Question rate not given on July 6. Developed obstructive jaundice hospitalized at  June 16 through 21. Patient had leukocytosis bacteremia and a stricture post Whipple 6/15 CAT scan showed cirrhosis, small ascites and postop changes. Patient had Enterococcus bacteremia treated with Zosyn and Unasyn and then Augmentin. No vegetations on valves. ERCP 6/17 showed no obstruction or stones. Intrahepatic branches diffusely dilated. Abnormal LFTs and elevated bilirubin therefore a metal stent was placed in the common bile duct. Stent should be changed in 3 months. Followable Disease:   -CT imaging abdominal pelvic CT with contrast 6/15/2022-mild ascites otherwise no changes. -MRI of the abdomen with and without contrast 5/17/22-status post Whipple, mild worsening ascites mildly dilated biliary tree pancreatic mesenteric edema    Comorbidities:  -A. fib. Hypertension, see below      Subjective:   During her hospitalization she had lower extremity edema resume Lasix and potassium. Recently started on Bumex by her cardiologist and she notices no improvement. Amlodipine held regarding her hypertension. She has history of A. fib on anticoagulation with apixaban restarted. Now returns here for routine follow-up and transfer of care from Dr. Haresh Vega.   Most recently seen by Mirella Mustafa NP on  patient now reports neuropathy. This involves the hands and feet. Today described as touching or walking on pillows and does affect some function occasionally dropping things and but denies any pain per se. Describes her reflux is much better. She is on Bumex per cardiologist and the leg edema is unchanged. Continued lower extremity edema placed on Bumex by cardiology in July. General sense of wellbeing is good and denies any abdominal complaints. Appetite is very good. No abdominal pain or fullness. Her weight has stabilized at 129 and she feels great. ROS:  Review of Systems 14 point negative except as above.     PMH:   Past Medical History:   Diagnosis Date    Cholangiocarcinoma ([address]     ozek    Hypertension         Social HX:   Social History     Socioeconomic History    Marital status:      Spouse name: Not on file    Number of children: Not on file    Years of education: Not on file    Highest education level: Not on file   Occupational History    Not on file   Tobacco Use    Smoking status: Former     Packs/day: 0.00     Years: 10.00     Pack years: 0.00     Types: Cigarettes     Quit date: 1972     Years since quittin.6    Smokeless tobacco: Never    Tobacco comments:     social smoker   Vaping Use    Vaping Use: Never used   Substance and Sexual Activity    Alcohol use: Yes     Comment: social- been a long time since last drink    Drug use: Never    Sexual activity: Not on file   Other Topics Concern    Not on file   Social History Narrative    Not on file     Social Determinants of Health     Financial Resource Strain: Low Risk     Difficulty of Paying Living Expenses: Not very hard   Food Insecurity: No Food Insecurity    Worried About Running Out of Food in the Last Year: Never true    Ran Out of Food in the Last Year: Never true   Transportation Needs: Not on file   Physical Activity: Not on file   Stress: Not on file   Social Connections: Not on file   Intimate lidocaine-prilocaine (EMLA) 2.5-2.5 % cream Apply topically as needed. 30 g 1    docusate sodium (COLACE) 100 MG capsule Take 100 mg by mouth 2 times daily      ondansetron (ZOFRAN) 4 MG tablet Take 4 mg by mouth every 8 hours as needed (Patient not taking: Reported on 2022)       No current facility-administered medications for this visit. Facility-Administered Medications Ordered in Other Visits   Medication Dose Route Frequency Provider Last Rate Last Admin    sodium chloride flush 0.9 % injection 5-40 mL  5-40 mL IntraVENous PRN Elmo Maddox MD   20 mL at 22 0844    heparin flush 100 UNIT/ML injection 500 Units  500 Units IntraCATHeter PRN Elmo Maddox MD             EXAM:   height is 5' 5\" (1.651 m) and weight is 132 lb (59.9 kg). Her oral temperature is 97.7 °F (36.5 °C). Her blood pressure is 144/65 (abnormal) and her pulse is 76. Her respiration is 16 and oxygen saturation is 98%. Estimated body surface area is 1.66 meters squared as calculated from the following:    Height as of this encounter: 5' 5\" (1.651 m). Weight as of this encounter: 132 lb (59.9 kg). ECO    General: Non-ill appearing. Very pleasant appears very comfortable in good spirits and younger than her stated age  [de-identified]: NC/AT,nonicteric, perrla,eom intact, no mucosal lesions  Neck: normal thyroid, no masses  Nodes: No adenopathy  Lungs/chest: clear, no rales,rhonchi or wheezing, lung bases clear  CV: rrr, no rubs ,gallops or murmurs  Breasts: Not examined  Abd/Rectal: soft, non-tender,bowel sounds normal , no HSM,no masses  Back: normal curvature, No midline tenderness. flanks nontender  : Not Examined  Extremities: no cyanosis,clubbing or edema. Skin: unremarkable  Neuro: A and O x 4, CN exam nonfocal, Motor- no deficits, Sensory- no deficits, gait-nl, speech- fluent, no ataxia.   Devices: Port    DATA:  LAB:     CBC with Differential:      Lab Results   Component Value Date/Time    WBC 5.3 2022 08:40 AM RBC 3.42 08/12/2022 08:40 AM    HGB 10.5 08/12/2022 08:40 AM    HCT 33.1 08/12/2022 08:40 AM     08/12/2022 08:40 AM    MCV 97 08/12/2022 08:40 AM    MCH 30.7 08/12/2022 08:40 AM    MCHC 31.7 08/12/2022 08:40 AM    RDW 13.8 08/12/2022 08:40 AM    NRBC 0 07/07/2022 04:47 PM    SEGSPCT 41.1 07/07/2022 04:47 PM    MONOPCT 8.2 07/07/2022 04:47 PM    MONOSABS 0.6 08/12/2022 08:40 AM    LYMPHSABS 1.8 08/12/2022 08:40 AM    EOSABS 0.1 08/12/2022 08:40 AM    BASOSABS 0.0 08/12/2022 08:40 AM      Lab Results   Component Value Date/Time    SEGSABS 2.7 08/12/2022 08:40 AM       CMP:    Lab Results   Component Value Date/Time     07/29/2022 03:55 PM    K 3.6 07/29/2022 03:55 PM    K 3.3 05/17/2022 06:00 AM     07/29/2022 03:55 PM    CO2 24 07/29/2022 03:55 PM    BUN 23 07/29/2022 03:55 PM    CREATININE 1.5 07/29/2022 03:55 PM    LABGLOM 33 07/29/2022 03:55 PM    GLUCOSE 97 07/29/2022 03:55 PM    PROT 6.7 07/07/2022 04:47 PM    LABALBU 3.5 07/07/2022 04:47 PM    CALCIUM 8.9 07/29/2022 03:55 PM    BILITOT 1.8 07/07/2022 04:47 PM    ALKPHOS 315 07/07/2022 04:47 PM    AST 46 07/07/2022 04:47 PM    ALT 29 07/07/2022 04:47 PM       BMP:    Lab Results   Component Value Date/Time     07/29/2022 03:55 PM    K 3.6 07/29/2022 03:55 PM    K 3.3 05/17/2022 06:00 AM     07/29/2022 03:55 PM    CO2 24 07/29/2022 03:55 PM    BUN 23 07/29/2022 03:55 PM    LABALBU 3.5 07/07/2022 04:47 PM    CREATININE 1.5 07/29/2022 03:55 PM    CALCIUM 8.9 07/29/2022 03:55 PM    LABGLOM 33 07/29/2022 03:55 PM    GLUCOSE 97 07/29/2022 03:55 PM       Magnesium:    Lab Results   Component Value Date/Time    MG 1.6 06/08/2022 04:56 PM     PT/INR:    Lab Results   Component Value Date/Time    INR 1.04 11/09/2021 07:40 PM     TSH:    Lab Results   Component Value Date/Time    TSH 5.180 07/07/2022 04:47 PM     VITAMIN B12: No components found for: B12  FOLATE:  No results found for: FOLATE  IRON:    Lab Results   Component Value Date/Time    IRON 38 01/26/2022 08:52 AM     Iron Saturation:  No components found for: PERCENTFE  TIBC:    Lab Results   Component Value Date/Time    TIBC 178 01/26/2022 08:52 AM     FERRITIN:    Lab Results   Component Value Date/Time    FERRITIN 496 01/26/2022 08:52 AM     CA 19-9 drawn and pending 8/12/2022      IMAGING:  See above    PROCEDURES:  See above    PATHOLOGY:   See above    GENETICS:  None    MOLECULAR:  None. Patient without recurrence or metastatic disease      ASSESSMENT/PLAN:    1: Diagnosis: 42-year-old female with history of a periampullary adenocarcinoma treated with Whipple procedure followed by 7 cycles of adjuvant FOLFOX. Adjuvant therapy was stopped when she developed obstructive jaundice likely from a stricture from her Whipple procedure and was hospitalized with enterococcal bacteremia in June. Currently off all therapy. 2) Prognosis / Disease Status: High risk disease. 3) Work-up:    Labs: CBC, CMP and CA 19-9 today 8/12/2022   Imaging: Schedule next CT of the abdomen pelvis with oral and IV contrast on October 4. Creatinine is elevated that day IV contrast will not be given. Procedures: [facility] patient's discharge summary says she is to return in mid September to have the stent exchange. Of asked the patient to call since metal stents typically are not replaced. Consults: None new   Other: Follow-up with Dr. Ghanshyam Horan of cardiology who is managing her diuretics. 4) Symptom Management: She is asymptomatic. 5) Supportive care provided. Level of care is appropriate. Teaching done today. Reviewed that there is no advantage to resuming adjuvant chemotherapy since she has been off this for 3 months. 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 6) Treatment goal:      Treatment plan: Treatment summarized above      Currently on surveillance. 7) Medications reviewed.    Prescriptions today: None No orders of the defined types were placed in this encounter. OARRS:  Controlled Substance Monitoring:    Acute and Chronic Pain Monitoring:   No flowsheet data found. 8) Research Options:       Not applicable      9) Other: None           10) Follow Up:  Labs and abdominal pelvic CT October 4 and follow-up with me 1 week later.       Joseph Rodgers MD

## 2022-08-12 NOTE — PROGRESS NOTES
Pt discharged in stable condition with verbalization of discharge instructions all questions answered and all  belongings sent with patient. Patient tolerated  mediport blood draw and flush without any complications or signs of a reaction.

## 2022-08-13 LAB — CA 19-9: 37 U/ML (ref 0–35)

## 2022-08-15 ENCOUNTER — TELEPHONE (OUTPATIENT)
Dept: FAMILY MEDICINE CLINIC | Age: 82
End: 2022-08-15

## 2022-08-17 RX ORDER — GABAPENTIN 100 MG/1
CAPSULE ORAL
Qty: 60 CAPSULE | Refills: 0 | Status: SHIPPED | OUTPATIENT
Start: 2022-08-17 | End: 2022-08-31

## 2022-08-22 ENCOUNTER — TELEPHONE (OUTPATIENT)
Dept: ONCOLOGY | Age: 82
End: 2022-08-22

## 2022-08-22 NOTE — TELEPHONE ENCOUNTER
Patient left a message on 8/19/22 saying her gabapentin is not working or helping her neuropathy. Patient also stated lasix is not working that she gets from Dr fuller. She would like to know if she should continue the medications or not.

## 2022-08-31 ENCOUNTER — OFFICE VISIT (OUTPATIENT)
Dept: FAMILY MEDICINE CLINIC | Age: 82
End: 2022-08-31
Payer: MEDICARE

## 2022-08-31 VITALS
SYSTOLIC BLOOD PRESSURE: 118 MMHG | OXYGEN SATURATION: 96 % | WEIGHT: 131 LBS | HEIGHT: 65 IN | DIASTOLIC BLOOD PRESSURE: 68 MMHG | TEMPERATURE: 97.9 F | HEART RATE: 67 BPM | RESPIRATION RATE: 16 BRPM | BODY MASS INDEX: 21.83 KG/M2

## 2022-08-31 DIAGNOSIS — E43 SEVERE MALNUTRITION (HCC): ICD-10-CM

## 2022-08-31 DIAGNOSIS — Z23 NEED FOR VACCINATION: ICD-10-CM

## 2022-08-31 DIAGNOSIS — R20.2 PARESTHESIAS: ICD-10-CM

## 2022-08-31 DIAGNOSIS — E87.6 HYPOKALEMIA: ICD-10-CM

## 2022-08-31 DIAGNOSIS — M79.89 LEG SWELLING: ICD-10-CM

## 2022-08-31 DIAGNOSIS — R63.4 UNINTENTIONAL WEIGHT LOSS: ICD-10-CM

## 2022-08-31 DIAGNOSIS — I10 PRIMARY HYPERTENSION: ICD-10-CM

## 2022-08-31 DIAGNOSIS — Z00.00 WELCOME TO MEDICARE PREVENTIVE VISIT: Primary | ICD-10-CM

## 2022-08-31 DIAGNOSIS — I48.91 ATRIAL FIBRILLATION, UNSPECIFIED TYPE (HCC): ICD-10-CM

## 2022-08-31 PROCEDURE — 1124F ACP DISCUSS-NO DSCNMKR DOCD: CPT

## 2022-08-31 PROCEDURE — G0402 INITIAL PREVENTIVE EXAM: HCPCS

## 2022-08-31 RX ORDER — ZOSTER VACCINE RECOMBINANT, ADJUVANTED 50 MCG/0.5
0.5 KIT INTRAMUSCULAR SEE ADMIN INSTRUCTIONS
Qty: 0.5 ML | Refills: 0 | Status: SHIPPED | OUTPATIENT
Start: 2022-08-31 | End: 2023-02-27

## 2022-08-31 RX ORDER — GABAPENTIN 100 MG/1
100 CAPSULE ORAL 3 TIMES DAILY
Qty: 90 CAPSULE | Refills: 2 | Status: SHIPPED | OUTPATIENT
Start: 2022-08-31 | End: 2022-11-29

## 2022-08-31 ASSESSMENT — PATIENT HEALTH QUESTIONNAIRE - PHQ9
SUM OF ALL RESPONSES TO PHQ QUESTIONS 1-9: 0
SUM OF ALL RESPONSES TO PHQ QUESTIONS 1-9: 0
2. FEELING DOWN, DEPRESSED OR HOPELESS: 0
SUM OF ALL RESPONSES TO PHQ QUESTIONS 1-9: 0
SUM OF ALL RESPONSES TO PHQ9 QUESTIONS 1 & 2: 0
1. LITTLE INTEREST OR PLEASURE IN DOING THINGS: 0
SUM OF ALL RESPONSES TO PHQ QUESTIONS 1-9: 0

## 2022-08-31 ASSESSMENT — LIFESTYLE VARIABLES
HOW MANY STANDARD DRINKS CONTAINING ALCOHOL DO YOU HAVE ON A TYPICAL DAY: PATIENT DOES NOT DRINK
HOW OFTEN DO YOU HAVE A DRINK CONTAINING ALCOHOL: NEVER

## 2022-08-31 NOTE — PROGRESS NOTES
S: 81 y.o. female with   Chief Complaint   Patient presents with    Medicare AWV     Annual          Reviewed hx and ROS in detail with resident prior to exam.  See notes for additional details. BP Readings from Last 3 Encounters:   08/31/22 118/68   08/12/22 (!) 144/65   08/12/22 (!) 144/65     Wt Readings from Last 3 Encounters:   08/31/22 131 lb (59.4 kg)   08/12/22 132 lb (59.9 kg)   08/12/22 132 lb 8 oz (60.1 kg)           O: VS:  height is 5' 5\" (1.651 m) and weight is 131 lb (59.4 kg). Her temporal temperature is 97.9 °F (36.6 °C). Her blood pressure is 118/68 and her pulse is 67. Her respiration is 16 and oxygen saturation is 96%. AAO/NAD, appropriate affect for mood  CV:  RRR, no murmur  Resp: CTAB       Diagnosis Orders   1. Welcome to Medicare preventive visit        2. Severe malnutrition ([facility] [address]        3. Primary hypertension        4. Atrial fibrillation, unspecified type ([facility] [address]        5. Hypokalemia        6. Unintentional weight loss        7. Leg swelling        8. Paresthesias  gabapentin (NEURONTIN) 100 MG capsule      9. Need for vaccination  zoster recombinant adjuvanted vaccine [facility]) 50 MCG/0.5ML SUSR injection          Plan  Welcome to Medicare physical-see resident's note health maintenance reviewed care gaps discussed titration of gabapentin for neuropathy otherwise everything stable      Health Maintenance Due   Topic Date Due    DTaP/Tdap/Td vaccine (1 - Tdap) Never done    Shingles vaccine (1 of 2) Never done    DEXA (modify frequency per FRAX score)  Never done    Pneumococcal 65+ years Vaccine (2 - PPSV23 or PCV20) 12/21/2021    COVID-19 Vaccine (4 - Booster for Deborah  series) 05/19/2022         Attending Physician Statement  I have discussed the case, including pertinent history and exam findings with the resident. I also have seen the patient and performed key portions of the examination. I agree with the documented assessment and plan as documented by the resident.   ASHLEY modifier added to this encounter      Garland Echavarria MD 8/31/2022 2:39 PM

## 2022-08-31 NOTE — PROGRESS NOTES
Medicare Annual Wellness Visit    Adonay Santiago is here for [employer] (Annual )    Assessment & Plan   Welcome to Medicare preventive visit  Severe malnutrition ([address]  Primary hypertension  Atrial fibrillation, unspecified type ([address]  Hypokalemia  Unintentional weight loss  Leg swelling  Paresthesias  -     gabapentin (NEURONTIN) 100 MG capsule; Take 1 capsule by mouth 3 times daily for 90 days. , Disp-90 capsule, R-2Normal  Need for vaccination  -     zoster recombinant adjuvanted vaccine [facility]) 50 MCG/0.5ML SUSR injection; Inject 0.5 mLs into the muscle See Admin Instructions 1 dose now and repeat in 2-6 months, Disp-0.5 mL, R-0Normal    Recommendations for Preventive Services Due: see orders and patient instructions/AVS.  Recommended screening schedule for the next 5-10 years is provided to the patient in written form: see Patient Instructions/AVS.     Return in 3 months (on 11/30/2022) for Medicare Annual Wellness Visit in 1 year. Subjective   The following acute and/or chronic problems were also addressed today:  Severe malnutrition ([address]  Primary hypertension  Atrial fibrillation, unspecified type ([address]  Hypokalemia  Unintentional weight loss  Leg swelling  Paresthesias    Patient's complete Health Risk Assessment and screening values have been reviewed and are found in Flowsheets. The following problems were reviewed today and where indicated follow up appointments were made and/or referrals ordered.     Positive Risk Factor Screenings with Interventions:    Fall Risk:  Do you feel unsteady or are you worried about falling? : (!) yes  2 or more falls in past year?: no  Fall with injury in past year?: no   Fall Risk Interventions:    Home safety tips provided  Medication adjusted- Increased Gabapentin 100 mg BID to TID            General Health and ACP:  General  In general, how would you say your health is?: Very Good  In the past 7 days, have you experienced any of the following: New or Increased Pain, New or Increased Fatigue, Loneliness, Social Isolation, Stress or Anger?: (!) Yes  Select all that apply: (!) Loneliness, Social Isolation  Do you get the social and emotional support that you need?: (!) No  Do you have a Living Will?: (!) No    Advance Directives       Power of  Living Will ACP-Advance Directive ACP-Power of     Not on File [place] on 05/18/22 Filed Not on File        General Health Risk Interventions:  Fatigue: patient advised to follow-up in this office for further evaluation and treatment within 3 month(s), after follow up with oncology  Social isolation: patient declines any further intervention for this issue              Objective   Vitals:    08/31/22 1345   BP: 118/68   Site: Left Upper Arm   Position: Sitting   Cuff Size: Medium Adult   Pulse: 67   Resp: 16   Temp: 97.9 °F (36.6 °C)   TempSrc: Temporal   SpO2: 96%   Weight: 131 lb (59.4 kg)   Height: 5' 5\" (1.651 m)      Body mass index is 21.8 kg/m².       General Appearance: alert and oriented to person, place and time, well developed and in no acute distress  Skin: warm and dry, no rash or erythema  Head: normocephalic and atraumatic  Eyes: pupils equal, round, and reactive to light, extraocular eye movements intact, conjunctivae normal  ENT: tympanic membrane, external ear and ear canal normal bilaterally, nose without deformity, nasal mucosa and turbinates normal without polyps  Neck: supple and non-tender without mass, no thyromegaly or thyroid nodules, no cervical lymphadenopathy  Pulmonary/Chest: clear to auscultation bilaterally- no wheezes, rales or rhonchi, normal air movement, no respiratory distress  Cardiovascular: normal rate, regular rhythm, normal S1 and S2, no murmurs, rubs, clicks, or gallops, distal pulses intact, no carotid bruits  Abdomen: soft, non-tender, non-distended, normal bowel sounds, no masses or organomegaly  Extremities: no cyanosis, clubbing, +1 pitting bilateral LE edema  Musculoskeletal: normal range of motion, no joint swelling, deformity or tenderness  Neurologic: reflexes normal and symmetric, no cranial nerve deficit, gait, coordination and speech normal       No Known Allergies  Prior to Visit Medications    Medication Sig Taking? Authorizing Provider   zoster recombinant adjuvanted vaccine [facility]) 50 MCG/0.5ML SUSR injection Inject 0.5 mLs into the muscle See Admin Instructions 1 dose now and repeat in 2-6 months Yes Gracia Sharif, DO   gabapentin (NEURONTIN) 100 MG capsule Take 1 capsule by mouth 3 times daily for 90 days. Yes Gracia Sharif, DO   potassium chloride (KLOR-CON M) 20 MEQ extended release tablet Take 1 tablet by mouth in the morning. Yes Gracia Sharif, DO   bumetanide (BUMEX) 1 MG tablet Take 1 tablet by mouth in the morning. [place] Miranda MD   Handicap Placard MISC by Does not apply route Diagnosis: Pancreatic cancer, deconditioned, weakness  Expiration: 5/31/2023 Yes Keily Angry, APRN - CNP   apixaban (ELIQUIS) 5 MG TABS tablet Take 1 tablet by mouth 2 times daily Yes Bonnie Berry MD   amLODIPine (NORVASC) 2.5 MG tablet Take 1 tablet by mouth daily Yes Bonnie Berry MD   amiodarone (CORDARONE) 200 MG tablet Take 1 tablet by mouth daily Yes Bonnie Berry MD   aspirin 81 MG chewable tablet Take 1 tablet by mouth daily Yes Bonnie Berry MD   ondansetron (ZOFRAN) 4 MG tablet Take 4 mg by mouth every 8 hours as needed Yes Historical Provider, MD   ferrous sulfate (IRON 325) 325 (65 Fe) MG tablet Take 325 mg by mouth daily (with breakfast) Yes Historical Provider, MD   lidocaine-prilocaine (EMLA) 2.5-2.5 % cream Apply topically as needed.  Yes Astrid Kat MD   docusate sodium (COLACE) 100 MG capsule Take 100 mg by mouth 2 times daily Yes Historical Provider, MD [place] (Including outside providers/suppliers regularly involved in providing care):   Patient Care Team:  Bonnie Berry MD as PCP - General (Family Medicine)  Ayah Ray, RN as Nurse Navigator (Oncology)     Reviewed and updated this visit:  Allergies  Meds  Problems       Electronically signed by:  Angelica Pandey DO, PGY-1

## 2022-08-31 NOTE — PROGRESS NOTES
Health Maintenance Due   Topic Date Due    Annual Wellness Visit (AWV)  Never done    DTaP/Tdap/Td vaccine (1 - Tdap) Never done    Shingles vaccine (1 of 2) Never done    DEXA (modify frequency per FRAX score)  Never done    Pneumococcal 65+ years Vaccine (2 - PPSV23 or PCV20) 12/21/2021    COVID-19 Vaccine (4 - Booster for Moderna series) 05/19/2022

## 2022-08-31 NOTE — PATIENT INSTRUCTIONS
including lifestyle, illnesses that may run in your family, and various assessments and screenings as appropriate. After reviewing your medical record and screening and assessments performed today your provider may have ordered immunizations, labs, imaging, and/or referrals for you. A list of these orders (if applicable) as well as your Preventive Care list are included within your After Visit Summary for your review. Other Preventive Recommendations:    A preventive eye exam performed by an eye specialist is recommended every 1-2 years to screen for glaucoma; cataracts, macular degeneration, and other eye disorders. A preventive dental visit is recommended every 6 months. Try to get at least 150 minutes of exercise per week or 10,000 steps per day on a pedometer . Order or download the FREE \"Exercise & Physical Activity: Your Everyday Guide\" from [employer]. Call 3-116.292.7642 or search [employer] online. You need 7172-4479 mg of calcium and 7768-7722 IU of vitamin D per day. It is possible to meet your calcium requirement with diet alone, but a vitamin D supplement is usually necessary to meet this goal.  When exposed to the sun, use a sunscreen that protects against both UVA and UVB radiation with an SPF of 30 or greater. Reapply every 2 to 3 hours or after sweating, drying off with a towel, or swimming. Always wear a seat belt when traveling in a car. Always wear a helmet when riding a bicycle or motorcycle.

## 2022-09-12 DIAGNOSIS — R20.2 PARESTHESIAS: ICD-10-CM

## 2022-09-12 RX ORDER — GABAPENTIN 100 MG/1
CAPSULE ORAL
Qty: 60 CAPSULE | Refills: 0 | OUTPATIENT
Start: 2022-09-12

## 2022-09-23 DIAGNOSIS — E43 SEVERE MALNUTRITION (HCC): ICD-10-CM

## 2022-09-23 DIAGNOSIS — E87.6 HYPOKALEMIA: ICD-10-CM

## 2022-09-26 RX ORDER — POTASSIUM CHLORIDE 20 MEQ/1
20 TABLET, EXTENDED RELEASE ORAL DAILY
Qty: 30 TABLET | Refills: 0 | Status: SHIPPED | OUTPATIENT
Start: 2022-09-26 | End: 2022-10-31

## 2022-09-26 NOTE — TELEPHONE ENCOUNTER
Patient's last appointment was : 8/31/2022  Patient's next appointment is :   Future Appointments   Date Time Provider Jeanne Finch   9/30/2022  1:00 PM STR OUT PT ONC INJ RM 04 STR[facility] OP ONC [place] [facility]   10/4/2022  9:20 AM STR CT IMAGING RM1  [facility] OUT EXP STR [facility]iolog   10/11/2022 10:30 AM STR OUT PT ONC INJ RM 02 STRZ OP ONC [place] HOD   10/11/2022 11:20 AM Paulette Hart MD N Oncology [address]   10/31/2022  2:45 PM Sai Stephen MD N [facility] Heart [address]   11/30/2022  2:00 PM Daljit Sharif DO [facility] FM RES [facility]     Last refilled:7/25/22    Lab Results   Component Value Date    LABA1C 5.7 11/10/2021     No results found for: CHOL, TRIG, HDL, LDLCALC, LDLDIRECT  Lab Results   Component Value Date     08/12/2022    K 4.2 08/12/2022     07/29/2022    CO2 24 07/29/2022    BUN 23 (H) 07/29/2022    CREATININE 1.4 (H) 08/12/2022    GLUCOSE 97 07/29/2022    CALCIUM 8.9 07/29/2022    PROT 6.6 08/12/2022    LABALBU 3.5 08/12/2022    BILITOT 0.8 08/12/2022    ALKPHOS 269 (H) 08/12/2022    AST 43 (H) 08/12/2022    ALT 28 08/12/2022    LABGLOM 33 (A) 07/29/2022     Lab Results   Component Value Date    TSH 5.180 (H) 07/07/2022    T4FREE 1.62 07/07/2022     Lab Results   Component Value Date    WBC 5.3 08/12/2022    HGB 10.5 (L) 08/12/2022    HCT 33.1 (L) 08/12/2022    MCV 97 08/12/2022     08/12/2022

## 2022-09-30 ENCOUNTER — HOSPITAL ENCOUNTER (OUTPATIENT)
Dept: INFUSION THERAPY | Age: 82
Discharge: HOME | End: 2022-09-30
Payer: MEDICARE

## 2022-09-30 VITALS
OXYGEN SATURATION: 99 % | HEART RATE: 66 BPM | RESPIRATION RATE: 18 BRPM | SYSTOLIC BLOOD PRESSURE: 120 MMHG | DIASTOLIC BLOOD PRESSURE: 57 MMHG | TEMPERATURE: 98.3 F

## 2022-09-30 DIAGNOSIS — C25.9 MALIGNANT NEOPLASM OF PANCREAS, UNSPECIFIED LOCATION OF MALIGNANCY (HCC): Primary | ICD-10-CM

## 2022-09-30 LAB
ABSOLUTE IMMATURE GRANULOCYTE: 0 THOU/MM3 (ref 0–0.07)
ALBUMIN SERPL-MCNC: 3.2 G/DL (ref 3.5–5.1)
ALP BLD-CCNC: 183 U/L (ref 38–126)
ALT SERPL-CCNC: 23 U/L (ref 11–66)
AST SERPL-CCNC: 32 U/L (ref 5–40)
BASINOPHIL, AUTOMATED: 1 % (ref 0–3)
BASOPHILS ABSOLUTE: 0 THOU/MM3 (ref 0–0.1)
BILIRUB SERPL-MCNC: 0.7 MG/DL (ref 0.3–1.2)
BILIRUBIN DIRECT: < 0.2 MG/DL (ref 0–0.3)
BUN, WHOLE BLOOD: 18 MG/DL (ref 8–26)
CHLORIDE, WHOLE BLOOD: 109 MEQ/L (ref 98–109)
CREATININE, WHOLE BLOOD: 1.3 MG/DL (ref 0.5–1.2)
EOSINOPHILS ABSOLUTE: 0.1 THOU/MM3 (ref 0–0.4)
EOSINOPHILS RELATIVE PERCENT: 2 % (ref 0–4)
GFR, ESTIMATED: 42 ML/MIN/1.73M2
GLUCOSE, WHOLE BLOOD: 103 MG/DL (ref 70–108)
HCT VFR BLD CALC: 32.6 % (ref 37–47)
HEMOGLOBIN: 10.4 GM/DL (ref 12–16)
IMMATURE GRANULOCYTES: 0 %
IONIZED CALCIUM, WHOLE BLOOD: 1.19 MMOL/L (ref 1.12–1.32)
LYMPHOCYTES # BLD: 39 % (ref 15–47)
LYMPHOCYTES ABSOLUTE: 1.7 THOU/MM3 (ref 1–4.8)
MCH RBC QN AUTO: 31.1 PG (ref 26–33)
MCHC RBC AUTO-ENTMCNC: 31.9 GM/DL (ref 32.2–35.5)
MCV RBC AUTO: 98 FL (ref 81–99)
MONOCYTES ABSOLUTE: 0.5 THOU/MM3 (ref 0.4–1.3)
MONOCYTES: 12 % (ref 0–12)
PDW BLD-RTO: 14 % (ref 11.5–14.5)
PLATELET # BLD: 175 THOU/MM3 (ref 130–400)
PMV BLD AUTO: 10 FL (ref 9.4–12.4)
POTASSIUM, WHOLE BLOOD: 4 MEQ/L (ref 3.5–4.9)
RBC # BLD: 3.34 MILL/MM3 (ref 4.2–5.4)
SEG NEUTROPHILS: 46 % (ref 43–75)
SEGMENTED NEUTROPHILS ABSOLUTE COUNT: 2.1 THOU/MM3 (ref 1.8–7.7)
SODIUM, WHOLE BLOOD: 144 MEQ/L (ref 138–146)
TOTAL CO2, WHOLE BLOOD: 28 MEQ/L (ref 23–33)
TOTAL PROTEIN: 6.3 G/DL (ref 6.1–8)
WBC # BLD: 4.5 THOU/MM3 (ref 4.8–10.8)

## 2022-09-30 PROCEDURE — 36415 COLL VENOUS BLD VENIPUNCTURE: CPT

## 2022-09-30 PROCEDURE — 2580000003 HC RX 258: Performed by: INTERNAL MEDICINE

## 2022-09-30 PROCEDURE — 86301 IMMUNOASSAY TUMOR CA 19-9: CPT

## 2022-09-30 PROCEDURE — 85025 COMPLETE CBC W/AUTO DIFF WBC: CPT

## 2022-09-30 PROCEDURE — 80047 BASIC METABLC PNL IONIZED CA: CPT

## 2022-09-30 PROCEDURE — 36591 DRAW BLOOD OFF VENOUS DEVICE: CPT

## 2022-09-30 PROCEDURE — 6360000002 HC RX W HCPCS: Performed by: INTERNAL MEDICINE

## 2022-09-30 PROCEDURE — 80076 HEPATIC FUNCTION PANEL: CPT

## 2022-09-30 RX ORDER — SODIUM CHLORIDE 0.9 % (FLUSH) 0.9 %
5-40 SYRINGE (ML) INJECTION PRN
Status: DISCONTINUED | OUTPATIENT
Start: 2022-09-30 | End: 2022-10-01 | Stop reason: HOSPADM

## 2022-09-30 RX ORDER — HEPARIN SODIUM (PORCINE) LOCK FLUSH IV SOLN 100 UNIT/ML 100 UNIT/ML
500 SOLUTION INTRAVENOUS PRN
Status: CANCELLED | OUTPATIENT
Start: 2022-09-30

## 2022-09-30 RX ORDER — HEPARIN SODIUM (PORCINE) LOCK FLUSH IV SOLN 100 UNIT/ML 100 UNIT/ML
500 SOLUTION INTRAVENOUS PRN
Status: DISCONTINUED | OUTPATIENT
Start: 2022-09-30 | End: 2022-10-01 | Stop reason: HOSPADM

## 2022-09-30 RX ORDER — SODIUM CHLORIDE 9 MG/ML
25 INJECTION, SOLUTION INTRAVENOUS PRN
Status: CANCELLED | OUTPATIENT
Start: 2022-09-30

## 2022-09-30 RX ORDER — SODIUM CHLORIDE 0.9 % (FLUSH) 0.9 %
5-40 SYRINGE (ML) INJECTION PRN
Status: CANCELLED | OUTPATIENT
Start: 2022-09-30

## 2022-09-30 RX ADMIN — Medication 500 UNITS: at 13:16

## 2022-09-30 RX ADMIN — SODIUM CHLORIDE, PRESERVATIVE FREE 10 ML: 5 INJECTION INTRAVENOUS at 13:15

## 2022-09-30 RX ADMIN — SODIUM CHLORIDE, PRESERVATIVE FREE 10 ML: 5 INJECTION INTRAVENOUS at 13:16

## 2022-09-30 NOTE — PLAN OF CARE
Problem: Discharge Planning  Goal: Knowledge of discharge instructions  Description: Knowledge of discharge instructions  Outcome: Adequate for Discharge  Note: Verbalized understanding of discharge instructions, follow-up appointments, and when to call the physician. Intervention: Discharge to appropriate level of care  Note: Discuss discharge instructions, follow ups, and when to call the doctor. Problem: Intellectual/Education/Knowledge Deficit  Goal: Teaching initiated upon admission  Outcome: Adequate for Discharge  Note: Patient verbalizes understanding to verbal information given on mediport lab draw,action and possible side effects. Aware to call MD if develop complications. Intervention: Verbal/written education provided  Note: Discussed mediport lab draw and when to call the doctor. Problem: Falls - Risk of:  Goal: Will remain free from falls  Description: Will remain free from falls  Outcome: Adequate for Discharge  Note: Patient verbalizes understanding of fall precautions. Patient free from falls this visit. Intervention: Assess risk factors for falls  Note: Discussed fall precautions, call light within reach. Problem: Infection - Central Venous Catheter-Associated Bloodstream Infection:  Goal: Will show no infection signs and symptoms  Description: Will show no infection signs and symptoms  Outcome: Adequate for Discharge  Note: Mediport site with no redness or warmth. Skin over port intact with no signs of breakdown noted. Patient verbalizes signs/symptoms of port infection and when to notify the physician. Intervention: Infection risk assessment  Note: Discussed port maintenance, infection prevention, signs and when to call the doctor     Care plan reviewed with patient. Patient verbalizes understanding of the plan of care and contribute to goal setting.

## 2022-10-01 LAB — CA 19-9: 39 U/ML (ref 0–35)

## 2022-10-04 ENCOUNTER — HOSPITAL ENCOUNTER (OUTPATIENT)
Dept: CT IMAGING | Age: 82
Discharge: HOME | End: 2022-10-04
Payer: MEDICARE

## 2022-10-04 DIAGNOSIS — C25.9 MALIGNANT NEOPLASM OF PANCREAS, UNSPECIFIED LOCATION OF MALIGNANCY (HCC): ICD-10-CM

## 2022-10-04 PROCEDURE — 6360000004 HC RX CONTRAST MEDICATION: Performed by: INTERNAL MEDICINE

## 2022-10-04 PROCEDURE — 74177 CT ABD & PELVIS W/CONTRAST: CPT

## 2022-10-04 RX ADMIN — IOPAMIDOL 80 ML: 755 INJECTION, SOLUTION INTRAVENOUS at 08:23

## 2022-10-11 ENCOUNTER — OFFICE VISIT (OUTPATIENT)
Dept: ONCOLOGY | Age: 82
End: 2022-10-11
Payer: MEDICARE

## 2022-10-11 ENCOUNTER — HOSPITAL ENCOUNTER (OUTPATIENT)
Dept: INFUSION THERAPY | Age: 82
Discharge: HOME | End: 2022-10-11
Payer: MEDICARE

## 2022-10-11 VITALS
SYSTOLIC BLOOD PRESSURE: 108 MMHG | OXYGEN SATURATION: 97 % | HEIGHT: 65 IN | BODY MASS INDEX: 22.16 KG/M2 | RESPIRATION RATE: 18 BRPM | DIASTOLIC BLOOD PRESSURE: 58 MMHG | TEMPERATURE: 98.3 F | WEIGHT: 133 LBS | HEART RATE: 73 BPM

## 2022-10-11 VITALS
OXYGEN SATURATION: 97 % | RESPIRATION RATE: 18 BRPM | BODY MASS INDEX: 22.23 KG/M2 | HEIGHT: 65 IN | WEIGHT: 133.4 LBS | SYSTOLIC BLOOD PRESSURE: 108 MMHG | DIASTOLIC BLOOD PRESSURE: 58 MMHG | HEART RATE: 73 BPM | TEMPERATURE: 98.3 F

## 2022-10-11 DIAGNOSIS — C25.9 MALIGNANT NEOPLASM OF PANCREAS, UNSPECIFIED LOCATION OF MALIGNANCY (HCC): Primary | ICD-10-CM

## 2022-10-11 LAB
ABSOLUTE IMMATURE GRANULOCYTE: 0 THOU/MM3 (ref 0–0.07)
ALBUMIN SERPL-MCNC: 3.7 G/DL (ref 3.5–5.1)
ALP BLD-CCNC: 196 U/L (ref 38–126)
ALT SERPL-CCNC: 28 U/L (ref 11–66)
AST SERPL-CCNC: 43 U/L (ref 5–40)
BASINOPHIL, AUTOMATED: 1 % (ref 0–3)
BASOPHILS ABSOLUTE: 0 THOU/MM3 (ref 0–0.1)
BILIRUB SERPL-MCNC: 0.8 MG/DL (ref 0.3–1.2)
BILIRUBIN DIRECT: < 0.2 MG/DL (ref 0–0.3)
BUN, WHOLE BLOOD: 26 MG/DL (ref 8–26)
CHLORIDE, WHOLE BLOOD: 104 MEQ/L (ref 98–109)
CREATININE, WHOLE BLOOD: 1.7 MG/DL (ref 0.5–1.2)
EOSINOPHILS ABSOLUTE: 0 THOU/MM3 (ref 0–0.4)
EOSINOPHILS RELATIVE PERCENT: 1 % (ref 0–4)
GFR, ESTIMATED: 31 ML/MIN/1.73M2
GLUCOSE, WHOLE BLOOD: 123 MG/DL (ref 70–108)
HCT VFR BLD CALC: 33.8 % (ref 37–47)
HEMOGLOBIN: 10.8 GM/DL (ref 12–16)
IMMATURE GRANULOCYTES: 0 %
IONIZED CALCIUM, WHOLE BLOOD: 1.15 MMOL/L (ref 1.12–1.32)
LYMPHOCYTES # BLD: 26 % (ref 15–47)
LYMPHOCYTES ABSOLUTE: 1.4 THOU/MM3 (ref 1–4.8)
MCH RBC QN AUTO: 30.9 PG (ref 26–33)
MCHC RBC AUTO-ENTMCNC: 32 GM/DL (ref 32.2–35.5)
MCV RBC AUTO: 97 FL (ref 81–99)
MONOCYTES ABSOLUTE: 0.6 THOU/MM3 (ref 0.4–1.3)
MONOCYTES: 11 % (ref 0–12)
PDW BLD-RTO: 14 % (ref 11.5–14.5)
PLATELET # BLD: 191 THOU/MM3 (ref 130–400)
PMV BLD AUTO: 10 FL (ref 9.4–12.4)
POTASSIUM, WHOLE BLOOD: 4.4 MEQ/L (ref 3.5–4.9)
RBC # BLD: 3.49 MILL/MM3 (ref 4.2–5.4)
SEG NEUTROPHILS: 62 % (ref 43–75)
SEGMENTED NEUTROPHILS ABSOLUTE COUNT: 3.3 THOU/MM3 (ref 1.8–7.7)
SODIUM, WHOLE BLOOD: 140 MEQ/L (ref 138–146)
TOTAL CO2, WHOLE BLOOD: 28 MEQ/L (ref 23–33)
TOTAL PROTEIN: 6.3 G/DL (ref 6.1–8)
WBC # BLD: 5.3 THOU/MM3 (ref 4.8–10.8)

## 2022-10-11 PROCEDURE — 80076 HEPATIC FUNCTION PANEL: CPT

## 2022-10-11 PROCEDURE — 1124F ACP DISCUSS-NO DSCNMKR DOCD: CPT | Performed by: INTERNAL MEDICINE

## 2022-10-11 PROCEDURE — 36591 DRAW BLOOD OFF VENOUS DEVICE: CPT

## 2022-10-11 PROCEDURE — 99214 OFFICE O/P EST MOD 30 MIN: CPT | Performed by: INTERNAL MEDICINE

## 2022-10-11 PROCEDURE — 85025 COMPLETE CBC W/AUTO DIFF WBC: CPT

## 2022-10-11 PROCEDURE — 80047 BASIC METABLC PNL IONIZED CA: CPT

## 2022-10-11 PROCEDURE — 99211 OFF/OP EST MAY X REQ PHY/QHP: CPT

## 2022-10-11 PROCEDURE — 2580000003 HC RX 258: Performed by: INTERNAL MEDICINE

## 2022-10-11 PROCEDURE — 6360000002 HC RX W HCPCS: Performed by: INTERNAL MEDICINE

## 2022-10-11 RX ORDER — SODIUM CHLORIDE 9 MG/ML
25 INJECTION, SOLUTION INTRAVENOUS PRN
OUTPATIENT
Start: 2022-10-11

## 2022-10-11 RX ORDER — HEPARIN SODIUM (PORCINE) LOCK FLUSH IV SOLN 100 UNIT/ML 100 UNIT/ML
500 SOLUTION INTRAVENOUS PRN
Status: DISCONTINUED | OUTPATIENT
Start: 2022-10-11 | End: 2022-10-12 | Stop reason: HOSPADM

## 2022-10-11 RX ORDER — HEPARIN SODIUM (PORCINE) LOCK FLUSH IV SOLN 100 UNIT/ML 100 UNIT/ML
500 SOLUTION INTRAVENOUS PRN
OUTPATIENT
Start: 2022-10-11

## 2022-10-11 RX ORDER — SODIUM CHLORIDE 0.9 % (FLUSH) 0.9 %
5-40 SYRINGE (ML) INJECTION PRN
OUTPATIENT
Start: 2022-10-11

## 2022-10-11 RX ORDER — SODIUM CHLORIDE 0.9 % (FLUSH) 0.9 %
5-40 SYRINGE (ML) INJECTION PRN
Status: DISCONTINUED | OUTPATIENT
Start: 2022-10-11 | End: 2022-10-12 | Stop reason: HOSPADM

## 2022-10-11 RX ADMIN — HEPARIN 500 UNITS: 100 SYRINGE at 11:38

## 2022-10-11 RX ADMIN — SODIUM CHLORIDE, PRESERVATIVE FREE 20 ML: 5 INJECTION INTRAVENOUS at 11:09

## 2022-10-11 RX ADMIN — SODIUM CHLORIDE, PRESERVATIVE FREE 10 ML: 5 INJECTION INTRAVENOUS at 11:08

## 2022-10-11 NOTE — PATIENT INSTRUCTIONS
January 11, 2023 labs including CBC, CMP CA 19-9 and CT abdomen and pelvis with oral but not IV contrast    Follow-up with me Wednesday, January 18 to review above    Patient will contact [facility] to have her biliary stent changed soon

## 2022-10-11 NOTE — DISCHARGE INSTRUCTIONS
Instructions      Return in about 3 months (around 1/18/2023). January 11, 2023 labs including CBC, CMP CA 19-9 and CT abdomen and pelvis with oral but not IV contrast     Follow-up with me Wednesday, January 18 to review above     Patient will contact [facility] to have her biliary stent changed soon         You received lab draw from [address] [facility] [address] [facility] while in outpatient oncology clinic. Call if any uncontrolled nausea/vomiting  Call if any fevers/chills/ problems or concerns  Call if any bleeding  Call if any chest pain/pressure  Call if any severe shortness of breath  Drink at least (6) 8oz glasses of fluids daily     If develop any of above condition, please call your MD or go to Emergency Department.

## 2022-10-11 NOTE — PROGRESS NOTES
[address] CANCER [address] [place] [address]  Dept: [phone]  Loc: [phone]   Hematology/Oncology Progress Note [facility])        Mireya Greer  1940    10/11/2022     No ref. provider found   Ela Mack MD     Diagnosis:   -Periampullary adenocarcinoma      Treatment:   -Whipple procedure [facility] 12/4/2021  (7 of 27 lymph nodes positive for cancer.  -Adjuvant therapy: FOLFOX last administered 5/2. First treatment began 1/26/2022. Dose reductions including oxaliplatin to 70 mg per metered squared. Question rate not given on July 6. Developed obstructive jaundice hospitalized at  June 16 through 21. Patient had leukocytosis bacteremia and a stricture post Whipple 6/15 CAT scan showed cirrhosis, small ascites and postop changes. Patient had Enterococcus bacteremia treated with Zosyn and Unasyn and then Augmentin. No vegetations on valves. ERCP 6/17 showed no obstruction or stones. Intrahepatic branches diffusely dilated. Abnormal LFTs and elevated bilirubin therefore a metal stent was placed in the common bile duct. Stent should be changed in 3 months. Followable Disease:   -CT imaging abdominal pelvic CT with contrast 6/15/2022-mild ascites otherwise no changes. -MRI of the abdomen with and without contrast 5/17/22-status post Whipple, mild worsening ascites mildly dilated biliary tree pancreatic mesenteric edema    Comorbidities:  -A. fib. Hypertension, see below      Subjective: Arturo Muller is overdue to go back to  and have her biliary stent changed. She will arrange this. Appetite is excellent. She is slowly regaining weight. Denies any pain. No GI or bowel concerns. Her results of her recent/ 10/4/20 CT abdomen pelvis. That was done with IV contrast.  Her creatinine is up to 1.7.   Future scans will be done without IV contrast and she has been encouraged to increase oral hydration    ROS:  Review of Systems 14 point negative except as above.     PMH:   Past Medical History:   Diagnosis Date    Cholangiocarcinoma ([address]     Mrozek    Hypertension         Social HX:   Social History     Socioeconomic History    Marital status:      Spouse name: Not on file    Number of children: Not on file    Years of education: Not on file    Highest education level: Not on file   Occupational History    Not on file   Tobacco Use    Smoking status: Former     Packs/day: 0.00     Years: 10.00     Pack years: 0.00     Types: Cigarettes     Quit date: 1972     Years since quittin.8    Smokeless tobacco: Never    Tobacco comments:     social smoker   Vaping Use    Vaping Use: Never used   Substance and Sexual Activity    Alcohol use: Yes     Comment: social- been a long time since last drink    Drug use: Never    Sexual activity: Not on file   Other Topics Concern    Not on file   Social History Narrative    Not on file     Social Determinants of Health     Financial Resource Strain: Low Risk     Difficulty of Paying Living Expenses: Not very hard   Food Insecurity: No Food Insecurity    Worried About Running Out of Food in the Last Year: Never true    0 [facility] N in the Last Year: Never true   Transportation Needs: Not on file   Physical Activity: Insufficiently Active    Days of Exercise per Week: 1 day    Minutes of Exercise per Session: 10 min   Stress: Not on file   Social Connections: Not on file   Intimate Partner Violence: Not on file   Housing Stability: Not on file        Spouse:     Phone: Rufino [phone] [facility] [phone]     Employment:      Immunizations:  Immunization History   Administered Date(s) Administered    COVID-19, [address] [place] [address] border, Primary or Immunocompromised, (age 12y+), IM, 100 mcg/0.5mL 2021, 2021, 2022    COVID-19, PFIZER Bivalent BOOSTER, (age 12y+), IM, 30 mcg/0.3 mL dose 2022    Influenza, FLUZONE (age 72 y+), High Dose, 0.7mL 10/27/2020, 10/15/2021    Influenza, High Dose (Fluzone 65 yrs and older) 2017, 2018, 2019    Pneumococcal Conjugate 13-valent Fallon Crawford) 2020        Health Screenings:  Mammogram:  No results found for this or any previous visit. No valid procedures specified. Pap / Pelvic: na  C-Scope: na      Gyn HX:   GPA:  RAJAT/BSO: intact  LMP: No LMP recorded. Patient is postmenopausal.     Health Maintenance Due   Topic Date Due    DTaP/Tdap/Td vaccine (1 - Tdap) Never done    Shingles vaccine (1 of 2) Never done    DEXA (modify frequency per FRAX score)  Never done    Pneumococcal 65+ years Vaccine (2 - PPSV23 or PCV20) 2021        Interests:       Fam HX:   Family History   Problem Relation Age of Onset    Breast Cancer Mother     Colon Cancer Mother     Cancer Father         bladder and lung    Lung Cancer Father     Kidney Disease Brother     No Known Problems Maternal Grandmother     No Known Problems Maternal Grandfather     No Known Problems Paternal Grandmother     No Known Problems Paternal Grandfather         Hospitalizations:   Began University  through  with obstructive jaundice from a stricture from her prior Whipple and enterococcal bacteremia. See above, metal stent placed.     Allergies:  No Known Allergies     Adult Illness:  Patient Active Problem List   Diagnosis    Obstructive jaundice    Hyperbilirubinemia    Common bile duct stricture    Elevated LFTs    Hypokalemia    Pancreatic cyst    Normocytic anemia    Abnormal urinalysis    Unintentional weight loss    Hiatal hernia    Diverticulosis    Malignant neoplasm of pancreas (HCC)    Atrial fibrillation (HCC)    Cholangiocarcinoma (HCC)    Thrombocytopenia (HCC)    SIRIA (acute kidney injury) ([address]    Hypomagnesemia    Macrocytic anemia    Severe malnutrition ([address]        Surgery:  Past Surgical History:   Procedure Laterality Date    APPENDECTOMY      CHOLECYSTECTOMY  2021     Carlos Manuel    ERCP N/A 11/10/2021    ERCP WITH STENT INSERTION performed by Nova Ambriz MD at [facility] [address]  12/04/2021    exp lap, radical celiac and portal lymph node dissection,pylorus preserving pancreatoduodenectomy-Dr [facility]    PORT SURGERY Left 1/25/2022    SINGLE LUMEN KNYHQGMUI INSERTION performed by Edna Victor MD at [place] [facility]        Medications:  Current Outpatient Medications   Medication Sig Dispense Refill    potassium chloride (KLOR-CON M) 20 MEQ extended release tablet TAKE 1 TABLET BY MOUTH IN THE MORNING 30 tablet 0    gabapentin (NEURONTIN) 100 MG capsule Take 1 capsule by mouth 3 times daily for 90 days. 90 capsule 2    bumetanide (BUMEX) 1 MG tablet Take 1 tablet by mouth in the morning. . 30 tablet 3    Handicap Placard MISC by Does not apply route Diagnosis: Pancreatic cancer, deconditioned, weakness  Expiration: 5/31/2023 1 each 0    apixaban (ELIQUIS) 5 MG TABS tablet Take 1 tablet by mouth 2 times daily 180 tablet 1    amLODIPine (NORVASC) 2.5 MG tablet Take 1 tablet by mouth daily 90 tablet 1    amiodarone (CORDARONE) 200 MG tablet Take 1 tablet by mouth daily 90 tablet 1    aspirin 81 MG chewable tablet Take 1 tablet by mouth daily 90 tablet 1    ondansetron (ZOFRAN) 4 MG tablet Take 4 mg by mouth every 8 hours as needed      ferrous sulfate (IRON 325) 325 (65 Fe) MG tablet Take 325 mg by mouth daily (with breakfast)      lidocaine-prilocaine (EMLA) 2.5-2.5 % cream Apply topically as needed. 30 g 1    docusate sodium (COLACE) 100 MG capsule Take 100 mg by mouth 2 times daily      zoster recombinant adjuvanted vaccine [facility]) 50 MCG/0.5ML SUSR injection Inject 0.5 mLs into the muscle See Admin Instructions 1 dose now and repeat in 2-6 months 0.5 mL 0     No current facility-administered medications for this visit.      Facility-Administered Medications Ordered in Other Visits   Medication Dose Route Frequency Provider Last Rate Last Admin    sodium chloride flush 0.9 % injection 5-40 mL  5-40 mL IntraVENous PRBRANDY Tran MD   20 mL at 10/11/22 1109    heparin flush 100 UNIT/ML injection 500 Units  500 Units IntraCATHeter PRBRANDY Tran MD             EXAM:   height is 5' 5\" (1.651 m) and weight is 133 lb (60.3 kg). Her oral temperature is 98.3 °F (36.8 °C). Her blood pressure is 108/58 (abnormal) and her pulse is 73. Her respiration is 18 and oxygen saturation is 97%. Estimated body surface area is 1.66 meters squared as calculated from the following:    Height as of this encounter: 5' 5\" (1.651 m). Weight as of this encounter: 133 lb (60.3 kg). ECO    General: Non-ill appearing. Very pleasant appears very comfortable in good spirits and younger than her stated age  [de-identified]: NC/AT,nonicteric, perrla,eom intact, no mucosal lesions  Neck: normal thyroid, no masses  Nodes: No adenopathy  Lungs/chest: clear, no rales,rhonchi or wheezing, lung bases clear  CV: rrr, no rubs ,gallops or murmurs  Breasts: Not examined  Abd/Rectal: soft, non-tender,bowel sounds normal , no HSM,no masses  Back: normal curvature, No midline tenderness. flanks nontender  : Not Examined  Extremities: no cyanosis,clubbing or edema. Skin: unremarkable  Neuro: A and O x 4, CN exam nonfocal, Motor- no deficits, Sensory- no deficits, gait-nl, speech- fluent, no ataxia.   Devices: Port    DATA:  LAB:     CBC with Differential:      Lab Results   Component Value Date/Time    WBC 5.3 10/11/2022 11:08 AM    RBC 3.49 10/11/2022 11:08 AM    HGB 10.8 10/11/2022 11:08 AM    HCT 33.8 10/11/2022 11:08 AM     10/11/2022 11:08 AM    MCV 97 10/11/2022 11:08 AM    MCH 30.9 10/11/2022 11:08 AM    MCHC 32.0 10/11/2022 11:08 AM    RDW 14.0 10/11/2022 11:08 AM    NRBC 0 2022 04:47 PM    SEGSPCT 41.1 2022 04:47 PM    MONOPCT 8.2 2022 04:47 PM    MONOSABS 0.6 10/11/2022 11:08 AM    LYMPHSABS 1.4 10/11/2022 11:08 AM    EOSABS 0.0 10/11/2022 11:08 AM BASOSABS 0.0 10/11/2022 11:08 AM      Lab Results   Component Value Date/Time    SEGSABS 3.3 10/11/2022 11:08 AM       CMP:    Lab Results   Component Value Date/Time     10/11/2022 11:08 AM     07/29/2022 03:55 PM    K 4.4 10/11/2022 11:08 AM    K 3.6 07/29/2022 03:55 PM    K 3.3 05/17/2022 06:00 AM     07/29/2022 03:55 PM    CO2 24 07/29/2022 03:55 PM    BUN 23 07/29/2022 03:55 PM    CREATININE 1.7 10/11/2022 11:08 AM    CREATININE 1.5 07/29/2022 03:55 PM    LABGLOM 33 07/29/2022 03:55 PM    GLUCOSE 97 07/29/2022 03:55 PM    PROT 6.3 09/30/2022 01:15 PM    LABALBU 3.2 09/30/2022 01:15 PM    CALCIUM 8.9 07/29/2022 03:55 PM    BILITOT 0.7 09/30/2022 01:15 PM    ALKPHOS 183 09/30/2022 01:15 PM    AST 32 09/30/2022 01:15 PM    ALT 23 09/30/2022 01:15 PM   This creatinine elevation from baseline of 1.5 was 1 week after she had a IV contrast abdominal CT    BMP:    Lab Results   Component Value Date/Time     10/11/2022 11:08 AM     07/29/2022 03:55 PM    K 4.4 10/11/2022 11:08 AM    K 3.6 07/29/2022 03:55 PM    K 3.3 05/17/2022 06:00 AM     07/29/2022 03:55 PM    CO2 24 07/29/2022 03:55 PM    BUN 23 07/29/2022 03:55 PM    LABALBU 3.2 09/30/2022 01:15 PM    CREATININE 1.7 10/11/2022 11:08 AM    CREATININE 1.5 07/29/2022 03:55 PM    CALCIUM 8.9 07/29/2022 03:55 PM    LABGLOM 33 07/29/2022 03:55 PM    GLUCOSE 97 07/29/2022 03:55 PM       Magnesium:    Lab Results   Component Value Date/Time    MG 1.6 06/08/2022 04:56 PM     PT/INR:    Lab Results   Component Value Date/Time    INR 1.04 11/09/2021 07:40 PM     TSH:    Lab Results   Component Value Date/Time    TSH 5.180 07/07/2022 04:47 PM     VITAMIN B12: No components found for: B12  FOLATE:  No results found for: FOLATE  IRON:    Lab Results   Component Value Date/Time    IRON 38 01/26/2022 08:52 AM     Iron Saturation:  No components found for: PERCENTFE  TIBC:    Lab Results   Component Value Date/Time    TIBC 178 01/26/2022 08:52 AM FERRITIN:    Lab Results   Component Value Date/Time    FERRITIN 496 01/26/2022 08:52 AM     CA 19-9  on 8/12/2022= 39      IMAGING:  CT abdomen pelvis with IV contrast 10/4/2022 compared with 6/15/2022  Interval placement of a biliary stent extending from the level of common bile duct at the gallbladder fossa just within the ampulla. The duct is patent. Adjacent fluid cystic lesion. Stable but nonenlarged retroperitoneal lymph nodes. PROCEDURES:  See above    PATHOLOGY:   See above    GENETICS:  None    MOLECULAR:  None. Patient without recurrence or metastatic disease      ASSESSMENT/PLAN:    1: Diagnosis: 61-year-old female with history of a periampullary adenocarcinoma treated with Whipple procedure followed by 7 cycles of adjuvant FOLFOX. Adjuvant therapy was stopped when she developed obstructive jaundice likely from a stricture from her Whipple procedure and was hospitalized with enterococcal bacteremia in June. Currently off all therapy. 2) Prognosis / Disease Status: High risk disease. 3) Work-up:    Labs: CBC, CMP and CA 19-9 tSeptember 22 equals 39/   Imaging: Schedule next CT of the abdomen pelvis with oral and IV contrast on October 4. Creatinine is elevated that day IV contrast will not be given. Procedures: [facility] patient's discharge summary says she is to return in mid September to have the stent exchange. Of asked the patient to call since metal stents typically are not replaced. Consults: None new   Other: Follow-up with Dr. Jan Michaels of cardiology who is managing her diuretics. 4) Symptom Management: She is asymptomatic. 5) Supportive care provided. Level of care is appropriate. Teaching done today. Reviewed that there is no advantage to resuming adjuvant chemotherapy since she has been off this for 3 months. 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         6) Treatment goal:      Treatment plan:

## 2022-10-11 NOTE — PROGRESS NOTES
Patient tolerated lab draw without any complications. Discharge instructions given to patient-verbalizes understanding. Ambulated off unit per self with belongings.

## 2022-10-11 NOTE — PLAN OF CARE
Problem: Discharge Planning  Goal: Knowledge of discharge instructions  Description: Knowledge of discharge instructions  Outcome: Progressing  Note: Verbalized understanding of discharge instructions, follow-up appointments, and when to call the physician. Intervention: Discharge to appropriate level of care  Note: Discuss understanding of discharge instructions,follow-up appointments, and when to call the physician. Problem: Falls - Risk of:  Goal: Will remain free from falls  Description: Will remain free from falls  Outcome: Progressing  Note: No falls occurred with visit today. Intervention: Assess risk factors for falls  Note: Verbalized understanding of fall prevention to ask for assistance with ambulation. Call light within reach. Problem: Infection - Central Venous Catheter-Associated Bloodstream Infection:  Goal: Will show no infection signs and symptoms  Description: Will show no infection signs and symptoms  Outcome: Progressing  Note: Mediport site with no redness or warmth. Skin over port site intact with no signs of breakdown noted. Patient verbalizes signs/symptoms of port infection and when to notify the physician. Intervention: Infection risk assessment  Note: Discuss port maintenance,infection prevention, sign of infection and when to call MD.      Care plan reviewed with patient. Patient verbalized understanding of the plan of care and contribute to goal setting.

## 2022-10-28 DIAGNOSIS — E87.6 HYPOKALEMIA: ICD-10-CM

## 2022-10-28 DIAGNOSIS — E43 SEVERE MALNUTRITION (HCC): ICD-10-CM

## 2022-10-31 ENCOUNTER — OFFICE VISIT (OUTPATIENT)
Dept: CARDIOLOGY CLINIC | Age: 82
End: 2022-10-31
Payer: MEDICARE

## 2022-10-31 VITALS
DIASTOLIC BLOOD PRESSURE: 70 MMHG | HEART RATE: 66 BPM | SYSTOLIC BLOOD PRESSURE: 121 MMHG | HEIGHT: 65 IN | WEIGHT: 131.38 LBS | BODY MASS INDEX: 21.89 KG/M2

## 2022-10-31 DIAGNOSIS — I48.91 ATRIAL FIBRILLATION, UNSPECIFIED TYPE (HCC): Primary | ICD-10-CM

## 2022-10-31 PROCEDURE — 99214 OFFICE O/P EST MOD 30 MIN: CPT | Performed by: INTERNAL MEDICINE

## 2022-10-31 PROCEDURE — 1124F ACP DISCUSS-NO DSCNMKR DOCD: CPT | Performed by: INTERNAL MEDICINE

## 2022-10-31 RX ORDER — POTASSIUM CHLORIDE 20 MEQ/1
20 TABLET, EXTENDED RELEASE ORAL DAILY
Qty: 30 TABLET | Refills: 0 | Status: SHIPPED | OUTPATIENT
Start: 2022-10-31 | End: 2022-11-30 | Stop reason: SDUPTHER

## 2022-10-31 NOTE — TELEPHONE ENCOUNTER
Patient's last appointment was : 8/31/2022  Patient's next appointment is : 11/30/22  Future Appointments   Date Time Provider Jeanne Finch   10/31/2022  2:45 PM Ele Zelaya MD N SRPX Heart [facility] [place]   11/11/2022  1:00 PM STR OUT PT ONC INJ RM 05 STRZ OP ONC [place] HOD   11/30/2022  2:00 PM Isabella Sharif, DO SRPX FM RES P - [place]   1/11/2023 11:00 AM STR CT IMAGING RM1  OP [facility] STRZ OUT EXP STR Radiolog   1/18/2023 10:40 AM Aj Jiang [facility] [place]     Last refilled:09/06/22    Lab Results   Component Value Date    LABA1C 5.7 11/10/2021     No results found for: CHOL, TRIG, HDL, LDLCALC, LDLDIRECT  Lab Results   Component Value Date     10/11/2022    K 4.4 10/11/2022     07/29/2022    CO2 24 07/29/2022    BUN 23 (H) 07/29/2022    CREATININE 1.7 (H) 10/11/2022    GLUCOSE 97 07/29/2022    CALCIUM 8.9 07/29/2022    PROT 6.3 10/11/2022    LABALBU 3.7 10/11/2022    BILITOT 0.8 10/11/2022    ALKPHOS 196 (H) 10/11/2022    AST 43 (H) 10/11/2022    ALT 28 10/11/2022    LABGLOM 33 (A) 07/29/2022     Lab Results   Component Value Date    TSH 5.180 (H) 07/07/2022    T4FREE 1.62 07/07/2022     Lab Results   Component Value Date    WBC 5.3 10/11/2022    HGB 10.8 (L) 10/11/2022    HCT 33.8 (L) 10/11/2022    MCV 97 10/11/2022     10/11/2022

## 2022-10-31 NOTE — PROGRESS NOTES
[id number] [facility] [address] [place] [address]  Dept: [phone]  Dept Fax: [phone]  Loc: [phone]    Visit Date: 10/31/2022    Ms. Lopez Knott is a 81 y.o. female  who presented for:  Chief Complaint   Patient presents with    Check-Up    Atrial Fibrillation       HPI:   82 yo F c hx of cholangiocarcinoma s/p whipple surgery and Afib post op is referred here for Afib. Patient had whipple surgery in 12/2021 in [place]. And Afib was diagnosed post exploratory surgery. She is on eliquis and amiodarone. EKG showed SR, incomplete RBBB, LAFB. She is currently on chemotherapy. Does report some neuropathy type of pain. Current Outpatient Medications:     potassium chloride (KLOR-CON M) 20 MEQ extended release tablet, TAKE 1 TABLET BY MOUTH IN THE MORNING, Disp: 30 tablet, Rfl: 0    gabapentin (NEURONTIN) 100 MG capsule, Take 1 capsule by mouth 3 times daily for 90 days. , Disp: 90 capsule, Rfl: 2    bumetanide (BUMEX) 1 MG tablet, Take 1 tablet by mouth in the morning. ., Disp: 30 tablet, Rfl: 3    apixaban (ELIQUIS) 5 MG TABS tablet, Take 1 tablet by mouth 2 times daily, Disp: 180 tablet, Rfl: 1    amLODIPine (NORVASC) 2.5 MG tablet, Take 1 tablet by mouth daily, Disp: 90 tablet, Rfl: 1    aspirin 81 MG chewable tablet, Take 1 tablet by mouth daily, Disp: 90 tablet, Rfl: 1    ondansetron (ZOFRAN) 4 MG tablet, Take 4 mg by mouth every 8 hours as needed, Disp: , Rfl:     ferrous sulfate (IRON 325) 325 (65 Fe) MG tablet, Take 325 mg by mouth daily (with breakfast), Disp: , Rfl:     lidocaine-prilocaine (EMLA) 2.5-2.5 % cream, Apply topically as needed. , Disp: 30 g, Rfl: 1    docusate sodium (COLACE) 100 MG capsule, Take 100 mg by mouth 2 times daily, Disp: , Rfl:     zoster recombinant adjuvanted vaccine (SHINGRIX) 50 MCG/0.5ML SUSR injection, Inject 0.5 mLs into the muscle See Admin Instructions 1 dose now and repeat in 2-6 months, Disp: 0.5 mL, Rfl: 0    Handicap Placard MISC, by Does not apply route Diagnosis: Pancreatic cancer, deconditioned, weakness Expiration: 5/31/2023, Disp: 1 each, Rfl: 0    Past Medical History  Belinda Blackwell  has a past medical history of Cholangiocarcinoma ([address] and Hypertension. Social History  Belinda Blackwell  reports that she quit smoking about 50 years ago. Her smoking use included cigarettes. She has never used smokeless tobacco. She reports current alcohol use. She reports that she does not use drugs. Family History  Belinda Blackwell family history includes Breast Cancer in her mother; Cancer in her father; Manoj Tiago in her mother; Kidney Disease in her brother; Tarri Culloden in her father; No Known Problems in her maternal grandfather, maternal grandmother, paternal grandfather, and paternal grandmother. Past Surgical History   Past Surgical History:   Procedure Laterality Date    APPENDECTOMY  1959    CHOLECYSTECTOMY  12/04/2021    Dr. Agnieszka Lopez    ERCP N/A 11/10/2021    ERCP WITH STENT INSERTION performed by Natacha Garza MD at [address] [place] [address] Drive  12/04/2021    exp lap, radical celiac and portal lymph node dissection,pylorus preserving pancreatoduodenectomy-Dr [facility]wing [facility]    PORT SURGERY Left 1/25/2022    SINGLE LUMEN LOTFSZOQG INSERTION performed by Damari Tamez MD at [address] [place] [address] Place:     REVIEW OF SYSTEMS  Constitutional: denies sweats, chills and fever  HENT: denies  congestion, sinus pressure, sneezing and sore throat. Eyes: denies  pain, discharge, redness and itching. Respiratory: denies apnea, cough  Gastrointestinal: denies blood in stool, constipation, diarrhea   Endocrine: denies cold intolerance, heat intolerance, polydipsia. Genitourinary: denies dysuria, enuresis, flank pain and hematuria. Musculoskeletal: denies arthralgias, joint swelling and neck pain. Neurological: denies numbness and headaches. Psychiatric/Behavioral: denies agitation, confusion, decreased concentration and dysphoric mood    All others reviewed and are negative. Objective:     /70   Pulse 66   Ht 5' 5\" (1.651 m)   Wt 131 lb 6 oz (59.6 kg)   BMI 21.86 kg/m²     Wt Readings from Last 3 Encounters:   10/31/22 131 lb 6 oz (59.6 kg)   10/11/22 133 lb (60.3 kg)   10/11/22 133 lb 6.4 oz (60.5 kg)     BP Readings from Last 3 Encounters:   10/31/22 121/70   10/11/22 (!) 108/58   10/11/22 (!) 108/58       PHYSICAL EXAM  Constitutional: Oriented to person, place, and time. Appears well-developed and well-nourished. HENT:   Head: Normocephalic and atraumatic. Eyes: EOM are normal. Pupils are equal, round, and reactive to light. Neck: Normal range of motion. Neck supple. No JVD present. Cardiovascular: Normal rate , normal heart sounds and intact distal pulses. Pulmonary/Chest: Effort normal and breath sounds normal. No respiratory distress. No wheezes. No rales. Abdominal: Soft. Bowel sounds are normal. No distension. There is no tenderness. Musculoskeletal: Normal range of motion. No edema. Neurological: Alert and oriented to person, place, and time. No cranial nerve deficit. Coordination normal.   Skin: Skin is warm and dry. Psychiatric: Normal mood and affect.        No results found for: CKTOTAL, CKMB, CKMBINDEX    Lab Results   Component Value Date/Time    WBC 5.3 10/11/2022 11:08 AM    RBC 3.49 10/11/2022 11:08 AM    HGB 10.8 10/11/2022 11:08 AM    HCT 33.8 10/11/2022 11:08 AM    MCV 97 10/11/2022 11:08 AM    MCH 30.9 10/11/2022 11:08 AM    MCHC 32.0 10/11/2022 11:08 AM    RDW 14.0 10/11/2022 11:08 AM     10/11/2022 11:08 AM    MPV 10.0 10/11/2022 11:08 AM       Lab Results   Component Value Date/Time     10/11/2022 11:08 AM     07/29/2022 03:55 PM    K 4.4 10/11/2022 11:08 AM    K 3.6 07/29/2022 03:55 PM    K 3.3 05/17/2022 06:00 AM     07/29/2022 03:55 PM    CO2 24 07/29/2022 03:55 PM BUN 23 07/29/2022 03:55 PM    LABALBU 3.7 10/11/2022 11:08 AM    CREATININE 1.7 10/11/2022 11:08 AM    CREATININE 1.5 07/29/2022 03:55 PM    CALCIUM 8.9 07/29/2022 03:55 PM    LABGLOM 29 10/11/2022 11:08 AM    GLUCOSE 97 07/29/2022 03:55 PM       Lab Results   Component Value Date/Time    ALKPHOS 196 10/11/2022 11:08 AM    ALT 28 10/11/2022 11:08 AM    AST 43 10/11/2022 11:08 AM    PROT 6.3 10/11/2022 11:08 AM    BILITOT 0.8 10/11/2022 11:08 AM    BILIDIR <0.2 10/11/2022 11:08 AM    LABALBU 3.7 10/11/2022 11:08 AM       Lab Results   Component Value Date/Time    MG 1.6 06/08/2022 04:56 PM       Lab Results   Component Value Date    INR 1.04 11/09/2021         Lab Results   Component Value Date/Time    LABA1C 5.7 11/10/2021 12:25 PM       No results found for: TRIG, HDL, LDLCALC, LDLDIRECT, LABVLDL    Lab Results   Component Value Date/Time    TSH 5.180 07/07/2022 04:47 PM         Testing Reviewed:      I haveindividually reviewed the below cardiac tests    EKG:    ECHO: No results found for this or any previous visit. STRESS:    CATH:    Assessment/Plan       Diagnosis Orders   1. Atrial fibrillation, unspecified type (HCC)            Afib ? Post op  Cholangiocarcionam s/p frederic, now on chemotherapy    Currently on Amiodarone and eliquis  Recently EKG showed SR  ? Post op Afib  Had Echo at , will try to obtain  Started on amiodarone and eliquis at   Will d/c amiodarone for now and monitor with an event monitor   Continue eliquis  Still has some more chemo therapy to go, currently on pause. The patient is asked to make an attempt to improve diet and exercise patterns to aid in medical management of this problem. Advised more plant based nutrition/meditarrean diet   Advised patient to call office or seek immediate medical attention if there is any new onset of  any chest pain, sob, palpitations, lightheadedness, dizziness, orthopnea, PND or pedal edema.    All medication side effects were discussed in details. Thank youfor allowing me to participate in the care of this patient. Please do not hesitate to contact me for any further questions. Return in about 3 months (around 1/31/2023), or if symptoms worsen or fail to improve, for Review testing, Regular follow up.        Electronically signed by Deborah Carrion MD [facility]  10/31/2022 at 11:13 AM EDT

## 2022-10-31 NOTE — PROGRESS NOTES
Pt here for 3 mo check up     Pt has possible upcoming cataract sx with Dr. Ivory Rosales     Pt continues with swelling in legs and feet

## 2022-11-11 ENCOUNTER — HOSPITAL ENCOUNTER (OUTPATIENT)
Dept: NON INVASIVE DIAGNOSTICS | Age: 82
Discharge: HOME | End: 2022-11-11
Payer: MEDICARE

## 2022-11-11 ENCOUNTER — HOSPITAL ENCOUNTER (OUTPATIENT)
Dept: INFUSION THERAPY | Age: 82
Discharge: HOME | End: 2022-11-11
Payer: MEDICARE

## 2022-11-11 VITALS
DIASTOLIC BLOOD PRESSURE: 56 MMHG | SYSTOLIC BLOOD PRESSURE: 127 MMHG | HEART RATE: 7 BPM | OXYGEN SATURATION: 99 % | TEMPERATURE: 98 F | RESPIRATION RATE: 18 BRPM

## 2022-11-11 DIAGNOSIS — C25.9 MALIGNANT NEOPLASM OF PANCREAS, UNSPECIFIED LOCATION OF MALIGNANCY (HCC): Primary | ICD-10-CM

## 2022-11-11 DIAGNOSIS — I48.91 ATRIAL FIBRILLATION, UNSPECIFIED TYPE (HCC): ICD-10-CM

## 2022-11-11 PROCEDURE — 6360000002 HC RX W HCPCS: Performed by: INTERNAL MEDICINE

## 2022-11-11 PROCEDURE — 99212 OFFICE O/P EST SF 10 MIN: CPT

## 2022-11-11 PROCEDURE — 93270 REMOTE 30 DAY ECG REV/REPORT: CPT

## 2022-11-11 PROCEDURE — 2580000003 HC RX 258: Performed by: INTERNAL MEDICINE

## 2022-11-11 RX ORDER — SODIUM CHLORIDE 9 MG/ML
25 INJECTION, SOLUTION INTRAVENOUS PRN
OUTPATIENT
Start: 2022-11-11

## 2022-11-11 RX ORDER — SODIUM CHLORIDE 0.9 % (FLUSH) 0.9 %
5-40 SYRINGE (ML) INJECTION PRN
Status: DISCONTINUED | OUTPATIENT
Start: 2022-11-11 | End: 2022-11-12 | Stop reason: HOSPADM

## 2022-11-11 RX ORDER — HEPARIN SODIUM (PORCINE) LOCK FLUSH IV SOLN 100 UNIT/ML 100 UNIT/ML
500 SOLUTION INTRAVENOUS PRN
OUTPATIENT
Start: 2022-11-11

## 2022-11-11 RX ORDER — HEPARIN SODIUM (PORCINE) LOCK FLUSH IV SOLN 100 UNIT/ML 100 UNIT/ML
500 SOLUTION INTRAVENOUS PRN
Status: DISCONTINUED | OUTPATIENT
Start: 2022-11-11 | End: 2022-11-12 | Stop reason: HOSPADM

## 2022-11-11 RX ORDER — WATER 1000 ML/1000ML
2.2 INJECTION, SOLUTION INTRAVENOUS ONCE
OUTPATIENT
Start: 2022-11-11 | End: 2022-11-11

## 2022-11-11 RX ORDER — SODIUM CHLORIDE 0.9 % (FLUSH) 0.9 %
5-40 SYRINGE (ML) INJECTION PRN
OUTPATIENT
Start: 2022-11-11

## 2022-11-11 RX ADMIN — SODIUM CHLORIDE, PRESERVATIVE FREE 10 ML: 5 INJECTION INTRAVENOUS at 13:09

## 2022-11-11 RX ADMIN — Medication 500 UNITS: at 13:09

## 2022-11-11 NOTE — PLAN OF CARE
Care plan reviewed with patient. Patient verbalized understanding of the plan of care and contribute to goal setting. Problem: Intellectual/Education/Knowledge Deficit  Goal: Teaching initiated upon admission  Outcome: Adequate for Discharge  Note: Verbalized understanding of port flush. Intervention: Verbal/written education provided  Note: Discuss port flush     Problem: Discharge Planning  Goal: Knowledge of discharge instructions  Description: Knowledge of discharge instructions  Outcome: Adequate for Discharge  Note: Verbalized understanding of discharge instructions, follow ups and when to call doctor   Intervention: Discharge to appropriate level of care  Note: Discuss discharge instructions, follow ups and when to call doctor. Problem: Falls - Risk of:  Goal: Will remain free from falls  Description: Will remain free from falls  Outcome: Adequate for Discharge  Note: Free from falls while in infusion center. Verbalized understanding of fall prevention and to ask for assistance with ambulation   Intervention: Assess risk factors for falls  Description: Assess risk factors for falls  Note: Assess for fall risk, instruct to ask for assistance with ambulation      Problem: Infection - Central Venous Catheter-Associated Bloodstream Infection:  Goal: Will show no infection signs and symptoms  Description: Will show no infection signs and symptoms  Outcome: Adequate for Discharge  Note: Mediport site with no redness or warmth. Skin over port intact with no signs of breakdown noted. Patient verbalizes signs/symptoms of port infection and when to notify the physician.     Intervention: Infection risk assessment  Description: Infection risk assessment  Note: Discuss port maintenance, infection prevention, signs and when to call

## 2022-11-11 NOTE — PROGRESS NOTES
Mediport flushed per protocol, tolerated well. Discharged in satisfactory condition.  Ambulated off unit per self

## 2022-11-11 NOTE — DISCHARGE INSTRUCTIONS
Please contact your Oncologist if you have any questions regarding the port flush that you received today.

## 2022-11-28 RX ORDER — BUMETANIDE 1 MG/1
1 TABLET ORAL DAILY
Qty: 30 TABLET | Refills: 2 | Status: SHIPPED | OUTPATIENT
Start: 2022-11-28

## 2022-11-30 ENCOUNTER — OFFICE VISIT (OUTPATIENT)
Dept: FAMILY MEDICINE CLINIC | Age: 82
End: 2022-11-30
Payer: MEDICARE

## 2022-11-30 VITALS
DIASTOLIC BLOOD PRESSURE: 70 MMHG | HEIGHT: 65 IN | RESPIRATION RATE: 12 BRPM | WEIGHT: 129 LBS | OXYGEN SATURATION: 97 % | SYSTOLIC BLOOD PRESSURE: 112 MMHG | HEART RATE: 74 BPM | BODY MASS INDEX: 21.49 KG/M2 | TEMPERATURE: 97 F

## 2022-11-30 DIAGNOSIS — E87.6 HYPOKALEMIA: ICD-10-CM

## 2022-11-30 DIAGNOSIS — Z78.0 POSTMENOPAUSAL: ICD-10-CM

## 2022-11-30 DIAGNOSIS — I10 PRIMARY HYPERTENSION: ICD-10-CM

## 2022-11-30 DIAGNOSIS — E43 SEVERE MALNUTRITION (HCC): Primary | ICD-10-CM

## 2022-11-30 PROCEDURE — 3074F SYST BP LT 130 MM HG: CPT

## 2022-11-30 PROCEDURE — 3078F DIAST BP <80 MM HG: CPT

## 2022-11-30 PROCEDURE — 1124F ACP DISCUSS-NO DSCNMKR DOCD: CPT

## 2022-11-30 PROCEDURE — 99213 OFFICE O/P EST LOW 20 MIN: CPT

## 2022-11-30 RX ORDER — POTASSIUM CHLORIDE 20 MEQ/1
20 TABLET, EXTENDED RELEASE ORAL DAILY
Qty: 30 TABLET | Refills: 0 | Status: SHIPPED | OUTPATIENT
Start: 2022-11-30

## 2022-11-30 RX ORDER — AMLODIPINE BESYLATE 2.5 MG/1
2.5 TABLET ORAL DAILY
Qty: 90 TABLET | Refills: 1 | Status: SHIPPED | OUTPATIENT
Start: 2022-11-30

## 2022-11-30 ASSESSMENT — ENCOUNTER SYMPTOMS
CHEST TIGHTNESS: 1
WHEEZING: 0
DIARRHEA: 0
PHOTOPHOBIA: 0
COUGH: 0
CONSTIPATION: 0
BACK PAIN: 1
SHORTNESS OF BREATH: 0
ABDOMINAL DISTENTION: 0
BLOOD IN STOOL: 0

## 2022-11-30 NOTE — PROGRESS NOTES
S: 81 y.o. female with   Chief Complaint   Patient presents with    3 Month Follow-Up     Chronic Conditions       -HTN:  On low dose norvasc  Working well  BP's stable  Was having orthostatic sxs, but those improved with lower dose  No CP/SOB      -Hypokalemia:  On supplementation  Working well  Labs UTD      -Pancreatic Cancer  S/p Whipple and adjuvant chemo  Doing well  In remission  Current with onc  No abd pain  No jaundice      -Malnutrition:  Recent hx of pancreatic cancer  Appetite better  Wts stable      BP Readings from Last 3 Encounters:   11/30/22 112/70   11/11/22 (!) 127/56   10/31/22 121/70     Wt Readings from Last 3 Encounters:   11/30/22 129 lb (58.5 kg)   10/31/22 131 lb 6 oz (59.6 kg)   10/11/22 133 lb (60.3 kg)       O: VS:  height is 5' 5\" (1.651 m) and weight is 129 lb (58.5 kg). Her temporal temperature is 97 °F (36.1 °C). Her blood pressure is 112/70 and her pulse is 74. Her respiration is 12 and oxygen saturation is 97%. AAO/NAD, appropriate affect for mood  CV:  RRR, no murmur  Resp: CTAB       Diagnosis Orders   1. Severe malnutrition (HCC)  potassium chloride (KLOR-CON M) 20 MEQ extended release tablet      2. Hypokalemia  potassium chloride (KLOR-CON M) 20 MEQ extended release tablet      3. Primary hypertension  amLODIPine (NORVASC) 2.5 MG tablet      4. Postmenopausal  DEXA BONE DENSITY 2 SITES          Plan  -HTN: Stable, con't Norvasc  -Hypokalemia: stable, con't K+ Supplement  -Pancreatic cancer: in remission, doing well. F/u oncology with labs/scans as planned  -Malnutrition: improved with better appetite. Con't monitor. High calorie diet.        Health Maintenance Due   Topic Date Due    DTaP/Tdap/Td vaccine (1 - Tdap) Never done    Shingles vaccine (1 of 2) Never done    DEXA (modify frequency per FRAX score)  Never done    Pneumococcal 65+ years Vaccine (2 - PPSV23 if available, else PCV20) 12/21/2021         Attending Physician Statement  I have discussed the case, including pertinent history and exam findings with the resident. I also have seen the patient and performed key portions of the examination. I agree with the documented assessment and plan as documented by the resident.   GC modifier added to this encounter      Taz Schmidt DO 11/30/2022 3:30 PM

## 2022-11-30 NOTE — PROGRESS NOTES
Health Maintenance Due   Topic Date Due    DTaP/Tdap/Td vaccine (1 - Tdap) Never done    Shingles vaccine (1 of 2) Never done    DEXA (modify frequency per FRAX score)  Never done    Pneumococcal 65+ years Vaccine (2 - PPSV23 if available, else PCV20) 12/21/2021

## 2022-11-30 NOTE — PROGRESS NOTES
Dickson Citykiera Greer Sex: female  Date of Birth:[birth year] Age:82 y.o. Date:11/30/2022   Chief Complaint: 3 Month Follow-Up (Chronic Conditions)    RODOLFO Poe is 81 y.o. female who presents for 3 month follow up on chronic conditions. Her malnutrition is stable, as is her Cholangiocarinoma s/p whipple (follows with oncology). A. Fib is stable, on a monitor to determine if she can come off Eliquis (follows with cardiology). She had a normal DEXA scan about 15 years ago, per patient. Her nerve pain in her hands is also stable on gabapentin. Patient History    Past Medical History:   has a past medical history of Cholangiocarcinoma ([address] and Hypertension. Past Surgical History:   Procedure Laterality Date    APPENDECTOMY  1959    CHOLECYSTECTOMY  12/04/2021    Dr. Prakash Salvage    ERCP N/A 11/10/2021    ERCP WITH STENT INSERTION performed by Julian Cooper MD at [address] [place] [address] Drive  12/04/2021    exp lap, radical celiac and portal lymph node dissection,pylorus preserving pancreatoduodenectomy-Dr [facility]wing [facility]    PORT SURGERY Left 1/25/2022    SINGLE LUMEN GYTFCUAIY INSERTION performed by Bj Scott MD at [address] [place] [address] History:   reports that she quit smoking about 50 years ago. Her smoking use included cigarettes. She has never used smokeless tobacco. She reports current alcohol use. She reports that she does not use drugs.      Family History   Problem Relation Age of Onset    Breast Cancer Mother     Colon Cancer Mother     Cancer Father         bladder and lung    Lung Cancer Father     Kidney Disease Brother     No Known Problems Maternal Grandmother     No Known Problems Maternal Grandfather     No Known Problems Paternal Grandmother     No Known Problems Paternal Grandfather        Review of Systems   Review of Systems   Constitutional:  Negative for activity change, appetite change, diaphoresis, fatigue and unexpected weight change (hx of weight loss, stable at 130 lb). HENT:  Negative for dental problem, drooling, hearing loss and sneezing. Eyes:  Negative for photophobia and visual disturbance. Respiratory:  Positive for chest tightness. Negative for cough, shortness of breath and wheezing. Cardiovascular:  Negative for palpitations and leg swelling. Gastrointestinal:  Negative for abdominal distention, blood in stool, constipation and diarrhea. Genitourinary:  Negative for difficulty urinating, dysuria, pelvic pain and vaginal pain. Musculoskeletal:  Positive for arthralgias (hands), back pain (mild) and gait problem (walker and cane, last fall 4-5 months ago). Negative for neck pain and neck stiffness. Skin:  Negative for pallor, rash and wound. Allergic/Immunologic: Negative for environmental allergies and food allergies. Neurological:  Positive for dizziness (rarely, standing too quickly). Negative for seizures and weakness. Hematological:  Does not bruise/bleed easily. Psychiatric/Behavioral:  Negative for confusion, decreased concentration, dysphoric mood and sleep disturbance. The patient is not nervous/anxious. Medications     Current Outpatient Medications   Medication Sig Dispense Refill    bumetanide (BUMEX) 1 MG tablet TAKE 1 TABLET BY MOUTH IN THE MORNING 30 tablet 2    potassium chloride (KLOR-CON M) 20 MEQ extended release tablet TAKE 1 TABLET BY MOUTH IN THE MORNING 30 tablet 0    gabapentin (NEURONTIN) 100 MG capsule Take 1 capsule by mouth 3 times daily for 90 days.  90 capsule 2    Handicap Placard MISC by Does not apply route Diagnosis: Pancreatic cancer, deconditioned, weakness  Expiration: 5/31/2023 1 each 0    apixaban (ELIQUIS) 5 MG TABS tablet Take 1 tablet by mouth 2 times daily 180 tablet 1    amLODIPine (NORVASC) 2.5 MG tablet Take 1 tablet by mouth daily 90 tablet 1    aspirin 81 MG chewable tablet Take 1 tablet by mouth daily 90 tablet 1    ferrous sulfate (IRON 325) 325 (65 Fe) MG tablet Take 325 mg by mouth daily (with breakfast)      lidocaine-prilocaine (EMLA) 2.5-2.5 % cream Apply topically as needed. 30 g 1    docusate sodium (COLACE) 100 MG capsule Take 100 mg by mouth 2 times daily       No current facility-administered medications for this visit. Vitals & Physical Examination     Vitals:    11/30/22 1342   BP: 112/70   Site: Right Upper Arm   Position: Sitting   Cuff Size: Medium Adult   Pulse: 74   Resp: 12   Temp: 97 °F (36.1 °C)   TempSrc: Temporal   SpO2: 97%   Weight: 129 lb (58.5 kg)   Height: 5' 5\" (1.651 m)    Body mass index is 21.47 kg/m². Physical Exam  Vitals reviewed. Constitutional:       General: She is not in acute distress. Appearance: Normal appearance. She is normal weight. She is not ill-appearing, toxic-appearing or diaphoretic. HENT:      Head: Normocephalic and atraumatic. Nose: Nose normal. No rhinorrhea. Mouth/Throat:      Mouth: Mucous membranes are moist.      Pharynx: Oropharynx is clear. Eyes:      General: No scleral icterus. Extraocular Movements: Extraocular movements intact. Cardiovascular:      Rate and Rhythm: Normal rate and regular rhythm. Pulses: Normal pulses. Heart sounds: Normal heart sounds. No murmur heard. No friction rub. No gallop. Pulmonary:      Effort: Pulmonary effort is normal. No respiratory distress. Breath sounds: Normal breath sounds. No wheezing, rhonchi or rales. Abdominal:      General: Abdomen is flat. Bowel sounds are normal. There is no distension. Palpations: Abdomen is soft. There is no mass. Tenderness: There is no abdominal tenderness. There is no guarding. Musculoskeletal:         General: Swelling (BLE, compression stockings on) present. No tenderness, deformity or signs of injury. Cervical back: Normal range of motion. No rigidity or tenderness. Skin:     General: Skin is warm and dry.       Coloration: Skin is not jaundiced or pale.      Findings: No bruising or rash. Neurological:      General: No focal deficit present. Mental Status: She is alert and oriented to person, place, and time. Motor: No weakness. Coordination: Coordination normal.      Gait: Gait abnormal Loleta Epley). Psychiatric:         Mood and Affect: Mood normal.         Behavior: Behavior normal.         Thought Content: Thought content normal.         Judgment: Judgment normal.     Results & Diagnostics   I have reviewed: active problem list, medication list, social history, health maintenance, notes from last encounter, lab results    Most Recent Labs:  Lab Results   Component Value Date    WBC 5.3 10/11/2022    HGB 10.8 (L) 10/11/2022    HCT 33.8 (L) 10/11/2022     10/11/2022    ALT 28 10/11/2022    AST 43 (H) 10/11/2022     10/11/2022     07/29/2022    K 4.4 10/11/2022    CREATININE 1.7 (H) 10/11/2022    BUN 23 (H) 07/29/2022    CO2 24 07/29/2022    TSH 5.180 (H) 07/07/2022    INR 1.04 11/09/2021    LABA1C 5.7 11/10/2021       Most recent Imaging:  No results found. Assessment & Plan:          ICD-10-CM    1. Severe malnutrition (HCC)  E43 potassium chloride (KLOR-CON M) 20 MEQ extended release tablet      2. Hypokalemia  E87.6 potassium chloride (KLOR-CON M) 20 MEQ extended release tablet      3. Primary hypertension  I10 amLODIPine (NORVASC) 2.5 MG tablet      4. Postmenopausal  Z78.0 DEXA BONE DENSITY 2 SITES          Medical Decision Making:  Please note the above diagnosis codes and their associated orders. Malnutrition: Weight stable ~130 lbs, patient having good results with previously suggested dietary modifications. Cancer in surveillance s/p surgery. Will refill supplementation. Hypokalemia: Will refill supplements. HTN: Amlodipine currently at 2.5 mg, reduced for hypotension concerns, follows with cardiology.   DEXA scan: Multiple osteoporosis risk factors (elderly, white, female, hx of falls, malnutrition, cancer s/p surgery, postmenopausal). Will order DEXA screening to determine benefit for intervention. Addressed this visit:  Acute Concerns: No significant medical complaints  Chronic Concerns: HTN, Cancer with malnutrition  Health Maintenance: Reviewed, DEXA ordered  Patient Education: Disease process and treatment, Prevention, detection, and treatment of acute complications, Develop strategies to promote health/ change behaviors  Follow Up Oral: Chronic concerns, health maintenance    Return in about 3 months (around 2/28/2023). On this date 11/30/2022 I have spent 30 minutes reviewing previous notes, test results and face to face with the patient discussing the diagnosis and importance of compliance with the treatment plan as well as documenting on the day of the visit.       An electronic signature was used to authenticate this note  - Keke Fine DO PGY-1 11/30/2022 at 2:43 PM

## 2022-11-30 NOTE — PATIENT INSTRUCTIONS
Thank you   Thank you for trusting us with your healthcare needs. You may receive a survey regarding today's visit. It would help us out if you would take a few moments to provide your feedback. We value your input. Please bring in ALL medications BOTTLES, including inhalers, herbal supplements, over the counter, prescribed & non-prescribed medicine. The office would like actual medication bottles and a list.   Please note our OFFICE POLICIES:   Prior to getting your labs drawn, please check with your insurance company for benefits and eligibility of lab services. Often, insurance companies cover certain tests for preventative visits only. It is patient's responsibility to see what is covered. We are unable to change a diagnosis after the test has been performed. Lab orders will not be re-printed. Please hold onto your original lab orders and take them to your lab to be completed. If you no show your scheduled appointment three times, you will be dismissed from this practice. Reschedules must be completed 24 hours prior to your schedule appointment. If the list below has been completed, PLEASE FAX RECORDS TO OUR OFFICE @ [phone].  Once the records have been received we will update your records at our office:  Health Maintenance Due   Topic Date Due    DTaP/Tdap/Td vaccine (1 - Tdap) Never done    Shingles vaccine (1 of 2) Never done    DEXA (modify frequency per FRAX score)  Never done    Pneumococcal 65+ years Vaccine (2 - PPSV23 if available, else PCV20) 12/21/2021

## 2022-12-16 NOTE — PROCEDURES
[address] [id number]                                 EVENT MONITOR    PATIENT NAME: Stacie Clemons           :        1940  MED REC NO:   [id number]                           ROOM:  ACCOUNT NO:   [de-identified]                           ADMIT DATE: 2022  PROVIDER:     Fantasma Shields MD    TEST TYPE:  EVENT MONITOR    INDICATION FOR STUDY: Rule out atrial fibrillation. The patient's monitoring period was from 2022 to 12/10/2022. FINDINGS:  The baseline rhythm was sinus rhythm with an average heart  rate of 75 beats per minute. There is no atrial fibrillation or flutter  found on the study. No significant atrial tachyarrhythmias. No  significant high-grade AV blocks. SUMMARY:  1. Predominant rhythm was sinus rhythm with an average heart rate of 75  beats per minute. 2.  No significant atrioventricular tachyarrhythmias. 3.  No significant atrial fibrillation or flutter found on the study. 4.  Clinical correlation is necessary.         Roma Quiroz MD    D: 12/15/2022 17:30:20       T: 12/15/2022 18:23:48     KRISH/[email or web address]  Job#: [id number]     Doc#: [id number]    CC:

## 2022-12-19 DIAGNOSIS — I48.91 ATRIAL FIBRILLATION, UNSPECIFIED TYPE (HCC): ICD-10-CM

## 2022-12-19 NOTE — TELEPHONE ENCOUNTER
Pt. Called stating she is going out of town [facility] around 6 to visit her son and she will not be back until the 4th of January. She is wondering if she can have a refill on her Eliquis just to make it till she gets back from her trip, she is out.     Please advise/refill    Patient's last appointment was : 11/30/2022  Patient's next appointment is : 03/01/2023  Future Appointments   Date Time Provider Jeanne Josette   1/11/2023 11:00 AM STR CT IMAGING RM1  OP [facility] OUT EXP STR Radiolog   1/18/2023 10:00 AM STR OUT PT ONC INJ RM 01 STRZ OP ONC [place] HOD   1/18/2023 10:40 AM Fran Teague MD N Oncology [facility] AM [place] [address]   2/2/2023  9:00 AM Canelo Goss MD N SRPX Heart [facility] [place] [address]   3/1/2023  1:00 PM Luis Lewis MD SRPX FM RES P - [place]     Last refilled:05/04/2022    Lab Results   Component Value Date    LABA1C 5.7 11/10/2021     No results found for: CHOL, TRIG, HDL, LDLCALC, LDLDIRECT  Lab Results   Component Value Date     10/11/2022    K 4.4 10/11/2022     07/29/2022    CO2 24 07/29/2022    BUN 23 (H) 07/29/2022    CREATININE 1.7 (H) 10/11/2022    GLUCOSE 97 07/29/2022    CALCIUM 8.9 07/29/2022    PROT 6.3 10/11/2022    LABALBU 3.7 10/11/2022    BILITOT 0.8 10/11/2022    ALKPHOS 196 (H) 10/11/2022    AST 43 (H) 10/11/2022    ALT 28 10/11/2022    LABGLOM 29 10/11/2022     Lab Results   Component Value Date    TSH 5.180 (H) 07/07/2022    T4FREE 1.62 07/07/2022     Lab Results   Component Value Date    WBC 5.3 10/11/2022    HGB 10.8 (L) 10/11/2022    HCT 33.8 (L) 10/11/2022    MCV 97 10/11/2022     10/11/2022

## 2022-12-19 NOTE — TELEPHONE ENCOUNTER
Patient's last appointment was : 11/30/2022  Patient's next appointment is : 3/1/23  Future Appointments   Date Time Provider Jeanne Finch   1/11/2023 11:00 AM STR CT IMAGING RM1  OP [facility] STR[facility] OUT EXP STR Radiolog   1/18/2023 10:00 AM STR OUT PT ONC INJ RM 01 STRZ OP ONC [place] HOD   1/18/2023 10:40 AM Maia Flaherty MD N Oncology [facility]   2/2/2023  9:00 AM iAda Wilson MD N SRPX Heart [place] - [place]   3/1/2023  1:00 PM Angela Werner MD SRPX FM RES [facility] - [place]     Last refilled:5/18/22    Lab Results   Component Value Date    LABA1C 5.7 11/10/2021     No results found for: CHOL, TRIG, HDL, LDLCALC, LDLDIRECT  Lab Results   Component Value Date     10/11/2022    K 4.4 10/11/2022     07/29/2022    CO2 24 07/29/2022    BUN 23 (H) 07/29/2022    CREATININE 1.7 (H) 10/11/2022    GLUCOSE 97 07/29/2022    CALCIUM 8.9 07/29/2022    PROT 6.3 10/11/2022    LABALBU 3.7 10/11/2022    BILITOT 0.8 10/11/2022    ALKPHOS 196 (H) 10/11/2022    AST 43 (H) 10/11/2022    ALT 28 10/11/2022    LABGLOM 29 10/11/2022     Lab Results   Component Value Date    TSH 5.180 (H) 07/07/2022    T4FREE 1.62 07/07/2022     Lab Results   Component Value Date    WBC 5.3 10/11/2022    HGB 10.8 (L) 10/11/2022    HCT 33.8 (L) 10/11/2022    MCV 97 10/11/2022     10/11/2022

## 2022-12-20 RX ORDER — APIXABAN 5 MG/1
TABLET, FILM COATED ORAL
Qty: 180 TABLET | Refills: 0 | OUTPATIENT
Start: 2022-12-20

## 2022-12-20 NOTE — TELEPHONE ENCOUNTER
Rx sent. However, please call patient and see if she is able to pick this up. If she is out of town, please ask which pharmacy is near her and I will send refill there instead if needed. Thank you!

## 2022-12-29 DIAGNOSIS — E43 SEVERE MALNUTRITION (HCC): ICD-10-CM

## 2022-12-29 DIAGNOSIS — E87.6 HYPOKALEMIA: ICD-10-CM

## 2022-12-29 DIAGNOSIS — R20.2 PARESTHESIAS: ICD-10-CM

## 2022-12-29 NOTE — TELEPHONE ENCOUNTER
Patient's last appointment was : 11/30/2022  Patient's next appointment is : 3/1/23  Future Appointments   Date Time Provider Jeanne Finch   1/11/2023 11:00 AM STR CT IMAGING RM1  OP [facility] STR[facility] OUT EXP STR Radiolog   1/18/2023 10:00 AM STR OUT PT ONC INJ RM 01 STRZ OP ONC [place] HOD   1/18/2023 10:40 AM Kirsten Moeller MD N Oncology [facility]   2/2/2023  9:00 AM Tanna Soto MD N SRPX Heart [facility]   3/1/2023  1:00 PM Jose Hagen MD SRPX FM RES [facility]     Last refilled:8/31/22    Lab Results   Component Value Date    LABA1C 5.7 11/10/2021     No results found for: CHOL, TRIG, HDL, LDLCALC, LDLDIRECT  Lab Results   Component Value Date     10/11/2022    K 4.4 10/11/2022     07/29/2022    CO2 24 07/29/2022    BUN 23 (H) 07/29/2022    CREATININE 1.7 (H) 10/11/2022    GLUCOSE 97 07/29/2022    CALCIUM 8.9 07/29/2022    PROT 6.3 10/11/2022    LABALBU 3.7 10/11/2022    BILITOT 0.8 10/11/2022    ALKPHOS 196 (H) 10/11/2022    AST 43 (H) 10/11/2022    ALT 28 10/11/2022    LABGLOM 29 10/11/2022     Lab Results   Component Value Date    TSH 5.180 (H) 07/07/2022    T4FREE 1.62 07/07/2022     Lab Results   Component Value Date    WBC 5.3 10/11/2022    HGB 10.8 (L) 10/11/2022    HCT 33.8 (L) 10/11/2022    MCV 97 10/11/2022     10/11/2022

## 2022-12-30 RX ORDER — GABAPENTIN 100 MG/1
CAPSULE ORAL
Qty: 90 CAPSULE | Refills: 3 | Status: SHIPPED | OUTPATIENT
Start: 2022-12-30 | End: 2023-03-30

## 2022-12-30 RX ORDER — POTASSIUM CHLORIDE 20 MEQ/1
TABLET, EXTENDED RELEASE ORAL
Qty: 30 TABLET | Refills: 0 | Status: SHIPPED | OUTPATIENT
Start: 2022-12-30

## 2022-12-30 NOTE — TELEPHONE ENCOUNTER
Patient's last appointment was : 11/30/2022  Patient's next appointment is : 03/01/2023  Future Appointments   Date Time Provider Jeanne Finch   1/11/2023 11:00 AM STR CT IMAGING RM1  OP [facility] STR[facility] OUT EXP STR Radiolog   1/18/2023 10:00 AM STR OUT PT ONC INJ RM 01 STRZ OP ONC [place] HOD   1/18/2023 10:40 AM Xander Banegas MD N Oncology [address]   2/2/2023  9:00 AM Manuel Herring MD N SRPX Heart [facility]   3/1/2023  1:00 PM Army Denise MD SRPX FM RES [address]     Last refilled:11/30/2022    Lab Results   Component Value Date    LABA1C 5.7 11/10/2021     No results found for: CHOL, TRIG, HDL, LDLCALC, LDLDIRECT  Lab Results   Component Value Date     10/11/2022    K 4.4 10/11/2022     07/29/2022    CO2 24 07/29/2022    BUN 23 (H) 07/29/2022    CREATININE 1.7 (H) 10/11/2022    GLUCOSE 97 07/29/2022    CALCIUM 8.9 07/29/2022    PROT 6.3 10/11/2022    LABALBU 3.7 10/11/2022    BILITOT 0.8 10/11/2022    ALKPHOS 196 (H) 10/11/2022    AST 43 (H) 10/11/2022    ALT 28 10/11/2022    LABGLOM 29 10/11/2022     Lab Results   Component Value Date    TSH 5.180 (H) 07/07/2022    T4FREE 1.62 07/07/2022     Lab Results   Component Value Date    WBC 5.3 10/11/2022    HGB 10.8 (L) 10/11/2022    HCT 33.8 (L) 10/11/2022    MCV 97 10/11/2022     10/11/2022
[id number]

## 2023-01-11 ENCOUNTER — HOSPITAL ENCOUNTER (OUTPATIENT)
Dept: CT IMAGING | Age: 83
Discharge: HOME | End: 2023-01-11
Payer: MEDICARE

## 2023-01-11 DIAGNOSIS — C25.9 MALIGNANT NEOPLASM OF PANCREAS, UNSPECIFIED LOCATION OF MALIGNANCY (HCC): ICD-10-CM

## 2023-01-11 PROCEDURE — 74176 CT ABD & PELVIS W/O CONTRAST: CPT

## 2023-01-16 DIAGNOSIS — C25.9 MALIGNANT NEOPLASM OF PANCREAS, UNSPECIFIED LOCATION OF MALIGNANCY (HCC): Primary | ICD-10-CM

## 2023-01-17 NOTE — PROGRESS NOTES
[address] CANCER [address] [place] [address]  Dept: [phone]  Loc: [phone]   Hematology/Oncology Progress Note [facility])        Jenni Greer  1940 1/16/2023     No ref. provider found   Talia Kwan MD     Diagnosis:   -Periampullary pancreatic adenocarcinoma      Treatment:   -Whipple procedure [facility] 12/4/2021  (7 of 27 lymph nodes positive for cancer.  -Adjuvant therapy: FOLFOX last administered 5/2. First treatment began 1/26/2022. Dose reductions including oxaliplatin to 70 mg per metered squared. Stopped @ April with low counts. Developed obstructive jaundice hospitalized at  June 16 through 21. Patient had leukocytosis bacteremia and a stricture post Whipple 6/15 CAT scan showed cirrhosis, small ascites and postop changes. Patient had Enterococcus bacteremia treated with Zosyn and Unasyn and then Augmentin. No vegetations on valves. ERCP 6/17 showed no obstruction or stones. Intrahepatic branches diffusely dilated. Abnormal LFTs and elevated bilirubin therefore a metal stent was placed in the common bile duct. Stent should be changed in 3 months. Patient is 2 months overdue to have her stent changed. Followable Disease:   - A/P CT wo contrast 1/11/23-stable  -CT imaging abdominal pelvic CT with contrast 6/15/2022-mild ascites otherwise no changes. -MRI of the abdomen with and without contrast 5/17/22-status post Whipple, mild worsening ascites mildly dilated biliary tree pancreatic mesenteric edema  - CA 19-9 - 39 ( 9/30/22)    Comorbidities:  -A. fib. Hypertension, see below      Subjective: Popeye Daily is still overdue to go back to  and have her biliary stent changed. Emphasized that she needs to arrange this. Appetite is excellent. Weight remains stable. Denies any pain. No GI or bowel concerns. Her results of her recent January 2023 CT abdomen pelvis. - YUMIKO. That was done without IV contrast.  Her creatinine was up to 1.7 and does fluctuate. ROS:  Review of Systems 14 point negative except as above.     PMH:   Past Medical History:   Diagnosis Date    Cholangiocarcinoma ([address]     ozlia    Hypertension         Social HX:   Social History     Socioeconomic History    Marital status:      Spouse name: Not on file    Number of children: Not on file    Years of education: Not on file    Highest education level: Not on file   Occupational History    Not on file   Tobacco Use    Smoking status: Former     Packs/day: 0.00     Years: 10.00     Pack years: 0.00     Types: Cigarettes     Quit date: 1972     Years since quittin.0    Smokeless tobacco: Never    Tobacco comments:     social smoker   Vaping Use    Vaping Use: Never used   Substance and Sexual Activity    Alcohol use: Yes     Comment: social- been a long time since last drink    Drug use: Never    Sexual activity: Not on file   Other Topics Concern    Not on file   Social History Narrative    Not on file     Social Determinants of Health     Financial Resource Strain: Not on file   Food Insecurity: Not on file   Transportation Needs: Not on file   Physical Activity: Insufficiently Active    Days of Exercise per Week: 1 day    Minutes of Exercise per Session: 10 min   Stress: Not on file   Social Connections: Not on file   Intimate Partner Violence: Not on file   Housing Stability: Not on file        Spouse:     Phone: Bitlye CostPrize  [address]ific[address] C/ John Barajas [facility]     Employment:      Immunizations:  Immunization History   Administered Date(s) Administered    COVID-19,  [facility] border, Primary or Immunocompromised, (age 12y+), IM, 100 mcg/0.5mL 2021, 2021, 2022    COVID-19, PFIZER Bivalent BOOSTER, DO NOT Dilute, (age 12y+), IM, 30 mcg/0.3 mL 2022    Influenza, FLUZONE (age 72 y+), High Dose, 0.7mL 10/27/2020, 10/15/2021, 2022    Influenza, High Dose (Fluzone 65 yrs and older) 2017, 2018, 2019    Pneumococcal Conjugate 13-valent Marisel Zak) 2020        Health Screenings:  Mammogram:  No results found for this or any previous visit. No valid procedures specified. Pap / Pelvic: na  C-Scope: na      Gyn HX:   GPA:  RAJAT/BSO: intact  LMP: No LMP recorded. Patient is postmenopausal.     Health Maintenance Due   Topic Date Due    DTaP/Tdap/Td vaccine (1 - Tdap) Never done    Shingles vaccine (1 of 2) Never done    DEXA (modify frequency per FRAX score)  Never done    Pneumococcal 65+ years Vaccine (2 - PPSV23 if available, else PCV20) 2021        Interests:       Fam HX:   Family History   Problem Relation Age of Onset    Breast Cancer Mother     Colon Cancer Mother     Cancer Father         bladder and lung    Lung Cancer Father     Kidney Disease Brother     No Known Problems Maternal Grandmother     No Known Problems Maternal Grandfather     No Known Problems Paternal Grandmother     No Known Problems Paternal Grandfather         Hospitalizations:   Began University  through  with obstructive jaundice from a stricture from her prior Whipple and enterococcal bacteremia. See above, metal stent placed.     Allergies:  No Known Allergies     Adult Illness:  Patient Active Problem List   Diagnosis    Obstructive jaundice    Hyperbilirubinemia    Common bile duct stricture    Elevated LFTs    Hypokalemia    Pancreatic cyst    Normocytic anemia    Abnormal urinalysis    Unintentional weight loss    Hiatal hernia    Diverticulosis    Malignant neoplasm of pancreas (HCC)    Atrial fibrillation (HCC)    Cholangiocarcinoma (HCC)    Thrombocytopenia (HCC)    SIRIA (acute kidney injury) ([address]    Hypomagnesemia    Macrocytic anemia    Severe malnutrition ([address]    Primary hypertension        Surgery:  Past Surgical History:   Procedure Laterality Date    APPENDECTOMY      CHOLECYSTECTOMY  2021    Dr. Courtney Carlin [place]    ERCP N/A 11/10/2021    ERCP WITH STENT INSERTION performed by Day Ledesma MD at [address] [place] [address] Drive  2021    exp lap, radical celiac and portal lymph node dissection,pylorus preserving pancreatoduodenectomy-Dr Ewing [facility]    PORT SURGERY Left 2022    SINGLE LUMEN [facility] INSERTION performed by Kashif West MD at [place] [facility]        Medications:  Current Outpatient Medications   Medication Sig Dispense Refill    potassium chloride (KLOR-CON M) 20 MEQ extended release tablet Take 1 tablet by mouth once daily 30 tablet 0    gabapentin (NEURONTIN) 100 MG capsule TAKE 1 CAPSULE BY MOUTH THREE TIMES DAILY FOR 90 DAYS 90 capsule 3    apixaban (ELIQUIS) 5 MG TABS tablet Take 1 tablet by mouth 2 times daily 180 tablet 1    amLODIPine (NORVASC) 2.5 MG tablet Take 1 tablet by mouth daily 90 tablet 1    bumetanide (BUMEX) 1 MG tablet TAKE 1 TABLET BY MOUTH IN THE MORNING 30 tablet 2    Handicap Placard MISC by Does not apply route Diagnosis: Pancreatic cancer, deconditioned, weakness  Expiration: 2023 1 each 0    aspirin 81 MG chewable tablet Take 1 tablet by mouth daily 90 tablet 1    ferrous sulfate (IRON 325) 325 (65 Fe) MG tablet Take 325 mg by mouth daily (with breakfast)      lidocaine-prilocaine (EMLA) 2.5-2.5 % cream Apply topically as needed. 30 g 1    docusate sodium (COLACE) 100 MG capsule Take 100 mg by mouth 2 times daily       No current facility-administered medications for this visit. EXAM:   vitals were not taken for this visit. Estimated body surface area is 1.64 meters squared as calculated from the following:    Height as of 22: 5' 5\" (1.651 m). Weight as of 22: 129 lb (58.5 kg). ECO    General: Non-ill appearing.   Very pleasant appears very comfortable in good spirits and younger than her stated age  [de-identified]: NC/AT,nonicteric, perrla,eom intact, no mucosal lesions  Neck: normal thyroid, no masses  Nodes: No adenopathy  Lungs/chest: clear, no rales,rhonchi or wheezing, lung bases clear  CV: rrr, no rubs ,gallops or murmurs  Breasts: Not examined  Abd/Rectal: soft, non-tender,bowel sounds normal , no HSM,no masses  Back: normal curvature, No midline tenderness. flanks nontender  : Not Examined  Extremities: no cyanosis,clubbing or edema. Skin: unremarkable  Neuro: A and O x 4, CN exam nonfocal, Motor- no deficits, Sensory- no deficits, gait-nl, speech- fluent, no ataxia.   Devices: Port    DATA:  LAB:     CBC with Differential:      Lab Results   Component Value Date/Time    WBC 5.3 10/11/2022 11:08 AM    RBC 3.49 10/11/2022 11:08 AM    HGB 10.8 10/11/2022 11:08 AM    HCT 33.8 10/11/2022 11:08 AM     10/11/2022 11:08 AM    MCV 97 10/11/2022 11:08 AM    MCH 30.9 10/11/2022 11:08 AM    MCHC 32.0 10/11/2022 11:08 AM    RDW 14.0 10/11/2022 11:08 AM    NRBC 0 07/07/2022 04:47 PM    SEGSPCT 41.1 07/07/2022 04:47 PM    MONOPCT 8.2 07/07/2022 04:47 PM    MONOSABS 0.6 10/11/2022 11:08 AM    LYMPHSABS 1.4 10/11/2022 11:08 AM    EOSABS 0.0 10/11/2022 11:08 AM    BASOSABS 0.0 10/11/2022 11:08 AM      Lab Results   Component Value Date/Time    SEGSABS 3.3 10/11/2022 11:08 AM       CMP:    Lab Results   Component Value Date/Time     10/11/2022 11:08 AM     07/29/2022 03:55 PM    K 4.4 10/11/2022 11:08 AM    K 3.6 07/29/2022 03:55 PM    K 3.3 05/17/2022 06:00 AM     07/29/2022 03:55 PM    CO2 24 07/29/2022 03:55 PM    BUN 23 07/29/2022 03:55 PM    CREATININE 1.7 10/11/2022 11:08 AM    CREATININE 1.5 07/29/2022 03:55 PM    LABGLOM 29 10/11/2022 11:08 AM    GLUCOSE 97 07/29/2022 03:55 PM    PROT 6.3 10/11/2022 11:08 AM    LABALBU 3.7 10/11/2022 11:08 AM    CALCIUM 8.9 07/29/2022 03:55 PM    BILITOT 0.8 10/11/2022 11:08 AM    ALKPHOS 196 10/11/2022 11:08 AM    AST 43 10/11/2022 11:08 AM    ALT 28 10/11/2022 11:08 AM   This creatinine elevation from baseline of 1.5 was 1 week after she had a IV contrast abdominal CT    BMP:    Lab Results Component Value Date/Time     10/11/2022 11:08 AM     07/29/2022 03:55 PM    K 4.4 10/11/2022 11:08 AM    K 3.6 07/29/2022 03:55 PM    K 3.3 05/17/2022 06:00 AM     07/29/2022 03:55 PM    CO2 24 07/29/2022 03:55 PM    BUN 23 07/29/2022 03:55 PM    LABALBU 3.7 10/11/2022 11:08 AM    CREATININE 1.7 10/11/2022 11:08 AM    CREATININE 1.5 07/29/2022 03:55 PM    CALCIUM 8.9 07/29/2022 03:55 PM    LABGLOM 29 10/11/2022 11:08 AM    GLUCOSE 97 07/29/2022 03:55 PM       Magnesium:    Lab Results   Component Value Date/Time    MG 1.6 06/08/2022 04:56 PM     PT/INR:    Lab Results   Component Value Date/Time    INR 1.04 11/09/2021 07:40 PM     TSH:    Lab Results   Component Value Date/Time    TSH 5.180 07/07/2022 04:47 PM     VITAMIN B12: No components found for: B12  FOLATE:  No results found for: FOLATE  IRON:    Lab Results   Component Value Date/Time    IRON 38 01/26/2022 08:52 AM     Iron Saturation:  No components found for: PERCENTFE  TIBC:    Lab Results   Component Value Date/Time    TIBC 178 01/26/2022 08:52 AM     FERRITIN:    Lab Results   Component Value Date/Time    FERRITIN 496 01/26/2022 08:52 AM     CA 19-9  on 8/12/2022= 39      IMAGING:  CT A/P wo contrast 1/11/23  Impression       1. Redemonstration of surgical changes from previous Whipple with stents in the biliary ducts extending into the anastomotic bowel with stable pneumobilia. No definite residual/recurrent mass is identified. 2. Localized fluid collection adjacent to the duct seen on prior exam is decreased in size on the current exam.   3. Mildly prominent mesenteric lymphadenopathy inferior to the pancreas, stable compared to prior exam.               CT abdomen pelvis with IV contrast 10/4/2022 compared with 6/15/2022  Interval placement of a biliary stent extending from the level of common bile duct at the gallbladder fossa just within the ampulla. The duct is patent. Adjacent fluid cystic lesion.   Stable but nonenlarged retroperitoneal lymph nodes. PROCEDURES:  See above    PATHOLOGY:   See above    GENETICS:  None    MOLECULAR:  None. Patient without recurrence or metastatic disease      ASSESSMENT/PLAN:    1: Diagnosis: 52-year-old female with history of a periampullary adenocarcinoma treated with Whipple procedure followed by 7 cycles of adjuvant FOLFOX. Adjuvant therapy was stopped when she developed obstructive jaundice likely from a stricture from her Whipple procedure and was hospitalized with enterococcal bacteremia in June. Currently off all therapy. 2) Prognosis / Disease Status: High risk disease. 3) Work-up:    Labs: CBC, CMP and CA 19-9 drawn today 1/18/2023. Imaging: Schedule next CT of the abdomen pelvis in 4 months which will be 5/17/2023. Procedures: Metal stent placed at  June 2022 patient's discharge summary says she is to return in mid September to have the stent exchange. She still has not returned to have the metal stent exchange. Consults: None new   Other: Follow-up with Dr. Sarah Browning of cardiology who is managing her diuretics. 4) Symptom Management: She is asymptomatic. 5) Supportive care provided. Level of care is appropriate. 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 6) Treatment goal:      Treatment plan: Treatment summarized above      Currently on surveillance. Change surveillance now to every 4 months. 7) Medications reviewed. Prescriptions today: None            No orders of the defined types were placed in this encounter. OARRS:  Controlled Substance Monitoring:    Acute and Chronic Pain Monitoring:   No flowsheet data found. 8) Research Options:       Not applicable      9) Other: Port flush every 2 months. Again emphasized the importance of following up at  to have her metal stent changed. 10) Follow Up:   Follow-up 4 months with me in 1 week before check labs including tumor marker and abdominal pelvic CT without contrast.  No contrast due to fluctuating creatinine.     Tasha Lewis MD

## 2023-01-18 ENCOUNTER — OFFICE VISIT (OUTPATIENT)
Dept: ONCOLOGY | Age: 83
End: 2023-01-18
Payer: MEDICARE

## 2023-01-18 ENCOUNTER — HOSPITAL ENCOUNTER (OUTPATIENT)
Dept: INFUSION THERAPY | Age: 83
Discharge: HOME | End: 2023-01-18
Payer: MEDICARE

## 2023-01-18 VITALS
TEMPERATURE: 97.7 F | HEART RATE: 16 BPM | WEIGHT: 129.8 LBS | HEIGHT: 65 IN | SYSTOLIC BLOOD PRESSURE: 98 MMHG | BODY MASS INDEX: 21.63 KG/M2 | RESPIRATION RATE: 16 BRPM | DIASTOLIC BLOOD PRESSURE: 55 MMHG | OXYGEN SATURATION: 100 %

## 2023-01-18 VITALS
TEMPERATURE: 97.7 F | HEART RATE: 83 BPM | WEIGHT: 129 LBS | SYSTOLIC BLOOD PRESSURE: 98 MMHG | BODY MASS INDEX: 21.49 KG/M2 | DIASTOLIC BLOOD PRESSURE: 55 MMHG | OXYGEN SATURATION: 100 % | RESPIRATION RATE: 16 BRPM | HEIGHT: 65 IN

## 2023-01-18 DIAGNOSIS — C25.9 MALIGNANT NEOPLASM OF PANCREAS, UNSPECIFIED LOCATION OF MALIGNANCY (HCC): Primary | ICD-10-CM

## 2023-01-18 LAB
ABSOLUTE IMMATURE GRANULOCYTE: 0.01 THOU/MM3 (ref 0–0.07)
ALBUMIN SERPL-MCNC: 3.7 G/DL (ref 3.5–5.1)
ALP BLD-CCNC: 182 U/L (ref 38–126)
ALT SERPL-CCNC: 23 U/L (ref 11–66)
AST SERPL-CCNC: 27 U/L (ref 5–40)
BASINOPHIL, AUTOMATED: 1 % (ref 0–3)
BASOPHILS ABSOLUTE: 0 THOU/MM3 (ref 0–0.1)
BILIRUB SERPL-MCNC: 0.5 MG/DL (ref 0.3–1.2)
BILIRUBIN DIRECT: < 0.2 MG/DL (ref 0–0.3)
BUN, WHOLE BLOOD: 26 MG/DL (ref 8–26)
CHLORIDE, WHOLE BLOOD: 107 MEQ/L (ref 98–109)
CREATININE, WHOLE BLOOD: 1.2 MG/DL (ref 0.5–1.2)
EOSINOPHILS ABSOLUTE: 0.1 THOU/MM3 (ref 0–0.4)
EOSINOPHILS RELATIVE PERCENT: 2 % (ref 0–4)
GFR SERPL CREATININE-BSD FRML MDRD: 45 ML/MIN/1.73M2
GLUCOSE, WHOLE BLOOD: 101 MG/DL (ref 70–108)
HCT VFR BLD CALC: 33.4 % (ref 37–47)
HEMOGLOBIN: 10.4 GM/DL (ref 12–16)
IMMATURE GRANULOCYTES: 0 %
IONIZED CALCIUM, WHOLE BLOOD: 1.19 MMOL/L (ref 1.12–1.32)
LYMPHOCYTES # BLD: 32 % (ref 15–47)
LYMPHOCYTES ABSOLUTE: 1.7 THOU/MM3 (ref 1–4.8)
MCH RBC QN AUTO: 30.9 PG (ref 26–33)
MCHC RBC AUTO-ENTMCNC: 31.1 GM/DL (ref 32.2–35.5)
MCV RBC AUTO: 99 FL (ref 81–99)
MONOCYTES ABSOLUTE: 0.5 THOU/MM3 (ref 0.4–1.3)
MONOCYTES: 10 % (ref 0–12)
PDW BLD-RTO: 12.5 % (ref 11.5–14.5)
PLATELET # BLD: 220 THOU/MM3 (ref 130–400)
PMV BLD AUTO: 9.9 FL (ref 9.4–12.4)
POTASSIUM, WHOLE BLOOD: 4 MEQ/L (ref 3.5–4.9)
RBC # BLD: 3.37 MILL/MM3 (ref 4.2–5.4)
SEG NEUTROPHILS: 56 % (ref 43–75)
SEGMENTED NEUTROPHILS ABSOLUTE COUNT: 3 THOU/MM3 (ref 1.8–7.7)
SODIUM, WHOLE BLOOD: 143 MEQ/L (ref 138–146)
TOTAL CO2, WHOLE BLOOD: 26 MEQ/L (ref 23–33)
TOTAL PROTEIN: 6.5 G/DL (ref 6.1–8)
WBC # BLD: 5.3 THOU/MM3 (ref 4.8–10.8)

## 2023-01-18 PROCEDURE — 86301 IMMUNOASSAY TUMOR CA 19-9: CPT

## 2023-01-18 PROCEDURE — 1124F ACP DISCUSS-NO DSCNMKR DOCD: CPT | Performed by: INTERNAL MEDICINE

## 2023-01-18 PROCEDURE — 6360000002 HC RX W HCPCS: Performed by: INTERNAL MEDICINE

## 2023-01-18 PROCEDURE — 80047 BASIC METABLC PNL IONIZED CA: CPT

## 2023-01-18 PROCEDURE — 36591 DRAW BLOOD OFF VENOUS DEVICE: CPT

## 2023-01-18 PROCEDURE — 3078F DIAST BP <80 MM HG: CPT | Performed by: INTERNAL MEDICINE

## 2023-01-18 PROCEDURE — 99211 OFF/OP EST MAY X REQ PHY/QHP: CPT

## 2023-01-18 PROCEDURE — 99214 OFFICE O/P EST MOD 30 MIN: CPT | Performed by: INTERNAL MEDICINE

## 2023-01-18 PROCEDURE — 85025 COMPLETE CBC W/AUTO DIFF WBC: CPT

## 2023-01-18 PROCEDURE — 80076 HEPATIC FUNCTION PANEL: CPT

## 2023-01-18 PROCEDURE — 3074F SYST BP LT 130 MM HG: CPT | Performed by: INTERNAL MEDICINE

## 2023-01-18 PROCEDURE — 2580000003 HC RX 258: Performed by: INTERNAL MEDICINE

## 2023-01-18 RX ORDER — HEPARIN SODIUM (PORCINE) LOCK FLUSH IV SOLN 100 UNIT/ML 100 UNIT/ML
500 SOLUTION INTRAVENOUS PRN
Status: DISCONTINUED | OUTPATIENT
Start: 2023-01-18 | End: 2023-01-19 | Stop reason: HOSPADM

## 2023-01-18 RX ORDER — SODIUM CHLORIDE 0.9 % (FLUSH) 0.9 %
5-40 SYRINGE (ML) INJECTION PRN
OUTPATIENT
Start: 2023-01-25

## 2023-01-18 RX ORDER — SODIUM CHLORIDE 0.9 % (FLUSH) 0.9 %
5-40 SYRINGE (ML) INJECTION PRN
Status: DISCONTINUED | OUTPATIENT
Start: 2023-01-18 | End: 2023-01-19 | Stop reason: HOSPADM

## 2023-01-18 RX ORDER — HEPARIN SODIUM (PORCINE) LOCK FLUSH IV SOLN 100 UNIT/ML 100 UNIT/ML
500 SOLUTION INTRAVENOUS PRN
OUTPATIENT
Start: 2023-01-18

## 2023-01-18 RX ORDER — SODIUM CHLORIDE 9 MG/ML
25 INJECTION, SOLUTION INTRAVENOUS PRN
OUTPATIENT
Start: 2023-01-18

## 2023-01-18 RX ORDER — SODIUM CHLORIDE 0.9 % (FLUSH) 0.9 %
5-40 SYRINGE (ML) INJECTION PRN
OUTPATIENT
Start: 2023-01-18

## 2023-01-18 RX ORDER — SODIUM CHLORIDE 9 MG/ML
5-250 INJECTION, SOLUTION INTRAVENOUS PRN
OUTPATIENT
Start: 2023-01-25

## 2023-01-18 RX ORDER — WATER 1000 ML/1000ML
2.2 INJECTION, SOLUTION INTRAVENOUS ONCE
OUTPATIENT
Start: 2023-01-18 | End: 2023-01-18

## 2023-01-18 RX ORDER — HEPARIN SODIUM (PORCINE) LOCK FLUSH IV SOLN 100 UNIT/ML 100 UNIT/ML
500 SOLUTION INTRAVENOUS PRN
OUTPATIENT
Start: 2023-01-25

## 2023-01-18 RX ADMIN — SODIUM CHLORIDE, PRESERVATIVE FREE 10 ML: 5 INJECTION INTRAVENOUS at 10:45

## 2023-01-18 RX ADMIN — SODIUM CHLORIDE, PRESERVATIVE FREE 20 ML: 5 INJECTION INTRAVENOUS at 10:46

## 2023-01-18 RX ADMIN — HEPARIN 500 UNITS: 100 SYRINGE at 12:02

## 2023-01-18 NOTE — DISCHARGE INSTRUCTIONS
Please contact your Oncologist if you have any questions regarding the labwork that you received today. You are instructed to call the office or go to the Emergency Dept. If you experience any of the following symptoms:    Dizziness/lightheadedness   Acute nausea or vomiting-not relieved by medications  Headaches-not relieved by medications  New chest pain or pressure  New rash /itching  New shortness of breath  Fever,chills or signs or symptoms of infection    Make sure you are drinking 48 to 64 ounces of water daily-if you are unable to drink fluids let us know right away.

## 2023-01-18 NOTE — PROGRESS NOTES
Patient tolerated  labwork from [facility] without any complications. Discharge instructions given to patient-verbalizes understanding. Ambulated off unit per self with belongings.

## 2023-01-18 NOTE — PATIENT INSTRUCTIONS
Labs today drawn CBC, CMP and confirm CA 19-9 was drawn before patient leaves.  Neck CT abdomen pelvis without contrast will be in 4 months; 5/17/23  Follow-up with me 5/24.  Next labs will be done also on 5/ including CBC CMP and CA 19-9  Port flush every 2 months.

## 2023-01-19 LAB — CANCER AG19-9 SERPL-ACNC: 31 U/ML (ref 0–35)

## 2023-02-02 ENCOUNTER — OFFICE VISIT (OUTPATIENT)
Dept: CARDIOLOGY CLINIC | Age: 83
End: 2023-02-02
Payer: MEDICARE

## 2023-02-02 VITALS
HEART RATE: 76 BPM | DIASTOLIC BLOOD PRESSURE: 62 MMHG | HEIGHT: 65 IN | SYSTOLIC BLOOD PRESSURE: 128 MMHG | WEIGHT: 127 LBS | BODY MASS INDEX: 21.16 KG/M2

## 2023-02-02 DIAGNOSIS — E43 SEVERE MALNUTRITION (HCC): ICD-10-CM

## 2023-02-02 DIAGNOSIS — R60.0 PEDAL EDEMA: Primary | ICD-10-CM

## 2023-02-02 DIAGNOSIS — E87.6 HYPOKALEMIA: ICD-10-CM

## 2023-02-02 PROCEDURE — 3074F SYST BP LT 130 MM HG: CPT | Performed by: INTERNAL MEDICINE

## 2023-02-02 PROCEDURE — 99214 OFFICE O/P EST MOD 30 MIN: CPT | Performed by: INTERNAL MEDICINE

## 2023-02-02 PROCEDURE — 1124F ACP DISCUSS-NO DSCNMKR DOCD: CPT | Performed by: INTERNAL MEDICINE

## 2023-02-02 PROCEDURE — 3078F DIAST BP <80 MM HG: CPT | Performed by: INTERNAL MEDICINE

## 2023-02-02 RX ORDER — POTASSIUM CHLORIDE 20 MEQ/1
10 TABLET, EXTENDED RELEASE ORAL EVERY OTHER DAY
Qty: 30 TABLET | Refills: 5 | Status: SHIPPED | OUTPATIENT
Start: 2023-02-02

## 2023-02-02 RX ORDER — BUMETANIDE 0.5 MG/1
0.5 TABLET ORAL EVERY OTHER DAY
Qty: 30 TABLET | Refills: 5 | Status: SHIPPED | OUTPATIENT
Start: 2023-02-02

## 2023-02-02 NOTE — PROGRESS NOTES
[id number] [facility] [place] [address] [place] [address]  Dept: [phone]  Dept Fax: [phone]  Loc: [phone]    Visit Date: 2/2/2023    Ms. Alana Blandon is a 81 y.o. female  who presented for:  Chief Complaint   Patient presents with    Follow-up       HPI:   80 yo F c hx of cholangiocarcinoma s/p whipple surgery and Afib post op is referred here for Afib. Patient had whipple surgery in 12/2021 in [place]. And Afib was diagnosed post exploratory surgery. She is on eliquis and amiodarone. EKG showed SR, incomplete RBBB, LAFB. She is currently on chemotherapy. Does report some neuropathy type of pain. She comes today having some questions about taking bumex. Current Outpatient Medications:     bumetanide (BUMEX) 0.5 MG tablet, Take 1 tablet by mouth every other day, Disp: 30 tablet, Rfl: 5    gabapentin (NEURONTIN) 100 MG capsule, TAKE 1 CAPSULE BY MOUTH THREE TIMES DAILY FOR 90 DAYS, Disp: 90 capsule, Rfl: 3    potassium chloride (KLOR-CON M) 20 MEQ extended release tablet, Take 1 tablet by mouth once daily, Disp: 30 tablet, Rfl: 0    apixaban (ELIQUIS) 5 MG TABS tablet, Take 1 tablet by mouth 2 times daily, Disp: 180 tablet, Rfl: 1    amLODIPine (NORVASC) 2.5 MG tablet, Take 1 tablet by mouth daily, Disp: 90 tablet, Rfl: 1    Handicap Placard MISC, by Does not apply route Diagnosis: Pancreatic cancer, deconditioned, weakness Expiration: 5/31/2023, Disp: 1 each, Rfl: 0    aspirin 81 MG chewable tablet, Take 1 tablet by mouth daily, Disp: 90 tablet, Rfl: 1    ferrous sulfate (IRON 325) 325 (65 Fe) MG tablet, Take 325 mg by mouth daily (with breakfast), Disp: , Rfl:     lidocaine-prilocaine (EMLA) 2.5-2.5 % cream, Apply topically as needed. , Disp: 30 g, Rfl: 1    docusate sodium (COLACE) 100 MG capsule, Take 100 mg by mouth 2 times daily, Disp: , Rfl:     Past Medical History  Monique Washburn  has a past medical history of Atrial fibrillation ([facility] [address] Cholangiocarcinoma ([facility] [address] and Hypertension. Social History  Grecia Ghotra  reports that she quit smoking about 51 years ago. Her smoking use included cigarettes. She has never used smokeless tobacco. She reports current alcohol use. She reports that she does not use drugs. Family History  Grecia Ghotra family history includes Breast Cancer in her mother; Cancer in her father; Silvino Gourd in her mother; Kidney Disease in her brother; Royal Ada in her father; No Known Problems in her maternal grandfather, maternal grandmother, paternal grandfather, and paternal grandmother. Past Surgical History   Past Surgical History:   Procedure Laterality Date    APPENDECTOMY  1959    CHOLECYSTECTOMY  12/04/2021    Dr. Kelly Pickering    ERCP N/A 11/10/2021    ERCP WITH STENT INSERTION performed by Edward Reagan MD at [address] [place] [address] Drive  12/04/2021    exp lap, radical celiac and portal lymph node dissection,pylorus preserving pancreatoduodenectomy-Dr [facility]    PORT SURGERY Left 1/25/2022    SINGLE LUMEN ALQSCQEPZ INSERTION performed by Nory Carroll MD at [address] [place] [address] Place:     REVIEW OF SYSTEMS  Constitutional: denies sweats, chills and fever  HENT: denies  congestion, sinus pressure, sneezing and sore throat. Eyes: denies  pain, discharge, redness and itching. Respiratory: denies apnea, cough  Gastrointestinal: denies blood in stool, constipation, diarrhea   Endocrine: denies cold intolerance, heat intolerance, polydipsia. Genitourinary: denies dysuria, enuresis, flank pain and hematuria. Musculoskeletal: denies arthralgias, joint swelling and neck pain. Neurological: denies numbness and headaches. Psychiatric/Behavioral: denies agitation, confusion, decreased concentration and dysphoric mood    All others reviewed and are negative.    Objective:     /62   Pulse 76   Ht 5' 5\" (1.651 m)   Wt 127 lb (57.6 kg)   BMI 21.13 kg/m²     Wt Readings from Last 3 Encounters:   02/02/23 127 lb (57.6 kg)   01/18/23 129 lb 12.8 oz (58.9 kg)   01/18/23 129 lb (58.5 kg)     BP Readings from Last 3 Encounters:   02/02/23 128/62   01/18/23 (!) 98/55   01/18/23 (!) 98/55       PHYSICAL EXAM  Constitutional: Oriented to person, place, and time. Appears well-developed and well-nourished. HENT:   Head: Normocephalic and atraumatic. Eyes: EOM are normal. Pupils are equal, round, and reactive to light. Neck: Normal range of motion. Neck supple. No JVD present. Cardiovascular: Normal rate , normal heart sounds and intact distal pulses. Pulmonary/Chest: Effort normal and breath sounds normal. No respiratory distress. No wheezes. No rales. Abdominal: Soft. Bowel sounds are normal. No distension. There is no tenderness. Musculoskeletal: Normal range of motion. No edema. Neurological: Alert and oriented to person, place, and time. No cranial nerve deficit. Coordination normal.   Skin: Skin is warm and dry. Psychiatric: Normal mood and affect.        No results found for: CKTOTAL, CKMB, CKMBINDEX    Lab Results   Component Value Date/Time    WBC 5.3 01/18/2023 10:45 AM    RBC 3.37 01/18/2023 10:45 AM    HGB 10.4 01/18/2023 10:45 AM    HCT 33.4 01/18/2023 10:45 AM    MCV 99 01/18/2023 10:45 AM    MCH 30.9 01/18/2023 10:45 AM    MCHC 31.1 01/18/2023 10:45 AM    RDW 12.5 01/18/2023 10:45 AM     01/18/2023 10:45 AM    MPV 9.9 01/18/2023 10:45 AM       Lab Results   Component Value Date/Time     01/18/2023 10:45 AM     07/29/2022 03:55 PM    K 4.0 01/18/2023 10:45 AM    K 3.6 07/29/2022 03:55 PM    K 3.3 05/17/2022 06:00 AM     07/29/2022 03:55 PM    CO2 24 07/29/2022 03:55 PM    BUN 23 07/29/2022 03:55 PM    LABALBU 3.7 01/18/2023 10:45 AM    CREATININE 1.2 01/18/2023 10:45 AM    CREATININE 1.5 07/29/2022 03:55 PM    CALCIUM 8.9 07/29/2022 03:55 PM    LABGLOM 45 01/18/2023 10:45 AM    GLUCOSE 97 07/29/2022 03:55 PM Lab Results   Component Value Date/Time    [facility] SOUTH 182 01/18/2023 10:45 AM    ALT 23 01/18/2023 10:45 AM    AST 27 01/18/2023 10:45 AM    PROT 6.5 01/18/2023 10:45 AM    BILITOT 0.5 01/18/2023 10:45 AM    BILIDIR <0.2 01/18/2023 10:45 AM    LABALBU 3.7 01/18/2023 10:45 AM       Lab Results   Component Value Date/Time    MG 1.6 06/08/2022 04:56 PM       Lab Results   Component Value Date    INR 1.04 11/09/2021         Lab Results   Component Value Date/Time    LABA1C 5.7 11/10/2021 12:25 PM       No results found for: TRIG, HDL, LDLCALC, LDLDIRECT, LABVLDL    Lab Results   Component Value Date/Time    TSH 5.180 07/07/2022 04:47 PM         Testing Reviewed:      I haveindividually reviewed the below cardiac tests    EKG:    ECHO: No results found for this or any previous visit. STRESS:    CATH:    Assessment/Plan       Diagnosis Orders   1. Pedal edema              Afib ? Post op, no more events on event monitor   Cholangiocarcionam s/p whipple, now on chemotherapy  Pedal edema     Will decrease bumex to 0.5 mg every other day  Currently on Amiodarone and eliquis  Recently EKG showed SR  ? Post op Afib  Had Echo at , will try to obtain  Started on amiodarone and eliquis at   Had event monitor for 30 days which did not show any more afib  This is post op afib only  Will d/c eliquis  The patient is asked to make an attempt to improve diet and exercise patterns to aid in medical management of this problem. Advised more plant based nutrition/meditarrean diet   Advised patient to call office or seek immediate medical attention if there is any new onset of  any chest pain, sob, palpitations, lightheadedness, dizziness, orthopnea, PND or pedal edema. All medication side effects were discussed in details. Thank youfor allowing me to participate in the care of this patient. Please do not hesitate to contact me for any further questions.      Return in about 6 months (around 8/2/2023), or if symptoms worsen or fail to improve, for Review testing, Regular follow up.        Electronically signed by Arlet Villanueva MD [address]  2/2/2023 at 11:13 AM EDT

## 2023-02-28 ENCOUNTER — TELEPHONE (OUTPATIENT)
Dept: FAMILY MEDICINE CLINIC | Age: 83
End: 2023-02-28

## 2023-02-28 NOTE — TELEPHONE ENCOUNTER
----- Message from Chacho Almendarez sent at 2/27/2023  4:01 PM EST -----  Subject: Message to Provider    QUESTIONS  Information for Provider? patient called and was advised to call the   office for [facility] to give her transpotation to her appt on   wednesday  ---------------------------------------------------------------------------  --------------  [address] [employer]  [phone]; OK to leave message on voicemail  ---------------------------------------------------------------------------  --------------  SCRIPT ANSWERS  Relationship to Patient?  Self

## 2023-02-28 NOTE — TELEPHONE ENCOUNTER
Set up with [facility]. They will be picking her up at 1230. Called pt to tell her. She told me [employer] already did it that morning. Called [facility] and they made sure she was only down for 1 pickup tomorrow.

## 2023-03-01 ENCOUNTER — NURSE ONLY (OUTPATIENT)
Dept: LAB | Age: 83
End: 2023-03-01

## 2023-03-01 ENCOUNTER — OFFICE VISIT (OUTPATIENT)
Dept: FAMILY MEDICINE CLINIC | Age: 83
End: 2023-03-01
Payer: MEDICARE

## 2023-03-01 VITALS
BODY MASS INDEX: 21.83 KG/M2 | HEIGHT: 65 IN | SYSTOLIC BLOOD PRESSURE: 102 MMHG | HEART RATE: 70 BPM | OXYGEN SATURATION: 100 % | DIASTOLIC BLOOD PRESSURE: 60 MMHG | WEIGHT: 131 LBS | TEMPERATURE: 98.2 F | RESPIRATION RATE: 14 BRPM

## 2023-03-01 DIAGNOSIS — G62.9 NEUROPATHY: ICD-10-CM

## 2023-03-01 DIAGNOSIS — L89.311 PRESSURE INJURY OF RIGHT BUTTOCK, STAGE 1: ICD-10-CM

## 2023-03-01 DIAGNOSIS — C25.9 MALIGNANT NEOPLASM OF PANCREAS, UNSPECIFIED LOCATION OF MALIGNANCY (HCC): Primary | ICD-10-CM

## 2023-03-01 DIAGNOSIS — I48.91 ATRIAL FIBRILLATION, UNSPECIFIED TYPE (HCC): ICD-10-CM

## 2023-03-01 LAB
FOLATE SERPL-MCNC: > 20 NG/ML (ref 4.8–24.2)
VIT B12 SERPL-MCNC: 431 PG/ML (ref 211–911)

## 2023-03-01 PROCEDURE — 3074F SYST BP LT 130 MM HG: CPT | Performed by: STUDENT IN AN ORGANIZED HEALTH CARE EDUCATION/TRAINING PROGRAM

## 2023-03-01 PROCEDURE — 3078F DIAST BP <80 MM HG: CPT | Performed by: STUDENT IN AN ORGANIZED HEALTH CARE EDUCATION/TRAINING PROGRAM

## 2023-03-01 PROCEDURE — 99214 OFFICE O/P EST MOD 30 MIN: CPT | Performed by: STUDENT IN AN ORGANIZED HEALTH CARE EDUCATION/TRAINING PROGRAM

## 2023-03-01 PROCEDURE — 1124F ACP DISCUSS-NO DSCNMKR DOCD: CPT | Performed by: STUDENT IN AN ORGANIZED HEALTH CARE EDUCATION/TRAINING PROGRAM

## 2023-03-01 SDOH — ECONOMIC STABILITY: HOUSING INSECURITY
IN THE LAST 12 MONTHS, WAS THERE A TIME WHEN YOU DID NOT HAVE A STEADY PLACE TO SLEEP OR SLEPT IN A SHELTER (INCLUDING NOW)?: NO

## 2023-03-01 SDOH — ECONOMIC STABILITY: INCOME INSECURITY: HOW HARD IS IT FOR YOU TO PAY FOR THE VERY BASICS LIKE FOOD, HOUSING, MEDICAL CARE, AND HEATING?: SOMEWHAT HARD

## 2023-03-01 SDOH — ECONOMIC STABILITY: FOOD INSECURITY: WITHIN THE PAST 12 MONTHS, YOU WORRIED THAT YOUR FOOD WOULD RUN OUT BEFORE YOU GOT MONEY TO BUY MORE.: NEVER TRUE

## 2023-03-01 SDOH — ECONOMIC STABILITY: FOOD INSECURITY: WITHIN THE PAST 12 MONTHS, THE FOOD YOU BOUGHT JUST DIDN'T LAST AND YOU DIDN'T HAVE MONEY TO GET MORE.: NEVER TRUE

## 2023-03-01 ASSESSMENT — PATIENT HEALTH QUESTIONNAIRE - PHQ9
2. FEELING DOWN, DEPRESSED OR HOPELESS: 1
1. LITTLE INTEREST OR PLEASURE IN DOING THINGS: 1
SUM OF ALL RESPONSES TO PHQ9 QUESTIONS 1 & 2: 2
SUM OF ALL RESPONSES TO PHQ QUESTIONS 1-9: 2

## 2023-03-01 ASSESSMENT — ENCOUNTER SYMPTOMS
CONSTIPATION: 0
WHEEZING: 0
DIARRHEA: 1
NAUSEA: 0
VOMITING: 0
ABDOMINAL DISTENTION: 0
SHORTNESS OF BREATH: 0
ABDOMINAL PAIN: 0
BACK PAIN: 0
COUGH: 0

## 2023-03-01 NOTE — PROGRESS NOTES
Bg Timmons is a 81 y.o. female who presents today for:  Chief Complaint   Patient presents with    3 Month Follow-Up           HPI:   Bg Timmons is 81 y.o. who presents today for 3 month follow-up. Pancreatic adenocarcinoma s/p Whipple at  12/4/21. Saw oncology 1/18/23 and off all treatment currently. Plan for repeat CT 5/17/23. Recent CA-19-9 31, improved from prior. Obstructive Jaundice - biliary stent placed 9/2022- she is overdue to have this stent changed and is planning to call  to follow-up on this. Afib: Saw Cardiology 2/2/23 - decreased Bumex to 0.5 mg every other day for leg swelling, and recommend only take potassium supplement on days when she take Bumex. Leg swelling improved overall. Cardiology also discontinued Eliquis and Amiodarone - patient doing well with this. Neuropathy in bilateral hands and feet to level of mid-shin and wrists. This is overall stable from prior, described as tight sensation. She is taking gabapentin 100 mg TID, which is overall helpful. Patient reports that she overall feels that she is gaining her strength back. Her weight continues to increase. She does note that some foods tend to go right through her, and she does note loose bowel movements. No abdominal pain, nausea, vomiting with this. Does report occasional, intermittent heartburn that only lasts for about a minute, and she has not taken any medication for this. She does also report a small sore on her right buttock, which she reports is due to having mild urinary incontinence and having to wear incontinence pads. This has not been red, painful, or draining. She is not using anything to the area currently    Believes she has had both pneumonia vaccines - only PCV-13 documented. DEXA ordered - planning to have done.         Objective:     Vitals:    03/01/23 1300   BP: 102/60   Site: Left Upper Arm   Position: Sitting   Cuff Size: Medium Adult   Pulse: 70 Resp: 14   Temp: 98.2 °F (36.8 °C)   TempSrc: Temporal   SpO2: 100%   Weight: 131 lb (59.4 kg)   Height: 5' 5\" (1.651 m)       Wt Readings from Last 3 Encounters:   03/01/23 131 lb (59.4 kg)   02/02/23 127 lb (57.6 kg)   01/18/23 129 lb 12.8 oz (58.9 kg)       BP Readings from Last 3 Encounters:   03/01/23 102/60   02/02/23 128/62   01/18/23 (!) 98/55       Lab Results   Component Value Date    WBC 5.3 01/18/2023    HGB 10.4 (L) 01/18/2023    HCT 33.4 (L) 01/18/2023    MCV 99 01/18/2023     01/18/2023     Lab Results   Component Value Date     01/18/2023    K 4.0 01/18/2023     07/29/2022    CO2 24 07/29/2022    BUN 23 (H) 07/29/2022    CREATININE 1.2 01/18/2023    GLUCOSE 97 07/29/2022    CALCIUM 8.9 07/29/2022    PROT 6.5 01/18/2023    LABALBU 3.7 01/18/2023    BILITOT 0.5 01/18/2023    ALKPHOS 182 (H) 01/18/2023    AST 27 01/18/2023    ALT 23 01/18/2023    LABGLOM 45 01/18/2023     Lab Results   Component Value Date    TSH 5.180 (H) 07/07/2022    T4FREE 1.62 07/07/2022     Lab Results   Component Value Date    LABA1C 5.7 11/10/2021     No results found for: EAG  No results found for: CHOL  No results found for: TRIG  No results found for: HDL  No results found for: LDLCHOLESTEROL, LDLCALC    No results found for: LABMICR, [id number]    Review of Systems   Constitutional:  Negative for activity change, chills, fatigue and fever. HENT:  Negative for congestion. Eyes:  Negative for visual disturbance. Respiratory:  Negative for cough, shortness of breath and wheezing. Cardiovascular:  Positive for leg swelling. Negative for chest pain and palpitations. Gastrointestinal:  Positive for diarrhea. Negative for abdominal distention, abdominal pain, constipation, nausea and vomiting. Genitourinary:  Negative for dysuria. Musculoskeletal:  Negative for back pain. Skin:  Positive for wound (right buttock). Neurological:  Positive for numbness (hands and feet).  Negative for dizziness, syncope, light-headedness and headaches. Psychiatric/Behavioral:  Negative for dysphoric mood. The patient is not nervous/anxious. Physical Exam  Vitals and nursing note reviewed. Constitutional:       Appearance: Normal appearance. She is normal weight. HENT:      Head: Normocephalic and atraumatic. Nose: Nose normal.      Mouth/Throat:      Mouth: Mucous membranes are moist.   Eyes:      Extraocular Movements: Extraocular movements intact. Conjunctiva/sclera: Conjunctivae normal.      Pupils: Pupils are equal, round, and reactive to light. Cardiovascular:      Rate and Rhythm: Normal rate and regular rhythm. Pulses: Normal pulses. Heart sounds: Murmur (1-2/6 ARACELIS, loudest RUSB) heard. Pulmonary:      Effort: Pulmonary effort is normal. No respiratory distress. Breath sounds: Normal breath sounds. No wheezing. Abdominal:      General: Abdomen is flat. Bowel sounds are normal. There is no distension. Palpations: Abdomen is soft. There is no mass. Tenderness: There is no abdominal tenderness. Musculoskeletal:      Cervical back: Neck supple. Right lower leg: Edema (1+) present. Left lower leg: Edema (1+) present. Skin:     General: Skin is warm and dry. Findings: Wound present. Neurological:      General: No focal deficit present. Mental Status: She is alert and oriented to person, place, and time.    Psychiatric:         Mood and Affect: Mood normal.         Behavior: Behavior normal.       Immunization History   Administered Date(s) Administered    COVID-19, MODERNA BLUE border, Primary or Immunocompromised, (age 12y+), IM, 100 mcg/0.5mL 02/05/2021, 03/05/2021, 01/19/2022    COVID-19, PFIZER Bivalent BOOSTER, DO NOT Dilute, (age 12y+), IM, 30 mcg/0.3 mL 09/28/2022    Influenza, FLUZONE (age 72 y+), High Dose, 0.7mL 10/27/2020, 10/15/2021, 09/28/2022    Influenza, High Dose (Fluzone 65 yrs and older) 12/01/2017, 11/12/2018, 11/08/2019 Pneumococcal Conjugate 13-valent Kvng Jad) 12/21/2020       Health Maintenance Due   Topic Date Due    DTaP/Tdap/Td vaccine (1 - Tdap) Never done    Shingles vaccine (1 of 2) Never done    DEXA (modify frequency per FRAX score)  Never done    Pneumococcal 65+ years Vaccine (2 - PPSV23 if available, else PCV20) 12/21/2021       Food Insecurity: No Food Insecurity    Worried About Running Out of Food in the Last Year: Never true    Ran Out of Food in the Last Year: Never true       Assessment / Plan:   1. Malignant neoplasm of pancreas, unspecified location of malignancy (HCC)  Chronic, stable. Patient doing well. -Recommend increasing fiber in diet due to loose stools. -If this does not improve, patient may need to consider pancreatic enzyme replacement due to history of Whipple  -Follow-up with oncology as scheduled  -Encourage patient to follow-up with [facility] for biliary stent replacement and further management    2. Neuropathy  Overall stable from prior. Classic stocking glove pattern. Most likely related to prior chemotherapy treatment. However will check B12 and folate to ensure this is not contributing.  -Continue current dose of gabapentin, consider increasing dose if symptoms worsen  - Vitamin B12 & Folate; Future    3. Pressure injury of right buttock, stage 1  New problem noted on exam today. Likely due to pressure injury from sitting and decreased activity overall.  -Recommend offloading cushion when sitting, frequent repositioning  -No signs of infection at this time, recommend patient continue monitoring this, follow-up if it continues to get larger or have new redness, warmth, induration. 4. Atrial fibrillation, unspecified type (HCC)  Chronic, believed to be only related to postop from Whipple procedure. Cardiac event monitor without further episodes of A-fib.  -Per recommendations from cardiology, no need for further treatment with amiodarone or Eliquis.   -Monitor symptoms, no further treatment needed currently. -Patient will bring pneumonia vaccination records to next visit -we will update at that time if indicated    -Encourage patient to have DEXA previously ordered completed         Return in about 3 months (around 6/1/2023) for chronic conditions. Medications Prescribed:  No orders of the defined types were placed in this encounter. Future Appointments   Date Time Provider Jeanne Finch   3/20/2023 10:00 AM STR OUT PT ONC INJ RM 10 STRZ OP ONC [place] HOD   5/17/2023 10:00 AM STR OUT PT ONC INJ RM 10 STRZ OP ONC [place] HOD   5/17/2023 10:40 AM STR CT IMAGING RM1  [facility] OUT EXP STR Radiolog   5/24/2023 11:20 AM Bj Dumont MD N Oncology [facility] [address]   6/2/2023 10:00 AM Moe Newman MD SRPX FM RES [facility] [address]   8/3/2023  9:30 AM Maude Johnson MD N SRPX Heart [facility] [address]       Patient given educational materials - see patient instructions. Discussed use, benefit, and sideeffects of prescribed medications. All patient questions answered. Pt voiced understanding. Reviewed health maintenance. Instructed to continue current medications, diet and exercise. Patient agreed with treatment plan. Follow up as directed.      Electronically signed by Moe Newman MD on 3/1/2023 at 6:01 PM

## 2023-03-01 NOTE — PATIENT INSTRUCTIONS
- Increase Fiber  in diet for stools  - Can try to stop stool softener  - Try tums for upset stomach    -Check for Pneumonia vaccine records and bring with you to next appointment

## 2023-03-01 NOTE — PROGRESS NOTES
Attending Note:  I saw and evaluated the patient, performing the key elements of the service. I discussed the findings, assessment and plan with the resident and agree with the resident's findings and plan as documented in the resident's note. GC modifier. Brief summary:  Loose stools - discontinue stool softener, add fiber. If ongoing issues may need additional treatment (?pancreatic enzymes given hx of Whipple). Neuropathy, s/p chemo - check B12 level. Consider increase in gabapentin. HTN - BP low normal, but has increased significantly when amlodipine discontinued. Cont low dose amlodipine. Prev med - Pt to check re dates for pneumonia vaccines. DXA ordered.

## 2023-03-02 ENCOUNTER — TELEPHONE (OUTPATIENT)
Dept: FAMILY MEDICINE CLINIC | Age: 83
End: 2023-03-02

## 2023-03-02 NOTE — TELEPHONE ENCOUNTER
----- Message from Loida Knight MD sent at 3/2/2023  9:15 AM EST -----  Sol,    Your B12 and Folate levels are normal. We will monitor your numbness and if it worsens we can discuss increasing gabapentin if needed. Thank you!

## 2023-03-20 ENCOUNTER — HOSPITAL ENCOUNTER (OUTPATIENT)
Dept: INFUSION THERAPY | Age: 83
Discharge: HOME | End: 2023-03-20
Payer: MEDICARE

## 2023-03-20 DIAGNOSIS — C25.9 MALIGNANT NEOPLASM OF PANCREAS, UNSPECIFIED LOCATION OF MALIGNANCY (HCC): Primary | ICD-10-CM

## 2023-03-20 PROCEDURE — 6360000002 HC RX W HCPCS: Performed by: INTERNAL MEDICINE

## 2023-03-20 PROCEDURE — 99212 OFFICE O/P EST SF 10 MIN: CPT

## 2023-03-20 PROCEDURE — 2580000003 HC RX 258: Performed by: INTERNAL MEDICINE

## 2023-03-20 RX ORDER — HEPARIN SODIUM (PORCINE) LOCK FLUSH IV SOLN 100 UNIT/ML 100 UNIT/ML
500 SOLUTION INTRAVENOUS PRN
Status: DISCONTINUED | OUTPATIENT
Start: 2023-03-20 | End: 2023-03-21 | Stop reason: HOSPADM

## 2023-03-20 RX ORDER — SODIUM CHLORIDE 9 MG/ML
25 INJECTION, SOLUTION INTRAVENOUS PRN
OUTPATIENT
Start: 2023-03-20

## 2023-03-20 RX ORDER — HEPARIN SODIUM (PORCINE) LOCK FLUSH IV SOLN 100 UNIT/ML 100 UNIT/ML
500 SOLUTION INTRAVENOUS PRN
OUTPATIENT
Start: 2023-03-20

## 2023-03-20 RX ORDER — SODIUM CHLORIDE 9 MG/ML
25 INJECTION, SOLUTION INTRAVENOUS PRN
Status: CANCELLED | OUTPATIENT
Start: 2023-03-20

## 2023-03-20 RX ORDER — SODIUM CHLORIDE 0.9 % (FLUSH) 0.9 %
5-40 SYRINGE (ML) INJECTION PRN
Status: DISCONTINUED | OUTPATIENT
Start: 2023-03-20 | End: 2023-03-21 | Stop reason: HOSPADM

## 2023-03-20 RX ORDER — SODIUM CHLORIDE 0.9 % (FLUSH) 0.9 %
5-40 SYRINGE (ML) INJECTION PRN
OUTPATIENT
Start: 2023-03-20

## 2023-03-20 RX ADMIN — HEPARIN 500 UNITS: 100 SYRINGE at 10:11

## 2023-03-20 RX ADMIN — SODIUM CHLORIDE, PRESERVATIVE FREE 10 ML: 5 INJECTION INTRAVENOUS at 10:10

## 2023-03-20 NOTE — DISCHARGE INSTRUCTIONS
You received mediport flush while in outpatient oncology clinic. Call if any uncontrolled nausea/vomiting  Call if any fevers/chills/ problems or concerns  Call if any bleeding  Call if any chest pain/pressure  Call if any severe shortness of breath  Drink at least (6) 8oz glasses of fluids daily     If develop any of above condition, please call your MD or go to Emergency Department.

## 2023-03-20 NOTE — PLAN OF CARE
Problem: Discharge Planning  Goal: Knowledge of discharge instructions  Description: Knowledge of discharge instructions  Outcome: Progressing  Note: Verbalized understanding of discharge instructions, follow-up appointments, and when to call the physician. Intervention: Discharge to appropriate level of care  Note: Discuss understanding of discharge instructions,follow-up appointments, and when to call the physician. Problem: Infection - Central Venous Catheter-Associated Bloodstream Infection:  Goal: Will show no infection signs and symptoms  Description: Will show no infection signs and symptoms  Outcome: Progressing  Note: Mediport site with no redness or warmth. Skin over port site intact with no signs of breakdown noted. Patient verbalizes signs/symptoms of port infection and when to notify the physician. Intervention: Infection risk assessment  Description: Infection risk assessment  Note: Discuss port maintenance,infection prevention, sign of infection and when to call MD.      Problem: Falls - Risk of:  Goal: Will remain free from falls  Description: Will remain free from falls  Outcome: Progressing  Note: No falls occurred with visit today. Intervention: Assess risk factors for falls  Description: Assess risk factors for falls  Note: Verbalized understanding of fall prevention to ask for assistance with ambulation. Call light within reach. Care plan reviewed with patient . Patient verbalize understanding of the plan of care and contribute to goal setting.

## 2023-05-17 ENCOUNTER — HOSPITAL ENCOUNTER (OUTPATIENT)
Dept: CT IMAGING | Age: 83
Discharge: HOME | End: 2023-05-17
Payer: MEDICARE

## 2023-05-17 DIAGNOSIS — C25.9 MALIGNANT NEOPLASM OF PANCREAS, UNSPECIFIED LOCATION OF MALIGNANCY (HCC): ICD-10-CM

## 2023-05-17 PROCEDURE — 74176 CT ABD & PELVIS W/O CONTRAST: CPT

## 2023-05-23 DIAGNOSIS — C25.9 MALIGNANT NEOPLASM OF PANCREAS, UNSPECIFIED LOCATION OF MALIGNANCY (HCC): Primary | ICD-10-CM

## 2023-05-23 NOTE — PROGRESS NOTES
[address] CANCER [address] [place] [address]  Dept: [phone]  Loc: [phone]   Hematology/Oncology Progress Note [facility])        Alanis Greer  1940 5/24/2023     No ref. provider found   Margie Zamora MD     Diagnosis:   -Periampullary pancreatic adenocarcinoma      Treatment:   -Whipple procedure [facility] 12/4/2021  (7 of 27 lymph nodes positive for cancer.  -Adjuvant therapy: FOLFOX last administered 5/22. First treatment began 1/26/2022. Dose reductions including oxaliplatin to 70 mg per metered squared. Stopped @ April with low counts. Developed obstructive jaundice hospitalized at  June 16 through 21, 2022. Patient had leukocytosis bacteremia and a stricture post Whipple 6/15 CAT scan showed cirrhosis, small ascites and postop changes. Patient had Enterococcus bacteremia treated with Zosyn and Unasyn and then Augmentin. No vegetations on valves. ERCP 6/17/22  showed no obstruction or stones. Intrahepatic branches diffusely dilated. Abnormal LFTs and elevated bilirubin therefore a metal stent was placed in the common bile duct. Stent should be changed in 3 months. Patient is 2 months overdue to have her stent changed. Followable Disease:   - A/P CT wo contrast  5/17/23- stable , 1/11/23-stable  -CT imaging abdominal pelvic CT with contrast 6/15/2022-mild ascites otherwise no changes. -MRI of the abdomen with and without contrast 5/17/22-status post Whipple, mild worsening ascites mildly dilated biliary tree pancreatic mesenteric edema  - CA 19-9 - 39 ( 9/30/22)    Comorbidities:  -A. fib. Hypertension, see below      Subjective: Alysia Garcia is still overdue to go back to  and have her biliary metal  stent changed ( q 3 months).   She has not had this change since she had it placed last summer and I told her that this is quite risky and she runs the risk of biliary obstruction

## 2023-05-24 ENCOUNTER — HOSPITAL ENCOUNTER (OUTPATIENT)
Dept: INFUSION THERAPY | Age: 83
Discharge: HOME | End: 2023-05-24
Payer: MEDICARE

## 2023-05-24 ENCOUNTER — OFFICE VISIT (OUTPATIENT)
Dept: ONCOLOGY | Age: 83
End: 2023-05-24
Payer: MEDICARE

## 2023-05-24 VITALS
BODY MASS INDEX: 22.73 KG/M2 | OXYGEN SATURATION: 98 % | WEIGHT: 136.4 LBS | DIASTOLIC BLOOD PRESSURE: 65 MMHG | TEMPERATURE: 98.1 F | SYSTOLIC BLOOD PRESSURE: 143 MMHG | RESPIRATION RATE: 16 BRPM | HEART RATE: 72 BPM | HEIGHT: 65 IN

## 2023-05-24 VITALS
RESPIRATION RATE: 16 BRPM | SYSTOLIC BLOOD PRESSURE: 143 MMHG | HEART RATE: 72 BPM | TEMPERATURE: 98.1 F | DIASTOLIC BLOOD PRESSURE: 65 MMHG | OXYGEN SATURATION: 98 %

## 2023-05-24 DIAGNOSIS — C25.9 MALIGNANT NEOPLASM OF PANCREAS, UNSPECIFIED LOCATION OF MALIGNANCY (HCC): Primary | ICD-10-CM

## 2023-05-24 LAB
ABSOLUTE IMMATURE GRANULOCYTE: 0 THOU/MM3 (ref 0–0.07)
ALBUMIN SERPL BCG-MCNC: 3.7 G/DL (ref 3.5–5.1)
ALP SERPL-CCNC: 257 U/L (ref 38–126)
ALT SERPL W/O P-5'-P-CCNC: 54 U/L (ref 11–66)
AST SERPL-CCNC: 59 U/L (ref 5–40)
BASOPHILS ABSOLUTE: 0 THOU/MM3 (ref 0–0.1)
BASOPHILS NFR BLD AUTO: 1 % (ref 0–3)
BILIRUB CONJ SERPL-MCNC: 0.3 MG/DL (ref 0–0.3)
BILIRUB SERPL-MCNC: 1 MG/DL (ref 0.3–1.2)
BUN BLDP-MCNC: 14 MG/DL (ref 8–26)
CHLORIDE BLD-SCNC: 108 MEQ/L (ref 98–109)
CREAT BLD-MCNC: 1 MG/DL (ref 0.5–1.2)
EOSINOPHIL NFR BLD AUTO: 1 % (ref 0–4)
EOSINOPHILS ABSOLUTE: 0.1 THOU/MM3 (ref 0–0.4)
ERYTHROCYTE [DISTWIDTH] IN BLOOD BY AUTOMATED COUNT: 13.6 % (ref 11.5–14.5)
GFR SERPL CREATININE-BSD FRML MDRD: 56 ML/MIN/1.73M2
GLUCOSE BLD-MCNC: 93 MG/DL (ref 70–108)
HCT VFR BLD AUTO: 35.4 % (ref 37–47)
HGB BLD-MCNC: 11.1 GM/DL (ref 12–16)
IMMATURE GRANULOCYTES: 0 %
IONIZED CALCIUM, WHOLE BLOOD: 1.2 MMOL/L (ref 1.12–1.32)
LYMPHOCYTES ABSOLUTE: 1.6 THOU/MM3 (ref 1–4.8)
LYMPHOCYTES NFR BLD AUTO: 35 % (ref 15–47)
MCH RBC QN AUTO: 29.7 PG (ref 26–33)
MCHC RBC AUTO-ENTMCNC: 31.4 GM/DL (ref 32.2–35.5)
MCV RBC AUTO: 95 FL (ref 81–99)
MONOCYTES ABSOLUTE: 0.5 THOU/MM3 (ref 0.4–1.3)
MONOCYTES NFR BLD AUTO: 11 % (ref 0–12)
NEUTROPHILS NFR BLD AUTO: 52 % (ref 43–75)
PLATELET # BLD AUTO: 188 THOU/MM3 (ref 130–400)
PMV BLD AUTO: 10.1 FL (ref 9.4–12.4)
POTASSIUM BLD-SCNC: 4.1 MEQ/L (ref 3.5–4.9)
PROT SERPL-MCNC: 6.7 G/DL (ref 6.1–8)
RBC # BLD AUTO: 3.74 MILL/MM3 (ref 4.2–5.4)
SEGMENTED NEUTROPHILS ABSOLUTE COUNT: 2.4 THOU/MM3 (ref 1.8–7.7)
SODIUM BLD-SCNC: 141 MEQ/L (ref 138–146)
TOTAL CO2, WHOLE BLOOD: 26 MEQ/L (ref 23–33)
WBC # BLD AUTO: 4.6 THOU/MM3 (ref 4.8–10.8)

## 2023-05-24 PROCEDURE — 36415 COLL VENOUS BLD VENIPUNCTURE: CPT

## 2023-05-24 PROCEDURE — 80076 HEPATIC FUNCTION PANEL: CPT

## 2023-05-24 PROCEDURE — 99214 OFFICE O/P EST MOD 30 MIN: CPT | Performed by: INTERNAL MEDICINE

## 2023-05-24 PROCEDURE — 80047 BASIC METABLC PNL IONIZED CA: CPT

## 2023-05-24 PROCEDURE — 1124F ACP DISCUSS-NO DSCNMKR DOCD: CPT | Performed by: INTERNAL MEDICINE

## 2023-05-24 PROCEDURE — 36591 DRAW BLOOD OFF VENOUS DEVICE: CPT

## 2023-05-24 PROCEDURE — 2580000003 HC RX 258: Performed by: INTERNAL MEDICINE

## 2023-05-24 PROCEDURE — 3078F DIAST BP <80 MM HG: CPT | Performed by: INTERNAL MEDICINE

## 2023-05-24 PROCEDURE — 3074F SYST BP LT 130 MM HG: CPT | Performed by: INTERNAL MEDICINE

## 2023-05-24 PROCEDURE — 86301 IMMUNOASSAY TUMOR CA 19-9: CPT

## 2023-05-24 PROCEDURE — 85025 COMPLETE CBC W/AUTO DIFF WBC: CPT

## 2023-05-24 PROCEDURE — 99211 OFF/OP EST MAY X REQ PHY/QHP: CPT

## 2023-05-24 PROCEDURE — 6360000002 HC RX W HCPCS: Performed by: INTERNAL MEDICINE

## 2023-05-24 RX ORDER — HEPARIN SODIUM (PORCINE) LOCK FLUSH IV SOLN 100 UNIT/ML 100 UNIT/ML
500 SOLUTION INTRAVENOUS PRN
OUTPATIENT
Start: 2023-05-24

## 2023-05-24 RX ORDER — SODIUM CHLORIDE 0.9 % (FLUSH) 0.9 %
5-40 SYRINGE (ML) INJECTION PRN
OUTPATIENT
Start: 2023-05-24

## 2023-05-24 RX ORDER — SODIUM CHLORIDE 0.9 % (FLUSH) 0.9 %
5-40 SYRINGE (ML) INJECTION PRN
Status: DISCONTINUED | OUTPATIENT
Start: 2023-05-24 | End: 2023-05-25 | Stop reason: HOSPADM

## 2023-05-24 RX ORDER — SODIUM CHLORIDE 9 MG/ML
25 INJECTION, SOLUTION INTRAVENOUS PRN
OUTPATIENT
Start: 2023-05-24

## 2023-05-24 RX ORDER — HEPARIN SODIUM (PORCINE) LOCK FLUSH IV SOLN 100 UNIT/ML 100 UNIT/ML
500 SOLUTION INTRAVENOUS PRN
Status: DISCONTINUED | OUTPATIENT
Start: 2023-05-24 | End: 2023-05-25 | Stop reason: HOSPADM

## 2023-05-24 RX ADMIN — HEPARIN 500 UNITS: 100 SYRINGE at 12:15

## 2023-05-24 RX ADMIN — SODIUM CHLORIDE, PRESERVATIVE FREE 20 ML: 5 INJECTION INTRAVENOUS at 11:26

## 2023-05-24 RX ADMIN — SODIUM CHLORIDE, PRESERVATIVE FREE 20 ML: 5 INJECTION INTRAVENOUS at 12:15

## 2023-05-24 RX ADMIN — SODIUM CHLORIDE, PRESERVATIVE FREE 10 ML: 5 INJECTION INTRAVENOUS at 11:25

## 2023-05-24 NOTE — PATIENT INSTRUCTIONS
-Routine follow-up with me approximately October 4, 2023  -I ordered CBC, CMP CA 19-9 and CT abdomen pelvis with without contrast to be done 1 week early or approximately September 27, 2023  -Patient is encouraged to without delay contact [facility] to have her metal biliary duct stent changed without delay  Continue port flush every 2 months.

## 2023-05-24 NOTE — PLAN OF CARE
Problem: Discharge Planning  Goal: Knowledge of discharge instructions  Description: Knowledge of discharge instructions  Note: Verbalize understanding of discharge instructions, follow up appointments, and when to call Physician. Intervention: Discharge to appropriate level of care  Note: Discuss understanding of discharge instructions, follow up appointments and when to call Physician. Problem: Falls - Risk of:  Goal: Will remain free from falls  Description: Will remain free from falls  Outcome: Adequate for Discharge  Note: Free from falls while in O.P. Oncology. Intervention: Assess risk factors for falls  Note: Discussed the need to use the call light for assistance when getting up to ambulate. Problem: Infection - Central Venous Catheter-Associated Bloodstream Infection:  Goal: Will show no infection signs and symptoms  Description: Will show no infection signs and symptoms  Note: Mediport site with no redness or warmth. Skin over port site intact with no signs of breakdown noted. Patient verbalizes signs/symptoms of port infection and when to notify the physician. Intervention: Infection risk assessment  Note: Discuss port maintenance,infection prevention, sign of infection and when to call MD.    Care plan reviewed with patient. Patient verbalize understanding of the plan of care and contribute to goal setting.

## 2023-05-25 LAB — CANCER AG19-9 SERPL-ACNC: 37 U/ML (ref 0–35)

## 2023-05-26 ENCOUNTER — TELEPHONE (OUTPATIENT)
Dept: FAMILY MEDICINE CLINIC | Age: 83
End: 2023-05-26

## 2023-05-26 NOTE — TELEPHONE ENCOUNTER
----- Message from Tere Barboza sent at 5/25/2023  2:55 PM EDT -----  Subject: Message to Provider    QUESTIONS  Information for Provider? Pt has appt 6/2 at 10 am and would like a [facility]   shuttle set up for her to get there  ---------------------------------------------------------------------------  --------------  [phone]  [phone]; OK to leave message on voicemail  ---------------------------------------------------------------------------  --------------  SCRIPT ANSWERS  Relationship to Patient?  Self

## 2023-06-02 ENCOUNTER — OFFICE VISIT (OUTPATIENT)
Dept: FAMILY MEDICINE CLINIC | Age: 83
End: 2023-06-02

## 2023-06-02 VITALS
BODY MASS INDEX: 22.13 KG/M2 | DIASTOLIC BLOOD PRESSURE: 72 MMHG | TEMPERATURE: 98.3 F | SYSTOLIC BLOOD PRESSURE: 128 MMHG | WEIGHT: 133 LBS | RESPIRATION RATE: 18 BRPM | OXYGEN SATURATION: 98 % | HEART RATE: 75 BPM

## 2023-06-02 DIAGNOSIS — R79.89 ELEVATED LFTS: ICD-10-CM

## 2023-06-02 DIAGNOSIS — I10 PRIMARY HYPERTENSION: ICD-10-CM

## 2023-06-02 DIAGNOSIS — I48.91 ATRIAL FIBRILLATION, UNSPECIFIED TYPE (HCC): ICD-10-CM

## 2023-06-02 DIAGNOSIS — G62.9 NEUROPATHY: ICD-10-CM

## 2023-06-02 DIAGNOSIS — C25.9 MALIGNANT NEOPLASM OF PANCREAS, UNSPECIFIED LOCATION OF MALIGNANCY (HCC): Primary | ICD-10-CM

## 2023-06-02 DIAGNOSIS — Z23 PNEUMOCOCCAL VACCINATION ADMINISTERED AT CURRENT VISIT: ICD-10-CM

## 2023-06-02 RX ORDER — GABAPENTIN 300 MG/1
300 CAPSULE ORAL 3 TIMES DAILY
Qty: 90 CAPSULE | Refills: 3 | Status: SHIPPED | OUTPATIENT
Start: 2023-06-02 | End: 2023-08-31

## 2023-06-02 RX ORDER — AMLODIPINE BESYLATE 2.5 MG/1
2.5 TABLET ORAL DAILY
Qty: 90 TABLET | Refills: 3 | Status: SHIPPED | OUTPATIENT
Start: 2023-06-02

## 2023-06-02 ASSESSMENT — ENCOUNTER SYMPTOMS
CONSTIPATION: 0
COUGH: 0
SHORTNESS OF BREATH: 0
ABDOMINAL PAIN: 0
BACK PAIN: 0
VOMITING: 0
NAUSEA: 0
DIARRHEA: 0
WHEEZING: 0

## 2023-06-02 NOTE — PROGRESS NOTES
S: 81 y.o. female with   Chief Complaint   Patient presents with    Follow-up     Pt feels good, she does feel like her bp are going high again before she takes the amlodipine 146/83. Dr Madi Mcgovern did an Afib monitor which showed nothing and took her off the amiodarone but kept her on the amlodipine. HPI per Dr. Kenrick Sapp    BP Readings from Last 3 Encounters:   06/02/23 128/72   05/24/23 (!) 143/65   05/24/23 (!) 143/65     Wt Readings from Last 3 Encounters:   06/02/23 133 lb (60.3 kg)   05/24/23 136 lb 6.4 oz (61.9 kg)   03/01/23 131 lb (59.4 kg)           O: VS:  weight is 133 lb (60.3 kg). Her temperature is 98.3 °F (36.8 °C). Her blood pressure is 128/72 and her pulse is 75. Her respiration is 18 and oxygen saturation is 98%. AAO/NAD, appropriate affect for mood  CV:  RRR, no murmur  Resp: CTAB       Diagnosis Orders   1. Malignant neoplasm of pancreas, unspecified location of malignancy ([facility] [address]        2. Neuropathy        3. Atrial fibrillation, unspecified type ([address]        4. Primary hypertension        5. Elevated LFTs        6. Pneumococcal vaccination administered at current visit            Plan  Neuropathy- worsening. Increase gabapentin to 300mg TID. Refer to podiatry for nail care. HTN is controlled. Health Maintenance Due   Topic Date Due    DTaP/Tdap/Td vaccine (1 - Tdap) Never done    Shingles vaccine (1 of 2) Never done    DEXA (modify frequency per FRAX score)  Never done    Pneumococcal 65+ years Vaccine (2 - PPSV23 if available, else PCV20) 12/21/2021         Attending Physician Statement  I have discussed the case, including pertinent history and exam findings with the resident. I also have seen the patient and performed key portions of the examination. I agree with the documented assessment and plan as documented by the resident.   GC modifier added to this encounter      Denise Carter DO 6/2/2023 11:15 AM

## 2023-06-02 NOTE — PROGRESS NOTES
Jose Fernández is a 81 y.o. female who presents today for:  Chief Complaint   Patient presents with    Follow-up     Pt feels good, she does feel like her bp are going high again before she takes the amlodipine 146/83. Dr Shane Street did an Afib monitor which showed nothing and took her off the amiodarone but kept her on the amlodipine. HPI:   Jose Fernández is 81 y.o. who presents today for 3-month follow-up. HTN: BP well controlled today. Taking Norvasc 2.5 mg daily. BP running elevated in AM before meds - 140-160s/70s. Not checking after meds. Will also have coffee at times before checking. Denies chest pain, shortness of breath, lightheadedness, syncope with this. History paroxysmal atrial fibrillation postoperatively after Whipple. She previously on amiodarone, Eliquis, metoprolol. This was discontinued per cardiology recommendations. Notes that the palpitations, lightheadedness, fatigue have all improved. Bilateral leg swelling: Overall improved from prior. Patient on Bumex 2.5 mg every other day - but only using 1-2x/week. She is taking potassium replacement with associated Bumex as well. Wearing compression stockings, overall well controlled. Numbness and tingling of bilateral hands and feet, has been present and unchanged with the supplements. She is on gabapentin 100 mg 3 times daily. Feels she still has numbness and tingling affecting activities, and would like to discuss adjustment. Continues following with oncology for history of pancreatic cancer. CA 19-9 37 recently, slightly increased from 31 previously. CT abdomen without clear evidence for recurrent disease on 5/17 she is currently in surveillance stage not receiving therapy. Follow with oncology regularly. She is due to go back to  for stent replacement per GI. Transportation has been difficult, which is why patient has not been there yet.   She is planning to have this completed when

## 2023-07-19 ENCOUNTER — HOSPITAL ENCOUNTER (OUTPATIENT)
Dept: INFUSION THERAPY | Age: 83
Discharge: HOME | End: 2023-07-19
Payer: MEDICARE

## 2023-07-19 VITALS
RESPIRATION RATE: 16 BRPM | OXYGEN SATURATION: 95 % | TEMPERATURE: 97.6 F | SYSTOLIC BLOOD PRESSURE: 95 MMHG | DIASTOLIC BLOOD PRESSURE: 51 MMHG | HEART RATE: 70 BPM

## 2023-07-19 DIAGNOSIS — C25.9 MALIGNANT NEOPLASM OF PANCREAS, UNSPECIFIED LOCATION OF MALIGNANCY (HCC): Primary | ICD-10-CM

## 2023-07-19 PROCEDURE — 6360000002 HC RX W HCPCS: Performed by: INTERNAL MEDICINE

## 2023-07-19 PROCEDURE — 2580000003 HC RX 258: Performed by: INTERNAL MEDICINE

## 2023-07-19 PROCEDURE — 99212 OFFICE O/P EST SF 10 MIN: CPT

## 2023-07-19 RX ORDER — HEPARIN 100 UNIT/ML
500 SYRINGE INTRAVENOUS PRN
OUTPATIENT
Start: 2023-07-19

## 2023-07-19 RX ORDER — SODIUM CHLORIDE 0.9 % (FLUSH) 0.9 %
5-40 SYRINGE (ML) INJECTION PRN
OUTPATIENT
Start: 2023-07-19

## 2023-07-19 RX ORDER — SODIUM CHLORIDE 9 MG/ML
25 INJECTION, SOLUTION INTRAVENOUS PRN
OUTPATIENT
Start: 2023-07-19

## 2023-07-19 RX ORDER — SODIUM CHLORIDE 0.9 % (FLUSH) 0.9 %
5-40 SYRINGE (ML) INJECTION PRN
Status: DISCONTINUED | OUTPATIENT
Start: 2023-07-19 | End: 2023-07-20 | Stop reason: HOSPADM

## 2023-07-19 RX ORDER — HEPARIN 100 UNIT/ML
500 SYRINGE INTRAVENOUS PRN
Status: DISCONTINUED | OUTPATIENT
Start: 2023-07-19 | End: 2023-07-20 | Stop reason: HOSPADM

## 2023-07-19 RX ADMIN — HEPARIN 500 UNITS: 100 SYRINGE at 13:05

## 2023-07-19 RX ADMIN — SODIUM CHLORIDE, PRESERVATIVE FREE 10 ML: 5 INJECTION INTRAVENOUS at 13:04

## 2023-07-19 NOTE — PROGRESS NOTES
Patient tolerated port flush via Mediport with no complications. Discussed discharge instructions- patient verbalized understanding. Ambulated off unit per self.

## 2023-07-19 NOTE — PLAN OF CARE
Problem: Discharge Planning  Goal: Knowledge of discharge instructions  Description: Knowledge of discharge instructions  Outcome: Adequate for Discharge  Note: Verbalized understanding of discharge instructions, follow-up appointments, and when to call the physician. Intervention: Discharge to appropriate level of care  Note: Discuss understanding of discharge instructions,follow-up appointments, and when to call the physician. Problem: Falls - Risk of:  Goal: Will remain free from falls  Description: Will remain free from falls  Outcome: Adequate for Discharge  Note: No falls occurred with visit today. Intervention: Assess risk factors for falls  Note: Verbalized understanding of fall prevention to ask for assistance with ambulation. Call light within reach. Problem: Infection - Central Venous Catheter-Associated Bloodstream Infection:  Goal: Will show no infection signs and symptoms  Description: Will show no infection signs and symptoms  Outcome: Adequate for Discharge  Note: Mediport site with no redness or warmth. Skin over port site intact with no signs of breakdown noted. Patient verbalizes signs/symptoms of port infection and when to notify the physician. Intervention: Infection risk assessment  Note: Discuss port maintenance,infection prevention, sign of infection and when to call MD.      Problem: Chronic Conditions and Co-morbidities  Goal: Patient's chronic conditions and co-morbidity symptoms are monitored and maintained or improved  Outcome: Adequate for Discharge  Flowsheets (Taken 7/19/2023 1523)  Care Plan - Patient's Chronic Conditions and Co-Morbidity Symptoms are Monitored and Maintained or Improved: Monitor and assess patient's chronic conditions and comorbid symptoms for stability, deterioration, or improvement  Note: Patient tolerated port flush via mediport with no complications. Discussed frequency and purpose- patient verbalized understanding.    Care plan reviewed with patient. Patient verbalize understanding of the plan of care and contribute to goal setting.

## 2023-07-19 NOTE — DISCHARGE INSTRUCTIONS
Please contact your Oncologist if you have any questions regarding the port flush that you received today. Patient instructed if experience any of the symptoms following today's port flush / to notify MD immediately or go to emergency department.     * dizziness/lightheadedness  *acute nausea/vomiting - not relieved with medication  *headache - not relieved from Tylenol/pain medication  *chest pain/pressure  *rash/itching  *shortness of breath        Drink fluids - 48oz fluids daily  Call if develop fever/ chills/ signs or symptoms of infection

## 2023-08-03 ENCOUNTER — OFFICE VISIT (OUTPATIENT)
Dept: CARDIOLOGY CLINIC | Age: 83
End: 2023-08-03
Payer: MEDICARE

## 2023-08-03 VITALS
DIASTOLIC BLOOD PRESSURE: 70 MMHG | WEIGHT: 134 LBS | BODY MASS INDEX: 22.33 KG/M2 | HEART RATE: 71 BPM | SYSTOLIC BLOOD PRESSURE: 132 MMHG | HEIGHT: 65 IN

## 2023-08-03 DIAGNOSIS — I48.91 ATRIAL FIBRILLATION, UNSPECIFIED TYPE (HCC): Primary | ICD-10-CM

## 2023-08-03 PROCEDURE — 1124F ACP DISCUSS-NO DSCNMKR DOCD: CPT | Performed by: INTERNAL MEDICINE

## 2023-08-03 PROCEDURE — 3078F DIAST BP <80 MM HG: CPT | Performed by: INTERNAL MEDICINE

## 2023-08-03 PROCEDURE — 93000 ELECTROCARDIOGRAM COMPLETE: CPT | Performed by: INTERNAL MEDICINE

## 2023-08-03 PROCEDURE — 99214 OFFICE O/P EST MOD 30 MIN: CPT | Performed by: INTERNAL MEDICINE

## 2023-08-03 PROCEDURE — 3075F SYST BP GE 130 - 139MM HG: CPT | Performed by: INTERNAL MEDICINE

## 2023-08-23 ENCOUNTER — HOSPITAL ENCOUNTER (OUTPATIENT)
Dept: INFUSION THERAPY | Age: 83
Discharge: HOME | End: 2023-08-23
Payer: MEDICARE

## 2023-08-23 VITALS
BODY MASS INDEX: 24.09 KG/M2 | DIASTOLIC BLOOD PRESSURE: 68 MMHG | OXYGEN SATURATION: 95 % | SYSTOLIC BLOOD PRESSURE: 150 MMHG | WEIGHT: 144.6 LBS | HEIGHT: 65 IN | HEART RATE: 75 BPM | TEMPERATURE: 98.6 F | RESPIRATION RATE: 16 BRPM

## 2023-08-23 DIAGNOSIS — C25.9 MALIGNANT NEOPLASM OF PANCREAS, UNSPECIFIED LOCATION OF MALIGNANCY (HCC): Primary | ICD-10-CM

## 2023-08-23 PROCEDURE — 2580000003 HC RX 258: Performed by: INTERNAL MEDICINE

## 2023-08-23 PROCEDURE — 6360000002 HC RX W HCPCS: Performed by: INTERNAL MEDICINE

## 2023-08-23 PROCEDURE — 99213 OFFICE O/P EST LOW 20 MIN: CPT

## 2023-08-23 RX ORDER — SODIUM CHLORIDE 9 MG/ML
25 INJECTION, SOLUTION INTRAVENOUS PRN
OUTPATIENT
Start: 2023-08-23

## 2023-08-23 RX ORDER — HEPARIN 100 UNIT/ML
500 SYRINGE INTRAVENOUS PRN
Status: DISCONTINUED | OUTPATIENT
Start: 2023-08-23 | End: 2023-08-24 | Stop reason: HOSPADM

## 2023-08-23 RX ORDER — HEPARIN 100 UNIT/ML
500 SYRINGE INTRAVENOUS PRN
OUTPATIENT
Start: 2023-08-23

## 2023-08-23 RX ORDER — SODIUM CHLORIDE 0.9 % (FLUSH) 0.9 %
5-40 SYRINGE (ML) INJECTION PRN
OUTPATIENT
Start: 2023-08-23

## 2023-08-23 RX ORDER — SODIUM CHLORIDE 0.9 % (FLUSH) 0.9 %
5-40 SYRINGE (ML) INJECTION PRN
Status: DISCONTINUED | OUTPATIENT
Start: 2023-08-23 | End: 2023-08-24 | Stop reason: HOSPADM

## 2023-08-23 RX ADMIN — SODIUM CHLORIDE, PRESERVATIVE FREE 10 ML: 5 INJECTION INTRAVENOUS at 12:49

## 2023-08-23 RX ADMIN — SODIUM CHLORIDE, PRESERVATIVE FREE 10 ML: 5 INJECTION INTRAVENOUS at 12:48

## 2023-08-23 RX ADMIN — HEPARIN 500 UNITS: 100 SYRINGE at 12:49

## 2023-08-23 NOTE — DISCHARGE INSTRUCTIONS
Please contact your Oncologist if you have any questions regarding the port flush that you received today. Patient instructed if experience any of the symptoms following today's port flush to notify MD immediately or go to emergency department.     * dizziness/lightheadedness  *acute nausea/vomiting - not relieved with medication  *headache - not relieved from Tylenol/pain medication  *chest pain/pressure  *rash/itching  *shortness of breath        Drink fluids - 48oz fluids daily  Call if develop fever/ chills/ signs or symptoms of infection

## 2023-09-07 ENCOUNTER — OFFICE VISIT (OUTPATIENT)
Dept: FAMILY MEDICINE CLINIC | Age: 83
End: 2023-09-07

## 2023-09-07 VITALS
OXYGEN SATURATION: 94 % | RESPIRATION RATE: 18 BRPM | DIASTOLIC BLOOD PRESSURE: 72 MMHG | TEMPERATURE: 97.3 F | HEIGHT: 65 IN | SYSTOLIC BLOOD PRESSURE: 128 MMHG | WEIGHT: 140 LBS | HEART RATE: 72 BPM | BODY MASS INDEX: 23.32 KG/M2

## 2023-09-07 DIAGNOSIS — G62.9 NEUROPATHY: ICD-10-CM

## 2023-09-07 DIAGNOSIS — M79.89 LEG SWELLING: ICD-10-CM

## 2023-09-07 DIAGNOSIS — I10 PRIMARY HYPERTENSION: Primary | ICD-10-CM

## 2023-09-07 DIAGNOSIS — I48.91 ATRIAL FIBRILLATION, UNSPECIFIED TYPE (HCC): ICD-10-CM

## 2023-09-07 DIAGNOSIS — C25.9 MALIGNANT NEOPLASM OF PANCREAS, UNSPECIFIED LOCATION OF MALIGNANCY (HCC): ICD-10-CM

## 2023-09-07 RX ORDER — GABAPENTIN 300 MG/1
600 CAPSULE ORAL 3 TIMES DAILY
Qty: 90 CAPSULE | Refills: 3 | Status: CANCELLED
Start: 2023-09-07 | End: 2023-12-06

## 2023-09-07 RX ORDER — GABAPENTIN 300 MG/1
CAPSULE ORAL
Qty: 120 CAPSULE | Refills: 3 | Status: SHIPPED | OUTPATIENT
Start: 2023-09-07 | End: 2023-12-07

## 2023-09-07 ASSESSMENT — ENCOUNTER SYMPTOMS
ABDOMINAL PAIN: 0
BACK PAIN: 0
WHEEZING: 0
VOMITING: 0
SHORTNESS OF BREATH: 0
COUGH: 0
CONSTIPATION: 0
DIARRHEA: 0
NAUSEA: 0

## 2023-09-27 ENCOUNTER — HOSPITAL ENCOUNTER (OUTPATIENT)
Dept: CT IMAGING | Age: 83
Discharge: HOME | End: 2023-09-27
Attending: INTERNAL MEDICINE
Payer: MEDICARE

## 2023-09-27 ENCOUNTER — HOSPITAL ENCOUNTER (OUTPATIENT)
Dept: INFUSION THERAPY | Age: 83
Discharge: HOME | End: 2023-09-27
Payer: MEDICARE

## 2023-09-27 VITALS
WEIGHT: 141.13 LBS | HEART RATE: 67 BPM | TEMPERATURE: 98.6 F | SYSTOLIC BLOOD PRESSURE: 143 MMHG | DIASTOLIC BLOOD PRESSURE: 69 MMHG | OXYGEN SATURATION: 98 % | BODY MASS INDEX: 23.52 KG/M2 | RESPIRATION RATE: 16 BRPM | HEIGHT: 65 IN

## 2023-09-27 DIAGNOSIS — C25.9 MALIGNANT NEOPLASM OF PANCREAS, UNSPECIFIED LOCATION OF MALIGNANCY (HCC): ICD-10-CM

## 2023-09-27 DIAGNOSIS — C25.9 MALIGNANT NEOPLASM OF PANCREAS, UNSPECIFIED LOCATION OF MALIGNANCY (HCC): Primary | ICD-10-CM

## 2023-09-27 LAB
ABSOLUTE IMMATURE GRANULOCYTE: 0.01 THOU/MM3 (ref 0–0.07)
ALBUMIN SERPL BCG-MCNC: 3.8 G/DL (ref 3.5–5.1)
ALP SERPL-CCNC: 230 U/L (ref 38–126)
ALT SERPL W/O P-5'-P-CCNC: 34 U/L (ref 11–66)
AST SERPL-CCNC: 36 U/L (ref 5–40)
BASOPHILS ABSOLUTE: 0 THOU/MM3 (ref 0–0.1)
BASOPHILS NFR BLD AUTO: 0 % (ref 0–3)
BILIRUB CONJ SERPL-MCNC: < 0.2 MG/DL (ref 0–0.3)
BILIRUB SERPL-MCNC: 0.9 MG/DL (ref 0.3–1.2)
BUN BLDP-MCNC: 24 MG/DL (ref 8–26)
CHLORIDE BLD-SCNC: 108 MEQ/L (ref 98–109)
CREAT BLD-MCNC: 1.1 MG/DL (ref 0.5–1.2)
EOSINOPHIL NFR BLD AUTO: 1 % (ref 0–4)
EOSINOPHILS ABSOLUTE: 0.1 THOU/MM3 (ref 0–0.4)
ERYTHROCYTE [DISTWIDTH] IN BLOOD BY AUTOMATED COUNT: 12.9 % (ref 11.5–14.5)
GFR SERPL CREATININE-BSD FRML MDRD: 50 ML/MIN/1.73M2
GLUCOSE BLD-MCNC: 66 MG/DL (ref 70–108)
HCT VFR BLD AUTO: 37.2 % (ref 37–47)
HGB BLD-MCNC: 11.9 GM/DL (ref 12–16)
IMMATURE GRANULOCYTES: 0 %
IONIZED CALCIUM, WHOLE BLOOD: 1.17 MMOL/L (ref 1.12–1.32)
LYMPHOCYTES ABSOLUTE: 2.2 THOU/MM3 (ref 1–4.8)
LYMPHOCYTES NFR BLD AUTO: 42 % (ref 15–47)
MCH RBC QN AUTO: 30.7 PG (ref 26–33)
MCHC RBC AUTO-ENTMCNC: 32 GM/DL (ref 32.2–35.5)
MCV RBC AUTO: 96 FL (ref 81–99)
MONOCYTES ABSOLUTE: 0.6 THOU/MM3 (ref 0.4–1.3)
MONOCYTES NFR BLD AUTO: 11 % (ref 0–12)
NEUTROPHILS NFR BLD AUTO: 45 % (ref 43–75)
PLATELET # BLD AUTO: 196 THOU/MM3 (ref 130–400)
PMV BLD AUTO: 9.8 FL (ref 9.4–12.4)
POTASSIUM BLD-SCNC: 4 MEQ/L (ref 3.5–4.9)
PROT SERPL-MCNC: 6.5 G/DL (ref 6.1–8)
RBC # BLD AUTO: 3.87 MILL/MM3 (ref 4.2–5.4)
SEGMENTED NEUTROPHILS ABSOLUTE COUNT: 2.3 THOU/MM3 (ref 1.8–7.7)
SODIUM BLD-SCNC: 140 MEQ/L (ref 138–146)
TOTAL CO2, WHOLE BLOOD: 24 MEQ/L (ref 23–33)
WBC # BLD AUTO: 5.2 THOU/MM3 (ref 4.8–10.8)

## 2023-09-27 PROCEDURE — 80047 BASIC METABLC PNL IONIZED CA: CPT

## 2023-09-27 PROCEDURE — 86301 IMMUNOASSAY TUMOR CA 19-9: CPT

## 2023-09-27 PROCEDURE — 6360000002 HC RX W HCPCS: Performed by: INTERNAL MEDICINE

## 2023-09-27 PROCEDURE — 85025 COMPLETE CBC W/AUTO DIFF WBC: CPT

## 2023-09-27 PROCEDURE — 2580000003 HC RX 258: Performed by: INTERNAL MEDICINE

## 2023-09-27 PROCEDURE — 36591 DRAW BLOOD OFF VENOUS DEVICE: CPT

## 2023-09-27 PROCEDURE — 74176 CT ABD & PELVIS W/O CONTRAST: CPT

## 2023-09-27 PROCEDURE — 80076 HEPATIC FUNCTION PANEL: CPT

## 2023-09-27 RX ORDER — SODIUM CHLORIDE 0.9 % (FLUSH) 0.9 %
5-40 SYRINGE (ML) INJECTION PRN
Status: DISCONTINUED | OUTPATIENT
Start: 2023-09-27 | End: 2023-09-28 | Stop reason: HOSPADM

## 2023-09-27 RX ORDER — SODIUM CHLORIDE 9 MG/ML
25 INJECTION, SOLUTION INTRAVENOUS PRN
Status: CANCELLED | OUTPATIENT
Start: 2023-09-27

## 2023-09-27 RX ORDER — HEPARIN 100 UNIT/ML
500 SYRINGE INTRAVENOUS PRN
Status: DISCONTINUED | OUTPATIENT
Start: 2023-09-27 | End: 2023-09-28 | Stop reason: HOSPADM

## 2023-09-27 RX ORDER — SODIUM CHLORIDE 0.9 % (FLUSH) 0.9 %
5-40 SYRINGE (ML) INJECTION PRN
Status: CANCELLED | OUTPATIENT
Start: 2023-09-27

## 2023-09-27 RX ORDER — HEPARIN 100 UNIT/ML
500 SYRINGE INTRAVENOUS PRN
Status: CANCELLED | OUTPATIENT
Start: 2023-09-27

## 2023-09-27 RX ADMIN — SODIUM CHLORIDE, PRESERVATIVE FREE 10 ML: 5 INJECTION INTRAVENOUS at 11:04

## 2023-09-27 RX ADMIN — HEPARIN 500 UNITS: 100 SYRINGE at 11:06

## 2023-09-27 RX ADMIN — SODIUM CHLORIDE, PRESERVATIVE FREE 20 ML: 5 INJECTION INTRAVENOUS at 11:05

## 2023-09-27 NOTE — DISCHARGE INSTRUCTIONS
You had labs drawn via [employer] while in Outpatient Oncology clinic, Please call us at [phone] if you have any questions or concerns about your visit or medications that you received today. It is important that you drink 48-64 ounces of water everyday. Eugene Barba

## 2023-09-27 NOTE — PLAN OF CARE
Problem: Safety - Adult  Goal: Free from fall injury  Outcome: Adequate for Discharge  Flowsheets (Taken 9/27/2023 1325)  Free From Fall Injury:   Instruct family/caregiver on patient safety   Based on caregiver fall risk screen, instruct family/caregiver to ask for assistance with transferring infant if caregiver noted to have fall risk factors  Note: Free from falls while in O.P. Oncology. Problem: Discharge Planning  Goal: Discharge to home or other facility with appropriate resources  Outcome: Adequate for Discharge  Flowsheets (Taken 9/27/2023 1325)  Discharge to home or other facility with appropriate resources:   Identify barriers to discharge with patient and caregiver   Identify discharge learning needs (meds, wound care, etc)  Note: Verbalize understanding of discharge instructions, follow up appointments, and when to call Physician. Problem: Infection - Adult  Goal: Absence of infection at discharge  Outcome: Adequate for Discharge  Flowsheets (Taken 9/27/2023 1325)  Absence of infection at discharge:   Assess and monitor for signs and symptoms of infection   Monitor all insertion sites i.e., indwelling lines, tubes and drains  Note: Mediport site with no redness or warmth. Skin over port site intact with no signs of breakdown noted. Patient verbalizes signs/symptoms of port infection and when to notify the physician. Care plan reviewed with patient. Patient verbalize understanding of the plan of care and contribute to goal setting.

## 2023-09-28 LAB — CANCER AG19-9 SERPL-ACNC: 34 U/ML (ref 0–35)

## 2023-10-04 NOTE — PROGRESS NOTES
(64.8 kg). ECO  GENERAL: well developed female, no acute distress   HEAD/FACE: atraumatic and normocephalic. EYES: No scleral icterus. NECK: Supple, symmetric. CHEST/RESPIRATORY: clear breath sounds in both lungs. CARDIOVASCULAR: On auscultation, regular rate and rhythm. No murmurs, gallops or rubs are heard. GASTROINTESTINAL/ABDOMEN: soft, non tender  NEUROLOGIC: Alert and oriented, no focal deficits   MUSCULOSKELETAL: no cyanosis, clubbing or edema in the extremities. LYMPHATICS: no lymphadenopathy in cervical, axillary areas     IMAGING  CT ABDOMEN PELVIS WO CONTRAST Additional Contrast? Oral (NO IV CONTRAST)  Result Date: 2023  1. A nonspecific irregular 6 mm pulmonary nodule is noted at the right lung base (series 3, image 8). A dedicated noncontrast CT thorax may be obtained for complete assessment. 2. The evaluation of the solid organs remains limited without IV contrast. No acute findings are visualized within the abdomen/pelvis. Numerous chronic findings are discussed. **This report has been created using voice recognition software. It may contain minor errors which are inherent in voice recognition technology. ** Final report electronically signed by Dr Wendi Wells on 2023 2:02 PM     ASSESSMENT:     History of periampullary adenocarcinoma status post Whipple surgery-  -Whipple procedure on 21- lymph nodes involved with cancer as per notes. [id number].   -Completed 7 cycles of adjuvant FOLFOX from January to May 2022. Discontinued due to low blood counts. - most recent CT scan A/P on 23-nonspecific irregular 6 mm pulmonary nodule noted in right lung base, no acute findings in abdomen/pelvis. - CA 19-9 of 34 on 23     2. 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 PLAN:   Check CT chest now to follow-up on pulmonary nodule. 14

## 2023-10-05 ENCOUNTER — CLINICAL DOCUMENTATION (OUTPATIENT)
Dept: CASE MANAGEMENT | Age: 83
End: 2023-10-05

## 2023-10-05 ENCOUNTER — CASE MANAGEMENT (OUTPATIENT)
Dept: CASE MANAGEMENT | Age: 83
End: 2023-10-05

## 2023-10-05 ENCOUNTER — HOSPITAL ENCOUNTER (OUTPATIENT)
Dept: INFUSION THERAPY | Age: 83
Discharge: HOME | End: 2023-10-05
Payer: MEDICARE

## 2023-10-05 ENCOUNTER — OFFICE VISIT (OUTPATIENT)
Dept: ONCOLOGY | Age: 83
End: 2023-10-05
Payer: MEDICARE

## 2023-10-05 VITALS
RESPIRATION RATE: 20 BRPM | SYSTOLIC BLOOD PRESSURE: 132 MMHG | DIASTOLIC BLOOD PRESSURE: 60 MMHG | TEMPERATURE: 97.5 F | WEIGHT: 142.8 LBS | HEIGHT: 65 IN | BODY MASS INDEX: 23.79 KG/M2 | HEART RATE: 75 BPM | OXYGEN SATURATION: 95 %

## 2023-10-05 VITALS
OXYGEN SATURATION: 95 % | HEART RATE: 75 BPM | TEMPERATURE: 97.5 F | HEIGHT: 65 IN | RESPIRATION RATE: 20 BRPM | WEIGHT: 142.8 LBS | BODY MASS INDEX: 23.79 KG/M2 | DIASTOLIC BLOOD PRESSURE: 60 MMHG | SYSTOLIC BLOOD PRESSURE: 132 MMHG

## 2023-10-05 DIAGNOSIS — R91.1 PULMONARY NODULE: Primary | ICD-10-CM

## 2023-10-05 DIAGNOSIS — C25.9 MALIGNANT NEOPLASM OF PANCREAS, UNSPECIFIED LOCATION OF MALIGNANCY (HCC): Primary | ICD-10-CM

## 2023-10-05 DIAGNOSIS — C25.9 MALIGNANT NEOPLASM OF PANCREAS, UNSPECIFIED LOCATION OF MALIGNANCY (HCC): ICD-10-CM

## 2023-10-05 PROCEDURE — 3075F SYST BP GE 130 - 139MM HG: CPT | Performed by: INTERNAL MEDICINE

## 2023-10-05 PROCEDURE — 99211 OFF/OP EST MAY X REQ PHY/QHP: CPT

## 2023-10-05 PROCEDURE — 99214 OFFICE O/P EST MOD 30 MIN: CPT | Performed by: INTERNAL MEDICINE

## 2023-10-05 PROCEDURE — 3078F DIAST BP <80 MM HG: CPT | Performed by: INTERNAL MEDICINE

## 2023-10-05 PROCEDURE — 1124F ACP DISCUSS-NO DSCNMKR DOCD: CPT | Performed by: INTERNAL MEDICINE

## 2023-10-05 RX ORDER — SODIUM CHLORIDE 0.9 % (FLUSH) 0.9 %
5-40 SYRINGE (ML) INJECTION PRN
Status: DISCONTINUED | OUTPATIENT
Start: 2023-10-05 | End: 2023-10-06 | Stop reason: HOSPADM

## 2023-10-05 RX ORDER — SODIUM CHLORIDE 0.9 % (FLUSH) 0.9 %
5-40 SYRINGE (ML) INJECTION PRN
OUTPATIENT
Start: 2023-10-05

## 2023-10-05 RX ORDER — SODIUM CHLORIDE 9 MG/ML
25 INJECTION, SOLUTION INTRAVENOUS PRN
OUTPATIENT
Start: 2023-10-05

## 2023-10-05 RX ORDER — HEPARIN 100 UNIT/ML
500 SYRINGE INTRAVENOUS PRN
Status: DISCONTINUED | OUTPATIENT
Start: 2023-10-05 | End: 2023-10-06 | Stop reason: HOSPADM

## 2023-10-05 RX ORDER — HEPARIN 100 UNIT/ML
500 SYRINGE INTRAVENOUS PRN
OUTPATIENT
Start: 2023-10-05

## 2023-10-05 NOTE — PROGRESS NOTES
Name: Jose Rdz  : 1940  MRN: [id number]    Oncology Navigation Follow-Up Note    Contact Type:  Medical Oncology    Subjective: feels good no symptoms    ONCPOC:  - reviewed CT abd scan-revealed sm nodule in lung  - CT chest ordered 10/27/2023  - seen [facility]/ has stent needs replaced-pt to call MD for referral to [facility] chest/abd/pelvis 2024  - apt MD 2024 to review results       Assistance Needed: for transportation- mariela arranged rides for scans  - wants to speak to  about other assistance    Receptive to [facility] / Palliative Care:  deferred    After MD appointment assisted pt to [address] [place] [address] office. Notes: Introduced myself as her nurse navigator- contact information card given. Mariela following to assist & support as needed.     Electronically signed by Arnulfo Ramirez RN on 10/5/2023 at 4:27 PM

## 2023-10-19 ENCOUNTER — OFFICE VISIT (OUTPATIENT)
Dept: FAMILY MEDICINE CLINIC | Age: 83
End: 2023-10-19
Payer: MEDICARE

## 2023-10-19 VITALS
OXYGEN SATURATION: 98 % | HEART RATE: 67 BPM | BODY MASS INDEX: 23.8 KG/M2 | SYSTOLIC BLOOD PRESSURE: 137 MMHG | DIASTOLIC BLOOD PRESSURE: 66 MMHG | WEIGHT: 143 LBS

## 2023-10-19 DIAGNOSIS — Z00.00 INITIAL MEDICARE ANNUAL WELLNESS VISIT: Primary | ICD-10-CM

## 2023-10-19 PROCEDURE — G0439 PPPS, SUBSEQ VISIT: HCPCS | Performed by: FAMILY MEDICINE

## 2023-10-19 PROCEDURE — 3075F SYST BP GE 130 - 139MM HG: CPT | Performed by: FAMILY MEDICINE

## 2023-10-19 PROCEDURE — 1124F ACP DISCUSS-NO DSCNMKR DOCD: CPT | Performed by: FAMILY MEDICINE

## 2023-10-19 PROCEDURE — 3078F DIAST BP <80 MM HG: CPT | Performed by: FAMILY MEDICINE

## 2023-10-19 SDOH — HEALTH STABILITY: PHYSICAL HEALTH: ON AVERAGE, HOW MANY MINUTES DO YOU ENGAGE IN EXERCISE AT THIS LEVEL?: 20 MIN

## 2023-10-19 SDOH — HEALTH STABILITY: PHYSICAL HEALTH: ON AVERAGE, HOW MANY DAYS PER WEEK DO YOU ENGAGE IN MODERATE TO STRENUOUS EXERCISE (LIKE A BRISK WALK)?: 4 DAYS

## 2023-10-19 ASSESSMENT — PATIENT HEALTH QUESTIONNAIRE - PHQ9
SUM OF ALL RESPONSES TO PHQ QUESTIONS 1-9: 0
SUM OF ALL RESPONSES TO PHQ QUESTIONS 1-9: 1
SUM OF ALL RESPONSES TO PHQ QUESTIONS 1-9: 0
SUM OF ALL RESPONSES TO PHQ QUESTIONS 1-9: 0
SUM OF ALL RESPONSES TO PHQ9 QUESTIONS 1 & 2: 1
SUM OF ALL RESPONSES TO PHQ QUESTIONS 1-9: 1
1. LITTLE INTEREST OR PLEASURE IN DOING THINGS: 1
2. FEELING DOWN, DEPRESSED OR HOPELESS: 0
SUM OF ALL RESPONSES TO PHQ QUESTIONS 1-9: 0
SUM OF ALL RESPONSES TO PHQ9 QUESTIONS 1 & 2: 0
1. LITTLE INTEREST OR PLEASURE IN DOING THINGS: 0
2. FEELING DOWN, DEPRESSED OR HOPELESS: 0
SUM OF ALL RESPONSES TO PHQ QUESTIONS 1-9: 1
SUM OF ALL RESPONSES TO PHQ QUESTIONS 1-9: 1

## 2023-10-19 ASSESSMENT — LIFESTYLE VARIABLES
HOW OFTEN DO YOU HAVE A DRINK CONTAINING ALCOHOL: MONTHLY OR LESS
HOW OFTEN DO YOU HAVE A DRINK CONTAINING ALCOHOL: 2
HOW MANY STANDARD DRINKS CONTAINING ALCOHOL DO YOU HAVE ON A TYPICAL DAY: 1 OR 2
HOW MANY STANDARD DRINKS CONTAINING ALCOHOL DO YOU HAVE ON A TYPICAL DAY: 1
HOW OFTEN DO YOU HAVE SIX OR MORE DRINKS ON ONE OCCASION: 1

## 2023-10-19 NOTE — PROGRESS NOTES
Medicare Annual Wellness Visit    Violeta Jacome is here for [facility] AWV    Assessment & Plan   Medicare Annual Wellness Visit (subsequent encounter)  Recommendations for Preventive Services Due: see orders and patient instructions/AVS.  Recommended screening schedule for the next 5-10 years is provided to the patient in written form: see Patient Instructions/AVS.     No follow-ups on file. Subjective       Patient's complete Health Risk Assessment and screening values have been reviewed and are found in Flowsheets. The following problems were reviewed today and where indicated follow up appointments were made and/or referrals ordered. Positive Risk Factor Screenings with Interventions:    Fall Risk:  Do you feel unsteady or are you worried about falling? : (!) yes  2 or more falls in past year?: no  Fall with injury in past year?: no     Interventions:    Counseled on strategies to reduce fall risk             Social and Emotional Support:  Do you get the social and emotional support that you need?: (!) No    Interventions:  Patient comments: states that her children do not live near her and live in other states. This makes it difficult at times to coordinate finding drivers for her appointments and for shopping and other transportation needs . Patient open to referral to care coordination for assistance as necessary. Vision Screen:  Do you have difficulty driving, watching TV, or doing any of your daily activities because of your eyesight?: (!) Yes  Have you had an eye exam within the past year?: (!) No  No results found. Interventions:   Patient encouraged to make appointment with their eye specialist; also due for cataract surgery per pt     ADL's:   Patient reports needing help with:  Select all that apply: (!) Shopping, Transportation  Interventions:  Patient comments: states that her children do not live near her and live in other states.  This makes it difficult at times to

## 2023-10-19 NOTE — PROGRESS NOTES
Immunizations Administered       Name Date Dose Route    Influenza, FLUAD, (age 72 y+), Adjuvanted, 0.5mL 10/19/2023 0.5 mL Intramuscular    Site: Deltoid- Left    Lot: [id number]    NDC: [address], PharmD Candidate     For Pharmacy Admin Tracking Only    Program: Medical Group  CPA in place:  Yes  Recommendation Provided To: Provider: 1 via Note to Provider and Patient/Caregiver: 1 via In person  Intervention Detail: Vaccine Recommended/Administered  Intervention Accepted By: Provider: 1 and Patient/Caregiver: 1  Gap Closed?: No   Time Spent (min): 15

## 2023-10-20 ENCOUNTER — CARE COORDINATION (OUTPATIENT)
Dept: CARE COORDINATION | Age: 83
End: 2023-10-20

## 2023-10-20 NOTE — CARE COORDINATION
Referral from PCP office - Patient mentioned having little to no support at home as her children live in far away states and she does not have family in [place]. She states she sometimes has trouble coordinating transportation with many different appointments (utilizing shuttles and sometimes [place] on aging) and with other needs (grocery store, , etc.)  Attempted to reach patient. Left message to return phone call to ambulatory care manager at [phone]. My chart letter sent.

## 2023-10-26 ENCOUNTER — CARE COORDINATION (OUTPATIENT)
Dept: CARE COORDINATION | Age: 83
End: 2023-10-26

## 2023-10-26 NOTE — CARE COORDINATION
2nd attempt to reach for care management. Referral from PCP office - Patient mentioned having little to no support at home as her children live in far away states and she does not have family in [place]. She states she sometimes has trouble coordinating transportation with many different appointments (utilizing shuttles and sometimes [place] on aging) and with other needs (grocery store, , etc.)  Attempted to reach patient. Left message to return phone call to ambulatory care manager at [phone]. My chart letter sent.

## 2023-10-27 ENCOUNTER — HOSPITAL ENCOUNTER (OUTPATIENT)
Dept: CT IMAGING | Age: 83
Discharge: HOME | End: 2023-10-27
Attending: INTERNAL MEDICINE
Payer: MEDICARE

## 2023-10-27 DIAGNOSIS — R91.1 PULMONARY NODULE: ICD-10-CM

## 2023-10-27 PROCEDURE — 71250 CT THORAX DX C-: CPT

## 2023-11-02 ENCOUNTER — CARE COORDINATION (OUTPATIENT)
Dept: CARE COORDINATION | Age: 83
End: 2023-11-02

## 2023-11-02 NOTE — CARE COORDINATION
3rd attempt to reach for care management. Referral from PCP office - Patient mentioned having little to no support at home as her children live in far away states and she does not have family in [place]. She states she sometimes has trouble coordinating transportation with many different appointments (utilizing shuttles and sometimes [place] on aging) and with other needs (grocery store, , etc.)  Attempted to reach patient. Left message to return phone call to ambulatory care manager at [phone]. My chart letter sent. Unable to reach for ACM enrollment.

## 2023-11-13 ENCOUNTER — CLINICAL DOCUMENTATION (OUTPATIENT)
Dept: CASE MANAGEMENT | Age: 83
End: 2023-11-13

## 2023-11-13 NOTE — PROGRESS NOTES
Name: Syed Ford  : 1940  MRN: [id number]    Oncology Navigation Follow-Up Note    Contact Type:  Telephone    Notes: Pt called mariela about ride arrangement for this Wed for port flush. Mariela informed scheduled for [facility] for port flush 11/15 and CT scan 2024.     Electronically signed by Sacha Estrada RN on 2023 at 3:34 PM

## 2023-11-15 ENCOUNTER — HOSPITAL ENCOUNTER (OUTPATIENT)
Dept: INFUSION THERAPY | Age: 83
Discharge: HOME | End: 2023-11-15
Payer: MEDICARE

## 2023-11-15 VITALS
DIASTOLIC BLOOD PRESSURE: 63 MMHG | RESPIRATION RATE: 18 BRPM | SYSTOLIC BLOOD PRESSURE: 136 MMHG | HEART RATE: 77 BPM | TEMPERATURE: 98.2 F | OXYGEN SATURATION: 98 %

## 2023-11-15 DIAGNOSIS — C25.9 MALIGNANT NEOPLASM OF PANCREAS, UNSPECIFIED LOCATION OF MALIGNANCY (HCC): Primary | ICD-10-CM

## 2023-11-15 PROCEDURE — 99212 OFFICE O/P EST SF 10 MIN: CPT

## 2023-11-15 PROCEDURE — 2580000003 HC RX 258: Performed by: INTERNAL MEDICINE

## 2023-11-15 PROCEDURE — 6360000002 HC RX W HCPCS: Performed by: INTERNAL MEDICINE

## 2023-11-15 RX ORDER — SODIUM CHLORIDE 0.9 % (FLUSH) 0.9 %
5-40 SYRINGE (ML) INJECTION PRN
OUTPATIENT
Start: 2023-11-15

## 2023-11-15 RX ORDER — SODIUM CHLORIDE 0.9 % (FLUSH) 0.9 %
5-40 SYRINGE (ML) INJECTION PRN
Status: DISCONTINUED | OUTPATIENT
Start: 2023-11-15 | End: 2023-11-16 | Stop reason: HOSPADM

## 2023-11-15 RX ORDER — HEPARIN 100 UNIT/ML
500 SYRINGE INTRAVENOUS PRN
OUTPATIENT
Start: 2023-11-15

## 2023-11-15 RX ORDER — SODIUM CHLORIDE 9 MG/ML
25 INJECTION, SOLUTION INTRAVENOUS PRN
OUTPATIENT
Start: 2023-11-15

## 2023-11-15 RX ORDER — HEPARIN 100 UNIT/ML
500 SYRINGE INTRAVENOUS PRN
Status: DISCONTINUED | OUTPATIENT
Start: 2023-11-15 | End: 2023-11-16 | Stop reason: HOSPADM

## 2023-11-15 RX ADMIN — SODIUM CHLORIDE, PRESERVATIVE FREE 20 ML: 5 INJECTION INTRAVENOUS at 14:06

## 2023-11-15 RX ADMIN — HEPARIN 500 UNITS: 100 SYRINGE at 14:06

## 2023-11-15 NOTE — PLAN OF CARE
Problem: Safety - Adult  Goal: Free from fall injury  Outcome: Adequate for Discharge  Flowsheets (Taken 11/15/2023 1411)  Free From Fall Injury: Instruct family/caregiver on patient safety     Problem: Discharge Planning  Goal: Discharge to home or other facility with appropriate resources  Outcome: Adequate for Discharge  Flowsheets (Taken 11/15/2023 1411)  Discharge to home or other facility with appropriate resources: Identify barriers to discharge with patient and caregiver     Problem: Infection - Adult  Goal: Absence of infection at discharge  Outcome: Adequate for Discharge  Flowsheets (Taken 11/15/2023 1411)  Absence of infection at discharge: Monitor all insertion sites i.e., indwelling lines, tubes and drains  Note: Mediport site with no redness or warmth. Skin over port site intact with no signs of breakdown noted. Patient verbalizes signs/symptoms of port infection and when to notify the physician. Care plan reviewed with patient. Patient verbalize understanding of the plan of care and contribute to goal setting.

## 2023-11-17 ENCOUNTER — CLINICAL DOCUMENTATION (OUTPATIENT)
Facility: HOSPITAL | Age: 83
End: 2023-11-17

## 2023-11-17 NOTE — PROGRESS NOTES
Patient Assistance                   Additional notes: Reviewed chart and no assistance is needed for dressing change.

## 2023-12-04 ENCOUNTER — SOCIAL WORK (OUTPATIENT)
Dept: INFUSION THERAPY | Age: 83
End: 2023-12-04

## 2023-12-04 NOTE — PROGRESS NOTES
- Transportation to and from has been coordinated with [place] [facility] for her 12/7 10:40a appt with Family Medicine.    - Her Jan appt (1/5/24@ 1:30, Med ONC), will be set up with [place] [facility] to bring her here. We then need to call [facility] to take her to her 1/5/24 @ 2:40p CT Scan and they will need contacted to take her back home after the scan. Her Jan 11 @ 1:30p appt with her Provider has been coordinated with [place] [facility] to and from the appt.

## 2023-12-07 ENCOUNTER — OFFICE VISIT (OUTPATIENT)
Dept: FAMILY MEDICINE CLINIC | Age: 83
End: 2023-12-07
Payer: MEDICARE

## 2023-12-07 VITALS
TEMPERATURE: 98.2 F | RESPIRATION RATE: 16 BRPM | SYSTOLIC BLOOD PRESSURE: 104 MMHG | OXYGEN SATURATION: 98 % | HEART RATE: 73 BPM | HEIGHT: 65 IN | BODY MASS INDEX: 24.43 KG/M2 | DIASTOLIC BLOOD PRESSURE: 60 MMHG | WEIGHT: 146.6 LBS

## 2023-12-07 DIAGNOSIS — T45.1X5A CHEMOTHERAPY-INDUCED NEUROPATHY (HCC): ICD-10-CM

## 2023-12-07 DIAGNOSIS — Z23 NEED FOR VACCINATION: ICD-10-CM

## 2023-12-07 DIAGNOSIS — M79.89 LEG SWELLING: ICD-10-CM

## 2023-12-07 DIAGNOSIS — I10 PRIMARY HYPERTENSION: Primary | ICD-10-CM

## 2023-12-07 DIAGNOSIS — I48.91 ATRIAL FIBRILLATION, UNSPECIFIED TYPE (HCC): ICD-10-CM

## 2023-12-07 DIAGNOSIS — G62.0 CHEMOTHERAPY-INDUCED NEUROPATHY (HCC): ICD-10-CM

## 2023-12-07 PROBLEM — D69.6 THROMBOCYTOPENIA (HCC): Status: RESOLVED | Noted: 2022-03-23 | Resolved: 2023-12-07

## 2023-12-07 PROCEDURE — 3078F DIAST BP <80 MM HG: CPT | Performed by: STUDENT IN AN ORGANIZED HEALTH CARE EDUCATION/TRAINING PROGRAM

## 2023-12-07 PROCEDURE — 1124F ACP DISCUSS-NO DSCNMKR DOCD: CPT | Performed by: STUDENT IN AN ORGANIZED HEALTH CARE EDUCATION/TRAINING PROGRAM

## 2023-12-07 PROCEDURE — 99214 OFFICE O/P EST MOD 30 MIN: CPT | Performed by: STUDENT IN AN ORGANIZED HEALTH CARE EDUCATION/TRAINING PROGRAM

## 2023-12-07 PROCEDURE — 3074F SYST BP LT 130 MM HG: CPT | Performed by: STUDENT IN AN ORGANIZED HEALTH CARE EDUCATION/TRAINING PROGRAM

## 2023-12-07 RX ORDER — GABAPENTIN 300 MG/1
CAPSULE ORAL
Qty: 120 CAPSULE | Refills: 3 | Status: SHIPPED | OUTPATIENT
Start: 2023-12-07 | End: 2024-03-07

## 2023-12-07 ASSESSMENT — ENCOUNTER SYMPTOMS
VOMITING: 0
BACK PAIN: 0
DIARRHEA: 0
ABDOMINAL PAIN: 0
CONSTIPATION: 0
NAUSEA: 0
SHORTNESS OF BREATH: 0
WHEEZING: 0
COUGH: 0

## 2023-12-13 ENCOUNTER — SOCIAL WORK (OUTPATIENT)
Dept: INFUSION THERAPY | Age: 83
End: 2023-12-13

## 2023-12-13 NOTE — PROGRESS NOTES
- The following transportation arrangements have been made for Jeannetta Boast effective 12/13/23    Date Appt Time Dept Payor Source Transport by Phone #    5-Jan 1:30p Med [facility] [phone]   only - [facility] needs called to take her to the CT Scan and to return home   5-Jan 2:40p [address] [place] [address] [phone] [facility] needs [facility] to [facility] her to the CT Scan and to return home   11-Jan 1:30p Provider [employer] [facility] [phone]       - Please note the arrangements to get her from her chemo treatment to the CT scan and back home. Jeannetta Boast has been notified of the arrangements. - Continued support is available through the  staff as needed.

## 2024-01-05 ENCOUNTER — HOSPITAL ENCOUNTER (OUTPATIENT)
Dept: INFUSION THERAPY | Age: 84
Discharge: HOME | End: 2024-01-05
Payer: MEDICARE

## 2024-01-05 ENCOUNTER — HOSPITAL ENCOUNTER (OUTPATIENT)
Dept: CT IMAGING | Age: 84
Discharge: HOME | End: 2024-01-05
Attending: INTERNAL MEDICINE
Payer: MEDICARE

## 2024-01-05 VITALS
DIASTOLIC BLOOD PRESSURE: 66 MMHG | SYSTOLIC BLOOD PRESSURE: 147 MMHG | TEMPERATURE: 98.3 F | OXYGEN SATURATION: 97 % | RESPIRATION RATE: 16 BRPM | HEART RATE: 76 BPM

## 2024-01-05 DIAGNOSIS — C25.9 MALIGNANT NEOPLASM OF PANCREAS, UNSPECIFIED LOCATION OF MALIGNANCY (HCC): Primary | ICD-10-CM

## 2024-01-05 DIAGNOSIS — C25.9 MALIGNANT NEOPLASM OF PANCREAS, UNSPECIFIED LOCATION OF MALIGNANCY (HCC): ICD-10-CM

## 2024-01-05 LAB
ABSOLUTE IMMATURE GRANULOCYTE: 0.01 THOU/MM3 (ref 0–0.07)
ALBUMIN SERPL BCG-MCNC: 4 G/DL (ref 3.5–5.1)
ALP SERPL-CCNC: 161 U/L (ref 38–126)
ALT SERPL W/O P-5'-P-CCNC: 21 U/L (ref 11–66)
AST SERPL-CCNC: 29 U/L (ref 5–40)
BASOPHILS ABSOLUTE: 0 THOU/MM3 (ref 0–0.1)
BASOPHILS NFR BLD AUTO: 0 % (ref 0–3)
BILIRUB CONJ SERPL-MCNC: < 0.2 MG/DL (ref 0–0.3)
BILIRUB SERPL-MCNC: 0.8 MG/DL (ref 0.3–1.2)
BUN BLDP-MCNC: 18 MG/DL (ref 8–26)
CEA SERPL-MCNC: 1.7 NG/ML (ref 0–5)
CHLORIDE BLD-SCNC: 109 MEQ/L (ref 98–109)
CREAT BLD-MCNC: 1.1 MG/DL (ref 0.5–1.2)
EOSINOPHIL NFR BLD AUTO: 1 % (ref 0–4)
EOSINOPHILS ABSOLUTE: 0.1 THOU/MM3 (ref 0–0.4)
ERYTHROCYTE [DISTWIDTH] IN BLOOD BY AUTOMATED COUNT: 12.8 % (ref 11.5–14.5)
GFR SERPL CREATININE-BSD FRML MDRD: 50 ML/MIN/1.73M2
GLUCOSE BLD-MCNC: 77 MG/DL (ref 70–108)
HCT VFR BLD AUTO: 39.6 % (ref 37–47)
HGB BLD-MCNC: 12.9 GM/DL (ref 12–16)
IMMATURE GRANULOCYTES: 0 %
IONIZED CALCIUM, WHOLE BLOOD: 1.15 MMOL/L (ref 1.12–1.32)
LYMPHOCYTES ABSOLUTE: 2.1 THOU/MM3 (ref 1–4.8)
LYMPHOCYTES NFR BLD AUTO: 32 % (ref 15–47)
MCH RBC QN AUTO: 31.2 PG (ref 26–33)
MCHC RBC AUTO-ENTMCNC: 32.6 GM/DL (ref 32.2–35.5)
MCV RBC AUTO: 96 FL (ref 81–99)
MONOCYTES ABSOLUTE: 0.7 THOU/MM3 (ref 0.4–1.3)
MONOCYTES NFR BLD AUTO: 11 % (ref 0–12)
NEUTROPHILS NFR BLD AUTO: 56 % (ref 43–75)
PLATELET # BLD AUTO: 199 THOU/MM3 (ref 130–400)
PMV BLD AUTO: 10 FL (ref 9.4–12.4)
POTASSIUM BLD-SCNC: 3.8 MEQ/L (ref 3.5–4.9)
PROT SERPL-MCNC: 6.9 G/DL (ref 6.1–8)
RBC # BLD AUTO: 4.13 MILL/MM3 (ref 4.2–5.4)
SEGMENTED NEUTROPHILS ABSOLUTE COUNT: 3.7 THOU/MM3 (ref 1.8–7.7)
SODIUM BLD-SCNC: 141 MEQ/L (ref 138–146)
TOTAL CO2, WHOLE BLOOD: 25 MEQ/L (ref 23–33)
WBC # BLD AUTO: 6.5 THOU/MM3 (ref 4.8–10.8)

## 2024-01-05 PROCEDURE — 80076 HEPATIC FUNCTION PANEL: CPT

## 2024-01-05 PROCEDURE — 86301 IMMUNOASSAY TUMOR CA 19-9: CPT

## 2024-01-05 PROCEDURE — 74177 CT ABD & PELVIS W/CONTRAST: CPT

## 2024-01-05 PROCEDURE — 6360000004 HC RX CONTRAST MEDICATION: Performed by: INTERNAL MEDICINE

## 2024-01-05 PROCEDURE — 85025 COMPLETE CBC W/AUTO DIFF WBC: CPT

## 2024-01-05 PROCEDURE — 71260 CT THORAX DX C+: CPT

## 2024-01-05 PROCEDURE — 6360000002 HC RX W HCPCS: Performed by: INTERNAL MEDICINE

## 2024-01-05 PROCEDURE — 2580000003 HC RX 258: Performed by: INTERNAL MEDICINE

## 2024-01-05 PROCEDURE — 36591 DRAW BLOOD OFF VENOUS DEVICE: CPT

## 2024-01-05 PROCEDURE — 82378 CARCINOEMBRYONIC ANTIGEN: CPT

## 2024-01-05 PROCEDURE — 80047 BASIC METABLC PNL IONIZED CA: CPT

## 2024-01-05 RX ORDER — HEPARIN 100 UNIT/ML
500 SYRINGE INTRAVENOUS PRN
OUTPATIENT
Start: 2024-01-05

## 2024-01-05 RX ORDER — SODIUM CHLORIDE 9 MG/ML
25 INJECTION, SOLUTION INTRAVENOUS PRN
OUTPATIENT
Start: 2024-01-05

## 2024-01-05 RX ORDER — SODIUM CHLORIDE 0.9 % (FLUSH) 0.9 %
5-40 SYRINGE (ML) INJECTION PRN
Status: DISCONTINUED | OUTPATIENT
Start: 2024-01-05 | End: 2024-01-06 | Stop reason: HOSPADM

## 2024-01-05 RX ORDER — SODIUM CHLORIDE 0.9 % (FLUSH) 0.9 %
5-40 SYRINGE (ML) INJECTION PRN
OUTPATIENT
Start: 2024-01-05

## 2024-01-05 RX ORDER — HEPARIN 100 UNIT/ML
500 SYRINGE INTRAVENOUS PRN
Status: DISCONTINUED | OUTPATIENT
Start: 2024-01-05 | End: 2024-01-06 | Stop reason: HOSPADM

## 2024-01-05 RX ADMIN — SODIUM CHLORIDE, PRESERVATIVE FREE 30 ML: 5 INJECTION INTRAVENOUS at 13:27

## 2024-01-05 RX ADMIN — IOPAMIDOL 80 ML: 755 INJECTION, SOLUTION INTRAVENOUS at 14:26

## 2024-01-05 RX ADMIN — HEPARIN 500 UNITS: 100 SYRINGE at 13:27

## 2024-01-05 NOTE — PLAN OF CARE
Problem: Safety - Adult  Goal: Free from fall injury  Outcome: Adequate for Discharge  Flowsheets (Taken 1/5/2024 1345)  Free From Fall Injury: Instruct family/caregiver on patient safety  Note: No falls this admissionPatient aware of fall precautions for here and at home -call light in reach while here      Problem: Discharge Planning  Goal: Discharge to home or other facility with appropriate resources  Outcome: Adequate for Discharge  Flowsheets (Taken 1/5/2024 1345)  Discharge to home or other facility with appropriate resources:   Identify barriers to discharge with patient and caregiver   Identify discharge learning needs (meds, wound care, etc)   Arrange for needed discharge resources and transportation as appropriate  Note: Discharge instructions given and reviewed with patient. All questions answered. Patient verbalized understandingPatient and family member able to teach back follow up appointments and when to call the doctor. Patient offers no questions at this time     Problem: Infection - Adult  Goal: Absence of infection at discharge  Outcome: Adequate for Discharge  Flowsheets (Taken 1/5/2024 1345)  Absence of infection at discharge:   Assess and monitor for signs and symptoms of infection   Monitor all insertion sites i.e., indwelling lines, tubes and drains   Instruct and encourage patient and family to use good hand hygiene technique  Note: No signs of infection noted at [facility] site Patient aware that is of increased risk for infection due to receiving chemotherapy and having a central venous catheter. Patient aware of signs and symptoms of infection and when to call the doctor     Problem: Chronic Conditions and Co-morbidities  Goal: Patient's chronic conditions and co-morbidity symptoms are monitored and maintained or improved  Outcome: Adequate for Discharge  Flowsheets (Taken 1/5/2024 1345)  Care Plan - Patient's Chronic Conditions and Co-Morbidity Symptoms are Monitored and Maintained or  Improved:   Collaborate with multidisciplinary team to address chronic and comorbid conditions and prevent exacerbation or deterioration   Monitor and assess patient's chronic conditions and comorbid symptoms for stability, deterioration, or improvement  Note: Reviewed mediport blood draw and flush  with patient, patient offered no questions or concerns. Patient verbalized understanding of drug being administered.   Care plan reviewed with patient and she verbalized understanding of the plan of care and contributed to goal setting.

## 2024-01-05 NOTE — PROGRESS NOTES
Pt discharged in stable condition with verbalization of discharge instructions all questions answered and all  belongings sent with patient. Patient tolerated mediprt blood draw and flush  without any complications or signs of a reaction.

## 2024-01-05 NOTE — DISCHARGE INSTRUCTIONS
Please contact your Oncologist if you have any questions regarding the BLOOD DRAW AND PORT FLUSH  that you received today.      Patient instructed if experience any of the symptoms following today's chemotherapy / to notify MD immediately or go to emergency department.    * dizziness/lightheadedness  *acute nausea/vomiting - not relieved with medication  *headache - not relieved from Tylenol/pain medication  *chest pain/pressure  *rash/itching  *shortness of breath        Drink fluids - 48oz fluids daily  Call if develop fever/ chills/ signs or symptoms of infection

## 2024-01-06 LAB — CANCER AG19-9 SERPL-ACNC: 48 U/ML (ref 0–35)

## 2024-01-10 NOTE — PROGRESS NOTES
suspicious and continues to increase slowly in size now measuring 7 mm, previously measured 6 mm, no other signs of metastatic disease.    2. 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    PLAN:   Will refer for CT-guided biopsy of right lower lobe pulmonary nodule as it continues to increase in size and CA 19-9 has increased slightly as well.  Patient was advised again to follow-up with [facility] for stent exchange.  She will get in touch with them.  RTC after biopsy results.    Total of 25 minutes were spent on the encounter with at least 50% of the time spent face to face with the patient.     Maikel Augustin MD  Hematology Oncology

## 2024-01-11 ENCOUNTER — OFFICE VISIT (OUTPATIENT)
Dept: ONCOLOGY | Age: 84
End: 2024-01-11
Payer: MEDICARE

## 2024-01-11 ENCOUNTER — HOSPITAL ENCOUNTER (OUTPATIENT)
Dept: INFUSION THERAPY | Age: 84
Discharge: HOME | End: 2024-01-11
Payer: MEDICARE

## 2024-01-11 ENCOUNTER — HOSPITAL ENCOUNTER (OUTPATIENT)
Dept: INFUSION THERAPY | Age: 84
Discharge: HOME | End: 2024-01-11

## 2024-01-11 VITALS
RESPIRATION RATE: 16 BRPM | DIASTOLIC BLOOD PRESSURE: 59 MMHG | TEMPERATURE: 98.3 F | SYSTOLIC BLOOD PRESSURE: 107 MMHG | HEART RATE: 82 BPM | OXYGEN SATURATION: 96 %

## 2024-01-11 VITALS
SYSTOLIC BLOOD PRESSURE: 107 MMHG | HEIGHT: 65 IN | DIASTOLIC BLOOD PRESSURE: 59 MMHG | WEIGHT: 147.2 LBS | HEART RATE: 82 BPM | BODY MASS INDEX: 24.53 KG/M2 | TEMPERATURE: 98.3 F | OXYGEN SATURATION: 96 % | RESPIRATION RATE: 16 BRPM

## 2024-01-11 DIAGNOSIS — R91.1 PULMONARY NODULE: Primary | ICD-10-CM

## 2024-01-11 DIAGNOSIS — C25.9 MALIGNANT NEOPLASM OF PANCREAS, UNSPECIFIED LOCATION OF MALIGNANCY (HCC): Primary | ICD-10-CM

## 2024-01-11 PROCEDURE — 3078F DIAST BP <80 MM HG: CPT | Performed by: INTERNAL MEDICINE

## 2024-01-11 PROCEDURE — 99211 OFF/OP EST MAY X REQ PHY/QHP: CPT

## 2024-01-11 PROCEDURE — 99213 OFFICE O/P EST LOW 20 MIN: CPT | Performed by: INTERNAL MEDICINE

## 2024-01-11 PROCEDURE — 1124F ACP DISCUSS-NO DSCNMKR DOCD: CPT | Performed by: INTERNAL MEDICINE

## 2024-01-11 PROCEDURE — 3074F SYST BP LT 130 MM HG: CPT | Performed by: INTERNAL MEDICINE

## 2024-01-11 RX ORDER — HEPARIN 100 UNIT/ML
500 SYRINGE INTRAVENOUS PRN
Status: DISCONTINUED | OUTPATIENT
Start: 2024-01-11 | End: 2024-01-12 | Stop reason: HOSPADM

## 2024-01-11 RX ORDER — HEPARIN 100 UNIT/ML
500 SYRINGE INTRAVENOUS PRN
OUTPATIENT
Start: 2024-01-11

## 2024-01-11 RX ORDER — SODIUM CHLORIDE 9 MG/ML
25 INJECTION, SOLUTION INTRAVENOUS PRN
OUTPATIENT
Start: 2024-01-11

## 2024-01-11 RX ORDER — SODIUM CHLORIDE 0.9 % (FLUSH) 0.9 %
5-40 SYRINGE (ML) INJECTION PRN
Status: DISCONTINUED | OUTPATIENT
Start: 2024-01-11 | End: 2024-01-12 | Stop reason: HOSPADM

## 2024-01-11 RX ORDER — SODIUM CHLORIDE 0.9 % (FLUSH) 0.9 %
5-40 SYRINGE (ML) INJECTION PRN
OUTPATIENT
Start: 2024-01-11

## 2024-01-16 ENCOUNTER — TELEPHONE (OUTPATIENT)
Dept: ONCOLOGY | Age: 84
End: 2024-01-16

## 2024-01-16 DIAGNOSIS — R91.1 PULMONARY NODULE: Primary | ICD-10-CM

## 2024-01-16 NOTE — TELEPHONE ENCOUNTER
Patient has called into office with questions about appointment. She states that she is not getting biopsy and she is scheduled for her follow up scan in April. She is wondering if she should keep 2/1 appointment or just reschedule after April CT scan.

## 2024-01-16 NOTE — TELEPHONE ENCOUNTER
Dr. Hameed said the nodule is too small to biopsy. He recommends a repeat CT in 4-6 months. He also said that he saw a PET scan is ordered for this patient and that no PET scan is needed. He said the nodule is <1 cm and is too small to be seen on a PET.

## 2024-01-19 ENCOUNTER — SOCIAL WORK (OUTPATIENT)
Dept: INFUSION THERAPY | Age: 84
End: 2024-01-19

## 2024-01-19 NOTE — PROGRESS NOTES
Oncology Social Work    Date: 1/19/2024  Time: 9:48 AM  Name: Sol Greer  MRN: [id number]     Contact Type: Transportation Request    Note:   Situation: Sol Greer called the Oncology Social Worker to assist with transportation to and from appts.     Background:  Sol Greer is not able to provide transportation for herself and has asked the  for assistance.     Assessment: - Sol Greer requested transportation for their Feb 16th, 1:00p appointment located at the [facility]  - Patient will be picked up by [facility] [place] [facility] at 12:15p and taken to and from her scheduled appt.   - Patient has been notified of the arrangements provided.    Recommendation: Appt changes will be initiated by Sol Greer based on need.  provided Sol Greer with my contact information and will remain available for support.      YUAN Locke, MEGAN, MONICA  Oncology Social Worker      Electronically signed by YUAN Locke LSW, MONICA on 1/19/2024 at 9:48 AM

## 2024-01-23 ENCOUNTER — SOCIAL WORK (OUTPATIENT)
Dept: INFUSION THERAPY | Age: 84
End: 2024-01-23

## 2024-01-23 NOTE — PROGRESS NOTES
Oncology Social Work    Date: 1/23/2024  Time: 8:09 AM  Name: Sol Greer  MRN: [id number]     Contact Type: Transportation Request    Note:   Situation: Sol Greer called the Oncology Social Worker to assist with transportation to and from appts.     Background:  Sol Greer is not able to provide transportation for themselves and has asked the  for assistance.     Assessment: - Sol Greer requested transportation for her Port flush and dressing change appointment located at the Cancer Center.  - Patient will be picked up by [facility] at 12:15p and taken to her scheduled appt. Originally this was planned with [facility] but needed changed today because that agency is closed on Feb 16th  - Patient has been notified of the arrangements provided.    Recommendation: Appt changes will be initiated by Sol Greer based on need.  provided Sol Greer with my contact information and will remain available for support.      YUAN Locke, MEGAN, MONICA  Oncology Social Worker      Electronically signed by YUAN Locke LSW, MONICA on 1/23/2024 at 8:09 AM

## 2024-02-05 ENCOUNTER — SOCIAL WORK (OUTPATIENT)
Dept: INFUSION THERAPY | Age: 84
End: 2024-02-05

## 2024-02-05 NOTE — PROGRESS NOTES
Oncology Social Work    Date: 2/5/2024  Time: 11:21 AM  Name: Sol Greer  MRN: [id number]     Contact Type: Transportation Request    Note:   Situation: Sol Greer called the Oncology Social Worker to assist with transportation to and from appts.     Background:  Sol Greer is not able to provide transportation for herself and has asked the  for assistance.     Assessment: - Sol Greer requested transportation for Feb and Mar appointments located at [facility] [address] [facility] and the cancer center   - Patient will be picked up by either [facility] or [facility] 30 mins prior to her appts times as follows:  Appt Date Appt Time Dept  Agency Used Location Notes    16-Feb 1:00p Med ONC [facility] Port flush & Dressing Change             13-Mar 1:30p [facility] [address], [address],  Seeing Dr Knight   - Patient has been notified of the arrangements provided. Any changes please coordinate with the SW.    Recommendation: Appt changes will be initiated by Sol Greer based on need.  provided Sol Greer with my contact information and will remain available for support.      YUAN Locke, MEGAN, MONICA  Oncology Social Worker      Electronically signed by YUAN Locke, MEGAN, MONICA on 2/5/2024 at 11:21 AM

## 2024-02-16 ENCOUNTER — HOSPITAL ENCOUNTER (OUTPATIENT)
Dept: INFUSION THERAPY | Age: 84
Discharge: HOME | End: 2024-02-16
Payer: MEDICARE

## 2024-02-16 VITALS
TEMPERATURE: 98.1 F | WEIGHT: 149 LBS | DIASTOLIC BLOOD PRESSURE: 76 MMHG | SYSTOLIC BLOOD PRESSURE: 146 MMHG | HEART RATE: 70 BPM | HEIGHT: 65 IN | OXYGEN SATURATION: 96 % | RESPIRATION RATE: 18 BRPM | BODY MASS INDEX: 24.83 KG/M2

## 2024-02-16 DIAGNOSIS — C25.9 MALIGNANT NEOPLASM OF PANCREAS, UNSPECIFIED LOCATION OF MALIGNANCY (HCC): Primary | ICD-10-CM

## 2024-02-16 PROCEDURE — 2580000003 HC RX 258: Performed by: INTERNAL MEDICINE

## 2024-02-16 PROCEDURE — 99212 OFFICE O/P EST SF 10 MIN: CPT

## 2024-02-16 PROCEDURE — 96523 IRRIG DRUG DELIVERY DEVICE: CPT

## 2024-02-16 PROCEDURE — 6360000002 HC RX W HCPCS: Performed by: INTERNAL MEDICINE

## 2024-02-16 RX ORDER — SODIUM CHLORIDE 9 MG/ML
25 INJECTION, SOLUTION INTRAVENOUS PRN
OUTPATIENT
Start: 2024-02-16

## 2024-02-16 RX ORDER — SODIUM CHLORIDE 0.9 % (FLUSH) 0.9 %
5-40 SYRINGE (ML) INJECTION PRN
OUTPATIENT
Start: 2024-02-16

## 2024-02-16 RX ORDER — HEPARIN 100 UNIT/ML
500 SYRINGE INTRAVENOUS PRN
OUTPATIENT
Start: 2024-02-16

## 2024-02-16 RX ORDER — SODIUM CHLORIDE 0.9 % (FLUSH) 0.9 %
5-40 SYRINGE (ML) INJECTION PRN
Status: DISCONTINUED | OUTPATIENT
Start: 2024-02-16 | End: 2024-02-17 | Stop reason: HOSPADM

## 2024-02-16 RX ORDER — HEPARIN 100 UNIT/ML
500 SYRINGE INTRAVENOUS PRN
Status: DISCONTINUED | OUTPATIENT
Start: 2024-02-16 | End: 2024-02-17 | Stop reason: HOSPADM

## 2024-02-16 RX ADMIN — Medication 500 UNITS: at 12:35

## 2024-02-16 RX ADMIN — SODIUM CHLORIDE, PRESERVATIVE FREE 10 ML: 5 INJECTION INTRAVENOUS at 12:34

## 2024-02-16 RX ADMIN — SODIUM CHLORIDE, PRESERVATIVE FREE 10 ML: 5 INJECTION INTRAVENOUS at 12:35

## 2024-02-16 NOTE — DISCHARGE INSTRUCTIONS
Patient tolerated port flush without any complications. Patient verbalized understanding of discharge instructions. Ambulated off unit per self with belongings.

## 2024-02-23 ENCOUNTER — SOCIAL WORK (OUTPATIENT)
Dept: INFUSION THERAPY | Age: 84
End: 2024-02-23

## 2024-02-23 NOTE — PROGRESS NOTES
Oncology Social Work    Date: 2/23/2024  Time: 11:52 AM  Name: Sol Greer  MRN: [id number]     Contact Type: Transportation Request    Note:   Situation: Sol Greer called the Oncology Social Worker to assist with transportation to and from appts.     Background:  Sol Greer is not able to provide transportation for herself and has asked the  for assistance.     Assessment: - Sol Greer requested transportation for their March appointments located in the [address] [place] [address] and Cancer Center  - Patient will be picked up by [facility] about 45 mins prior to her appt time. She will be taken to her scheduled appt. Return trip needs to be called in to the [facility] when she is finished with her appt.   Appt Date Appt Time Dept  Agency Used Contact Phone # Location Notes   13-Mar 1:30p [facility] [phone] [address] [place] [address], [address],  Seeing Dr Knight   29-Mar 12:00p [facility] [phone] Cancer Center     - Patient has been notified of the arrangements provided.    Recommendation: Appt changes will be initiated by Slo Greer based on need.  provided Sol Greer with my contact information and will remain available for support.      YUAN Locke, MEGAN, MONICA  Oncology Social Worker      Electronically signed by YUAN Locke LSW, ACHP-SW on 2/23/2024 at 11:52 AM

## 2024-03-13 ENCOUNTER — OFFICE VISIT (OUTPATIENT)
Dept: FAMILY MEDICINE CLINIC | Age: 84
End: 2024-03-13
Payer: MEDICARE

## 2024-03-13 VITALS
HEIGHT: 65 IN | HEART RATE: 69 BPM | BODY MASS INDEX: 25.16 KG/M2 | SYSTOLIC BLOOD PRESSURE: 100 MMHG | RESPIRATION RATE: 12 BRPM | TEMPERATURE: 97.9 F | OXYGEN SATURATION: 97 % | WEIGHT: 151 LBS | DIASTOLIC BLOOD PRESSURE: 62 MMHG

## 2024-03-13 DIAGNOSIS — T45.1X5A CHEMOTHERAPY-INDUCED NEUROPATHY (HCC): ICD-10-CM

## 2024-03-13 DIAGNOSIS — I10 PRIMARY HYPERTENSION: Primary | ICD-10-CM

## 2024-03-13 DIAGNOSIS — M79.89 LEG SWELLING: ICD-10-CM

## 2024-03-13 DIAGNOSIS — R53.81 PHYSICAL DECONDITIONING: ICD-10-CM

## 2024-03-13 DIAGNOSIS — G62.0 CHEMOTHERAPY-INDUCED NEUROPATHY (HCC): ICD-10-CM

## 2024-03-13 DIAGNOSIS — I48.91 ATRIAL FIBRILLATION, UNSPECIFIED TYPE (HCC): ICD-10-CM

## 2024-03-13 DIAGNOSIS — C25.9 MALIGNANT NEOPLASM OF PANCREAS, UNSPECIFIED LOCATION OF MALIGNANCY (HCC): ICD-10-CM

## 2024-03-13 DIAGNOSIS — E04.1 THYROID NODULE: ICD-10-CM

## 2024-03-13 PROBLEM — E43 SEVERE MALNUTRITION (HCC): Chronic | Status: RESOLVED | Noted: 2022-05-18 | Resolved: 2024-03-13

## 2024-03-13 PROCEDURE — 3074F SYST BP LT 130 MM HG: CPT | Performed by: STUDENT IN AN ORGANIZED HEALTH CARE EDUCATION/TRAINING PROGRAM

## 2024-03-13 PROCEDURE — 99214 OFFICE O/P EST MOD 30 MIN: CPT | Performed by: STUDENT IN AN ORGANIZED HEALTH CARE EDUCATION/TRAINING PROGRAM

## 2024-03-13 PROCEDURE — 1124F ACP DISCUSS-NO DSCNMKR DOCD: CPT | Performed by: STUDENT IN AN ORGANIZED HEALTH CARE EDUCATION/TRAINING PROGRAM

## 2024-03-13 PROCEDURE — 3078F DIAST BP <80 MM HG: CPT | Performed by: STUDENT IN AN ORGANIZED HEALTH CARE EDUCATION/TRAINING PROGRAM

## 2024-03-13 SDOH — ECONOMIC STABILITY: FOOD INSECURITY: WITHIN THE PAST 12 MONTHS, THE FOOD YOU BOUGHT JUST DIDN'T LAST AND YOU DIDN'T HAVE MONEY TO GET MORE.: NEVER TRUE

## 2024-03-13 SDOH — ECONOMIC STABILITY: INCOME INSECURITY: HOW HARD IS IT FOR YOU TO PAY FOR THE VERY BASICS LIKE FOOD, HOUSING, MEDICAL CARE, AND HEATING?: NOT HARD AT ALL

## 2024-03-13 SDOH — ECONOMIC STABILITY: FOOD INSECURITY: WITHIN THE PAST 12 MONTHS, YOU WORRIED THAT YOUR FOOD WOULD RUN OUT BEFORE YOU GOT MONEY TO BUY MORE.: NEVER TRUE

## 2024-03-13 ASSESSMENT — PATIENT HEALTH QUESTIONNAIRE - PHQ9
SUM OF ALL RESPONSES TO PHQ QUESTIONS 1-9: 0
SUM OF ALL RESPONSES TO PHQ QUESTIONS 1-9: 0
1. LITTLE INTEREST OR PLEASURE IN DOING THINGS: 0
SUM OF ALL RESPONSES TO PHQ QUESTIONS 1-9: 0
SUM OF ALL RESPONSES TO PHQ9 QUESTIONS 1 & 2: 0
SUM OF ALL RESPONSES TO PHQ QUESTIONS 1-9: 0
2. FEELING DOWN, DEPRESSED OR HOPELESS: 0

## 2024-03-13 NOTE — PROGRESS NOTES
Sol Greer is a 83 y.o. female who presents today for:  Chief Complaint   Patient presents with    3 Month Follow-Up     Discuss bp medications, some dizziness          HPI:   Sol Greer is 83 y.o. who presents today for follow-up.    History of pancreatic cancer s/p [place]. Following with Oncology for surveillance. Mention CT guided biopsy but too small according to patient so will have CT to monitor 4/16/24.    HTN: Taking Norvasc 2.5 mg daily. Not Monitoring BP at home. Denies side effects. Occasional episode of lightheadedness at home, also not drinking as much water recently.  No falls.    Post-op A-fib: Patient recently saw Cardiology 8/3/23. EKG at the time was NSR. No change. Remains on ASA 81 mg daily, no need for further Eliquis per Cardiology recommendations     Bilateral leg swelling: Overall improved from prior.  Patient on Bumex 0.5 mg as needed - has not needed this.  She is taking potassium replacement only when taking Bumex, so has not needed this. Wearing compression stockings, overall well controlled.    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 Taking gabapentin 600 mg nightly and 300 mg during the day. Misses doses occasionally. Overall stable.    Feels slightly more deconditioned over the winter and with being at home.  She does have a handicap placard, which she would like renewed due to neuropathy.  Is also interested in doing some exercises at home.    History of thyroid nodule which is due for follow-up US.       Objective:     Vitals:    03/13/24 1331 03/13/24 1335   BP: (!) 108/52 100/62   Site: Left Upper Arm Left Upper Arm   Position: Sitting Sitting   Cuff Size: Large Adult Medium Adult   Pulse: 69    Resp: 12    Temp: 97.9 °F (36.6 °C)    TempSrc: Oral    SpO2: 97%    Weight: 68.5 kg (151 lb)    Height: 1.651 m (5' 5\")        Wt Readings from Last 3 Encounters:   03/13/24 68.5 kg (151 lb)   02/16/24 67.6 kg (149 lb)   01/11/24 66.8 kg (147 lb 3.2 oz)       BP 
Attending Physician Note    I saw and evaluated the patient, performing the key elements of the service.  I discussed the findings, assessment and plan with the resident and agree with the resident's findings and plan as documented in the resident's note.  GC modifier added.    Brief summary:  HTN, now with sx c/w orthostatic hypotension - discontinue amlodipine.   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   Rec COVID booster.    
Health Maintenance Due   Topic Date Due    DTaP/Tdap/Td vaccine (1 - Tdap) Never done    Shingles vaccine (1 of 2) Never done    DEXA (modify frequency per FRAX score)  Never done    Respiratory Syncytial Virus (RSV) Pregnant or age 60 yrs+ (1 - 1-dose 60+ series) Never done    COVID-19 Vaccine (5 - 2023-24 season) 09/01/2023    Annual Wellness Visit (Medicare Advantage)  01/01/2024      
MISC Does not apply, Diagnosis: Pancreatic cancer, deconditioning, chemo-induced neuropathy<BR>Expiration: 5/31/2028    lidocaine-prilocaine (EMLA) 2.5-2.5 % cream Apply topically as needed.    Multiple Vitamin (MULTIVITAMIN ADULT PO) Oral     Assessment & Plan:      1. Primary hypertension  2. Chemotherapy-induced neuropathy (HCC)  -     Handicap Placard [facility]; Starting Wed 3/13/2024, Disp-1 each, R-0, PrintDiagnosis: Pancreatic cancer, deconditioning, chemo-induced neuropathy Expiration: 5/31/2028  3. Thyroid nodule  -     US THYROID; Future  -     TSH With Reflex Ft4; Future  4. Atrial fibrillation, unspecified type (HCC)  5. Leg swelling  6. Malignant neoplasm of pancreas, unspecified location of malignancy (HCC)  -     Handicap Placard MISC; Starting Wed 3/13/2024, Disp-1 each, R-0, PrintDiagnosis: Pancreatic cancer, deconditioning, chemo-induced neuropathy Expiration: 5/31/2028  7. Physical deconditioning    - Stop amlodipine due to soft BPs  - Discontinue gabapentin  - Thyroid imaging/ TSH for nodules    Return in about 3 months (around 6/13/2024) for AWV with [facility].      An electronic signature was used to authenticate this note  - Kael Ta DO PGY-2 on 3/13/2024 at 2:23 PM

## 2024-03-14 PROBLEM — C22.1 CHOLANGIOCARCINOMA (HCC): Status: RESOLVED | Noted: 2022-01-20 | Resolved: 2024-03-14

## 2024-03-14 ASSESSMENT — ENCOUNTER SYMPTOMS
NAUSEA: 0
WHEEZING: 0
VOMITING: 0
SHORTNESS OF BREATH: 0
CONSTIPATION: 0
DIARRHEA: 0
COUGH: 0
BACK PAIN: 0
ABDOMINAL PAIN: 0

## 2024-03-29 ENCOUNTER — HOSPITAL ENCOUNTER (OUTPATIENT)
Dept: INFUSION THERAPY | Age: 84
Discharge: HOME | End: 2024-03-29
Payer: MEDICARE

## 2024-03-29 ENCOUNTER — SOCIAL WORK (OUTPATIENT)
Dept: INFUSION THERAPY | Age: 84
End: 2024-03-29

## 2024-03-29 VITALS
HEART RATE: 75 BPM | RESPIRATION RATE: 18 BRPM | WEIGHT: 153.6 LBS | SYSTOLIC BLOOD PRESSURE: 134 MMHG | DIASTOLIC BLOOD PRESSURE: 71 MMHG | TEMPERATURE: 97.8 F | OXYGEN SATURATION: 98 % | BODY MASS INDEX: 25.56 KG/M2

## 2024-03-29 DIAGNOSIS — C25.9 MALIGNANT NEOPLASM OF PANCREAS, UNSPECIFIED LOCATION OF MALIGNANCY (HCC): Primary | ICD-10-CM

## 2024-03-29 PROCEDURE — 6360000002 HC RX W HCPCS: Performed by: INTERNAL MEDICINE

## 2024-03-29 PROCEDURE — 96523 IRRIG DRUG DELIVERY DEVICE: CPT

## 2024-03-29 PROCEDURE — 2580000003 HC RX 258: Performed by: INTERNAL MEDICINE

## 2024-03-29 RX ORDER — HEPARIN 100 UNIT/ML
500 SYRINGE INTRAVENOUS PRN
Status: DISCONTINUED | OUTPATIENT
Start: 2024-03-29 | End: 2024-03-30 | Stop reason: HOSPADM

## 2024-03-29 RX ORDER — SODIUM CHLORIDE 0.9 % (FLUSH) 0.9 %
5-40 SYRINGE (ML) INJECTION PRN
Status: DISCONTINUED | OUTPATIENT
Start: 2024-03-29 | End: 2024-03-30 | Stop reason: HOSPADM

## 2024-03-29 RX ADMIN — SODIUM CHLORIDE, PRESERVATIVE FREE 20 ML: 5 INJECTION INTRAVENOUS at 11:43

## 2024-03-29 RX ADMIN — HEPARIN 500 UNITS: 100 SYRINGE at 11:44

## 2024-03-29 NOTE — PROGRESS NOTES
Oncology Social Work    Date: 3/29/2024  Time: 12:05 PM  Name: Sol Greer  MRN: [id number]     Contact Type: Transportation Request    Note:   Situation: Sol Greer called the Oncology Social Worker to assist with transportation to and from appts.     Background:  Sol Greer is not able to provide transportation for herself and has asked the  for assistance.     Assessment: - Sol Greer requested transportation for their April appointments located in the [facility]  - Patient will be picked up by [facility] approximately :30-:45 mins  prior to her scheduled appt.   Appt Date Appt Time Dept  Transport Company Location Notes               16-Apr 9:20a CT Scan [facility]   23-Apr 1:30p Med ONC [facility] [address] [facility]    2:00p Provider  [address] - Seeing Dr Pace     - Patient has been notified of the arrangements provided.    Recommendation: Appt changes will be initiated by Sol Greer based on need.  provided Sol Greer with my contact information and will remain available for support.      YUAN Locke, MEGAN, MONICA  Oncology Social Worker      Electronically signed by YUAN Locke, MEGAN, MONICA on 3/29/2024 at 12:05 PM

## 2024-03-29 NOTE — PLAN OF CARE
Problem: Discharge Planning  Goal: Discharge to home or other facility with appropriate resources  Outcome: Adequate for Discharge  Flowsheets (Taken 3/29/2024 1417)  Discharge to home or other facility with appropriate resources:   Identify barriers to discharge with patient and caregiver   Identify discharge learning needs (meds, wound care, etc)   Arrange for needed discharge resources and transportation as appropriate  Note: Discharge instructions given and reviewed with patient. All questions answered. Patient verbalized understandingPatient able to teach back follow up appointments and when to call the doctor. Patient offers no questions at this time     Problem: Infection - Adult  Goal: Absence of infection at discharge  Flowsheets (Taken 3/29/2024 1417)  Absence of infection at discharge:   Assess and monitor for signs and symptoms of infection   Monitor lab/diagnostic results   Monitor all insertion sites i.e., indwelling lines, tubes and drains   Instruct and encourage patient and family to use good hand hygiene technique  Note: Patient aware that is of increased risk for infection due to receiving chemotherapy and having a central venous catheter. Patient aware of signs and symptoms of infection and when to call the doctorNo signs of infection noted at mediport site      Problem: Chronic Conditions and Co-morbidities  Goal: Patient's chronic conditions and co-morbidity symptoms are monitored and maintained or improved  Flowsheets (Taken 3/29/2024 1417)  Care Plan - Patient's Chronic Conditions and Co-Morbidity Symptoms are Monitored and Maintained or Improved:   Monitor and assess patient's chronic conditions and comorbid symptoms for stability, deterioration, or improvement   Collaborate with multidisciplinary team to address chronic and comorbid conditions and prevent exacerbation or deterioration  Note: Reviewed mediport flush with patient, patient offered no questions or concerns. Patient verbalized  understanding of drug being administered.     Problem: Safety - Adult  Goal: Free from fall injury  Outcome: Adequate for Discharge  Flowsheets (Taken 3/29/2024 1419)  Free From Fall Injury: Instruct family/caregiver on patient safety  Note: Patient aware of fall precautions for here and at home -call light in reach while here No falls this admission     Problem: Discharge Planning  Goal: Discharge to home or other facility with appropriate resources  Outcome: Adequate for Discharge  Flowsheets (Taken 3/29/2024 1417)  Discharge to home or other facility with appropriate resources:   Identify barriers to discharge with patient and caregiver   Identify discharge learning needs (meds, wound care, etc)   Arrange for needed discharge resources and transportation as appropriate  Note: Discharge instructions given and reviewed with patient. All questions answered. Patient verbalized understandingPatient able to teach back follow up appointments and when to call the doctor. Patient offers no questions at this time     Problem: Infection - Adult  Goal: Absence of infection at discharge  Flowsheets (Taken 3/29/2024 1417)  Absence of infection at discharge:   Assess and monitor for signs and symptoms of infection   Monitor lab/diagnostic results   Monitor all insertion sites i.e., indwelling lines, tubes and drains   Instruct and encourage patient and family to use good hand hygiene technique  Note: Patient aware that is of increased risk for infection due to receiving chemotherapy and having a central venous catheter. Patient aware of signs and symptoms of infection and when to call the doctorNo signs of infection noted at mediport site      Problem: Chronic Conditions and Co-morbidities  Goal: Patient's chronic conditions and co-morbidity symptoms are monitored and maintained or improved  Flowsheets (Taken 3/29/2024 1417)  Care Plan - Patient's Chronic Conditions and Co-Morbidity Symptoms are Monitored and Maintained or

## 2024-04-16 ENCOUNTER — HOSPITAL ENCOUNTER (OUTPATIENT)
Dept: CT IMAGING | Age: 84
Discharge: HOME | End: 2024-04-16
Attending: INTERNAL MEDICINE
Payer: MEDICARE

## 2024-04-16 DIAGNOSIS — R91.1 PULMONARY NODULE: ICD-10-CM

## 2024-04-16 LAB — POC CREATININE WHOLE BLOOD: 1.2 MG/DL (ref 0.5–1.2)

## 2024-04-16 PROCEDURE — 71260 CT THORAX DX C+: CPT

## 2024-04-16 PROCEDURE — 6360000004 HC RX CONTRAST MEDICATION: Performed by: INTERNAL MEDICINE

## 2024-04-16 PROCEDURE — 82565 ASSAY OF CREATININE: CPT

## 2024-04-16 RX ADMIN — IOPAMIDOL 80 ML: 755 INJECTION, SOLUTION INTRAVENOUS at 09:15

## 2024-04-22 DIAGNOSIS — C24.1 PRIMARY ADENOCARCINOMA OF AMPULLA OF VATER (HCC): Primary | ICD-10-CM

## 2024-04-23 ENCOUNTER — OFFICE VISIT (OUTPATIENT)
Dept: ONCOLOGY | Age: 84
End: 2024-04-23
Payer: MEDICARE

## 2024-04-23 ENCOUNTER — HOSPITAL ENCOUNTER (OUTPATIENT)
Dept: INFUSION THERAPY | Age: 84
Discharge: HOME | End: 2024-04-23

## 2024-04-23 ENCOUNTER — HOSPITAL ENCOUNTER (OUTPATIENT)
Dept: INFUSION THERAPY | Age: 84
Discharge: HOME | End: 2024-04-23
Payer: MEDICARE

## 2024-04-23 VITALS
DIASTOLIC BLOOD PRESSURE: 84 MMHG | BODY MASS INDEX: 25.76 KG/M2 | HEART RATE: 71 BPM | RESPIRATION RATE: 18 BRPM | WEIGHT: 154.6 LBS | SYSTOLIC BLOOD PRESSURE: 138 MMHG | HEIGHT: 65 IN | OXYGEN SATURATION: 99 % | TEMPERATURE: 98.7 F

## 2024-04-23 VITALS
DIASTOLIC BLOOD PRESSURE: 84 MMHG | HEART RATE: 71 BPM | TEMPERATURE: 98.7 F | OXYGEN SATURATION: 99 % | SYSTOLIC BLOOD PRESSURE: 138 MMHG | RESPIRATION RATE: 18 BRPM

## 2024-04-23 DIAGNOSIS — C24.1 PRIMARY ADENOCARCINOMA OF AMPULLA OF VATER (HCC): ICD-10-CM

## 2024-04-23 DIAGNOSIS — C25.9 MALIGNANT NEOPLASM OF PANCREAS, UNSPECIFIED LOCATION OF MALIGNANCY (HCC): Primary | ICD-10-CM

## 2024-04-23 DIAGNOSIS — C24.1 PRIMARY ADENOCARCINOMA OF AMPULLA OF VATER (HCC): Primary | ICD-10-CM

## 2024-04-23 LAB
ALBUMIN SERPL BCG-MCNC: 3.9 G/DL (ref 3.5–5.1)
ALP SERPL-CCNC: 147 U/L (ref 38–126)
ALT SERPL W/O P-5'-P-CCNC: 12 U/L (ref 11–66)
AST SERPL-CCNC: 23 U/L (ref 5–40)
BASOPHILS ABSOLUTE: 0 THOU/MM3 (ref 0–0.1)
BASOPHILS NFR BLD AUTO: 1 % (ref 0–3)
BILIRUB CONJ SERPL-MCNC: < 0.2 MG/DL (ref 0–0.3)
BILIRUB SERPL-MCNC: 0.7 MG/DL (ref 0.3–1.2)
BUN BLDP-MCNC: 11 MG/DL (ref 8–26)
CHLORIDE BLD-SCNC: 111 MEQ/L (ref 98–109)
CREAT BLD-MCNC: 0.7 MG/DL (ref 0.5–1.2)
EOSINOPHIL NFR BLD AUTO: 1 % (ref 0–4)
EOSINOPHILS ABSOLUTE: 0.1 THOU/MM3 (ref 0–0.4)
ERYTHROCYTE [DISTWIDTH] IN BLOOD BY AUTOMATED COUNT: 12.5 % (ref 11.5–14.5)
GFR SERPL CREATININE-BSD FRML MDRD: 86 ML/MIN/1.73M2
GLUCOSE BLD-MCNC: 65 MG/DL (ref 70–108)
HCT VFR BLD AUTO: 39.8 % (ref 37–47)
HGB BLD-MCNC: 12.8 GM/DL (ref 12–16)
IMMATURE GRANULOCYTES %: 0 %
IMMATURE GRANULOCYTES ABSOLUTE: 0.01 THOU/MM3 (ref 0–0.07)
IONIZED CALCIUM, WHOLE BLOOD: 1.17 MMOL/L (ref 1.12–1.32)
LYMPHOCYTES ABSOLUTE: 2.4 THOU/MM3 (ref 1–4.8)
LYMPHOCYTES NFR BLD AUTO: 47 % (ref 15–47)
MCH RBC QN AUTO: 31 PG (ref 26–33)
MCHC RBC AUTO-ENTMCNC: 32.2 GM/DL (ref 32.2–35.5)
MCV RBC AUTO: 96 FL (ref 81–99)
MONOCYTES ABSOLUTE: 0.4 THOU/MM3 (ref 0.4–1.3)
MONOCYTES NFR BLD AUTO: 8 % (ref 0–12)
NEUTROPHILS NFR BLD AUTO: 42 % (ref 43–75)
PLATELET # BLD AUTO: 169 THOU/MM3 (ref 130–400)
PMV BLD AUTO: 10.6 FL (ref 9.4–12.4)
POTASSIUM BLD-SCNC: 3.8 MEQ/L (ref 3.5–4.9)
PROT SERPL-MCNC: 6.7 G/DL (ref 6.1–8)
RBC # BLD AUTO: 4.13 MILL/MM3 (ref 4.2–5.4)
SEGMENTED NEUTROPHILS ABSOLUTE COUNT: 2.2 THOU/MM3 (ref 1.8–7.7)
SODIUM BLD-SCNC: 143 MEQ/L (ref 138–146)
TOTAL CO2, WHOLE BLOOD: 24 MEQ/L (ref 23–33)
WBC # BLD AUTO: 5.1 THOU/MM3 (ref 4.8–10.8)

## 2024-04-23 PROCEDURE — 99211 OFF/OP EST MAY X REQ PHY/QHP: CPT

## 2024-04-23 PROCEDURE — 85025 COMPLETE CBC W/AUTO DIFF WBC: CPT

## 2024-04-23 PROCEDURE — 80047 BASIC METABLC PNL IONIZED CA: CPT

## 2024-04-23 PROCEDURE — 6360000002 HC RX W HCPCS: Performed by: INTERNAL MEDICINE

## 2024-04-23 PROCEDURE — 86301 IMMUNOASSAY TUMOR CA 19-9: CPT

## 2024-04-23 PROCEDURE — 3075F SYST BP GE 130 - 139MM HG: CPT | Performed by: INTERNAL MEDICINE

## 2024-04-23 PROCEDURE — 3079F DIAST BP 80-89 MM HG: CPT | Performed by: INTERNAL MEDICINE

## 2024-04-23 PROCEDURE — 1124F ACP DISCUSS-NO DSCNMKR DOCD: CPT | Performed by: INTERNAL MEDICINE

## 2024-04-23 PROCEDURE — 80076 HEPATIC FUNCTION PANEL: CPT

## 2024-04-23 PROCEDURE — 36591 DRAW BLOOD OFF VENOUS DEVICE: CPT

## 2024-04-23 PROCEDURE — 99213 OFFICE O/P EST LOW 20 MIN: CPT | Performed by: INTERNAL MEDICINE

## 2024-04-23 PROCEDURE — 2580000003 HC RX 258: Performed by: INTERNAL MEDICINE

## 2024-04-23 RX ORDER — SODIUM CHLORIDE 9 MG/ML
25 INJECTION, SOLUTION INTRAVENOUS PRN
Status: CANCELLED | OUTPATIENT
Start: 2024-04-23

## 2024-04-23 RX ORDER — HEPARIN 100 UNIT/ML
500 SYRINGE INTRAVENOUS PRN
Status: DISCONTINUED | OUTPATIENT
Start: 2024-04-23 | End: 2024-04-24 | Stop reason: HOSPADM

## 2024-04-23 RX ORDER — SODIUM CHLORIDE 9 MG/ML
25 INJECTION, SOLUTION INTRAVENOUS PRN
OUTPATIENT
Start: 2024-04-23

## 2024-04-23 RX ORDER — HEPARIN 100 UNIT/ML
500 SYRINGE INTRAVENOUS PRN
OUTPATIENT
Start: 2024-04-23

## 2024-04-23 RX ORDER — SODIUM CHLORIDE 0.9 % (FLUSH) 0.9 %
5-40 SYRINGE (ML) INJECTION PRN
Status: DISCONTINUED | OUTPATIENT
Start: 2024-04-23 | End: 2024-04-24 | Stop reason: HOSPADM

## 2024-04-23 RX ORDER — SODIUM CHLORIDE 0.9 % (FLUSH) 0.9 %
5-40 SYRINGE (ML) INJECTION PRN
OUTPATIENT
Start: 2024-04-23

## 2024-04-23 RX ADMIN — HEPARIN 500 UNITS: 100 SYRINGE at 14:15

## 2024-04-23 RX ADMIN — SODIUM CHLORIDE, PRESERVATIVE FREE 10 ML: 5 INJECTION INTRAVENOUS at 14:15

## 2024-04-23 RX ADMIN — SODIUM CHLORIDE, PRESERVATIVE FREE 30 ML: 5 INJECTION INTRAVENOUS at 13:35

## 2024-04-23 ASSESSMENT — ENCOUNTER SYMPTOMS
ALLERGIC/IMMUNOLOGIC NEGATIVE: 1
EYES NEGATIVE: 1
GASTROINTESTINAL NEGATIVE: 1
RESPIRATORY NEGATIVE: 1

## 2024-04-23 NOTE — DISCHARGE INSTRUCTIONS
Please contact your Oncologist if you have any questions regarding the port flush/lab draw that you received today.    You are instructed to call the office or go to the Emergency Dept. If you experience any of the following symptoms:    Dizziness/lightheadedness   Acute nausea or vomiting-not relieved by medications  Headaches-not relieved by medications  New chest pain or pressure  New rash /itching  New shortness of breath  Fever,chills or signs or symptoms of infection    Make sure you are drinking 48 to 64 ounces of water daily-if you are unable to drink fluids let us know right away.

## 2024-04-23 NOTE — PROGRESS NOTES
Patient tolerated lab draw/port flush without any complications.    Last vital signs  /84   Pulse 71   Temp 98.7 °F (37.1 °C) (Oral)   Resp 18   SpO2 99%     Discharge instructions given to patient, Verbalizes understanding. Ambulated off unit per self in stable condition with all belongings.

## 2024-04-23 NOTE — PROGRESS NOTES
[facility] [place] [facility]  [facility]  [address]  [address]  [place] [id number]  Dept: [phone]  Loc: [phone]   Hematology/Oncology Progress Note (Clinic)        Sol Greer  1940 4/23/2024     No ref. 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 Will order Pet/Ct scan for restaging for cancer and evaluation for possible biopsy. Return to clinic after Pet/Ct scan will make further recommendations once we have more information.    Symptom Management: (include nausea, vomiting, diarrhea, constipation, appetite, weight loss, energy, anxiety, depression, SOB, neuropathy, pain)      none     Supportive care provided.       Level of care is appropriate.       Teaching done today.     Medications reviewed.   Prescriptions today:            No orders of the defined types were placed in this encounter.       OARRS:  Controlled Substance Monitoring:    Acute and Chronic Pain Monitoring:        No data to display                 Research Options:       none    Follow Up:  No follow-ups on file.     I spent 20 minutes face-to-face with the patient and family. Greater than half of this visit was spent counseling and coordinating her care.  Time Spent includes chart prep ,review of x-rays and  labs, symptom management. Teaching includes risk and benefits of any treatment and the importance of compliance and risk reduction.    Indio Pace MD     Oncology History    No history exists.       Subjective:   This is a pleasant 83 y.o. History of carcinoma of ampulla vater. She is at baseline of health. She is here to follow up on results of Ct scan of chest.    ROS:  Review of Systems   Constitutional: Negative.

## 2024-04-23 NOTE — PLAN OF CARE
Problem: Safety - Adult  Goal: Free from fall injury  Outcome: Adequate for Discharge  Flowsheets (Taken 4/23/2024 1344)  Free From Fall Injury: Instruct family/caregiver on patient safety     Problem: Discharge Planning  Goal: Discharge to home or other facility with appropriate resources  Outcome: Adequate for Discharge  Flowsheets (Taken 4/23/2024 1344)  Discharge to home or other facility with appropriate resources: Identify barriers to discharge with patient and caregiver     Problem: Infection - Adult  Goal: Absence of infection at discharge  Outcome: Adequate for Discharge  Flowsheets (Taken 4/23/2024 1344)  Absence of infection at discharge: Monitor all insertion sites i.e., indwelling lines, tubes and drains  Note: Mediport site with no redness or warmth. Skin over port site intact with no signs of breakdown noted. Patient verbalizes signs/symptoms of port infection and when to notify the physician.       Problem: Chronic Conditions and Co-morbidities  Goal: Patient's chronic conditions and co-morbidity symptoms are monitored and maintained or improved  Outcome: Adequate for Discharge  Flowsheets (Taken 4/23/2024 1344)  Care Plan - Patient's Chronic Conditions and Co-Morbidity Symptoms are Monitored and Maintained or Improved:   Monitor and assess patient's chronic conditions and comorbid symptoms for stability, deterioration, or improvement   Collaborate with multidisciplinary team to address chronic and comorbid conditions and prevent exacerbation or deterioration     Care plan reviewed with patient.  Patient verbalize understanding of the plan of care and contribute to goal setting.

## 2024-04-24 LAB — CANCER AG19-9 SERPL-ACNC: 68 U/ML (ref 0–35)

## 2024-04-25 ENCOUNTER — SOCIAL WORK (OUTPATIENT)
Dept: INFUSION THERAPY | Age: 84
End: 2024-04-25

## 2024-04-25 NOTE — PROGRESS NOTES
Oncology Social Work    Date: 4/25/2024  Time: 11:11 AM  Name: Sol Greer  MRN: [id number]     Contact Type: Transportation Request    Note:   Situation: Sol Greer called the Oncology Social Worker to assist with transportation to and from appts.     Background:  Sol Greer is not able to provide transportation for herself and has asked the  for assistance.     Assessment: - Sol Greer requested transportation for her upcoming appts  - Patient will be picked up by [facility] [place] [facility] and taken to/from her scheduled appt.  As follows:  Appt Date Appt Time Dept  Transport Company Location Notes               30-Apr 12:45p CT [facility] [place] [facility] [address] - PET Scan               2-May 11:30a Provider [place] [facility] [address] - Seeing Dr Thompson     - Patient has been notified by her nurse navigator of the arrangements provided.    Recommendation: Appt changes will be initiated by Sol MEGHA Greer based on need.  provided Sol Greer with my contact information and will remain available for support.      YUAN Locke, MEGAN, MONICA  Oncology Social Worker      Electronically signed by YUAN Locke, MEGAN, MONICA on 4/25/2024 at 11:11 AM

## 2024-04-30 ENCOUNTER — HOSPITAL ENCOUNTER (OUTPATIENT)
Dept: PET IMAGING | Age: 84
Discharge: HOME | End: 2024-04-30
Attending: INTERNAL MEDICINE
Payer: MEDICARE

## 2024-04-30 DIAGNOSIS — C24.1 PRIMARY ADENOCARCINOMA OF AMPULLA OF VATER (HCC): ICD-10-CM

## 2024-04-30 PROCEDURE — 78815 PET IMAGE W/CT SKULL-THIGH: CPT

## 2024-04-30 PROCEDURE — 3430000000 HC RX DIAGNOSTIC RADIOPHARMACEUTICAL: Performed by: INTERNAL MEDICINE

## 2024-04-30 PROCEDURE — A9609 HC RX DIAGNOSTIC RADIOPHARMACEUTICAL: HCPCS | Performed by: INTERNAL MEDICINE

## 2024-04-30 RX ORDER — FLUDEOXYGLUCOSE F 18 200 MCI/ML
14 INJECTION, SOLUTION INTRAVENOUS
Status: COMPLETED | OUTPATIENT
Start: 2024-04-30 | End: 2024-04-30

## 2024-04-30 RX ADMIN — FLUDEOXYGLUCOSE F 18 14 MILLICURIE: 200 INJECTION, SOLUTION INTRAVENOUS at 12:20

## 2024-05-02 ENCOUNTER — CLINICAL DOCUMENTATION (OUTPATIENT)
Dept: CASE MANAGEMENT | Age: 84
End: 2024-05-02

## 2024-05-02 ENCOUNTER — HOSPITAL ENCOUNTER (OUTPATIENT)
Dept: INFUSION THERAPY | Age: 84
Discharge: HOME | End: 2024-05-02
Payer: MEDICARE

## 2024-05-02 ENCOUNTER — OFFICE VISIT (OUTPATIENT)
Dept: ONCOLOGY | Age: 84
End: 2024-05-02
Payer: MEDICARE

## 2024-05-02 ENCOUNTER — SOCIAL WORK (OUTPATIENT)
Dept: INFUSION THERAPY | Age: 84
End: 2024-05-02

## 2024-05-02 VITALS
HEART RATE: 70 BPM | RESPIRATION RATE: 18 BRPM | TEMPERATURE: 98 F | DIASTOLIC BLOOD PRESSURE: 80 MMHG | BODY MASS INDEX: 25.29 KG/M2 | HEIGHT: 65 IN | WEIGHT: 151.8 LBS | SYSTOLIC BLOOD PRESSURE: 150 MMHG | OXYGEN SATURATION: 97 %

## 2024-05-02 VITALS
SYSTOLIC BLOOD PRESSURE: 150 MMHG | OXYGEN SATURATION: 97 % | RESPIRATION RATE: 18 BRPM | HEART RATE: 70 BPM | DIASTOLIC BLOOD PRESSURE: 80 MMHG | TEMPERATURE: 98 F

## 2024-05-02 DIAGNOSIS — R59.1 LYMPHADENOPATHY: Primary | ICD-10-CM

## 2024-05-02 PROCEDURE — 1124F ACP DISCUSS-NO DSCNMKR DOCD: CPT | Performed by: INTERNAL MEDICINE

## 2024-05-02 PROCEDURE — 99214 OFFICE O/P EST MOD 30 MIN: CPT | Performed by: INTERNAL MEDICINE

## 2024-05-02 PROCEDURE — 3077F SYST BP >= 140 MM HG: CPT | Performed by: INTERNAL MEDICINE

## 2024-05-02 PROCEDURE — 3078F DIAST BP <80 MM HG: CPT | Performed by: INTERNAL MEDICINE

## 2024-05-02 PROCEDURE — 99211 OFF/OP EST MAY X REQ PHY/QHP: CPT

## 2024-05-02 NOTE — PROGRESS NOTES
Oncology Social Work    Date: 5/2/2024  Time: 2:42 PM  Name: Sol Greer  MRN: [id number]     Contact Type: Transportation Request    Note:   Situation: Sol Greer called the Oncology Social Worker to assist with transportation to and from appts.     Background:  Sol Greer is not able to provide transportation for herself and has asked the  for assistance.     Assessment: - Sol Greer requested transportation for her May appointments as follows:     Appt Date Appt Time Transport Company Location Notes   30-May 10:30a [facility] [address] - Seeing Dr Pace     - Patient will be picked up by [facility] about 30-45 mins prior to her scheduled appt time. She is set up as a will-call, therefore we must call [facility] for her return trip home.   - Patient has been notified of the arrangements provided.    Recommendation: Appt changes will be initiated by Sol Greer based on need.  provided Sol Greer with my contact information and will remain available for support.      YUAN Locke, MEGAN, MONICA  Oncology Social Worker      Electronically signed by YUAN Locke LSW, MONICA on 5/2/2024 at 2:42 PM

## 2024-05-02 NOTE — PATIENT INSTRUCTIONS
Orders Placed This Encounter   Procedures    Kathia Arias MD, Pulmonary Disease, [place]   Please check with Dr. Vo office, if they can see her in the next couple of weeks.  Follow up visit to review the results of biopsy

## 2024-05-02 NOTE — PROGRESS NOTES
Name: Sol Greer  : 1940  MRN: [id number]    Oncology Navigation Follow-Up Note    Contact Type:  Medical Oncology    Subjective: \"feels pretty- voices no issues\"    Objective: MD discuss PET scan    ONCPOC:  -MD informed PET scan show one lymph node needs biopsy( lung nodule but doesn't look cancerous  -MD referred pulmonary for EBUS  -MD instructed pt to contact Dr. Dey @ regarding stent replacement   -return MD visit  to discuss biopsy results    Assistance Needed: denies any at this time    Receptive to Advanced Care Planning / Palliative Care:  deferred    Referrals: pulmonary EBUS for lymph node biopsy    Education: reiterated ONC POC    Notes: mariela following to assist & support as needed    Electronically signed by Ysabel Yañez RN on 2024 at 3:52 PM

## 2024-05-02 NOTE — PROGRESS NOTES
[facility] [place] [facility]  [facility]  [address]  [address]  [place] [address]  Dept: [phone]  Loc: [phone]   Hematology/Oncology Progress Note (Clinic)        Sol Forresterdee  1940  No ref. provider found   Loida Knight MD     Diagnosis/CC:   Chief Complaint   Patient presents with    Follow-up     Primary adenocarcinoma of ampulla of Vater (HCC)       HPI:     Diagnosis:   -Periampullary pancreatic adenocarcinoma        Treatment:   -Whipple procedure [facility] 12/4/2021  (7 of 27 lymph nodes positive for cancer.  -Adjuvant therapy: FOLFOX last administered 5/22.  First treatment began 1/26/2022.  Dose reductions including oxaliplatin to 70 mg per metered squared.  Stopped @ April with low counts.  Developed obstructive jaundice hospitalized at  June 16 through 21, 2022.  Patient had leukocytosis bacteremia and a stricture post Whipple 6/15 CAT scan showed cirrhosis, small ascites and postop changes.  Patient had Enterococcus bacteremia treated with Zosyn and Unasyn and then Augmentin.  No vegetations on valves.  ERCP 6/17/22  showed no obstruction or stones.  Intrahepatic branches diffusely dilated.  Abnormal LFTs and elevated bilirubin therefore a metal stent was placed in the common bile duct.  Stent should be changed in 3 months.  Patient is 2 months overdue to have her stent changed.     Followable Disease:   - A/P CT wo contrast  5/17/23- stable , 1/11/23-stable  -CT imaging abdominal pelvic CT with contrast 6/15/2022-mild ascites otherwise no changes.  -MRI of the abdomen with and without contrast 5/17/22-status post Whipple, mild worsening ascites mildly dilated biliary tree pancreatic mesenteric edema  - CA 19-9 - 39 ( 9/30/22)     Comorbidities:  -A. fib.  Hypertension, see below    Subjective/Interim Summary:   10/5/23-  Patient has been doing well.  Denies having any new symptoms.  No nausea, vomiting, abdominal

## 2024-05-03 ENCOUNTER — SOCIAL WORK (OUTPATIENT)
Dept: INFUSION THERAPY | Age: 84
End: 2024-05-03

## 2024-05-03 NOTE — PROGRESS NOTES
Oncology Social Work    Date: 5/3/2024  Time: 11:27 AM  Name: Sol Greer  MRN: [id number]     Contact Type: Transportation Request    Note:   Situation: Sol Greer called the Oncology Social Worker to assist with transportation to and from appts.     Background:  Sol Greer is not able to provide transportation for herself and has asked the  for assistance.     Assessment: - Sol Greer requested transportation for her May appts  - Patient will be picked up by [facility] about an hour prior to her appt time. She will be taken too and from (provided her appt doesn't go past 3:30p) her scheduled appts as follows:  Appt Date Appt Time Dept  Transport Company Location Notes               7-May 2:40p Provider [facility] [address] - Seeing Dr. Reynaga   30-May 10:30a Provider [facility] [address] - Seeing Dr Pace   - Patient has been notified of the arrangements provided.    Recommendation: Appt changes will be initiated by Sol Greer based on need.  provided Sol Greer with my contact information and will remain available for support.      YUAN Locke, MEGAN, MONICA  Oncology Social Worker      Electronically signed by YUAN Locke, MEGAN, MONICA on 5/3/2024 at 11:27 AM

## 2024-05-06 NOTE — PROGRESS NOTES
[facility]    Patient: SOL MCCORMICK, 84 y.o.   : 1940    Patient of Loida Knight MD   Referring Provider: Ruthie Thompson MD       Subjective     Chief Complaint   Patient presents with    New Patient     New Patient Right Lung Nodule PET 24 CT 24        HPI  Sol with presents to Pulmonology clinic today as a referral from Dr. Thompson from Hem/Onc for EBUS of R hilar lymph node that was recently found to be PET positive. Pt has h/o periampullary pancreatic adenocarcinoma s/p Whipple procedure at [facility] 2021 and s/p 7 cycles of adjuvant chemotherapy.  During our encounter today we reviewed the PET/CT imaging and discussed the steps of bronchoscopy and endobronchial ultrasound as well as the risks and benefits of performing such procedures.  Following our discussion patient is amenable to pursuing bronchoscopy with EBUS and transbronchial biopsies and is scheduled to have this performed on Friday, 5/10/2024.  She is not taking any blood thinners or antiplatelet medications.      Immunizations:  Immunization History   Administered Date(s) Administered    COVID-19, MODERNA BLUE border, Primary or Immunocompromised, (age 12y+), IM, 100 mcg/0.5mL 2021, 2021, 2022    COVID-19, PFIZER Bivalent, DO NOT Dilute, (age 12y+), IM, 30 mcg/0.3 mL 2022    Influenza, FLUAD, (age 65 y+), Adjuvanted, 0.5mL 10/19/2023    Influenza, FLUZONE (age 65 y+), High Dose, 0.7mL 10/27/2020, 10/15/2021, 2022    Influenza, High Dose (Fluzone 65 yrs and older) 2017, 2018, 2019    Pneumococcal, PCV-13, PREVNAR 13, (age 6w+), IM, 0.5mL 2020    Pneumococcal, PCV20, PREVNAR 20, (age 6w+), IM, 0.5mL 2023      Past Medical hx   PMH:  Past Medical History:   Diagnosis Date    Atrial fibrillation (HCC)     Cholangiocarcinoma (HCC)     Mrozek    Hypertension      SURGICAL HISTORY:  Past Surgical

## 2024-05-07 ENCOUNTER — OFFICE VISIT (OUTPATIENT)
Dept: PULMONOLOGY | Age: 84
End: 2024-05-07
Payer: MEDICARE

## 2024-05-07 VITALS
OXYGEN SATURATION: 97 % | HEART RATE: 73 BPM | WEIGHT: 150.54 LBS | SYSTOLIC BLOOD PRESSURE: 138 MMHG | BODY MASS INDEX: 25.05 KG/M2 | TEMPERATURE: 98.7 F | DIASTOLIC BLOOD PRESSURE: 86 MMHG

## 2024-05-07 DIAGNOSIS — R91.1 LUNG NODULE: Primary | ICD-10-CM

## 2024-05-07 PROCEDURE — 99204 OFFICE O/P NEW MOD 45 MIN: CPT | Performed by: INTERNAL MEDICINE

## 2024-05-07 PROCEDURE — 1124F ACP DISCUSS-NO DSCNMKR DOCD: CPT | Performed by: INTERNAL MEDICINE

## 2024-05-07 PROCEDURE — 3079F DIAST BP 80-89 MM HG: CPT | Performed by: INTERNAL MEDICINE

## 2024-05-07 PROCEDURE — 3075F SYST BP GE 130 - 139MM HG: CPT | Performed by: INTERNAL MEDICINE

## 2024-05-07 RX ORDER — BUMETANIDE 0.5 MG/1
0.5 TABLET ORAL PRN
COMMUNITY

## 2024-05-09 ENCOUNTER — SOCIAL WORK (OUTPATIENT)
Dept: INFUSION THERAPY | Age: 84
End: 2024-05-09

## 2024-05-09 NOTE — PROGRESS NOTES
Oncology Social Work    Date: 5/9/2024  Time: 8:00 AM  Name: Sol Greer  MRN: [id number]     Contact Type: Transportation Request    Note:   Situation: Sol Greer called the Oncology Social Worker to assist with transportation to and from appts.     Background:  Sol Greer is not able to provide transportation for herself and has asked the  for assistance.     Assessment: - Sol Greer requested transportation for her May appts   - Patient will be picked up by [facility] approx. :30 mins prior to her appt  except for the May 10th appt. She will be picked up about *;15 since she needs to be there for pre-op :90 mins prior. Her transport is as follows:  Appt Date Appt Time Dept  Transport Company Location Notes               10-May 10:00a Pulmonology [facility] [address] - Special Procedure   20-May 11:20a Provider [facility] [address] - Seeing Dr. Reynaga   30-May 10:30a Provider [facility] [address] - Seeing Dr Pace   - Patient has been notified of the arrangements provided.    Recommendation: Appt changes will be initiated by Sol Greer based on need.  provided Sol Greer with my contact information and will remain available for support.      YUAN Locke, MEGAN, MONICA  Oncology Social Worker      Electronically signed by YUAN Locke, MEGAN, MONICA on 5/9/2024 at 8:00 AM

## 2024-05-10 ENCOUNTER — APPOINTMENT (OUTPATIENT)
Dept: GENERAL RADIOLOGY | Age: 84
End: 2024-05-10
Attending: INTERNAL MEDICINE
Payer: MEDICARE

## 2024-05-10 ENCOUNTER — ANESTHESIA (OUTPATIENT)
Dept: ENDOSCOPY | Age: 84
End: 2024-05-10
Payer: MEDICARE

## 2024-05-10 ENCOUNTER — HOSPITAL ENCOUNTER (OUTPATIENT)
Age: 84
Setting detail: OUTPATIENT SURGERY
Discharge: HOME | End: 2024-05-10
Attending: INTERNAL MEDICINE | Admitting: INTERNAL MEDICINE
Payer: MEDICARE

## 2024-05-10 ENCOUNTER — ANESTHESIA EVENT (OUTPATIENT)
Dept: ENDOSCOPY | Age: 84
End: 2024-05-10
Payer: MEDICARE

## 2024-05-10 VITALS
BODY MASS INDEX: 24.86 KG/M2 | HEIGHT: 65 IN | HEART RATE: 75 BPM | RESPIRATION RATE: 18 BRPM | WEIGHT: 149.2 LBS | SYSTOLIC BLOOD PRESSURE: 150 MMHG | DIASTOLIC BLOOD PRESSURE: 70 MMHG | OXYGEN SATURATION: 93 % | TEMPERATURE: 96.9 F

## 2024-05-10 PROCEDURE — 99234 HOSP IP/OBS SM DT SF/LOW 45: CPT | Performed by: INTERNAL MEDICINE

## 2024-05-10 PROCEDURE — 6360000002 HC RX W HCPCS: Performed by: NURSE ANESTHETIST, CERTIFIED REGISTERED

## 2024-05-10 PROCEDURE — 2580000003 HC RX 258: Performed by: INTERNAL MEDICINE

## 2024-05-10 PROCEDURE — 31652 BRONCH EBUS SAMPLNG 1/2 NODE: CPT | Performed by: INTERNAL MEDICINE

## 2024-05-10 PROCEDURE — 3700000000 HC ANESTHESIA ATTENDED CARE: Performed by: INTERNAL MEDICINE

## 2024-05-10 PROCEDURE — 3700000001 HC ADD 15 MINUTES (ANESTHESIA): Performed by: INTERNAL MEDICINE

## 2024-05-10 PROCEDURE — 3609027000 HC BRONCHOSCOPY: Performed by: INTERNAL MEDICINE

## 2024-05-10 PROCEDURE — 88305 TISSUE EXAM BY PATHOLOGIST: CPT

## 2024-05-10 PROCEDURE — C1725 CATH, TRANSLUMIN NON-LASER: HCPCS | Performed by: INTERNAL MEDICINE

## 2024-05-10 PROCEDURE — 88173 CYTOPATH EVAL FNA REPORT: CPT

## 2024-05-10 PROCEDURE — 7100000000 HC PACU RECOVERY - FIRST 15 MIN: Performed by: INTERNAL MEDICINE

## 2024-05-10 PROCEDURE — 88177 CYTP FNA EVAL EA ADDL: CPT

## 2024-05-10 PROCEDURE — 71045 X-RAY EXAM CHEST 1 VIEW: CPT

## 2024-05-10 PROCEDURE — 7100000011 HC PHASE II RECOVERY - ADDTL 15 MIN: Performed by: INTERNAL MEDICINE

## 2024-05-10 PROCEDURE — 2500000003 HC RX 250 WO HCPCS: Performed by: NURSE ANESTHETIST, CERTIFIED REGISTERED

## 2024-05-10 PROCEDURE — 3603165200 HC BRNCHSC EBUS GUIDED SAMPL 1/2 NODE STATION/STRUX: Performed by: INTERNAL MEDICINE

## 2024-05-10 PROCEDURE — 7100000010 HC PHASE II RECOVERY - FIRST 15 MIN: Performed by: INTERNAL MEDICINE

## 2024-05-10 PROCEDURE — 88172 CYTP DX EVAL FNA 1ST EA SITE: CPT

## 2024-05-10 PROCEDURE — 2720000010 HC SURG SUPPLY STERILE: Performed by: INTERNAL MEDICINE

## 2024-05-10 PROCEDURE — 7100000001 HC PACU RECOVERY - ADDTL 15 MIN: Performed by: INTERNAL MEDICINE

## 2024-05-10 RX ORDER — LIDOCAINE HYDROCHLORIDE 20 MG/ML
INJECTION, SOLUTION EPIDURAL; INFILTRATION; INTRACAUDAL; PERINEURAL PRN
Status: DISCONTINUED | OUTPATIENT
Start: 2024-05-10 | End: 2024-05-10 | Stop reason: SDUPTHER

## 2024-05-10 RX ORDER — FENTANYL CITRATE 50 UG/ML
INJECTION, SOLUTION INTRAMUSCULAR; INTRAVENOUS PRN
Status: DISCONTINUED | OUTPATIENT
Start: 2024-05-10 | End: 2024-05-10 | Stop reason: SDUPTHER

## 2024-05-10 RX ORDER — ROCURONIUM BROMIDE 10 MG/ML
INJECTION, SOLUTION INTRAVENOUS PRN
Status: DISCONTINUED | OUTPATIENT
Start: 2024-05-10 | End: 2024-05-10 | Stop reason: SDUPTHER

## 2024-05-10 RX ORDER — SODIUM CHLORIDE 0.9 % (FLUSH) 0.9 %
5-40 SYRINGE (ML) INJECTION PRN
Status: DISCONTINUED | OUTPATIENT
Start: 2024-05-10 | End: 2024-05-10 | Stop reason: HOSPADM

## 2024-05-10 RX ORDER — PROPOFOL 10 MG/ML
INJECTION, EMULSION INTRAVENOUS PRN
Status: DISCONTINUED | OUTPATIENT
Start: 2024-05-10 | End: 2024-05-10 | Stop reason: SDUPTHER

## 2024-05-10 RX ORDER — SODIUM CHLORIDE 9 MG/ML
25 INJECTION, SOLUTION INTRAVENOUS PRN
Status: DISCONTINUED | OUTPATIENT
Start: 2024-05-10 | End: 2024-05-10 | Stop reason: HOSPADM

## 2024-05-10 RX ORDER — ONDANSETRON 2 MG/ML
INJECTION INTRAMUSCULAR; INTRAVENOUS PRN
Status: DISCONTINUED | OUTPATIENT
Start: 2024-05-10 | End: 2024-05-10 | Stop reason: SDUPTHER

## 2024-05-10 RX ORDER — SODIUM CHLORIDE 0.9 % (FLUSH) 0.9 %
5-40 SYRINGE (ML) INJECTION EVERY 12 HOURS SCHEDULED
Status: DISCONTINUED | OUTPATIENT
Start: 2024-05-10 | End: 2024-05-10 | Stop reason: HOSPADM

## 2024-05-10 RX ORDER — DEXAMETHASONE SODIUM PHOSPHATE 4 MG/ML
INJECTION, SOLUTION INTRA-ARTICULAR; INTRALESIONAL; INTRAMUSCULAR; INTRAVENOUS; SOFT TISSUE PRN
Status: DISCONTINUED | OUTPATIENT
Start: 2024-05-10 | End: 2024-05-10 | Stop reason: SDUPTHER

## 2024-05-10 RX ADMIN — PROPOFOL 140 MG: 10 INJECTION, EMULSION INTRAVENOUS at 12:41

## 2024-05-10 RX ADMIN — SODIUM CHLORIDE 25 ML: 9 INJECTION, SOLUTION INTRAVENOUS at 10:48

## 2024-05-10 RX ADMIN — LIDOCAINE HYDROCHLORIDE 100 MG: 20 INJECTION, SOLUTION EPIDURAL; INFILTRATION; INTRACAUDAL; PERINEURAL at 12:41

## 2024-05-10 RX ADMIN — FENTANYL CITRATE 25 MCG: 50 INJECTION, SOLUTION INTRAMUSCULAR; INTRAVENOUS at 12:57

## 2024-05-10 RX ADMIN — SUGAMMADEX 200 MG: 100 INJECTION, SOLUTION INTRAVENOUS at 13:50

## 2024-05-10 RX ADMIN — SODIUM CHLORIDE 25 ML: 9 INJECTION, SOLUTION INTRAVENOUS at 09:23

## 2024-05-10 RX ADMIN — DEXAMETHASONE SODIUM PHOSPHATE 8 MG: 4 INJECTION, SOLUTION INTRAMUSCULAR; INTRAVENOUS at 12:56

## 2024-05-10 RX ADMIN — ROCURONIUM BROMIDE 50 MG: 10 INJECTION INTRAVENOUS at 12:41

## 2024-05-10 RX ADMIN — FENTANYL CITRATE 50 MCG: 50 INJECTION, SOLUTION INTRAMUSCULAR; INTRAVENOUS at 12:41

## 2024-05-10 RX ADMIN — ONDANSETRON 4 MG: 2 INJECTION INTRAMUSCULAR; INTRAVENOUS at 12:56

## 2024-05-10 RX ADMIN — FENTANYL CITRATE 25 MCG: 50 INJECTION, SOLUTION INTRAMUSCULAR; INTRAVENOUS at 13:29

## 2024-05-10 ASSESSMENT — ENCOUNTER SYMPTOMS
DIARRHEA: 0
TROUBLE SWALLOWING: 0
STRIDOR: 0
SHORTNESS OF BREATH: 0
COUGH: 0
BLOOD IN STOOL: 0
VOMITING: 0
WHEEZING: 0
CHOKING: 0

## 2024-05-10 ASSESSMENT — PAIN - FUNCTIONAL ASSESSMENT
PAIN_FUNCTIONAL_ASSESSMENT: NONE - DENIES PAIN

## 2024-05-10 NOTE — PROGRESS NOTES
Recovery mode. Denies discomfort,taking fluids. Dr. Mendes discussed findings and plan of care with patient and . Discharge instructions provided, understanding verbalized.

## 2024-05-10 NOTE — ANESTHESIA PRE PROCEDURE
Department of Anesthesiology  Preprocedure Note       Name:  Sol Greer   Age:  84 y.o.  :  1940                                          MRN:  [id number]         Date:  5/10/2024      Surgeon: Surgeon(s):  Star Mendes MD    Procedure: Procedure(s):  BRONCHOSCOPY ENDOBRONCHIAL ULTRASOUND  BRONCHOSCOPY    Medications prior to admission:   Prior to Admission medications    Medication Sig Start Date End Date Taking? Authorizing Provider   bumetanide (BUMEX) 0.5 MG tablet Take 1 tablet by mouth as needed    Luz Maria Villaseñor MD   POTASSIUM CHLORIDE ER PO Take by mouth as needed    Luz Maria Villaseñor MD   Handicap Placard MISC by Does not apply route Diagnosis: Pancreatic cancer, deconditioning, chemo-induced neuropathy  Expiration: 2028 3/13/24   Loida Knight MD   gabapentin (NEURONTIN) 300 MG capsule Take 300 mg twice daily and 600 mg nightly 12/7/23 5/10/24  Loida Knight MD   Multiple Vitamin (MULTIVITAMIN ADULT PO) Take by mouth    Luz Maria Villaseñor MD   aspirin 81 MG chewable tablet Take 1 tablet by mouth daily 22   Loida Knight MD   ferrous sulfate (IRON 325) 325 (65 Fe) MG tablet Take 1 tablet by mouth daily (with breakfast)    Luz Maria Villaseñor MD   lidocaine-prilocaine (EMLA) 2.5-2.5 % cream Apply topically as needed. 22   Margo Perera MD   docusate sodium (COLACE) 100 MG capsule Take 1 capsule by mouth daily    ProviderLuz Maria MD       Current medications:    Current Facility-Administered Medications   Medication Dose Route Frequency Provider Last Rate Last Admin    sodium chloride flush 0.9 % injection 5-40 mL  5-40 mL IntraVENous 2 times per day Star Mendes MD        sodium chloride flush 0.9 % injection 5-40 mL  5-40 mL IntraVENous PRN Star Mendes MD        0.9 % sodium chloride infusion  25 mL IntraVENous PRN Star Mendes  mL/hr at 05/10/24 1239 NoRateChange at 05/10/24 1239       Allergies:  No Known Allergies    Problem

## 2024-05-10 NOTE — ANESTHESIA POSTPROCEDURE EVALUATION
Department of Anesthesiology  Postprocedure Note    Patient: Sol Greer  MRN: [id number]  YOB: 1940  Date of evaluation: 5/10/2024    Procedure Summary       Date: 05/10/24 Room / Location: [place] [address] / [facility]    Anesthesia Start: 1239 Anesthesia Stop: 1412    Procedures:       BRONCHOSCOPY ENDOBRONCHIAL ULTRASOUND      BRONCHOSCOPY Diagnosis:       Lung nodule      (Lung nodule [R91.1])    Surgeons: Star Mendes MD Responsible Provider: Glen Hill DO    Anesthesia Type: Not recorded ASA Status: 2            Anesthesia Type: No value filed.    Catrachito Phase I: Catrachito Score: 8    Catrachito Phase II: Catrachito Score: 10    Anesthesia Post Evaluation    Patient location during evaluation: PACU  Patient participation: complete - patient participated  Level of consciousness: awake and alert  Pain score: 2  Airway patency: patent  Nausea & Vomiting: no nausea and no vomiting  Cardiovascular status: hemodynamically stable and blood pressure returned to baseline  Respiratory status: spontaneous ventilation, acceptable and nasal cannula  Hydration status: stable  Pain management: adequate and satisfactory to patient    No notable events documented.

## 2024-05-10 NOTE — ANESTHESIA PRE PROCEDURE
Department of Anesthesiology  Preprocedure Note       Name:  Sol Greer   Age:  84 y.o.  :  1940                                          MRN:  [id number]         Date:  5/10/2024      Surgeon: Surgeon(s):  Star Mendes MD    Procedure: Procedure(s):  BRONCHOSCOPY ENDOBRONCHIAL ULTRASOUND    Medications prior to admission:   Prior to Admission medications    Medication Sig Start Date End Date Taking? Authorizing Provider   bumetanide (BUMEX) 0.5 MG tablet Take 1 tablet by mouth as needed    Luz Maria Villaseñor MD   POTASSIUM CHLORIDE ER PO Take by mouth as needed    Luz Maria Villaseñor MD   Handicap Placard MISC by Does not apply route Diagnosis: Pancreatic cancer, deconditioning, chemo-induced neuropathy  Expiration: 2028 3/13/24   Loida Knight MD   gabapentin (NEURONTIN) 300 MG capsule Take 300 mg twice daily and 600 mg nightly 12/7/23 5/10/24  Loida Knight MD   Multiple Vitamin (MULTIVITAMIN ADULT PO) Take by mouth    Luz Maria Villaseñor MD   aspirin 81 MG chewable tablet Take 1 tablet by mouth daily 22   Loida Knight MD   ferrous sulfate (IRON 325) 325 (65 Fe) MG tablet Take 1 tablet by mouth daily (with breakfast)    Luz Maria Villaseñor MD   lidocaine-prilocaine (EMLA) 2.5-2.5 % cream Apply topically as needed. 22   Margo Perera MD   docusate sodium (COLACE) 100 MG capsule Take 1 capsule by mouth daily    ProviderLuz Maria MD       Current medications:    Current Facility-Administered Medications   Medication Dose Route Frequency Provider Last Rate Last Admin    sodium chloride flush 0.9 % injection 5-40 mL  5-40 mL IntraVENous 2 times per day Star Mendes MD        sodium chloride flush 0.9 % injection 5-40 mL  5-40 mL IntraVENous PRN Star Mendes MD        0.9 % sodium chloride infusion  25 mL IntraVENous PRN Star Mendes  mL/hr at 05/10/24 1048 25 mL at 05/10/24 1048       Allergies:  No Known Allergies    Problem List:    Patient Active

## 2024-05-10 NOTE — PROCEDURES
Bronchoscopy with EBUS (Endobronchial ultrasound guided) Procedure Note    Patient: Sol Greer  : 1940      Operation: Flexible fiberoptic bronchoscopy, endobronchial ultrasound, transbronchial (endobronchial ultrasound guided) biopsy of lymph node/s with out fluoroscopy. Flexible diagnostic fiberoptic bronchoscopy without fluoroscopy. And EBUS performed    Date of Operation: 5/10/2024    Indication for procedure: Mediastinal lymphadenopathy, PET avid R hilar node, history of pancreatic adenocarcinoma.     Pre operative diagnoses: lung lesion concerning for malignancy    Post operative diagnoses: lung lesion concerning for malignancy       Surgeon: Star Mendes MD    Assistants: N/A    Consent: Sol Greer is a 84 y.o. female . The risks, benefits, complications, treatment options and expected outcomes were discussed with the patient. Consent obtained from the patient after explaining the risks and complications of the procedure. The possibilities of reaction to medication, pulmonary aspiration, perforation of a airway, bleeding, failure to diagnose a condition and creating a complication requiring transfusion,respiratory failure and operation were discussed with the patient who freely signed the consent.    Anesthesia: General endotracheal anesthesia/MAC anesthesia.    Description of procedure:  A time out was taken. Please see anesthesiologist note for details.     EBUS (Endobronchial ultrasound):    After achieving adequate sedation the EBUS scope was passed through the ET tube.    The scope was  advanced into the right main bronchus and then into the Right upper lobe, Right middle lobe, and Right lower lobe bronchi and segmental bronchi.    EBUS:     Direct visualization of the lymph nodes was obtained using endobronchial ultrasound (EBUS) guidance. A complete ultrasound lymph node exam was performed for lymph node stations 2, 4, 7, 10 and 11.  The following lymph nodes were

## 2024-05-10 NOTE — PROGRESS NOTES
1403: pt arrives to pacu. Pt placed on 4L nasla cannula. VSS. Respirations unlabored. Pt sitting up in bed. Pt denies pain   1407: pt resting in bed   1413: xray at bedside   1418: pt using bedpan  1425: pt resting in bed   1433: pt transported to endo

## 2024-05-10 NOTE — PROGRESS NOTES
EBUS completed, tolerated well. FNA to station 7, 10 R  lymph node x 6 passes each. specimen jars and slides taken to lab per pathologist. Scope .

## 2024-05-10 NOTE — H&P
reports current alcohol use.    Family History:       Problem Relation Age of Onset    Breast Cancer Mother     Colon Cancer Mother     Cancer Father         bladder and lung    Lung Cancer Father     Kidney Disease Brother     No Known Problems Maternal Grandmother     No Known Problems Maternal Grandfather     No Known Problems Paternal Grandmother     No Known Problems Paternal Grandfather            REVIEW OF SYSTEMS:    Review of Systems   Constitutional:  Negative for chills, fever and unexpected weight change.   HENT:  Negative for trouble swallowing.    Respiratory:  Negative for cough, choking, shortness of breath, wheezing and stridor.    Cardiovascular:  Negative for chest pain, palpitations and leg swelling.   Gastrointestinal:  Negative for blood in stool, diarrhea and vomiting.   Skin:  Negative for rash.   Allergic/Immunologic: Negative for immunocompromised state.   Hematological:  Negative for adenopathy. Does not bruise/bleed easily.           Objective:   PHYSICAL EXAM:      VITALS:  BP (!) 148/84   Pulse 78   Temp 98.3 °F (36.8 °C) (Temporal)   Resp 18   Ht 1.651 m (5' 5\")   Wt 67.7 kg (149 lb 3.2 oz)   SpO2 97%   BMI 24.83 kg/m²   24HR INTAKE/OUTPUT:  No intake or output data in the 24 hours ending 05/10/24 0932  CURRENT PULSE OXIMETRY:  SpO2: 97 %  O2: .flow[[id number]:last    Physical Exam  Vitals and nursing note reviewed.   Constitutional:       Appearance: Normal appearance.   HENT:      Head: Normocephalic and atraumatic.   Eyes:      General: No scleral icterus.     Extraocular Movements: Extraocular movements intact.   Cardiovascular:      Rate and Rhythm: Normal rate and regular rhythm.      Pulses: Normal pulses.      Heart sounds: No murmur heard.  Pulmonary:      Effort: Pulmonary effort is normal. No respiratory distress.      Breath sounds: Normal breath sounds. No wheezing or rales.   Abdominal:      General: Abdomen is flat.      Palpations: Abdomen is soft.   Musculoskeletal:       Cervical back: Normal range of motion and neck supple.      Right lower leg: No edema.      Left lower leg: No edema.   Skin:     General: Skin is warm and dry.      Capillary Refill: Capillary refill takes less than 2 seconds.   Neurological:      General: No focal deficit present.      Mental Status: She is alert and oriented to person, place, and time.   Psychiatric:         Mood and Affect: Mood normal.         Behavior: Behavior normal.          DATA:    Old records have been reviewed  CBC with Differential:    Lab Results   Component Value Date/Time    WBC 5.1 04/23/2024 01:51 PM    RBC 4.13 04/23/2024 01:51 PM    HGB 12.8 04/23/2024 01:51 PM    HCT 39.8 04/23/2024 01:51 PM     04/23/2024 01:51 PM    MCV 96 04/23/2024 01:51 PM    MCH 31.0 04/23/2024 01:51 PM    MCHC 32.2 04/23/2024 01:51 PM    RDW 12.5 04/23/2024 01:51 PM    NRBC 0 07/07/2022 04:47 PM    MONOPCT 8.2 07/07/2022 04:47 PM    EOSPCT 1 04/23/2024 01:51 PM    MONOSABS 0.4 04/23/2024 01:51 PM    EOSABS 0.1 04/23/2024 01:51 PM    BASOSABS 0.0 04/23/2024 01:51 PM     BMP:    Lab Results   Component Value Date/Time     04/23/2024 01:51 PM     07/29/2022 03:55 PM    K 3.8 04/23/2024 01:51 PM    K 3.6 07/29/2022 03:55 PM    K 3.3 05/17/2022 06:00 AM     07/29/2022 03:55 PM    CO2 24 07/29/2022 03:55 PM    BUN 23 07/29/2022 03:55 PM    CREATININE 0.7 04/23/2024 01:51 PM    CREATININE 1.5 07/29/2022 03:55 PM    CALCIUM 8.9 07/29/2022 03:55 PM    LABGLOM 86 04/23/2024 01:51 PM    GLUCOSE 97 07/29/2022 03:55 PM     Hepatic Function Panel:    Lab Results   Component Value Date/Time    ALKPHOS 147 04/23/2024 01:51 PM    ALT 12 04/23/2024 01:51 PM    AST 23 04/23/2024 01:51 PM    BILITOT 0.7 04/23/2024 01:51 PM    BILIDIR <0.2 04/23/2024 01:51 PM     ABG:  No results found for: \"QDO7MZZ\", \"BEART\", \"P3ZSUKEI\", \"PHART\", \"THGBART\", \"BPT3QLU\", \"PO2ART\", \"GZF6WFH\"          Assessment/Plan   # Mediastinal LAD  # Lung lesion/mass  #

## 2024-05-15 ENCOUNTER — TELEPHONE (OUTPATIENT)
Dept: PULMONOLOGY | Age: 84
End: 2024-05-15

## 2024-05-15 DIAGNOSIS — R59.0 LAD (LYMPHADENOPATHY), MEDIASTINAL: Primary | ICD-10-CM

## 2024-05-15 DIAGNOSIS — C25.9 MALIGNANT NEOPLASM OF PANCREAS, UNSPECIFIED LOCATION OF MALIGNANCY (HCC): ICD-10-CM

## 2024-05-15 NOTE — TELEPHONE ENCOUNTER
Called patient to let her know did really find much on the EBUS station 10 and station 7 negative, but not really much in the hilar region especially given the PET positivity.  There was disconcordant pet imaging with the EBUS ultrasound.  This can happen if is just an acute infectious process, but with patient having known adenocarcinoma of the pancreas will get stat CTA chest with contrast to look at that hilar region a little more closely hopefully before her appointment on Monday..

## 2024-05-16 ENCOUNTER — SOCIAL WORK (OUTPATIENT)
Dept: INFUSION THERAPY | Age: 84
End: 2024-05-16

## 2024-05-16 NOTE — PROGRESS NOTES
Oncology Social Work    Date: 5/16/2024  Time: 12:25 PM  Name: Sol Greer  MRN: [id number]     Contact Type: Transportation Request    Note:   Situation: Sol Greer called the Oncology Social Worker to assist with transportation to and from appts.     Background:  Sol Greer is not able to provide transportation for herself and has asked the  for assistance.     Assessment: - Sol Greer requested transportation for the following appts and they have been confirmed with Sol and [facility] as follows:  Appt Date Appt Time Dept  Location Notes             20-May 11:20a Provider [address] - Seeing Dr. Reynaga   30-May 10:30a Provider [address] - Seeing Dr Pace   - Patient wants to get her scan set up but was advised if she does, she will need ot coordinate the transportation through the [facility] herself since I will not be available to do so. She expressed understanding and agreement.   - Patient has been notified of the arrangements provided.    Recommendation: Appt changes will be initiated by Sol Greer based on need.  provided Sol Greer with my contact information and will remain available for support.      YUAN Locke, MEGAN, MONICA  Oncology Social Worker      Electronically signed by YUAN Locke LSW, ACHP-SW on 5/16/2024 at 12:25 PM

## 2024-05-17 ENCOUNTER — HOSPITAL ENCOUNTER (OUTPATIENT)
Dept: CT IMAGING | Age: 84
Discharge: HOME | End: 2024-05-17
Attending: INTERNAL MEDICINE
Payer: MEDICARE

## 2024-05-17 DIAGNOSIS — C25.9 MALIGNANT NEOPLASM OF PANCREAS, UNSPECIFIED LOCATION OF MALIGNANCY (HCC): ICD-10-CM

## 2024-05-17 DIAGNOSIS — R59.0 LAD (LYMPHADENOPATHY), MEDIASTINAL: ICD-10-CM

## 2024-05-17 PROCEDURE — 71260 CT THORAX DX C+: CPT

## 2024-05-17 PROCEDURE — 6360000004 HC RX CONTRAST MEDICATION: Performed by: INTERNAL MEDICINE

## 2024-05-17 RX ADMIN — IOPAMIDOL 80 ML: 755 INJECTION, SOLUTION INTRAVENOUS at 11:53

## 2024-05-19 NOTE — PROGRESS NOTES
Father         bladder and lung    Lung Cancer Father     Kidney Disease Brother     No Known Problems Maternal Grandmother     No Known Problems Maternal Grandfather     No Known Problems Paternal Grandmother     No Known Problems Paternal Grandfather      CURRENT MEDICATIONS:  Current Outpatient Medications   Medication Sig Dispense Refill    bumetanide (BUMEX) 0.5 MG tablet Take 1 tablet by mouth as needed      POTASSIUM CHLORIDE ER PO Take by mouth as needed      Handicap Placard MISC by Does not apply route Diagnosis: Pancreatic cancer, deconditioning, chemo-induced neuropathy  Expiration: 5/31/2028 1 each 0    Multiple Vitamin (MULTIVITAMIN ADULT PO) Take by mouth      aspirin 81 MG chewable tablet Take 1 tablet by mouth daily 90 tablet 1    ferrous sulfate (IRON 325) 325 (65 Fe) MG tablet Take 1 tablet by mouth daily (with breakfast)      lidocaine-prilocaine (EMLA) 2.5-2.5 % cream Apply topically as needed. 30 g 1    docusate sodium (COLACE) 100 MG capsule Take 1 capsule by mouth daily      gabapentin (NEURONTIN) 300 MG capsule Take 300 mg twice daily and 600 mg nightly 120 capsule 3     No current facility-administered medications for this visit.     .    ROS   ROS all negative except for as noted in HPI     Physical exam   /70 (Site: Left Upper Arm, Position: Sitting, Cuff Size: Medium Adult)   Pulse 79   Temp 99.1 °F (37.3 °C) (Infrared)   Ht 1.651 m (5' 5\")   Wt 67.3 kg (148 lb 6.4 oz)   SpO2 96% Comment: RA  BMI 24.70 kg/m²    Wt Readings from Last 3 Encounters:   05/20/24 67.3 kg (148 lb 6.4 oz)   05/10/24 67.7 kg (149 lb 3.2 oz)   05/07/24 68.3 kg (150 lb 8.6 oz)       Constitutional: No acute distress.Pleasant.   HENT: Normocephalic and atraumatic. Oropharynx is clear and moist. No oral thrush.   Eyes: Conjunctivae are normal. PERRLA. No scleral icterus.   Neck: Neck supple. No JVD present. No tracheal deviation present.   Cardiovascular: Normal rate, regular rhythm, normal heart sounds.

## 2024-05-20 ENCOUNTER — OFFICE VISIT (OUTPATIENT)
Dept: PULMONOLOGY | Age: 84
End: 2024-05-20
Payer: MEDICARE

## 2024-05-20 VITALS
DIASTOLIC BLOOD PRESSURE: 70 MMHG | HEIGHT: 65 IN | TEMPERATURE: 99.1 F | WEIGHT: 148.4 LBS | SYSTOLIC BLOOD PRESSURE: 122 MMHG | BODY MASS INDEX: 24.72 KG/M2 | HEART RATE: 79 BPM | OXYGEN SATURATION: 96 %

## 2024-05-20 DIAGNOSIS — R91.1 LUNG NODULE: ICD-10-CM

## 2024-05-20 DIAGNOSIS — I10 PRIMARY HYPERTENSION: ICD-10-CM

## 2024-05-20 DIAGNOSIS — R59.0 LAD (LYMPHADENOPATHY), MEDIASTINAL: ICD-10-CM

## 2024-05-20 DIAGNOSIS — C25.9 MALIGNANT NEOPLASM OF PANCREAS, UNSPECIFIED LOCATION OF MALIGNANCY (HCC): Primary | ICD-10-CM

## 2024-05-20 PROCEDURE — 1124F ACP DISCUSS-NO DSCNMKR DOCD: CPT | Performed by: INTERNAL MEDICINE

## 2024-05-20 PROCEDURE — 3078F DIAST BP <80 MM HG: CPT | Performed by: INTERNAL MEDICINE

## 2024-05-20 PROCEDURE — 99214 OFFICE O/P EST MOD 30 MIN: CPT | Performed by: INTERNAL MEDICINE

## 2024-05-20 PROCEDURE — 3074F SYST BP LT 130 MM HG: CPT | Performed by: INTERNAL MEDICINE

## 2024-05-20 NOTE — TELEPHONE ENCOUNTER
Patient's last appointment was : 3/13/2024 with our   Patient's next appointment is : Visit date not found   Last refilled on: 06/02/2023  Which pharmacy does the script need sent to: [facility] [place]      Lab Results   Component Value Date    LABA1C 5.7 11/10/2021     No results found for: \"CHOL\", \"TRIG\", \"HDL\", \"LDLDIRECT\"  Lab Results   Component Value Date     04/23/2024    K 3.8 04/23/2024     07/29/2022    CO2 24 07/29/2022    BUN 23 (H) 07/29/2022    CREATININE 0.7 04/23/2024    GLUCOSE 97 07/29/2022    CALCIUM 8.9 07/29/2022    BILITOT 0.7 04/23/2024    ALKPHOS 147 (H) 04/23/2024    AST 23 04/23/2024    ALT 12 04/23/2024    LABGLOM 86 04/23/2024     Lab Results   Component Value Date    TSH 5.180 (H) 07/07/2022    T4FREE 1.62 07/07/2022     Lab Results   Component Value Date    WBC 5.1 04/23/2024    HGB 12.8 04/23/2024    HCT 39.8 04/23/2024    MCV 96 04/23/2024     04/23/2024

## 2024-05-21 RX ORDER — AMLODIPINE BESYLATE 2.5 MG/1
2.5 TABLET ORAL DAILY
Qty: 90 TABLET | Refills: 0 | OUTPATIENT
Start: 2024-05-21

## 2024-05-30 ENCOUNTER — CLINICAL DOCUMENTATION (OUTPATIENT)
Dept: CASE MANAGEMENT | Age: 84
End: 2024-05-30

## 2024-05-30 ENCOUNTER — HOSPITAL ENCOUNTER (OUTPATIENT)
Dept: INFUSION THERAPY | Age: 84
Discharge: HOME | End: 2024-05-30
Payer: MEDICARE

## 2024-05-30 ENCOUNTER — OFFICE VISIT (OUTPATIENT)
Dept: ONCOLOGY | Age: 84
End: 2024-05-30
Payer: MEDICARE

## 2024-05-30 VITALS
TEMPERATURE: 97.6 F | OXYGEN SATURATION: 100 % | SYSTOLIC BLOOD PRESSURE: 134 MMHG | HEART RATE: 79 BPM | DIASTOLIC BLOOD PRESSURE: 63 MMHG | HEIGHT: 65 IN | BODY MASS INDEX: 24.6 KG/M2 | RESPIRATION RATE: 18 BRPM

## 2024-05-30 VITALS
RESPIRATION RATE: 18 BRPM | TEMPERATURE: 97.6 F | WEIGHT: 147.8 LBS | HEART RATE: 79 BPM | SYSTOLIC BLOOD PRESSURE: 134 MMHG | OXYGEN SATURATION: 100 % | HEIGHT: 65 IN | DIASTOLIC BLOOD PRESSURE: 63 MMHG | BODY MASS INDEX: 24.62 KG/M2

## 2024-05-30 DIAGNOSIS — R91.1 PULMONARY NODULE: ICD-10-CM

## 2024-05-30 DIAGNOSIS — C25.9 MALIGNANT NEOPLASM OF PANCREAS, UNSPECIFIED LOCATION OF MALIGNANCY (HCC): Primary | ICD-10-CM

## 2024-05-30 DIAGNOSIS — R59.1 LYMPHADENOPATHY: ICD-10-CM

## 2024-05-30 DIAGNOSIS — C24.1 PRIMARY ADENOCARCINOMA OF AMPULLA OF VATER (HCC): Primary | ICD-10-CM

## 2024-05-30 PROCEDURE — 1124F ACP DISCUSS-NO DSCNMKR DOCD: CPT | Performed by: INTERNAL MEDICINE

## 2024-05-30 PROCEDURE — 2580000003 HC RX 258: Performed by: INTERNAL MEDICINE

## 2024-05-30 PROCEDURE — 6360000002 HC RX W HCPCS: Performed by: INTERNAL MEDICINE

## 2024-05-30 PROCEDURE — 96523 IRRIG DRUG DELIVERY DEVICE: CPT

## 2024-05-30 PROCEDURE — 3078F DIAST BP <80 MM HG: CPT | Performed by: INTERNAL MEDICINE

## 2024-05-30 PROCEDURE — 3075F SYST BP GE 130 - 139MM HG: CPT | Performed by: INTERNAL MEDICINE

## 2024-05-30 PROCEDURE — 99213 OFFICE O/P EST LOW 20 MIN: CPT | Performed by: INTERNAL MEDICINE

## 2024-05-30 PROCEDURE — 99211 OFF/OP EST MAY X REQ PHY/QHP: CPT

## 2024-05-30 RX ORDER — HEPARIN 100 UNIT/ML
500 SYRINGE INTRAVENOUS PRN
Status: DISCONTINUED | OUTPATIENT
Start: 2024-05-30 | End: 2024-05-31 | Stop reason: HOSPADM

## 2024-05-30 RX ORDER — HEPARIN 100 UNIT/ML
500 SYRINGE INTRAVENOUS PRN
OUTPATIENT
Start: 2024-05-30

## 2024-05-30 RX ORDER — SODIUM CHLORIDE 9 MG/ML
25 INJECTION, SOLUTION INTRAVENOUS PRN
OUTPATIENT
Start: 2024-05-30

## 2024-05-30 RX ORDER — SODIUM CHLORIDE 0.9 % (FLUSH) 0.9 %
5-40 SYRINGE (ML) INJECTION PRN
OUTPATIENT
Start: 2024-05-30

## 2024-05-30 RX ORDER — SODIUM CHLORIDE 0.9 % (FLUSH) 0.9 %
5-40 SYRINGE (ML) INJECTION PRN
Status: DISCONTINUED | OUTPATIENT
Start: 2024-05-30 | End: 2024-05-31 | Stop reason: HOSPADM

## 2024-05-30 RX ADMIN — SODIUM CHLORIDE, PRESERVATIVE FREE 10 ML: 5 INJECTION INTRAVENOUS at 10:58

## 2024-05-30 RX ADMIN — SODIUM CHLORIDE, PRESERVATIVE FREE 10 ML: 5 INJECTION INTRAVENOUS at 10:57

## 2024-05-30 RX ADMIN — Medication 500 UNITS: at 10:58

## 2024-05-30 ASSESSMENT — ENCOUNTER SYMPTOMS
RESPIRATORY NEGATIVE: 1
ALLERGIC/IMMUNOLOGIC NEGATIVE: 1
GASTROINTESTINAL NEGATIVE: 1
EYES NEGATIVE: 1

## 2024-05-30 NOTE — PATIENT INSTRUCTIONS
Rtc in August ctv scan of chest abd and pelvis  1 week before visit. CBC bmp hepatic panel and ca-19-9 day of vist

## 2024-05-30 NOTE — PROGRESS NOTES
Patient tolerated medi-port flush without any complications. Discharge instructions given to patient-verbalizes understanding. Ambulated off unit per self with belongings.

## 2024-05-30 NOTE — DISCHARGE INSTRUCTIONS
Please contact your Oncologist if you have any questions regarding the medi-port flush that you received today.    You are instructed to call the office or go to the Emergency Dept. If you experience any of the following symptoms:    Dizziness/lightheadedness   Acute nausea or vomiting-not relieved by medications  Headaches-not relieved by medications  New chest pain or pressure  New rash /itching  New shortness of breath  Fever,chills or signs or symptoms of infection    Make sure you are drinking 48 to 64 ounces of water daily-if you are unable to drink fluids let us know right away.

## 2024-05-30 NOTE — PROGRESS NOTES
chart prep ,review of x-rays and  labs, symptom management. Teaching includes risk and benefits of any treatment and the importance of compliance and risk reduction.    Indio Pace MD     Oncology History    No history exists.       Subjective:   This is a pleasant 84 y.o. 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  She did have her EBUS and biopsy which came back negative she is otherwise doing well is here for follow-up concerning her history of cancer probable moderate in this pulmonary nodule as well as hilar adenopathy    ROS:  Review of Systems   Constitutional: Negative.    HENT: Negative.     Eyes: Negative.    Respiratory: Negative.     Cardiovascular: Negative.    Gastrointestinal: Negative.    Endocrine: Negative.    Genitourinary: Negative.    Musculoskeletal: Negative.    Skin: Negative.    Allergic/Immunologic: Negative.    Neurological: Negative.    Hematological: Negative.    Psychiatric/Behavioral: Negative.            [facility] [address]       Immunizations:  Immunization History   Administered Date(s) Administered    COVID-19, MODERNA BLUE border, Primary or Immunocompromised, (age 12y+), IM, 100 mcg/0.5mL 02/05/2021, 03/05/2021, 01/19/2022    COVID-19, PFIZER Bivalent, DO NOT Dilute, (age 12y+), IM, 30 mcg/0.3 mL 09/28/2022    Influenza, FLUAD, (age 65 y+), Adjuvanted, 0.5mL 10/19/2023    Influenza, FLUZONE (age 65 y+), High Dose, 0.7mL 10/27/2020, 10/15/2021, 09/28/2022    Influenza, High Dose (Fluzone 65 yrs and older) 12/01/2017, 11/12/2018, 11/08/2019    Pneumococcal, PCV-13, PREVNAR 13, (age 6w+), IM, 0.5mL 12/21/2020    Pneumococcal, PCV20, PREVNAR 20, (age 6w+), IM, 0.5mL 06/02/2023        Health Screenings:    Gyn HX:   LMP: No LMP recorded. Patient is postmenopausal.     Health Maintenance Due   Topic Date Due    DTaP/Tdap/Td vaccine (1 - Tdap) Never done    Shingles vaccine (1 of 2) Never done

## 2024-05-30 NOTE — PLAN OF CARE
Problem: Safety - Adult  Goal: Free from fall injury  Outcome: Adequate for Discharge  Flowsheets (Taken 5/30/2024 1553)  Free From Fall Injury:   Instruct family/caregiver on patient safety   Based on caregiver fall risk screen, instruct family/caregiver to ask for assistance with transferring infant if caregiver noted to have fall risk factors  Note: Free from falls while in O.P. Oncology.     Problem: Discharge Planning  Goal: Discharge to home or other facility with appropriate resources  Flowsheets (Taken 5/30/2024 1553)  Discharge to home or other facility with appropriate resources:   Identify barriers to discharge with patient and caregiver   Arrange for needed discharge resources and transportation as appropriate   Identify discharge learning needs (meds, wound care, etc)  Note: Verbalize understanding of discharge instructions, follow up appointments, and when to call Physician.     Problem: Infection - Adult  Goal: Absence of infection at discharge  Flowsheets (Taken 5/30/2024 1553)  Absence of infection at discharge:   Assess and monitor for signs and symptoms of infection   Monitor lab/diagnostic results   Monitor all insertion sites i.e., indwelling lines, tubes and drains  Note: Mediport site with no redness or warmth. Skin over port site intact with no signs of breakdown noted. Patient verbalizes signs/symptoms of port infection and when to notify the physician.   Care plan reviewed with patient.  Patient verbalizes understanding of the plan of care and contribute to goal setting.

## 2024-05-31 NOTE — PROGRESS NOTES
Name: Sol Greer  : 1940  MRN: [id number]    Oncology Navigation Follow-Up Note    Contact Type:  Medical Oncology    Subjective: feels good- voices no issues    Objective: review biopsy    ONCPOC:  -reviewed lymph node biopsy- benign  -MD reminded pt to call Dr. Dey IU about stent replacement   -MD ordered CT scans in 3 months- 2024   -return MD appt  to discuss results    Assistance Needed: denies any at this time  Mariela assisted pt to infusion clinic to have mediport flush    Receptive to Advanced Care Planning / Palliative Care:  deferred    Education: mariela reiterated ONC POC    Notes: mariela following to assist & support as needed    Electronically signed by Ysabel Yañez RN on 2024 at 8:47 AM

## 2024-06-28 ENCOUNTER — SOCIAL WORK (OUTPATIENT)
Dept: INFUSION THERAPY | Age: 84
End: 2024-06-28

## 2024-06-28 NOTE — PROGRESS NOTES
Oncology Social Work    Date: 6/28/2024  Time: 12:34 PM  Name: Sol Greer  MRN: [id number]     Contact Type: Transportation Request    Note:   Situation: Sol Greer has been in contact with the Oncology Social Worker to assist with transportation to and from appts.     Background:  Sol Greer is not able to provide transportation for herself and has asked the  for assistance.     Assessment: - Sol Greer requested transportation for upcoming appointment [facility]  - Patient will be picked up by [facility] [place] [facility] and taken to her scheduled appt. As follows:  Appt Date Appt Time Dept  Agency Used Contact Phone # Location Notes                 25-Jul 12:00p [facility] [place] [facility] [phone] [address] [facility] Flu   - Patient has been notified of the arrangements provided.    Recommendation: Appt changes will be initiated by Sol Greer based on need.  provided Sol Greer with my contact information and will remain available for support.      YUAN Locke, MEGAN, MONICA  Oncology Social Worker      Electronically signed by YUAN Locke LSW, ACHP-SW on 6/28/2024 at 12:34 PM

## 2024-07-25 ENCOUNTER — HOSPITAL ENCOUNTER (OUTPATIENT)
Dept: INFUSION THERAPY | Age: 84
Discharge: HOME | End: 2024-07-25
Payer: MEDICARE

## 2024-07-25 VITALS
TEMPERATURE: 98.6 F | BODY MASS INDEX: 24.89 KG/M2 | WEIGHT: 149.6 LBS | RESPIRATION RATE: 16 BRPM | DIASTOLIC BLOOD PRESSURE: 71 MMHG | HEART RATE: 73 BPM | OXYGEN SATURATION: 98 % | SYSTOLIC BLOOD PRESSURE: 151 MMHG

## 2024-07-25 DIAGNOSIS — C25.9 MALIGNANT NEOPLASM OF PANCREAS, UNSPECIFIED LOCATION OF MALIGNANCY (HCC): Primary | ICD-10-CM

## 2024-07-25 PROCEDURE — 96523 IRRIG DRUG DELIVERY DEVICE: CPT

## 2024-07-25 PROCEDURE — 2580000003 HC RX 258: Performed by: INTERNAL MEDICINE

## 2024-07-25 PROCEDURE — 6360000002 HC RX W HCPCS: Performed by: INTERNAL MEDICINE

## 2024-07-25 RX ORDER — SODIUM CHLORIDE 9 MG/ML
25 INJECTION, SOLUTION INTRAVENOUS PRN
OUTPATIENT
Start: 2024-07-25

## 2024-07-25 RX ORDER — SODIUM CHLORIDE 0.9 % (FLUSH) 0.9 %
5-40 SYRINGE (ML) INJECTION PRN
OUTPATIENT
Start: 2024-07-25

## 2024-07-25 RX ORDER — HEPARIN 100 UNIT/ML
500 SYRINGE INTRAVENOUS PRN
Status: DISCONTINUED | OUTPATIENT
Start: 2024-07-25 | End: 2024-07-26 | Stop reason: HOSPADM

## 2024-07-25 RX ORDER — SODIUM CHLORIDE 0.9 % (FLUSH) 0.9 %
5-40 SYRINGE (ML) INJECTION PRN
Status: DISCONTINUED | OUTPATIENT
Start: 2024-07-25 | End: 2024-07-26 | Stop reason: HOSPADM

## 2024-07-25 RX ORDER — HEPARIN 100 UNIT/ML
500 SYRINGE INTRAVENOUS PRN
OUTPATIENT
Start: 2024-07-25

## 2024-07-25 RX ADMIN — HEPARIN 500 UNITS: 100 SYRINGE at 11:43

## 2024-07-25 RX ADMIN — SODIUM CHLORIDE, PRESERVATIVE FREE 30 ML: 5 INJECTION INTRAVENOUS at 11:43

## 2024-07-25 NOTE — PROGRESS NOTES
Patient tolerated port flush without any complications. Discharge instructions given to patient-verbalizes understanding. Ambulated off unit per self with belongings.

## 2024-07-25 NOTE — PLAN OF CARE
Problem: Safety - Adult  Goal: Free from fall injury  Outcome: Adequate for Discharge  Flowsheets (Taken 7/25/2024 1148)  Free From Fall Injury:   Instruct family/caregiver on patient safety   Based on caregiver fall risk screen, instruct family/caregiver to ask for assistance with transferring infant if caregiver noted to have fall risk factors  Note: Free from falls while in O.P. Oncology.Discussed the need to use the call light for assistance when getting up to ambulate.        Problem: Discharge Planning  Goal: Discharge to home or other facility with appropriate resources  Outcome: Adequate for Discharge  Flowsheets (Taken 7/25/2024 1148)  Discharge to home or other facility with appropriate resources:   Identify barriers to discharge with patient and caregiver   Identify discharge learning needs (meds, wound care, etc)  Note: Verbalize understanding of discharge instructions, follow up appointments, and when to call Physician.Discuss understanding of discharge instructions, follow up appointments and when to call Physician.        Problem: Chronic Conditions and Co-morbidities  Goal: Patient's chronic conditions and co-morbidity symptoms are monitored and maintained or improved  Outcome: Adequate for Discharge  Flowsheets (Taken 7/25/2024 1148)  Care Plan - Patient's Chronic Conditions and Co-Morbidity Symptoms are Monitored and Maintained or Improved:   Monitor and assess patient's chronic conditions and comorbid symptoms for stability, deterioration, or improvement   Collaborate with multidisciplinary team to address chronic and comorbid conditions and prevent exacerbation or deterioration  Note: Patient verbalizes understanding to verbal information given on port flush,action and possible side effects. Aware to call MD if develop complications.      Care plan reviewed with patient. Patient verbalizes understanding of the plan of care and contributes to goal setting.

## 2024-07-26 ENCOUNTER — SOCIAL WORK (OUTPATIENT)
Dept: INFUSION THERAPY | Age: 84
End: 2024-07-26

## 2024-07-26 NOTE — PROGRESS NOTES
Oncology Social Work    Date: 7/26/2024  Time: 4:16 PM  Name: Sol Greer  MRN: [id number]     Contact Type: Transportation Request    Note:   Situation: Sol Greer utilizes the Oncology Social Worker to assist with transportation to and from appts.     Background:  Sol Greer is not able to provide transportation for herself and has asked the  for assistance.     Assessment: - Sol Greer requested transportation for her upcoming Aug appts within the [employer] network.  - Patient will be picked up by [facility] and taken to/from her appts as follows:  Appt Date Appt Time Dept  Agency Used Location Notes               19-Aug 8:30a CT Scan [facility] [place] [facility] [address] - [facility] testing    21-Aug 10:30a Provider [place] [facility] [address] - Seeing Evangelinadavid Beckett   23-Aug 9:15a [facility] [place] [facility] [address] - [facility]    10:00a Provider  [address] - Seeing Dr Thompson   - A potential complication has arisen by the transport company as they can not get her to the CT Scan before 8:30a. Her medical team has been notified and possible changes are being looked into at the time of this writing.  Updates will follow if this time is no longer provided  - Patient has been notified of the arrangements provided.    Recommendation: Appt changes will be initiated by Sol Greer based on need.  provided Sol Greer with my contact information and will remain available for support.      YUAN Locke, MEGAN, MONICA  Oncology Social Worker      Electronically signed by YUAN Locke LSW, ACHP-SW on 7/26/2024 at 4:16 PM

## 2024-08-02 NOTE — CONSULTS
[facility]    Patient - Sarah Sebastian   MRN -  [id number]   [facility] # - [de-identified]   - 1940      Date of Admission -  2022  6:05 PM  Date of evaluation -  2022  [facility] Day - 1  Referred by- Lukas Lorenzo MD Primary Care Physician - Angus Langston MD       Reason for Consult    Pancreatic cancer on chemotherapy   [address] Problems    Diagnosis Date Noted    Malignant neoplasm of pancreas [facility]) [C25.9] 2021    Atrial fibrillation ([facility] [address] [I48.91] 2021    Hyperbilirubinemia [E80.6]      RODOLFO Dawn is a 81 y.o. female admitted for hyperbilirubinemia. The patient is followed by Dr. Devika Upton for periampullary adenocarcinoma cancer. She was seen by Dr. Devika Upton on 22 for cycle #8 of FOLFOX and was noted to have jaundice, reported dark urine. Her LFT revealed an increase in bilirubin to 4.2 compared to 0.9 two weeks ago. She was directly admitted under the Hospitalist Service. GI was consulted and MRCP ordered. Oncology consult requested to follow up on above. The patient is currently sitting up in bedside couch, her daughter is present. The patient reports she is feeling well overnight. She denies fever, chills, chest pain, shortness of breath, abdominal pain, nausea, vomiting, bowel changes. The patient affirms poor oral intake prior to hospitalization and deconditioning. Patient's daughter states they are interested in home health. Oncology History    Per Dr. Krish Salamanca note on 22  periampullary adenocarcinoma. She is status post Whipple procedure. She presents to the medical oncology clinic to continue adjuvant chemotherapy. Patient was admitted to [facility] on 2021 for jaundice and not feeling well. She noticed juandice since 21. Associated symptom includes RUQ pain, itching and dark urine with light color stool.  Hospital day 2, GI was consulted for possible choledocholithiasis. Liver function test transaminitis with AST//228 and significant Alk phos elevate at 421. CT abdominal (11/9) show Distend gallbladder with dilated common bile duct but no stone and hepatomegaly. RUQ US (11/09): show evidence of distened gall bladder with gallbladder sludge both intra and extra hepatic biliary duct dilation with no stone no para cholecystic edema. She underwent ERCP on 11/10/2021.  Ampulla and major papula were of normal appearance on endoscopy.  Procedure was complicated by retroperitoneal perforation.  Procedure was terminated.  Patient was started on Zosyn.  Patient was administered lactated Ringer's at 100 cc an hour and kept n.p.o. Corin Riley was then transferred to [address] for higher level care on 11/11/21. She underwent initial exploratory surgery, where a bowel perforation was not noted. She was then monitored for elevated LFTs and hyperbilirubinemia. Patient then returned to  for Whipple surgery on 12/4/21 by Dr. Elza Oliver showed pre ampullary adenocarcinoma with 7/27 lymph nodes positive for cancer.  She tolerated Whipple without complications.  She initially had 4 drains present.  Currently only one pancreatic drain remains.  Incision is healing well, without drainage, erythema, or warmth.   [facility] .Lori Angel is currently back at home, her son is here to stay with her for a couple of days.    Patient didn't have any medical history except cataract , run of A. fib during recent hospitalization at .  (which was schedule for last Monday and it was cancelled due to patient in ED).  Take multivitamin daily.        Meds    Current Medications    amiodarone  200 mg Oral Daily    amLODIPine  2.5 mg Oral Daily    apixaban  5 mg Oral BID    aspirin  81 mg Oral Daily    docusate sodium  100 mg Oral BID    ferrous sulfate  325 mg Oral Daily with breakfast    sodium chloride flush  5-40 mL IntraVENous 2 times per day     sodium chloride flush, sodium chloride, ondansetron **OR** ondansetron, polyethylene glycol, acetaminophen **OR** acetaminophen, potassium chloride **OR** potassium alternative oral replacement **OR** potassium chloride, magnesium sulfate  IV Drips/Infusions   sodium chloride 75 mL/hr at 22 0940    sodium chloride       Past Medical History         Diagnosis Date    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 Kenia Wilson    ERCP N/A 11/10/2021    ERCP WITH STENT INSERTION performed by Marnie Nazario MD at [facility]  2021    exp lap, radical celiac and portal lymph node dissection,pylorus preserving pancreatoduodenectomy-Dr Ewing IU    PORT SURGERY Left 2022    SINGLE LUMEN [id number] INSERTION performed by Elizabeth Leary MD at [facility] Exceptions are: Sips of Water with Meds  Allergies    Patient has no known allergies.   Social History     Social History     Socioeconomic History    Marital status:      Spouse name: Not on file    Number of children: Not on file    Years of education: Not on file    Highest education level: Not on file   Occupational History    Not on file   Tobacco Use    Smoking status: Former Smoker     Packs/day: 0.00     Years: 10.00     Pack years: 0.00     Types: Cigarettes     Quit date: 1972     Years since quittin.4    Smokeless tobacco: Never Used    Tobacco comment: social smoker   Vaping Use    Vaping Use: Never used   Substance and Sexual Activity    Alcohol use: Yes     Comment: social- been a long time since last drink    Drug use: Never    Sexual activity: Not on file   Other Topics Concern    Not on file   Social History Narrative    Not on file Social Determinants of Health     Financial Resource Strain: Low Risk     Difficulty of Paying Living Expenses: Not very hard   Food Insecurity: No Food Insecurity    Worried About Running Out of Food in the Last Year: Never true    Roberto of Food in the Last Year: Never true   Transportation Needs:     Lack of Transportation (Medical): Not on file    Lack of Transportation (Non-Medical): Not on file   Physical Activity:     Days of Exercise per Week: Not on file    Minutes of Exercise per Session: Not on file   Stress:     Feeling of Stress : Not on file   Social Connections:     Frequency of Communication with Friends and Family: Not on file    Frequency of Social Gatherings with Friends and Family: Not on file    Attends Orthodoxy Services: Not on file    Active Member of [address] [place] [address] [employer] or Organizations: Not on file    Attends Club or Organization Meetings: Not on file    Marital Status: Not on file   Intimate Partner Violence:     Fear of Current or Ex-Partner: Not on file    Emotionally Abused: Not on file    Physically Abused: Not on file    Sexually Abused: Not on file   Housing Stability:     Unable to Pay for Housing in the Last Year: Not on file    Number of [place] in the Last Year: Not on file    Unstable Housing in the Last Year: Not on file     Family History          Problem Relation Age of Onset    Breast Cancer Mother     Colon Cancer Mother     Cancer Father         bladder and lung    Lung Cancer Father     Kidney Disease Brother     No Known Problems Maternal Grandmother     No Known Problems Maternal Grandfather     No Known Problems Paternal Grandmother     No Known Problems Paternal Grandfather      ROS     Review of Systems   Pertinent review of systems noted in HPI, all other ROS negative. Vitals     oral temperature is 98 °F (36.7 °C). Her blood pressure is 133/72 and her pulse is 72. Her respiration is 16 and oxygen saturation is 97%.           Exam Physical Exam   General appearance: No apparent distress, chronically ill appearance, sitting up in bedside chair, and cooperative. HEENT: Pupils equal, round, and reactive to light. Mild scleral icterus. Oral mucosa moist.   Neck: Supple, with full range of motion. Trachea midline. Respiratory:  Normal respiratory effort. Clear to auscultation, bilaterally. No wheezes, rales or rhonchi. Cardiovascular: Regular rate and rhythm with normal S1/S2   Abdomen: Soft, active bowel sounds. Musculoskeletal: bilateral LE edema, no calf pain with palpation. No erythema or streaking. Skin: No rashes or lesions. Neurologic:  Neurovascularly intact without any focal sensory/motor deficits. Psychiatric: Alert and oriented      Labs   CBC  Recent Labs     05/16/22  0923 05/17/22  0600   WBC 5.8 2.9*   RBC 3.11* 2.55*   HGB 10.3* 8.4*   HCT 31.9* 25.9*   * 101.6*   MCH 33.1* 32.9   MCHC 32.3 32.4   RDW 14.6*  --     136   MPV 10.7 10.3      BMP  Recent Labs     05/16/22  0924 05/17/22  0600    141   K 2.9* 3.3*   CL  --  108   CO2  --  23   BUN  --  13   CREATININE 1.2 0.9   GLUCOSE  --  72   MG  --  1.4*   CALCIUM  --  8.2*     LFT  Recent Labs     05/16/22  0923 05/17/22  0600   AST 90* 58*   ALT 79* 56   BILITOT 4.2* 2.9*   ALKPHOS 248* 199*     INR  No results for input(s): INR, PROTIME in the last 72 hours. PTT  No results for input(s): APTT in the last 72 hours. Radiology      MRCP ordered     Assessment/Recommendations    1. Stage IIIB Periampullary Adenocarcinoma, pancreaticobiliary type  Oncology history as above. S/p Whipple 12/4/2021. She is on adjuvant chemotherapy with FOLFOX. She was due for chemotherapy yesterday on 5/16/22 and held due to hyperbilirubinemia. 2. Hyperbilirubinemia - bilirubin increased to 4.2 on 5/16/22 compared to 0.9 on 5/2/22. Bilirubin improved to 2.9 today on 5/17/22. MRCP ordered and GI consulted.  Follow MRCP    3. Elevated LFT - LFT on 5/2/22 ALT, AST, 02-Aug-2024 15:18

## 2024-08-15 NOTE — PROGRESS NOTES
[facility]    Patient: REINALDO MCCORMICK, 84 y.o.   : 1940    Patient of Dr. Reynaga    Assessment/Plan   1. Lung nodule with LAD (lymphadenopathy), mediastinal  -Reviewed CT Chest with patient with increase in size of RLL nodule and left nodule. No LAD per radiology dictation on current imaging. I have sent a message to Dr. Rosen requesting for addendum with measurement of right hilar lymph node of previous concern. Pending comparison, will plan to discuss case with Dr. Mendes. Advised patient of possible need for CT guided biopsy of RLL nodule and patient reports she is agreeable to any further biopsy that would be required. Advised patient I will plan to call after I have discussed case with Dr. Mendes. Patient wishes for note to be forwarded to oncology after discussion with Dr. Mendes     2. Malignant neoplasm of pancreas, unspecified location of malignancy (HCC)  -Continue with oncology      Reviewed preventative vaccinations    Advised patient to call office with any changes, questions, or concerns regarding respiratory status or issues with prescribed medications    No follow-ups on file.  Confirmed patient's phone number. Will call to set up further follow up/testing after discussion of case with Dr. Mendes     **ADDENDUM**  Addendum placed on CT Chest read, see below. Discussed case with Dr. Mendes. Dr. Mendes to review imaging and provide recommendations.    Addenda  Addendum: Comparison is made with CT scan of the chest dated 17 May 2024 and PET  scan dated 2024.     There has been interval deterioration. There has been enlargement of the right  hilar lymph node since previous PET scan dated 2024 and CT scan dated  2024.     Electronically signed by Dr. Shanon Rosen    Electronically signed by ASIA Sánchez CNP on 2024 at 5:07 PM      Subjective     Chief Complaint   Patient presents with    Follow-up

## 2024-08-19 ENCOUNTER — HOSPITAL ENCOUNTER (OUTPATIENT)
Dept: CT IMAGING | Age: 84
Discharge: HOME | End: 2024-08-19
Attending: INTERNAL MEDICINE
Payer: MEDICARE

## 2024-08-19 DIAGNOSIS — R59.1 LYMPHADENOPATHY: ICD-10-CM

## 2024-08-19 DIAGNOSIS — R91.1 PULMONARY NODULE: ICD-10-CM

## 2024-08-19 DIAGNOSIS — C24.1 PRIMARY ADENOCARCINOMA OF AMPULLA OF VATER (HCC): ICD-10-CM

## 2024-08-19 LAB — POC CREATININE WHOLE BLOOD: 1.3 MG/DL (ref 0.5–1.2)

## 2024-08-19 PROCEDURE — 6360000004 HC RX CONTRAST MEDICATION: Performed by: INTERNAL MEDICINE

## 2024-08-19 PROCEDURE — 71260 CT THORAX DX C+: CPT

## 2024-08-19 PROCEDURE — 82565 ASSAY OF CREATININE: CPT

## 2024-08-19 PROCEDURE — 74177 CT ABD & PELVIS W/CONTRAST: CPT

## 2024-08-19 RX ADMIN — IOPAMIDOL 80 ML: 755 INJECTION, SOLUTION INTRAVENOUS at 08:52

## 2024-08-21 ENCOUNTER — OFFICE VISIT (OUTPATIENT)
Dept: PULMONOLOGY | Age: 84
End: 2024-08-21
Payer: MEDICARE

## 2024-08-21 VITALS
HEIGHT: 65 IN | HEART RATE: 62 BPM | DIASTOLIC BLOOD PRESSURE: 62 MMHG | TEMPERATURE: 99 F | BODY MASS INDEX: 24.39 KG/M2 | OXYGEN SATURATION: 100 % | SYSTOLIC BLOOD PRESSURE: 130 MMHG | WEIGHT: 146.4 LBS

## 2024-08-21 DIAGNOSIS — R91.1 LUNG NODULE: Primary | ICD-10-CM

## 2024-08-21 DIAGNOSIS — C25.9 MALIGNANT NEOPLASM OF PANCREAS, UNSPECIFIED LOCATION OF MALIGNANCY (HCC): ICD-10-CM

## 2024-08-21 DIAGNOSIS — R59.0 LAD (LYMPHADENOPATHY), MEDIASTINAL: ICD-10-CM

## 2024-08-21 PROCEDURE — 3075F SYST BP GE 130 - 139MM HG: CPT

## 2024-08-21 PROCEDURE — 99214 OFFICE O/P EST MOD 30 MIN: CPT

## 2024-08-21 PROCEDURE — 3078F DIAST BP <80 MM HG: CPT

## 2024-08-21 PROCEDURE — 1124F ACP DISCUSS-NO DSCNMKR DOCD: CPT

## 2024-08-21 ASSESSMENT — ENCOUNTER SYMPTOMS
SINUS PAIN: 0
SHORTNESS OF BREATH: 1
WHEEZING: 0
RHINORRHEA: 1
COUGH: 1
CHEST TIGHTNESS: 0
SINUS PRESSURE: 0

## 2024-08-23 ENCOUNTER — HOSPITAL ENCOUNTER (OUTPATIENT)
Dept: INFUSION THERAPY | Age: 84
Discharge: HOME | End: 2024-08-23
Payer: MEDICARE

## 2024-08-23 ENCOUNTER — OFFICE VISIT (OUTPATIENT)
Dept: ONCOLOGY | Age: 84
End: 2024-08-23
Payer: MEDICARE

## 2024-08-23 VITALS
WEIGHT: 149.8 LBS | BODY MASS INDEX: 24.96 KG/M2 | HEART RATE: 69 BPM | TEMPERATURE: 98.2 F | DIASTOLIC BLOOD PRESSURE: 75 MMHG | HEIGHT: 65 IN | RESPIRATION RATE: 18 BRPM | OXYGEN SATURATION: 97 % | SYSTOLIC BLOOD PRESSURE: 154 MMHG

## 2024-08-23 VITALS
TEMPERATURE: 98.2 F | BODY MASS INDEX: 24.96 KG/M2 | OXYGEN SATURATION: 97 % | SYSTOLIC BLOOD PRESSURE: 154 MMHG | DIASTOLIC BLOOD PRESSURE: 75 MMHG | HEIGHT: 65 IN | RESPIRATION RATE: 18 BRPM | HEART RATE: 69 BPM | WEIGHT: 149.8 LBS

## 2024-08-23 VITALS
TEMPERATURE: 98.2 F | RESPIRATION RATE: 18 BRPM | OXYGEN SATURATION: 97 % | SYSTOLIC BLOOD PRESSURE: 154 MMHG | HEART RATE: 69 BPM | DIASTOLIC BLOOD PRESSURE: 75 MMHG

## 2024-08-23 DIAGNOSIS — R59.1 LYMPHADENOPATHY: ICD-10-CM

## 2024-08-23 DIAGNOSIS — C24.1 PRIMARY ADENOCARCINOMA OF AMPULLA OF VATER (HCC): Primary | ICD-10-CM

## 2024-08-23 DIAGNOSIS — C24.1 PRIMARY ADENOCARCINOMA OF AMPULLA OF VATER (HCC): ICD-10-CM

## 2024-08-23 DIAGNOSIS — C25.9 MALIGNANT NEOPLASM OF PANCREAS, UNSPECIFIED LOCATION OF MALIGNANCY (HCC): Primary | ICD-10-CM

## 2024-08-23 LAB
ALBUMIN SERPL BCG-MCNC: 3.8 G/DL (ref 3.5–5.1)
ALP SERPL-CCNC: 171 U/L (ref 38–126)
ALT SERPL W/O P-5'-P-CCNC: 22 U/L (ref 11–66)
AST SERPL-CCNC: 37 U/L (ref 5–40)
BASOPHILS ABSOLUTE: 0 THOU/MM3 (ref 0–0.1)
BASOPHILS NFR BLD AUTO: 0 % (ref 0–3)
BILIRUB CONJ SERPL-MCNC: 0.2 MG/DL (ref 0.1–13.8)
BILIRUB SERPL-MCNC: 0.5 MG/DL (ref 0.3–1.2)
BUN BLDP-MCNC: 13 MG/DL (ref 8–26)
CANCER AG19-9 SERPL-ACNC: 409 U/ML (ref 0–35)
CHLORIDE BLD-SCNC: 111 MEQ/L (ref 98–109)
CREAT BLD-MCNC: 0.8 MG/DL (ref 0.5–1.2)
EOSINOPHIL NFR BLD AUTO: 1 % (ref 0–4)
EOSINOPHILS ABSOLUTE: 0.1 THOU/MM3 (ref 0–0.4)
ERYTHROCYTE [DISTWIDTH] IN BLOOD BY AUTOMATED COUNT: 12.8 % (ref 11.5–14.5)
GFR SERPL CREATININE-BSD FRML MDRD: 73 ML/MIN/1.73M2
GLUCOSE BLD-MCNC: 50 MG/DL (ref 70–108)
HCT VFR BLD AUTO: 39.5 % (ref 37–47)
HGB BLD-MCNC: 12.7 GM/DL (ref 12–16)
IMMATURE GRANULOCYTES %: 0 %
IMMATURE GRANULOCYTES ABSOLUTE: 0.01 THOU/MM3 (ref 0–0.07)
IONIZED CALCIUM, WHOLE BLOOD: 1.22 MMOL/L (ref 1.12–1.32)
LYMPHOCYTES ABSOLUTE: 2.1 THOU/MM3 (ref 1–4.8)
LYMPHOCYTES NFR BLD AUTO: 32 % (ref 15–47)
MCH RBC QN AUTO: 30.8 PG (ref 26–33)
MCHC RBC AUTO-ENTMCNC: 32.2 GM/DL (ref 32.2–35.5)
MCV RBC AUTO: 96 FL (ref 81–99)
MONOCYTES ABSOLUTE: 0.6 THOU/MM3 (ref 0.4–1.3)
MONOCYTES NFR BLD AUTO: 8 % (ref 0–12)
NEUTROPHILS ABSOLUTE: 3.9 THOU/MM3 (ref 1.8–7.7)
NEUTROPHILS NFR BLD AUTO: 59 % (ref 43–75)
PLATELET # BLD AUTO: 267 THOU/MM3 (ref 130–400)
PMV BLD AUTO: 10 FL (ref 9.4–12.4)
POTASSIUM BLD-SCNC: 4.2 MEQ/L (ref 3.5–4.9)
PROT SERPL-MCNC: 6.8 G/DL (ref 6.1–8)
RBC # BLD AUTO: 4.13 MILL/MM3 (ref 4.2–5.4)
SODIUM BLD-SCNC: 145 MEQ/L (ref 138–146)
TOTAL CO2, WHOLE BLOOD: 27 MEQ/L (ref 23–33)
WBC # BLD AUTO: 6.7 THOU/MM3 (ref 4.8–10.8)

## 2024-08-23 PROCEDURE — 80076 HEPATIC FUNCTION PANEL: CPT

## 2024-08-23 PROCEDURE — 80047 BASIC METABLC PNL IONIZED CA: CPT

## 2024-08-23 PROCEDURE — 3077F SYST BP >= 140 MM HG: CPT | Performed by: INTERNAL MEDICINE

## 2024-08-23 PROCEDURE — 36591 DRAW BLOOD OFF VENOUS DEVICE: CPT

## 2024-08-23 PROCEDURE — 2580000003 HC RX 258: Performed by: INTERNAL MEDICINE

## 2024-08-23 PROCEDURE — 99214 OFFICE O/P EST MOD 30 MIN: CPT | Performed by: INTERNAL MEDICINE

## 2024-08-23 PROCEDURE — 99211 OFF/OP EST MAY X REQ PHY/QHP: CPT

## 2024-08-23 PROCEDURE — 6360000002 HC RX W HCPCS: Performed by: INTERNAL MEDICINE

## 2024-08-23 PROCEDURE — 3078F DIAST BP <80 MM HG: CPT | Performed by: INTERNAL MEDICINE

## 2024-08-23 PROCEDURE — 86301 IMMUNOASSAY TUMOR CA 19-9: CPT

## 2024-08-23 PROCEDURE — 85025 COMPLETE CBC W/AUTO DIFF WBC: CPT

## 2024-08-23 PROCEDURE — 1124F ACP DISCUSS-NO DSCNMKR DOCD: CPT | Performed by: INTERNAL MEDICINE

## 2024-08-23 RX ORDER — SODIUM CHLORIDE 9 MG/ML
25 INJECTION, SOLUTION INTRAVENOUS PRN
OUTPATIENT
Start: 2024-08-23

## 2024-08-23 RX ORDER — HEPARIN 100 UNIT/ML
500 SYRINGE INTRAVENOUS PRN
Status: DISCONTINUED | OUTPATIENT
Start: 2024-08-23 | End: 2024-08-24 | Stop reason: HOSPADM

## 2024-08-23 RX ORDER — HEPARIN 100 UNIT/ML
500 SYRINGE INTRAVENOUS PRN
OUTPATIENT
Start: 2024-08-23

## 2024-08-23 RX ORDER — SODIUM CHLORIDE 0.9 % (FLUSH) 0.9 %
5-40 SYRINGE (ML) INJECTION PRN
OUTPATIENT
Start: 2024-08-23

## 2024-08-23 RX ORDER — SODIUM CHLORIDE 0.9 % (FLUSH) 0.9 %
5-40 SYRINGE (ML) INJECTION PRN
Status: DISCONTINUED | OUTPATIENT
Start: 2024-08-23 | End: 2024-08-24 | Stop reason: HOSPADM

## 2024-08-23 RX ADMIN — SODIUM CHLORIDE, PRESERVATIVE FREE 10 ML: 5 INJECTION INTRAVENOUS at 10:26

## 2024-08-23 RX ADMIN — SODIUM CHLORIDE, PRESERVATIVE FREE 10 ML: 5 INJECTION INTRAVENOUS at 09:20

## 2024-08-23 RX ADMIN — Medication 500 UNITS: at 10:26

## 2024-08-23 NOTE — PROGRESS NOTES
[facility] [place] [facility]  [facility]  [address]  [address]  [place] [address]  Dept: [phone]  Loc: [phone]   Hematology/Oncology Progress Note (Clinic)        Sol Forresterdee  1940  No ref. provider found   Loida Knight MD     Diagnosis/CC:   Chief Complaint   Patient presents with    Follow-up     Primary adenocarcinoma of ampulla of Vater        HPI:     Diagnosis:   -Periampullary pancreatic adenocarcinoma        Treatment:   -Whipple procedure [facility] 12/4/2021  (7 of 27 lymph nodes positive for cancer.  -Adjuvant therapy: FOLFOX last administered 5/22.  First treatment began 1/26/2022.  Dose reductions including oxaliplatin to 70 mg per metered squared.  Stopped @ April with low counts.  Developed obstructive jaundice hospitalized at  June 16 through 21, 2022.  Patient had leukocytosis bacteremia and a stricture post Whipple 6/15 CAT scan showed cirrhosis, small ascites and postop changes.  Patient had Enterococcus bacteremia treated with Zosyn and Unasyn and then Augmentin.  No vegetations on valves.  ERCP 6/17/22  showed no obstruction or stones.  Intrahepatic branches diffusely dilated.  Abnormal LFTs and elevated bilirubin therefore a metal stent was placed in the common bile duct.  Stent should be changed in 3 months.  Patient is 2 months overdue to have her stent changed.     Followable Disease:   - A/P CT wo contrast  5/17/23- stable , 1/11/23-stable  -CT imaging abdominal pelvic CT with contrast 6/15/2022-mild ascites otherwise no changes.  -MRI of the abdomen with and without contrast 5/17/22-status post Whipple, mild worsening ascites mildly dilated biliary tree pancreatic mesenteric edema  - CA 19-9 - 39 ( 9/30/22)     Comorbidities:  -A. fib.  Hypertension, see below    Subjective/Interim Summary:   10/5/23-  Patient has been doing well.  Denies having any new symptoms.  No nausea, vomiting, abdominal pain,

## 2024-08-23 NOTE — PATIENT INSTRUCTIONS
Orders Placed This Encounter   Procedures    PET CT SKULL BASE TO MID THIGH     Patient is  established patient of Star Mendes, please make a urgent follow up appointment with him to review the results of the CT chest with new findings.  Labs reviewed, stable.    Return in about 3 weeks (around 9/13/2024).MD visit to review pulmonology recommendations+ PET scan+ no labs

## 2024-08-27 ENCOUNTER — TELEPHONE (OUTPATIENT)
Dept: PULMONOLOGY | Age: 84
End: 2024-08-27

## 2024-08-27 NOTE — TELEPHONE ENCOUNTER
Called and left message for patient to call back to go over the plan for her treatment. Dr. Mendes would need patient to get a CT chest this afternoon with super D protocol. I got that scheduled for 2:10 this afternoon at [facility].    She would need to arrive half an hour early to outpatient express.   We are also going to schedule an EBUS this Friday with Dr. Mendes, he is hopeful they can get more tissue to sample due to the lymph nodes enlarging.   Patient is currently on blood thinner Aspirin. She is to stop taking this today. (If she already took this morning this is fine just do not take it again tomorrow or the following days leading up to procedure)   Please transfer patient to me in the office so we can go over this more with the patient and the preps for her procedure on Friday.

## 2024-08-27 NOTE — TELEPHONE ENCOUNTER
CD with super d protocol obtained from radiology and CD given to Dr. Mendes to upload into navigational bronch program.

## 2024-08-27 NOTE — TELEPHONE ENCOUNTER
RUTH FROM CT CALLED AND STATING THEY WERE ABLE TO UPLOAD THE SUPER D SO PATIENT DOESN'T NEED TO COME IN FOR THE CT TODAY  THEY ALSO TRIED CALLING THE PATIENT.

## 2024-08-28 ENCOUNTER — HOSPITAL ENCOUNTER (OUTPATIENT)
Dept: CT IMAGING | Age: 84
Discharge: HOME | End: 2024-08-28
Payer: MEDICARE

## 2024-08-28 DIAGNOSIS — C25.9 MALIGNANT NEOPLASM OF PANCREAS, UNSPECIFIED LOCATION OF MALIGNANCY (HCC): ICD-10-CM

## 2024-08-28 DIAGNOSIS — R59.0 LAD (LYMPHADENOPATHY), MEDIASTINAL: ICD-10-CM

## 2024-08-28 DIAGNOSIS — T45.1X5A CHEMOTHERAPY-INDUCED NEUROPATHY (HCC): ICD-10-CM

## 2024-08-28 DIAGNOSIS — G62.0 CHEMOTHERAPY-INDUCED NEUROPATHY (HCC): ICD-10-CM

## 2024-08-28 DIAGNOSIS — R91.1 LUNG NODULE: ICD-10-CM

## 2024-08-28 PROCEDURE — 71250 CT THORAX DX C-: CPT

## 2024-08-28 RX ORDER — GABAPENTIN 300 MG/1
CAPSULE ORAL
Qty: 120 CAPSULE | Refills: 0 | Status: SHIPPED | OUTPATIENT
Start: 2024-08-28 | End: 2024-09-27

## 2024-08-28 RX ORDER — SODIUM CHLORIDE 0.9 % (FLUSH) 0.9 %
5-40 SYRINGE (ML) INJECTION EVERY 12 HOURS SCHEDULED
Status: DISCONTINUED | OUTPATIENT
Start: 2024-08-28 | End: 2024-08-29 | Stop reason: HOSPADM

## 2024-08-28 RX ORDER — SODIUM CHLORIDE 9 MG/ML
INJECTION, SOLUTION INTRAVENOUS CONTINUOUS
Status: DISCONTINUED | OUTPATIENT
Start: 2024-08-28 | End: 2024-08-29 | Stop reason: HOSPADM

## 2024-08-28 RX ORDER — SODIUM CHLORIDE 9 MG/ML
25 INJECTION, SOLUTION INTRAVENOUS PRN
Status: DISCONTINUED | OUTPATIENT
Start: 2024-08-28 | End: 2024-08-29 | Stop reason: HOSPADM

## 2024-08-28 RX ORDER — SODIUM CHLORIDE 0.9 % (FLUSH) 0.9 %
5-40 SYRINGE (ML) INJECTION PRN
Status: DISCONTINUED | OUTPATIENT
Start: 2024-08-28 | End: 2024-08-29 | Stop reason: HOSPADM

## 2024-08-28 RX ORDER — GABAPENTIN 300 MG/1
CAPSULE ORAL
Qty: 120 CAPSULE | Refills: 0 | OUTPATIENT
Start: 2024-08-28 | End: 2024-09-27

## 2024-08-28 NOTE — TELEPHONE ENCOUNTER
Notified Monse at CT that current Super D not compatible with our carito bronch program. They are going to try again and burn to disc. If this does not work we will re-scan patient.

## 2024-08-28 NOTE — TELEPHONE ENCOUNTER
Received call from patient. Stated that she has already talked to Oncology about this, stated that refill until she is seen in Oncology would need to come from PCP, would like to go to [facility] on [address].

## 2024-08-28 NOTE — TELEPHONE ENCOUNTER
Called and left message for patient to give me a call back regarding this. Please transfer to me in the office when she calls back

## 2024-08-28 NOTE — TELEPHONE ENCOUNTER
PDMP Monitoring:    Last PDMP Jimmy as Reviewed:  Review User Review Instant Review Result   CORONA SEGURA 8/28/2024 10:38 AM Reviewed PDMP [1]

## 2024-08-28 NOTE — TELEPHONE ENCOUNTER
Called and spoke to Danica, she would like patient to come in today around 1:15pm.   Called and left message for patient to inform her of this, and that we might need to move procedure to Thursday afternoon. It is currently up in the air, waiting to see if there will be a rep available for Friday.   Please transfer patient to me when she calls back. I will try here again at 11am.

## 2024-08-28 NOTE — TELEPHONE ENCOUNTER
Patient's last appointment was: 3/13/2024  with our   Patient's next appointment is: Visit date not found  with our DrDaja   Last refilled on: 7/4/2024  Which pharmacy does the script need sent to: [facility] [address]      Lab Results   Component Value Date    LABA1C 5.7 11/10/2021     No results found for: \"CHOL\", \"TRIG\", \"HDL\", \"LDLDIRECT\"  Lab Results   Component Value Date     08/23/2024    K 4.2 08/23/2024     07/29/2022    CO2 24 07/29/2022    BUN 23 (H) 07/29/2022    CREATININE 0.8 08/23/2024    GLUCOSE 97 07/29/2022    CALCIUM 8.9 07/29/2022    BILITOT 0.5 08/23/2024    ALKPHOS 171 (H) 08/23/2024    AST 37 08/23/2024    ALT 22 08/23/2024    LABGLOM 73 08/23/2024     Lab Results   Component Value Date    TSH 5.180 (H) 07/07/2022    T4FREE 1.62 07/07/2022     Lab Results   Component Value Date    WBC 6.7 08/23/2024    HGB 12.7 08/23/2024    HCT 39.5 08/23/2024    MCV 96 08/23/2024     08/23/2024

## 2024-08-29 ENCOUNTER — ANESTHESIA EVENT (OUTPATIENT)
Dept: ENDOSCOPY | Age: 84
End: 2024-08-29
Payer: MEDICARE

## 2024-08-29 ENCOUNTER — APPOINTMENT (OUTPATIENT)
Dept: GENERAL RADIOLOGY | Age: 84
End: 2024-08-29
Attending: INTERNAL MEDICINE
Payer: MEDICARE

## 2024-08-29 ENCOUNTER — HOSPITAL ENCOUNTER (OUTPATIENT)
Age: 84
Setting detail: OUTPATIENT SURGERY
Discharge: HOME | End: 2024-08-29
Attending: INTERNAL MEDICINE | Admitting: INTERNAL MEDICINE
Payer: MEDICARE

## 2024-08-29 ENCOUNTER — ANESTHESIA (OUTPATIENT)
Dept: ENDOSCOPY | Age: 84
End: 2024-08-29
Payer: MEDICARE

## 2024-08-29 VITALS
RESPIRATION RATE: 16 BRPM | BODY MASS INDEX: 24.62 KG/M2 | DIASTOLIC BLOOD PRESSURE: 90 MMHG | HEIGHT: 65 IN | TEMPERATURE: 97.8 F | OXYGEN SATURATION: 95 % | SYSTOLIC BLOOD PRESSURE: 180 MMHG | HEART RATE: 80 BPM | WEIGHT: 147.8 LBS

## 2024-08-29 PROBLEM — R59.0 MEDIASTINAL LYMPHADENOPATHY: Status: ACTIVE | Noted: 2024-08-29

## 2024-08-29 PROBLEM — R91.8 LUNG MASS: Status: ACTIVE | Noted: 2024-08-29

## 2024-08-29 PROCEDURE — 88305 TISSUE EXAM BY PATHOLOGIST: CPT

## 2024-08-29 PROCEDURE — 88172 CYTP DX EVAL FNA 1ST EA SITE: CPT

## 2024-08-29 PROCEDURE — 7100000000 HC PACU RECOVERY - FIRST 15 MIN: Performed by: INTERNAL MEDICINE

## 2024-08-29 PROCEDURE — 7100000001 HC PACU RECOVERY - ADDTL 15 MIN: Performed by: INTERNAL MEDICINE

## 2024-08-29 PROCEDURE — 88342 IMHCHEM/IMCYTCHM 1ST ANTB: CPT

## 2024-08-29 PROCEDURE — 2720000010 HC SURG SUPPLY STERILE: Performed by: INTERNAL MEDICINE

## 2024-08-29 PROCEDURE — 2500000003 HC RX 250 WO HCPCS: Performed by: NURSE ANESTHETIST, CERTIFIED REGISTERED

## 2024-08-29 PROCEDURE — 88177 CYTP FNA EVAL EA ADDL: CPT

## 2024-08-29 PROCEDURE — 3603165200 HC BRNCHSC EBUS GUIDED SAMPL 1/2 NODE STATION/STRUX: Performed by: INTERNAL MEDICINE

## 2024-08-29 PROCEDURE — 3700000001 HC ADD 15 MINUTES (ANESTHESIA): Performed by: INTERNAL MEDICINE

## 2024-08-29 PROCEDURE — 71045 X-RAY EXAM CHEST 1 VIEW: CPT

## 2024-08-29 PROCEDURE — 7100000011 HC PHASE II RECOVERY - ADDTL 15 MIN: Performed by: INTERNAL MEDICINE

## 2024-08-29 PROCEDURE — 3700000000 HC ANESTHESIA ATTENDED CARE: Performed by: INTERNAL MEDICINE

## 2024-08-29 PROCEDURE — 2580000003 HC RX 258: Performed by: INTERNAL MEDICINE

## 2024-08-29 PROCEDURE — 88341 IMHCHEM/IMCYTCHM EA ADD ANTB: CPT

## 2024-08-29 PROCEDURE — 2709999900 HC NON-CHARGEABLE SUPPLY: Performed by: INTERNAL MEDICINE

## 2024-08-29 PROCEDURE — 7100000010 HC PHASE II RECOVERY - FIRST 15 MIN: Performed by: INTERNAL MEDICINE

## 2024-08-29 PROCEDURE — 6360000002 HC RX W HCPCS: Performed by: NURSE ANESTHETIST, CERTIFIED REGISTERED

## 2024-08-29 PROCEDURE — 88173 CYTOPATH EVAL FNA REPORT: CPT

## 2024-08-29 PROCEDURE — 88112 CYTOPATH CELL ENHANCE TECH: CPT

## 2024-08-29 PROCEDURE — 2580000003 HC RX 258: Performed by: NURSE ANESTHETIST, CERTIFIED REGISTERED

## 2024-08-29 RX ORDER — ONDANSETRON 2 MG/ML
INJECTION INTRAMUSCULAR; INTRAVENOUS PRN
Status: DISCONTINUED | OUTPATIENT
Start: 2024-08-29 | End: 2024-08-29 | Stop reason: SDUPTHER

## 2024-08-29 RX ORDER — EPHEDRINE SULFATE/0.9% NACL/PF 25 MG/5 ML
SYRINGE (ML) INTRAVENOUS PRN
Status: DISCONTINUED | OUTPATIENT
Start: 2024-08-29 | End: 2024-08-29 | Stop reason: SDUPTHER

## 2024-08-29 RX ORDER — DEXAMETHASONE SODIUM PHOSPHATE 4 MG/ML
INJECTION, SOLUTION INTRA-ARTICULAR; INTRALESIONAL; INTRAMUSCULAR; INTRAVENOUS; SOFT TISSUE PRN
Status: DISCONTINUED | OUTPATIENT
Start: 2024-08-29 | End: 2024-08-29 | Stop reason: SDUPTHER

## 2024-08-29 RX ORDER — LIDOCAINE HYDROCHLORIDE 20 MG/ML
INJECTION, SOLUTION EPIDURAL; INFILTRATION; INTRACAUDAL; PERINEURAL PRN
Status: DISCONTINUED | OUTPATIENT
Start: 2024-08-29 | End: 2024-08-29 | Stop reason: SDUPTHER

## 2024-08-29 RX ORDER — ROCURONIUM BROMIDE 10 MG/ML
INJECTION, SOLUTION INTRAVENOUS PRN
Status: DISCONTINUED | OUTPATIENT
Start: 2024-08-29 | End: 2024-08-29 | Stop reason: SDUPTHER

## 2024-08-29 RX ORDER — PROPOFOL 10 MG/ML
INJECTION, EMULSION INTRAVENOUS PRN
Status: DISCONTINUED | OUTPATIENT
Start: 2024-08-29 | End: 2024-08-29 | Stop reason: SDUPTHER

## 2024-08-29 RX ORDER — SODIUM CHLORIDE 9 MG/ML
INJECTION, SOLUTION INTRAVENOUS CONTINUOUS PRN
Status: DISCONTINUED | OUTPATIENT
Start: 2024-08-29 | End: 2024-08-29 | Stop reason: SDUPTHER

## 2024-08-29 RX ORDER — FENTANYL CITRATE 50 UG/ML
INJECTION, SOLUTION INTRAMUSCULAR; INTRAVENOUS PRN
Status: DISCONTINUED | OUTPATIENT
Start: 2024-08-29 | End: 2024-08-29 | Stop reason: SDUPTHER

## 2024-08-29 RX ADMIN — SODIUM CHLORIDE: 9 INJECTION, SOLUTION INTRAVENOUS at 09:51

## 2024-08-29 RX ADMIN — LIDOCAINE HYDROCHLORIDE 80 MG: 20 INJECTION, SOLUTION EPIDURAL; INFILTRATION; INTRACAUDAL; PERINEURAL at 09:54

## 2024-08-29 RX ADMIN — PROPOFOL 140 MG: 10 INJECTION, EMULSION INTRAVENOUS at 09:54

## 2024-08-29 RX ADMIN — FENTANYL CITRATE 100 MCG: 50 INJECTION, SOLUTION INTRAMUSCULAR; INTRAVENOUS at 09:54

## 2024-08-29 RX ADMIN — EPHEDRINE SULFATE 15 MG: 5 INJECTION INTRAVENOUS at 10:27

## 2024-08-29 RX ADMIN — PHENYLEPHRINE HYDROCHLORIDE 200 MCG: 10 INJECTION INTRAVENOUS at 10:11

## 2024-08-29 RX ADMIN — SUGAMMADEX 200 MG: 100 INJECTION, SOLUTION INTRAVENOUS at 11:57

## 2024-08-29 RX ADMIN — SODIUM CHLORIDE: 9 INJECTION, SOLUTION INTRAVENOUS at 09:40

## 2024-08-29 RX ADMIN — ROCURONIUM BROMIDE 50 MG: 10 INJECTION INTRAVENOUS at 09:54

## 2024-08-29 RX ADMIN — DEXAMETHASONE SODIUM PHOSPHATE 10 MG: 4 INJECTION, SOLUTION INTRAMUSCULAR; INTRAVENOUS at 09:54

## 2024-08-29 RX ADMIN — ONDANSETRON 4 MG: 2 INJECTION INTRAMUSCULAR; INTRAVENOUS at 09:54

## 2024-08-29 ASSESSMENT — PAIN - FUNCTIONAL ASSESSMENT
PAIN_FUNCTIONAL_ASSESSMENT: NONE - DENIES PAIN
PAIN_FUNCTIONAL_ASSESSMENT: NONE - DENIES PAIN
PAIN_FUNCTIONAL_ASSESSMENT: 0-10

## 2024-08-29 NOTE — PROCEDURES
PROCEDURE NOTE  Date: 2024    Bronchoscopy with EBUS (Endobronchial ultrasound guided) Procedure Note    Patient: Sol Greer  : 1940      Operation: Flexible fiberoptic bronchoscopy, endobronchial ultrasound, transbronchial (endobronchial ultrasound guided) biopsy of lymph node/s.   Date of Operation: 2024  Indication for procedure: enlarging RLL lung mass and large LLL mass with Mediastinal lymphadenopathy with history of pancreatic cancer    Pre operative diagnoses: lung lesion concerning for malignancy  Post operative diagnoses: lung lesion concerning for malignancy     Surgeon: Star Mendes MD    Assistants: N/A    Consent: Sol Greer is a 84 y.o. female . The risks, benefits, complications, treatment options and expected outcomes were discussed with the patient. Consent obtained from the patient after explaining the risks and complications of the procedure. The possibilities of reaction to medication, pulmonary aspiration, perforation of a airway, bleeding, failure to diagnose a condition and creating a complication requiring transfusion,respiratory failure and operation were discussed with the patient who freely signed the consent.    Anesthesia: General endotracheal anesthesia/MAC anesthesia.    Description of procedure:  A time out was taken. Please see anesthesiologist note for details.     EBUS (Endobronchial ultrasound):  After achieving adequate sedation the EBUS scope was passed through the ET tube.  The standard Flexible fiberoptic bronchoscope was passed into the trachea. The christa is sharp with no growths. Careful inspection of the tracheal lumen was accomplished with no growths.  The scope was  advanced into the right main bronchus and then into the Right upper lobe, Right middle lobe, and Right lower lobe bronchi and segmental bronchi.  The scope was sequentially passed into the left main and then left upper and lower bronchi and segmental bronchi.

## 2024-08-29 NOTE — PROGRESS NOTES
1218: pt arrives to pacu, respirations unlabored on room air. Pt awakens to voice but quickly drifts back to sleep. No signs of distress noted.   1225: Xray at bedside.  1228: pt resting in bed with eyes closed. No signs of distress noted.   1238: pt resting with eyes closed. No needs voiced at this time.   1243: report called to RN in Endo  1248: pt meets criteria for discharge from pacu at this time. pt transported to Endo in stable condition   1253: Pt Handed off to Nany GONZALEZ in Endo.

## 2024-08-29 NOTE — PROGRESS NOTES
1250 brought to phase 2, vss  1300 patient resting in bed, denies any needs at this time.   1310:VSS, denies any pain, light dimmed, call light in reach.

## 2024-08-29 NOTE — ANESTHESIA PRE PROCEDURE
Department of Anesthesiology  Preprocedure Note       Name:  Sol Greer   Age:  84 y.o.  :  1940                                          MRN:  [id number]         Date:  2024      Surgeon: Surgeon(s):  Star Mendes MD    Procedure: Procedure(s):  NAVIGATIONAL  EBUS SUPER D    Medications prior to admission:   Prior to Admission medications    Medication Sig Start Date End Date Taking? Authorizing Provider   gabapentin (NEURONTIN) 300 MG capsule Take 300 mg twice daily and 600 mg nightly 24 Yes Pato Sharif DO   POTASSIUM CHLORIDE ER PO Take by mouth as needed   Yes Luz Maria Villaseñor MD   Multiple Vitamin (MULTIVITAMIN ADULT PO) Take by mouth   Yes ProviderLuz Maria MD   aspirin 81 MG chewable tablet Take 1 tablet by mouth daily 22  Yes Loida Knight MD   ferrous sulfate (IRON 325) 325 (65 Fe) MG tablet Take 1 tablet by mouth daily (with breakfast)   Yes Luz Maria Villaseñor MD   docusate sodium (COLACE) 100 MG capsule Take 1 capsule by mouth daily   Yes ProviderLuz Maria MD   bumetanide (BUMEX) 0.5 MG tablet Take 1 tablet by mouth as needed    ProviderLuz Maria MD   lidocaine-prilocaine (EMLA) 2.5-2.5 % cream Apply topically as needed. 22   Margo Perera MD       Current medications:    Current Facility-Administered Medications   Medication Dose Route Frequency Provider Last Rate Last Admin    sodium chloride flush 0.9 % injection 5-40 mL  5-40 mL IntraVENous 2 times per day Star Mendes MD        sodium chloride flush 0.9 % injection 5-40 mL  5-40 mL IntraVENous PRN Star Mendes MD        0.9 % sodium chloride infusion  25 mL IntraVENous PRN Star Mendes MD        0.9 % sodium chloride infusion   IntraVENous Continuous Star Mendes MD 75 mL/hr at 24 0940 New Bag at 24 0940       Allergies:  No Known Allergies    Problem List:    Patient Active Problem List   Diagnosis Code    Obstructive jaundice K83.1

## 2024-08-29 NOTE — PROGRESS NOTES
Super D completed.  Pt tolerated well.  Scope  used.  Biopsies, brushing, and mini BAL completed.      Patient in endo for EBUS. Scope  used. 22 gauge visio shot needle used for 10 passes. FNA in positions 11L, station 7. Slides and 2 jars hand carried by pathology to lab. 0 biopsies taken. 0 jars labeled and sent to lab. Pictures taken. Procedure completed. Patient tolerated well.

## 2024-08-29 NOTE — H&P
History and Physical     Name:Sol Greer  Medical Record Number:[id number]  Account Number: [id number]  Age: 84 y.o.      CHIEF COMPLAINT / HPI:                The patient is a 84 y.o. female with significant past medical history of   Patient Active Problem List   Diagnosis    Obstructive jaundice    Hyperbilirubinemia    Common bile duct stricture    Elevated LFTs    Hypokalemia    Pancreatic cyst    Normocytic anemia    Abnormal urinalysis    Unintentional weight loss    Hiatal hernia    Diverticulosis    Malignant neoplasm of pancreas (HCC)    Atrial fibrillation (HCC)    SIRIA (acute kidney injury) (HCC)    Hypomagnesemia    Macrocytic anemia    Primary hypertension    Chemotherapy-induced neuropathy (HCC)      presents with complaints of enlarging hilar LAD and enlarging pulmonary masses, here for ebus and navigational bronchoscopy.      Past Medical History:      Diagnosis Date    Atrial fibrillation (HCC)     Cholangiocarcinoma (HCC)     Mrozek    Hypertension         Past Surgical History:        Procedure Laterality Date    APPENDECTOMY  1959    BRONCHOSCOPY N/A 5/10/2024    BRONCHOSCOPY ENDOBRONCHIAL ULTRASOUND performed by Star Mendes MD at [facility] ENDOSCOPY    BRONCHOSCOPY  5/10/2024    BRONCHOSCOPY performed by Star Mendes MD at [facility] ENDOSCOPY    CHOLECYSTECTOMY  12/04/2021    Dr. Ewing    COLONOSCOPY      [facility]    ERCP N/A 11/10/2021    ERCP WITH STENT INSERTION performed by Indio Kuhn MD at [facility] Endoscopy    [facility] EBUS GUIDED SAMPL 1/2 NODE STATION/STRUX  5/10/2024    PANCREAS SURGERY  12/04/2021    exp lap, radical celiac and portal lymph node dissection,pylorus preserving pancreatoduodenectomy-Dr Ewing [facility]    PORT SURGERY Left 1/25/2022    SINGLE LUMEN SMARTPORT INSERTION performed by Marsha Maki MD at [facility] OR       Current Medications:     sodium chloride flush  5-40 mL IntraVENous 2 times per day       Allergies:  No Known Allergies    Social History:

## 2024-08-29 NOTE — ANESTHESIA POSTPROCEDURE EVALUATION
Department of Anesthesiology  Postprocedure Note    Patient: Sol Greer  MRN: [id number]  YOB: 1940  Date of evaluation: 8/29/2024    Procedure Summary       Date: 08/29/24 Room / Location: [place] [address] / [facility]    Anesthesia Start: 0951 Anesthesia Stop: 1220    Procedure: NAVIGATIONAL  EBUS SUPER D Diagnosis:       Lung nodule      LAD (lymphadenopathy)      (Lung nodule [R91.1])      (LAD (lymphadenopathy) [R59.1])    Surgeons: Star Mendes MD Responsible Provider: Tray Woody MD    Anesthesia Type: general ASA Status: 3            Anesthesia Type: No value filed.    Catrachito Phase I: Catrachito Score: 10    Catrachito Phase II: Catrachito Score: 9    Anesthesia Post Evaluation    Patient location during evaluation: PACU  Patient participation: complete - patient cannot participate  Level of consciousness: awake and alert  Pain score: 0  Airway patency: patent  Nausea & Vomiting: no vomiting and no nausea  Cardiovascular status: blood pressure returned to baseline and hemodynamically stable  Respiratory status: acceptable, nonlabored ventilation, spontaneous ventilation and room air  Hydration status: stable        No notable events documented.

## 2024-08-30 ENCOUNTER — TELEPHONE (OUTPATIENT)
Dept: ONCOLOGY | Age: 84
End: 2024-08-30

## 2024-08-30 ENCOUNTER — SOCIAL WORK (OUTPATIENT)
Dept: INFUSION THERAPY | Age: 84
End: 2024-08-30

## 2024-08-30 ENCOUNTER — CASE MANAGEMENT (OUTPATIENT)
Dept: CASE MANAGEMENT | Age: 84
End: 2024-08-30

## 2024-08-30 ENCOUNTER — TELEPHONE (OUTPATIENT)
Dept: PULMONOLOGY | Age: 84
End: 2024-08-30

## 2024-08-30 NOTE — TELEPHONE ENCOUNTER
Called patient to try and setup a follow up appt to go over her bronch results. There was no answer had to lvm

## 2024-08-30 NOTE — TELEPHONE ENCOUNTER
[facility] in PET Scheduling has not been able to reach patient.  Left message that the patient will be moved out with different time and date.    Called Sol, finally able to contact patient inform her that PET Scheduling was trying to get a hold of her to schedule appointment.  Since they were not able to reach her the appointment and time was moved out.      Luz Elena to call [facility] in PET scheduling at [phone].

## 2024-08-30 NOTE — PROGRESS NOTES
Oncology Social Work    Date: 8/30/2024  Time: 12:42 PM  Name: Sol Greer  MRN: [id number]     Contact Type: Transportation Request    Note:   Situation: Sol Greer called the Oncology Social Worker to assist with transportation to and from appts.     Background:  Sol Greer is not able to provide transportation for herself and has asked the  for assistance.     Assessment: - Sol Greer requested transportation for upcoming Sept 17th appt. Her app[facility] 9:30a [facility] the [facility]  - Patient will be picked up by [facility] approximately :30 mins prior to her appt. She will be taken to/from her scheduled appt.   - Patient has been notified of the arrangements provided.  Sol called back to request transport for her PET Scan. This scan is now required to be provided through [facility]   Her appt is scheduled for 11:3.0a. She will be picked up at 10:45a    Recommendation: Appt changes will be initiated by Sol Greer based on need.  provided Sol Greer with my contact information and will remain available for support.      YUAN Locke, MEGAN, MONICA  Oncology Social Worker      Electronically signed by YUAN Locke, MEGAN, MONICA on 8/30/2024 at 12:42 PM

## 2024-08-30 NOTE — PROGRESS NOTES
Name: Sol Greer  : 1940  MRN: [id number]    Oncology Navigation Follow-Up Note    Contact Type:  Telephone    Notes: Pt called donald stating she had an EBUS yesterday done by Dr. Mendes. He jace she doesn't need a PET scan. Donald spoke to Dr Thompson who wants pt to get PET.   Donald called pt and explained Dr. Thompson is wanting PET. Was for  but she missed apt- holding 9/3/2024 for pt. Pt instructed pt to call [facility] PET  now to confirm 9/3/2024 apt -voiced understanding.    Electronically signed by Ysabel Yañez RN on 2024 at 1:06 PM

## 2024-09-11 ENCOUNTER — OFFICE VISIT (OUTPATIENT)
Dept: PULMONOLOGY | Age: 84
End: 2024-09-11
Payer: MEDICARE

## 2024-09-11 VITALS
BODY MASS INDEX: 24.19 KG/M2 | HEIGHT: 65 IN | OXYGEN SATURATION: 99 % | HEART RATE: 74 BPM | DIASTOLIC BLOOD PRESSURE: 86 MMHG | WEIGHT: 145.2 LBS | SYSTOLIC BLOOD PRESSURE: 138 MMHG | TEMPERATURE: 98.3 F

## 2024-09-11 DIAGNOSIS — R59.0 LAD (LYMPHADENOPATHY), MEDIASTINAL: ICD-10-CM

## 2024-09-11 DIAGNOSIS — R91.1 LUNG NODULE: Primary | ICD-10-CM

## 2024-09-11 DIAGNOSIS — R06.02 SHORTNESS OF BREATH ON EXERTION: ICD-10-CM

## 2024-09-11 DIAGNOSIS — C25.9 MALIGNANT NEOPLASM OF PANCREAS, UNSPECIFIED LOCATION OF MALIGNANCY (HCC): ICD-10-CM

## 2024-09-11 PROCEDURE — 99213 OFFICE O/P EST LOW 20 MIN: CPT

## 2024-09-11 PROCEDURE — 3075F SYST BP GE 130 - 139MM HG: CPT

## 2024-09-11 PROCEDURE — 3079F DIAST BP 80-89 MM HG: CPT

## 2024-09-11 PROCEDURE — 1124F ACP DISCUSS-NO DSCNMKR DOCD: CPT

## 2024-09-11 ASSESSMENT — ENCOUNTER SYMPTOMS
SINUS PRESSURE: 0
SINUS PAIN: 0
CHEST TIGHTNESS: 0
COUGH: 1
WHEEZING: 0
RHINORRHEA: 0
SHORTNESS OF BREATH: 0

## 2024-09-13 ENCOUNTER — HOSPITAL ENCOUNTER (OUTPATIENT)
Dept: CT IMAGING | Age: 84
Discharge: HOME | End: 2024-09-13
Attending: RADIOLOGY

## 2024-09-13 DIAGNOSIS — Z00.6 ENCOUNTER FOR EXAMINATION FOR NORMAL COMPARISON OR CONTROL IN CLINICAL RESEARCH PROGRAM: ICD-10-CM

## 2024-09-17 ENCOUNTER — OFFICE VISIT (OUTPATIENT)
Dept: ONCOLOGY | Age: 84
End: 2024-09-17
Payer: MEDICARE

## 2024-09-17 ENCOUNTER — HOSPITAL ENCOUNTER (OUTPATIENT)
Dept: INFUSION THERAPY | Age: 84
Discharge: HOME | End: 2024-09-17
Payer: MEDICARE

## 2024-09-17 VITALS
WEIGHT: 147.2 LBS | HEIGHT: 65 IN | TEMPERATURE: 97.8 F | RESPIRATION RATE: 16 BRPM | SYSTOLIC BLOOD PRESSURE: 153 MMHG | BODY MASS INDEX: 24.53 KG/M2 | DIASTOLIC BLOOD PRESSURE: 73 MMHG | HEART RATE: 76 BPM | OXYGEN SATURATION: 96 %

## 2024-09-17 VITALS
OXYGEN SATURATION: 96 % | RESPIRATION RATE: 16 BRPM | TEMPERATURE: 97.8 F | SYSTOLIC BLOOD PRESSURE: 153 MMHG | DIASTOLIC BLOOD PRESSURE: 73 MMHG | HEART RATE: 76 BPM

## 2024-09-17 DIAGNOSIS — C25.9 METASTASIS FROM PANCREATIC CANCER (HCC): ICD-10-CM

## 2024-09-17 DIAGNOSIS — C24.1 PRIMARY ADENOCARCINOMA OF AMPULLA OF VATER (HCC): Primary | ICD-10-CM

## 2024-09-17 DIAGNOSIS — C79.9 METASTASIS FROM PANCREATIC CANCER (HCC): ICD-10-CM

## 2024-09-17 PROCEDURE — 3077F SYST BP >= 140 MM HG: CPT | Performed by: INTERNAL MEDICINE

## 2024-09-17 PROCEDURE — 1124F ACP DISCUSS-NO DSCNMKR DOCD: CPT | Performed by: INTERNAL MEDICINE

## 2024-09-17 PROCEDURE — 99214 OFFICE O/P EST MOD 30 MIN: CPT | Performed by: INTERNAL MEDICINE

## 2024-09-17 PROCEDURE — 99211 OFF/OP EST MAY X REQ PHY/QHP: CPT

## 2024-09-17 PROCEDURE — 3078F DIAST BP <80 MM HG: CPT | Performed by: INTERNAL MEDICINE

## 2024-09-17 RX ORDER — ONDANSETRON 4 MG/1
4 TABLET, FILM COATED ORAL
Qty: 30 TABLET | Refills: 3 | Status: SHIPPED | OUTPATIENT
Start: 2024-09-17

## 2024-09-17 RX ORDER — LIDOCAINE/PRILOCAINE 2.5 %-2.5%
CREAM (GRAM) TOPICAL
Qty: 30 G | Refills: 1 | Status: SHIPPED | OUTPATIENT
Start: 2024-09-17

## 2024-09-17 NOTE — PATIENT INSTRUCTIONS
Return in about 2 weeks (around 10/1/2024).MD visit+ labs+ chemo.  Chemo education, nursing orders placed.  Foundation tissue, on the ABUS, in basket message sent to MsDaja Violeta.  Given prescriptions for Zofran 4 mg every 4-6 hrs as needed.  Orders Placed This Encounter   Medications    lidocaine-prilocaine (EMLA) 2.5-2.5 % cream     Sig: Apply topically as needed.     Dispense:  30 g     Refill:  1    ondansetron (ZOFRAN) 4 MG tablet     Sig: Take 1 tablet by mouth every 4-6 hours as needed for Nausea or Vomiting     Dispense:  30 tablet     Refill:  3    Authorization requested for gemcitabine and abraxane.( Notify Ms. Brock).  Social work< please notify Ms. Torres, pt needs primary care physician.

## 2024-09-18 ENCOUNTER — SOCIAL WORK (OUTPATIENT)
Dept: INFUSION THERAPY | Age: 84
End: 2024-09-18

## 2024-09-18 ENCOUNTER — CLINICAL DOCUMENTATION (OUTPATIENT)
Dept: CASE MANAGEMENT | Age: 84
End: 2024-09-18

## 2024-09-19 ENCOUNTER — TELEPHONE (OUTPATIENT)
Dept: PULMONOLOGY | Age: 84
End: 2024-09-19

## 2024-09-20 LAB — FOUNDATION MEDICINE RESULT STATUS: NORMAL

## 2024-09-21 LAB — FOUNDATION MEDICINE RESULT STATUS: NORMAL

## 2024-09-26 ENCOUNTER — HOSPITAL ENCOUNTER (OUTPATIENT)
Dept: INFUSION THERAPY | Age: 84
Discharge: HOME | End: 2024-09-26

## 2024-09-27 ENCOUNTER — TELEPHONE (OUTPATIENT)
Dept: PULMONOLOGY | Age: 84
End: 2024-09-27

## 2024-09-27 LAB — FOUNDATION MEDICINE RESULT STATUS: NORMAL

## 2024-10-01 ENCOUNTER — OFFICE VISIT (OUTPATIENT)
Dept: ONCOLOGY | Age: 84
End: 2024-10-01
Payer: MEDICARE

## 2024-10-01 ENCOUNTER — CLINICAL DOCUMENTATION (OUTPATIENT)
Dept: CASE MANAGEMENT | Age: 84
End: 2024-10-01

## 2024-10-01 ENCOUNTER — HOSPITAL ENCOUNTER (OUTPATIENT)
Dept: INFUSION THERAPY | Age: 84
Discharge: HOME | End: 2024-10-01
Payer: MEDICARE

## 2024-10-01 ENCOUNTER — HOSPITAL ENCOUNTER (OUTPATIENT)
Dept: INFUSION THERAPY | Age: 84
Discharge: HOME | End: 2024-10-01

## 2024-10-01 VITALS
HEART RATE: 81 BPM | TEMPERATURE: 97.8 F | SYSTOLIC BLOOD PRESSURE: 155 MMHG | OXYGEN SATURATION: 98 % | WEIGHT: 145.4 LBS | BODY MASS INDEX: 24.22 KG/M2 | DIASTOLIC BLOOD PRESSURE: 70 MMHG | RESPIRATION RATE: 16 BRPM | HEIGHT: 65 IN

## 2024-10-01 VITALS
WEIGHT: 145.4 LBS | BODY MASS INDEX: 24.22 KG/M2 | HEIGHT: 65 IN | HEART RATE: 76 BPM | TEMPERATURE: 98.3 F | RESPIRATION RATE: 16 BRPM | DIASTOLIC BLOOD PRESSURE: 70 MMHG | OXYGEN SATURATION: 98 % | SYSTOLIC BLOOD PRESSURE: 150 MMHG

## 2024-10-01 DIAGNOSIS — C79.9 METASTASIS FROM PANCREATIC CANCER (HCC): Primary | ICD-10-CM

## 2024-10-01 DIAGNOSIS — C24.1 PRIMARY ADENOCARCINOMA OF AMPULLA OF VATER (HCC): ICD-10-CM

## 2024-10-01 DIAGNOSIS — C25.9 METASTASIS FROM PANCREATIC CANCER (HCC): Primary | ICD-10-CM

## 2024-10-01 LAB
ALBUMIN SERPL BCG-MCNC: 3.8 G/DL (ref 3.5–5.1)
ALP SERPL-CCNC: 147 U/L (ref 38–126)
ALT SERPL W/O P-5'-P-CCNC: 13 U/L (ref 11–66)
AST SERPL-CCNC: 25 U/L (ref 5–40)
BASOPHILS ABSOLUTE: 0 THOU/MM3 (ref 0–0.1)
BASOPHILS NFR BLD AUTO: 0 % (ref 0–3)
BILIRUB CONJ SERPL-MCNC: 0.3 MG/DL (ref 0.1–13.8)
BILIRUB SERPL-MCNC: 0.9 MG/DL (ref 0.3–1.2)
BUN BLDP-MCNC: 13 MG/DL (ref 8–26)
CHLORIDE BLD-SCNC: 106 MEQ/L (ref 98–109)
CREAT BLD-MCNC: 1 MG/DL (ref 0.5–1.2)
EOSINOPHIL NFR BLD AUTO: 1 % (ref 0–4)
EOSINOPHILS ABSOLUTE: 0.1 THOU/MM3 (ref 0–0.4)
ERYTHROCYTE [DISTWIDTH] IN BLOOD BY AUTOMATED COUNT: 12.5 % (ref 11.5–14.5)
GFR SERPL CREATININE-BSD FRML MDRD: 56 ML/MIN/1.73M2
GLUCOSE BLD-MCNC: 99 MG/DL (ref 70–108)
HCT VFR BLD AUTO: 39.4 % (ref 37–47)
HGB BLD-MCNC: 12.8 GM/DL (ref 12–16)
IMMATURE GRANULOCYTES %: 0 %
IMMATURE GRANULOCYTES ABSOLUTE: 0 THOU/MM3 (ref 0–0.07)
IONIZED CALCIUM, WHOLE BLOOD: 1.16 MMOL/L (ref 1.12–1.32)
LYMPHOCYTES ABSOLUTE: 1.5 THOU/MM3 (ref 1–4.8)
LYMPHOCYTES NFR BLD AUTO: 25 % (ref 15–47)
MCH RBC QN AUTO: 31.1 PG (ref 26–33)
MCHC RBC AUTO-ENTMCNC: 32.5 GM/DL (ref 32.2–35.5)
MCV RBC AUTO: 96 FL (ref 81–99)
MONOCYTES ABSOLUTE: 0.5 THOU/MM3 (ref 0.4–1.3)
MONOCYTES NFR BLD AUTO: 9 % (ref 0–12)
NEUTROPHILS ABSOLUTE: 3.8 THOU/MM3 (ref 1.8–7.7)
NEUTROPHILS NFR BLD AUTO: 65 % (ref 43–75)
PLATELET # BLD AUTO: 239 THOU/MM3 (ref 130–400)
PMV BLD AUTO: 10 FL (ref 9.4–12.4)
POTASSIUM BLD-SCNC: 4.1 MEQ/L (ref 3.5–4.9)
PROT SERPL-MCNC: 6.8 G/DL (ref 6.1–8)
RBC # BLD AUTO: 4.11 MILL/MM3 (ref 4.2–5.4)
SODIUM BLD-SCNC: 140 MEQ/L (ref 138–146)
TOTAL CO2, WHOLE BLOOD: 25 MEQ/L (ref 23–33)
WBC # BLD AUTO: 5.9 THOU/MM3 (ref 4.8–10.8)

## 2024-10-01 PROCEDURE — 2580000003 HC RX 258: Performed by: INTERNAL MEDICINE

## 2024-10-01 PROCEDURE — 3077F SYST BP >= 140 MM HG: CPT | Performed by: INTERNAL MEDICINE

## 2024-10-01 PROCEDURE — 6360000002 HC RX W HCPCS: Performed by: INTERNAL MEDICINE

## 2024-10-01 PROCEDURE — 80047 BASIC METABLC PNL IONIZED CA: CPT

## 2024-10-01 PROCEDURE — 3078F DIAST BP <80 MM HG: CPT | Performed by: INTERNAL MEDICINE

## 2024-10-01 PROCEDURE — 1124F ACP DISCUSS-NO DSCNMKR DOCD: CPT | Performed by: INTERNAL MEDICINE

## 2024-10-01 PROCEDURE — 99214 OFFICE O/P EST MOD 30 MIN: CPT | Performed by: INTERNAL MEDICINE

## 2024-10-01 PROCEDURE — 96375 TX/PRO/DX INJ NEW DRUG ADDON: CPT

## 2024-10-01 PROCEDURE — 96413 CHEMO IV INFUSION 1 HR: CPT

## 2024-10-01 PROCEDURE — 99211 OFF/OP EST MAY X REQ PHY/QHP: CPT

## 2024-10-01 PROCEDURE — 80076 HEPATIC FUNCTION PANEL: CPT

## 2024-10-01 PROCEDURE — 96417 CHEMO IV INFUS EACH ADDL SEQ: CPT

## 2024-10-01 PROCEDURE — 85025 COMPLETE CBC W/AUTO DIFF WBC: CPT

## 2024-10-01 PROCEDURE — 36591 DRAW BLOOD OFF VENOUS DEVICE: CPT

## 2024-10-01 RX ORDER — DIPHENHYDRAMINE HYDROCHLORIDE 50 MG/ML
50 INJECTION INTRAMUSCULAR; INTRAVENOUS
Status: CANCELLED | OUTPATIENT
Start: 2024-10-01

## 2024-10-01 RX ORDER — SODIUM CHLORIDE 9 MG/ML
INJECTION, SOLUTION INTRAVENOUS CONTINUOUS
OUTPATIENT
Start: 2024-10-15

## 2024-10-01 RX ORDER — MEPERIDINE HYDROCHLORIDE 50 MG/ML
12.5 INJECTION INTRAMUSCULAR; INTRAVENOUS; SUBCUTANEOUS PRN
Status: CANCELLED | OUTPATIENT
Start: 2024-10-01

## 2024-10-01 RX ORDER — SODIUM CHLORIDE 9 MG/ML
5-250 INJECTION, SOLUTION INTRAVENOUS PRN
Status: CANCELLED | OUTPATIENT
Start: 2024-10-01

## 2024-10-01 RX ORDER — SODIUM CHLORIDE 9 MG/ML
5-250 INJECTION, SOLUTION INTRAVENOUS PRN
OUTPATIENT
Start: 2024-10-15

## 2024-10-01 RX ORDER — HEPARIN 100 UNIT/ML
500 SYRINGE INTRAVENOUS PRN
Status: CANCELLED | OUTPATIENT
Start: 2024-10-01

## 2024-10-01 RX ORDER — ACETAMINOPHEN 325 MG/1
650 TABLET ORAL
OUTPATIENT
Start: 2024-10-08

## 2024-10-01 RX ORDER — HEPARIN SODIUM (PORCINE) LOCK FLUSH IV SOLN 100 UNIT/ML 100 UNIT/ML
500 SOLUTION INTRAVENOUS PRN
Status: CANCELLED | OUTPATIENT
Start: 2024-10-01

## 2024-10-01 RX ORDER — DIPHENHYDRAMINE HYDROCHLORIDE 50 MG/ML
50 INJECTION INTRAMUSCULAR; INTRAVENOUS
OUTPATIENT
Start: 2024-10-08

## 2024-10-01 RX ORDER — EPINEPHRINE 1 MG/ML
0.3 INJECTION, SOLUTION, CONCENTRATE INTRAVENOUS PRN
OUTPATIENT
Start: 2024-10-15

## 2024-10-01 RX ORDER — PACLITAXEL 100 MG/20ML
200 INJECTION, POWDER, LYOPHILIZED, FOR SUSPENSION INTRAVENOUS ONCE
Status: COMPLETED | OUTPATIENT
Start: 2024-10-01 | End: 2024-10-01

## 2024-10-01 RX ORDER — ONDANSETRON 2 MG/ML
8 INJECTION INTRAMUSCULAR; INTRAVENOUS
OUTPATIENT
Start: 2024-10-08

## 2024-10-01 RX ORDER — SODIUM CHLORIDE 9 MG/ML
25 INJECTION, SOLUTION INTRAVENOUS PRN
Status: CANCELLED | OUTPATIENT
Start: 2024-10-01

## 2024-10-01 RX ORDER — MEPERIDINE HYDROCHLORIDE 50 MG/ML
12.5 INJECTION INTRAMUSCULAR; INTRAVENOUS; SUBCUTANEOUS PRN
OUTPATIENT
Start: 2024-10-08

## 2024-10-01 RX ORDER — ALBUTEROL SULFATE 90 UG/1
4 INHALANT RESPIRATORY (INHALATION) PRN
Status: CANCELLED | OUTPATIENT
Start: 2024-10-01

## 2024-10-01 RX ORDER — SODIUM CHLORIDE 0.9 % (FLUSH) 0.9 %
5-40 SYRINGE (ML) INJECTION PRN
Status: CANCELLED | OUTPATIENT
Start: 2024-10-01

## 2024-10-01 RX ORDER — ONDANSETRON 2 MG/ML
8 INJECTION INTRAMUSCULAR; INTRAVENOUS ONCE
OUTPATIENT
Start: 2024-10-08 | End: 2024-10-08

## 2024-10-01 RX ORDER — HEPARIN SODIUM (PORCINE) LOCK FLUSH IV SOLN 100 UNIT/ML 100 UNIT/ML
500 SOLUTION INTRAVENOUS PRN
OUTPATIENT
Start: 2024-10-08

## 2024-10-01 RX ORDER — ONDANSETRON 2 MG/ML
8 INJECTION INTRAMUSCULAR; INTRAVENOUS ONCE
Status: COMPLETED | OUTPATIENT
Start: 2024-10-01 | End: 2024-10-01

## 2024-10-01 RX ORDER — FAMOTIDINE 10 MG/ML
20 INJECTION, SOLUTION INTRAVENOUS
Status: CANCELLED | OUTPATIENT
Start: 2024-10-01

## 2024-10-01 RX ORDER — ONDANSETRON 2 MG/ML
8 INJECTION INTRAMUSCULAR; INTRAVENOUS
OUTPATIENT
Start: 2024-10-15

## 2024-10-01 RX ORDER — PACLITAXEL 100 MG/20ML
125 INJECTION, POWDER, LYOPHILIZED, FOR SUSPENSION INTRAVENOUS ONCE
Status: CANCELLED | OUTPATIENT
Start: 2024-10-01 | End: 2024-10-01

## 2024-10-01 RX ORDER — SODIUM CHLORIDE 9 MG/ML
5-250 INJECTION, SOLUTION INTRAVENOUS PRN
OUTPATIENT
Start: 2024-10-08

## 2024-10-01 RX ORDER — EPINEPHRINE 1 MG/ML
0.3 INJECTION, SOLUTION, CONCENTRATE INTRAVENOUS PRN
Status: CANCELLED | OUTPATIENT
Start: 2024-10-01

## 2024-10-01 RX ORDER — ACETAMINOPHEN 325 MG/1
650 TABLET ORAL
Status: CANCELLED | OUTPATIENT
Start: 2024-10-01

## 2024-10-01 RX ORDER — PACLITAXEL 100 MG/20ML
125 INJECTION, POWDER, LYOPHILIZED, FOR SUSPENSION INTRAVENOUS ONCE
Status: CANCELLED | OUTPATIENT
Start: 2024-10-15 | End: 2024-10-15

## 2024-10-01 RX ORDER — EPINEPHRINE 1 MG/ML
0.3 INJECTION, SOLUTION, CONCENTRATE INTRAVENOUS PRN
OUTPATIENT
Start: 2024-10-08

## 2024-10-01 RX ORDER — MEPERIDINE HYDROCHLORIDE 50 MG/ML
12.5 INJECTION INTRAMUSCULAR; INTRAVENOUS; SUBCUTANEOUS PRN
OUTPATIENT
Start: 2024-10-15

## 2024-10-01 RX ORDER — ONDANSETRON 2 MG/ML
8 INJECTION INTRAMUSCULAR; INTRAVENOUS
Status: CANCELLED | OUTPATIENT
Start: 2024-10-01

## 2024-10-01 RX ORDER — SODIUM CHLORIDE 0.9 % (FLUSH) 0.9 %
5-40 SYRINGE (ML) INJECTION PRN
OUTPATIENT
Start: 2024-10-15

## 2024-10-01 RX ORDER — SODIUM CHLORIDE 9 MG/ML
INJECTION, SOLUTION INTRAVENOUS CONTINUOUS
OUTPATIENT
Start: 2024-10-08

## 2024-10-01 RX ORDER — PACLITAXEL 100 MG/20ML
200 INJECTION, POWDER, LYOPHILIZED, FOR SUSPENSION INTRAVENOUS ONCE
Status: CANCELLED | OUTPATIENT
Start: 2024-10-08 | End: 2024-10-08

## 2024-10-01 RX ORDER — DIPHENHYDRAMINE HYDROCHLORIDE 50 MG/ML
50 INJECTION INTRAMUSCULAR; INTRAVENOUS
OUTPATIENT
Start: 2024-10-15

## 2024-10-01 RX ORDER — FAMOTIDINE 10 MG/ML
20 INJECTION, SOLUTION INTRAVENOUS
OUTPATIENT
Start: 2024-10-08

## 2024-10-01 RX ORDER — ALBUTEROL SULFATE 90 UG/1
4 INHALANT RESPIRATORY (INHALATION) PRN
OUTPATIENT
Start: 2024-10-08

## 2024-10-01 RX ORDER — FAMOTIDINE 10 MG/ML
20 INJECTION, SOLUTION INTRAVENOUS
OUTPATIENT
Start: 2024-10-15

## 2024-10-01 RX ORDER — HEPARIN SODIUM (PORCINE) LOCK FLUSH IV SOLN 100 UNIT/ML 100 UNIT/ML
500 SOLUTION INTRAVENOUS PRN
OUTPATIENT
Start: 2024-10-15

## 2024-10-01 RX ORDER — SODIUM CHLORIDE 0.9 % (FLUSH) 0.9 %
5-40 SYRINGE (ML) INJECTION PRN
OUTPATIENT
Start: 2024-10-08

## 2024-10-01 RX ORDER — HEPARIN 100 UNIT/ML
500 SYRINGE INTRAVENOUS PRN
Status: DISCONTINUED | OUTPATIENT
Start: 2024-10-01 | End: 2024-10-02 | Stop reason: HOSPADM

## 2024-10-01 RX ORDER — SODIUM CHLORIDE 9 MG/ML
5-250 INJECTION, SOLUTION INTRAVENOUS PRN
Status: DISCONTINUED | OUTPATIENT
Start: 2024-10-01 | End: 2024-10-02 | Stop reason: HOSPADM

## 2024-10-01 RX ORDER — PACLITAXEL 100 MG/20ML
200 INJECTION, POWDER, LYOPHILIZED, FOR SUSPENSION INTRAVENOUS ONCE
OUTPATIENT
Start: 2024-10-15 | End: 2024-10-15

## 2024-10-01 RX ORDER — SODIUM CHLORIDE 0.9 % (FLUSH) 0.9 %
5-40 SYRINGE (ML) INJECTION PRN
Status: DISCONTINUED | OUTPATIENT
Start: 2024-10-01 | End: 2024-10-02 | Stop reason: HOSPADM

## 2024-10-01 RX ORDER — PACLITAXEL 100 MG/20ML
125 INJECTION, POWDER, LYOPHILIZED, FOR SUSPENSION INTRAVENOUS ONCE
Status: CANCELLED | OUTPATIENT
Start: 2024-10-08 | End: 2024-10-08

## 2024-10-01 RX ORDER — SODIUM CHLORIDE 9 MG/ML
INJECTION, SOLUTION INTRAVENOUS CONTINUOUS
Status: CANCELLED | OUTPATIENT
Start: 2024-10-01

## 2024-10-01 RX ORDER — ACETAMINOPHEN 325 MG/1
650 TABLET ORAL
OUTPATIENT
Start: 2024-10-15

## 2024-10-01 RX ORDER — ALBUTEROL SULFATE 90 UG/1
4 INHALANT RESPIRATORY (INHALATION) PRN
OUTPATIENT
Start: 2024-10-15

## 2024-10-01 RX ORDER — ONDANSETRON 2 MG/ML
8 INJECTION INTRAMUSCULAR; INTRAVENOUS ONCE
OUTPATIENT
Start: 2024-10-15 | End: 2024-10-15

## 2024-10-01 RX ORDER — ONDANSETRON 2 MG/ML
8 INJECTION INTRAMUSCULAR; INTRAVENOUS ONCE
Status: CANCELLED | OUTPATIENT
Start: 2024-10-01 | End: 2024-10-01

## 2024-10-01 RX ADMIN — PACLITAXEL 200 MG: 100 INJECTION, POWDER, LYOPHILIZED, FOR SUSPENSION INTRAVENOUS at 10:16

## 2024-10-01 RX ADMIN — SODIUM CHLORIDE, PRESERVATIVE FREE 10 ML: 5 INJECTION INTRAVENOUS at 12:01

## 2024-10-01 RX ADMIN — SODIUM CHLORIDE, PRESERVATIVE FREE 30 ML: 5 INJECTION INTRAVENOUS at 09:18

## 2024-10-01 RX ADMIN — ONDANSETRON 8 MG: 2 INJECTION INTRAMUSCULAR; INTRAVENOUS at 09:50

## 2024-10-01 RX ADMIN — GEMCITABINE HYDROCHLORIDE 1600 MG: 1 INJECTION, SOLUTION INTRAVENOUS at 10:55

## 2024-10-01 RX ADMIN — SODIUM CHLORIDE 55 ML/HR: 9 INJECTION, SOLUTION INTRAVENOUS at 09:49

## 2024-10-01 RX ADMIN — Medication 500 UNITS: at 12:01

## 2024-10-01 ASSESSMENT — ENCOUNTER SYMPTOMS
EYES NEGATIVE: 1
RESPIRATORY NEGATIVE: 1
ALLERGIC/IMMUNOLOGIC NEGATIVE: 1
GASTROINTESTINAL NEGATIVE: 1

## 2024-10-01 NOTE — PROGRESS NOTES
Patient assessed for the following post chemotherapy:    Dizziness   No  Lightheadedness  No      Acute nausea/vomiting No  Headache   No  Chest pain/pressure  No  Rash/itching   No  Shortness of breath  No    Patient kept for 20 minutes observation post infusion chemotherapy.    Patient tolerated chemotherapy treatment Abraxane/Gemzar without any complications.    Last vital signs:   BP (!) 150/70   Pulse 76   Temp 98.3 °F (36.8 °C) (Oral)   Resp 16   Ht 1.651 m (5' 5\")   Wt 66 kg (145 lb 6.4 oz)   SpO2 98%   BMI 24.20 kg/m²     Physician's instructions:     Ok to treat today.   Follow up next week with crnp with treatment with cbc bmp and hepatic panel.   Will place referral for palliative care for neuropathy.        Patient instructed if experience any of the above symptoms following today's infusion, she is to notify MD immediately or go to the emergency department.    Discharge instructions given to patient. Verbalizes understanding. Ambulated off unit per self/cane, with belongings.

## 2024-10-01 NOTE — DISCHARGE INSTRUCTIONS
Please contact your Oncologist if you have any questions regarding the chemotherapy Abraxane/Gemzar that you received today.      Patient instructed if experience any of the symptoms following today's chemotherapy treatment to notify MD immediately or go to emergency department.    * dizziness/lightheadedness  *acute nausea/vomiting - not relieved with medication  *headache - not relieved from Tylenol/pain medication  *chest pain/pressure  *rash/itching  *shortness of breath        Drink fluids - 48oz fluids daily  Call if develop fever/ chills/ signs or symptoms of infection     Physician's instructions:  Instructions  Ok to treat today.   Follow up next week with crnp with treatment with cbc bmp and hepatic panel.   Will place referral for palliative care for neuropathy

## 2024-10-01 NOTE — PATIENT INSTRUCTIONS
Ok to treat today. Follow up next week with crnp with treatment with cbc bmp and hepatic panel. Will place referral for palliative care for neuropathy.

## 2024-10-01 NOTE — PROGRESS NOTES
07/29/2022 03:55 PM    CO2 24 07/29/2022 03:55 PM    BUN 23 07/29/2022 03:55 PM    CREATININE 1.0 10/01/2024 09:18 AM    CREATININE 1.5 07/29/2022 03:55 PM    CALCIUM 8.9 07/29/2022 03:55 PM    LABGLOM 56 10/01/2024 09:18 AM    LABGLOM 86 04/23/2024 01:51 PM    GLUCOSE 97 07/29/2022 03:55 PM       Magnesium:    Lab Results   Component Value Date/Time    MG 1.6 06/08/2022 04:56 PM     PT/INR:    Lab Results   Component Value Date/Time    INR 1.04 11/09/2021 07:40 PM     TSH:    Lab Results   Component Value Date/Time    TSH 5.180 07/07/2022 04:47 PM     VITAMIN B12: No components found for: \"B12\"  FOLATE:    Lab Results   Component Value Date/Time    FOLATE > 20.0 03/01/2023 02:11 PM     IRON:    Lab Results   Component Value Date/Time    IRON 38 01/26/2022 08:52 AM     Iron Saturation:  No components found for: \"PERCENTFE\"  TIBC:    Lab Results   Component Value Date/Time    TIBC 178 01/26/2022 08:52 AM     FERRITIN:    Lab Results   Component Value Date/Time    FERRITIN 496 01/26/2022 08:52 AM     PSA: No results found for: \"PSA\"           Indio Pace MD 10/1/2024

## 2024-10-01 NOTE — PROGRESS NOTES
Name: Sol Greer  : 1940  MRN: [id number]    Oncology Navigation Follow-Up Note    Contact Type:  Medical Oncology    Subjective: feels good- anxious to get started tx                      Continue issues with neuropathy hands/feet    Objective: MD visit- start tx    Oncology Plan of Care :   -MD reviewed s/e of chemotherapy drugs   -review labs   -proceed Gemzar/Abraxane C1D1    -pt aware to call if neuropathy symptoms worsen   -return MD apt 10/8- for lab/tx/assess neuropathy    Assistance Needed: denies any at this time    Receptive to Advanced Care Planning / Palliative Care:  referral Dr. Rock for neuropathy     Education: mariela reiterated ONC POC    Referrals: Dr. Rock for symptom management neuropathy    Notes: mariela following to assist & support as needed    Electronically signed by Ysabel Yañez RN on 10/1/2024 at 11:44 AM

## 2024-10-01 NOTE — PROGRESS NOTES
[facility] [place] [facility]  [facility]  [address]  [address]  [place] [address]  Dept: [phone]  Loc: [phone]   Hematology/Oncology Progress Note (Clinic)        Sol Forresterdee  1940  No ref. provider found   Loida Knight MD     Diagnosis/CC:   Chief Complaint   Patient presents with    Follow-up     Primary adenocarcinoma of ampulla of Vater       HPI:     Diagnosis:   -Periampullary pancreatic adenocarcinoma        Treatment:   -Whipple procedure [facility] 12/4/2021  (7 of 27 lymph nodes positive for cancer.  -Adjuvant therapy: FOLFOX last administered 5/22.  First treatment began 1/26/2022.  Dose reductions including oxaliplatin to 70 mg per metered squared.  Stopped @ April with low counts.  Developed obstructive jaundice hospitalized at  June 16 through 21, 2022.  Patient had leukocytosis bacteremia and a stricture post Whipple 6/15 CAT scan showed cirrhosis, small ascites and postop changes.  Patient had Enterococcus bacteremia treated with Zosyn and Unasyn and then Augmentin.  No vegetations on valves.  ERCP 6/17/22  showed no obstruction or stones.  Intrahepatic branches diffusely dilated.  Abnormal LFTs and elevated bilirubin therefore a metal stent was placed in the common bile duct.  Stent should be changed in 3 months.  Patient is 2 months overdue to have her stent changed.     Followable Disease:   - A/P CT wo contrast  5/17/23- stable , 1/11/23-stable  -CT imaging abdominal pelvic CT with contrast 6/15/2022-mild ascites otherwise no changes.  -MRI of the abdomen with and without contrast 5/17/22-status post Whipple, mild worsening ascites mildly dilated biliary tree pancreatic mesenteric edema  - CA 19-9 - 39 ( 9/30/22)     Comorbidities:  -A. fib.  Hypertension, see below    Subjective/Interim Summary:   10/5/23-  Patient has been doing well.  Denies having any new symptoms.  No nausea, vomiting, abdominal pain,

## 2024-10-01 NOTE — PLAN OF CARE
Problem: Discharge Planning  Goal: Discharge to home or other facility with appropriate resources  Outcome: Adequate for Discharge  Flowsheets (Taken 10/1/2024 0946)  Discharge to home or other facility with appropriate resources: Identify barriers to discharge with patient and caregiver     Problem: Safety - Adult  Goal: Free from fall injury  Outcome: Adequate for Discharge  Flowsheets (Taken 10/1/2024 0946)  Free From Fall Injury: Instruct family/caregiver on patient safety     Problem: Chronic Conditions and Co-morbidities  Goal: Patient's chronic conditions and co-morbidity symptoms are monitored and maintained or improved  Outcome: Adequate for Discharge  Flowsheets (Taken 10/1/2024 0946)  Care Plan - Patient's Chronic Conditions and Co-Morbidity Symptoms are Monitored and Maintained or Improved:   Monitor and assess patient's chronic conditions and comorbid symptoms for stability, deterioration, or improvement   Collaborate with multidisciplinary team to address chronic and comorbid conditions and prevent exacerbation or deterioration  Note:   Chemotherapy Teaching     What is Chemotherapy   Drug action [x]   Method of Administration [x]   Handouts given []     Side Effects  Nausea/vomiting [x]   Diarrhea [x]   Fatigue [x]   Signs / Symptoms of infection [x]   Neutropenia [x]   Thrombocytopenia [x]   Alopecia [x]   neuropathy [x]   Glasscock diet &  the importance of fluids [x]       Micellaneous  Importance of nutrition [x]   Importance of oral hygiene [x]   When to call the MD [x]   Monitoring labs [x]   Use of supportive services []     Explanation of Drug Regimen / Frequency  Abraxane/Gemzar     Comments  Verbalized understanding to drug,action,side effects and when to call MD        Problem: Infection - Adult  Goal: Absence of infection at discharge  Outcome: Adequate for Discharge  Flowsheets (Taken 10/1/2024 0946)  Absence of infection at discharge:   Assess and monitor for signs and symptoms of

## 2024-10-01 NOTE — ONCOLOGY
Chemotherapy Administration    Pre-assessment Data: Antineoplastic Agents  See toxicity flow sheet for assessment                                          [x]         Interventions:   Chemotherapy SQ injection given []   Taxol administered-VS per protocol []   Blood pressure meds held 12 hours prior to Rituxan/Ruxience []   Rituxan/Ruxience administered- VS and precautions per guidelines []   Emergency drugs available as appropriate [x]   Anaphylaxis assessment completed [x]   Pre-medications administered as ordered [x]   Blood return noted upon initiation of chemotherapy [x]   Blood return noted each 1-2ml of a vesicant medication if given IV push []   Navelbine, Vincristine and Velban given as a monitored wide open drip, blood return noted before during and after infusion. []   Blood return noted each 2-3ml of a non-vesicant medication if given IV push []   Patient aware of potential Immunotherapy toxicities []   Monitor for signs / symptoms of hypersensitivity reaction [x]   Chemotherapy orders (drug/dose/rate) verified by 2 Chemo certified RN’s [x]   Monitor IV site and blood return throughout the infusion of the medication [x]   Document IV site checks on the IV assessment form [x]   Document chemotherapy teaching on the Patient Education tab [x]   Document patient verbalizes understanding of medications being administered [x]   If IV infiltration, see ONS Guidelines []   Other:     Abraxane/Gemzar []

## 2024-10-02 LAB
FOUNDATION MEDICINE RESULT STATUS: NORMAL
MSI CA SPEC-IMP: NORMAL
MUTS/MB TUMOR: NORMAL

## 2024-10-08 ENCOUNTER — OFFICE VISIT (OUTPATIENT)
Dept: ONCOLOGY | Age: 84
End: 2024-10-08
Payer: MEDICARE

## 2024-10-08 ENCOUNTER — HOSPITAL ENCOUNTER (OUTPATIENT)
Dept: INFUSION THERAPY | Age: 84
Discharge: HOME | End: 2024-10-08
Payer: MEDICARE

## 2024-10-08 ENCOUNTER — TELEPHONE (OUTPATIENT)
Facility: HOSPITAL | Age: 84
End: 2024-10-08

## 2024-10-08 VITALS
WEIGHT: 138 LBS | HEART RATE: 90 BPM | BODY MASS INDEX: 22.99 KG/M2 | OXYGEN SATURATION: 97 % | DIASTOLIC BLOOD PRESSURE: 60 MMHG | RESPIRATION RATE: 16 BRPM | SYSTOLIC BLOOD PRESSURE: 110 MMHG | TEMPERATURE: 97.8 F | HEIGHT: 65 IN

## 2024-10-08 VITALS
SYSTOLIC BLOOD PRESSURE: 124 MMHG | RESPIRATION RATE: 16 BRPM | BODY MASS INDEX: 22.99 KG/M2 | TEMPERATURE: 98.2 F | OXYGEN SATURATION: 98 % | DIASTOLIC BLOOD PRESSURE: 59 MMHG | WEIGHT: 138 LBS | HEART RATE: 78 BPM | HEIGHT: 65 IN

## 2024-10-08 DIAGNOSIS — C79.9 METASTASIS FROM PANCREATIC CANCER (HCC): Primary | ICD-10-CM

## 2024-10-08 DIAGNOSIS — C24.1 PRIMARY ADENOCARCINOMA OF AMPULLA OF VATER (HCC): Primary | ICD-10-CM

## 2024-10-08 DIAGNOSIS — C25.9 METASTASIS FROM PANCREATIC CANCER (HCC): ICD-10-CM

## 2024-10-08 DIAGNOSIS — C79.9 METASTASIS FROM PANCREATIC CANCER (HCC): ICD-10-CM

## 2024-10-08 DIAGNOSIS — C25.9 METASTASIS FROM PANCREATIC CANCER (HCC): Primary | ICD-10-CM

## 2024-10-08 LAB
ALBUMIN SERPL BCG-MCNC: 3.6 G/DL (ref 3.5–5.1)
ALP SERPL-CCNC: 140 U/L (ref 38–126)
ALT SERPL W/O P-5'-P-CCNC: 13 U/L (ref 11–66)
AST SERPL-CCNC: 27 U/L (ref 5–40)
BASOPHILS ABSOLUTE: 0 THOU/MM3 (ref 0–0.1)
BASOPHILS NFR BLD AUTO: 0 % (ref 0–3)
BILIRUB CONJ SERPL-MCNC: 0.4 MG/DL (ref 0.1–13.8)
BILIRUB SERPL-MCNC: 0.8 MG/DL (ref 0.3–1.2)
BUN BLDP-MCNC: 24 MG/DL (ref 8–26)
CHLORIDE BLD-SCNC: 108 MEQ/L (ref 98–109)
CREAT BLD-MCNC: 1.3 MG/DL (ref 0.5–1.2)
EOSINOPHIL NFR BLD AUTO: 1 % (ref 0–4)
EOSINOPHILS ABSOLUTE: 0.1 THOU/MM3 (ref 0–0.4)
ERYTHROCYTE [DISTWIDTH] IN BLOOD BY AUTOMATED COUNT: 11.9 % (ref 11.5–14.5)
GFR SERPL CREATININE-BSD FRML MDRD: 41 ML/MIN/1.73M2
GLUCOSE BLD-MCNC: 280 MG/DL (ref 70–108)
HCT VFR BLD AUTO: 38 % (ref 37–47)
HGB BLD-MCNC: 12.3 GM/DL (ref 12–16)
IMMATURE GRANULOCYTES %: 0 %
IMMATURE GRANULOCYTES ABSOLUTE: 0.01 THOU/MM3 (ref 0–0.07)
IONIZED CALCIUM, WHOLE BLOOD: 1.16 MMOL/L (ref 1.12–1.32)
LYMPHOCYTES ABSOLUTE: 2.2 THOU/MM3 (ref 1–4.8)
LYMPHOCYTES NFR BLD AUTO: 42 % (ref 15–47)
MCH RBC QN AUTO: 30.9 PG (ref 26–33)
MCHC RBC AUTO-ENTMCNC: 32.4 GM/DL (ref 32.2–35.5)
MCV RBC AUTO: 96 FL (ref 81–99)
MONOCYTES ABSOLUTE: 0.4 THOU/MM3 (ref 0.4–1.3)
MONOCYTES NFR BLD AUTO: 7 % (ref 0–12)
NEUTROPHILS ABSOLUTE: 2.6 THOU/MM3 (ref 1.8–7.7)
NEUTROPHILS NFR BLD AUTO: 50 % (ref 43–75)
PLATELET # BLD AUTO: 244 THOU/MM3 (ref 130–400)
PMV BLD AUTO: 9.8 FL (ref 9.4–12.4)
POTASSIUM BLD-SCNC: 3.2 MEQ/L (ref 3.5–4.9)
PROT SERPL-MCNC: 6.8 G/DL (ref 6.1–8)
RBC # BLD AUTO: 3.98 MILL/MM3 (ref 4.2–5.4)
SODIUM BLD-SCNC: 141 MEQ/L (ref 138–146)
TOTAL CO2, WHOLE BLOOD: 20 MEQ/L (ref 23–33)
WBC # BLD AUTO: 5.2 THOU/MM3 (ref 4.8–10.8)

## 2024-10-08 PROCEDURE — 80076 HEPATIC FUNCTION PANEL: CPT

## 2024-10-08 PROCEDURE — 36591 DRAW BLOOD OFF VENOUS DEVICE: CPT

## 2024-10-08 PROCEDURE — 96375 TX/PRO/DX INJ NEW DRUG ADDON: CPT

## 2024-10-08 PROCEDURE — 96413 CHEMO IV INFUSION 1 HR: CPT

## 2024-10-08 PROCEDURE — 3074F SYST BP LT 130 MM HG: CPT | Performed by: INTERNAL MEDICINE

## 2024-10-08 PROCEDURE — 3078F DIAST BP <80 MM HG: CPT | Performed by: INTERNAL MEDICINE

## 2024-10-08 PROCEDURE — 80047 BASIC METABLC PNL IONIZED CA: CPT

## 2024-10-08 PROCEDURE — 99214 OFFICE O/P EST MOD 30 MIN: CPT | Performed by: INTERNAL MEDICINE

## 2024-10-08 PROCEDURE — 1124F ACP DISCUSS-NO DSCNMKR DOCD: CPT | Performed by: INTERNAL MEDICINE

## 2024-10-08 PROCEDURE — 2580000003 HC RX 258: Performed by: INTERNAL MEDICINE

## 2024-10-08 PROCEDURE — 85025 COMPLETE CBC W/AUTO DIFF WBC: CPT

## 2024-10-08 PROCEDURE — 99211 OFF/OP EST MAY X REQ PHY/QHP: CPT

## 2024-10-08 PROCEDURE — 6360000002 HC RX W HCPCS: Performed by: INTERNAL MEDICINE

## 2024-10-08 RX ORDER — SODIUM CHLORIDE 9 MG/ML
25 INJECTION, SOLUTION INTRAVENOUS PRN
Status: CANCELLED | OUTPATIENT
Start: 2024-10-08

## 2024-10-08 RX ORDER — ONDANSETRON 2 MG/ML
8 INJECTION INTRAMUSCULAR; INTRAVENOUS ONCE
Status: COMPLETED | OUTPATIENT
Start: 2024-10-08 | End: 2024-10-08

## 2024-10-08 RX ORDER — SODIUM CHLORIDE 9 MG/ML
5-250 INJECTION, SOLUTION INTRAVENOUS PRN
Status: DISCONTINUED | OUTPATIENT
Start: 2024-10-08 | End: 2024-10-09 | Stop reason: HOSPADM

## 2024-10-08 RX ORDER — SODIUM CHLORIDE 0.9 % (FLUSH) 0.9 %
5-40 SYRINGE (ML) INJECTION PRN
Status: CANCELLED | OUTPATIENT
Start: 2024-10-08

## 2024-10-08 RX ORDER — HEPARIN 100 UNIT/ML
500 SYRINGE INTRAVENOUS PRN
Status: CANCELLED | OUTPATIENT
Start: 2024-10-08

## 2024-10-08 RX ORDER — HEPARIN 100 UNIT/ML
500 SYRINGE INTRAVENOUS PRN
Status: DISCONTINUED | OUTPATIENT
Start: 2024-10-08 | End: 2024-10-09 | Stop reason: HOSPADM

## 2024-10-08 RX ORDER — SODIUM CHLORIDE 0.9 % (FLUSH) 0.9 %
5-40 SYRINGE (ML) INJECTION PRN
Status: DISCONTINUED | OUTPATIENT
Start: 2024-10-08 | End: 2024-10-09 | Stop reason: HOSPADM

## 2024-10-08 RX ADMIN — GEMCITABINE HYDROCHLORIDE 1600 MG: 1 INJECTION, SOLUTION INTRAVENOUS at 12:17

## 2024-10-08 RX ADMIN — ONDANSETRON 8 MG: 2 INJECTION INTRAMUSCULAR; INTRAVENOUS at 11:37

## 2024-10-08 RX ADMIN — SODIUM CHLORIDE 150 ML/HR: 9 INJECTION, SOLUTION INTRAVENOUS at 11:34

## 2024-10-08 RX ADMIN — SODIUM CHLORIDE, PRESERVATIVE FREE 10 ML: 5 INJECTION INTRAVENOUS at 13:01

## 2024-10-08 RX ADMIN — HEPARIN 500 UNITS: 100 SYRINGE at 13:01

## 2024-10-08 RX ADMIN — SODIUM CHLORIDE, PRESERVATIVE FREE 30 ML: 5 INJECTION INTRAVENOUS at 11:05

## 2024-10-08 ASSESSMENT — ENCOUNTER SYMPTOMS
ALLERGIC/IMMUNOLOGIC NEGATIVE: 1
EYES NEGATIVE: 1
RESPIRATORY NEGATIVE: 1
GASTROINTESTINAL NEGATIVE: 1

## 2024-10-08 NOTE — ONCOLOGY
Chemotherapy Administration    Pre-assessment Data: Antineoplastic Agents  See toxicity flow sheet for assessment                                          [x]         Interventions:   Chemotherapy SQ injection given []   Taxol administered-VS per protocol []   Blood pressure meds held 12 hours prior to Rituxan/Ruxience []   Rituxan/Ruxience administered- VS and precautions per guidelines []   Emergency drugs available as appropriate [x]   Anaphylaxis assessment completed [x]   Pre-medications administered as ordered [x]   Blood return noted upon initiation of chemotherapy [x]   Blood return noted each 1-2ml of a vesicant medication if given IV push []   Navelbine, Vincristine and Velban given as a monitored wide open drip, blood return noted before during and after infusion. []   Blood return noted each 2-3ml of a non-vesicant medication if given IV push []   Patient aware of potential Immunotherapy toxicities []   Monitor for signs / symptoms of hypersensitivity reaction [x]   Chemotherapy orders (drug/dose/rate) verified by 2 Chemo certified RN’s [x]   Monitor IV site and blood return throughout the infusion of the medication [x]   Document IV site checks on the IV assessment form [x]   Document chemotherapy teaching on the Patient Education tab [x]   Document patient verbalizes understanding of medications being administered [x]   If IV infiltration, see ONS Guidelines []   Other:    Gemzar []

## 2024-10-08 NOTE — TELEPHONE ENCOUNTER
Financial Navigator attempted to reach Sol to discuss financial assistance options for treatment.    Left message asking patient to call me back    Delmy Gale  [phone]

## 2024-10-08 NOTE — PLAN OF CARE
Problem: Discharge Planning  Goal: Discharge to home or other facility with appropriate resources  Outcome: Adequate for Discharge  Flowsheets (Taken 10/8/2024 1110)  Discharge to home or other facility with appropriate resources: Identify barriers to discharge with patient and caregiver     Problem: Safety - Adult  Goal: Free from fall injury  Outcome: Adequate for Discharge  Flowsheets (Taken 10/8/2024 1110)  Free From Fall Injury: Instruct family/caregiver on patient safety     Problem: Chronic Conditions and Co-morbidities  Goal: Patient's chronic conditions and co-morbidity symptoms are monitored and maintained or improved  Outcome: Adequate for Discharge  Flowsheets (Taken 10/8/2024 1110)  Care Plan - Patient's Chronic Conditions and Co-Morbidity Symptoms are Monitored and Maintained or Improved:   Monitor and assess patient's chronic conditions and comorbid symptoms for stability, deterioration, or improvement   Collaborate with multidisciplinary team to address chronic and comorbid conditions and prevent exacerbation or deterioration  Note:   Chemotherapy Teaching     What is Chemotherapy   Drug action [x]   Method of Administration [x]   Handouts given []     Side Effects  Nausea/vomiting [x]   Diarrhea [x]   Fatigue [x]   Signs / Symptoms of infection [x]   Neutropenia [x]   Thrombocytopenia [x]   Alopecia [x]   neuropathy [x]   Grant diet &  the importance of fluids [x]       Micellaneous  Importance of nutrition [x]   Importance of oral hygiene [x]   When to call the MD [x]   Monitoring labs [x]   Use of supportive services []     Explanation of Drug Regimen / Frequency  Gemzar/Abraxane     Comments  Verbalized understanding to drug,action,side effects and when to call MD        Problem: Infection - Adult  Goal: Absence of infection at discharge  Outcome: Adequate for Discharge  Flowsheets (Taken 10/8/2024 1110)  Absence of infection at discharge:   Assess and monitor for signs and symptoms of  infection   Monitor all insertion sites i.e., indwelling lines, tubes and drains   Monitor lab/diagnostic results  Note: Mediport site with no redness or warmth. Skin over port site intact with no signs of breakdown noted. Patient verbalizes signs/symptoms of port infection and when to notify the physician.     Care plan reviewed with patient. Patient verbalizes understanding of the plan of care and contributes to goal setting.

## 2024-10-08 NOTE — PATIENT INSTRUCTIONS
Ok to treat with gemcitabine. 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 Follow up next week with provider/crnp. Cbc bmp and hepatic panel.

## 2024-10-08 NOTE — DISCHARGE INSTRUCTIONS
Please contact your Oncologist if you have any questions regarding the Gemzar that you received today.    You are instructed to call the office or go to the Emergency Dept. If you experience any of the following symptoms:    Dizziness/lightheadedness   Acute nausea or vomiting-not relieved by medications  Headaches-not relieved by medications  New chest pain or pressure  New rash /itching  New shortness of breath  Fever,chills or signs or symptoms of infection    Make sure you are drinking 48 to 64 ounces of water daily-if you are unable to drink fluids let us know right away.

## 2024-10-08 NOTE — PROGRESS NOTES
Patient tolerated Gemzar without any complications.  Denies dizziness, lightheadedness, acute nausea or vomiting, headache, heart palpitations, rash/itching or increased SOB.    Last vital signs  BP (!) 124/59   Pulse 78   Temp 98.2 °F (36.8 °C) (Oral)   Resp 16   Ht 1.651 m (5' 5\")   Wt 62.6 kg (138 lb)   SpO2 98%   BMI 22.96 kg/m²     Patient instructed if they experience any of the above symptoms following today's visit, he/she is to notify the Physician or go to the Emergency Dept.    Discharge instructions given to patient, Verbalizes understanding. Ambulated off unit per self in stable condition with all belongings.

## 2024-10-08 NOTE — PROGRESS NOTES
Results   Component Value Date/Time    MG 1.6 06/08/2022 04:56 PM     PT/INR:    Lab Results   Component Value Date/Time    INR 1.04 11/09/2021 07:40 PM     TSH:    Lab Results   Component Value Date/Time    TSH 5.180 07/07/2022 04:47 PM     VITAMIN B12: No components found for: \"B12\"  FOLATE:    Lab Results   Component Value Date/Time    FOLATE > 20.0 03/01/2023 02:11 PM     IRON:    Lab Results   Component Value Date/Time    IRON 38 01/26/2022 08:52 AM     Iron Saturation:  No components found for: \"PERCENTFE\"  TIBC:    Lab Results   Component Value Date/Time    TIBC 178 01/26/2022 08:52 AM     FERRITIN:    Lab Results   Component Value Date/Time    FERRITIN 496 01/26/2022 08:52 AM     PSA: No results found for: \"PSA\"             Indio Pace MD 10/8/2024

## 2024-10-09 ENCOUNTER — SOCIAL WORK (OUTPATIENT)
Dept: INFUSION THERAPY | Age: 84
End: 2024-10-09

## 2024-10-09 NOTE — PROGRESS NOTES
Oncology Social Work    Date: 10/9/2024  Time: 2:14 PM  Name: Sol Greer  MRN: [id number]     Contact Type: Transportation Request    Note:   Situation: oSl Greer called the Oncology Social Worker to assist with transportation to and from appts.     Background:  Sol Greer is not able to provide transportation for herself and has asked the  for assistance.     Assessment: - Sol Greer requested transportation for her upcoming Oct appts here at the [facility]  - Patient will be picked up by [facility] and taken to/from her scheduled appts as follows:  Appt Date Appt Time Dept  Transport Company Location Notes   15-Oct 9:45a [facility] [place] [facility] [address] - [facility]    10:45a Provider  [address] - Seeing Dr Thompson    10:15a Chemo  3 Hour chemo   - Patient has been notified of the arrangements provided.    Recommendation: Appt changes will be initiated by Sol MEGHA Greer based on need.  provided Sol Greer with my contact information and will remain available for support.      YUAN Locke, MEGAN, MONICA  Oncology Social Worker      Electronically signed by YUAN Locke LSW, ACHP-SW on 10/9/2024 at 2:14 PM

## 2024-10-11 ENCOUNTER — CLINICAL DOCUMENTATION (OUTPATIENT)
Dept: ONCOLOGY | Age: 84
End: 2024-10-11

## 2024-10-11 DIAGNOSIS — T45.1X5A CHEMOTHERAPY-INDUCED NEUROPATHY (HCC): ICD-10-CM

## 2024-10-11 DIAGNOSIS — G62.0 CHEMOTHERAPY-INDUCED NEUROPATHY (HCC): ICD-10-CM

## 2024-10-11 RX ORDER — GABAPENTIN 300 MG/1
CAPSULE ORAL
Qty: 120 CAPSULE | Refills: 0 | Status: SHIPPED | OUTPATIENT
Start: 2024-10-11 | End: 2024-11-10

## 2024-10-11 NOTE — TELEPHONE ENCOUNTER
Pt calls in asking for a refill of gabapentin. She states that she restarted chemotherapy and is having a lot of nerve pain. She states it is too hard to come in for an appt.

## 2024-10-11 NOTE — TELEPHONE ENCOUNTER
Will send refill for 30 days, patient needs appointment prior to additional refills (can be virtual if patient needs).  PDMP reviewed and appropriate

## 2024-10-11 NOTE — PROGRESS NOTES
PD-L1 testing has failed on all specimens sent to [facility].  CDx testing was done and is resulted.

## 2024-10-15 ENCOUNTER — HOSPITAL ENCOUNTER (OUTPATIENT)
Dept: INFUSION THERAPY | Age: 84
Discharge: HOME | End: 2024-10-15

## 2024-10-15 ENCOUNTER — HOSPITAL ENCOUNTER (OUTPATIENT)
Dept: INFUSION THERAPY | Age: 84
Discharge: HOME | End: 2024-10-15
Payer: MEDICARE

## 2024-10-15 ENCOUNTER — OFFICE VISIT (OUTPATIENT)
Dept: ONCOLOGY | Age: 84
End: 2024-10-15
Payer: MEDICARE

## 2024-10-15 VITALS
BODY MASS INDEX: 23.22 KG/M2 | HEART RATE: 77 BPM | HEIGHT: 65 IN | TEMPERATURE: 98.1 F | DIASTOLIC BLOOD PRESSURE: 74 MMHG | RESPIRATION RATE: 16 BRPM | OXYGEN SATURATION: 97 % | SYSTOLIC BLOOD PRESSURE: 164 MMHG | WEIGHT: 139.4 LBS

## 2024-10-15 VITALS
OXYGEN SATURATION: 99 % | HEIGHT: 65 IN | WEIGHT: 139.4 LBS | HEART RATE: 87 BPM | RESPIRATION RATE: 16 BRPM | BODY MASS INDEX: 23.22 KG/M2 | DIASTOLIC BLOOD PRESSURE: 69 MMHG | SYSTOLIC BLOOD PRESSURE: 144 MMHG | TEMPERATURE: 98.4 F

## 2024-10-15 DIAGNOSIS — C25.9 METASTASIS FROM PANCREATIC CANCER (HCC): Primary | ICD-10-CM

## 2024-10-15 DIAGNOSIS — C24.1 PRIMARY ADENOCARCINOMA OF AMPULLA OF VATER (HCC): Primary | ICD-10-CM

## 2024-10-15 DIAGNOSIS — C79.9 METASTASIS FROM PANCREATIC CANCER (HCC): Primary | ICD-10-CM

## 2024-10-15 DIAGNOSIS — C79.9 METASTASIS FROM PANCREATIC CANCER (HCC): ICD-10-CM

## 2024-10-15 DIAGNOSIS — C25.9 METASTASIS FROM PANCREATIC CANCER (HCC): ICD-10-CM

## 2024-10-15 DIAGNOSIS — C25.9 MALIGNANT NEOPLASM OF PANCREAS, UNSPECIFIED LOCATION OF MALIGNANCY (HCC): ICD-10-CM

## 2024-10-15 DIAGNOSIS — N18.32 CHRONIC KIDNEY DISEASE, STAGE 3B (HCC): ICD-10-CM

## 2024-10-15 LAB
ALBUMIN SERPL BCG-MCNC: 3.5 G/DL (ref 3.5–5.1)
ALP SERPL-CCNC: 192 U/L (ref 38–126)
ALT SERPL W/O P-5'-P-CCNC: 25 U/L (ref 11–66)
AST SERPL-CCNC: 34 U/L (ref 5–40)
BASOPHILS ABSOLUTE: 0 THOU/MM3 (ref 0–0.1)
BASOPHILS NFR BLD AUTO: 1 % (ref 0–3)
BILIRUB CONJ SERPL-MCNC: 0.2 MG/DL (ref 0.1–13.8)
BILIRUB SERPL-MCNC: 0.6 MG/DL (ref 0.3–1.2)
BUN BLDP-MCNC: 12 MG/DL (ref 8–26)
CHLORIDE BLD-SCNC: 108 MEQ/L (ref 98–109)
CREAT BLD-MCNC: 1.3 MG/DL (ref 0.5–1.2)
EOSINOPHIL NFR BLD AUTO: 0 % (ref 0–4)
EOSINOPHILS ABSOLUTE: 0 THOU/MM3 (ref 0–0.4)
ERYTHROCYTE [DISTWIDTH] IN BLOOD BY AUTOMATED COUNT: 12.2 % (ref 11.5–14.5)
GFR SERPL CREATININE-BSD FRML MDRD: 41 ML/MIN/1.73M2
GLUCOSE BLD-MCNC: 112 MG/DL (ref 70–108)
HCT VFR BLD AUTO: 36.2 % (ref 37–47)
HGB BLD-MCNC: 11.8 GM/DL (ref 12–16)
IMMATURE GRANULOCYTES %: 0 %
IMMATURE GRANULOCYTES ABSOLUTE: 0.01 THOU/MM3 (ref 0–0.07)
IONIZED CALCIUM, WHOLE BLOOD: 1.17 MMOL/L (ref 1.12–1.32)
LYMPHOCYTES ABSOLUTE: 1.3 THOU/MM3 (ref 1–4.8)
LYMPHOCYTES NFR BLD AUTO: 36 % (ref 15–47)
MCH RBC QN AUTO: 31.2 PG (ref 26–33)
MCHC RBC AUTO-ENTMCNC: 32.6 GM/DL (ref 32.2–35.5)
MCV RBC AUTO: 96 FL (ref 81–99)
MONOCYTES ABSOLUTE: 0.5 THOU/MM3 (ref 0.4–1.3)
MONOCYTES NFR BLD AUTO: 13 % (ref 0–12)
NEUTROPHILS ABSOLUTE: 1.9 THOU/MM3 (ref 1.8–7.7)
NEUTROPHILS NFR BLD AUTO: 50 % (ref 43–75)
PLATELET # BLD AUTO: 191 THOU/MM3 (ref 130–400)
PMV BLD AUTO: 9.4 FL (ref 9.4–12.4)
POTASSIUM BLD-SCNC: 4.4 MEQ/L (ref 3.5–4.9)
PROT SERPL-MCNC: 6.8 G/DL (ref 6.1–8)
RBC # BLD AUTO: 3.78 MILL/MM3 (ref 4.2–5.4)
SODIUM BLD-SCNC: 140 MEQ/L (ref 138–146)
TOTAL CO2, WHOLE BLOOD: 24 MEQ/L (ref 23–33)
WBC # BLD AUTO: 3.7 THOU/MM3 (ref 4.8–10.8)

## 2024-10-15 PROCEDURE — 96375 TX/PRO/DX INJ NEW DRUG ADDON: CPT

## 2024-10-15 PROCEDURE — 99214 OFFICE O/P EST MOD 30 MIN: CPT | Performed by: INTERNAL MEDICINE

## 2024-10-15 PROCEDURE — 85025 COMPLETE CBC W/AUTO DIFF WBC: CPT

## 2024-10-15 PROCEDURE — 36591 DRAW BLOOD OFF VENOUS DEVICE: CPT

## 2024-10-15 PROCEDURE — 1124F ACP DISCUSS-NO DSCNMKR DOCD: CPT | Performed by: INTERNAL MEDICINE

## 2024-10-15 PROCEDURE — 80076 HEPATIC FUNCTION PANEL: CPT

## 2024-10-15 PROCEDURE — 80047 BASIC METABLC PNL IONIZED CA: CPT

## 2024-10-15 PROCEDURE — 3077F SYST BP >= 140 MM HG: CPT | Performed by: INTERNAL MEDICINE

## 2024-10-15 PROCEDURE — 2580000003 HC RX 258: Performed by: INTERNAL MEDICINE

## 2024-10-15 PROCEDURE — 6360000002 HC RX W HCPCS: Performed by: INTERNAL MEDICINE

## 2024-10-15 PROCEDURE — 96417 CHEMO IV INFUS EACH ADDL SEQ: CPT

## 2024-10-15 PROCEDURE — 96413 CHEMO IV INFUSION 1 HR: CPT

## 2024-10-15 PROCEDURE — 99211 OFF/OP EST MAY X REQ PHY/QHP: CPT

## 2024-10-15 PROCEDURE — 3078F DIAST BP <80 MM HG: CPT | Performed by: INTERNAL MEDICINE

## 2024-10-15 RX ORDER — ONDANSETRON 2 MG/ML
8 INJECTION INTRAMUSCULAR; INTRAVENOUS ONCE
Status: COMPLETED | OUTPATIENT
Start: 2024-10-15 | End: 2024-10-15

## 2024-10-15 RX ORDER — MULTIVIT WITH MINERALS/LUTEIN
250 TABLET ORAL DAILY
COMMUNITY

## 2024-10-15 RX ORDER — HEPARIN 100 UNIT/ML
500 SYRINGE INTRAVENOUS PRN
OUTPATIENT
Start: 2024-10-15

## 2024-10-15 RX ORDER — PACLITAXEL 100 MG/20ML
94 INJECTION, POWDER, LYOPHILIZED, FOR SUSPENSION INTRAVENOUS ONCE
Status: COMPLETED | OUTPATIENT
Start: 2024-10-15 | End: 2024-10-15

## 2024-10-15 RX ORDER — PACLITAXEL 100 MG/20ML
94 INJECTION, POWDER, LYOPHILIZED, FOR SUSPENSION INTRAVENOUS ONCE
Status: CANCELLED | OUTPATIENT
Start: 2024-10-15 | End: 2024-10-15

## 2024-10-15 RX ORDER — SODIUM CHLORIDE 9 MG/ML
5-250 INJECTION, SOLUTION INTRAVENOUS PRN
Status: DISCONTINUED | OUTPATIENT
Start: 2024-10-15 | End: 2024-10-16 | Stop reason: HOSPADM

## 2024-10-15 RX ORDER — SODIUM CHLORIDE 0.9 % (FLUSH) 0.9 %
5-40 SYRINGE (ML) INJECTION PRN
OUTPATIENT
Start: 2024-10-15

## 2024-10-15 RX ORDER — HEPARIN 100 UNIT/ML
500 SYRINGE INTRAVENOUS PRN
Status: DISCONTINUED | OUTPATIENT
Start: 2024-10-15 | End: 2024-10-16 | Stop reason: HOSPADM

## 2024-10-15 RX ORDER — SODIUM CHLORIDE 9 MG/ML
25 INJECTION, SOLUTION INTRAVENOUS PRN
OUTPATIENT
Start: 2024-10-15

## 2024-10-15 RX ORDER — SODIUM CHLORIDE 0.9 % (FLUSH) 0.9 %
5-40 SYRINGE (ML) INJECTION PRN
Status: DISCONTINUED | OUTPATIENT
Start: 2024-10-15 | End: 2024-10-16 | Stop reason: HOSPADM

## 2024-10-15 RX ADMIN — GEMCITABINE HYDROCHLORIDE 1600 MG: 1 INJECTION, SOLUTION INTRAVENOUS at 12:03

## 2024-10-15 RX ADMIN — ONDANSETRON 8 MG: 2 INJECTION INTRAMUSCULAR; INTRAVENOUS at 11:22

## 2024-10-15 RX ADMIN — HEPARIN 500 UNITS: 100 SYRINGE at 13:33

## 2024-10-15 RX ADMIN — SODIUM CHLORIDE 175 ML/HR: 9 INJECTION, SOLUTION INTRAVENOUS at 11:21

## 2024-10-15 RX ADMIN — PACLITAXEL 160 MG: 100 INJECTION, POWDER, LYOPHILIZED, FOR SUSPENSION INTRAVENOUS at 12:46

## 2024-10-15 RX ADMIN — SODIUM CHLORIDE, PRESERVATIVE FREE 30 ML: 5 INJECTION INTRAVENOUS at 10:15

## 2024-10-15 RX ADMIN — SODIUM CHLORIDE, PRESERVATIVE FREE 10 ML: 5 INJECTION INTRAVENOUS at 13:33

## 2024-10-15 ASSESSMENT — PAIN DESCRIPTION - PAIN TYPE: TYPE: ACUTE PAIN

## 2024-10-15 ASSESSMENT — PAIN DESCRIPTION - LOCATION: LOCATION: BACK

## 2024-10-15 ASSESSMENT — PAIN SCALES - GENERAL: PAINLEVEL_OUTOF10: 1

## 2024-10-15 ASSESSMENT — PAIN DESCRIPTION - FREQUENCY: FREQUENCY: INTERMITTENT

## 2024-10-15 ASSESSMENT — PAIN DESCRIPTION - DESCRIPTORS: DESCRIPTORS: ACHING

## 2024-10-15 NOTE — PROGRESS NOTES
Patient tolerated Gemzar/Abraxane without any complications.  Denies dizziness, lightheadedness, acute nausea or vomiting, headache, heart palpitations, rash/itching or increased SOB.    Last vital signs  BP (!) 164/74   Pulse 77   Temp 98.1 °F (36.7 °C) (Tympanic)   Resp 16   Ht 1.651 m (5' 5\")   Wt 63.2 kg (139 lb 6.4 oz)   SpO2 97%   BMI 23.20 kg/m²     Patient instructed if they experience any of the above symptoms following today's visit, he/she is to notify the Physician or go to the Emergency Dept.    Discharge instructions given to patient, Verbalizes understanding. Ambulated off unit per self in stable condition with all belongings.

## 2024-10-15 NOTE — PLAN OF CARE
Problem: Pain  Goal: Verbalizes/displays adequate comfort level or baseline comfort level  Outcome: Adequate for Discharge  Flowsheets (Taken 10/15/2024 1529)  Verbalizes/displays adequate comfort level or baseline comfort level: Encourage patient to monitor pain and request assistance     Problem: Discharge Planning  Goal: Discharge to home or other facility with appropriate resources  Outcome: Adequate for Discharge  Flowsheets (Taken 10/15/2024 1529)  Discharge to home or other facility with appropriate resources: Identify barriers to discharge with patient and caregiver     Problem: Safety - Adult  Goal: Free from fall injury  Outcome: Adequate for Discharge  Flowsheets (Taken 10/15/2024 1529)  Free From Fall Injury: Instruct family/caregiver on patient safety     Problem: Chronic Conditions and Co-morbidities  Goal: Patient's chronic conditions and co-morbidity symptoms are monitored and maintained or improved  Outcome: Adequate for Discharge  Flowsheets (Taken 10/15/2024 1529)  Care Plan - Patient's Chronic Conditions and Co-Morbidity Symptoms are Monitored and Maintained or Improved:   Monitor and assess patient's chronic conditions and comorbid symptoms for stability, deterioration, or improvement   Collaborate with multidisciplinary team to address chronic and comorbid conditions and prevent exacerbation or deterioration  Note:   Chemotherapy Teaching     What is Chemotherapy   Drug action [x]   Method of Administration [x]   Handouts given []     Side Effects  Nausea/vomiting [x]   Diarrhea [x]   Fatigue [x]   Signs / Symptoms of infection [x]   Neutropenia [x]   Thrombocytopenia [x]   Alopecia [x]   neuropathy [x]   Richardson diet &  the importance of fluids [x]       Micellaneous  Importance of nutrition [x]   Importance of oral hygiene [x]   When to call the MD [x]   Monitoring labs [x]   Use of supportive services []     Explanation of Drug Regimen / Frequency  Gemzar/Abraxane     Comments  Verbalized  understanding to drug,action,side effects and when to call MD        Problem: Infection - Adult  Goal: Absence of infection at discharge  Outcome: Adequate for Discharge  Flowsheets (Taken 10/15/2024 6523)  Absence of infection at discharge:   Assess and monitor for signs and symptoms of infection   Monitor all insertion sites i.e., indwelling lines, tubes and drains   Monitor lab/diagnostic results  Note: Mediport site with no redness or warmth. Skin over port site intact with no signs of breakdown noted. Patient verbalizes signs/symptoms of port infection and when to notify the physician.     Care plan reviewed with patient. Patient verbalizes understanding of the plan of care and contributes to goal setting.

## 2024-10-15 NOTE — PATIENT INSTRUCTIONS
Labs reviewed, proceed with treatment.  Return in about 2 weeks (around 10/29/2024).MD. Labs, treatment visit  Orders Placed This Encounter   Procedures    CBC with Auto Differential    Hepatic Function Panel    POC PANEL DIANE W/ELPIDIO

## 2024-10-15 NOTE — ONCOLOGY
Chemotherapy Administration    Pre-assessment Data: Antineoplastic Agents  See toxicity flow sheet for assessment                                          [x]         Interventions:   Chemotherapy SQ injection given []   Taxol administered-VS per protocol []   Blood pressure meds held 12 hours prior to Rituxan/Ruxience []   Rituxan/Ruxience administered- VS and precautions per guidelines []   Emergency drugs available as appropriate [x]   Anaphylaxis assessment completed [x]   Pre-medications administered as ordered [x]   Blood return noted upon initiation of chemotherapy [x]   Blood return noted each 1-2ml of a vesicant medication if given IV push []   Navelbine, Vincristine and Velban given as a monitored wide open drip, blood return noted before during and after infusion. []   Blood return noted each 2-3ml of a non-vesicant medication if given IV push []   Patient aware of potential Immunotherapy toxicities [x]   Monitor for signs / symptoms of hypersensitivity reaction [x]   Chemotherapy orders (drug/dose/rate) verified by 2 Chemo certified RN’s [x]   Monitor IV site and blood return throughout the infusion of the medication [x]   Document IV site checks on the IV assessment form [x]   Document chemotherapy teaching on the Patient Education tab [x]   Document patient verbalizes understanding of medications being administered [x]   If IV infiltration, see ONS Guidelines []   Other:     Abraxane/Gemzar []

## 2024-10-15 NOTE — PROGRESS NOTES
[facility] [place] [facility]  [facility]  [address]  [address]  [place] [address]  Dept: [phone]  Loc: [phone]   Hematology/Oncology Progress Note (Clinic)        Sol Forresterdee  1940  No ref. provider found   Loida Knight MD     Diagnosis/CC:   Chief Complaint   Patient presents with    Follow-up     Primary adenocarcinoma of ampulla of Vater        HPI:     Diagnosis:   -Periampullary pancreatic adenocarcinoma        Treatment:   -Whipple procedure [facility] 12/4/2021  (7 of 27 lymph nodes positive for cancer.  -Adjuvant therapy: FOLFOX last administered 5/22.  First treatment began 1/26/2022.  Dose reductions including oxaliplatin to 70 mg per metered squared.  Stopped @ April with low counts.  Developed obstructive jaundice hospitalized at  June 16 through 21, 2022.  Patient had leukocytosis bacteremia and a stricture post Whipple 6/15 CAT scan showed cirrhosis, small ascites and postop changes.  Patient had Enterococcus bacteremia treated with Zosyn and Unasyn and then Augmentin.  No vegetations on valves.  ERCP 6/17/22  showed no obstruction or stones.  Intrahepatic branches diffusely dilated.  Abnormal LFTs and elevated bilirubin therefore a metal stent was placed in the common bile duct.  Stent should be changed in 3 months.  Patient is 2 months overdue to have her stent changed.     Followable Disease:   - A/P CT wo contrast  5/17/23- stable , 1/11/23-stable  -CT imaging abdominal pelvic CT with contrast 6/15/2022-mild ascites otherwise no changes.  -MRI of the abdomen with and without contrast 5/17/22-status post Whipple, mild worsening ascites mildly dilated biliary tree pancreatic mesenteric edema  - CA 19-9 - 39 ( 9/30/22)     Comorbidities:  -A. fib.  Hypertension, see below    Subjective/Interim Summary:   10/5/23-  Patient has been doing well.  Denies having any new symptoms.  No nausea, vomiting, abdominal pain,

## 2024-10-15 NOTE — DISCHARGE INSTRUCTIONS
Please contact your Oncologist if you have any questions regarding the Abraxane/Gemzar that you received today.    You are instructed to call the office or go to the Emergency Dept. If you experience any of the following symptoms:    Dizziness/lightheadedness   Acute nausea or vomiting-not relieved by medications  Headaches-not relieved by medications  New chest pain or pressure  New rash /itching  New shortness of breath  Fever,chills or signs or symptoms of infection    Make sure you are drinking 48 to 64 ounces of water daily-if you are unable to drink fluids let us know right away.

## 2024-10-17 LAB — FOUNDATION MEDICINE RESULT STATUS: NORMAL

## 2024-10-23 LAB
FOUNDATION MEDICINE BLOOD TUMOR MUTATIONAL BURDEN: NORMAL
FOUNDATION MEDICINE CTDNA TUMOR FRACTION: NORMAL
FOUNDATION MEDICINE RESULT STATUS: NORMAL
MSI CA SPEC-IMP: NORMAL

## 2024-10-25 ENCOUNTER — SOCIAL WORK (OUTPATIENT)
Dept: INFUSION THERAPY | Age: 84
End: 2024-10-25

## 2024-10-25 NOTE — PROGRESS NOTES
Oncology Social Work    Date: 10/25/2024  Time: 2:50 PM  Name: Sol Greer  MRN: [id number]     Contact Type: Transportation Request    Note:   Situation: Sol Greer called the Oncology Social Worker to assist with transportation to and from appts.     Background:  Sol Greer is not able to provide transportation for herself and has asked the  for assistance.     Assessment: - Sol Greer requested transportation for upcoming appointments located throughout the "[employer] network of providers  - Patient will be picked up by [facility] [place] and taken to/from her scheduled appts as follows:  Appt Date Appt Time Dept  Agency Used Location Notes   29-Oct 9:30a Chemo [facility] [address] - [facility]    10:00a Provider  [address] - Seeing Dr Thompson   30-Oct 11:00a Provider [place] [facility] - [facility] [address]               18-Nov 11:45a [facility] [address] - Seeing Dr Rock   - Patient has been notified of the arrangements provided.    Recommendation: Appt changes will be initiated by Sol Greer based on need.  provided Sol Greer with my contact information and will remain available for support.      YUAN Locke, MEGAN, MONICA  Oncology Social Worker      Electronically signed by YUAN Locke LSW, MONICA on 10/25/2024 at 2:50 PM

## 2024-10-29 ENCOUNTER — HOSPITAL ENCOUNTER (OUTPATIENT)
Dept: INFUSION THERAPY | Age: 84
Discharge: HOME | End: 2024-10-29
Payer: MEDICARE

## 2024-10-29 ENCOUNTER — OFFICE VISIT (OUTPATIENT)
Dept: ONCOLOGY | Age: 84
End: 2024-10-29
Payer: MEDICARE

## 2024-10-29 VITALS
BODY MASS INDEX: 23.69 KG/M2 | HEIGHT: 65 IN | SYSTOLIC BLOOD PRESSURE: 119 MMHG | WEIGHT: 142.2 LBS | HEART RATE: 78 BPM | DIASTOLIC BLOOD PRESSURE: 62 MMHG | TEMPERATURE: 98.7 F | OXYGEN SATURATION: 95 % | RESPIRATION RATE: 16 BRPM

## 2024-10-29 VITALS
TEMPERATURE: 97.9 F | WEIGHT: 142.2 LBS | DIASTOLIC BLOOD PRESSURE: 56 MMHG | RESPIRATION RATE: 18 BRPM | SYSTOLIC BLOOD PRESSURE: 122 MMHG | BODY MASS INDEX: 23.69 KG/M2 | OXYGEN SATURATION: 96 % | HEIGHT: 65 IN | HEART RATE: 84 BPM

## 2024-10-29 DIAGNOSIS — C79.9 METASTASIS FROM PANCREATIC CANCER (HCC): Primary | ICD-10-CM

## 2024-10-29 DIAGNOSIS — C24.1 PRIMARY ADENOCARCINOMA OF AMPULLA OF VATER (HCC): Primary | ICD-10-CM

## 2024-10-29 DIAGNOSIS — C25.9 METASTASIS FROM PANCREATIC CANCER (HCC): Primary | ICD-10-CM

## 2024-10-29 DIAGNOSIS — C24.1 PRIMARY ADENOCARCINOMA OF AMPULLA OF VATER (HCC): ICD-10-CM

## 2024-10-29 DIAGNOSIS — C79.9 METASTASIS FROM PANCREATIC CANCER (HCC): ICD-10-CM

## 2024-10-29 DIAGNOSIS — C25.9 METASTASIS FROM PANCREATIC CANCER (HCC): ICD-10-CM

## 2024-10-29 LAB
ALBUMIN SERPL BCG-MCNC: 3.7 G/DL (ref 3.5–5.1)
ALP SERPL-CCNC: 193 U/L (ref 38–126)
ALT SERPL W/O P-5'-P-CCNC: 24 U/L (ref 11–66)
AST SERPL-CCNC: 28 U/L (ref 5–40)
BASOPHILS ABSOLUTE: 0.1 THOU/MM3 (ref 0–0.1)
BASOPHILS NFR BLD AUTO: 1 % (ref 0–3)
BILIRUB CONJ SERPL-MCNC: 0.2 MG/DL (ref 0.1–13.8)
BILIRUB SERPL-MCNC: 0.5 MG/DL (ref 0.3–1.2)
BUN BLDP-MCNC: 14 MG/DL (ref 8–26)
CHLORIDE BLD-SCNC: 109 MEQ/L (ref 98–109)
CREAT BLD-MCNC: 1.1 MG/DL (ref 0.5–1.2)
EOSINOPHIL NFR BLD AUTO: 1 % (ref 0–4)
EOSINOPHILS ABSOLUTE: 0.1 THOU/MM3 (ref 0–0.4)
ERYTHROCYTE [DISTWIDTH] IN BLOOD BY AUTOMATED COUNT: 14.4 % (ref 11.5–14.5)
GFR SERPL CREATININE-BSD FRML MDRD: 50 ML/MIN/1.73M2
GLUCOSE BLD-MCNC: 89 MG/DL (ref 70–108)
HCT VFR BLD AUTO: 34.8 % (ref 37–47)
HGB BLD-MCNC: 11.3 GM/DL (ref 12–16)
IMMATURE GRANULOCYTES %: 0 %
IMMATURE GRANULOCYTES ABSOLUTE: 0.02 THOU/MM3 (ref 0–0.07)
IONIZED CALCIUM, WHOLE BLOOD: 1.18 MMOL/L (ref 1.12–1.32)
LYMPHOCYTES ABSOLUTE: 1.2 THOU/MM3 (ref 1–4.8)
LYMPHOCYTES NFR BLD AUTO: 22 % (ref 15–47)
MCH RBC QN AUTO: 31.8 PG (ref 26–33)
MCHC RBC AUTO-ENTMCNC: 32.5 GM/DL (ref 32.2–35.5)
MCV RBC AUTO: 98 FL (ref 81–99)
MONOCYTES ABSOLUTE: 0.6 THOU/MM3 (ref 0.4–1.3)
MONOCYTES NFR BLD AUTO: 10 % (ref 0–12)
NEUTROPHILS ABSOLUTE: 3.6 THOU/MM3 (ref 1.8–7.7)
NEUTROPHILS NFR BLD AUTO: 65 % (ref 43–75)
PLATELET # BLD AUTO: 404 THOU/MM3 (ref 130–400)
PMV BLD AUTO: 9.4 FL (ref 9.4–12.4)
POTASSIUM BLD-SCNC: 4.5 MEQ/L (ref 3.5–4.9)
PROT SERPL-MCNC: 6.5 G/DL (ref 6.1–8)
RBC # BLD AUTO: 3.55 MILL/MM3 (ref 4.2–5.4)
SODIUM BLD-SCNC: 140 MEQ/L (ref 138–146)
TOTAL CO2, WHOLE BLOOD: 26 MEQ/L (ref 23–33)
WBC # BLD AUTO: 5.5 THOU/MM3 (ref 4.8–10.8)

## 2024-10-29 PROCEDURE — 85025 COMPLETE CBC W/AUTO DIFF WBC: CPT

## 2024-10-29 PROCEDURE — 36591 DRAW BLOOD OFF VENOUS DEVICE: CPT

## 2024-10-29 PROCEDURE — 1159F MED LIST DOCD IN RCRD: CPT | Performed by: INTERNAL MEDICINE

## 2024-10-29 PROCEDURE — 96375 TX/PRO/DX INJ NEW DRUG ADDON: CPT

## 2024-10-29 PROCEDURE — 2580000003 HC RX 258: Performed by: INTERNAL MEDICINE

## 2024-10-29 PROCEDURE — 3078F DIAST BP <80 MM HG: CPT | Performed by: INTERNAL MEDICINE

## 2024-10-29 PROCEDURE — 3074F SYST BP LT 130 MM HG: CPT | Performed by: INTERNAL MEDICINE

## 2024-10-29 PROCEDURE — 1124F ACP DISCUSS-NO DSCNMKR DOCD: CPT | Performed by: INTERNAL MEDICINE

## 2024-10-29 PROCEDURE — 80047 BASIC METABLC PNL IONIZED CA: CPT

## 2024-10-29 PROCEDURE — 99214 OFFICE O/P EST MOD 30 MIN: CPT | Performed by: INTERNAL MEDICINE

## 2024-10-29 PROCEDURE — 6360000002 HC RX W HCPCS: Performed by: INTERNAL MEDICINE

## 2024-10-29 PROCEDURE — 99211 OFF/OP EST MAY X REQ PHY/QHP: CPT

## 2024-10-29 PROCEDURE — 80076 HEPATIC FUNCTION PANEL: CPT

## 2024-10-29 PROCEDURE — 96417 CHEMO IV INFUS EACH ADDL SEQ: CPT

## 2024-10-29 PROCEDURE — 96413 CHEMO IV INFUSION 1 HR: CPT

## 2024-10-29 RX ORDER — SODIUM CHLORIDE 0.9 % (FLUSH) 0.9 %
5-40 SYRINGE (ML) INJECTION PRN
Status: DISCONTINUED | OUTPATIENT
Start: 2024-10-29 | End: 2024-10-30 | Stop reason: HOSPADM

## 2024-10-29 RX ORDER — SODIUM CHLORIDE 9 MG/ML
INJECTION, SOLUTION INTRAVENOUS CONTINUOUS
Status: CANCELLED | OUTPATIENT
Start: 2024-10-29

## 2024-10-29 RX ORDER — MEPERIDINE HYDROCHLORIDE 50 MG/ML
12.5 INJECTION INTRAMUSCULAR; INTRAVENOUS; SUBCUTANEOUS PRN
Status: CANCELLED | OUTPATIENT
Start: 2024-10-29

## 2024-10-29 RX ORDER — SODIUM CHLORIDE 9 MG/ML
5-250 INJECTION, SOLUTION INTRAVENOUS PRN
OUTPATIENT
Start: 2024-11-12

## 2024-10-29 RX ORDER — ALBUTEROL SULFATE 90 UG/1
4 INHALANT RESPIRATORY (INHALATION) PRN
OUTPATIENT
Start: 2024-11-12

## 2024-10-29 RX ORDER — MEPERIDINE HYDROCHLORIDE 50 MG/ML
12.5 INJECTION INTRAMUSCULAR; INTRAVENOUS; SUBCUTANEOUS PRN
OUTPATIENT
Start: 2024-11-12

## 2024-10-29 RX ORDER — ONDANSETRON 2 MG/ML
8 INJECTION INTRAMUSCULAR; INTRAVENOUS ONCE
OUTPATIENT
Start: 2024-11-12 | End: 2024-11-12

## 2024-10-29 RX ORDER — FAMOTIDINE 10 MG/ML
20 INJECTION, SOLUTION INTRAVENOUS
Status: CANCELLED | OUTPATIENT
Start: 2024-10-29

## 2024-10-29 RX ORDER — SODIUM CHLORIDE 9 MG/ML
25 INJECTION, SOLUTION INTRAVENOUS PRN
OUTPATIENT
Start: 2024-10-29

## 2024-10-29 RX ORDER — ONDANSETRON 2 MG/ML
8 INJECTION INTRAMUSCULAR; INTRAVENOUS ONCE
Status: COMPLETED | OUTPATIENT
Start: 2024-10-29 | End: 2024-10-29

## 2024-10-29 RX ORDER — SODIUM CHLORIDE 0.9 % (FLUSH) 0.9 %
5-40 SYRINGE (ML) INJECTION PRN
OUTPATIENT
Start: 2024-10-29

## 2024-10-29 RX ORDER — PACLITAXEL 100 MG/20ML
94 INJECTION, POWDER, LYOPHILIZED, FOR SUSPENSION INTRAVENOUS ONCE
Status: CANCELLED | OUTPATIENT
Start: 2024-10-29 | End: 2024-10-29

## 2024-10-29 RX ORDER — ALBUTEROL SULFATE 90 UG/1
4 INHALANT RESPIRATORY (INHALATION) PRN
Status: CANCELLED | OUTPATIENT
Start: 2024-10-29

## 2024-10-29 RX ORDER — SODIUM CHLORIDE 9 MG/ML
5-250 INJECTION, SOLUTION INTRAVENOUS PRN
Status: CANCELLED | OUTPATIENT
Start: 2024-10-29

## 2024-10-29 RX ORDER — ACETAMINOPHEN 325 MG/1
650 TABLET ORAL
OUTPATIENT
Start: 2024-11-12

## 2024-10-29 RX ORDER — HEPARIN SODIUM (PORCINE) LOCK FLUSH IV SOLN 100 UNIT/ML 100 UNIT/ML
500 SOLUTION INTRAVENOUS PRN
Status: CANCELLED | OUTPATIENT
Start: 2024-10-29

## 2024-10-29 RX ORDER — EPINEPHRINE 1 MG/ML
0.3 INJECTION, SOLUTION, CONCENTRATE INTRAVENOUS PRN
OUTPATIENT
Start: 2024-11-12

## 2024-10-29 RX ORDER — SODIUM CHLORIDE 9 MG/ML
INJECTION, SOLUTION INTRAVENOUS CONTINUOUS
OUTPATIENT
Start: 2024-11-12

## 2024-10-29 RX ORDER — HEPARIN SODIUM (PORCINE) LOCK FLUSH IV SOLN 100 UNIT/ML 100 UNIT/ML
500 SOLUTION INTRAVENOUS PRN
OUTPATIENT
Start: 2024-11-12

## 2024-10-29 RX ORDER — ACETAMINOPHEN 325 MG/1
650 TABLET ORAL
Status: CANCELLED | OUTPATIENT
Start: 2024-10-29

## 2024-10-29 RX ORDER — DIPHENHYDRAMINE HYDROCHLORIDE 50 MG/ML
50 INJECTION INTRAMUSCULAR; INTRAVENOUS
Status: CANCELLED | OUTPATIENT
Start: 2024-10-29

## 2024-10-29 RX ORDER — SODIUM CHLORIDE 9 MG/ML
5-250 INJECTION, SOLUTION INTRAVENOUS PRN
Status: DISCONTINUED | OUTPATIENT
Start: 2024-10-29 | End: 2024-10-30 | Stop reason: HOSPADM

## 2024-10-29 RX ORDER — EPINEPHRINE 1 MG/ML
0.3 INJECTION, SOLUTION, CONCENTRATE INTRAVENOUS PRN
Status: CANCELLED | OUTPATIENT
Start: 2024-10-29

## 2024-10-29 RX ORDER — PACLITAXEL 100 MG/20ML
94 INJECTION, POWDER, LYOPHILIZED, FOR SUSPENSION INTRAVENOUS ONCE
Status: COMPLETED | OUTPATIENT
Start: 2024-10-29 | End: 2024-10-29

## 2024-10-29 RX ORDER — SODIUM CHLORIDE 0.9 % (FLUSH) 0.9 %
5-40 SYRINGE (ML) INJECTION PRN
OUTPATIENT
Start: 2024-11-12

## 2024-10-29 RX ORDER — SODIUM CHLORIDE 0.9 % (FLUSH) 0.9 %
5-40 SYRINGE (ML) INJECTION PRN
Status: CANCELLED | OUTPATIENT
Start: 2024-10-29

## 2024-10-29 RX ORDER — ONDANSETRON 2 MG/ML
8 INJECTION INTRAMUSCULAR; INTRAVENOUS
OUTPATIENT
Start: 2024-11-12

## 2024-10-29 RX ORDER — HEPARIN 100 UNIT/ML
500 SYRINGE INTRAVENOUS PRN
OUTPATIENT
Start: 2024-10-29

## 2024-10-29 RX ORDER — HEPARIN 100 UNIT/ML
500 SYRINGE INTRAVENOUS PRN
Status: DISCONTINUED | OUTPATIENT
Start: 2024-10-29 | End: 2024-10-30 | Stop reason: HOSPADM

## 2024-10-29 RX ORDER — ONDANSETRON 2 MG/ML
8 INJECTION INTRAMUSCULAR; INTRAVENOUS ONCE
Status: CANCELLED | OUTPATIENT
Start: 2024-10-29 | End: 2024-10-29

## 2024-10-29 RX ORDER — DIPHENHYDRAMINE HYDROCHLORIDE 50 MG/ML
50 INJECTION INTRAMUSCULAR; INTRAVENOUS
OUTPATIENT
Start: 2024-11-12

## 2024-10-29 RX ORDER — PACLITAXEL 100 MG/20ML
94 INJECTION, POWDER, LYOPHILIZED, FOR SUSPENSION INTRAVENOUS ONCE
OUTPATIENT
Start: 2024-11-12 | End: 2024-11-12

## 2024-10-29 RX ORDER — FAMOTIDINE 10 MG/ML
20 INJECTION, SOLUTION INTRAVENOUS
OUTPATIENT
Start: 2024-11-12

## 2024-10-29 RX ORDER — ONDANSETRON 2 MG/ML
8 INJECTION INTRAMUSCULAR; INTRAVENOUS
Status: CANCELLED | OUTPATIENT
Start: 2024-10-29

## 2024-10-29 RX ADMIN — HEPARIN 500 UNITS: 100 SYRINGE at 12:50

## 2024-10-29 RX ADMIN — GEMCITABINE HYDROCHLORIDE 1600 MG: 1 INJECTION, SOLUTION INTRAVENOUS at 11:10

## 2024-10-29 RX ADMIN — PACLITAXEL 160 MG: 100 INJECTION, POWDER, LYOPHILIZED, FOR SUSPENSION INTRAVENOUS at 11:58

## 2024-10-29 RX ADMIN — SODIUM CHLORIDE, PRESERVATIVE FREE 30 ML: 5 INJECTION INTRAVENOUS at 09:14

## 2024-10-29 RX ADMIN — SODIUM CHLORIDE, PRESERVATIVE FREE 10 ML: 5 INJECTION INTRAVENOUS at 10:37

## 2024-10-29 RX ADMIN — SODIUM CHLORIDE 100 ML/HR: 9 INJECTION, SOLUTION INTRAVENOUS at 10:40

## 2024-10-29 RX ADMIN — ONDANSETRON 8 MG: 2 INJECTION INTRAMUSCULAR; INTRAVENOUS at 10:42

## 2024-10-29 RX ADMIN — SODIUM CHLORIDE, PRESERVATIVE FREE 10 ML: 5 INJECTION INTRAVENOUS at 12:50

## 2024-10-29 NOTE — DISCHARGE INSTRUCTIONS
Please contact your Oncologist if you have any questions regarding the Gemzar/Abraxane that you received today.      Please call if you experience any of the the following symptoms after today's therapy / notify MD immediately or go to the Emergency Department.    *dizziness/lightheadedness  *acute nausea/vomiting - not relieved with medication  *headache - not relieved from Tylenol/pain medication  *chest pain/pressure  *rash/itching  *shortness of breath    Drink fluids - 48-64 ounces of fluids daily.    Please call if you develop fever/chills/signs or symptoms of an infection or you are unable to drink fluids.         Provider Instructions:     Ok for chemotherapy today  Delete day 8 due to worsening neuropathy ( every 2 weeks chemo )  RTC in 2 weeks with MD or NP with labs

## 2024-10-29 NOTE — PROGRESS NOTES
demonstrates malignant epithelial       cells with evidence of acini formation.  The findings are       consistent with non-small cell carcinoma, favor adenocarcinoma.       Immunohistochemical stains for CK7, CK20, TTF-1, Napsin A, PAX 8,       GATA3, and CDX2 are performed.  The controls are adequate.  The       malignant cells demonstrate positive staining for CK7 and CK20.       All other stains are negative.         The patient's stated history of pancreatic cancer is noted;       although, we do not have this diagnosis within our pathology files.       The immunophenotype of CK7+/CK20+ carcinoma is not specific but can       be seen in pancreatobiliary primaries.  The lack of TTF-1 and       Napsin A staining argues against a pulmonary primary.     G: Left lower lobe of lung, fine-needle aspirate:     No malignant cells seen.     H: Left lower lobe of lung, BAL fluid:    No malignant cells seen.              ASSESSMENT:     History of periampullary adenocarcinoma status post Whipple surgery- Metastatic to Lymph node, lung nodules( 8/30/24)  - Whipple procedure on 12/4/21-7/27 lymph nodes involved with cancer as per notes. [id number].   - Completed 7 cycles of adjuvant FOLFOX from January to May 2022.  Discontinued due to low blood counts.  - CA 19-9 of 34 on 9/27/23--> 48 on 1/5/24 ---4/23/2024( 68)  - most recent CT scan on 1/5/2024-right lower lobe pulmonary nodule remains suspicious and continues to increase slowly in size now measuring 7 mm, previously measured 6 mm, no other signs of metastatic disease.  Biopsy came back consistent with adenocarcinoma with CK 7 Positive, CK20 positive, TTF negative  with pathology identical to previous malignancy with periampullary carcinoma.  CA 19-9 is elevated up to 409 on August 23, 2024.  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  Negative TTF-1 argues against pulmonary primary.    Patient started on palliative chemotherapy with

## 2024-10-29 NOTE — ONCOLOGY
Chemotherapy Administration    Pre-assessment Data: Antineoplastic Agents  See toxicity flow sheet for assessment                                          [x]         Interventions:   Chemotherapy SQ injection given []   Taxol administered-VS per protocol []   Blood pressure meds held 12 hours prior to Rituxan/Ruxience []   Rituxan/Ruxience administered- VS and precautions per guidelines []   Emergency drugs available as appropriate [x]   Anaphylaxis assessment completed [x]   Pre-medications administered as ordered [x]   Blood return noted upon initiation of chemotherapy [x]   Blood return noted each 1-2ml of a vesicant medication if given IV push []   Navelbine, Vincristine and Velban given as a monitored wide open drip, blood return noted before during and after infusion. []   Blood return noted each 2-3ml of a non-vesicant medication if given IV push []   Patient aware of potential Immunotherapy toxicities []   Monitor for signs / symptoms of hypersensitivity reaction [x]   Chemotherapy orders (drug/dose/rate) verified by 2 Chemo certified RN’s [x]   Monitor IV site and blood return throughout the infusion of the medication [x]   Document IV site checks on the IV assessment form [x]   Document chemotherapy teaching on the Patient Education tab [x]   Document patient verbalizes understanding of medications being administered [x]   If IV infiltration, see ONS Guidelines []   Other:     Abraxane/Gemzar [x]

## 2024-10-29 NOTE — PROGRESS NOTES
Patient tolerated Gemzar and Abraxane without any complications.    Denies dizziness, lightheadedness, acute nausea or vomiting, headache, heart palpitations, rash/itching or increased SOB.    Last vital signs:   BP (!) 122/56   Pulse 84   Temp 97.9 °F (36.6 °C) (Oral)   Resp 18   Ht 1.651 m (5' 5\")   Wt 64.5 kg (142 lb 3.2 oz)   SpO2 96%   BMI 23.66 kg/m²     Patient instructed if they experience any of the above symptoms following today's visit, he/she is to notify the physician immediately or go to the Emergency Department.    Discharge instructions given to patient. Verbalizes understanding. Ambulated off unit per self in stable condition with belongings.

## 2024-10-29 NOTE — PATIENT INSTRUCTIONS
Ok for chemotherapy today  Delete day 8 due to worsening neuropathy ( every 2 weeks chemo )  RTC in 2 weeks with MD or NP with labs

## 2024-10-29 NOTE — PLAN OF CARE
Problem: Discharge Planning  Goal: Discharge to home or other facility with appropriate resources  Outcome: Adequate for Discharge  Flowsheets (Taken 10/29/2024 1530)  Discharge to home or other facility with appropriate resources:   Identify barriers to discharge with patient and caregiver   Identify discharge learning needs (meds, wound care, etc)  Note: Verbalize understanding of discharge instructions, follow up appointments, and when to call Physician.      Problem: Safety - Adult  Goal: Free from fall injury  Outcome: Adequate for Discharge  Flowsheets (Taken 10/29/2024 1530)  Free From Fall Injury: Instruct family/caregiver on patient safety  Note: Free from falls while in O.P. Oncology.      Problem: Chronic Conditions and Co-morbidities  Goal: Patient's chronic conditions and co-morbidity symptoms are monitored and maintained or improved  Outcome: Adequate for Discharge  Flowsheets (Taken 10/29/2024 1530)  Care Plan - Patient's Chronic Conditions and Co-Morbidity Symptoms are Monitored and Maintained or Improved:   Monitor and assess patient's chronic conditions and comorbid symptoms for stability, deterioration, or improvement   Collaborate with multidisciplinary team to address chronic and comorbid conditions and prevent exacerbation or deterioration  Note:   Chemotherapy Teaching     What is Chemotherapy   Drug action [x]   Method of Administration [x]   Handouts given []     Side Effects  Nausea/vomiting [x]   Diarrhea [x]   Fatigue [x]   Signs / Symptoms of infection [x]   Neutropenia [x]   Thrombocytopenia [x]   Alopecia [x]   neuropathy [x]   [place] diet &  the importance of fluids [x]       Micellaneous  Importance of nutrition [x]   Importance of oral hygiene [x]   When to call the MD [x]   Monitoring labs [x]   Use of supportive services []     Explanation of Drug Regimen / Frequency  Abraxane/Gemzar     Comments  Verbalized understanding to drug,action,side effects and when to call MD

## 2024-10-30 ENCOUNTER — OFFICE VISIT (OUTPATIENT)
Dept: FAMILY MEDICINE CLINIC | Age: 84
End: 2024-10-30

## 2024-10-30 VITALS
SYSTOLIC BLOOD PRESSURE: 120 MMHG | HEART RATE: 76 BPM | WEIGHT: 140.6 LBS | BODY MASS INDEX: 23.4 KG/M2 | TEMPERATURE: 99.8 F | DIASTOLIC BLOOD PRESSURE: 72 MMHG | RESPIRATION RATE: 16 BRPM

## 2024-10-30 DIAGNOSIS — G62.0 CHEMOTHERAPY-INDUCED NEUROPATHY (HCC): Primary | ICD-10-CM

## 2024-10-30 DIAGNOSIS — C25.9 METASTASIS FROM PANCREATIC CANCER (HCC): ICD-10-CM

## 2024-10-30 DIAGNOSIS — T45.1X5A CHEMOTHERAPY-INDUCED NEUROPATHY (HCC): Primary | ICD-10-CM

## 2024-10-30 DIAGNOSIS — N39.41 URGE INCONTINENCE OF URINE: ICD-10-CM

## 2024-10-30 DIAGNOSIS — I10 PRIMARY HYPERTENSION: ICD-10-CM

## 2024-10-30 DIAGNOSIS — C79.9 METASTASIS FROM PANCREATIC CANCER (HCC): ICD-10-CM

## 2024-10-30 RX ORDER — MIRABEGRON 50 MG/1
50 TABLET, FILM COATED, EXTENDED RELEASE ORAL DAILY
Qty: 30 TABLET | Refills: 1 | Status: SHIPPED | OUTPATIENT
Start: 2024-10-30

## 2024-10-30 RX ORDER — GABAPENTIN 300 MG/1
600 CAPSULE ORAL 3 TIMES DAILY
Qty: 180 CAPSULE | Refills: 0
Start: 2024-10-30 | End: 2024-11-29

## 2024-10-30 ASSESSMENT — ENCOUNTER SYMPTOMS
CONSTIPATION: 0
DIARRHEA: 0
NAUSEA: 0
COUGH: 0
BACK PAIN: 0
SHORTNESS OF BREATH: 0
WHEEZING: 0
VOMITING: 0
ABDOMINAL PAIN: 0

## 2024-10-30 NOTE — PROGRESS NOTES
Sol Greer is a 84 y.o. female who presents today for:  Chief Complaint   Patient presents with    6 Month Follow-Up         HPI:   Sol Greer is 84 y.o. who presents today for 6-month follow-up.    HTN: Has been off all medications due to episodes of lightheadedness.  Patient states some elevations in blood pressure during oncology appointments recently.  However, she also has had some intermittent episodes of lightheadedness when standing up at home.  She has not checked BP at home.  BP remains stable today.     Patient has a history of pancreatic  adenocarcinoma, s/p Whipple procedure in 2021.  She has been followed by oncology.  She does have a new metastatic lesion in right lung.  She is doing palliative chemotherapy, just switching to treatments every other week currently.  She was also referred to palliative medicine due to metastatic disease.  Will be seeing Dr. Rock 11/18/24.  Overall, states she is feeling well and tolerating treatments at this time.  Would like to continue treatment currently.    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  She remains on gabapentin 300 mg BID and 600 mg nightly.  Using walker now due to feeling unsteady on her feet.  No recent falls    Patient has also had worsening urinary incontinence recently.  States she has  frequency and urgency, and is leaking urine with this.  Denies incontinence with coughing, sneezing.  No dysuria, abdominal pain, or flank pain.  This has also worsened since initiation of chemotherapy      Objective:     Vitals:    10/30/24 1103   BP: 120/72   Pulse: 76   Resp: 16   Temp: 99.8 °F (37.7 °C)   TempSrc: Oral   Weight: 63.8 kg (140 lb 9.6 oz)       Wt Readings from Last 3 Encounters:   10/30/24 63.8 kg (140 lb 9.6 oz)   10/29/24 64.5 kg (142 lb 3.2 oz)   10/29/24 64.5 kg (142 lb 3.2 oz)       BP Readings from Last 3 Encounters:   10/30/24 120/72

## 2024-11-12 ENCOUNTER — OFFICE VISIT (OUTPATIENT)
Dept: ONCOLOGY | Age: 84
End: 2024-11-12

## 2024-11-12 ENCOUNTER — HOSPITAL ENCOUNTER (OUTPATIENT)
Dept: INFUSION THERAPY | Age: 84
Discharge: HOME | End: 2024-11-12

## 2024-11-12 ENCOUNTER — HOSPITAL ENCOUNTER (OUTPATIENT)
Dept: INFUSION THERAPY | Age: 84
Discharge: HOME | End: 2024-11-12
Payer: MEDICARE

## 2024-11-12 ENCOUNTER — CLINICAL DOCUMENTATION (OUTPATIENT)
Dept: CASE MANAGEMENT | Age: 84
End: 2024-11-12

## 2024-11-12 VITALS
SYSTOLIC BLOOD PRESSURE: 160 MMHG | BODY MASS INDEX: 23.63 KG/M2 | WEIGHT: 141.8 LBS | HEIGHT: 65 IN | HEART RATE: 74 BPM | OXYGEN SATURATION: 97 % | DIASTOLIC BLOOD PRESSURE: 83 MMHG | RESPIRATION RATE: 16 BRPM | TEMPERATURE: 98.2 F

## 2024-11-12 VITALS
RESPIRATION RATE: 18 BRPM | HEIGHT: 65 IN | SYSTOLIC BLOOD PRESSURE: 126 MMHG | HEART RATE: 88 BPM | TEMPERATURE: 98.4 F | DIASTOLIC BLOOD PRESSURE: 62 MMHG | BODY MASS INDEX: 23.63 KG/M2 | OXYGEN SATURATION: 97 % | WEIGHT: 141.8 LBS

## 2024-11-12 DIAGNOSIS — C24.1 PRIMARY ADENOCARCINOMA OF AMPULLA OF VATER (HCC): ICD-10-CM

## 2024-11-12 DIAGNOSIS — C24.1 PRIMARY ADENOCARCINOMA OF AMPULLA OF VATER (HCC): Primary | ICD-10-CM

## 2024-11-12 DIAGNOSIS — C79.9 METASTASIS FROM PANCREATIC CANCER (HCC): Primary | ICD-10-CM

## 2024-11-12 DIAGNOSIS — C25.9 METASTASIS FROM PANCREATIC CANCER (HCC): Primary | ICD-10-CM

## 2024-11-12 LAB
ALBUMIN SERPL BCG-MCNC: 3.6 G/DL (ref 3.5–5.1)
ALP SERPL-CCNC: 205 U/L (ref 38–126)
ALT SERPL W/O P-5'-P-CCNC: 22 U/L (ref 11–66)
AST SERPL-CCNC: 28 U/L (ref 5–40)
BASOPHILS ABSOLUTE: 0.1 THOU/MM3 (ref 0–0.1)
BASOPHILS NFR BLD AUTO: 1 % (ref 0–3)
BILIRUB CONJ SERPL-MCNC: 0.2 MG/DL (ref 0.1–13.8)
BILIRUB SERPL-MCNC: 0.5 MG/DL (ref 0.3–1.2)
BUN BLDP-MCNC: 14 MG/DL (ref 8–26)
CHLORIDE BLD-SCNC: 109 MEQ/L (ref 98–109)
CREAT BLD-MCNC: 1 MG/DL (ref 0.5–1.2)
EOSINOPHIL NFR BLD AUTO: 1 % (ref 0–4)
EOSINOPHILS ABSOLUTE: 0.1 THOU/MM3 (ref 0–0.4)
ERYTHROCYTE [DISTWIDTH] IN BLOOD BY AUTOMATED COUNT: 14.9 % (ref 11.5–14.5)
GFR SERPL CREATININE-BSD FRML MDRD: 56 ML/MIN/1.73M2
GLUCOSE BLD-MCNC: 134 MG/DL (ref 70–108)
HCT VFR BLD AUTO: 35.7 % (ref 37–47)
HGB BLD-MCNC: 11.6 GM/DL (ref 12–16)
IMMATURE GRANULOCYTES %: 0 %
IMMATURE GRANULOCYTES ABSOLUTE: 0.02 THOU/MM3 (ref 0–0.07)
IONIZED CALCIUM, WHOLE BLOOD: 1.17 MMOL/L (ref 1.12–1.32)
LYMPHOCYTES ABSOLUTE: 1.7 THOU/MM3 (ref 1–4.8)
LYMPHOCYTES NFR BLD AUTO: 22 % (ref 15–47)
MCH RBC QN AUTO: 32.1 PG (ref 26–33)
MCHC RBC AUTO-ENTMCNC: 32.5 GM/DL (ref 32.2–35.5)
MCV RBC AUTO: 99 FL (ref 81–99)
MONOCYTES ABSOLUTE: 0.8 THOU/MM3 (ref 0.4–1.3)
MONOCYTES NFR BLD AUTO: 10 % (ref 0–12)
NEUTROPHILS ABSOLUTE: 4.9 THOU/MM3 (ref 1.8–7.7)
NEUTROPHILS NFR BLD AUTO: 65 % (ref 43–75)
PLATELET # BLD AUTO: 270 THOU/MM3 (ref 130–400)
PMV BLD AUTO: 9.8 FL (ref 9.4–12.4)
POTASSIUM BLD-SCNC: 4.1 MEQ/L (ref 3.5–4.9)
PROT SERPL-MCNC: 6.7 G/DL (ref 6.1–8)
RBC # BLD AUTO: 3.61 MILL/MM3 (ref 4.2–5.4)
SODIUM BLD-SCNC: 141 MEQ/L (ref 138–146)
TOTAL CO2, WHOLE BLOOD: 25 MEQ/L (ref 23–33)
WBC # BLD AUTO: 7.5 THOU/MM3 (ref 4.8–10.8)

## 2024-11-12 PROCEDURE — 2580000003 HC RX 258: Performed by: INTERNAL MEDICINE

## 2024-11-12 PROCEDURE — 80076 HEPATIC FUNCTION PANEL: CPT

## 2024-11-12 PROCEDURE — 96417 CHEMO IV INFUS EACH ADDL SEQ: CPT

## 2024-11-12 PROCEDURE — 80047 BASIC METABLC PNL IONIZED CA: CPT

## 2024-11-12 PROCEDURE — 6360000002 HC RX W HCPCS: Performed by: INTERNAL MEDICINE

## 2024-11-12 PROCEDURE — 96413 CHEMO IV INFUSION 1 HR: CPT

## 2024-11-12 PROCEDURE — 85025 COMPLETE CBC W/AUTO DIFF WBC: CPT

## 2024-11-12 PROCEDURE — 99211 OFF/OP EST MAY X REQ PHY/QHP: CPT

## 2024-11-12 PROCEDURE — 86301 IMMUNOASSAY TUMOR CA 19-9: CPT

## 2024-11-12 PROCEDURE — 96375 TX/PRO/DX INJ NEW DRUG ADDON: CPT

## 2024-11-12 RX ORDER — ONDANSETRON 2 MG/ML
8 INJECTION INTRAMUSCULAR; INTRAVENOUS ONCE
Status: COMPLETED | OUTPATIENT
Start: 2024-11-12 | End: 2024-11-12

## 2024-11-12 RX ORDER — SODIUM CHLORIDE 0.9 % (FLUSH) 0.9 %
5-40 SYRINGE (ML) INJECTION PRN
Status: DISCONTINUED | OUTPATIENT
Start: 2024-11-12 | End: 2024-11-13 | Stop reason: HOSPADM

## 2024-11-12 RX ORDER — SODIUM CHLORIDE 0.9 % (FLUSH) 0.9 %
5-40 SYRINGE (ML) INJECTION PRN
OUTPATIENT
Start: 2024-11-12

## 2024-11-12 RX ORDER — HEPARIN 100 UNIT/ML
500 SYRINGE INTRAVENOUS PRN
Status: DISCONTINUED | OUTPATIENT
Start: 2024-11-12 | End: 2024-11-13 | Stop reason: HOSPADM

## 2024-11-12 RX ORDER — SODIUM CHLORIDE 9 MG/ML
25 INJECTION, SOLUTION INTRAVENOUS PRN
OUTPATIENT
Start: 2024-11-12

## 2024-11-12 RX ORDER — HEPARIN 100 UNIT/ML
500 SYRINGE INTRAVENOUS PRN
OUTPATIENT
Start: 2024-11-12

## 2024-11-12 RX ORDER — PACLITAXEL 100 MG/20ML
94 INJECTION, POWDER, LYOPHILIZED, FOR SUSPENSION INTRAVENOUS ONCE
Status: COMPLETED | OUTPATIENT
Start: 2024-11-12 | End: 2024-11-12

## 2024-11-12 RX ORDER — SODIUM CHLORIDE 9 MG/ML
5-250 INJECTION, SOLUTION INTRAVENOUS PRN
Status: DISCONTINUED | OUTPATIENT
Start: 2024-11-12 | End: 2024-11-13 | Stop reason: HOSPADM

## 2024-11-12 RX ADMIN — ONDANSETRON 8 MG: 2 INJECTION INTRAMUSCULAR; INTRAVENOUS at 09:18

## 2024-11-12 RX ADMIN — SODIUM CHLORIDE, PRESERVATIVE FREE 10 ML: 5 INJECTION INTRAVENOUS at 09:17

## 2024-11-12 RX ADMIN — GEMCITABINE HYDROCHLORIDE 1600 MG: 1 INJECTION, SOLUTION INTRAVENOUS at 09:41

## 2024-11-12 RX ADMIN — HEPARIN 500 UNITS: 100 SYRINGE at 11:41

## 2024-11-12 RX ADMIN — SODIUM CHLORIDE 20 ML/HR: 9 INJECTION, SOLUTION INTRAVENOUS at 09:18

## 2024-11-12 RX ADMIN — SODIUM CHLORIDE, PRESERVATIVE FREE 30 ML: 5 INJECTION INTRAVENOUS at 08:20

## 2024-11-12 RX ADMIN — PACLITAXEL 160 MG: 100 INJECTION, POWDER, LYOPHILIZED, FOR SUSPENSION INTRAVENOUS at 10:25

## 2024-11-12 RX ADMIN — SODIUM CHLORIDE, PRESERVATIVE FREE 10 ML: 5 INJECTION INTRAVENOUS at 11:41

## 2024-11-12 ASSESSMENT — PAIN SCALES - GENERAL: PAINLEVEL_OUTOF10: 0

## 2024-11-12 NOTE — PLAN OF CARE
Problem: Discharge Planning  Goal: Discharge to home or other facility with appropriate resources  Outcome: Adequate for Discharge  Flowsheets (Taken 11/12/2024 1608)  Discharge to home or other facility with appropriate resources:   Identify barriers to discharge with patient and caregiver   Identify discharge learning needs (meds, wound care, etc)  Note: Verbalize understanding of discharge instructions, follow up appointments, and when to call Physician.      Problem: Safety - Adult  Goal: Free from fall injury  Outcome: Adequate for Discharge  Flowsheets (Taken 11/12/2024 1608)  Free From Fall Injury: Instruct family/caregiver on patient safety  Note: Free from falls while in O.P. Oncology.     Problem: Chronic Conditions and Co-morbidities  Goal: Patient's chronic conditions and co-morbidity symptoms are monitored and maintained or improved  Outcome: Adequate for Discharge  Flowsheets (Taken 11/12/2024 1608)  Care Plan - Patient's Chronic Conditions and Co-Morbidity Symptoms are Monitored and Maintained or Improved:   Monitor and assess patient's chronic conditions and comorbid symptoms for stability, deterioration, or improvement   Collaborate with multidisciplinary team to address chronic and comorbid conditions and prevent exacerbation or deterioration  Note:   Chemotherapy Teaching     What is Chemotherapy   Drug action [x]   Method of Administration [x]   Handouts given []     Side Effects  Nausea/vomiting [x]   Diarrhea [x]   Fatigue [x]   Signs / Symptoms of infection [x]   Neutropenia [x]   Thrombocytopenia [x]   Alopecia [x]   neuropathy [x]   [place] diet &  the importance of fluids [x]       Micellaneous  Importance of nutrition [x]   Importance of oral hygiene [x]   When to call the MD [x]   Monitoring labs [x]   Use of supportive services []     Explanation of Drug Regimen / Frequency  Gemzar/Abraxane     Comments  Verbalized understanding to drug,action,side effects and when to call MD

## 2024-11-12 NOTE — PATIENT INSTRUCTIONS
Labs reviewed, proceed with treatment.  She will start the new prescription of neurontin 600 mg tid.  Orders Placed This Encounter   Procedures    CBC with Auto Differential    Hepatic Function Panel    POC PANEL BMP W/IOCA

## 2024-11-12 NOTE — ONCOLOGY
Chemotherapy Administration    Pre-assessment Data: Antineoplastic Agents  See toxicity flow sheet for assessment                                          [x]         Interventions:   Chemotherapy SQ injection given []   Taxol administered-VS per protocol []   Blood pressure meds held 12 hours prior to Rituxan/Ruxience []   Rituxan/Ruxience administered- VS and precautions per guidelines []   Emergency drugs available as appropriate [x]   Anaphylaxis assessment completed [x]   Pre-medications administered as ordered [x]   Blood return noted upon initiation of chemotherapy [x]   Blood return noted each 1-2ml of a vesicant medication if given IV push []   Navelbine, Vincristine and Velban given as a monitored wide open drip, blood return noted before during and after infusion. []   Blood return noted each 2-3ml of a non-vesicant medication if given IV push []   Patient aware of potential Immunotherapy toxicities []   Monitor for signs / symptoms of hypersensitivity reaction [x]   Chemotherapy orders (drug/dose/rate) verified by 2 Chemo certified RN’s [x]   Monitor IV site and blood return throughout the infusion of the medication [x]   Document IV site checks on the IV assessment form [x]   Document chemotherapy teaching on the Patient Education tab [x]   Document patient verbalizes understanding of medications being administered [x]   If IV infiltration, see ONS Guidelines []   Other:     Gemzar/Abraxane [x]

## 2024-11-12 NOTE — PROGRESS NOTES
Name: Sol Greer  : 1940  MRN: [id number]    Oncology Navigation Follow-Up Note    Contact Type:  Medical Oncology    Subjective: continue neuropathy in fingers/feet. Definitely progression since start of new chemotherapy. Currently on Neurontin 300mg am/300mg afternoon/600mg evening.   PCP increase 600mg TID but waiting on medication. Neuropathy is stable since recent increase.   Used cane today-felt slight unsteady on feet. Walk short distances in home without assistance.       Objective: MD visit    Oncology Plan of Care :   -review lab/ proceed with Gemzar/Abraxane Q2 weeks   -next MD apt  for lab/tx     Assistance Needed: denies any at this time    Receptive to Advanced Care Planning / Palliative Care:  New patient consultation     Education: reiterated ONC POC      Notes: mariela following to assist & support as needed    Electronically signed by Ysabel Yañez RN on 2024 at 11:14 AM

## 2024-11-12 NOTE — PROGRESS NOTES
Whipple procedure on 12/4/21-7/27 lymph nodes involved with cancer as per notes. [id number].   - Completed 7 cycles of adjuvant FOLFOX from January to May 2022.  Discontinued due to low blood counts.  - CA 19-9 of 34 on 9/27/23--> 48 on 1/5/24 ---4/23/2024( 68)  - most recent CT scan on 1/5/2024-right lower lobe pulmonary nodule remains suspicious and continues to increase slowly in size now measuring 7 mm, previously measured 6 mm, no other signs of metastatic disease.    2. 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    EBUS ( 5/10/24): no evidence of malignant cells in station 7 and station 10R LN.  CA 19-9 with upward trend.( 68 on 4/23/24).  CT CAP( 8/21/24): Interval deterioration since previous study dated 5/17/2024 with enlargement  of the nodules in the right and left lower lobes and soft tissue density  adjacent to the right hilum. Tiny hypodensity in the dome of the right lobe of the liver.  Tiny hypodensity in the dome of the right lobe of the liver. Small mesenteric lymph nodes. Atherosclerotic calcification in the abdominal aorta and iliac arteries. Slightly thick-walled bladder.  Small amount of free fluid within the pelvis.   PET 9/30/24: Medial right middle lobe/right hilar mass, left lower lobe nodule and right lower lobe nodule meet criteria for viable neoplasm.  Right middle lobe volume loss likely related to segment bronchial obstruction.( Detailed report as stated above.)  Findings consistent with metastatic adenocarcinoma, positive for malignancy( right lower lobe of lung), station7 LN. \"Tissue morphology was compared to previous pancreatic adenocarcinoma from [facility], both cases demonstrated moderately to poorly differentiated adenocarcinoma.  The morphologies are similar in appearance.  The patient stated history of pancreatic cancer as noted.  The immunophenotype of CK7

## 2024-11-12 NOTE — PROGRESS NOTES
Patient tolerated Abraxane and Gemzar without any complications.    Patient kept for 20 minute observation post infusion chemotherapy. Denies dizziness, lightheadedness, acute nausea or vomiting, headache, heart palpitations, rash/itching or increased SOB.     Last vital signs:   BP (!) 160/83   Pulse 74   Temp 98.2 °F (36.8 °C) (Oral)   Resp 16   Ht 1.651 m (5' 5\")   Wt 64.3 kg (141 lb 12.8 oz)   SpO2 97%   BMI 23.60 kg/m²     Patient instructed if they experience any of the above symptoms following today's visit, he/she is to notify the physician immediately or go to the Emergency Department.    Patient educated on monitoring temperatures at home daily and to call physician or go to the Emergency Department for temperatures of 100.4 F or greater.    Discharge instructions given to patient. Verbalizes understanding. Ambulated off unit per self in stable condition with belongings.

## 2024-11-12 NOTE — DISCHARGE INSTRUCTIONS
Please contact your Oncologist if you have any questions regarding the Abraxane/Gemzar that you received today.      Please call if you experience any of the the following symptoms after today's therapy / notify MD immediately or go to the Emergency Department.    *dizziness/lightheadedness  *acute nausea/vomiting - not relieved with medication  *headache - not relieved from Tylenol/pain medication  *chest pain/pressure  *rash/itching  *shortness of breath    Drink fluids - 48-64 ounces of fluids daily.    Please call if you develop fever/chills/signs or symptoms of an infection or you are unable to drink fluids.        Provider Instructions:    Labs reviewed, proceed with treatment.  She will start the new prescription of neurontin 600 mg tid.      Orders Placed This Encounter   Procedures    CBC with Auto Differential    Hepatic Function Panel    POC PANEL BMP W/IOCA      Return in about 2 weeks (around 11/26/2024).MD vist+ labs+ treatment visit

## 2024-11-13 LAB — CANCER AG19-9 SERPL-ACNC: 252 U/ML (ref 0–35)

## 2024-11-15 DIAGNOSIS — G62.0 CHEMOTHERAPY-INDUCED NEUROPATHY (HCC): ICD-10-CM

## 2024-11-15 DIAGNOSIS — T45.1X5A CHEMOTHERAPY-INDUCED NEUROPATHY (HCC): ICD-10-CM

## 2024-11-15 RX ORDER — GABAPENTIN 300 MG/1
600 CAPSULE ORAL 3 TIMES DAILY
Qty: 180 CAPSULE | Refills: 0 | Status: CANCELLED | OUTPATIENT
Start: 2024-11-15 | End: 2024-12-15

## 2024-11-15 NOTE — TELEPHONE ENCOUNTER
This medication refill is regarding a telephone request. Refill requested by patient.    Requested Prescriptions     Pending Prescriptions Disp Refills    gabapentin (NEURONTIN) 300 MG capsule 180 capsule 0     Sig: Take 2 capsules by mouth 3 times daily for 30 days.       Date of last visit: 10/30/2024   Date of next visit: 12/3/2024  Date of last refill: 10/11/24 for 300 mg BID and 600 mg Q PM; dosage was increased to 300 mg 2 capsules TID by TH on 10/30/24.  [facility]    Rx verified, ordered and set to .    WCP    Also states that Myrbetriq is going to cost $95 for a 30 days supply and this is not affordable. Requesting to have a cheaper medication sent to the pharmacy. If there is nothing cheaper to prescribe then pt prefers not to have anything prescribed and will discuss it further at her scheduled appt on 12/3/24.      WCP

## 2024-11-18 ENCOUNTER — OFFICE VISIT (OUTPATIENT)
Dept: PALLATIVE CARE | Age: 84
End: 2024-11-18
Payer: MEDICARE

## 2024-11-18 VITALS
WEIGHT: 135 LBS | BODY MASS INDEX: 22.49 KG/M2 | SYSTOLIC BLOOD PRESSURE: 118 MMHG | HEIGHT: 65 IN | DIASTOLIC BLOOD PRESSURE: 60 MMHG | HEART RATE: 95 BPM | OXYGEN SATURATION: 99 % | RESPIRATION RATE: 17 BRPM

## 2024-11-18 DIAGNOSIS — C79.9 METASTASIS FROM PANCREATIC CANCER (HCC): ICD-10-CM

## 2024-11-18 DIAGNOSIS — C25.9 METASTASIS FROM PANCREATIC CANCER (HCC): ICD-10-CM

## 2024-11-18 DIAGNOSIS — Z51.5 PALLIATIVE CARE PATIENT: ICD-10-CM

## 2024-11-18 DIAGNOSIS — T45.1X5A CHEMOTHERAPY-INDUCED NEUROPATHY (HCC): ICD-10-CM

## 2024-11-18 DIAGNOSIS — G62.0 CHEMOTHERAPY-INDUCED NEUROPATHY (HCC): ICD-10-CM

## 2024-11-18 DIAGNOSIS — C78.00 PANCREATIC CANCER METASTASIZED TO LUNG (HCC): Primary | ICD-10-CM

## 2024-11-18 DIAGNOSIS — C25.9 PANCREATIC CANCER METASTASIZED TO LUNG (HCC): Primary | ICD-10-CM

## 2024-11-18 DIAGNOSIS — E46 PROTEIN-CALORIE MALNUTRITION, UNSPECIFIED SEVERITY (HCC): ICD-10-CM

## 2024-11-18 PROCEDURE — 3078F DIAST BP <80 MM HG: CPT | Performed by: STUDENT IN AN ORGANIZED HEALTH CARE EDUCATION/TRAINING PROGRAM

## 2024-11-18 PROCEDURE — 1159F MED LIST DOCD IN RCRD: CPT | Performed by: STUDENT IN AN ORGANIZED HEALTH CARE EDUCATION/TRAINING PROGRAM

## 2024-11-18 PROCEDURE — 3074F SYST BP LT 130 MM HG: CPT | Performed by: STUDENT IN AN ORGANIZED HEALTH CARE EDUCATION/TRAINING PROGRAM

## 2024-11-18 PROCEDURE — 1124F ACP DISCUSS-NO DSCNMKR DOCD: CPT | Performed by: STUDENT IN AN ORGANIZED HEALTH CARE EDUCATION/TRAINING PROGRAM

## 2024-11-18 PROCEDURE — 99205 OFFICE O/P NEW HI 60 MIN: CPT | Performed by: STUDENT IN AN ORGANIZED HEALTH CARE EDUCATION/TRAINING PROGRAM

## 2024-11-18 RX ORDER — GABAPENTIN 600 MG/1
600 TABLET ORAL 3 TIMES DAILY
Qty: 90 TABLET | Refills: 2 | Status: SHIPPED | OUTPATIENT
Start: 2024-11-18 | End: 2025-02-16

## 2024-11-18 RX ORDER — FEEDER CONTAINER WITH PUMP SET
EACH MISCELLANEOUS
Qty: 90 EACH | Refills: 5 | Status: SHIPPED | OUTPATIENT
Start: 2024-11-18

## 2024-11-18 NOTE — PROGRESS NOTES
Sol Greer is a 84 y.o. female who presents to the palliative care clinic today for:  Chief Complaint   Patient presents with    New Patient     new patient, referral Dr Pace, metastatic pancreatic cancer    Pain     Right side abdominal pain radiates to back       HPI:   Sol Greer present to the palliative care clinic as a new patient to establish care. Sol Greer  has a past medical history of Atrial fibrillation (HCC), Cholangiocarcinoma (HCC), and Hypertension.  Patient was initially diagnosed with pancreatic adenocarcinoma in 2021.  She underwent a Whipple procedure at [facility] on December 4, 2021.  She was initiated on systemic therapy with FOLFOX in January 2022.  She completed adjuvant FOLFOX in May 2022.  She developed chemotherapy-induced peripheral neuropathy and was started on gabapentin.  She underwent an EBUS and lung biopsy on August 29, 2024.  Lung biopsies did not show any evidence of cancer, however pathology from lymph node sampled during EBUS demonstrated poorly differentiated adenocarcinoma.  It was not clear if this represented a new primary malignancy or metastatic disease from her pancreatic cancer.  She had a PET scan in September 2024 which demonstrated a medial right lobe/right hilar mass, left lower lobe nodule and right lower lobe nodule meeting criteria for viable neoplasm.  She underwent a lung biopsy which did not show any signs of cancer.  Tumor board confirmed pathology was from metastatic pancreatic cancer and recommended palliative treatment.  She was initiated on nab-paclitaxel, and gemcitabine and Abraxane in October 2024.  She was referred to the palliative care clinic by medical oncology for assistance with symptom management and goals of care discussions.    Symptoms:  Pain:   Location- Right sided abdomen  Radiation- Back  Quality- dull, aching, shooting, numbness, tingling, radiating to shoulder(s)

## 2024-11-18 NOTE — TELEPHONE ENCOUNTER
Gabapentin sent to pharmacy.  PDMP reviewed and appropriate.  Rx updated to 600 mg tablets, take 1 tablet 3 times a day.  Please let patient know of this change.  Also, Myrbetriq discontinued.  Would recommend holding off on alternative medications for incontinence pending repeat evaluation in office due to concern of other medications causing hypotension.  Thanks!

## 2024-11-18 NOTE — PATIENT INSTRUCTIONS
Continue current plan of care for acute and chronic conditions per primary and specialist teams  Continue Gabapentin 600 mg three times a day scheduled for chemotherapy induced peripheral neuropathy  May start Cymbalta if neuropathy does not improve with increase of gabapentin   Start ensure 3 times per day after meals   Please call the office if your symptoms worsen or if you were to develop new symptoms  Please call the palliative care office when you need refills

## 2024-11-20 ENCOUNTER — SOCIAL WORK (OUTPATIENT)
Dept: INFUSION THERAPY | Age: 84
End: 2024-11-20

## 2024-11-20 NOTE — PROGRESS NOTES
Oncology Social Work    Date: 11/20/2024  Time: 4:03 PM  Name: Sol Greer  MRN: [id number]     Contact Type: Transportation Request    Note:   Situation: Sol Greer called the Oncology Social Worker to assist with transportation to and from appts.     Background:  Sol Greer is not able to provide their own transportation and has asked the  for assistance.     Assessment:  Sol Greer requested transportation for upcoming appointment(s) located throughout the [facility] network of providers.  - She will be picked up by [facility] and taken to/from scheduled appointment(s) as follows:  Appt Date Appt Time Dept  Agency Used  Contact Phone # Transport Company Status Date Location Notes   25-Nov 9:00a Chemo [facility] Luz Elena [phone] [facility] Confirmed 11/20/24 [address] - 1 hr chemo    9:30a [facility]       [facility]        9:30a Provider       Seeing Dr Thompson                         3-Dec 10:20a Provider [facility] Luz Elena [phone] [facility] Confirmed 11/20/24 [address] - Seeing Dr Knight   - Patient has been notified of the arrangements provided.    Recommendation: Appt changes will be initiated by Sol Greer based on need.  provided my contact information and will remain available for support.      YUAN Locke, MEGAN, MONICA  Oncology Social Worker      Electronically signed by YUAN Locke LSW, ACHP-SW on 11/20/2024 at 4:03 PM

## 2024-11-22 ENCOUNTER — TELEPHONE (OUTPATIENT)
Dept: ONCOLOGY | Age: 84
End: 2024-11-22

## 2024-11-22 ENCOUNTER — CLINICAL DOCUMENTATION (OUTPATIENT)
Dept: CASE MANAGEMENT | Age: 84
End: 2024-11-22

## 2024-11-22 NOTE — PROGRESS NOTES
Donald received call on behalf of ДМИТРИЙ Yañez, pt's navigator.  Pt requested tx scheduled 11/25 be pushed out a week bc she is going to [place] for holiday.   Appt changed to 12/3, arrive at 0830.    Pt updated.    Pt with a lot of questions re: [facility] & clinical trial mentioned at last appt with ONC.    Donald review of ONC's documentation, & explained her the results of [facility] prompted ONC to discuss pt possibly being a  candidate for a clinical trial at [facility].    Pt inquired about specifics what the trial would be.  Donald shared that [facility] would evaluate her in consult to determine if she was appropriate for an open clinical trial * discuss it accordingly.  Pt is NOW re-thinking the referral to [facility] & may consider the evaluation.  Donald encouraged pt to share the above with her family over the holiday weekend.    Call transferred to  for transportation change for pt's rescheduled appt.

## 2024-11-25 ENCOUNTER — HOSPITAL ENCOUNTER (OUTPATIENT)
Dept: INFUSION THERAPY | Age: 84
End: 2024-11-25

## 2024-11-25 ENCOUNTER — TELEPHONE (OUTPATIENT)
Dept: PALLATIVE CARE | Age: 84
End: 2024-11-25

## 2024-11-27 ENCOUNTER — SOCIAL WORK (OUTPATIENT)
Dept: INFUSION THERAPY | Age: 84
End: 2024-11-27

## 2024-11-27 NOTE — PROGRESS NOTES
Oncology Social Work    Date: 11/27/2024  Time: 1:11 PM  Name: Sol Greer  MRN: [id number]     Contact Type: Transportation Request    Note:   Situation: Sol Greer called the Oncology Social Worker to assist with transportation to and from appts.     Background:  Sol Greer is not able to provide their own transportation and has asked the  for assistance.     Assessment:  Sol Greer requested transportation for upcoming appointment(s) located throughout the [facility] network of providers.  - She will be picked up by [facility] and taken to/from scheduled appointment(s) as follows:  Appt Date Appt Time Dept  Location Notes   3-Dec 8:30a Chemo [address] - 1 hr chemo    8:30a Line [facility] Labs [facility]        9:00a Provider Seeing Dr Thompson   11-Dec 11:00a Family Medicine [address] - Seeing KADI Knight     - Patient has been notified of the arrangements provided.    Recommendation: Appt changes will be initiated by Sol Greer based on need.  provided my contact information and will remain available for support.      YUAN Locke, MEGAN, MONICA  Oncology Social Worker      Electronically signed by YUAN Locke, MEGAN, MONICA on 11/27/2024 at 1:11 PM

## 2024-12-03 ENCOUNTER — OFFICE VISIT (OUTPATIENT)
Dept: ONCOLOGY | Age: 84
End: 2024-12-03
Payer: MEDICARE

## 2024-12-03 ENCOUNTER — HOSPITAL ENCOUNTER (OUTPATIENT)
Dept: INFUSION THERAPY | Age: 84
Discharge: HOME | End: 2024-12-03
Payer: MEDICARE

## 2024-12-03 VITALS
BODY MASS INDEX: 23.82 KG/M2 | HEART RATE: 67 BPM | HEIGHT: 65 IN | OXYGEN SATURATION: 96 % | TEMPERATURE: 98.7 F | SYSTOLIC BLOOD PRESSURE: 167 MMHG | DIASTOLIC BLOOD PRESSURE: 82 MMHG | WEIGHT: 143 LBS | RESPIRATION RATE: 16 BRPM

## 2024-12-03 VITALS
DIASTOLIC BLOOD PRESSURE: 75 MMHG | BODY MASS INDEX: 23.82 KG/M2 | HEART RATE: 87 BPM | WEIGHT: 143 LBS | SYSTOLIC BLOOD PRESSURE: 151 MMHG | HEIGHT: 65 IN | RESPIRATION RATE: 16 BRPM | TEMPERATURE: 97.5 F | OXYGEN SATURATION: 100 %

## 2024-12-03 DIAGNOSIS — C25.9 METASTASIS FROM PANCREATIC CANCER (HCC): Primary | ICD-10-CM

## 2024-12-03 DIAGNOSIS — C79.9 METASTASIS FROM PANCREATIC CANCER (HCC): Primary | ICD-10-CM

## 2024-12-03 DIAGNOSIS — C24.1 PRIMARY ADENOCARCINOMA OF AMPULLA OF VATER (HCC): ICD-10-CM

## 2024-12-03 LAB
ALBUMIN SERPL BCG-MCNC: 3.7 G/DL (ref 3.5–5.1)
ALP SERPL-CCNC: 198 U/L (ref 38–126)
ALT SERPL W/O P-5'-P-CCNC: 35 U/L (ref 11–66)
AST SERPL-CCNC: 50 U/L (ref 5–40)
BASOPHILS ABSOLUTE: 0.1 THOU/MM3 (ref 0–0.1)
BASOPHILS NFR BLD AUTO: 1 % (ref 0–3)
BILIRUB CONJ SERPL-MCNC: 0.2 MG/DL (ref 0.1–13.8)
BILIRUB SERPL-MCNC: 0.5 MG/DL (ref 0.3–1.2)
BUN BLDP-MCNC: 10 MG/DL (ref 8–26)
CHLORIDE BLD-SCNC: 108 MEQ/L (ref 98–109)
CREAT BLD-MCNC: 0.9 MG/DL (ref 0.5–1.2)
EOSINOPHIL NFR BLD AUTO: 2 % (ref 0–4)
EOSINOPHILS ABSOLUTE: 0.1 THOU/MM3 (ref 0–0.4)
ERYTHROCYTE [DISTWIDTH] IN BLOOD BY AUTOMATED COUNT: 15.5 % (ref 11.5–14.5)
GFR SERPL CREATININE-BSD FRML MDRD: 63 ML/MIN/1.73M2
GLUCOSE BLD-MCNC: 89 MG/DL (ref 70–108)
HCT VFR BLD AUTO: 34.6 % (ref 37–47)
HGB BLD-MCNC: 11.4 GM/DL (ref 12–16)
IMMATURE GRANULOCYTES %: 0 %
IMMATURE GRANULOCYTES ABSOLUTE: 0.02 THOU/MM3 (ref 0–0.07)
IONIZED CALCIUM, WHOLE BLOOD: 1.18 MMOL/L (ref 1.12–1.32)
LYMPHOCYTES ABSOLUTE: 2.1 THOU/MM3 (ref 1–4.8)
LYMPHOCYTES NFR BLD AUTO: 29 % (ref 15–47)
MCH RBC QN AUTO: 32.8 PG (ref 26–33)
MCHC RBC AUTO-ENTMCNC: 32.9 GM/DL (ref 32.2–35.5)
MCV RBC AUTO: 99 FL (ref 81–99)
MONOCYTES ABSOLUTE: 0.9 THOU/MM3 (ref 0.4–1.3)
MONOCYTES NFR BLD AUTO: 13 % (ref 0–12)
NEUTROPHILS ABSOLUTE: 4 THOU/MM3 (ref 1.8–7.7)
NEUTROPHILS NFR BLD AUTO: 56 % (ref 43–75)
PLATELET # BLD AUTO: 355 THOU/MM3 (ref 130–400)
PMV BLD AUTO: 10 FL (ref 9.4–12.4)
POTASSIUM BLD-SCNC: 4.2 MEQ/L (ref 3.5–4.9)
PROT SERPL-MCNC: 6.8 G/DL (ref 6.1–8)
RBC # BLD AUTO: 3.48 MILL/MM3 (ref 4.2–5.4)
SODIUM BLD-SCNC: 142 MEQ/L (ref 138–146)
TOTAL CO2, WHOLE BLOOD: 23 MEQ/L (ref 23–33)
WBC # BLD AUTO: 7.1 THOU/MM3 (ref 4.8–10.8)

## 2024-12-03 PROCEDURE — 1159F MED LIST DOCD IN RCRD: CPT | Performed by: INTERNAL MEDICINE

## 2024-12-03 PROCEDURE — 1126F AMNT PAIN NOTED NONE PRSNT: CPT | Performed by: INTERNAL MEDICINE

## 2024-12-03 PROCEDURE — 2580000003 HC RX 258: Performed by: INTERNAL MEDICINE

## 2024-12-03 PROCEDURE — 80047 BASIC METABLC PNL IONIZED CA: CPT

## 2024-12-03 PROCEDURE — 99214 OFFICE O/P EST MOD 30 MIN: CPT | Performed by: INTERNAL MEDICINE

## 2024-12-03 PROCEDURE — 96375 TX/PRO/DX INJ NEW DRUG ADDON: CPT

## 2024-12-03 PROCEDURE — 3077F SYST BP >= 140 MM HG: CPT | Performed by: INTERNAL MEDICINE

## 2024-12-03 PROCEDURE — 96413 CHEMO IV INFUSION 1 HR: CPT

## 2024-12-03 PROCEDURE — 99211 OFF/OP EST MAY X REQ PHY/QHP: CPT

## 2024-12-03 PROCEDURE — 80076 HEPATIC FUNCTION PANEL: CPT

## 2024-12-03 PROCEDURE — 6360000002 HC RX W HCPCS: Performed by: INTERNAL MEDICINE

## 2024-12-03 PROCEDURE — 96417 CHEMO IV INFUS EACH ADDL SEQ: CPT

## 2024-12-03 PROCEDURE — 1124F ACP DISCUSS-NO DSCNMKR DOCD: CPT | Performed by: INTERNAL MEDICINE

## 2024-12-03 PROCEDURE — 3078F DIAST BP <80 MM HG: CPT | Performed by: INTERNAL MEDICINE

## 2024-12-03 PROCEDURE — 85025 COMPLETE CBC W/AUTO DIFF WBC: CPT

## 2024-12-03 RX ORDER — HYDROCORTISONE SODIUM SUCCINATE 100 MG/2ML
100 INJECTION INTRAMUSCULAR; INTRAVENOUS
Status: CANCELLED | OUTPATIENT
Start: 2024-12-03

## 2024-12-03 RX ORDER — SODIUM CHLORIDE 9 MG/ML
5-250 INJECTION, SOLUTION INTRAVENOUS PRN
Status: CANCELLED | OUTPATIENT
Start: 2024-12-03

## 2024-12-03 RX ORDER — SODIUM CHLORIDE 9 MG/ML
5-250 INJECTION, SOLUTION INTRAVENOUS PRN
Status: DISCONTINUED | OUTPATIENT
Start: 2024-12-03 | End: 2024-12-04 | Stop reason: HOSPADM

## 2024-12-03 RX ORDER — DIPHENHYDRAMINE HYDROCHLORIDE 50 MG/ML
50 INJECTION INTRAMUSCULAR; INTRAVENOUS
Status: CANCELLED | OUTPATIENT
Start: 2024-12-03

## 2024-12-03 RX ORDER — MEPERIDINE HYDROCHLORIDE 50 MG/ML
12.5 INJECTION INTRAMUSCULAR; INTRAVENOUS; SUBCUTANEOUS PRN
Status: CANCELLED | OUTPATIENT
Start: 2024-12-03

## 2024-12-03 RX ORDER — EPINEPHRINE 1 MG/ML
0.3 INJECTION, SOLUTION, CONCENTRATE INTRAVENOUS PRN
Status: CANCELLED | OUTPATIENT
Start: 2024-12-03

## 2024-12-03 RX ORDER — SODIUM CHLORIDE 9 MG/ML
INJECTION, SOLUTION INTRAVENOUS CONTINUOUS
Status: CANCELLED | OUTPATIENT
Start: 2024-12-03

## 2024-12-03 RX ORDER — FAMOTIDINE 10 MG/ML
20 INJECTION, SOLUTION INTRAVENOUS
Status: CANCELLED | OUTPATIENT
Start: 2024-12-03

## 2024-12-03 RX ORDER — ONDANSETRON 2 MG/ML
8 INJECTION INTRAMUSCULAR; INTRAVENOUS ONCE
Status: COMPLETED | OUTPATIENT
Start: 2024-12-03 | End: 2024-12-03

## 2024-12-03 RX ORDER — SODIUM CHLORIDE 0.9 % (FLUSH) 0.9 %
5-40 SYRINGE (ML) INJECTION PRN
Status: DISCONTINUED | OUTPATIENT
Start: 2024-12-03 | End: 2024-12-04 | Stop reason: HOSPADM

## 2024-12-03 RX ORDER — HEPARIN 100 UNIT/ML
500 SYRINGE INTRAVENOUS PRN
OUTPATIENT
Start: 2024-12-03

## 2024-12-03 RX ORDER — HEPARIN 100 UNIT/ML
500 SYRINGE INTRAVENOUS PRN
Status: DISCONTINUED | OUTPATIENT
Start: 2024-12-03 | End: 2024-12-04 | Stop reason: HOSPADM

## 2024-12-03 RX ORDER — PACLITAXEL 100 MG/20ML
125 INJECTION, POWDER, LYOPHILIZED, FOR SUSPENSION INTRAVENOUS ONCE
Status: CANCELLED | OUTPATIENT
Start: 2024-12-03 | End: 2024-12-03

## 2024-12-03 RX ORDER — PACLITAXEL 100 MG/20ML
200 INJECTION, POWDER, LYOPHILIZED, FOR SUSPENSION INTRAVENOUS ONCE
Status: COMPLETED | OUTPATIENT
Start: 2024-12-03 | End: 2024-12-03

## 2024-12-03 RX ORDER — ONDANSETRON 2 MG/ML
8 INJECTION INTRAMUSCULAR; INTRAVENOUS ONCE
Status: CANCELLED | OUTPATIENT
Start: 2024-12-03 | End: 2024-12-03

## 2024-12-03 RX ORDER — SODIUM CHLORIDE 0.9 % (FLUSH) 0.9 %
5-40 SYRINGE (ML) INJECTION PRN
OUTPATIENT
Start: 2024-12-03

## 2024-12-03 RX ORDER — ACETAMINOPHEN 325 MG/1
650 TABLET ORAL
Status: CANCELLED | OUTPATIENT
Start: 2024-12-03

## 2024-12-03 RX ORDER — SODIUM CHLORIDE 0.9 % (FLUSH) 0.9 %
5-40 SYRINGE (ML) INJECTION PRN
Status: CANCELLED | OUTPATIENT
Start: 2024-12-03

## 2024-12-03 RX ORDER — ALBUTEROL SULFATE 90 UG/1
4 INHALANT RESPIRATORY (INHALATION) PRN
Status: CANCELLED | OUTPATIENT
Start: 2024-12-03

## 2024-12-03 RX ORDER — SODIUM CHLORIDE 9 MG/ML
25 INJECTION, SOLUTION INTRAVENOUS PRN
OUTPATIENT
Start: 2024-12-03

## 2024-12-03 RX ORDER — HEPARIN SODIUM (PORCINE) LOCK FLUSH IV SOLN 100 UNIT/ML 100 UNIT/ML
500 SOLUTION INTRAVENOUS PRN
Status: CANCELLED | OUTPATIENT
Start: 2024-12-03

## 2024-12-03 RX ORDER — ONDANSETRON 2 MG/ML
8 INJECTION INTRAMUSCULAR; INTRAVENOUS
Status: CANCELLED | OUTPATIENT
Start: 2024-12-03

## 2024-12-03 RX ADMIN — HEPARIN 500 UNITS: 100 SYRINGE at 13:30

## 2024-12-03 RX ADMIN — SODIUM CHLORIDE, PRESERVATIVE FREE 10 ML: 5 INJECTION INTRAVENOUS at 13:30

## 2024-12-03 RX ADMIN — PACLITAXEL 200 MG: 100 INJECTION, POWDER, LYOPHILIZED, FOR SUSPENSION INTRAVENOUS at 10:30

## 2024-12-03 RX ADMIN — ONDANSETRON 8 MG: 2 INJECTION INTRAMUSCULAR; INTRAVENOUS at 09:12

## 2024-12-03 RX ADMIN — SODIUM CHLORIDE, PRESERVATIVE FREE 30 ML: 5 INJECTION INTRAVENOUS at 08:30

## 2024-12-03 RX ADMIN — GEMCITABINE HYDROCHLORIDE 1600 MG: 1 INJECTION, SOLUTION INTRAVENOUS at 12:51

## 2024-12-03 RX ADMIN — SODIUM CHLORIDE 20 ML/HR: 9 INJECTION, SOLUTION INTRAVENOUS at 09:11

## 2024-12-03 NOTE — PROGRESS NOTES
Patient tolerated treatment without any complications.  Denies dizziness, lightheadedness, acute nausea or vomiting, headache, heart palpitations, rash/itching or increased SOB.    Last vital signs  BP (!) 167/82   Pulse 67   Temp 98.7 °F (37.1 °C) (Oral)   Resp 16   Ht 1.651 m (5' 5\")   Wt 64.9 kg (143 lb)   SpO2 96%   BMI 23.80 kg/m²     Patient instructed if they experience any of the above symptoms following today's visit, she is to notify the Physician or go to the Emergency Dept.    Discharge instructions given to patient, Verbalizes understanding. Ambulated off unit per self in stable condition with all belongings.

## 2024-12-03 NOTE — PROGRESS NOTES
[facility] [place] [facility]  [facility]  [address]  [address]  [place] [address]  Dept: [phone]  Loc: [phone]   Hematology/Oncology Progress Note (Clinic)        Sol Forresterdee  1940  No ref. provider found   Loida Knight MD     Diagnosis/CC:   Chief Complaint   Patient presents with    Follow-up     Primary adenocarcinoma of ampulla of Vater       HPI:     Diagnosis:   -Periampullary pancreatic adenocarcinoma        Treatment:   -Whipple procedure [facility] 12/4/2021  (7 of 27 lymph nodes positive for cancer.  -Adjuvant therapy: FOLFOX last administered 5/22.  First treatment began 1/26/2022.  Dose reductions including oxaliplatin to 70 mg per metered squared.  Stopped @ April with low counts.  Developed obstructive jaundice hospitalized at  June 16 through 21, 2022.  Patient had leukocytosis bacteremia and a stricture post Whipple 6/15 CAT scan showed cirrhosis, small ascites and postop changes.  Patient had Enterococcus bacteremia treated with Zosyn and Unasyn and then Augmentin.  No vegetations on valves.  ERCP 6/17/22  showed no obstruction or stones.  Intrahepatic branches diffusely dilated.  Abnormal LFTs and elevated bilirubin therefore a metal stent was placed in the common bile duct.  Stent should be changed in 3 months.  Patient is 2 months overdue to have her stent changed.     Followable Disease:   - A/P CT wo contrast  5/17/23- stable , 1/11/23-stable  -CT imaging abdominal pelvic CT with contrast 6/15/2022-mild ascites otherwise no changes.  -MRI of the abdomen with and without contrast 5/17/22-status post Whipple, mild worsening ascites mildly dilated biliary tree pancreatic mesenteric edema  - CA 19-9 - 39 ( 9/30/22)     Comorbidities:  -A. fib.  Hypertension, see below    Subjective/Interim Summary:   10/5/23-  Patient has been doing well.  Denies having any new symptoms.  No nausea, vomiting, abdominal pain,

## 2024-12-03 NOTE — PLAN OF CARE
Problem: Discharge Planning  Goal: Discharge to home or other facility with appropriate resources  Outcome: Adequate for Discharge  Flowsheets (Taken 12/3/2024 0900)  Discharge to home or other facility with appropriate resources:   Identify discharge learning needs (meds, wound care, etc)   Identify barriers to discharge with patient and caregiver  Note: Verbalize understanding of discharge instructions, follow up appointments, and when to call Physician.Discuss understanding of discharge instructions, follow up appointments and when to call Physician.        Problem: Safety - Adult  Goal: Free from fall injury  Outcome: Adequate for Discharge  Flowsheets (Taken 12/3/2024 0900)  Free From Fall Injury:   Instruct family/caregiver on patient safety   Based on caregiver fall risk screen, instruct family/caregiver to ask for assistance with transferring infant if caregiver noted to have fall risk factors  Note: Free from falls while in O.P. Oncology.Discussed the need to use the call light for assistance when getting up to ambulate.        Problem: Chronic Conditions and Co-morbidities  Goal: Patient's chronic conditions and co-morbidity symptoms are monitored and maintained or improved  Outcome: Adequate for Discharge  Flowsheets (Taken 12/3/2024 0900)  Care Plan - Patient's Chronic Conditions and Co-Morbidity Symptoms are Monitored and Maintained or Improved:   Collaborate with multidisciplinary team to address chronic and comorbid conditions and prevent exacerbation or deterioration   Monitor and assess patient's chronic conditions and comorbid symptoms for stability, deterioration, or improvement  Note:   Chemotherapy Teaching     What is Chemotherapy   Drug action [x]   Method of Administration [x]   Handouts given []     Side Effects  Nausea/vomiting [x]   Diarrhea [x]   Fatigue [x]   Signs / Symptoms of infection [x]   Neutropenia [x]   Thrombocytopenia [x]   Alopecia [x]   neuropathy [x]   Hughes diet &  the

## 2024-12-03 NOTE — PATIENT INSTRUCTIONS
Labs reviewed, proceed with treatment.  Orders Placed This Encounter   Procedures    CBC With Auto Differential    Comprehensive metabolic panel    CBC with Auto Differential    Hepatic Function Panel    POC PANEL BMP W/IOCA    Amb External Referral To Oncology   Return in about 2 weeks (around 12/17/2024). MD visit+ labs+ treatment visit

## 2024-12-05 ENCOUNTER — SOCIAL WORK (OUTPATIENT)
Dept: INFUSION THERAPY | Age: 84
End: 2024-12-05

## 2024-12-05 NOTE — PROGRESS NOTES
Oncology Social Work    Date: 12/5/2024  Time: 3:59 PM  Name: Sol Greer  MRN: [id number]     Contact Type: Transportation Request    Note:   Situation: Sol Greer called the Oncology Social Worker to assist with transportation to and from appts.     Background:  Sol Greer is not able to provide their own transportation and has asked the  for assistance.     Assessment:  Sol Greer requested transportation for upcoming appointment(s) located throughout the [facility] network of providers.  - She will be picked up by [facility] and taken to/from scheduled appointment(s) as follows:  Appt Date Appt Time Dept  Location Notes   11-Dec 11:00a Family Medicine [address] - Seeing KADI Knight   17-Dec 9:30a Line & Labs Line & Labs        10:00a Chemo [address] - 1 hr chemo    10:30a Provider Seeing Dr Mcknight     - Patient has been notified of the arrangements provided.    Recommendation: Appt changes will be initiated by Sol Greer based on need.  provided my contact information and will remain available for support.      YUAN Locke, MEGAN, MONICA  Oncology Social Worker      Electronically signed by YUAN Locke LSW, MONICA on 12/5/2024 at 3:59 PM

## 2024-12-10 SDOH — HEALTH STABILITY: PHYSICAL HEALTH: ON AVERAGE, HOW MANY DAYS PER WEEK DO YOU ENGAGE IN MODERATE TO STRENUOUS EXERCISE (LIKE A BRISK WALK)?: 1 DAY

## 2024-12-10 SDOH — HEALTH STABILITY: PHYSICAL HEALTH: ON AVERAGE, HOW MANY MINUTES DO YOU ENGAGE IN EXERCISE AT THIS LEVEL?: 20 MIN

## 2024-12-10 ASSESSMENT — PATIENT HEALTH QUESTIONNAIRE - PHQ9
SUM OF ALL RESPONSES TO PHQ QUESTIONS 1-9: 0
1. LITTLE INTEREST OR PLEASURE IN DOING THINGS: NOT AT ALL
SUM OF ALL RESPONSES TO PHQ9 QUESTIONS 1 & 2: 0
SUM OF ALL RESPONSES TO PHQ QUESTIONS 1-9: 0
2. FEELING DOWN, DEPRESSED OR HOPELESS: NOT AT ALL
SUM OF ALL RESPONSES TO PHQ QUESTIONS 1-9: 0
SUM OF ALL RESPONSES TO PHQ QUESTIONS 1-9: 0

## 2024-12-10 ASSESSMENT — LIFESTYLE VARIABLES
HOW MANY STANDARD DRINKS CONTAINING ALCOHOL DO YOU HAVE ON A TYPICAL DAY: PATIENT DOES NOT DRINK
HOW OFTEN DO YOU HAVE SIX OR MORE DRINKS ON ONE OCCASION: 1
HOW OFTEN DO YOU HAVE A DRINK CONTAINING ALCOHOL: 1
HOW MANY STANDARD DRINKS CONTAINING ALCOHOL DO YOU HAVE ON A TYPICAL DAY: 0
HOW OFTEN DO YOU HAVE A DRINK CONTAINING ALCOHOL: NEVER

## 2024-12-11 ENCOUNTER — OFFICE VISIT (OUTPATIENT)
Dept: FAMILY MEDICINE CLINIC | Age: 84
End: 2024-12-11

## 2024-12-11 VITALS
HEART RATE: 72 BPM | DIASTOLIC BLOOD PRESSURE: 64 MMHG | SYSTOLIC BLOOD PRESSURE: 116 MMHG | TEMPERATURE: 97.2 F | WEIGHT: 136.4 LBS | HEIGHT: 65 IN | BODY MASS INDEX: 22.73 KG/M2

## 2024-12-11 DIAGNOSIS — N39.41 URGE INCONTINENCE OF URINE: ICD-10-CM

## 2024-12-11 DIAGNOSIS — G62.0 CHEMOTHERAPY-INDUCED NEUROPATHY (HCC): ICD-10-CM

## 2024-12-11 DIAGNOSIS — Z00.00 MEDICARE ANNUAL WELLNESS VISIT, SUBSEQUENT: Primary | ICD-10-CM

## 2024-12-11 DIAGNOSIS — T45.1X5A CHEMOTHERAPY-INDUCED NEUROPATHY (HCC): ICD-10-CM

## 2024-12-11 DIAGNOSIS — L98.9 NON-HEALING SKIN LESION OF NOSE: ICD-10-CM

## 2024-12-11 DIAGNOSIS — H26.8 OTHER CATARACT OF BOTH EYES: ICD-10-CM

## 2024-12-11 DIAGNOSIS — I10 PRIMARY HYPERTENSION: ICD-10-CM

## 2024-12-11 RX ORDER — AMLODIPINE BESYLATE 2.5 MG/1
TABLET ORAL
Qty: 30 TABLET | Refills: 3
Start: 2024-12-11

## 2024-12-11 NOTE — PROGRESS NOTES
Health Maintenance Due   Topic Date Due    DTaP/Tdap/Td vaccine (1 - Tdap) Patient would like to have done.    Shingles vaccine (1 of 2) Never done    Respiratory Syncytial Virus (RSV) Pregnant or age 60 yrs+ (1 - 1-dose 75+ series) Never done    Annual Wellness Visit (Medicare Advantage)  Done today     COVID-19 Vaccine (9 - 2023-24 season) 11/18/2024

## 2024-12-11 NOTE — PROGRESS NOTES
Medicare Annual Wellness Visit    Sol Greer is here for Medicare AWV (Medicare Well Visit )    Assessment & Plan   Medicare annual wellness visit, subsequent  Primary hypertension  -     amLODIPine (NORVASC) 2.5 MG tablet; Take 1 tablet in the morning on days you will receive chemotherapy, Disp-30 tablet, R-3NO PRINT  Non-healing skin lesion of nose  Urge incontinence of urine  Chemotherapy-induced neuropathy (HCC)  Other cataract of both eyes    AWV completed as below. Preventive care including DEXA, Mammo, and colonoscopy will be held off at this time due to ongoing chemo therapy    HTN: Chronic. Controlled in office but seems to be elevated when patient has chemo. Advised to start Norvasc 2.5 mg daily only on days when she has chemo/oncology appointments and will monitor.    Lesion of nose: Acute issue, recommend triple antibiotic cream and moisturizing with vaseline. Advised to monitor and if continues to grow or starts bleeding and will need referral to dermatology    Urge incontinence: Chronic, uncontrolled. Myrbetriq too costly. Pelvic floor exercises provided for patient. Hesitant with oxybutynin due to blood pressure. Monitor    Neuropathy: Chronic, stable. Continue current dose gabapentin 600 mg TID    Cataracts: Advised to check with oncology if patient able to have cataract procedure during active treatment    Recommendations for Preventive Services Due: see orders and patient instructions/AVS.  Recommended screening schedule for the next 5-10 years is provided to the patient in written form: see Patient Instructions/AVS.     Return in about 3 months (around 3/11/2025).     Subjective   The following acute and/or chronic problems were also addressed today:    BP has been elevated intermittently. Appears to run 150-160s/70-80s mostly at oncology or chemo appointments. Medications stopped previously due to hypotension. Still has norvasc at home.    Has lesion on right nostril over past week

## 2024-12-12 ENCOUNTER — SOCIAL WORK (OUTPATIENT)
Dept: INFUSION THERAPY | Age: 84
End: 2024-12-12

## 2024-12-12 NOTE — PROGRESS NOTES
Oncology Social Work    Date: 12/12/2024  Time: 10:52 AM  Name: Sol Greer  MRN: [id number]     Contact Type: Transportation Request    Note:   Situation: Sol Greer called the Oncology Social Worker to assist with transportation to and from appts.     Background:  Sol Greer is not able to provide their own transportation and has asked the  for assistance.     Assessment:  Sol Greer requested transportation for upcoming appointment(s) located throughout the [facility] network of providers.  - She will be picked up by [facility] at 1:30-2:00p and taken to/from scheduled appointment on Dec 19th @ 4:00p as follows:  GI Medical Oncology Dept (Dr Marquis)  [address]  [phone]  - Patient has been notified of the arrangements provided.    Recommendation: Appt changes will be initiated by Sol Greer based on need.  provided my contact information and will remain available for support.      YUAN Locke, MEGAN, MONICA  Oncology Social Worker      Electronically signed by YUAN Locke LSW, ACHP-SW on 12/12/2024 at 10:52 AM

## 2024-12-17 ENCOUNTER — OFFICE VISIT (OUTPATIENT)
Dept: ONCOLOGY | Age: 84
End: 2024-12-17
Payer: MEDICARE

## 2024-12-17 ENCOUNTER — HOSPITAL ENCOUNTER (OUTPATIENT)
Dept: INFUSION THERAPY | Age: 84
Discharge: HOME | End: 2024-12-17
Payer: MEDICARE

## 2024-12-17 VITALS
HEART RATE: 70 BPM | SYSTOLIC BLOOD PRESSURE: 146 MMHG | RESPIRATION RATE: 16 BRPM | OXYGEN SATURATION: 96 % | WEIGHT: 139.8 LBS | TEMPERATURE: 98.5 F | HEIGHT: 65 IN | DIASTOLIC BLOOD PRESSURE: 69 MMHG | BODY MASS INDEX: 23.29 KG/M2

## 2024-12-17 VITALS
DIASTOLIC BLOOD PRESSURE: 67 MMHG | BODY MASS INDEX: 23.29 KG/M2 | SYSTOLIC BLOOD PRESSURE: 148 MMHG | RESPIRATION RATE: 16 BRPM | TEMPERATURE: 98.2 F | WEIGHT: 139.8 LBS | HEIGHT: 65 IN | HEART RATE: 74 BPM | OXYGEN SATURATION: 95 %

## 2024-12-17 DIAGNOSIS — C79.9 METASTASIS FROM PANCREATIC CANCER (HCC): Primary | ICD-10-CM

## 2024-12-17 DIAGNOSIS — C25.9 METASTASIS FROM PANCREATIC CANCER (HCC): Primary | ICD-10-CM

## 2024-12-17 LAB
ALBUMIN SERPL BCG-MCNC: 3.4 G/DL (ref 3.5–5.1)
ALP SERPL-CCNC: 254 U/L (ref 38–126)
ALT SERPL W/O P-5'-P-CCNC: 59 U/L (ref 11–66)
AST SERPL-CCNC: 89 U/L (ref 5–40)
BASOPHILS ABSOLUTE: 0 THOU/MM3 (ref 0–0.1)
BASOPHILS NFR BLD AUTO: 1 % (ref 0–3)
BILIRUB CONJ SERPL-MCNC: 0.4 MG/DL (ref 0.1–13.8)
BILIRUB SERPL-MCNC: 1 MG/DL (ref 0.3–1.2)
BUN BLDP-MCNC: 15 MG/DL (ref 8–26)
CHLORIDE BLD-SCNC: 107 MEQ/L (ref 98–109)
CREAT BLD-MCNC: 1.1 MG/DL (ref 0.5–1.2)
EOSINOPHIL NFR BLD AUTO: 1 % (ref 0–4)
EOSINOPHILS ABSOLUTE: 0.1 THOU/MM3 (ref 0–0.4)
ERYTHROCYTE [DISTWIDTH] IN BLOOD BY AUTOMATED COUNT: 14.9 % (ref 11.5–14.5)
GFR SERPL CREATININE-BSD FRML MDRD: 50 ML/MIN/1.73M2
GLUCOSE BLD-MCNC: 83 MG/DL (ref 70–108)
HCT VFR BLD AUTO: 34.3 % (ref 37–47)
HGB BLD-MCNC: 11 GM/DL (ref 12–16)
IMMATURE GRANULOCYTES %: 1 %
IMMATURE GRANULOCYTES ABSOLUTE: 0.03 THOU/MM3 (ref 0–0.07)
IONIZED CALCIUM, WHOLE BLOOD: 1.23 MMOL/L (ref 1.12–1.32)
LYMPHOCYTES ABSOLUTE: 1.1 THOU/MM3 (ref 1–4.8)
LYMPHOCYTES NFR BLD AUTO: 17 % (ref 15–47)
MCH RBC QN AUTO: 32.3 PG (ref 26–33)
MCHC RBC AUTO-ENTMCNC: 32.1 GM/DL (ref 32.2–35.5)
MCV RBC AUTO: 101 FL (ref 81–99)
MONOCYTES ABSOLUTE: 0.9 THOU/MM3 (ref 0.4–1.3)
MONOCYTES NFR BLD AUTO: 14 % (ref 0–12)
NEUTROPHILS ABSOLUTE: 4.2 THOU/MM3 (ref 1.8–7.7)
NEUTROPHILS NFR BLD AUTO: 67 % (ref 43–75)
PLATELET # BLD AUTO: 274 THOU/MM3 (ref 130–400)
PMV BLD AUTO: 9.8 FL (ref 9.4–12.4)
POTASSIUM BLD-SCNC: 3.9 MEQ/L (ref 3.5–4.9)
PROT SERPL-MCNC: 6.3 G/DL (ref 6.1–8)
RBC # BLD AUTO: 3.41 MILL/MM3 (ref 4.2–5.4)
SODIUM BLD-SCNC: 141 MEQ/L (ref 138–146)
TOTAL CO2, WHOLE BLOOD: 23 MEQ/L (ref 23–33)
WBC # BLD AUTO: 6.2 THOU/MM3 (ref 4.8–10.8)

## 2024-12-17 PROCEDURE — 96375 TX/PRO/DX INJ NEW DRUG ADDON: CPT

## 2024-12-17 PROCEDURE — 1126F AMNT PAIN NOTED NONE PRSNT: CPT | Performed by: INTERNAL MEDICINE

## 2024-12-17 PROCEDURE — 1159F MED LIST DOCD IN RCRD: CPT | Performed by: INTERNAL MEDICINE

## 2024-12-17 PROCEDURE — 85025 COMPLETE CBC W/AUTO DIFF WBC: CPT

## 2024-12-17 PROCEDURE — 80047 BASIC METABLC PNL IONIZED CA: CPT

## 2024-12-17 PROCEDURE — 2580000003 HC RX 258: Performed by: INTERNAL MEDICINE

## 2024-12-17 PROCEDURE — 80076 HEPATIC FUNCTION PANEL: CPT

## 2024-12-17 PROCEDURE — 3077F SYST BP >= 140 MM HG: CPT | Performed by: INTERNAL MEDICINE

## 2024-12-17 PROCEDURE — 1124F ACP DISCUSS-NO DSCNMKR DOCD: CPT | Performed by: INTERNAL MEDICINE

## 2024-12-17 PROCEDURE — 96417 CHEMO IV INFUS EACH ADDL SEQ: CPT

## 2024-12-17 PROCEDURE — 6360000002 HC RX W HCPCS: Performed by: INTERNAL MEDICINE

## 2024-12-17 PROCEDURE — 3078F DIAST BP <80 MM HG: CPT | Performed by: INTERNAL MEDICINE

## 2024-12-17 PROCEDURE — 99214 OFFICE O/P EST MOD 30 MIN: CPT | Performed by: INTERNAL MEDICINE

## 2024-12-17 PROCEDURE — 96413 CHEMO IV INFUSION 1 HR: CPT

## 2024-12-17 PROCEDURE — 99211 OFF/OP EST MAY X REQ PHY/QHP: CPT

## 2024-12-17 RX ORDER — ALBUTEROL SULFATE 90 UG/1
4 INHALANT RESPIRATORY (INHALATION) PRN
Status: CANCELLED | OUTPATIENT
Start: 2024-12-17

## 2024-12-17 RX ORDER — SODIUM CHLORIDE 9 MG/ML
INJECTION, SOLUTION INTRAVENOUS CONTINUOUS
Status: CANCELLED | OUTPATIENT
Start: 2024-12-17

## 2024-12-17 RX ORDER — ONDANSETRON 2 MG/ML
8 INJECTION INTRAMUSCULAR; INTRAVENOUS ONCE
Status: COMPLETED | OUTPATIENT
Start: 2024-12-17 | End: 2024-12-17

## 2024-12-17 RX ORDER — SODIUM CHLORIDE 9 MG/ML
5-250 INJECTION, SOLUTION INTRAVENOUS PRN
Status: CANCELLED | OUTPATIENT
Start: 2024-12-17

## 2024-12-17 RX ORDER — HEPARIN SODIUM (PORCINE) LOCK FLUSH IV SOLN 100 UNIT/ML 100 UNIT/ML
500 SOLUTION INTRAVENOUS PRN
Status: CANCELLED | OUTPATIENT
Start: 2024-12-17

## 2024-12-17 RX ORDER — FAMOTIDINE 10 MG/ML
20 INJECTION, SOLUTION INTRAVENOUS
Status: CANCELLED | OUTPATIENT
Start: 2024-12-17

## 2024-12-17 RX ORDER — PACLITAXEL 100 MG/20ML
200 INJECTION, POWDER, LYOPHILIZED, FOR SUSPENSION INTRAVENOUS ONCE
Status: CANCELLED | OUTPATIENT
Start: 2024-12-17 | End: 2024-12-17

## 2024-12-17 RX ORDER — ONDANSETRON 2 MG/ML
8 INJECTION INTRAMUSCULAR; INTRAVENOUS ONCE
Status: CANCELLED | OUTPATIENT
Start: 2024-12-17 | End: 2024-12-17

## 2024-12-17 RX ORDER — PACLITAXEL 100 MG/20ML
200 INJECTION, POWDER, LYOPHILIZED, FOR SUSPENSION INTRAVENOUS ONCE
Status: COMPLETED | OUTPATIENT
Start: 2024-12-17 | End: 2024-12-17

## 2024-12-17 RX ORDER — ONDANSETRON 2 MG/ML
8 INJECTION INTRAMUSCULAR; INTRAVENOUS
Status: CANCELLED | OUTPATIENT
Start: 2024-12-17

## 2024-12-17 RX ORDER — SODIUM CHLORIDE 0.9 % (FLUSH) 0.9 %
5-40 SYRINGE (ML) INJECTION PRN
Status: CANCELLED | OUTPATIENT
Start: 2024-12-17

## 2024-12-17 RX ORDER — MEPERIDINE HYDROCHLORIDE 50 MG/ML
12.5 INJECTION INTRAMUSCULAR; INTRAVENOUS; SUBCUTANEOUS PRN
Status: CANCELLED | OUTPATIENT
Start: 2024-12-17

## 2024-12-17 RX ORDER — ACETAMINOPHEN 325 MG/1
650 TABLET ORAL
Status: CANCELLED | OUTPATIENT
Start: 2024-12-17

## 2024-12-17 RX ORDER — HEPARIN 100 UNIT/ML
500 SYRINGE INTRAVENOUS PRN
Status: DISCONTINUED | OUTPATIENT
Start: 2024-12-17 | End: 2024-12-18 | Stop reason: HOSPADM

## 2024-12-17 RX ORDER — SODIUM CHLORIDE 9 MG/ML
5-250 INJECTION, SOLUTION INTRAVENOUS PRN
Status: DISCONTINUED | OUTPATIENT
Start: 2024-12-17 | End: 2024-12-18 | Stop reason: HOSPADM

## 2024-12-17 RX ORDER — EPINEPHRINE 1 MG/ML
0.3 INJECTION, SOLUTION, CONCENTRATE INTRAVENOUS PRN
Status: CANCELLED | OUTPATIENT
Start: 2024-12-17

## 2024-12-17 RX ORDER — SODIUM CHLORIDE 0.9 % (FLUSH) 0.9 %
5-40 SYRINGE (ML) INJECTION PRN
Status: DISCONTINUED | OUTPATIENT
Start: 2024-12-17 | End: 2024-12-18 | Stop reason: HOSPADM

## 2024-12-17 RX ORDER — DIPHENHYDRAMINE HYDROCHLORIDE 50 MG/ML
50 INJECTION INTRAMUSCULAR; INTRAVENOUS
Status: CANCELLED | OUTPATIENT
Start: 2024-12-17

## 2024-12-17 RX ORDER — HYDROCORTISONE SODIUM SUCCINATE 100 MG/2ML
100 INJECTION INTRAMUSCULAR; INTRAVENOUS
Status: CANCELLED | OUTPATIENT
Start: 2024-12-17

## 2024-12-17 RX ADMIN — SODIUM CHLORIDE 55 ML/HR: 9 INJECTION, SOLUTION INTRAVENOUS at 11:26

## 2024-12-17 RX ADMIN — HEPARIN 500 UNITS: 100 SYRINGE at 13:28

## 2024-12-17 RX ADMIN — PACLITAXEL 200 MG: 100 INJECTION, POWDER, LYOPHILIZED, FOR SUSPENSION INTRAVENOUS at 12:46

## 2024-12-17 RX ADMIN — SODIUM CHLORIDE, PRESERVATIVE FREE 10 ML: 5 INJECTION INTRAVENOUS at 13:28

## 2024-12-17 RX ADMIN — ONDANSETRON 8 MG: 2 INJECTION INTRAMUSCULAR; INTRAVENOUS at 11:27

## 2024-12-17 RX ADMIN — SODIUM CHLORIDE 1600 MG: 9 INJECTION, SOLUTION INTRAVENOUS at 12:11

## 2024-12-17 RX ADMIN — SODIUM CHLORIDE, PRESERVATIVE FREE 30 ML: 5 INJECTION INTRAVENOUS at 10:19

## 2024-12-17 NOTE — PLAN OF CARE
Problem: Discharge Planning  Goal: Discharge to home or other facility with appropriate resources  Outcome: Adequate for Discharge  Flowsheets (Taken 12/17/2024 1331)  Discharge to home or other facility with appropriate resources: Identify barriers to discharge with patient and caregiver     Problem: Safety - Adult  Goal: Free from fall injury  Outcome: Adequate for Discharge  Flowsheets (Taken 12/17/2024 1331)  Free From Fall Injury: Instruct family/caregiver on patient safety     Problem: Chronic Conditions and Co-morbidities  Goal: Patient's chronic conditions and co-morbidity symptoms are monitored and maintained or improved  Outcome: Adequate for Discharge  Flowsheets (Taken 12/17/2024 1331)  Care Plan - Patient's Chronic Conditions and Co-Morbidity Symptoms are Monitored and Maintained or Improved: Monitor and assess patient's chronic conditions and comorbid symptoms for stability, deterioration, or improvement  Note:   Chemotherapy Teaching     What is Chemotherapy   Drug action [x]   Method of Administration [x]   Handouts given []     Side Effects  Nausea/vomiting [x]   Diarrhea [x]   Fatigue [x]   Signs / Symptoms of infection [x]   Neutropenia [x]   Thrombocytopenia [x]   Alopecia [x]   neuropathy [x]   Nixon diet &  the importance of fluids [x]       Micellaneous  Importance of nutrition [x]   Importance of oral hygiene [x]   When to call the MD [x]   Monitoring labs [x]   Use of supportive services []     Explanation of Drug Regimen / Frequency  Abraxane/gemzar     Comments  Verbalized understanding to drug,action,side effects and when to call MD         Problem: Infection - Adult  Goal: Absence of infection at discharge  Outcome: Adequate for Discharge  Flowsheets (Taken 12/17/2024 1331)  Absence of infection at discharge: Monitor all insertion sites i.e., indwelling lines, tubes and drains  Note: Mediport site with no redness or warmth. Skin over port site intact with no signs of breakdown noted.  Patient verbalizes signs/symptoms of port infection and when to notify the physician.

## 2024-12-17 NOTE — PROGRESS NOTES
[facility] [place] [facility]  [facility]  [address]  [address]  [place] [address]  Dept: [phone]  Loc: [phone]   Hematology/Oncology Progress Note (Clinic)        Sol Forresterdee  1940  No ref. provider found   Loida Knight MD     Diagnosis/CC:   Chief Complaint   Patient presents with    Follow-up     Primary adenocarcinoma of ampulla of Vater (HCC)       HPI:     Diagnosis:   -Periampullary pancreatic adenocarcinoma        Treatment:   -Whipple procedure [facility] 12/4/2021  (7 of 27 lymph nodes positive for cancer.  -Adjuvant therapy: FOLFOX last administered 5/22.  First treatment began 1/26/2022.  Dose reductions including oxaliplatin to 70 mg per metered squared.  Stopped @ April with low counts.  Developed obstructive jaundice hospitalized at  June 16 through 21, 2022.  Patient had leukocytosis bacteremia and a stricture post Whipple 6/15 CAT scan showed cirrhosis, small ascites and postop changes.  Patient had Enterococcus bacteremia treated with Zosyn and Unasyn and then Augmentin.  No vegetations on valves.  ERCP 6/17/22  showed no obstruction or stones.  Intrahepatic branches diffusely dilated.  Abnormal LFTs and elevated bilirubin therefore a metal stent was placed in the common bile duct.  Stent should be changed in 3 months.  Patient is 2 months overdue to have her stent changed.     Followable Disease:   - A/P CT wo contrast  5/17/23- stable , 1/11/23-stable  -CT imaging abdominal pelvic CT with contrast 6/15/2022-mild ascites otherwise no changes.  -MRI of the abdomen with and without contrast 5/17/22-status post Whipple, mild worsening ascites mildly dilated biliary tree pancreatic mesenteric edema  - CA 19-9 - 39 ( 9/30/22)     Comorbidities:  -A. fib.  Hypertension, see below    Subjective/Interim Summary:   10/5/23-  Patient has been doing well.  Denies having any new symptoms.  No nausea, vomiting, abdominal

## 2024-12-17 NOTE — DISCHARGE INSTRUCTIONS
Please contact your Oncologist if you have any questions regarding the chemotherapy Abraxane/Gemzar that you received today.      Patient instructed if experience any of the symptoms following today's chemotherapy treatment to notify MD immediately or go to emergency department.    * dizziness/lightheadedness  *acute nausea/vomiting - not relieved with medication  *headache - not relieved from Tylenol/pain medication  *chest pain/pressure  *rash/itching  *shortness of breath        Drink fluids - 48oz fluids daily  Call if develop fever/ chills/ signs or symptoms of infection     Physician's instructions:  Instructions  Return in about 2 weeks (around 12/31/2024).  Ok for chemo today  RTC in 2 weeks  PET/Ct after third cycle. And prior to next visit

## 2024-12-17 NOTE — PROGRESS NOTES
Patient assessed for the following post chemotherapy:    Dizziness   No  Lightheadedness  No      Acute nausea/vomiting No  Headache   No  Chest pain/pressure  No  Rash/itching   No  Shortness of breath  No    Patient kept for 20 minutes observation post infusion chemotherapy.    Patient tolerated chemotherapy treatment Abraxane/Gemzar without any complications.    Last vital signs:   BP (!) 146/69   Pulse 70   Temp 98.5 °F (36.9 °C) (Oral)   Resp 16   Ht 1.651 m (5' 5\")   Wt 63.4 kg (139 lb 12.8 oz)   SpO2 96%   BMI 23.26 kg/m²     Physician's instructions:  Ok for chemo today  RTC in 2 weeks  PET/Ct after third cycle. And prior to next visit    Patient instructed if experience any of the above symptoms following today's infusion, she is to notify MD immediately or go to the emergency department.    Discharge instructions given to patient. Verbalizes understanding. Ambulated off unit per self, with belongings.

## 2024-12-18 RX ORDER — HEPARIN 100 UNIT/ML
500 SYRINGE INTRAVENOUS PRN
OUTPATIENT
Start: 2024-12-18

## 2024-12-18 RX ORDER — SODIUM CHLORIDE 0.9 % (FLUSH) 0.9 %
5-40 SYRINGE (ML) INJECTION PRN
OUTPATIENT
Start: 2024-12-18

## 2024-12-18 RX ORDER — SODIUM CHLORIDE 9 MG/ML
25 INJECTION, SOLUTION INTRAVENOUS PRN
OUTPATIENT
Start: 2024-12-18

## 2024-12-23 ENCOUNTER — HOSPITAL ENCOUNTER (OUTPATIENT)
Dept: PET IMAGING | Age: 84
Discharge: HOME | End: 2024-12-23
Attending: INTERNAL MEDICINE
Payer: MEDICARE

## 2024-12-23 DIAGNOSIS — C79.9 METASTASIS FROM PANCREATIC CANCER (HCC): ICD-10-CM

## 2024-12-23 DIAGNOSIS — C25.9 METASTASIS FROM PANCREATIC CANCER (HCC): Primary | ICD-10-CM

## 2024-12-23 DIAGNOSIS — C79.9 METASTASIS FROM PANCREATIC CANCER (HCC): Primary | ICD-10-CM

## 2024-12-23 DIAGNOSIS — C25.9 METASTASIS FROM PANCREATIC CANCER (HCC): ICD-10-CM

## 2024-12-23 PROCEDURE — 78815 PET IMAGE W/CT SKULL-THIGH: CPT

## 2024-12-23 PROCEDURE — 3430000000 HC RX DIAGNOSTIC RADIOPHARMACEUTICAL: Performed by: INTERNAL MEDICINE

## 2024-12-23 PROCEDURE — A9609 HC RX DIAGNOSTIC RADIOPHARMACEUTICAL: HCPCS | Performed by: INTERNAL MEDICINE

## 2024-12-23 RX ORDER — FLUDEOXYGLUCOSE F 18 200 MCI/ML
9.3 INJECTION, SOLUTION INTRAVENOUS
Status: COMPLETED | OUTPATIENT
Start: 2024-12-23 | End: 2024-12-23

## 2024-12-23 RX ADMIN — FLUDEOXYGLUCOSE F 18 9.3 MILLICURIE: 200 INJECTION, SOLUTION INTRAVENOUS at 13:02

## 2024-12-23 NOTE — PROGRESS NOTES
Got call from radiology that the patient has slight pneumothorax.  From the radiologist the pneumothorax seems to be slight and small.  I called the patient immediately at 3 PM today December 23, 2024.  Patient reported that she has no dyspnea or chest pain or any discomfort.  I informed her of the results of pneumothorax.  I informed her that this may regress or get any bigger.  It may cause symptoms of dyspnea or chest pain.  Patient is leaving to [place] for the [place] tomorrow early morning and she will return back next week.  I have informed the patient that we need to get a follow-up chest x-ray in 1 or 2 days.  I advised her to see a physician where she is going to any [place] and be evaluated in 1 or 2 days and repeat chest x-ray.  I also advised her that if develop any shortness of breath or chest pain to report immediately to the emergency room.  She verbalized understanding.

## 2024-12-24 ENCOUNTER — HOSPITAL ENCOUNTER (OUTPATIENT)
Dept: GENERAL RADIOLOGY | Age: 84
Discharge: HOME | End: 2024-12-24
Payer: MEDICARE

## 2024-12-24 ENCOUNTER — HOSPITAL ENCOUNTER (OUTPATIENT)
Age: 84
Discharge: HOME | End: 2024-12-24
Payer: MEDICARE

## 2024-12-24 ENCOUNTER — TELEPHONE (OUTPATIENT)
Dept: ONCOLOGY | Age: 84
End: 2024-12-24

## 2024-12-24 DIAGNOSIS — C79.9 METASTASIS FROM PANCREATIC CANCER (HCC): ICD-10-CM

## 2024-12-24 DIAGNOSIS — C25.9 METASTASIS FROM PANCREATIC CANCER (HCC): ICD-10-CM

## 2024-12-24 PROCEDURE — 71045 X-RAY EXAM CHEST 1 VIEW: CPT

## 2024-12-24 NOTE — TELEPHONE ENCOUNTER
Patient calling in asking what the next steps are after talking to Dr. Mcknight yesterday about her PET scan results. I let her know that there is a chest x-ray order for her to complete and that she needs to do that ASAP, she is leaving to go out of town for 3 days and I let her know that she will need to complete this before she goes. She stated understanding and will be heading to the hospital later this morning.

## 2024-12-27 ENCOUNTER — SOCIAL WORK (OUTPATIENT)
Dept: INFUSION THERAPY | Age: 84
End: 2024-12-27

## 2024-12-27 NOTE — PROGRESS NOTES
Oncology Social Work    Date: 12/27/2024  Time: 9:02 AM  Name: Sol Greer  MRN: [id number]     Contact Type: Transportation Request    Note:   Situation: Sol Greer called the Oncology Social Worker to assist with transportation to and from appts.     Background:  Sol Greer is not able to provide their own transportation and has asked the  for assistance.     Assessment:  Sol Greer requested transportation for upcoming appointment(s) located throughout the [facility] network of providers.  - She will be picked up by [facility] and taken to/from scheduled appointment(s) as follows:  Appt Date Appt Time Dept  Location Notes   31-Dec 10:00a Chemo [address] - 1 hr chemo    10:30a Line & Labs Line & Labs        10:35a Provider Seeing Dr Mcknight   - Patient has been notified of the arrangements provided.    Recommendation: Appt changes will be initiated by Sol Greer based on need.  provided my contact information and will remain available for support.      YUAN Locke, MEGAN, MONICA  Oncology Social Worker      Electronically signed by YUAN Locke LSW, MONICA on 12/27/2024 at 9:02 AM

## 2024-12-31 ENCOUNTER — HOSPITAL ENCOUNTER (OUTPATIENT)
Dept: INFUSION THERAPY | Age: 84
Discharge: HOME | End: 2024-12-31
Payer: MEDICARE

## 2024-12-31 ENCOUNTER — OFFICE VISIT (OUTPATIENT)
Dept: ONCOLOGY | Age: 84
End: 2024-12-31
Payer: MEDICARE

## 2024-12-31 VITALS
OXYGEN SATURATION: 97 % | SYSTOLIC BLOOD PRESSURE: 141 MMHG | HEART RATE: 80 BPM | WEIGHT: 138 LBS | TEMPERATURE: 98.2 F | RESPIRATION RATE: 16 BRPM | HEIGHT: 65 IN | DIASTOLIC BLOOD PRESSURE: 68 MMHG | BODY MASS INDEX: 22.99 KG/M2

## 2024-12-31 VITALS
BODY MASS INDEX: 22.99 KG/M2 | WEIGHT: 138 LBS | TEMPERATURE: 99 F | SYSTOLIC BLOOD PRESSURE: 152 MMHG | RESPIRATION RATE: 16 BRPM | HEIGHT: 65 IN | DIASTOLIC BLOOD PRESSURE: 75 MMHG | HEART RATE: 77 BPM | OXYGEN SATURATION: 97 %

## 2024-12-31 DIAGNOSIS — C25.9 METASTASIS FROM PANCREATIC CANCER (HCC): Primary | ICD-10-CM

## 2024-12-31 DIAGNOSIS — C79.9 METASTASIS FROM PANCREATIC CANCER (HCC): Primary | ICD-10-CM

## 2024-12-31 LAB
ALBUMIN SERPL BCG-MCNC: 3.2 G/DL (ref 3.5–5.1)
ALP SERPL-CCNC: 190 U/L (ref 38–126)
ALT SERPL W/O P-5'-P-CCNC: 23 U/L (ref 11–66)
AST SERPL-CCNC: 21 U/L (ref 5–40)
BASOPHILS ABSOLUTE: 0 THOU/MM3 (ref 0–0.1)
BASOPHILS NFR BLD AUTO: 1 % (ref 0–3)
BILIRUB CONJ SERPL-MCNC: 0.3 MG/DL (ref 0.1–13.8)
BILIRUB SERPL-MCNC: 0.6 MG/DL (ref 0.3–1.2)
BUN BLDP-MCNC: 13 MG/DL (ref 8–26)
CHLORIDE BLD-SCNC: 109 MEQ/L (ref 98–109)
CREAT BLD-MCNC: 0.9 MG/DL (ref 0.5–1.2)
EOSINOPHIL NFR BLD AUTO: 1 % (ref 0–4)
EOSINOPHILS ABSOLUTE: 0.1 THOU/MM3 (ref 0–0.4)
ERYTHROCYTE [DISTWIDTH] IN BLOOD BY AUTOMATED COUNT: 14.4 % (ref 11.5–14.5)
GFR SERPL CREATININE-BSD FRML MDRD: 63 ML/MIN/1.73M2
GLUCOSE BLD-MCNC: 106 MG/DL (ref 70–108)
HCT VFR BLD AUTO: 31 % (ref 37–47)
HGB BLD-MCNC: 10 GM/DL (ref 12–16)
IMMATURE GRANULOCYTES %: 0 %
IMMATURE GRANULOCYTES ABSOLUTE: 0.01 THOU/MM3 (ref 0–0.07)
IONIZED CALCIUM, WHOLE BLOOD: 1.18 MMOL/L (ref 1.12–1.32)
LYMPHOCYTES ABSOLUTE: 1.4 THOU/MM3 (ref 1–4.8)
LYMPHOCYTES NFR BLD AUTO: 30 % (ref 15–47)
MCH RBC QN AUTO: 31.9 PG (ref 26–33)
MCHC RBC AUTO-ENTMCNC: 32.3 GM/DL (ref 32.2–35.5)
MCV RBC AUTO: 99 FL (ref 81–99)
MONOCYTES ABSOLUTE: 0.7 THOU/MM3 (ref 0.4–1.3)
MONOCYTES NFR BLD AUTO: 16 % (ref 0–12)
NEUTROPHILS ABSOLUTE: 2.5 THOU/MM3 (ref 1.8–7.7)
NEUTROPHILS NFR BLD AUTO: 53 % (ref 43–75)
PLATELET # BLD AUTO: 245 THOU/MM3 (ref 130–400)
PMV BLD AUTO: 10.1 FL (ref 9.4–12.4)
POTASSIUM BLD-SCNC: 3.4 MEQ/L (ref 3.5–4.9)
PROT SERPL-MCNC: 6.1 G/DL (ref 6.1–8)
RBC # BLD AUTO: 3.13 MILL/MM3 (ref 4.2–5.4)
SODIUM BLD-SCNC: 142 MEQ/L (ref 138–146)
TOTAL CO2, WHOLE BLOOD: 22 MEQ/L (ref 23–33)
WBC # BLD AUTO: 4.8 THOU/MM3 (ref 4.8–10.8)

## 2024-12-31 PROCEDURE — 80076 HEPATIC FUNCTION PANEL: CPT

## 2024-12-31 PROCEDURE — 96375 TX/PRO/DX INJ NEW DRUG ADDON: CPT

## 2024-12-31 PROCEDURE — 2580000003 HC RX 258: Performed by: INTERNAL MEDICINE

## 2024-12-31 PROCEDURE — 1124F ACP DISCUSS-NO DSCNMKR DOCD: CPT | Performed by: INTERNAL MEDICINE

## 2024-12-31 PROCEDURE — 99214 OFFICE O/P EST MOD 30 MIN: CPT | Performed by: INTERNAL MEDICINE

## 2024-12-31 PROCEDURE — 2500000003 HC RX 250 WO HCPCS: Performed by: INTERNAL MEDICINE

## 2024-12-31 PROCEDURE — 85025 COMPLETE CBC W/AUTO DIFF WBC: CPT

## 2024-12-31 PROCEDURE — 96413 CHEMO IV INFUSION 1 HR: CPT

## 2024-12-31 PROCEDURE — 3077F SYST BP >= 140 MM HG: CPT | Performed by: INTERNAL MEDICINE

## 2024-12-31 PROCEDURE — 6360000002 HC RX W HCPCS: Performed by: INTERNAL MEDICINE

## 2024-12-31 PROCEDURE — 96417 CHEMO IV INFUS EACH ADDL SEQ: CPT

## 2024-12-31 PROCEDURE — 3078F DIAST BP <80 MM HG: CPT | Performed by: INTERNAL MEDICINE

## 2024-12-31 PROCEDURE — 80047 BASIC METABLC PNL IONIZED CA: CPT

## 2024-12-31 PROCEDURE — 1159F MED LIST DOCD IN RCRD: CPT | Performed by: INTERNAL MEDICINE

## 2024-12-31 PROCEDURE — 99211 OFF/OP EST MAY X REQ PHY/QHP: CPT

## 2024-12-31 RX ORDER — DIPHENHYDRAMINE HYDROCHLORIDE 50 MG/ML
50 INJECTION INTRAMUSCULAR; INTRAVENOUS
OUTPATIENT
Start: 2025-01-14

## 2024-12-31 RX ORDER — SODIUM CHLORIDE 9 MG/ML
5-250 INJECTION, SOLUTION INTRAVENOUS PRN
Status: CANCELLED | OUTPATIENT
Start: 2024-12-31

## 2024-12-31 RX ORDER — HEPARIN SODIUM (PORCINE) LOCK FLUSH IV SOLN 100 UNIT/ML 100 UNIT/ML
500 SOLUTION INTRAVENOUS PRN
OUTPATIENT
Start: 2025-01-14

## 2024-12-31 RX ORDER — PACLITAXEL 100 MG/20ML
200 INJECTION, POWDER, LYOPHILIZED, FOR SUSPENSION INTRAVENOUS ONCE
OUTPATIENT
Start: 2025-01-14 | End: 2025-01-14

## 2024-12-31 RX ORDER — SODIUM CHLORIDE 9 MG/ML
5-250 INJECTION, SOLUTION INTRAVENOUS PRN
OUTPATIENT
Start: 2025-01-14

## 2024-12-31 RX ORDER — POTASSIUM CHLORIDE 750 MG/1
10 TABLET, EXTENDED RELEASE ORAL 2 TIMES DAILY
Qty: 60 TABLET | Refills: 0 | Status: SHIPPED | OUTPATIENT
Start: 2024-12-31

## 2024-12-31 RX ORDER — HYDROCORTISONE SODIUM SUCCINATE 100 MG/2ML
100 INJECTION INTRAMUSCULAR; INTRAVENOUS
Status: CANCELLED | OUTPATIENT
Start: 2024-12-31

## 2024-12-31 RX ORDER — SODIUM CHLORIDE 0.9 % (FLUSH) 0.9 %
5-40 SYRINGE (ML) INJECTION PRN
OUTPATIENT
Start: 2024-12-31

## 2024-12-31 RX ORDER — PACLITAXEL 100 MG/20ML
200 INJECTION, POWDER, LYOPHILIZED, FOR SUSPENSION INTRAVENOUS ONCE
Status: CANCELLED | OUTPATIENT
Start: 2024-12-31 | End: 2024-12-31

## 2024-12-31 RX ORDER — HYDROCORTISONE SODIUM SUCCINATE 100 MG/2ML
100 INJECTION INTRAMUSCULAR; INTRAVENOUS
OUTPATIENT
Start: 2025-01-14

## 2024-12-31 RX ORDER — ALBUTEROL SULFATE 90 UG/1
4 INHALANT RESPIRATORY (INHALATION) PRN
Status: CANCELLED | OUTPATIENT
Start: 2024-12-31

## 2024-12-31 RX ORDER — ACETAMINOPHEN 325 MG/1
650 TABLET ORAL
Status: CANCELLED | OUTPATIENT
Start: 2024-12-31

## 2024-12-31 RX ORDER — DIPHENHYDRAMINE HYDROCHLORIDE 50 MG/ML
50 INJECTION INTRAMUSCULAR; INTRAVENOUS
Status: CANCELLED | OUTPATIENT
Start: 2024-12-31

## 2024-12-31 RX ORDER — ONDANSETRON 2 MG/ML
8 INJECTION INTRAMUSCULAR; INTRAVENOUS
OUTPATIENT
Start: 2025-01-14

## 2024-12-31 RX ORDER — HEPARIN SODIUM (PORCINE) LOCK FLUSH IV SOLN 100 UNIT/ML 100 UNIT/ML
500 SOLUTION INTRAVENOUS PRN
Status: CANCELLED | OUTPATIENT
Start: 2024-12-31

## 2024-12-31 RX ORDER — FAMOTIDINE 10 MG/ML
20 INJECTION, SOLUTION INTRAVENOUS
OUTPATIENT
Start: 2025-01-14

## 2024-12-31 RX ORDER — ALBUTEROL SULFATE 90 UG/1
4 INHALANT RESPIRATORY (INHALATION) PRN
OUTPATIENT
Start: 2025-01-14

## 2024-12-31 RX ORDER — SODIUM CHLORIDE 0.9 % (FLUSH) 0.9 %
5-40 SYRINGE (ML) INJECTION PRN
Status: DISCONTINUED | OUTPATIENT
Start: 2024-12-31 | End: 2025-01-01 | Stop reason: HOSPADM

## 2024-12-31 RX ORDER — HEPARIN 100 UNIT/ML
500 SYRINGE INTRAVENOUS PRN
OUTPATIENT
Start: 2024-12-31

## 2024-12-31 RX ORDER — MEPERIDINE HYDROCHLORIDE 50 MG/ML
12.5 INJECTION INTRAMUSCULAR; INTRAVENOUS; SUBCUTANEOUS PRN
OUTPATIENT
Start: 2025-01-14

## 2024-12-31 RX ORDER — SODIUM CHLORIDE 0.9 % (FLUSH) 0.9 %
5-40 SYRINGE (ML) INJECTION PRN
Status: CANCELLED | OUTPATIENT
Start: 2024-12-31

## 2024-12-31 RX ORDER — MEPERIDINE HYDROCHLORIDE 50 MG/ML
12.5 INJECTION INTRAMUSCULAR; INTRAVENOUS; SUBCUTANEOUS PRN
Status: CANCELLED | OUTPATIENT
Start: 2024-12-31

## 2024-12-31 RX ORDER — SODIUM CHLORIDE 9 MG/ML
25 INJECTION, SOLUTION INTRAVENOUS PRN
OUTPATIENT
Start: 2024-12-31

## 2024-12-31 RX ORDER — HEPARIN 100 UNIT/ML
500 SYRINGE INTRAVENOUS PRN
Status: DISCONTINUED | OUTPATIENT
Start: 2024-12-31 | End: 2025-01-01 | Stop reason: HOSPADM

## 2024-12-31 RX ORDER — PACLITAXEL 100 MG/20ML
200 INJECTION, POWDER, LYOPHILIZED, FOR SUSPENSION INTRAVENOUS ONCE
Status: COMPLETED | OUTPATIENT
Start: 2024-12-31 | End: 2024-12-31

## 2024-12-31 RX ORDER — ONDANSETRON 2 MG/ML
8 INJECTION INTRAMUSCULAR; INTRAVENOUS ONCE
Status: CANCELLED | OUTPATIENT
Start: 2024-12-31 | End: 2024-12-31

## 2024-12-31 RX ORDER — ONDANSETRON 2 MG/ML
8 INJECTION INTRAMUSCULAR; INTRAVENOUS
Status: CANCELLED | OUTPATIENT
Start: 2024-12-31

## 2024-12-31 RX ORDER — SODIUM CHLORIDE 9 MG/ML
INJECTION, SOLUTION INTRAVENOUS CONTINUOUS
Status: CANCELLED | OUTPATIENT
Start: 2024-12-31

## 2024-12-31 RX ORDER — FAMOTIDINE 10 MG/ML
20 INJECTION, SOLUTION INTRAVENOUS
Status: CANCELLED | OUTPATIENT
Start: 2024-12-31

## 2024-12-31 RX ORDER — SODIUM CHLORIDE 9 MG/ML
INJECTION, SOLUTION INTRAVENOUS CONTINUOUS
OUTPATIENT
Start: 2025-01-14

## 2024-12-31 RX ORDER — SODIUM CHLORIDE 0.9 % (FLUSH) 0.9 %
5-40 SYRINGE (ML) INJECTION PRN
OUTPATIENT
Start: 2025-01-14

## 2024-12-31 RX ORDER — ONDANSETRON 2 MG/ML
8 INJECTION INTRAMUSCULAR; INTRAVENOUS ONCE
OUTPATIENT
Start: 2025-01-14 | End: 2025-01-14

## 2024-12-31 RX ORDER — EPINEPHRINE 1 MG/ML
0.3 INJECTION, SOLUTION, CONCENTRATE INTRAVENOUS PRN
Status: CANCELLED | OUTPATIENT
Start: 2024-12-31

## 2024-12-31 RX ORDER — ACETAMINOPHEN 325 MG/1
650 TABLET ORAL
OUTPATIENT
Start: 2025-01-14

## 2024-12-31 RX ORDER — SODIUM CHLORIDE 9 MG/ML
5-250 INJECTION, SOLUTION INTRAVENOUS PRN
Status: DISCONTINUED | OUTPATIENT
Start: 2024-12-31 | End: 2025-01-01 | Stop reason: HOSPADM

## 2024-12-31 RX ORDER — ONDANSETRON 2 MG/ML
8 INJECTION INTRAMUSCULAR; INTRAVENOUS ONCE
Status: COMPLETED | OUTPATIENT
Start: 2024-12-31 | End: 2024-12-31

## 2024-12-31 RX ORDER — EPINEPHRINE 1 MG/ML
0.3 INJECTION, SOLUTION, CONCENTRATE INTRAVENOUS PRN
OUTPATIENT
Start: 2025-01-14

## 2024-12-31 RX ADMIN — SODIUM CHLORIDE 50 ML/HR: 9 INJECTION, SOLUTION INTRAVENOUS at 11:04

## 2024-12-31 RX ADMIN — SODIUM CHLORIDE, PRESERVATIVE FREE 10 ML: 5 INJECTION INTRAVENOUS at 13:27

## 2024-12-31 RX ADMIN — GEMCITABINE HYDROCHLORIDE 1600 MG: 1 INJECTION, SOLUTION INTRAVENOUS at 11:32

## 2024-12-31 RX ADMIN — ONDANSETRON 8 MG: 2 INJECTION INTRAMUSCULAR; INTRAVENOUS at 11:06

## 2024-12-31 RX ADMIN — HEPARIN 500 UNITS: 100 SYRINGE at 13:27

## 2024-12-31 RX ADMIN — SODIUM CHLORIDE, PRESERVATIVE FREE 30 ML: 5 INJECTION INTRAVENOUS at 10:20

## 2024-12-31 RX ADMIN — PACLITAXEL 200 MG: 100 INJECTION, POWDER, LYOPHILIZED, FOR SUSPENSION INTRAVENOUS at 12:28

## 2024-12-31 NOTE — PLAN OF CARE
Problem: Discharge Planning  Goal: Discharge to home or other facility with appropriate resources  Outcome: Adequate for Discharge  Flowsheets (Taken 12/31/2024 1335)  Discharge to home or other facility with appropriate resources:   Identify barriers to discharge with patient and caregiver   Identify discharge learning needs (meds, wound care, etc)  Note: Verbalize understanding of discharge instructions, follow up appointments, and when to call Physician.       Problem: Safety - Adult  Goal: Free from fall injury  Outcome: Adequate for Discharge  Flowsheets (Taken 12/31/2024 1335)  Free From Fall Injury: Instruct family/caregiver on patient safety  Note: Free from falls while in O.P. Oncology.       Problem: Chronic Conditions and Co-morbidities  Goal: Patient's chronic conditions and co-morbidity symptoms are monitored and maintained or improved  Outcome: Adequate for Discharge  Flowsheets (Taken 12/31/2024 1335)  Care Plan - Patient's Chronic Conditions and Co-Morbidity Symptoms are Monitored and Maintained or Improved:   Monitor and assess patient's chronic conditions and comorbid symptoms for stability, deterioration, or improvement   Collaborate with multidisciplinary team to address chronic and comorbid conditions and prevent exacerbation or deterioration  Note:   Chemotherapy Teaching     What is Chemotherapy   Drug action [x]   Method of Administration [x]   Handouts given []     Side Effects  Nausea/vomiting [x]   Diarrhea [x]   Fatigue [x]   Signs / Symptoms of infection [x]   Neutropenia [x]   Thrombocytopenia [x]   Alopecia [x]   neuropathy [x]   Verdigre diet &  the importance of fluids [x]       Micellaneous  Importance of nutrition [x]   Importance of oral hygiene [x]   When to call the MD [x]   Monitoring labs [x]   Use of supportive services []     Explanation of Drug Regimen / Frequency  Abraxane/Gemzar     Comments  Verbalized understanding to drug,action,side effects and when to call MD          Problem: Infection - Adult  Goal: Absence of infection at discharge  Outcome: Adequate for Discharge  Flowsheets (Taken 12/31/2024 4388)  Absence of infection at discharge:   Assess and monitor for signs and symptoms of infection   Monitor lab/diagnostic results   Monitor all insertion sites i.e., indwelling lines, tubes and drains   Administer medications as ordered   Instruct and encourage patient and family to use good hand hygiene technique  Note: Mediport site with no redness or warmth. Skin over port site intact with no signs of breakdown noted. Patient verbalizes signs/symptoms of port infection and when to notify the physician.        Care plan reviewed with patient.  Patient verbalizes understanding of the plan of care and contributes to goal setting.

## 2024-12-31 NOTE — PROGRESS NOTES
[facility] [place] [facility]  [facility]  [address]  [address]  [place] [address]  Dept: [phone]  Loc: [phone]   Hematology/Oncology Progress Note (Clinic)        Sol Forresterdee  1940  No ref. 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 12/4/2021  (7 of 27 lymph nodes positive for cancer.  -Adjuvant therapy: FOLFOX last administered 5/22.  First treatment began 1/26/2022.  Dose reductions including oxaliplatin to 70 mg per metered squared.  Stopped @ April with low counts.  Developed obstructive jaundice hospitalized at  June 16 through 21, 2022.  Patient had leukocytosis bacteremia and a stricture post Whipple 6/15 CAT scan showed cirrhosis, small ascites and postop changes.  Patient had Enterococcus bacteremia treated with Zosyn and Unasyn and then Augmentin.  No vegetations on valves.  ERCP 6/17/22  showed no obstruction or stones.  Intrahepatic branches diffusely dilated.  Abnormal LFTs and elevated bilirubin therefore a metal stent was placed in the common bile duct.  Stent should be changed in 3 months.  Patient is 2 months overdue to have her stent changed.     Followable Disease:   - A/P CT wo contrast  5/17/23- stable , 1/11/23-stable  -CT imaging abdominal pelvic CT with contrast 6/15/2022-mild ascites otherwise no changes.  -MRI of the abdomen with and without contrast 5/17/22-status post Whipple, mild worsening ascites mildly dilated biliary tree pancreatic mesenteric edema  - CA 19-9 - 39 ( 9/30/22)     Comorbidities:  -A. fib.  Hypertension, see below    Subjective/Interim Summary:   10/5/23-  Patient has been doing well.  Denies having any new symptoms.  No nausea, vomiting, abdominal pain,

## 2024-12-31 NOTE — DISCHARGE INSTRUCTIONS
Please contact your Oncologist if you have any questions regarding the abraxane and gemzar that you received today.    You are instructed to call the office or go to the Emergency Dept. If you experience any of the following symptoms:    Chills,temperature of 100.4 or higher or any symptoms of infection.  Dizziness/lightheadedness   Acute nausea or vomiting-not relieved by medications  Headaches-not relieved by medications  New chest pain or pressure  New rash /itching  New shortness of breath      Make sure you are drinking 48 to 64 ounces of water daily-if you are unable to drink fluids let us know right away.

## 2024-12-31 NOTE — PROGRESS NOTES
Patient tolerated Abraxane and gemzar without any complications.  Denies dizziness, lightheadedness, acute nausea or vomiting, headache, heart palpitations, rash/itching or increased SOB.  Discussed the importance of monitoring and reporting temperature of 100.4 or greater to our office [phone] or going directly to Emergency Dept.    Last vital signs  BP (!) 141/68   Pulse 80   Temp 98.2 °F (36.8 °C) (Oral)   Resp 16   Ht 1.651 m (5' 5\")   Wt 62.6 kg (138 lb)   SpO2 97%   BMI 22.96 kg/m²     Patient instructed if they experience any of the above symptoms following today's visit, he/she is to notify the Physician or go to the Emergency Dept.    Discharge instructions given to patient, Verbalizes understanding. Ambulated off unit per self in stable condition with all belongings.

## 2025-01-07 ENCOUNTER — SOCIAL WORK (OUTPATIENT)
Dept: INFUSION THERAPY | Age: 85
End: 2025-01-07

## 2025-01-07 NOTE — PROGRESS NOTES
Oncology Social Work    Date: 1/7/2025  Time: 4:26 PM  Name: Sol Greer  MRN: [id number]     Contact Type: Transportation Request    Note:   Situation: Sol Greer called the Oncology Social Worker to assist with transportation to and from appts.     Background:  Sol Greer is not able to provide their own transportation and has asked the  for assistance.     Assessment:  Sol Greer requested transportation for upcoming appointment(s) located throughout the [facility] network of providers.  - She will be picked up by  [facility] and taken to/from scheduled appointment(s) as follows:  -   Appt Date Appt Time Dept  Location Notes   14-Jan 10:30a Chemo [address] - 1 hr chemo    10:35a Line & Labs Line & Labs    11:00a Provider Seeing Dr Mcknight   27-Jan 11:00a Pall Care [address] - Seeing Dr Rock   - Patient has been notified of the arrangements provided.    Recommendation: Appt changes will be initiated by Sol Greer based on need.  provided my contact information and will remain available for support.      YUAN Locke, MEGAN, MONICA  Oncology Social Worker      Electronically signed by YUAN Locke LSW, MONICA on 1/7/2025 at 4:26 PM

## 2025-01-13 ENCOUNTER — TELEPHONE (OUTPATIENT)
Dept: ONCOLOGY | Age: 85
End: 2025-01-13

## 2025-01-14 ENCOUNTER — SOCIAL WORK (OUTPATIENT)
Dept: INFUSION THERAPY | Age: 85
End: 2025-01-14

## 2025-01-14 ENCOUNTER — TELEPHONE (OUTPATIENT)
Dept: ONCOLOGY | Age: 85
End: 2025-01-14

## 2025-01-14 ENCOUNTER — HOSPITAL ENCOUNTER (OUTPATIENT)
Dept: INFUSION THERAPY | Age: 85
Discharge: HOME | End: 2025-01-14
Payer: MEDICARE

## 2025-01-14 ENCOUNTER — CLINICAL DOCUMENTATION (OUTPATIENT)
Dept: CASE MANAGEMENT | Age: 85
End: 2025-01-14

## 2025-01-14 ENCOUNTER — OFFICE VISIT (OUTPATIENT)
Dept: ONCOLOGY | Age: 85
End: 2025-01-14
Payer: MEDICARE

## 2025-01-14 VITALS
WEIGHT: 125 LBS | SYSTOLIC BLOOD PRESSURE: 130 MMHG | RESPIRATION RATE: 16 BRPM | DIASTOLIC BLOOD PRESSURE: 70 MMHG | TEMPERATURE: 98.4 F | HEART RATE: 87 BPM | BODY MASS INDEX: 20.83 KG/M2 | OXYGEN SATURATION: 97 % | HEIGHT: 65 IN

## 2025-01-14 VITALS
DIASTOLIC BLOOD PRESSURE: 73 MMHG | OXYGEN SATURATION: 97 % | RESPIRATION RATE: 18 BRPM | TEMPERATURE: 98.3 F | HEIGHT: 65 IN | HEART RATE: 73 BPM | BODY MASS INDEX: 20.83 KG/M2 | SYSTOLIC BLOOD PRESSURE: 153 MMHG | WEIGHT: 125 LBS

## 2025-01-14 DIAGNOSIS — C25.9 PANCREATIC CANCER METASTASIZED TO LUNG (HCC): Primary | ICD-10-CM

## 2025-01-14 DIAGNOSIS — C78.00 PANCREATIC CANCER METASTASIZED TO LUNG (HCC): Primary | ICD-10-CM

## 2025-01-14 DIAGNOSIS — R91.8 LUNG MASS: ICD-10-CM

## 2025-01-14 DIAGNOSIS — C79.9 METASTASIS FROM PANCREATIC CANCER (HCC): ICD-10-CM

## 2025-01-14 DIAGNOSIS — C79.9 METASTASIS FROM PANCREATIC CANCER (HCC): Primary | ICD-10-CM

## 2025-01-14 DIAGNOSIS — C25.9 METASTASIS FROM PANCREATIC CANCER (HCC): Primary | ICD-10-CM

## 2025-01-14 DIAGNOSIS — C25.9 METASTASIS FROM PANCREATIC CANCER (HCC): ICD-10-CM

## 2025-01-14 LAB
ALBUMIN SERPL BCG-MCNC: 3.7 G/DL (ref 3.5–5.1)
ALP SERPL-CCNC: 176 U/L (ref 38–126)
ALT SERPL W/O P-5'-P-CCNC: 15 U/L (ref 11–66)
AST SERPL-CCNC: 20 U/L (ref 5–40)
BASOPHILS ABSOLUTE: 0.1 THOU/MM3 (ref 0–0.1)
BASOPHILS NFR BLD AUTO: 1 % (ref 0–3)
BILIRUB CONJ SERPL-MCNC: 0.6 MG/DL (ref 0.1–13.8)
BILIRUB SERPL-MCNC: 1.3 MG/DL (ref 0.3–1.2)
BUN BLDP-MCNC: 20 MG/DL (ref 8–26)
CHLORIDE BLD-SCNC: 106 MEQ/L (ref 98–109)
CREAT BLD-MCNC: 1.1 MG/DL (ref 0.5–1.2)
EOSINOPHIL NFR BLD AUTO: 0 % (ref 0–4)
EOSINOPHILS ABSOLUTE: 0 THOU/MM3 (ref 0–0.4)
ERYTHROCYTE [DISTWIDTH] IN BLOOD BY AUTOMATED COUNT: 14.1 % (ref 11.5–14.5)
GFR SERPL CREATININE-BSD FRML MDRD: 50 ML/MIN/1.73M2
GLUCOSE BLD-MCNC: 99 MG/DL (ref 70–108)
HCT VFR BLD AUTO: 34.8 % (ref 37–47)
HGB BLD-MCNC: 11.3 GM/DL (ref 12–16)
IMMATURE GRANULOCYTES %: 0 %
IMMATURE GRANULOCYTES ABSOLUTE: 0.01 THOU/MM3 (ref 0–0.07)
IONIZED CALCIUM, WHOLE BLOOD: 1.2 MMOL/L (ref 1.12–1.32)
LYMPHOCYTES ABSOLUTE: 1.2 THOU/MM3 (ref 1–4.8)
LYMPHOCYTES NFR BLD AUTO: 20 % (ref 15–47)
MCH RBC QN AUTO: 32.1 PG (ref 26–33)
MCHC RBC AUTO-ENTMCNC: 32.5 GM/DL (ref 32.2–35.5)
MCV RBC AUTO: 99 FL (ref 81–99)
MONOCYTES ABSOLUTE: 0.7 THOU/MM3 (ref 0.4–1.3)
MONOCYTES NFR BLD AUTO: 11 % (ref 0–12)
NEUTROPHILS ABSOLUTE: 4.2 THOU/MM3 (ref 1.8–7.7)
NEUTROPHILS NFR BLD AUTO: 68 % (ref 43–75)
PLATELET # BLD AUTO: 306 THOU/MM3 (ref 130–400)
PMV BLD AUTO: 9.8 FL (ref 9.4–12.4)
POTASSIUM BLD-SCNC: 3.6 MEQ/L (ref 3.5–4.9)
PROT SERPL-MCNC: 6.8 G/DL (ref 6.1–8)
RBC # BLD AUTO: 3.52 MILL/MM3 (ref 4.2–5.4)
SODIUM BLD-SCNC: 139 MEQ/L (ref 138–146)
TOTAL CO2, WHOLE BLOOD: 22 MEQ/L (ref 23–33)
WBC # BLD AUTO: 6.2 THOU/MM3 (ref 4.8–10.8)

## 2025-01-14 PROCEDURE — 3078F DIAST BP <80 MM HG: CPT | Performed by: INTERNAL MEDICINE

## 2025-01-14 PROCEDURE — 96417 CHEMO IV INFUS EACH ADDL SEQ: CPT

## 2025-01-14 PROCEDURE — 99214 OFFICE O/P EST MOD 30 MIN: CPT | Performed by: INTERNAL MEDICINE

## 2025-01-14 PROCEDURE — 80076 HEPATIC FUNCTION PANEL: CPT

## 2025-01-14 PROCEDURE — 96375 TX/PRO/DX INJ NEW DRUG ADDON: CPT

## 2025-01-14 PROCEDURE — 3075F SYST BP GE 130 - 139MM HG: CPT | Performed by: INTERNAL MEDICINE

## 2025-01-14 PROCEDURE — 80047 BASIC METABLC PNL IONIZED CA: CPT

## 2025-01-14 PROCEDURE — 6360000002 HC RX W HCPCS: Performed by: INTERNAL MEDICINE

## 2025-01-14 PROCEDURE — 2580000003 HC RX 258: Performed by: INTERNAL MEDICINE

## 2025-01-14 PROCEDURE — 2500000003 HC RX 250 WO HCPCS: Performed by: INTERNAL MEDICINE

## 2025-01-14 PROCEDURE — 85025 COMPLETE CBC W/AUTO DIFF WBC: CPT

## 2025-01-14 PROCEDURE — 99211 OFF/OP EST MAY X REQ PHY/QHP: CPT

## 2025-01-14 PROCEDURE — 1159F MED LIST DOCD IN RCRD: CPT | Performed by: INTERNAL MEDICINE

## 2025-01-14 PROCEDURE — 1124F ACP DISCUSS-NO DSCNMKR DOCD: CPT | Performed by: INTERNAL MEDICINE

## 2025-01-14 PROCEDURE — 86301 IMMUNOASSAY TUMOR CA 19-9: CPT

## 2025-01-14 PROCEDURE — 96413 CHEMO IV INFUSION 1 HR: CPT

## 2025-01-14 RX ORDER — ZOLEDRONIC ACID 0.04 MG/ML
4 INJECTION, SOLUTION INTRAVENOUS ONCE
OUTPATIENT
Start: 2025-01-14 | End: 2025-01-14

## 2025-01-14 RX ORDER — SODIUM CHLORIDE 9 MG/ML
5-250 INJECTION, SOLUTION INTRAVENOUS PRN
OUTPATIENT
Start: 2025-01-14

## 2025-01-14 RX ORDER — SODIUM CHLORIDE 0.9 % (FLUSH) 0.9 %
5-40 SYRINGE (ML) INJECTION PRN
OUTPATIENT
Start: 2025-01-14

## 2025-01-14 RX ORDER — SODIUM CHLORIDE 0.9 % (FLUSH) 0.9 %
5-40 SYRINGE (ML) INJECTION PRN
Status: DISCONTINUED | OUTPATIENT
Start: 2025-01-14 | End: 2025-01-15 | Stop reason: HOSPADM

## 2025-01-14 RX ORDER — SODIUM CHLORIDE 9 MG/ML
INJECTION, SOLUTION INTRAVENOUS CONTINUOUS
OUTPATIENT
Start: 2025-01-14

## 2025-01-14 RX ORDER — ACETAMINOPHEN 325 MG/1
650 TABLET ORAL
OUTPATIENT
Start: 2025-01-14

## 2025-01-14 RX ORDER — HEPARIN 100 UNIT/ML
500 SYRINGE INTRAVENOUS PRN
Status: DISCONTINUED | OUTPATIENT
Start: 2025-01-14 | End: 2025-01-15 | Stop reason: HOSPADM

## 2025-01-14 RX ORDER — HYDROCORTISONE SODIUM SUCCINATE 100 MG/2ML
100 INJECTION INTRAMUSCULAR; INTRAVENOUS
OUTPATIENT
Start: 2025-01-14

## 2025-01-14 RX ORDER — ALBUTEROL SULFATE 90 UG/1
4 INHALANT RESPIRATORY (INHALATION) PRN
OUTPATIENT
Start: 2025-01-14

## 2025-01-14 RX ORDER — PACLITAXEL 100 MG/20ML
200 INJECTION, POWDER, LYOPHILIZED, FOR SUSPENSION INTRAVENOUS ONCE
Status: COMPLETED | OUTPATIENT
Start: 2025-01-14 | End: 2025-01-14

## 2025-01-14 RX ORDER — ONDANSETRON 2 MG/ML
8 INJECTION INTRAMUSCULAR; INTRAVENOUS ONCE
Status: COMPLETED | OUTPATIENT
Start: 2025-01-14 | End: 2025-01-14

## 2025-01-14 RX ORDER — SODIUM CHLORIDE 9 MG/ML
5-250 INJECTION, SOLUTION INTRAVENOUS PRN
Status: DISCONTINUED | OUTPATIENT
Start: 2025-01-14 | End: 2025-01-15 | Stop reason: HOSPADM

## 2025-01-14 RX ORDER — ONDANSETRON 2 MG/ML
8 INJECTION INTRAMUSCULAR; INTRAVENOUS
OUTPATIENT
Start: 2025-01-14

## 2025-01-14 RX ORDER — DIPHENHYDRAMINE HYDROCHLORIDE 50 MG/ML
50 INJECTION INTRAMUSCULAR; INTRAVENOUS
OUTPATIENT
Start: 2025-01-14

## 2025-01-14 RX ORDER — HEPARIN SODIUM (PORCINE) LOCK FLUSH IV SOLN 100 UNIT/ML 100 UNIT/ML
500 SOLUTION INTRAVENOUS PRN
OUTPATIENT
Start: 2025-01-14

## 2025-01-14 RX ORDER — EPINEPHRINE 1 MG/ML
0.3 INJECTION, SOLUTION, CONCENTRATE INTRAVENOUS PRN
OUTPATIENT
Start: 2025-01-14

## 2025-01-14 RX ORDER — HEPARIN 100 UNIT/ML
500 SYRINGE INTRAVENOUS PRN
OUTPATIENT
Start: 2025-01-14

## 2025-01-14 RX ORDER — SODIUM CHLORIDE 9 MG/ML
25 INJECTION, SOLUTION INTRAVENOUS PRN
OUTPATIENT
Start: 2025-01-14

## 2025-01-14 RX ORDER — FAMOTIDINE 10 MG/ML
20 INJECTION, SOLUTION INTRAVENOUS
OUTPATIENT
Start: 2025-01-14

## 2025-01-14 RX ADMIN — SODIUM CHLORIDE 40 ML/HR: 9 INJECTION, SOLUTION INTRAVENOUS at 11:54

## 2025-01-14 RX ADMIN — SODIUM CHLORIDE, PRESERVATIVE FREE 30 ML: 5 INJECTION INTRAVENOUS at 10:51

## 2025-01-14 RX ADMIN — GEMCITABINE HYDROCHLORIDE 1600 MG: 1 INJECTION, SOLUTION INTRAVENOUS at 12:42

## 2025-01-14 RX ADMIN — SODIUM CHLORIDE, PRESERVATIVE FREE 10 ML: 5 INJECTION INTRAVENOUS at 14:16

## 2025-01-14 RX ADMIN — HEPARIN 500 UNITS: 100 SYRINGE at 14:16

## 2025-01-14 RX ADMIN — PACLITAXEL 200 MG: 100 INJECTION, POWDER, LYOPHILIZED, FOR SUSPENSION INTRAVENOUS at 13:23

## 2025-01-14 RX ADMIN — ONDANSETRON 8 MG: 2 INJECTION INTRAMUSCULAR; INTRAVENOUS at 11:56

## 2025-01-14 NOTE — PROGRESS NOTES
Patient tolerated Abraxane and gemzar without any complications.  Denies dizziness, lightheadedness, acute nausea or vomiting, headache, heart palpitations, rash/itching or increased SOB.  Discussed the importance of monitoring and reporting temperature of 100.4 or greater to our office [phone] or going directly to Emergency Dept.    Last vital signs  BP (!) 153/73   Pulse 73   Temp 98.3 °F (36.8 °C) (Oral)   Resp 18   Ht 1.651 m (5' 5\")   Wt 56.7 kg (125 lb)   SpO2 97%   BMI 20.80 kg/m²     Patient instructed if they experience any of the above symptoms following today's visit, he/she is to notify the Physician or go to the Emergency Dept.    Discharge instructions given to patient, Verbalizes understanding. Ambulated off unit per self in stable condition with all belongings.

## 2025-01-14 NOTE — DISCHARGE INSTRUCTIONS
Ok for chemotherapy today  Will contact [facility] for clinical trials    Please contact your Oncologist if you have any questions regarding the Abraxan and gemzar that you received today.    You are instructed to call the office or go to the Emergency Dept. If you experience any of the following symptoms:    Chills,temperature of 100.4 or higher or any symptoms of infection.  Dizziness/lightheadedness   Acute nausea or vomiting-not relieved by medications  Headaches-not relieved by medications  New chest pain or pressure  New rash /itching  New shortness of breath      Make sure you are drinking 48 to 64 ounces of water daily-if you are unable to drink fluids let us know right away.

## 2025-01-14 NOTE — PROGRESS NOTES
Oncology Social Work    Date: 1/14/2025  Time: 11:27 AM  Name: Sol Greer  MRN: [id number]     Contact Type: Follow-up    Note:   Situation: This staff was contacted by Sol Greer regarding Meals on Wheels    Background: She has had multiple encounters with this staff. Today's conversation focused on trying to help her obtain food since she no longer has the energy to cook on her own.     Assessment: This staff had the opportunity to speak with Sol and explained the process of getting meals delivered to her home. Sol admitted not cooking for herself because it takes too much energy. If she stands to cook, it zaps her energy for the rest of the day. She had a recent diagnosis that has impacted her health quite substantially as of recent.   - She was referred to [facility] about getting an assessment done to determine eligibility. If Sol can't get the meals through [employer], then the option to purchase outright is available as well. She was made aware fo the potential cost.   - [facility] will coordinate an assessment date and time with Sol and then she will let me know the determination.   - Additional education regarding the services provided by the  were explained during our conversation.     Recommendation: Sol Greer was encouraged to complete the assessment with [facility]. This staff will remain available for assistance and support as needed. My direct phone number was provided for further contact..    Melissa Venegas, MSW, LSW, ACHP-  Oncology Social Worker

## 2025-01-14 NOTE — TELEPHONE ENCOUNTER
Per Dr Mcknight request I phoned the office of Dr Tabor at [facility] regarding pt. Left Dr Mcknight's cell number for callback.

## 2025-01-14 NOTE — PROGRESS NOTES
Oncology Social Work    Date: 1/14/2025  Time: 9:59 AM  Name: Sol Greer  MRN: [id number]     Contact Type: Transportation Request    Note:   Situation: Sol Greer called the Oncology Social Worker to assist with transportation to and from appts.     Background:  Sol Greer is not able to provide their own transportation and has asked the  for assistance.     Assessment:  Sol Greer requested transportation for upcoming appointment(s) located throughout the [facility] network of providers.  - She will be picked up by [facility] and taken to/from scheduled appointment(s) as follows:  Appt Date Appt Time Dept  Location Notes   14-Jan 10:30a Chemo [address] - 1 hr chemo    10:35a Line [facility] Line & Labs    11:00a Provider Seeing Dr Mcknight   15-Jose Antonio 12:45p Provider [address] - Seeing Dr. Mendes   27-Jan 11:00a [facility] Care [address] - Seeing Dr Rock             12-Mar 11:00a Provider [address] - Seeing KADI Knight   - Patient has been notified of the arrangements provided.    Recommendation: Appt changes will be initiated by Sol Greer based on need.  provided my contact information and will remain available for support.      YUAN Locke, MEGAN, MONICA  Oncology Social Worker      Electronically signed by YUAN Locke LSW, MONICA on 1/14/2025 at 9:59 AM

## 2025-01-14 NOTE — PROGRESS NOTES
disease.            5/10/24:  FINAL RESULTS:    A. Lymph node, station 7, FNA:        No malignant cells seen.        Lymphoid sample.           B. Lymph node, station 10R, FNA:        No malignant cells seen.        Limited lymphoid sample.       24:      FINAL DIAGNOSIS:   A.  Lung, right lower lobe, biopsy:     Minute fragments of benign cartilage and bronchial mucosa.   B.  Lung, left lower lobe, biopsy:     Minute fragment of hemorrhagic alveolar parenchyma.     E: 11L lymph node, pass 1: Blood. No malignant cells seen (NMCS).       Pass 2: Blood. Scattered lymphs, NMCS.  Pass 3: Blood. NMCS.   Pass       4: Blood, lymphs. NMCS.   F: Station 7 lymph node, pass 1: Blood. NMCS       Pass 2: Positive for malignancy. Non-small cell carcinoma.  MTK           Narrative  Performed by: [facility]      MANISH NICOLASCAIN REINALDO         [id number]  Assoc.                                              Page 1 of 1  [address] [place] [address]                                                      PROC: 2024  [facility]                                    RECV: 2024  [address]                                     RPTD: 2024  [place] [address]                      MRN:  [id number]    LOC: EN                      ACCT: [id number]  SEX: F                      : 1940  AGE: 84 Y                         PATHOLOGY REPORT                      ATTN: CRISTY ROWELL                      REQ: CRISTY ROWELL        Clinical Information:  LUNG NODULES MASS / H/O PANCREATIC CANCER      RAPID ONSITE EVALUATION:  E: 11L lymph node, pass 1: Blood. No malignant cells seen (NMCS).      Pass 2: Blood. Scattered lymphs, NMCS.  Pass 3: Blood. NMCS.   Pass      4: Blood, lymphs. NMCS.  F: Station 7 lymph node, pass 1: Blood. NMCS      Pass 2: Positive for malignancy. Non-small cell carcinoma.  MTK      ADDENDUM REPORT 24 and ADDENDUM REPORT 2024:    Addendum comment 2024:

## 2025-01-14 NOTE — PLAN OF CARE
Problem: Discharge Planning  Goal: Discharge to home or other facility with appropriate resources  Outcome: Adequate for Discharge  Flowsheets (Taken 1/14/2025 1631)  Discharge to home or other facility with appropriate resources:   Identify barriers to discharge with patient and caregiver   Identify discharge learning needs (meds, wound care, etc)     Problem: Safety - Adult  Goal: Free from fall injury  Outcome: Adequate for Discharge  Flowsheets (Taken 1/14/2025 1631)  Free From Fall Injury:   Instruct family/caregiver on patient safety   Based on caregiver fall risk screen, instruct family/caregiver to ask for assistance with transferring infant if caregiver noted to have fall risk factors     Problem: Chronic Conditions and Co-morbidities  Goal: Patient's chronic conditions and co-morbidity symptoms are monitored and maintained or improved  Outcome: Adequate for Discharge  Flowsheets (Taken 1/14/2025 1631)  Care Plan - Patient's Chronic Conditions and Co-Morbidity Symptoms are Monitored and Maintained or Improved:   Monitor and assess patient's chronic conditions and comorbid symptoms for stability, deterioration, or improvement   Collaborate with multidisciplinary team to address chronic and comorbid conditions and prevent exacerbation or deterioration  Note:   Chemotherapy Teaching     What is Chemotherapy   Drug action [x]   Method of Administration [x]   Handouts given []     Side Effects  Nausea/vomiting [x]   Diarrhea [x]   Fatigue [x]   Signs / Symptoms of infection [x]   Neutropenia [x]   Thrombocytopenia [x]   Alopecia [x]   neuropathy [x]   [place] diet &  the importance of fluids [x]       Micellaneous  Importance of nutrition [x]   Importance of oral hygiene [x]   When to call the MD [x]   Monitoring labs [x]   Use of supportive services []     Explanation of Drug Regimen / Frequency  Abraxane and gemzar     Comments  Verbalized understanding to drug,action,side effects and when to call MD

## 2025-01-14 NOTE — ONCOLOGY
Chemotherapy Administration    Pre-assessment Data: Antineoplastic Agents  See toxicity flow sheet for assessment                                          [x]         Interventions:   Chemotherapy SQ injection given []   Taxol administered-VS per protocol []   Blood pressure meds held 12 hours prior to Rituxan/Ruxience []   Rituxan/Ruxience administered- VS and precautions per guidelines []   Emergency drugs available as appropriate [x]   Anaphylaxis assessment completed [x]   Pre-medications administered as ordered [x]   Blood return noted upon initiation of chemotherapy [x]   Blood return noted each 1-2ml of a vesicant medication if given IV push []   Navelbine, Vincristine and Velban given as a monitored wide open drip, blood return noted before during and after infusion. []   Blood return noted each 2-3ml of a non-vesicant medication if given IV push []   Patient aware of potential Immunotherapy toxicities []   Monitor for signs / symptoms of hypersensitivity reaction [x]   Chemotherapy orders (drug/dose/rate) verified by 2 Chemo certified RN’s [x]   Monitor IV site and blood return throughout the infusion of the medication [x]   Document IV site checks on the IV assessment form [x]   Document chemotherapy teaching on the Patient Education tab [x]   Document patient verbalizes understanding of medications being administered- Abraxane and gemzar [x]   If IV infiltration, see ONS Guidelines []   Other:      []

## 2025-01-15 ENCOUNTER — APPOINTMENT (OUTPATIENT)
Dept: CT IMAGING | Age: 85
DRG: 200 | End: 2025-01-15
Payer: MEDICARE

## 2025-01-15 ENCOUNTER — OFFICE VISIT (OUTPATIENT)
Dept: PULMONOLOGY | Age: 85
End: 2025-01-15
Payer: MEDICARE

## 2025-01-15 ENCOUNTER — HOSPITAL ENCOUNTER (INPATIENT)
Age: 85
LOS: 4 days | Discharge: HOME | DRG: 200 | End: 2025-01-19
Attending: EMERGENCY MEDICINE | Admitting: PHYSICIAN ASSISTANT
Payer: MEDICARE

## 2025-01-15 ENCOUNTER — APPOINTMENT (OUTPATIENT)
Dept: ULTRASOUND IMAGING | Age: 85
DRG: 200 | End: 2025-01-15
Payer: MEDICARE

## 2025-01-15 ENCOUNTER — APPOINTMENT (OUTPATIENT)
Dept: GENERAL RADIOLOGY | Age: 85
DRG: 200 | End: 2025-01-15
Payer: MEDICARE

## 2025-01-15 ENCOUNTER — FOLLOWUP TELEPHONE ENCOUNTER (OUTPATIENT)
Dept: ONCOLOGY | Age: 85
End: 2025-01-15

## 2025-01-15 VITALS
OXYGEN SATURATION: 98 % | BODY MASS INDEX: 20.83 KG/M2 | TEMPERATURE: 98.3 F | WEIGHT: 125 LBS | HEART RATE: 97 BPM | SYSTOLIC BLOOD PRESSURE: 96 MMHG | DIASTOLIC BLOOD PRESSURE: 56 MMHG | HEIGHT: 65 IN

## 2025-01-15 DIAGNOSIS — C25.9 METASTASIS FROM PANCREATIC CANCER (HCC): Primary | ICD-10-CM

## 2025-01-15 DIAGNOSIS — R55 PRE-SYNCOPE: ICD-10-CM

## 2025-01-15 DIAGNOSIS — N18.32 CHRONIC KIDNEY DISEASE, STAGE 3B (HCC): ICD-10-CM

## 2025-01-15 DIAGNOSIS — C79.9 METASTASIS FROM PANCREATIC CANCER (HCC): Primary | ICD-10-CM

## 2025-01-15 DIAGNOSIS — R55 SYNCOPE, UNSPECIFIED SYNCOPE TYPE: ICD-10-CM

## 2025-01-15 DIAGNOSIS — J93.9 PNEUMOTHORAX ON RIGHT: ICD-10-CM

## 2025-01-15 DIAGNOSIS — R91.8 LUNG MASS: ICD-10-CM

## 2025-01-15 DIAGNOSIS — C78.00 PANCREATIC CANCER METASTASIZED TO LUNG (HCC): ICD-10-CM

## 2025-01-15 DIAGNOSIS — R06.02 SHORTNESS OF BREATH: Primary | ICD-10-CM

## 2025-01-15 DIAGNOSIS — R59.0 MEDIASTINAL LYMPHADENOPATHY: ICD-10-CM

## 2025-01-15 DIAGNOSIS — C25.9 PANCREATIC CANCER METASTASIZED TO LUNG (HCC): ICD-10-CM

## 2025-01-15 DIAGNOSIS — J93.9 PNEUMOTHORAX, UNSPECIFIED TYPE: Primary | ICD-10-CM

## 2025-01-15 DIAGNOSIS — R55 NEAR SYNCOPE: ICD-10-CM

## 2025-01-15 PROBLEM — C24.1 CANCER OF AMPULLA OF VATER (HCC): Status: ACTIVE | Noted: 2022-01-06

## 2025-01-15 LAB
ALBUMIN SERPL BCG-MCNC: 3.8 G/DL (ref 3.5–5.1)
ALP SERPL-CCNC: 174 U/L (ref 38–126)
ALT SERPL W/O P-5'-P-CCNC: 19 U/L (ref 11–66)
ANION GAP SERPL CALC-SCNC: 15 MEQ/L (ref 8–16)
APTT PPP: 26.8 SECONDS (ref 22–38)
AST SERPL-CCNC: 32 U/L (ref 5–40)
BASOPHILS ABSOLUTE: 0 THOU/MM3 (ref 0–0.1)
BASOPHILS NFR BLD AUTO: 0.8 %
BILIRUB SERPL-MCNC: 1.1 MG/DL (ref 0.3–1.2)
BUN SERPL-MCNC: 21 MG/DL (ref 7–22)
CALCIUM SERPL-MCNC: 8.7 MG/DL (ref 8.5–10.5)
CANCER AG19-9 SERPL-ACNC: 592 U/ML (ref 0–35)
CHLORIDE SERPL-SCNC: 102 MEQ/L (ref 98–111)
CO2 SERPL-SCNC: 23 MEQ/L (ref 23–33)
CREAT SERPL-MCNC: 1.4 MG/DL (ref 0.4–1.2)
DEPRECATED RDW RBC AUTO: 51 FL (ref 35–45)
EKG ATRIAL RATE: 85 BPM
EKG P AXIS: 89 DEGREES
EKG P-R INTERVAL: 176 MS
EKG Q-T INTERVAL: 418 MS
EKG QRS DURATION: 92 MS
EKG QTC CALCULATION (BAZETT): 497 MS
EKG R AXIS: -62 DEGREES
EKG T AXIS: 71 DEGREES
EKG VENTRICULAR RATE: 85 BPM
EOSINOPHIL NFR BLD AUTO: 0.3 %
EOSINOPHILS ABSOLUTE: 0 THOU/MM3 (ref 0–0.4)
ERYTHROCYTE [DISTWIDTH] IN BLOOD BY AUTOMATED COUNT: 14.2 % (ref 11.5–14.5)
GFR SERPL CREATININE-BSD FRML MDRD: 37 ML/MIN/1.73M2
GLUCOSE SERPL-MCNC: 152 MG/DL (ref 70–108)
HCT VFR BLD AUTO: 35.2 % (ref 37–47)
HGB BLD-MCNC: 11.3 GM/DL (ref 12–16)
IMM GRANULOCYTES # BLD AUTO: 0.02 THOU/MM3 (ref 0–0.07)
IMM GRANULOCYTES NFR BLD AUTO: 0.3 %
INR PPP: 0.99 (ref 0.85–1.13)
LYMPHOCYTES ABSOLUTE: 1.2 THOU/MM3 (ref 1–4.8)
LYMPHOCYTES NFR BLD AUTO: 20.8 %
MAGNESIUM SERPL-MCNC: 1.6 MG/DL (ref 1.6–2.4)
MCH RBC QN AUTO: 31.7 PG (ref 26–33)
MCHC RBC AUTO-ENTMCNC: 32.1 GM/DL (ref 32.2–35.5)
MCV RBC AUTO: 98.6 FL (ref 81–99)
MONOCYTES ABSOLUTE: 0.4 THOU/MM3 (ref 0.4–1.3)
MONOCYTES NFR BLD AUTO: 7.3 %
NEUTROPHILS ABSOLUTE: 4.2 THOU/MM3 (ref 1.8–7.7)
NEUTROPHILS NFR BLD AUTO: 70.5 %
NRBC BLD AUTO-RTO: 0 /100 WBC
NT-PROBNP SERPL IA-MCNC: 720.2 PG/ML (ref 0–449)
OSMOLALITY SERPL CALC.SUM OF ELEC: 285.3 MOSMOL/KG (ref 275–300)
PLATELET # BLD AUTO: 380 THOU/MM3 (ref 130–400)
PMV BLD AUTO: 10.4 FL (ref 9.4–12.4)
POTASSIUM SERPL-SCNC: 3.8 MEQ/L (ref 3.5–5.2)
PROT SERPL-MCNC: 6.9 G/DL (ref 6.1–8)
RBC # BLD AUTO: 3.57 MILL/MM3 (ref 4.2–5.4)
SODIUM SERPL-SCNC: 140 MEQ/L (ref 135–145)
TROPONIN, HIGH SENSITIVITY: 27 NG/L (ref 0–12)
WBC # BLD AUTO: 6 THOU/MM3 (ref 4.8–10.8)

## 2025-01-15 PROCEDURE — 93010 ELECTROCARDIOGRAM REPORT: CPT | Performed by: INTERNAL MEDICINE

## 2025-01-15 PROCEDURE — 2500000003 HC RX 250 WO HCPCS: Performed by: PHYSICIAN ASSISTANT

## 2025-01-15 PROCEDURE — 93005 ELECTROCARDIOGRAM TRACING: CPT | Performed by: EMERGENCY MEDICINE

## 2025-01-15 PROCEDURE — 6360000004 HC RX CONTRAST MEDICATION: Performed by: EMERGENCY MEDICINE

## 2025-01-15 PROCEDURE — 1159F MED LIST DOCD IN RCRD: CPT | Performed by: INTERNAL MEDICINE

## 2025-01-15 PROCEDURE — 1200000003 HC TELEMETRY R&B

## 2025-01-15 PROCEDURE — 3078F DIAST BP <80 MM HG: CPT | Performed by: INTERNAL MEDICINE

## 2025-01-15 PROCEDURE — 99214 OFFICE O/P EST MOD 30 MIN: CPT | Performed by: INTERNAL MEDICINE

## 2025-01-15 PROCEDURE — 2580000003 HC RX 258: Performed by: PHYSICIAN ASSISTANT

## 2025-01-15 PROCEDURE — 1124F ACP DISCUSS-NO DSCNMKR DOCD: CPT | Performed by: INTERNAL MEDICINE

## 2025-01-15 PROCEDURE — 94640 AIRWAY INHALATION TREATMENT: CPT

## 2025-01-15 PROCEDURE — 71045 X-RAY EXAM CHEST 1 VIEW: CPT

## 2025-01-15 PROCEDURE — 6370000000 HC RX 637 (ALT 250 FOR IP): Performed by: EMERGENCY MEDICINE

## 2025-01-15 PROCEDURE — 99223 1ST HOSP IP/OBS HIGH 75: CPT | Performed by: PHYSICIAN ASSISTANT

## 2025-01-15 PROCEDURE — 85730 THROMBOPLASTIN TIME PARTIAL: CPT

## 2025-01-15 PROCEDURE — 71275 CT ANGIOGRAPHY CHEST: CPT

## 2025-01-15 PROCEDURE — 80053 COMPREHEN METABOLIC PANEL: CPT

## 2025-01-15 PROCEDURE — 32551 INSERTION OF CHEST TUBE: CPT

## 2025-01-15 PROCEDURE — 83880 ASSAY OF NATRIURETIC PEPTIDE: CPT

## 2025-01-15 PROCEDURE — 85025 COMPLETE CBC W/AUTO DIFF WBC: CPT

## 2025-01-15 PROCEDURE — 83735 ASSAY OF MAGNESIUM: CPT

## 2025-01-15 PROCEDURE — 85610 PROTHROMBIN TIME: CPT

## 2025-01-15 PROCEDURE — 36415 COLL VENOUS BLD VENIPUNCTURE: CPT

## 2025-01-15 PROCEDURE — 1160F RVW MEDS BY RX/DR IN RCRD: CPT | Performed by: INTERNAL MEDICINE

## 2025-01-15 PROCEDURE — 84484 ASSAY OF TROPONIN QUANT: CPT

## 2025-01-15 PROCEDURE — 99285 EMERGENCY DEPT VISIT HI MDM: CPT

## 2025-01-15 PROCEDURE — 3074F SYST BP LT 130 MM HG: CPT | Performed by: INTERNAL MEDICINE

## 2025-01-15 PROCEDURE — 6370000000 HC RX 637 (ALT 250 FOR IP): Performed by: PHYSICIAN ASSISTANT

## 2025-01-15 PROCEDURE — 76770 US EXAM ABDO BACK WALL COMP: CPT

## 2025-01-15 RX ORDER — ONDANSETRON 2 MG/ML
4 INJECTION INTRAMUSCULAR; INTRAVENOUS EVERY 6 HOURS PRN
Status: DISCONTINUED | OUTPATIENT
Start: 2025-01-15 | End: 2025-01-19 | Stop reason: HOSPADM

## 2025-01-15 RX ORDER — ACETAMINOPHEN 325 MG/1
650 TABLET ORAL EVERY 6 HOURS PRN
Status: DISCONTINUED | OUTPATIENT
Start: 2025-01-15 | End: 2025-01-19 | Stop reason: HOSPADM

## 2025-01-15 RX ORDER — ONDANSETRON 4 MG/1
4 TABLET, ORALLY DISINTEGRATING ORAL EVERY 8 HOURS PRN
Status: DISCONTINUED | OUTPATIENT
Start: 2025-01-15 | End: 2025-01-19 | Stop reason: HOSPADM

## 2025-01-15 RX ORDER — GABAPENTIN 300 MG/1
300 CAPSULE ORAL 2 TIMES DAILY
Status: DISCONTINUED | OUTPATIENT
Start: 2025-01-15 | End: 2025-01-19 | Stop reason: HOSPADM

## 2025-01-15 RX ORDER — IPRATROPIUM BROMIDE AND ALBUTEROL SULFATE 2.5; .5 MG/3ML; MG/3ML
1 SOLUTION RESPIRATORY (INHALATION)
Status: DISCONTINUED | OUTPATIENT
Start: 2025-01-15 | End: 2025-01-15

## 2025-01-15 RX ORDER — SODIUM CHLORIDE 0.9 % (FLUSH) 0.9 %
5-40 SYRINGE (ML) INJECTION PRN
Status: DISCONTINUED | OUTPATIENT
Start: 2025-01-15 | End: 2025-01-19 | Stop reason: HOSPADM

## 2025-01-15 RX ORDER — IOPAMIDOL 755 MG/ML
80 INJECTION, SOLUTION INTRAVASCULAR
Status: COMPLETED | OUTPATIENT
Start: 2025-01-15 | End: 2025-01-15

## 2025-01-15 RX ORDER — SODIUM CHLORIDE 9 MG/ML
INJECTION, SOLUTION INTRAVENOUS PRN
Status: DISCONTINUED | OUTPATIENT
Start: 2025-01-15 | End: 2025-01-19 | Stop reason: HOSPADM

## 2025-01-15 RX ORDER — SODIUM CHLORIDE 0.9 % (FLUSH) 0.9 %
5-40 SYRINGE (ML) INJECTION EVERY 12 HOURS SCHEDULED
Status: DISCONTINUED | OUTPATIENT
Start: 2025-01-15 | End: 2025-01-19 | Stop reason: HOSPADM

## 2025-01-15 RX ORDER — POLYETHYLENE GLYCOL 3350 17 G/17G
17 POWDER, FOR SOLUTION ORAL DAILY PRN
Status: DISCONTINUED | OUTPATIENT
Start: 2025-01-15 | End: 2025-01-19 | Stop reason: HOSPADM

## 2025-01-15 RX ORDER — 0.9 % SODIUM CHLORIDE 0.9 %
500 INTRAVENOUS SOLUTION INTRAVENOUS ONCE
Status: COMPLETED | OUTPATIENT
Start: 2025-01-15 | End: 2025-01-16

## 2025-01-15 RX ORDER — ACETAMINOPHEN 650 MG/1
650 SUPPOSITORY RECTAL EVERY 6 HOURS PRN
Status: DISCONTINUED | OUTPATIENT
Start: 2025-01-15 | End: 2025-01-19 | Stop reason: HOSPADM

## 2025-01-15 RX ADMIN — SODIUM CHLORIDE, PRESERVATIVE FREE 10 ML: 5 INJECTION INTRAVENOUS at 22:28

## 2025-01-15 RX ADMIN — GABAPENTIN 300 MG: 300 CAPSULE ORAL at 21:30

## 2025-01-15 RX ADMIN — IPRATROPIUM BROMIDE AND ALBUTEROL SULFATE 1 DOSE: .5; 3 SOLUTION RESPIRATORY (INHALATION) at 15:41

## 2025-01-15 RX ADMIN — IOPAMIDOL 80 ML: 755 INJECTION, SOLUTION INTRAVENOUS at 15:47

## 2025-01-15 RX ADMIN — ACETAMINOPHEN 650 MG: 325 TABLET ORAL at 22:33

## 2025-01-15 RX ADMIN — SODIUM CHLORIDE 500 ML: 9 INJECTION, SOLUTION INTRAVENOUS at 21:25

## 2025-01-15 ASSESSMENT — PAIN SCALES - WONG BAKER: WONGBAKER_NUMERICALRESPONSE: NO HURT

## 2025-01-15 ASSESSMENT — PAIN SCALES - GENERAL
PAINLEVEL_OUTOF10: 0
PAINLEVEL_OUTOF10: 4
PAINLEVEL_OUTOF10: 3

## 2025-01-15 ASSESSMENT — ENCOUNTER SYMPTOMS
SINUS PRESSURE: 0
RHINORRHEA: 0
COUGH: 1
WHEEZING: 0
SINUS PAIN: 0
CHEST TIGHTNESS: 0
SHORTNESS OF BREATH: 0

## 2025-01-15 ASSESSMENT — PAIN - FUNCTIONAL ASSESSMENT: PAIN_FUNCTIONAL_ASSESSMENT: 0-10

## 2025-01-15 ASSESSMENT — PAIN DESCRIPTION - LOCATION: LOCATION: ABDOMEN

## 2025-01-15 NOTE — ED NOTES
Dr betts and Dr Lunsford at bedside for chest tube insertion   Pt remains Aox4  Vitals are stable

## 2025-01-15 NOTE — ED NOTES
ED to inpatient nurses report      Chief Complaint:  Chief Complaint   Patient presents with    Dizziness     Present to ED from: home    MOA:     LOC: alert and orientated to name, place, date  Mobility: Requires assistance * 1  Oxygen Baseline:     Current needs required: RA- chest tube to LIWS     Code Status:   Full Code    What abnormal results were found and what did you give/do to treat them? See chart   Any procedures or intervention occur? See chart     Mental Status:       Psych Assessment:        Vitals:  Patient Vitals for the past 24 hrs:   BP Temp Temp src Pulse Resp SpO2   01/15/25 1837 -- -- -- 77 15 --   01/15/25 1836 -- -- -- 78 20 --   01/15/25 1814 -- -- -- 81 15 --   01/15/25 1654 -- -- -- 85 24 98 %   01/15/25 1541 -- -- -- 73 16 97 %   01/15/25 1511 127/72 -- -- 74 17 98 %   01/15/25 1354 -- 98.1 °F (36.7 °C) Oral -- -- --   01/15/25 1349 125/72 -- -- 89 18 96 %        LDAs:   Peripheral IV 01/15/25 Right Antecubital (Active)   Site Assessment Clean, dry & intact 01/15/25 1354   Phlebitis Assessment No symptoms 01/15/25 1354   Infiltration Assessment 0 01/15/25 1354       Ambulatory Status:  Presents to emergency department  because of falls (Syncope, seizure, or loss of consciousness): Yes, Age > 70: Yes, Altered Mental Status, Intoxication with alcohol or substance confusion (Disorientation, impaired judgment, poor safety awaremess, or inability to follow instructions): Yes, Impaired Mobility: Ambulates or transfers with assistive devices or assistance; Unable to ambulate or transer.: No, Nursing Judgement: Yes    Diagnosis:  DISPOSITION Admitted 01/15/2025 06:19:03 PM   Final diagnoses:   Near syncope   Pneumothorax, unspecified type        Consults:  IP CONSULT TO PULMONOLOGY     Pain Score:  Pain Assessment  Pain Assessment: 0-10  Pain Level: 3  Patient's Stated Pain Goal: 0 - No pain    C-SSRS:   Risk of Suicide: No Risk    Sepsis Screening:       Kinder Fall Risk:  Kinder 1 Fall

## 2025-01-15 NOTE — PROGRESS NOTES
[facility]    Patient: REINALDO MCCORMICK, 84 y.o.   : 1940  1/15/2025     Assessment/Plan   1. Lung nodule with LAD (lymphadenopathy), mediastinal & hx of Malignant neoplasm of pancreas status post Whipple in  at [facility]  -(+) for adenocarcinoma at station 7 lymph node and FNA of RLL mass.     2. Shortness of breath on exertion with occasional \"spasm breath\"  -Advised pulmonary function test for further evaluation and patient declines at this time   -Reviewed preventative vaccinations    3. Syncopal episode  4. pneumothorax     Patient in clinic today with some hypotension BP on 2 separate times 90s over 50s, just had chemo yesterday.  Very lightheaded and dizzy.  Fell once when coming into clinic, and almost fell again after 4-5 steps with her walker.  Does have a pneumothorax on that right side does not seem to be having any pulmonary symptoms some decreased sounds on the right side also has an effusion there.  Will send to the emergency room likely need some fluids.  Would also recommend getting stat CT chest to rule out pneumothorax, labs as well, also patient does have malignancy and is immunocompromised       Subjective     Chief Complaint   Patient presents with    Follow-up     Pulm f/u partially collapsed lung, PET scan 24 and CXR 24.        HPI  Complaints: shortness of breath  Onset Duration: Over a year  Exacerbating factors: Exertion  Alleviating factors: Rest  Associated symptoms: Cough \"occasionally\"  Pertinent negatives: Shortness of Breath, Sputum Production, Hemoptysis, Wheezing, and Chest Tightness    Reinaldo is here for follow up for ebus and carito bronch. Patient was initially referred by oncology for EBUS of R hilar lymph node that was PET (+). Patient follows with oncology for history of pancreatic adenocarcinoma status post Whipple (at [facility]) and adjuvant chemotherapy. Overall patient reports respiratory symptoms have

## 2025-01-15 NOTE — ED PROVIDER NOTES
Pt Name: Sol Greer  MRN: [id number]  Birthdate 1940  Date of evaluation: 1/15/2025  ED Resident: Liz Bhardwaj MD  ED Attending: Dr. Huber    CHIEF COMPLAINT       Chief Complaint   Patient presents with    Dizziness     History obtained from the patient.    HISTORY OF PRESENT ILLNESS    HPI  Sol Greer is a 84 y.o. female who presents to the emergency department for evaluation of dizziness.    Patient sent from pulmonary clinic for near syncope.    Pulmonology clinic note states that she has adenocarcinoma of the lung, has been experiencing shortness of breath on exertion, near-syncope in the office, and they told the patient that they were concerned for pneumothorax because of diminished breath sounds.  She was sent immediately to the ER.  In the emergency department, patient denies chest pain, shortness of breath, no nausea, vomiting, diarrhea.  She stated that she had coffee and some breakfast this morning, and did start to feel weak and fatigued on her way to the appointment, but she still went.  She stated that she got up and started to walk, but then felt like she was about to pass out, she did fall but no head injury or loss of consciousness.    History of pancreatic adenocarcinoma.      The patient has no other acute complaints at this time.    PAST MEDICAL AND SURGICAL HISTORY     Past Medical History:   Diagnosis Date    Atrial fibrillation (HCC)     Cholangiocarcinoma (HCC)     Mrozek    Hypertension      Past Surgical History:   Procedure Laterality Date    APPENDECTOMY  1959    BRONCHOSCOPY N/A 5/10/2024    BRONCHOSCOPY ENDOBRONCHIAL ULTRASOUND performed by Star Mendes MD at [facility] ENDOSCOPY    BRONCHOSCOPY  5/10/2024    BRONCHOSCOPY performed by Star Mendes MD at [facility] ENDOSCOPY    BRONCHOSCOPY N/A 8/29/2024    NAVIGATIONAL  EBUS SUPER D performed by Star Mendes MD at [facility] ENDOSCOPY    CHOLECYSTECTOMY  12/04/2021    Dr. Gildardo Houston    Neurological:      Mental Status: She is alert and oriented to person, place, and time.         DIFFERENTIALS   Initial Assessment: Given the patient's above chief complaint and findings on history and physical examination, I thought it was appropriate to consider the following emergency medical conditions:    Vasovagal syncope, arrhythmia, anemia, electrolyte abnormality, pleural effusion, pneumothorax, PE    PLAN: chest x-ray, CBC, CMP, troponin, BNP, magnesium, EKG, CTA chest    Although some of these diagnoses are unlikely they were considered in my medical decision making.  Chronic Conditions considered:   Patient Active Problem List   Diagnosis    Obstructive jaundice    Hyperbilirubinemia    Common bile duct stricture    Elevated LFTs    Hypokalemia    Pancreatic cyst    Normocytic anemia    Abnormal urinalysis    Unintentional weight loss    Hiatal hernia    Diverticulosis    Metastasis from pancreatic cancer (HCC)    Atrial fibrillation (HCC)    SIRIA (acute kidney injury) (HCC)    Hypomagnesemia    Macrocytic anemia    Primary hypertension    Chemotherapy-induced neuropathy (HCC)    Lung mass    Mediastinal lymphadenopathy    Chronic kidney disease, stage 3b (N18.32)    Protein-calorie malnutrition (HCC)    Palliative care patient    Pancreatic cancer metastasized to lung (HCC)    Cancer of ampulla of Vater (HCC)    Pneumothorax on right    Pneumothorax       ED RESULTS   Laboratory results:  Labs Reviewed   CBC WITH AUTO DIFFERENTIAL - Abnormal; Notable for the following components:       Result Value    RBC 3.57 (*)     Hemoglobin 11.3 (*)     Hematocrit 35.2 (*)     MCHC 32.1 (*)     RDW-SD 51.0 (*)     All other components within normal limits   COMPREHENSIVE METABOLIC PANEL - Abnormal; Notable for the following components:    Glucose 152 (*)     Creatinine 1.4 (*)     Alkaline Phosphatase 174 (*)     All other components within normal limits   TROPONIN - Abnormal; Notable for the following components:

## 2025-01-15 NOTE — PROGRESS NOTES
Spoke to the patient.  Her sound is strong.  She said that she is feeling much better.  And she is doing well.  CA 19-9 is coming back elevated at 500.  With a new lytic lesions on the recent PET/CT scan consistent with disease progression.  Even though clinically she is doing very well.  Minimal pain in the spine I have discussed her case with Dr. Erickson at [facility] unfortunately there is no clinical trials available for her.  There is a clinical trial available at [facility] in [place].  I discussed this with the patient and she is in agreement to go for evaluation.  Will make the referral to [facility].

## 2025-01-15 NOTE — ED PROVIDER NOTES
Attending Supervising Physician's Attestation Statement  I performed a history and physical examination on the patient and discussed the management with the resident physician at 1420. I reviewed and agree with the findings and plan as documented in the resident physician note. This includes all diagnostic interpretations and treatment plans as written. I was present for the key portion of any procedures performed and the inclusive time noted in any critical care statement. Except as noted below.     Brief H&P   This patient is a 84 y.o. Female with dizziness and near syncope.  Patient states that she has a history of pancreatic cancer with metastasis to her right lung.  She was seeing her pulmonologist today and felt dizzy and like she was going to pass out.  Patient states her appetite is somewhat poor, and she often does not have the energy to cook for herself.  She denies any vomiting, no diarrhea.  Pulmonology was concerned she had a pneumothorax and sent her here for an evaluation.    On my examination, nontoxic-appearing female, no distress    HEENT: Normocephalic, Atraumatic. Neck is supple without TTP.   Lungs: Bilateral lower lobes clear and equal, right mid lung absent, MANI clear, and RUL diminished  Heart: Rate and rhythm regular, no murmurs clicks or gallops  Abdomen: Soft non-tender, and non-distended abdomen. No rigidity. No Guarding.   Lower extremities: No edema, no tenderness to palpation.   Neuro: Awake and alert, no lateralizing deficits, cranial nerves II through XII grossly intact bilaterally    Diagnostics and Treatments      LABS / RADIOLOGY:     Labs Reviewed   CBC WITH AUTO DIFFERENTIAL - Abnormal; Notable for the following components:       Result Value    RBC 3.57 (*)     Hemoglobin 11.3 (*)     Hematocrit 35.2 (*)     MCHC 32.1 (*)     RDW-SD 51.0 (*)     All other components within normal limits   COMPREHENSIVE METABOLIC PANEL - Abnormal; Notable for the following components:    Glucose

## 2025-01-15 NOTE — ED NOTES
Pt to ED for eval after becoming dizzy and suffering a fall at the pulmonology office. Pt in stable condition. Denies any dizziness at current.

## 2025-01-15 NOTE — PROCEDURES
PROCEDURE NOTE  Date: 1/15/2025   Name: Sol Greer  YOB: 1940    Chest Tube    Date/Time: 1/15/2025 6:47 PM    Performed by: Irene Pace MD  Authorized by: Mesha Huber MD    Consent:     Consent obtained:  Written    Consent given by:  Patient    Risks, benefits, and alternatives were discussed: yes      Risks discussed:  Bleeding, incomplete drainage, nerve damage, pain, damage to surrounding structures and infection    Alternatives discussed:  Observation, referral and alternative treatment  Universal protocol:     Procedure explained and questions answered to patient or proxy's satisfaction: yes      Relevant documents present and verified: yes      Test results available: yes      Imaging studies available: yes      Required blood products, implants, devices, and special equipment available: yes      Site/side marked: yes      Immediately prior to procedure, a time out was called: yes      Patient identity confirmed:  Verbally with patient  Pre-procedure details:     Skin preparation:  Chlorhexidine and povidone-iodine    Preparation: Patient was prepped and draped in the usual sterile fashion    Sedation:     Sedation type:  None  Anesthesia:     Anesthesia method:  Local infiltration    Local anesthetic:  Lidocaine 1% w/o epi  Procedure details:     Placement location:  R anterior    Scalpel size:  10    Tube size (Belgian): 7.    Dissection instrument: Probe dilator.    Ultrasound guidance: no      Tension pneumothorax: yes      Tube connected to:  Suction, water seal and Heimlich valve    Drainage characteristics:  Air only    Suture material:  2-0 silk    Dressing:  Petrolatum-impregnated gauze  Post-procedure details:     Post-insertion x-ray findings: tube in good position      Procedure completion:  Tolerated

## 2025-01-16 ENCOUNTER — APPOINTMENT (OUTPATIENT)
Dept: GENERAL RADIOLOGY | Age: 85
DRG: 200 | End: 2025-01-16
Payer: MEDICARE

## 2025-01-16 ENCOUNTER — CASE MANAGEMENT (OUTPATIENT)
Dept: CASE MANAGEMENT | Age: 85
End: 2025-01-16

## 2025-01-16 LAB
ANION GAP SERPL CALC-SCNC: 7 MEQ/L (ref 8–16)
BACTERIA: ABNORMAL
BASE EXCESS BLDA CALC-SCNC: 3.6 MMOL/L (ref -2–3)
BASOPHILS ABSOLUTE: 0 THOU/MM3 (ref 0–0.1)
BASOPHILS NFR BLD AUTO: 0.6 %
BILIRUB UR QL STRIP: NEGATIVE
BUN SERPL-MCNC: 17 MG/DL (ref 7–22)
CA-I BLD ISE-SCNC: 1.2 MMOL/L (ref 1.12–1.32)
CALCIUM SERPL-MCNC: 8.3 MG/DL (ref 8.5–10.5)
CASTS #/AREA URNS LPF: ABNORMAL /LPF
CASTS #/AREA URNS LPF: ABNORMAL /LPF
CHARACTER UR: ABNORMAL
CHARCOAL URNS QL MICRO: ABNORMAL
CHLORIDE SERPL-SCNC: 106 MEQ/L (ref 98–111)
CO2 SERPL-SCNC: 25 MEQ/L (ref 23–33)
COLLECTED BY:: ABNORMAL
COLOR UR: YELLOW
CREAT SERPL-MCNC: 1 MG/DL (ref 0.4–1.2)
CREAT UR-MCNC: 179.8 MG/DL
CRYSTALS URNS QL MICRO: ABNORMAL
DEPRECATED RDW RBC AUTO: 49.5 FL (ref 35–45)
DEVICE: ABNORMAL
EOSINOPHIL NFR BLD AUTO: 0.9 %
EOSINOPHILS ABSOLUTE: 0 THOU/MM3 (ref 0–0.4)
EPITHELIAL CELLS, UA: ABNORMAL /HPF
ERYTHROCYTE [DISTWIDTH] IN BLOOD BY AUTOMATED COUNT: 14.1 % (ref 11.5–14.5)
FOLATE SERPL-MCNC: > 20 NG/ML (ref 4.8–24.2)
GFR SERPL CREATININE-BSD FRML MDRD: 56 ML/MIN/1.73M2
GLUCOSE SERPL-MCNC: 84 MG/DL (ref 70–108)
GLUCOSE UR QL STRIP.AUTO: NEGATIVE MG/DL
HCO3 BLDA-SCNC: 26 MMOL/L (ref 23–28)
HCT VFR BLD AUTO: 30.9 % (ref 37–47)
HGB BLD-MCNC: 10 GM/DL (ref 12–16)
HGB UR QL STRIP.AUTO: NEGATIVE
IMM GRANULOCYTES # BLD AUTO: 0.01 THOU/MM3 (ref 0–0.07)
IMM GRANULOCYTES NFR BLD AUTO: 0.3 %
IRON SATN MFR SERPL: 92 % (ref 20–50)
IRON SERPL-MCNC: 188 UG/DL (ref 50–170)
KETONES UR QL STRIP.AUTO: NEGATIVE
LEUKOCYTE ESTERASE UR QL STRIP.AUTO: NEGATIVE
LYMPHOCYTES ABSOLUTE: 0.9 THOU/MM3 (ref 1–4.8)
LYMPHOCYTES NFR BLD AUTO: 26.4 %
MCH RBC QN AUTO: 31.4 PG (ref 26–33)
MCHC RBC AUTO-ENTMCNC: 32.4 GM/DL (ref 32.2–35.5)
MCV RBC AUTO: 97.2 FL (ref 81–99)
MONOCYTES ABSOLUTE: 0.4 THOU/MM3 (ref 0.4–1.3)
MONOCYTES NFR BLD AUTO: 10.5 %
NEUTROPHILS ABSOLUTE: 2.1 THOU/MM3 (ref 1.8–7.7)
NEUTROPHILS NFR BLD AUTO: 61.3 %
NITRITE UR QL STRIP.AUTO: POSITIVE
NRBC BLD AUTO-RTO: 0 /100 WBC
OSMOLALITY SERPL CALC.SUM OF ELEC: 276.4 MOSMOL/KG (ref 275–300)
PCO2 TEMP ADJ BLDMV: 31 MMHG (ref 41–51)
PH BLDMV: 7.53 [PH] (ref 7.31–7.41)
PH UR STRIP.AUTO: 5.5 [PH] (ref 5–9)
PHOSPHATE SERPL-MCNC: 2.7 MG/DL (ref 2.4–4.7)
PLATELET # BLD AUTO: 239 THOU/MM3 (ref 130–400)
PMV BLD AUTO: 10.6 FL (ref 9.4–12.4)
PO2 BLDMV: 101 MMHG (ref 25–40)
POTASSIUM SERPL-SCNC: 3.7 MEQ/L (ref 3.5–5.2)
PROT UR STRIP.AUTO-MCNC: ABNORMAL MG/DL
PROT UR-MCNC: 28.2 MG/DL
PROT/CREAT 24H UR: 0.16 MG/G{CREAT}
RBC # BLD AUTO: 3.18 MILL/MM3 (ref 4.2–5.4)
RBC #/AREA URNS HPF: ABNORMAL /HPF
RENAL EPI CELLS #/AREA URNS HPF: ABNORMAL /[HPF]
SAO2 % BLDMV: 99 %
SITE: ABNORMAL
SODIUM SERPL-SCNC: 138 MEQ/L (ref 135–145)
SPECIFIC GRAVITY UA: > 1.03 (ref 1–1.03)
TIBC SERPL-MCNC: < 205 UG/DL (ref 171–450)
TSH SERPL DL<=0.005 MIU/L-ACNC: 1.01 UIU/ML (ref 0.4–4.2)
UROBILINOGEN, URINE: 1 EU/DL (ref 0–1)
VENTILATION MODE VENT: ABNORMAL
VIT B12 SERPL-MCNC: 342 PG/ML (ref 211–911)
WBC # BLD AUTO: 3.5 THOU/MM3 (ref 4.8–10.8)
WBC #/AREA URNS HPF: ABNORMAL /HPF
YEAST LIKE FUNGI URNS QL MICRO: ABNORMAL

## 2025-01-16 PROCEDURE — 6370000000 HC RX 637 (ALT 250 FOR IP)

## 2025-01-16 PROCEDURE — 32551 INSERTION OF CHEST TUBE: CPT

## 2025-01-16 PROCEDURE — 83540 ASSAY OF IRON: CPT

## 2025-01-16 PROCEDURE — 82746 ASSAY OF FOLIC ACID SERUM: CPT

## 2025-01-16 PROCEDURE — 83550 IRON BINDING TEST: CPT

## 2025-01-16 PROCEDURE — 1200000003 HC TELEMETRY R&B

## 2025-01-16 PROCEDURE — 99222 1ST HOSP IP/OBS MODERATE 55: CPT | Performed by: INTERNAL MEDICINE

## 2025-01-16 PROCEDURE — 2500000003 HC RX 250 WO HCPCS

## 2025-01-16 PROCEDURE — 85025 COMPLETE CBC W/AUTO DIFF WBC: CPT

## 2025-01-16 PROCEDURE — 36415 COLL VENOUS BLD VENIPUNCTURE: CPT

## 2025-01-16 PROCEDURE — 81001 URINALYSIS AUTO W/SCOPE: CPT

## 2025-01-16 PROCEDURE — 99233 SBSQ HOSP IP/OBS HIGH 50: CPT | Performed by: STUDENT IN AN ORGANIZED HEALTH CARE EDUCATION/TRAINING PROGRAM

## 2025-01-16 PROCEDURE — 82803 BLOOD GASES ANY COMBINATION: CPT

## 2025-01-16 PROCEDURE — 84100 ASSAY OF PHOSPHORUS: CPT

## 2025-01-16 PROCEDURE — 82330 ASSAY OF CALCIUM: CPT

## 2025-01-16 PROCEDURE — 84156 ASSAY OF PROTEIN URINE: CPT

## 2025-01-16 PROCEDURE — 87077 CULTURE AEROBIC IDENTIFY: CPT

## 2025-01-16 PROCEDURE — 1200000000 HC SEMI PRIVATE

## 2025-01-16 PROCEDURE — 87186 SC STD MICRODIL/AGAR DIL: CPT

## 2025-01-16 PROCEDURE — 84443 ASSAY THYROID STIM HORMONE: CPT

## 2025-01-16 PROCEDURE — 87086 URINE CULTURE/COLONY COUNT: CPT

## 2025-01-16 PROCEDURE — 83921 ORGANIC ACID SINGLE QUANT: CPT

## 2025-01-16 PROCEDURE — 82570 ASSAY OF URINE CREATININE: CPT

## 2025-01-16 PROCEDURE — 82607 VITAMIN B-12: CPT

## 2025-01-16 PROCEDURE — 71046 X-RAY EXAM CHEST 2 VIEWS: CPT

## 2025-01-16 PROCEDURE — 80048 BASIC METABOLIC PNL TOTAL CA: CPT

## 2025-01-16 RX ORDER — ASPIRIN 81 MG/1
81 TABLET, CHEWABLE ORAL DAILY
Status: DISCONTINUED | OUTPATIENT
Start: 2025-01-16 | End: 2025-01-19 | Stop reason: HOSPADM

## 2025-01-16 RX ORDER — DOCUSATE SODIUM 100 MG/1
100 CAPSULE, LIQUID FILLED ORAL DAILY
Status: DISCONTINUED | OUTPATIENT
Start: 2025-01-16 | End: 2025-01-16

## 2025-01-16 RX ORDER — AMLODIPINE BESYLATE 2.5 MG/1
2.5 TABLET ORAL DAILY
Status: DISCONTINUED | OUTPATIENT
Start: 2025-01-16 | End: 2025-01-16

## 2025-01-16 RX ORDER — HYDRALAZINE HYDROCHLORIDE 20 MG/ML
5 INJECTION INTRAMUSCULAR; INTRAVENOUS EVERY 6 HOURS PRN
Status: DISCONTINUED | OUTPATIENT
Start: 2025-01-16 | End: 2025-01-19 | Stop reason: HOSPADM

## 2025-01-16 RX ORDER — LIDOCAINE/PRILOCAINE 2.5 %-2.5%
CREAM (GRAM) TOPICAL PRN
Status: DISCONTINUED | OUTPATIENT
Start: 2025-01-16 | End: 2025-01-19 | Stop reason: HOSPADM

## 2025-01-16 RX ORDER — FERROUS SULFATE 325(65) MG
325 TABLET ORAL NIGHTLY
Status: DISCONTINUED | OUTPATIENT
Start: 2025-01-16 | End: 2025-01-16

## 2025-01-16 RX ORDER — ASCORBIC ACID 500 MG
250 TABLET ORAL DAILY
Status: DISCONTINUED | OUTPATIENT
Start: 2025-01-16 | End: 2025-01-19 | Stop reason: HOSPADM

## 2025-01-16 RX ADMIN — SODIUM CHLORIDE, PRESERVATIVE FREE 10 ML: 5 INJECTION INTRAVENOUS at 09:13

## 2025-01-16 RX ADMIN — ACETAMINOPHEN 650 MG: 325 TABLET ORAL at 19:44

## 2025-01-16 RX ADMIN — GABAPENTIN 300 MG: 300 CAPSULE ORAL at 20:24

## 2025-01-16 RX ADMIN — OXYCODONE HYDROCHLORIDE AND ACETAMINOPHEN 250 MG: 500 TABLET ORAL at 09:13

## 2025-01-16 RX ADMIN — Medication 1 TABLET: at 09:13

## 2025-01-16 RX ADMIN — GABAPENTIN 300 MG: 300 CAPSULE ORAL at 09:13

## 2025-01-16 RX ADMIN — SODIUM CHLORIDE, PRESERVATIVE FREE 10 ML: 5 INJECTION INTRAVENOUS at 20:23

## 2025-01-16 RX ADMIN — ACETAMINOPHEN 650 MG: 325 TABLET ORAL at 09:13

## 2025-01-16 RX ADMIN — DOCUSATE SODIUM 100 MG: 100 CAPSULE, LIQUID FILLED ORAL at 09:13

## 2025-01-16 ASSESSMENT — PAIN DESCRIPTION - LOCATION
LOCATION: CHEST
LOCATION: CHEST

## 2025-01-16 ASSESSMENT — PAIN DESCRIPTION - DESCRIPTORS
DESCRIPTORS: ACHING
DESCRIPTORS: ACHING

## 2025-01-16 ASSESSMENT — PAIN DESCRIPTION - ORIENTATION
ORIENTATION: RIGHT
ORIENTATION: RIGHT

## 2025-01-16 ASSESSMENT — PAIN DESCRIPTION - ONSET: ONSET: ON-GOING

## 2025-01-16 ASSESSMENT — PAIN SCALES - GENERAL
PAINLEVEL_OUTOF10: 3
PAINLEVEL_OUTOF10: 7

## 2025-01-16 ASSESSMENT — PAIN DESCRIPTION - FREQUENCY: FREQUENCY: INTERMITTENT

## 2025-01-16 ASSESSMENT — PAIN - FUNCTIONAL ASSESSMENT: PAIN_FUNCTIONAL_ASSESSMENT: ACTIVITIES ARE NOT PREVENTED

## 2025-01-16 ASSESSMENT — PAIN DESCRIPTION - PAIN TYPE: TYPE: ACUTE PAIN

## 2025-01-16 NOTE — PROGRESS NOTES
PROGRESS NOTE      Patient:  Sol Salguero [facility]  Unit/Bed:6K-18/018-A  YOB: 1940  MRN: [id number]   Acct: [id number]    PCP: Loida Knight MD    Date of Admission: 1/15/2025 LOS: 1    Date of Evaluation:  1/16/2025    Anticipated Discharge: pending clinical status    Assessment/Plan:    Right sided Pneumothorax  Noted on PET/CT on 12/23/24, CTA chest 1/15 noted further worsening  S/p pigtail and chest tube insertion on 1/15/25, f/u CXR showed near total resolution  No air leak present this evening  As per pulm for further management    Mechanical fall  Dizziness  Orthostatic Hypotension  Endorsed lightheadedness prior to fall, patient hit her right knee and did not hit her head  Right knee appears normal without effusion  Orthostatic BP assessment positive, 40 mmHg drop in systolic  Encourage PO intake; discussed taking positional changes slow  Likely cause of her fall in conjunction with above  Fall precautions  Consider stopping amlodipine at discharge    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, thoracic spine, right scapula  S/p whipple surgery at  on  Currently on chemotherapy, follows with   Intending to do clinical trial in [facility]  Serum metanephrines pending    Chronic normocytic anemia  Baseline hemoglobin appears to be around 11  Hgb on admission was 11.3, consistent with previous labs  Iron studies on admission showed elevated iron and % saturation; holding oral iron supplementation  Folate and B12 in normal limits  Continue to monitor with CBC    SIRIA, improved  Creatinine returned to normal limits after IV fluid bolus in the ER  1.4 on admission, noted that she has had elevations previously, but unlikely to be CKD  Renal ultrasound showed bilateral renal atrophy without any hydronephrosis  Continue to monitor with BMP    Asymptomatic Bacturia  UA with microscopic positive for nitrites, leuk esterase, and many bacteria  Asymptomatic at this time;

## 2025-01-16 NOTE — PROGRESS NOTES
Patient received sacramental anointing by a . If you are in need of  support, please call [phone]. If you are in need of a  after 6:30pm, please call the house supervisor for the on-call .     01/16/25 1615   Encounter Summary   Encounter Overview/Reason Initial Encounter   Service Provided For Patient   Referral/Consult From [facility]   Last Encounter  01/16/25  (Anointed)   Complexity of Encounter Low   Begin Time 1223   End Time  1230   Total Time Calculated 7 min   Spiritual/Emotional needs   Type Spiritual Support   Rituals, Rites and Sacraments   Type Anointing   Assessment/Intervention/Outcome   Assessment Hopeful   Intervention Empowerment   Outcome Encouraged;Engaged in conversation

## 2025-01-16 NOTE — ED NOTES
Spoke to  staff who approved patient transfer to [facility]. Patient transported upstairs in stable condition.

## 2025-01-16 NOTE — PROGRESS NOTES
Name: Sol Greer  : 1940  MRN: [id number]    Oncology Navigation Follow-Up Note    Contact Type:  Telephone    Notes: per Dr. Mcknight request -donald called pt-Dr Janel BERRIOS had no clinical trials available-  Refer to Ana Marte MD @ [facility]. Referral made. Donald informed pt.  Pt currently in ER d/t pneumothorax -pigtail placed-admitted to hospital.    Electronically signed by Ysabel Yañez RN on 2025 at 11:57 AM

## 2025-01-16 NOTE — PLAN OF CARE
Problem: Discharge Planning  Goal: Discharge to home or other facility with appropriate resources  Outcome: Progressing     Problem: Safety - Adult  Goal: Free from fall injury  Outcome: Progressing     Problem: ABCDS Injury Assessment  Goal: Absence of physical injury  Outcome: Progressing     Problem: Skin/Tissue Integrity  Goal: Absence of new skin breakdown  Description: 1.  Monitor for areas of redness and/or skin breakdown  2.  Assess vascular access sites hourly  3.  Every 4-6 hours minimum:  Change oxygen saturation probe site  4.  Every 4-6 hours:  If on nasal continuous positive airway pressure, respiratory therapy assess nares and determine need for appliance change or resting period.  Outcome: Progressing      done

## 2025-01-16 NOTE — H&P
Hospitalist History & Physical    Patient:  Sol Greer    Unit/Bed:6K-18/018-A  YOB: 1940  MRN: [id number]   PCP: Loida Knight MD  Code Status: Full Code    Date of Service: The patient was seen and examined on 01/15/25 and admitted to Inpatient with an expected length of stay of greater than two midnights due to medical therapy.     Chief Complaint: fall    Assessment/Plan:    Right-sided pneumothorax  Noted initially on PET/CT on 12/23/2024.  CTA of the chest today with enlarging right-sided pneumothorax.  Pigtail placed in the emergency department.  Follow-up radiograph shows almost complete resolution with trace pneumothorax.  Pulmonology has been consulted.  Mechanical fall  Patient endorses antecedent lightheadedness/dizziness.  This has been occurring intermittently when moving from a seated to standing position.  No loss of consciousness.  Suspect patient likely has orthostatic hypotension.  As a chest tube was placed, will defer orthostatic vital signs for now.  Consider when cleared from a pulmonology standpoint.  Check TTE.  Maintain telemetry.  Consider cardiac event monitor upon discharge.  PT consultation when appropriate.  Chronic normocytic anemia  Hemoglobin 11.3, appears around baseline.  Daily CBC.  Metastatic pancreatic cancer with metastatic disease to the lungs and thoracic spine and right scapula  Status post Whipple at .  Currently on chemotherapy.  Due to progression of disease, currently investigating possible clinical trial.  Follows with oncology outpatient.  Elevated creatinine  Suspect likely CKD, appears to be stage III  Creatinine today is 1.4.  Previously has been 1.5 in 7/29/2022 and 1.3 on 7/7/2022.  Check urine protein to creatinine ratio, renal ultrasound, urine with microscopy.  Consider nephrology consultation or close outpatient follow-up for further evaluation.  Neuropathy  Continue gabapentin.  Decrease dose to 300 mg twice daily

## 2025-01-16 NOTE — CARE COORDINATION
Case Management Assessment Initial Evaluation    Date/Time of Evaluation: 2025 8:05 AM  Assessment Completed by: Ike Bocanegra RN    If patient is discharged prior to next notation, then this note serves as note for discharge by case management.    Patient Name: Sol Greer                   YOB: 1940  Diagnosis: Pneumothorax [J93.9]  Near syncope [R55]  Pneumothorax, unspecified type [J93.9]                   Date / Time: 1/15/2025  1:43 PM  Location: [address] [place] [address]     Patient Admission Status: Inpatient   Readmission Risk Low 0-14, Mod 15-19), High > 20: Readmission Risk Score: 17.2    Current PCP: Loida Knight MD  Health Care Decision Makers:   Primary Decision Maker (Active): Rissa Shetty - Child - [phone]    Secondary Decision Maker: PercyMamadou - Child - [phone]    Additional Case Management Notes: Trop elev. Pulm consult pending.     Procedures: 1/15 right chest tube insertion    Imagin/15 CTA chest: right-sided pneumothorax. Small right-sided pleural effusion with atelectasis/infiltrate. Findings consistent with metastatic disease.     Patient Goals/Plan/Treatment Preferences: Met with Sol, she plans to return home alone at discharge. She has transportation arranged for appts through the [facility] where she is receiving chemotherapy. She has a cane and walker. She denies need for HH services at this time, but does request a packet of local resources for transport, meal plans, HH, elder agencies, etc. Packet provided.        25 1146   Service Assessment   Patient Orientation Alert and Oriented   Cognition Alert   History Provided By Patient   Primary Caregiver Self   Support Systems Children   Patient's Healthcare Decision Maker is: Named in Scanned ACP Document   PCP Verified by CM Yes   Prior Functional Level Cooking;Assistance with the following:   Current Functional Level Assistance with the following:;Cooking   Can patient

## 2025-01-16 NOTE — PROGRESS NOTES
Name: Sol Greer  : 1940  MRN: [id number]    Oncology Navigation Follow-Up Note    Contact Type:  Medical Oncology    Subjective: \"c/o occ dizzy/fuzzy feeling. No H/A.Discomfort in back - no pain.  Not eating-difficulty with fixing meals-has appetite    Objective: MD to discuss scan/ tx plan    Oncology Plan of Care:     -MD informed PET revealed suspicious new lesions scapula/spine-stable in liver/lungs.     -obtain CA 19-9 today       -proceed with tx today     -pt agreed to clinical trial if available at [facility]        -if no trial available poss FOLFOX or FOLFIRI      -return MD apt     Assistance Needed: Donald contacted Melissa Venegas () to assist with enrolling pt in      Area Agency - [facility]als [employer] Whe[facility]     Receptive to Advanced Care Planning / Palliative Care:  following    Education: donald reiterated ONC POC    Referrals: MD to contact Dr. Islas @[facility] regarding clinical trials    Notes: donald following to assist & support as needed    Electronically signed by Ysabel Yañez RN on 2025 at 10:10 AM

## 2025-01-16 NOTE — CONSULTS
[place] [facility]      Patient - Sol rGeer   MRN -  [id number]   [facility] # - [id number]   - 1940      Date of Admission -  1/15/2025  1:43 PM  Date of evaluation -  2025  Room - -/018-A   Hospital Day - 1  Consulting - Sandra Rosen DO Primary Care Physician - Loida Knight MD     Problem List      Active Hospital Problems    Diagnosis Date Noted    Pneumothorax [J93.9] 01/15/2025     Reason for Consult    Pneumothorax status post pigtail placement  HPI   History Obtained From: Patient and electronic medical record.    Sol Greer is a 84 y.o. female with PMHx of metastatic pancreatic adenocarcinoma with metastasis to lungs, and bones, HTN, A.fib, was sent to the ED from pulmonary office due to hypotension in 90s and 50s, lightheadedness and dizziness. Pt reportedly had a fall, prior to presenting to the clinic and near fall in the clinic as well. Pt was noted to have decreased breath sounds on the right and was sent to the ER for further management. Pt was also noted to have pneumothorax on PET/CT on 24, asymptomatic. Pt reported feeling shortness of breath and general tiredness for the past two weeks. Pt did report feeling chest pressure, heaviness on the right side radiating to the axilla and back. The chest pressure was intermittent in nature. Denied any cough, fever, chills, nausea, vomiting, chest pain, abdominal pain. Pt was feeling short of breath even walking to the bathroom from her bed. Previously she was able to walk quarter of a mile without any symptoms. Pt reports having recent chemotherapy on Tuesday for pancreatic adenocarcinoma. ED vital sings: 125/72, RR 18, SpO2 96% on RA. CTA chest showed enlargement of the right sided pneumothorax. Pigtail cathter was placed in the ED. Pt was admitted for inpatient for further monitoring.     She is having shortness of breath: Yes  Onset: gradual

## 2025-01-17 ENCOUNTER — APPOINTMENT (OUTPATIENT)
Age: 85
DRG: 200 | End: 2025-01-17
Attending: PHYSICIAN ASSISTANT
Payer: MEDICARE

## 2025-01-17 ENCOUNTER — APPOINTMENT (OUTPATIENT)
Dept: GENERAL RADIOLOGY | Age: 85
DRG: 200 | End: 2025-01-17
Payer: MEDICARE

## 2025-01-17 ENCOUNTER — SOCIAL WORK (OUTPATIENT)
Dept: INFUSION THERAPY | Age: 85
End: 2025-01-17

## 2025-01-17 LAB
ANION GAP SERPL CALC-SCNC: 10 MEQ/L (ref 8–16)
BUN SERPL-MCNC: 16 MG/DL (ref 7–22)
CALCIUM SERPL-MCNC: 8.7 MG/DL (ref 8.5–10.5)
CHLORIDE SERPL-SCNC: 105 MEQ/L (ref 98–111)
CO2 SERPL-SCNC: 26 MEQ/L (ref 23–33)
CREAT SERPL-MCNC: 0.9 MG/DL (ref 0.4–1.2)
DEPRECATED RDW RBC AUTO: 51.1 FL (ref 35–45)
ECHO AO ASC DIAM: 3.5 CM
ECHO AO ASCENDING AORTA INDEX: 2.16 CM/M2
ECHO AV CUSP MM: 1.6 CM
ECHO AV PEAK GRADIENT: 8 MMHG
ECHO AV PEAK VELOCITY: 1.4 M/S
ECHO AV VELOCITY RATIO: 0.71
ECHO BSA: 1.61 M2
ECHO LA AREA 2C: 13.8 CM2
ECHO LA AREA 4C: 13.1 CM2
ECHO LA DIAMETER INDEX: 2.04 CM/M2
ECHO LA DIAMETER: 3.3 CM
ECHO LA MAJOR AXIS: 4.4 CM
ECHO LA MINOR AXIS: 4.4 CM
ECHO LA VOL BP: 33 ML (ref 22–52)
ECHO LA VOL MOD A2C: 35 ML (ref 22–52)
ECHO LA VOL MOD A4C: 32 ML (ref 22–52)
ECHO LA VOL/BSA BIPLANE: 20 ML/M2 (ref 16–34)
ECHO LA VOLUME INDEX MOD A2C: 22 ML/M2 (ref 16–34)
ECHO LA VOLUME INDEX MOD A4C: 20 ML/M2 (ref 16–34)
ECHO LV E' LATERAL VELOCITY: 5.8 CM/S
ECHO LV E' SEPTAL VELOCITY: 5.3 CM/S
ECHO LV EF PHYSICIAN: 60 %
ECHO LV EJECTION FRACTION BIPLANE: 59 % (ref 55–100)
ECHO LV FRACTIONAL SHORTENING: 31 % (ref 28–44)
ECHO LV INTERNAL DIMENSION DIASTOLE INDEX: 2.59 CM/M2
ECHO LV INTERNAL DIMENSION DIASTOLIC: 4.2 CM (ref 3.9–5.3)
ECHO LV INTERNAL DIMENSION SYSTOLIC INDEX: 1.79 CM/M2
ECHO LV INTERNAL DIMENSION SYSTOLIC: 2.9 CM
ECHO LV ISOVOLUMETRIC RELAXATION TIME (IVRT): 56 MS
ECHO LV IVSD: 0.8 CM (ref 0.6–0.9)
ECHO LV MASS 2D: 109.8 G (ref 67–162)
ECHO LV MASS INDEX 2D: 67.8 G/M2 (ref 43–95)
ECHO LV POSTERIOR WALL DIASTOLIC: 0.9 CM (ref 0.6–0.9)
ECHO LV RELATIVE WALL THICKNESS RATIO: 0.43
ECHO LVOT PEAK GRADIENT: 4 MMHG
ECHO LVOT PEAK VELOCITY: 1 M/S
ECHO PV MAX VELOCITY: 0.8 M/S
ECHO PV PEAK GRADIENT: 3 MMHG
ECHO RV INTERNAL DIMENSION: 2.8 CM
ECHO RV TAPSE: 2.6 CM (ref 1.7–?)
ECHO TV REGURGITANT MAX VELOCITY: 2.67 M/S
ECHO TV REGURGITANT PEAK GRADIENT: 29 MMHG
ERYTHROCYTE [DISTWIDTH] IN BLOOD BY AUTOMATED COUNT: 14.2 % (ref 11.5–14.5)
GFR SERPL CREATININE-BSD FRML MDRD: 63 ML/MIN/1.73M2
GLUCOSE SERPL-MCNC: 117 MG/DL (ref 70–108)
HCT VFR BLD AUTO: 34.6 % (ref 37–47)
HGB BLD-MCNC: 11 GM/DL (ref 12–16)
MCH RBC QN AUTO: 31.6 PG (ref 26–33)
MCHC RBC AUTO-ENTMCNC: 31.8 GM/DL (ref 32.2–35.5)
MCV RBC AUTO: 99.4 FL (ref 81–99)
PLATELET # BLD AUTO: 291 THOU/MM3 (ref 130–400)
PMV BLD AUTO: 10.8 FL (ref 9.4–12.4)
POTASSIUM SERPL-SCNC: 4.2 MEQ/L (ref 3.5–5.2)
RBC # BLD AUTO: 3.48 MILL/MM3 (ref 4.2–5.4)
SODIUM SERPL-SCNC: 141 MEQ/L (ref 135–145)
WBC # BLD AUTO: 4 THOU/MM3 (ref 4.8–10.8)

## 2025-01-17 PROCEDURE — 80048 BASIC METABOLIC PNL TOTAL CA: CPT

## 2025-01-17 PROCEDURE — 97530 THERAPEUTIC ACTIVITIES: CPT

## 2025-01-17 PROCEDURE — 1200000000 HC SEMI PRIVATE

## 2025-01-17 PROCEDURE — 85027 COMPLETE CBC AUTOMATED: CPT

## 2025-01-17 PROCEDURE — 36415 COLL VENOUS BLD VENIPUNCTURE: CPT

## 2025-01-17 PROCEDURE — 6370000000 HC RX 637 (ALT 250 FOR IP)

## 2025-01-17 PROCEDURE — 97162 PT EVAL MOD COMPLEX 30 MIN: CPT

## 2025-01-17 PROCEDURE — 93306 TTE W/DOPPLER COMPLETE: CPT | Performed by: INTERNAL MEDICINE

## 2025-01-17 PROCEDURE — 97166 OT EVAL MOD COMPLEX 45 MIN: CPT

## 2025-01-17 PROCEDURE — 2580000003 HC RX 258

## 2025-01-17 PROCEDURE — 93306 TTE W/DOPPLER COMPLETE: CPT

## 2025-01-17 PROCEDURE — 99233 SBSQ HOSP IP/OBS HIGH 50: CPT | Performed by: STUDENT IN AN ORGANIZED HEALTH CARE EDUCATION/TRAINING PROGRAM

## 2025-01-17 PROCEDURE — 71046 X-RAY EXAM CHEST 2 VIEWS: CPT

## 2025-01-17 PROCEDURE — 2500000003 HC RX 250 WO HCPCS

## 2025-01-17 PROCEDURE — 99232 SBSQ HOSP IP/OBS MODERATE 35: CPT | Performed by: INTERNAL MEDICINE

## 2025-01-17 PROCEDURE — 97116 GAIT TRAINING THERAPY: CPT

## 2025-01-17 PROCEDURE — 97535 SELF CARE MNGMENT TRAINING: CPT

## 2025-01-17 RX ORDER — DOCUSATE SODIUM 100 MG/1
100 CAPSULE, LIQUID FILLED ORAL DAILY
Status: DISCONTINUED | OUTPATIENT
Start: 2025-01-17 | End: 2025-01-19 | Stop reason: HOSPADM

## 2025-01-17 RX ORDER — SODIUM CHLORIDE 9 MG/ML
INJECTION, SOLUTION INTRAVENOUS CONTINUOUS
Status: ACTIVE | OUTPATIENT
Start: 2025-01-17 | End: 2025-01-17

## 2025-01-17 RX ADMIN — GABAPENTIN 300 MG: 300 CAPSULE ORAL at 10:51

## 2025-01-17 RX ADMIN — Medication 1 TABLET: at 10:51

## 2025-01-17 RX ADMIN — GABAPENTIN 300 MG: 300 CAPSULE ORAL at 20:30

## 2025-01-17 RX ADMIN — SODIUM CHLORIDE: 9 INJECTION, SOLUTION INTRAVENOUS at 13:51

## 2025-01-17 RX ADMIN — SODIUM CHLORIDE, PRESERVATIVE FREE 10 ML: 5 INJECTION INTRAVENOUS at 10:51

## 2025-01-17 RX ADMIN — OXYCODONE HYDROCHLORIDE AND ACETAMINOPHEN 250 MG: 500 TABLET ORAL at 10:51

## 2025-01-17 RX ADMIN — DOCUSATE SODIUM 100 MG: 100 CAPSULE, LIQUID FILLED ORAL at 14:00

## 2025-01-17 NOTE — PROGRESS NOTES
[place] [facility] Pulmonary, Sleep and Critical Care Medicine      Patient - Sol Greer   MRN -  [id number]   [facility] # - [id number]   - 1940      Date of Admission -  1/15/2025  1:43 PM  Date of evaluation -  2025  Room - -018-A   Hospital Day - 2  Consulting - Sandra Rosen DO Primary Care Physician - Loida Knight MD     Problem List      Active Hospital Problems    Diagnosis Date Noted    Pneumothorax [J93.9] 01/15/2025     Reason for Consult    Pneumothorax status post pigtail placement  HPI   History Obtained From: Patient and electronic medical record.    Sol Greer is a 84 y.o. female with PMHx of metastatic pancreatic adenocarcinoma with metastasis to lungs, and bones, HTN, A.fib, was sent to the ED from pulmonary office due to hypotension in 90s and 50s, lightheadedness and dizziness. Pt reportedly had a fall, prior to presenting to the clinic and near fall in the clinic as well. Pt was noted to have decreased breath sounds on the right and was sent to the ER for further management. Pt was also noted to have pneumothorax on PET/CT on 24, asymptomatic. Pt reported feeling shortness of breath and general tiredness for the past two weeks. Pt did report feeling chest pressure, heaviness on the right side radiating to the axilla and back. The chest pressure was intermittent in nature. Denied any cough, fever, chills, nausea, vomiting, chest pain, abdominal pain. Pt was feeling short of breath even walking to the bathroom from her bed. Previously she was able to walk quarter of a mile without any symptoms. Pt reports having recent chemotherapy on Tuesday for pancreatic adenocarcinoma. ED vital sings: 125/72, RR 18, SpO2 96% on RA. CTA chest showed enlargement of the right sided pneumothorax. Pigtail cathter was placed in the ED. Pt was admitted for inpatient for further monitoring.     She is having shortness of breath: Yes  Onset: gradual

## 2025-01-17 NOTE — PROGRESS NOTES
PROGRESS NOTE      Patient:  Sol Salguero [facility]  Unit/Bed:6K-18/018-A  YOB: 1940  MRN: [id number]   Acct: [id number]    PCP: Loida Knight MD    Date of Admission: 1/15/2025 LOS: 2    Date of Evaluation:  1/17/2025    Anticipated Discharge: pending clinical status    Assessment/Plan:    Right sided Pneumothorax  Noted on PET/CT on 12/23/24, CTA chest 1/15 noted further worsening  S/p pigtail and chest tube insertion on 1/15/25, f/u CXR showed near total resolution  No air leak present this evening  CXR 1/17 AM showed ~2% pneumo  As per pulm for further management    Mechanical fall  Dizziness  Orthostatic Hypotension  Endorsed lightheadedness prior to fall, patient hit her right knee and did not hit her head  Right knee appears normal without effusion  Orthostatic BP assessment positive, 40 mmHg drop in systolic  Encourage PO intake; discussed taking positional changes slow  Likely cause of her fall in conjunction with above  Fall precautions  D/c'd amlodipine, blood pressure well controlled    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, thoracic spine, right scapula  S/p whipple surgery at  on 12/4/2021  Currently on chemotherapy, follows with Dr. Mcknight  Intending to do clinical trial in [facility]  Serum metanephrines pending    Chronic normocytic anemia  Baseline hemoglobin appears to be around 11  Hgb on admission was 11.3, consistent with previous labs  Iron studies on admission showed elevated iron and % saturation; holding oral iron supplementation  Folate and B12 in normal limits  Continue to monitor with CBC    SIRIA, improved  Creatinine returned to normal limits after IV fluid bolus in the ER  1.4 on admission, noted that she has had elevations previously, but unlikely to be CKD  Renal ultrasound showed bilateral renal atrophy without any hydronephrosis  Continue to monitor with BMP    Asymptomatic Bacturia  UA with microscopic positive for nitrites,

## 2025-01-17 NOTE — PROGRESS NOTES
[facility]  INPATIENT PHYSICAL THERAPY  EVALUATION  STRZ RENAL TELEMETRY 6K - 6K-18/018-A    Discharge Recommendations: Home with assist PRN, Home with Home health PT  Equipment Recommendations: No             Time In: 1317  Time Out: 1341  Timed Code Treatment Minutes: 15 Minutes  Minutes: 24      Date: 2025  Patient Name: Sol Greer,  Gender:  female        MRN: [id number]  : 1940  (84 y.o.)      Referring Practitioner: Hany Liu DO  Diagnosis: Pneumothorax  Additional Pertinent Hx: Per EMR, \"Sol Greer is a 84 y.o. female with a history of metastatic pancreatic cancer and neuropathy who presented to [facility] with chief complaint of fall.  The patient was seen in the pulmonology office today.  She has been following with oncology and pulmonology as she has a history of pancreatic cancer with metastatic disease to the lungs.  Over the last few weeks or so, she reports having a sensation in the right side of the chest intermittently.  She denies any pain in the chest and states that this sensation comes and goes.  She denies any significant shortness of breath with this.  She was noted to have a pneumothorax on a PET/CT on 2024.  She had not yet seen pulmonology until today.  Additionally, the patient reports she has been having lightheadedness and dizziness primarily with position changes over the last few weeks.  She was previously on blood pressure medications, but these were titrated off as her blood pressure was running low.  She does report decreased oral intake.  She does not take any diuretics.  While at the office today, the patient stood up when her name was called and became lightheaded and fell to the ground.  She denies any loss of consciousness.  She is not having any pain anywhere at this time.  She was subsequently sent to the hospital for further evaluation.  A CTA of the chest was performed and showed new

## 2025-01-17 NOTE — PLAN OF CARE
Problem: Discharge Planning  Goal: Discharge to home or other facility with appropriate resources  Outcome: Progressing  Flowsheets (Taken 1/16/2025 0845 by Mesha Ruvalcaba, RN)  Discharge to home or other facility with appropriate resources: Identify barriers to discharge with patient and caregiver     Problem: Safety - Adult  Goal: Free from fall injury  Outcome: Progressing  Flowsheets (Taken 1/17/2025 0304)  Free From Fall Injury:   Instruct family/caregiver on patient safety   Based on caregiver fall risk screen, instruct family/caregiver to ask for assistance with transferring infant if caregiver noted to have fall risk factors     Problem: ABCDS Injury Assessment  Goal: Absence of physical injury  Outcome: Progressing  Flowsheets (Taken 1/17/2025 0304)  Absence of Physical Injury: Implement safety measures based on patient assessment     Problem: Skin/Tissue Integrity  Goal: Absence of new skin breakdown  Description: 1.  Monitor for areas of redness and/or skin breakdown  2.  Assess vascular access sites hourly  3.  Every 4-6 hours minimum:  Change oxygen saturation probe site  4.  Every 4-6 hours:  If on nasal continuous positive airway pressure, respiratory therapy assess nares and determine need for appliance change or resting period.  Outcome: Progressing     Problem: Pain  Goal: Verbalizes/displays adequate comfort level or baseline comfort level  Outcome: Progressing  Flowsheets (Taken 1/16/2025 1130 by Mesha Ruvalcaba, RN)  Verbalizes/displays adequate comfort level or baseline comfort level: Encourage patient to monitor pain and request assistance

## 2025-01-17 NOTE — CARE COORDINATION
1/17/25, 2:31 PM EST      Anticipate weekend discharge. Denies all needs.   Patient goals/plan/ treatment preferences discussed by  and .  Patient goals/plan/ treatment preferences reviewed with patient/ family.  Patient/ family verbalize understanding of discharge plan and are in agreement with goal/plan/treatment preferences.  Understanding was demonstrated using the teach back method.  AVS provided by RN at time of discharge, which includes all necessary medical information pertaining to the patients current course of illness, treatment, post-discharge goals of care, and treatment preferences.     Services At/After Discharge: None

## 2025-01-17 NOTE — PROGRESS NOTES
[facility]  INPATIENT OCCUPATIONAL THERAPY  STRZ RENAL TELEMETRY 6K  EVALUATION      Discharge Recommendations: Continue to assess pending progress, Home independently, Home with Home health OT  Equipment Recommendations: No        Time In: 0806  Time Out: 08  Timed Code Treatment Minutes: 24 Minutes  Minutes: 34          Date: 2025  Patient Name: Sol Greer,   Gender: female      MRN: [id number]  : 1940  (84 y.o.)  Referring Practitioner: Hany Liu DO  Diagnosis: Pneumothorax  Additional Pertinent Hx: Per EMR, \"Sol Greer is a 84 y.o. female with a history of metastatic pancreatic cancer and neuropathy who presented to [facility] with chief complaint of fall.  The patient was seen in the pulmonology office today.  She has been following with oncology and pulmonology as she has a history of pancreatic cancer with metastatic disease to the lungs.  Over the last few weeks or so, she reports having a sensation in the right side of the chest intermittently.  She denies any pain in the chest and states that this sensation comes and goes.  She denies any significant shortness of breath with this.  She was noted to have a pneumothorax on a PET/CT on 2024.  She had not yet seen pulmonology until today.  Additionally, the patient reports she has been having lightheadedness and dizziness primarily with position changes over the last few weeks.  She was previously on blood pressure medications, but these were titrated off as her blood pressure was running low.  She does report decreased oral intake.  She does not take any diuretics.  While at the office today, the patient stood up when her name was called and became lightheaded and fell to the ground.  She denies any loss of consciousness.  She is not having any pain anywhere at this time.  She was subsequently sent to the hospital for further evaluation.  A CTA of the chest was performed  and hospital readmission after discharge. Pt would benefit from HH at discharge.     Performance deficits / Impairments: Decreased endurance, Decreased ADL status, Decreased high-level IADLs, Decreased balance  Prognosis: Good  REQUIRES OT FOLLOW-UP: Yes  Decision Making: Medium Complexity    Treatment Initiated: Treatment and education initiated within context of evaluation.  Evaluation time included review of current medical information, gathering information related to past medical, social and functional history, completion of standardized testing, formal and informal observation of tasks, assessment of data and development of plan of care and goals.  Treatment time included skilled education and facilitation of tasks to increase safety and independence with ADL's for improved functional independence and quality of life.    Patient Education:          Patient Education  Education Given To: Patient  Education Provided: Role of Therapy;Plan of Care;Precautions;Transfer Training;ADL Adaptive Strategies  Education Method: Demonstration  Barriers to Learning: None  Education Outcome: Verbalized understanding;Continued education needed    Plan:  Times Per Week: 5x  Times Per Day: Once a day  Current Treatment Recommendations: Strengthening, Balance training, ROM, Functional mobility training, Endurance training, Safety education & training, Self-Care / ADL, Home management training, Patient/Caregiver education & training, Equipment evaluation, education, & procurement.  See long-term goal time frame for expected duration of plan of care.  If no long-term goals established, a short length of stay is anticipated.    Goals:  Patient goals : Go Home  Short Term Goals  Time Frame for Short Term Goals: Until discharge  Short Term Goal 1: Pt will complete dynamic standing task x 5 minutes with 0 vcs for safety and Mod Indep to increase indep and endurance with all sinkside grooming.  Short Term Goal 2: Pt will complete

## 2025-01-17 NOTE — PROGRESS NOTES
Oncology Social Work    Date: 1/17/2025  Time: 1:25 PM  Name: Sol Greer  MRN: [id number]     Contact Type: Transportation Request    Note:   Situation: Sol Greer called the Oncology Social Worker to assist with transportation to and from appts.     Background:  Sol Greer is not able to provide their own transportation and has asked the  for assistance.     Assessment:  Sol Greer requested transportation for upcoming appointment(s) located throughout the [facility] network of providers.  - She will be picked up by [facility] and taken to/from scheduled appointment(s) as follows:  Appt Date Appt Time Dept  Location Notes   27-Jan 11:00a Pall Care [address] - Seeing Dr Rock   28-Jan 10:30a Chemo [address] - 1 hr chemo    10:35a [facility]    11:00a Provider Seeing Dr Awada     - Patient has been notified of the arrangements provided.    Recommendation: Appt changes will be initiated by Sol Greer based on need.  provided my contact information and will remain available for support.      YUAN Locke, MEGAN, MONICA  Oncology Social Worker      Electronically signed by YUAN Locke LSW, MONICA on 1/17/2025 at 1:25 PM

## 2025-01-17 NOTE — PLAN OF CARE
Problem: Discharge Planning  Goal: Discharge to home or other facility with appropriate resources  1/17/2025 1754 by Mesha Ruvalcaba RN  Outcome: Progressing  Flowsheets (Taken 1/17/2025 1045)  Discharge to home or other facility with appropriate resources: Identify barriers to discharge with patient and caregiver  1/17/2025 1753 by Mesha Ruvalcaba RN  Outcome: Progressing  Flowsheets (Taken 1/17/2025 1045)  Discharge to home or other facility with appropriate resources: Identify barriers to discharge with patient and caregiver     Problem: Safety - Adult  Goal: Free from fall injury  1/17/2025 1754 by Mesha Ruvalcaba RN  Outcome: Progressing  Flowsheets (Taken 1/17/2025 1753)  Free From Fall Injury: Instruct family/caregiver on patient safety  1/17/2025 1753 by Mesha Ruvalcaba RN  Outcome: Progressing  Flowsheets (Taken 1/17/2025 1753)  Free From Fall Injury: Instruct family/caregiver on patient safety     Problem: ABCDS Injury Assessment  Goal: Absence of physical injury  1/17/2025 1754 by Mesha Ruvalcaba RN  Outcome: Progressing  Flowsheets (Taken 1/17/2025 0304 by Aixa Marie, RN)  Absence of Physical Injury: Implement safety measures based on patient assessment  1/17/2025 1753 by Mesha Ruvalcaba RN  Outcome: Progressing  Flowsheets (Taken 1/17/2025 0304 by Aixa Marie, RN)  Absence of Physical Injury: Implement safety measures based on patient assessment     Problem: Skin/Tissue Integrity  Goal: Absence of new skin breakdown  Description: 1.  Monitor for areas of redness and/or skin breakdown  2.  Assess vascular access sites hourly  3.  Every 4-6 hours minimum:  Change oxygen saturation probe site  4.  Every 4-6 hours:  If on nasal continuous positive airway pressure, respiratory therapy assess nares and determine need for appliance change or resting period.  1/17/2025 1754 by Mesha Ruvalcaba RN  Outcome: Progressing  1/17/2025 1753 by Mesha Ruvalcaba RN  Outcome: Progressing     Problem: Pain  Goal:

## 2025-01-18 ENCOUNTER — APPOINTMENT (OUTPATIENT)
Dept: GENERAL RADIOLOGY | Age: 85
DRG: 200 | End: 2025-01-18
Payer: MEDICARE

## 2025-01-18 LAB
ANION GAP SERPL CALC-SCNC: 10 MEQ/L (ref 8–16)
BUN SERPL-MCNC: 13 MG/DL (ref 7–22)
CALCIUM SERPL-MCNC: 8.6 MG/DL (ref 8.5–10.5)
CHLORIDE SERPL-SCNC: 104 MEQ/L (ref 98–111)
CO2 SERPL-SCNC: 25 MEQ/L (ref 23–33)
CREAT SERPL-MCNC: 0.8 MG/DL (ref 0.4–1.2)
DEPRECATED RDW RBC AUTO: 49.9 FL (ref 35–45)
ERYTHROCYTE [DISTWIDTH] IN BLOOD BY AUTOMATED COUNT: 13.8 % (ref 11.5–14.5)
GFR SERPL CREATININE-BSD FRML MDRD: 73 ML/MIN/1.73M2
GLUCOSE SERPL-MCNC: 111 MG/DL (ref 70–108)
HCT VFR BLD AUTO: 32.7 % (ref 37–47)
HGB BLD-MCNC: 10.3 GM/DL (ref 12–16)
MCH RBC QN AUTO: 31.4 PG (ref 26–33)
MCHC RBC AUTO-ENTMCNC: 31.5 GM/DL (ref 32.2–35.5)
MCV RBC AUTO: 99.7 FL (ref 81–99)
METHYLMALONATE SERPL-SCNC: NORMAL
PLATELET # BLD AUTO: 269 THOU/MM3 (ref 130–400)
PMV BLD AUTO: 11.2 FL (ref 9.4–12.4)
POTASSIUM SERPL-SCNC: 4.4 MEQ/L (ref 3.5–5.2)
RBC # BLD AUTO: 3.28 MILL/MM3 (ref 4.2–5.4)
SODIUM SERPL-SCNC: 139 MEQ/L (ref 135–145)
WBC # BLD AUTO: 3.3 THOU/MM3 (ref 4.8–10.8)

## 2025-01-18 PROCEDURE — 85027 COMPLETE CBC AUTOMATED: CPT

## 2025-01-18 PROCEDURE — 1200000000 HC SEMI PRIVATE

## 2025-01-18 PROCEDURE — 6370000000 HC RX 637 (ALT 250 FOR IP)

## 2025-01-18 PROCEDURE — 99232 SBSQ HOSP IP/OBS MODERATE 35: CPT | Performed by: STUDENT IN AN ORGANIZED HEALTH CARE EDUCATION/TRAINING PROGRAM

## 2025-01-18 PROCEDURE — 36415 COLL VENOUS BLD VENIPUNCTURE: CPT

## 2025-01-18 PROCEDURE — 2500000003 HC RX 250 WO HCPCS

## 2025-01-18 PROCEDURE — 80048 BASIC METABOLIC PNL TOTAL CA: CPT

## 2025-01-18 PROCEDURE — 99232 SBSQ HOSP IP/OBS MODERATE 35: CPT | Performed by: INTERNAL MEDICINE

## 2025-01-18 PROCEDURE — 71046 X-RAY EXAM CHEST 2 VIEWS: CPT

## 2025-01-18 RX ADMIN — ACETAMINOPHEN 650 MG: 325 TABLET ORAL at 01:14

## 2025-01-18 RX ADMIN — SODIUM CHLORIDE, PRESERVATIVE FREE 10 ML: 5 INJECTION INTRAVENOUS at 07:57

## 2025-01-18 RX ADMIN — GABAPENTIN 300 MG: 300 CAPSULE ORAL at 20:36

## 2025-01-18 RX ADMIN — DOCUSATE SODIUM 100 MG: 100 CAPSULE, LIQUID FILLED ORAL at 07:57

## 2025-01-18 RX ADMIN — GABAPENTIN 300 MG: 300 CAPSULE ORAL at 07:57

## 2025-01-18 RX ADMIN — Medication 1 TABLET: at 07:57

## 2025-01-18 RX ADMIN — OXYCODONE HYDROCHLORIDE AND ACETAMINOPHEN 250 MG: 500 TABLET ORAL at 07:56

## 2025-01-18 RX ADMIN — SODIUM CHLORIDE, PRESERVATIVE FREE 10 ML: 5 INJECTION INTRAVENOUS at 20:36

## 2025-01-18 ASSESSMENT — PAIN SCALES - GENERAL
PAINLEVEL_OUTOF10: 0
PAINLEVEL_OUTOF10: 3

## 2025-01-18 NOTE — PROGRESS NOTES
PROGRESS NOTE      Patient:  Sol Salguero [facility]  Unit/Bed:6K-18/018-A  YOB: 1940  MRN: [id number]   Acct: [id number]    PCP: Loida Knight MD    Date of Admission: 1/15/2025 LOS: 3    Date of Evaluation:  1/18/2025    Anticipated Discharge: Likely tomorrow 1/19    Assessment/Plan:    Right sided pneumothorax  Noted on PET scan on 12/23/2024  CTA chest 1/15: Enlarging right-sided pneumothorax, bilateral lung nodules, lesions in thoracic and bilateral scapula consistent with metastatic disease  CXR 1/15: Small right sided pleural effusion with adjacent atelectasis/infiltrate  S/p pigtail chest tube insertion on 1/15 with repeat CXR showing trace pneumothorax  CXR 1/16 no pneumothorax seen, small right sided effusion, minimal atelectasis of both lung bases  CXR 1/17: Less than 2, diastolic dysfunction pneumothorax of right apex  CXR 1/18: Small stable right apical pneumothorax  Will continue with a daily chest x-rays  Chest tube management per pulmonology      Mechanical fall  Dizziness  Orthostatic hypotension  Regular diet  Encourage p.o. intake  Fall precautions  Home amlodipine discontinued    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, thoracic spine, right scapula  S/p Whipple surgery at  on 12/4/2021  Follows outpatient oncology (Dr. Mcknight ), currently on chemotherapy    Chronic normocytic anemia  Likely secondary to anemia of chronic disease secondary to above  Baseline hemoglobin ~11  Iron studies 1/16/2025: Iron 188, TIBC<205, percent iron saturation 92  B12 342, folate >20    SIRIA, resolved  Renal ultrasound 1/16: Bilateral renal atrophy, no hydronephrosis  Creatinine on admission 1.4  Creatinine on 1/18 stable at 0.8  s/p IVF  Encourage oral intake    Asymptomatic bacteriuria  UA 1/16 significant for many bacteria, positive nitrites, trace protein  Urine culture currently growing gram-negative bacilli  As patient is currently asymptomatic we will hold off

## 2025-01-18 NOTE — PROGRESS NOTES
[facility] for Pulmonary, Sleep and Critical Care Medicine      Patient - Sol Greer   MRN -  [id number]   [facility] # - [id number]   - 1940      Date of Admission -  1/15/2025  1:43 PM  Date of evaluation -  2025  Room - --A   Hospital Day - 3  Consulting - Sandra Rosen DO Primary Care Physician - Loida Knight MD     Problem List      Active Hospital Problems    Diagnosis Date Noted    Pneumothorax [J93.9] 01/15/2025     Reason for Consult    Pneumothorax status post pigtail placement  HPI   History Obtained From: Patient and electronic medical record.    Sol Greer is a 84 y.o. female with PMHx of metastatic pancreatic adenocarcinoma with metastasis to lungs, and bones, HTN, A.fib, was sent to the ED from pulmonary office due to hypotension in 90s and 50s, lightheadedness and dizziness. Pt reportedly had a fall, prior to presenting to the clinic and near fall in the clinic as well. Pt was noted to have decreased breath sounds on the right and was sent to the ER for further management. Pt was also noted to have pneumothorax on PET/CT on 24, asymptomatic. Pt reported feeling shortness of breath and general tiredness for the past two weeks. Pt did report feeling chest pressure, heaviness on the right side radiating to the axilla and back. The chest pressure was intermittent in nature. Denied any cough, fever, chills, nausea, vomiting, chest pain, abdominal pain. Pt was feeling short of breath even walking to the bathroom from her bed. Previously she was able to walk quarter of a mile without any symptoms. Pt reports having recent chemotherapy on Tuesday for pancreatic adenocarcinoma. ED vital sings: 125/72, RR 18, SpO2 96% on RA. CTA chest showed enlargement of the right sided pneumothorax. Pigtail cathter was placed in the ED. Pt was admitted for inpatient for further monitoring.     She is having shortness of breath: Yes  Onset: gradual  no

## 2025-01-18 NOTE — PLAN OF CARE
Problem: Discharge Planning  Goal: Discharge to home or other facility with appropriate resources  Outcome: Progressing  Flowsheets (Taken 1/18/2025 0746)  Discharge to home or other facility with appropriate resources: Identify barriers to discharge with patient and caregiver     Problem: Safety - Adult  Goal: Free from fall injury  Outcome: Progressing     Problem: ABCDS Injury Assessment  Goal: Absence of physical injury  Outcome: Progressing     Problem: Skin/Tissue Integrity  Goal: Absence of new skin breakdown  Description: 1.  Monitor for areas of redness and/or skin breakdown  2.  Assess vascular access sites hourly  3.  Every 4-6 hours minimum:  Change oxygen saturation probe site  4.  Every 4-6 hours:  If on nasal continuous positive airway pressure, respiratory therapy assess nares and determine need for appliance change or resting period.  Outcome: Progressing     Problem: Pain  Goal: Verbalizes/displays adequate comfort level or baseline comfort level  Outcome: Progressing  Flowsheets (Taken 1/18/2025 0746)  Verbalizes/displays adequate comfort level or baseline comfort level:   Assess pain using appropriate pain scale   Encourage patient to monitor pain and request assistance

## 2025-01-18 NOTE — PLAN OF CARE
Problem: Discharge Planning  Goal: Discharge to home or other facility with appropriate resources  1/17/2025 2150 by Aixa Marie RN  Outcome: Progressing  Flowsheets (Taken 1/17/2025 1045 by Mesha Ruvalcaba RN)  Discharge to home or other facility with appropriate resources: Identify barriers to discharge with patient and caregiver  1/17/2025 1754 by Mesha Ruvalcaba RN  Outcome: Progressing  Flowsheets (Taken 1/17/2025 1045)  Discharge to home or other facility with appropriate resources: Identify barriers to discharge with patient and caregiver  1/17/2025 1753 by Mesha Ruvalcaba RN  Outcome: Progressing  Flowsheets (Taken 1/17/2025 1045)  Discharge to home or other facility with appropriate resources: Identify barriers to discharge with patient and caregiver     Problem: Safety - Adult  Goal: Free from fall injury  1/17/2025 2150 by Aixa Marie RN  Outcome: Progressing  Flowsheets (Taken 1/17/2025 1753 by Mesha Ruvalcaba RN)  Free From Fall Injury: Instruct family/caregiver on patient safety  1/17/2025 1754 by Mesha Ruvalcaba RN  Outcome: Progressing  Flowsheets (Taken 1/17/2025 1753)  Free From Fall Injury: Instruct family/caregiver on patient safety  1/17/2025 1753 by Mesha Ruvalcaba RN  Outcome: Progressing  Flowsheets (Taken 1/17/2025 1753)  Free From Fall Injury: Instruct family/caregiver on patient safety     Problem: ABCDS Injury Assessment  Goal: Absence of physical injury  1/17/2025 2150 by Aixa Marie RN  Outcome: Progressing  Flowsheets (Taken 1/17/2025 0304)  Absence of Physical Injury: Implement safety measures based on patient assessment  1/17/2025 1754 by Mesha Ruvalcaba RN  Outcome: Progressing  Flowsheets (Taken 1/17/2025 0304 by Aixa Marie, RN)  Absence of Physical Injury: Implement safety measures based on patient assessment  1/17/2025 1753 by Mesha Ruvalcaba RN  Outcome: Progressing  Flowsheets (Taken 1/17/2025 0304 by Aixa Marie, RN)  Absence of Physical Injury:  Implement safety measures based on patient assessment     Problem: Skin/Tissue Integrity  Goal: Absence of new skin breakdown  Description: 1.  Monitor for areas of redness and/or skin breakdown  2.  Assess vascular access sites hourly  3.  Every 4-6 hours minimum:  Change oxygen saturation probe site  4.  Every 4-6 hours:  If on nasal continuous positive airway pressure, respiratory therapy assess nares and determine need for appliance change or resting period.  1/17/2025 2150 by Aixa Marie RN  Outcome: Progressing  1/17/2025 1754 by Mesha Ruvalcaba RN  Outcome: Progressing  1/17/2025 1753 by Mesha Ruvalcaba RN  Outcome: Progressing     Problem: Pain  Goal: Verbalizes/displays adequate comfort level or baseline comfort level  1/17/2025 2150 by Aixa Marie RN  Outcome: Progressing  Flowsheets (Taken 1/17/2025 1545 by Mesha Ruvalcaba, RN)  Verbalizes/displays adequate comfort level or baseline comfort level: Encourage patient to monitor pain and request assistance  1/17/2025 1754 by Mesha Ruvalcaba RN  Outcome: Progressing  Flowsheets (Taken 1/17/2025 1545)  Verbalizes/displays adequate comfort level or baseline comfort level: Encourage patient to monitor pain and request assistance  1/17/2025 1753 by Mesha Ruvalcaba RN  Outcome: Progressing  Flowsheets  Taken 1/17/2025 1545  Verbalizes/displays adequate comfort level or baseline comfort level: Encourage patient to monitor pain and request assistance  Taken 1/17/2025 1345  Verbalizes/displays adequate comfort level or baseline comfort level: Encourage patient to monitor pain and request assistance  Taken 1/17/2025 1045  Verbalizes/displays adequate comfort level or baseline comfort level: Encourage patient to monitor pain and request assistance

## 2025-01-19 ENCOUNTER — APPOINTMENT (OUTPATIENT)
Dept: GENERAL RADIOLOGY | Age: 85
DRG: 200 | End: 2025-01-19
Payer: MEDICARE

## 2025-01-19 VITALS
DIASTOLIC BLOOD PRESSURE: 69 MMHG | OXYGEN SATURATION: 97 % | SYSTOLIC BLOOD PRESSURE: 145 MMHG | WEIGHT: 135.14 LBS | HEART RATE: 77 BPM | HEIGHT: 65 IN | BODY MASS INDEX: 22.52 KG/M2 | RESPIRATION RATE: 17 BRPM | TEMPERATURE: 99 F

## 2025-01-19 LAB
ANION GAP SERPL CALC-SCNC: 9 MEQ/L (ref 8–16)
BUN SERPL-MCNC: 13 MG/DL (ref 7–22)
CALCIUM SERPL-MCNC: 9 MG/DL (ref 8.5–10.5)
CHLORIDE SERPL-SCNC: 103 MEQ/L (ref 98–111)
CO2 SERPL-SCNC: 25 MEQ/L (ref 23–33)
CREAT SERPL-MCNC: 0.8 MG/DL (ref 0.4–1.2)
DEPRECATED RDW RBC AUTO: 50.1 FL (ref 35–45)
ERYTHROCYTE [DISTWIDTH] IN BLOOD BY AUTOMATED COUNT: 13.8 % (ref 11.5–14.5)
GFR SERPL CREATININE-BSD FRML MDRD: 73 ML/MIN/1.73M2
GLUCOSE SERPL-MCNC: 103 MG/DL (ref 70–108)
HCT VFR BLD AUTO: 33.9 % (ref 37–47)
HGB BLD-MCNC: 10.7 GM/DL (ref 12–16)
MCH RBC QN AUTO: 31.4 PG (ref 26–33)
MCHC RBC AUTO-ENTMCNC: 31.6 GM/DL (ref 32.2–35.5)
MCV RBC AUTO: 99.4 FL (ref 81–99)
PLATELET # BLD AUTO: 292 THOU/MM3 (ref 130–400)
PMV BLD AUTO: 10.5 FL (ref 9.4–12.4)
POTASSIUM SERPL-SCNC: 4.3 MEQ/L (ref 3.5–5.2)
RBC # BLD AUTO: 3.41 MILL/MM3 (ref 4.2–5.4)
SODIUM SERPL-SCNC: 137 MEQ/L (ref 135–145)
WBC # BLD AUTO: 3.3 THOU/MM3 (ref 4.8–10.8)

## 2025-01-19 PROCEDURE — 6370000000 HC RX 637 (ALT 250 FOR IP)

## 2025-01-19 PROCEDURE — 2500000003 HC RX 250 WO HCPCS

## 2025-01-19 PROCEDURE — 71046 X-RAY EXAM CHEST 2 VIEWS: CPT

## 2025-01-19 PROCEDURE — 99232 SBSQ HOSP IP/OBS MODERATE 35: CPT | Performed by: INTERNAL MEDICINE

## 2025-01-19 PROCEDURE — 36415 COLL VENOUS BLD VENIPUNCTURE: CPT

## 2025-01-19 PROCEDURE — 99239 HOSP IP/OBS DSCHRG MGMT >30: CPT | Performed by: STUDENT IN AN ORGANIZED HEALTH CARE EDUCATION/TRAINING PROGRAM

## 2025-01-19 PROCEDURE — 85027 COMPLETE CBC AUTOMATED: CPT

## 2025-01-19 PROCEDURE — 80048 BASIC METABOLIC PNL TOTAL CA: CPT

## 2025-01-19 RX ORDER — GABAPENTIN 300 MG/1
300 CAPSULE ORAL 2 TIMES DAILY
Qty: 60 CAPSULE | Refills: 0 | Status: SHIPPED | OUTPATIENT
Start: 2025-01-19 | End: 2025-01-27 | Stop reason: ALTCHOICE

## 2025-01-19 RX ADMIN — GABAPENTIN 300 MG: 300 CAPSULE ORAL at 07:40

## 2025-01-19 RX ADMIN — OXYCODONE HYDROCHLORIDE AND ACETAMINOPHEN 250 MG: 500 TABLET ORAL at 07:40

## 2025-01-19 RX ADMIN — SODIUM CHLORIDE, PRESERVATIVE FREE 10 ML: 5 INJECTION INTRAVENOUS at 07:41

## 2025-01-19 RX ADMIN — Medication 1 TABLET: at 07:41

## 2025-01-19 RX ADMIN — DOCUSATE SODIUM 100 MG: 100 CAPSULE, LIQUID FILLED ORAL at 07:41

## 2025-01-19 NOTE — DISCHARGE SUMMARY
DISCHARGE SUMMARY      Patient Identification:   Sol Greer   : 1940  MRN: [id number]   Account: [id number]      Patient's PCP: Loida Knight MD    Admit Date: 1/15/2025     Discharge Date:   25    Admitting Physician: Tono Carlin PA-C     Discharge Physician: Hany Liu DO     Discharge Diagnoses:    Right sided pneumothorax  Mechanical fall without injury  Dizziness  Orthostatic Hypotension  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, thoracic spine, and right scapula  Chronic normocytic anemia  SIRIA, improved  Asymptomatic bacturia  Neuropathy    Updated Assessment and Plan from Day of Discharge        Right sided Pneumothorax  Noted on PET/CT on 24, CTA chest 1/15 noted further worsening  S/p pigtail and chest tube insertion on 1/15/25, f/u CXR showed near total resolution  No air leak present this evening  CXR  showed stable appearance with small right apical pneumothorax and small right pleural effusion  Repeat CXR in two weeks, f/u with pulm     Mechanical fall  Dizziness  Orthostatic Hypotension  Endorsed lightheadedness prior to fall, patient hit her right knee and did not hit her head  Right knee appears normal without effusion  Orthostatic BP assessment positive, 40 mmHg drop in systolic  Encourage PO intake; discussed taking positional changes slow  Likely cause of her fall in conjunction with above  Fall precautions  D/c'd amlodipine, blood pressure well controlled     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, thoracic spine, right scapula  S/p whipple surgery at  on 2021  Currently on chemotherapy, follows with Dr. Mcknight  Intending to do clinical trial in [facility]  Serum metanephrines  showed a slightly elevated MMA  Consider renal insufficiency as outpatient     Chronic normocytic anemia  Baseline hemoglobin appears to be around 11  Hgb on admission was 11.3, consistent with previous     WBC 3.3 01/19/2025 06:05 AM    HGB 10.7 01/19/2025 06:05 AM    HCT 33.9 01/19/2025 06:05 AM     01/19/2025 06:05 AM       Renal:    Lab Results   Component Value Date/Time     01/19/2025 06:05 AM    K 4.3 01/19/2025 06:05 AM    K 3.7 01/16/2025 05:43 AM     01/19/2025 06:05 AM    CO2 25 01/19/2025 06:05 AM    BUN 13 01/19/2025 06:05 AM    CREATININE 0.8 01/19/2025 06:05 AM    CALCIUM 9.0 01/19/2025 06:05 AM    PHOS 2.7 01/16/2025 05:43 AM         Significant Diagnostic Studies    Radiology:   XR CHEST (2 VW)   Final Result   1. Small stable right apical pneumothorax.   2. Small right-sided pleural effusion with adjacent atelectasis/infiltrate.               **This report has been created using voice recognition software. It may contain   minor errors which are inherent in voice recognition technology.**         Electronically signed by Dr. Jayson Barbosa      XR CHEST (2 VW)   Final Result   No interval change since previous study dated 1/17/2025..               **This report has been created using voice recognition software. It may contain   minor errors which are inherent in voice recognition technology.**      Electronically signed by Dr. Shanon Rosen      XR CHEST (2 VW)   Final Result   1. Normal heart size. Mediport left side, catheter tip in SVC. Pigtail catheter   in the pleural space right side. Tiny less than 2% pneumothorax right apex   2. Masslike density right infrahilar region. Tiny effusion right side.   3. Mild subsegmental atelectasis lung right hemidiaphragm. Minimal fibrotic   stranding left lung base.            **This report has been created using voice recognition software.  It may contain   minor errors which are inherent in voice recognition technology.**            Electronically signed by Dr. Jordon Hameed      XR CHEST (2 VW)   Final Result   1. Normal heart size. Mediport left side, catheter tip in SVC. Pigtail catheter   in the pleural space right side.   2. Small

## 2025-01-19 NOTE — PROGRESS NOTES
[facility] for Pulmonary, Sleep and Critical Care Medicine      Patient - Sol Greer   MRN -  [id number]   [facility] # - [id number]   - 1940      Date of Admission -  1/15/2025  1:43 PM  Date of evaluation -  2025  Room - -018-A   Hospital Day - 4  Consulting - Sandra Rosen DO Primary Care Physician - Loida Knight MD     Problem List      Active Hospital Problems    Diagnosis Date Noted    Pneumothorax [J93.9] 01/15/2025     Reason for Consult    Pneumothorax status post pigtail placement  HPI   History Obtained From: Patient and electronic medical record.    Sol Greer is a 84 y.o. female with PMHx of metastatic pancreatic adenocarcinoma with metastasis to lungs, and bones, HTN, A.fib, was sent to the ED from pulmonary office due to hypotension in 90s and 50s, lightheadedness and dizziness. Pt reportedly had a fall, prior to presenting to the clinic and near fall in the clinic as well. Pt was noted to have decreased breath sounds on the right and was sent to the ER for further management. Pt was also noted to have pneumothorax on PET/CT on 24, asymptomatic. Pt reported feeling shortness of breath and general tiredness for the past two weeks. Pt did report feeling chest pressure, heaviness on the right side radiating to the axilla and back. The chest pressure was intermittent in nature. Denied any cough, fever, chills, nausea, vomiting, chest pain, abdominal pain. Pt was feeling short of breath even walking to the bathroom from her bed. Previously she was able to walk quarter of a mile without any symptoms. Pt reports having recent chemotherapy on Tuesday for pancreatic adenocarcinoma. ED vital sings: 125/72, RR 18, SpO2 96% on RA. CTA chest showed enlargement of the right sided pneumothorax. Pigtail cathter was placed in the ED. Pt was admitted for inpatient for further monitoring.     She is having shortness of breath: Yes  Onset: gradual

## 2025-01-19 NOTE — PLAN OF CARE
Problem: Discharge Planning  Goal: Discharge to home or other facility with appropriate resources  1/19/2025 0229 by Dirk Bush RN  Outcome: Progressing  Flowsheets (Taken 1/18/2025 0746 by Rebecca Marx RN)  Discharge to home or other facility with appropriate resources: Identify barriers to discharge with patient and caregiver  1/18/2025 1822 by Rebecca Marx RN  Outcome: Progressing  Flowsheets (Taken 1/18/2025 0746)  Discharge to home or other facility with appropriate resources: Identify barriers to discharge with patient and caregiver     Problem: Safety - Adult  Goal: Free from fall injury  1/19/2025 0229 by Dirk Bush RN  Outcome: Progressing  Flowsheets (Taken 1/17/2025 1753 by Mesha Ruvalcaba, RN)  Free From Fall Injury: Instruct family/caregiver on patient safety  1/18/2025 1822 by Rebecca Marx RN  Outcome: Progressing     Problem: ABCDS Injury Assessment  Goal: Absence of physical injury  1/19/2025 0229 by Dirk Bush RN  Outcome: Progressing  Flowsheets (Taken 1/17/2025 0304 by Aixa Marie, RN)  Absence of Physical Injury: Implement safety measures based on patient assessment  1/18/2025 1822 by Rebecca Marx RN  Outcome: Progressing     Problem: Skin/Tissue Integrity  Goal: Absence of new skin breakdown  Description: 1.  Monitor for areas of redness and/or skin breakdown  2.  Assess vascular access sites hourly  3.  Every 4-6 hours minimum:  Change oxygen saturation probe site  4.  Every 4-6 hours:  If on nasal continuous positive airway pressure, respiratory therapy assess nares and determine need for appliance change or resting period.  1/19/2025 0229 by Dirk Bush RN  Outcome: Progressing  1/18/2025 1822 by Rebecca Marx RN  Outcome: Progressing     Problem: Pain  Goal: Verbalizes/displays adequate comfort level or baseline comfort level  1/19/2025 0229 by Dirk Bush RN  Outcome: Progressing  Flowsheets (Taken 1/18/2025 0746 by Rebecca Marx

## 2025-01-19 NOTE — PLAN OF CARE
Problem: Discharge Planning  Goal: Discharge to home or other facility with appropriate resources  1/19/2025 1135 by Rebecca Marx RN  Outcome: Progressing  Flowsheets (Taken 1/19/2025 0739)  Discharge to home or other facility with appropriate resources: Identify barriers to discharge with patient and caregiver  1/19/2025 0229 by Drik Bush RN  Outcome: Progressing  Flowsheets (Taken 1/18/2025 0746 by Rebecca Marx, RN)  Discharge to home or other facility with appropriate resources: Identify barriers to discharge with patient and caregiver     Problem: Safety - Adult  Goal: Free from fall injury  1/19/2025 1135 by Rebecca Marx RN  Outcome: Progressing  1/19/2025 0229 by Dirk Bush RN  Outcome: Progressing  Flowsheets (Taken 1/17/2025 1753 by Mesha Ruvalcaba, RN)  Free From Fall Injury: Instruct family/caregiver on patient safety     Problem: ABCDS Injury Assessment  Goal: Absence of physical injury  1/19/2025 1135 by Rebecca Marx RN  Outcome: Progressing  1/19/2025 0229 by Dirk Bush RN  Outcome: Progressing  Flowsheets (Taken 1/17/2025 0304 by Aixa Marie, RN)  Absence of Physical Injury: Implement safety measures based on patient assessment     Problem: Skin/Tissue Integrity  Goal: Absence of new skin breakdown  Description: 1.  Monitor for areas of redness and/or skin breakdown  2.  Assess vascular access sites hourly  3.  Every 4-6 hours minimum:  Change oxygen saturation probe site  4.  Every 4-6 hours:  If on nasal continuous positive airway pressure, respiratory therapy assess nares and determine need for appliance change or resting period.  1/19/2025 1135 by Rebecca Marx RN  Outcome: Progressing  1/19/2025 0229 by Dirk Bush RN  Outcome: Progressing     Problem: Pain  Goal: Verbalizes/displays adequate comfort level or baseline comfort level  1/19/2025 1135 by Rebecca Marx RN  Outcome: Progressing  Flowsheets (Taken 1/19/2025 0739)  Verbalizes/displays

## 2025-01-20 ENCOUNTER — CARE COORDINATION (OUTPATIENT)
Dept: CASE MANAGEMENT | Age: 85
End: 2025-01-20

## 2025-01-20 DIAGNOSIS — J93.9 PNEUMOTHORAX ON RIGHT: Primary | ICD-10-CM

## 2025-01-20 LAB
BACTERIA UR CULT: ABNORMAL
ORGANISM: ABNORMAL

## 2025-01-20 PROCEDURE — 1111F DSCHRG MED/CURRENT MED MERGE: CPT | Performed by: STUDENT IN AN ORGANIZED HEALTH CARE EDUCATION/TRAINING PROGRAM

## 2025-01-21 ENCOUNTER — TELEPHONE (OUTPATIENT)
Dept: FAMILY MEDICINE CLINIC | Age: 85
End: 2025-01-21

## 2025-01-21 NOTE — CARE COORDINATION
Please call to have patient's [facility] visit scheduled and advise if transportation is needed. Thank you!  
[facility]  
transportation is scheduled through [facility].  She will call Pul to scheduled 3 week HFU and schedule transportation.  Needs CXR 1 week prior to Pulm HFU.  She asks this writer to contact PCP to schedule HFU. Message sent to PCP.  She will require [facility] if PCP HFU scheduled.  BMP/CBC ordered at d/c. She will have labs drawn at Oncology 1/28/25.  Has NO dizziness since chest tube inserted.  Eating, drinking ok. Denies problems w/ urination/bowels.  Agrees to CTN calls and has CTN #'s. Appreciative of the calls.  No other concerns voiced at this time.         Care Transition Nurse reviewed discharge instructions, medical action plan, and red flags with patient. The patient was given an opportunity to ask questions; all questions answered at this time.. The patient verbalized understanding.   Were discharge instructions available to patient? Yes.   Reviewed appropriate site of care based on symptoms and resources available to patient including: PCP  Specialist  Urgent care clinics  When to call 911  Serious Parody Messaging. The patient agrees to contact the primary care provider and/or specialist office for questions related to their healthcare.      Advance Care Planning:   Does patient have an Advance Directive: reviewed and current.    Medication Reconciliation:  Medication reconciliation was performed with patient,1111F entered: yes.     Remote Patient Monitoring:  Offered patient enrollment in the Remote Patient Monitoring (RPM) program for in-home monitoring: Deferred at this time because too much info for 1st call; will discuss at next outreach.    Assessments:  Care Transitions 24 Hour Call    Do you have a copy of your discharge instructions?: Yes  Do you have all of your prescriptions and are they filled?: Yes  Have you been contacted by a [facility] Pharmacist?: No  Have you scheduled your follow up appointment?: No  Do you feel like you have everything you need to keep you well at home?: Yes  Care Transitions

## 2025-01-22 ENCOUNTER — CARE COORDINATION (OUTPATIENT)
Dept: CASE MANAGEMENT | Age: 85
End: 2025-01-22

## 2025-01-22 NOTE — CARE COORDINATION
Care Transitions Note    Follow Up Call     Patient Current Location:  Home: [address] [address]    Care Transition Nurse contacted the patient by telephone. Verified name and  as identifiers.    Additional needs identified to be addressed with provider   No needs identified                 Method of communication with provider: none.    Care Summary Note: CTN call to Sol today and she says she is feeling pretty good. Denies falls, sob, chest pain, dizziness, malaise, fever, chills, n/v/d, swelling, UTI sxs.  R Chest tube site healing-no redness, swelling or drainage.  PCP HFU 25 [facility]  Palliative Care 25   Oncology 25  Transportation already arranged by [facility].  Neela 2/3/25 w/ CXR 2 week prior.  She says she will arrange this transportation through [facility].  BMP/CBC will be done at Oncology appt.  BP=will check  Eating, drinking & sleeping ok. Denies problems w/ urination/bowels.  No other concerns voiced at this time. Will continue to follow.    Plan of care updates since last contact:  Review of patient management of conditions/medications: R pneumothorax fall->resolved CXR 2 weeks w/ Pulm HFU 2/3/25       Advance Care Planning:   Does patient have an Advance Directive: reviewed during previous call, see note. .    Medication Review:  No changes since last call.     Remote Patient Monitoring:  Offered patient enrollment in the Remote Patient Monitoring (RPM) program for in-home monitoring: Yes, but did not enroll at this time: limited patient ability to navigate RPM/equipment.    Assessments:  Care Transitions ED Follow Up    Care Transitions Interventions     Transportation Support: Completed      DME Assistance: Declined   Disease Specific Clinic: Completed      Disease Association: Completed      Schedule Follow Up Appointment with Physician: Completed  Do you have any ongoing symptoms?: No   Do you have all of your prescriptions and are they filled?: Yes   Were you

## 2025-01-23 ENCOUNTER — TELEPHONE (OUTPATIENT)
Dept: PULMONOLOGY | Age: 85
End: 2025-01-23

## 2025-01-23 NOTE — TELEPHONE ENCOUNTER
Patients daughter called in asking Dr Mendes's opinion on moving her mother to [facility] to live with her son and daughter in law.. Dr Mendes was in office so I went back and he stated that he thinks that the move would be overall better for her since she would not be alone for appointments and treatment. Daughter was notified

## 2025-01-24 ENCOUNTER — CARE COORDINATION (OUTPATIENT)
Dept: CASE MANAGEMENT | Age: 85
End: 2025-01-24

## 2025-01-24 ENCOUNTER — TELEPHONE (OUTPATIENT)
Dept: ONCOLOGY | Age: 85
End: 2025-01-24

## 2025-01-24 ENCOUNTER — SOCIAL WORK (OUTPATIENT)
Dept: INFUSION THERAPY | Age: 85
End: 2025-01-24

## 2025-01-24 NOTE — TELEPHONE ENCOUNTER
Spoke to her daughter.  With permission of the patient.  Daughter has several question.  We have discussed the staging.  Incurable status of malignancy.  Failure previous chemotherapy.  I have discussed imaging.  I have discussed clinical trial at [facility] briefly.  Targeting K-albert mutation.  The patient is moving to [facility] and she has a family around [place].  Daughter talked about her mother moving to live with her son.  To provide a social and family support.  Discussed poor prognosis.  Hospice can be an option.  However clinical trial can be an option as well.

## 2025-01-24 NOTE — CARE COORDINATION
Digital Medicine: Clinician Introduction    Aurelio Perkins is a 69 y.o. male who is newly enrolled in the Digital Medicine Clinic.    The following information was reviewed and updated:  Preferred pharmacy   [id number] [place] - [address]  [address]  [place] [address]  Phone: [phone] Fax: [phone]    [facility]  [address] [place] [address]  Phone: [phone] Fax: [phone]    [facility]  [address]  [place] [address]  Phone: [phone] Fax: [phone]          Review of patient's allergies indicates:   Allergen Reactions    Antihistamines - alkylamine Other (See Comments)     hallucinations    Benadryl [diphenhydramine hcl] Other (See Comments)     hallucinations    Codeine Itching     Turns red    Lodine [etodolac] Other (See Comments)     fatigue    Methotrexate analogues Other (See Comments)     Sunlight sensitivity    Morphine Itching     Turns red    Statins-hmg-coa reductase inhibitors        Patient reports blood pressures are doing well. Notes he has no worries or concerns and feels confident in his readings. Denies concerns or complaints.    Patient declines need for initial education today, and requests fewer and less frequent contact.     Given BP control - appropriate at this time.     The history is provided by the patient.     HYPERTENSION  Our goal is to get BP to consistently below 130/80mmHg and make the process convenient so patient can avoid extra trips to the office. Getting your blood pressure below 130/80mmHg (definition of control) will reduce your risk for heart attack, kidney failure, stroke and death (as well as kidney failure, eye disease, & dementia)    Patient's BP goal is 130/80. Patients BP average is 120/73 mmHg, which is at or below goal, per 2017 ACC/AHA Hypertension Guidelines.    Allergies reviewed.      Last 5 Patient Entered Readings                                       Current 30 Day Average: 120/73     Recent Readings 1/25/2020 1/15/2020 1/5/2020 12/30/2019 12/23/2019    SBP (mmHg) 118 115 120 125 124    DBP (mmHg) 73 75 70 75 74            INTERVENTION(S)  encouragement/support    PLAN  continue monitoring    Blood pressures well controlled.   - No education provided at patient's request.   - If control lost, education should be first step.     Will continue current regimen.     Will schedule next outreach in 5-6 months.       There are no preventive care reminders to display for this patient.    Current Medication Regimen:  Hypertension Medications             hydroCHLOROthiazide (MICROZIDE) 12.5 mg capsule Take 1 capsule (12.5 mg total) by mouth once daily.    metoprolol succinate (TOPROL-XL) 25 MG 24 hr tablet Take 1 tablet (25 mg total) by mouth once daily.    nitroGLYCERIN (NITROSTAT) 0.4 MG SL tablet Place 0.4 mg under the tongue.            Reviewed the importance of self-monitoring, medication adherence, and that the health  can be used as a resource for lifestyle modifications to help reduce or maintain a healthy lifestyle.    Sent link to [facility] webpages and my contact information via [employer] for future questions. Follow up scheduled.         Screenings   healing   - appetite and fluid intake are good, \"back on the program and getting straighten up by throwing away  items\". Her neighbor has brought over some cooked meals.   - last BM was yesterday, the patient states iron supplement does not cause constipation  - CXR expected before 2025, will get it done before she sees her PCP on 2025. The patient's daughter will help her get these completed on the 3 days her daughter is in town visiting between social events. The patient sates she needs to get calendars straighten out before her daughter visits that way she does not forget anything important.     The patient agrees to receive a call within the next few days.     Discussed seeking emergency medical attention for new or worsening symptoms and the patient expressed understanding.   The patient states no other questions or concerns at this time    Plan of care updates since last contact:  Transportation       Advance Care Planning:   Does patient have an Advance Directive: reviewed and current.    Medication Review:  No changes since last call.      Remote Patient Monitoring:  Offered patient enrollment in the Remote Patient Monitoring (RPM) program for in-home monitoring: Yes, but did not enroll at this time: limited patient ability to navigate RPM/equipment    Assessments:  Care Transitions 24 Hour Call    Do you have any ongoing symptoms?: Yes  Patient-reported symptoms: Other  Do you have all of your prescriptions and are they filled?: Yes  Were you discharged with any Home Care or Post Acute Services: No  Care Transitions Interventions     Transportation Support: Completed      DME Assistance: Declined   Disease Specific Clinic: Completed      Disease Association: Completed                Follow Up Appointment:   Reviewed upcoming appointment(s).  Future Appointments         Provider Specialty Dept Phone    2025 11:00 AM Neal Rock MD Palliative Care [phone]    2025

## 2025-01-24 NOTE — PROGRESS NOTES
Oncology Social Work    Date: 1/24/2025  Time: 12:52 PM  Name: Sol Greer  MRN: [id number]     Contact Type: Transportation Request    Note:   Situation: Sol Greer called the Oncology Social Worker to assist with transportation to and from appts.     Background:  Sol Greer is not able to provide their own transportation and has asked the  for assistance.     Assessment:  Sol Greer requested transportation for upcoming appointment(s) located throughout the [facility] network of providers.  - She will be picked up by [place] [facility] and taken to/from scheduled appointment(s) as follows:  27-Jan 11:00a Pall Care [address] - Seeing Dr Rock   28-Jan 12:30p Chemo [address] - 1 hr chemo    12:35p Line & Labs Line & Labs    1:00p Provider Seeing Dr Awada   29-Jan 11:20a Provider [address] - Seeing KADI Knight             3-Feb 11:00a Pulmonology [address] - Seeing J Luis Nails     - Patient has been notified of the arrangements provided.    Recommendation: Appt changes will be initiated by Sol Greer based on need.  provided my contact information and will remain available for support.      YUAN Locke, MEGAN, MONICA  Oncology Social Worker      Electronically signed by YUAN Locke LSW, MONICA on 1/24/2025 at 12:52 PM

## 2025-01-27 ENCOUNTER — OFFICE VISIT (OUTPATIENT)
Dept: PALLATIVE CARE | Age: 85
End: 2025-01-27
Payer: MEDICARE

## 2025-01-27 VITALS
OXYGEN SATURATION: 100 % | BODY MASS INDEX: 21.73 KG/M2 | HEIGHT: 65 IN | DIASTOLIC BLOOD PRESSURE: 78 MMHG | SYSTOLIC BLOOD PRESSURE: 124 MMHG | WEIGHT: 130.4 LBS | HEART RATE: 96 BPM

## 2025-01-27 DIAGNOSIS — C78.00 PANCREATIC CANCER METASTASIZED TO LUNG (HCC): Primary | ICD-10-CM

## 2025-01-27 DIAGNOSIS — C25.9 METASTASIS FROM PANCREATIC CANCER (HCC): ICD-10-CM

## 2025-01-27 DIAGNOSIS — C79.9 METASTASIS FROM PANCREATIC CANCER (HCC): ICD-10-CM

## 2025-01-27 DIAGNOSIS — C25.9 PANCREATIC CANCER METASTASIZED TO LUNG (HCC): Primary | ICD-10-CM

## 2025-01-27 DIAGNOSIS — G62.0 CHEMOTHERAPY-INDUCED NEUROPATHY (HCC): ICD-10-CM

## 2025-01-27 DIAGNOSIS — G62.0 CHEMOTHERAPY-INDUCED PERIPHERAL NEUROPATHY (HCC): ICD-10-CM

## 2025-01-27 DIAGNOSIS — T45.1X5A CHEMOTHERAPY-INDUCED PERIPHERAL NEUROPATHY (HCC): ICD-10-CM

## 2025-01-27 DIAGNOSIS — Z51.5 PALLIATIVE CARE PATIENT: ICD-10-CM

## 2025-01-27 DIAGNOSIS — T45.1X5A CHEMOTHERAPY-INDUCED NEUROPATHY (HCC): ICD-10-CM

## 2025-01-27 PROCEDURE — 3078F DIAST BP <80 MM HG: CPT | Performed by: STUDENT IN AN ORGANIZED HEALTH CARE EDUCATION/TRAINING PROGRAM

## 2025-01-27 PROCEDURE — 3074F SYST BP LT 130 MM HG: CPT | Performed by: STUDENT IN AN ORGANIZED HEALTH CARE EDUCATION/TRAINING PROGRAM

## 2025-01-27 PROCEDURE — 99214 OFFICE O/P EST MOD 30 MIN: CPT | Performed by: STUDENT IN AN ORGANIZED HEALTH CARE EDUCATION/TRAINING PROGRAM

## 2025-01-27 PROCEDURE — 1126F AMNT PAIN NOTED NONE PRSNT: CPT | Performed by: STUDENT IN AN ORGANIZED HEALTH CARE EDUCATION/TRAINING PROGRAM

## 2025-01-27 PROCEDURE — 1123F ACP DISCUSS/DSCN MKR DOCD: CPT | Performed by: STUDENT IN AN ORGANIZED HEALTH CARE EDUCATION/TRAINING PROGRAM

## 2025-01-27 PROCEDURE — 1159F MED LIST DOCD IN RCRD: CPT | Performed by: STUDENT IN AN ORGANIZED HEALTH CARE EDUCATION/TRAINING PROGRAM

## 2025-01-27 RX ORDER — DULOXETIN HYDROCHLORIDE 30 MG/1
CAPSULE, DELAYED RELEASE ORAL
Qty: 113 CAPSULE | Refills: 0 | Status: SHIPPED | OUTPATIENT
Start: 2025-01-27 | End: 2025-03-28

## 2025-01-27 NOTE — PROGRESS NOTES
[facility]  [facility]  [address]  [address]  [place] [address]  Dept: [phone]  Loc: [phone]     Hematology/Oncology Follow Up Office Visit        Sol Greer  1940  No ref. provider found   Loida Knight MD     Diagnosis/CC:   No chief complaint on file.      HPI:     Diagnosis:     -Periampullary pancreatic adenocarcinoma        Treatment:   -Whipple procedure [facility] 12/4/2021  (7 of 27 lymph nodes positive for cancer.  -Adjuvant therapy: FOLFOX last administered 5/22.  First treatment began 1/26/2022.  Dose reductions including oxaliplatin to 70 mg per metered squared.  Stopped @ April with low counts.  Developed obstructive jaundice hospitalized at  June 16 through 21, 2022.  Patient had leukocytosis bacteremia and a stricture post Whipple 6/15 CAT scan showed cirrhosis, small ascites and postop changes.  Patient had Enterococcus bacteremia treated with Zosyn and Unasyn and then Augmentin.  No vegetations on valves.  ERCP 6/17/22  showed no obstruction or stones.  Intrahepatic branches diffusely dilated.  Abnormal LFTs and elevated bilirubin therefore a metal stent was placed in the common bile duct.  Stent should be changed in 3 months.  Patient is 2 months overdue to have her stent changed.     Followable Disease:     - A/P CT wo contrast  5/17/23- stable , 1/11/23-stable  -CT imaging abdominal pelvic CT with contrast 6/15/2022-mild ascites otherwise no changes.  -MRI of the abdomen with and without contrast 5/17/22-status post Whipple, mild worsening ascites mildly dilated biliary tree pancreatic mesenteric edema  - CA 19-9 - 39 ( 9/30/22)     Comorbidities:  -A. fib.  Hypertension, see below    Subjective/Interim Summary:     10/5/23-  Patient has been doing well.  Denies having any new symptoms.  No nausea, vomiting, abdominal pain, change in bowel habits.  She has a good appetite.  She has

## 2025-01-27 NOTE — PROGRESS NOTES
Behavior is cooperative.         Cognition and Memory: Cognition normal. Cognition is not impaired.         Palliative Performance Scale: 70%  Ambulation reduced; unable to perform normal job/work; significant  disease; able to do own self care; normal or reduced intake; fully conscious    Assessment / Plan:   Sol Greer is a 84 y.o. female who presents to the palliative care clinic today for follow up of Symptoms related to her cancer.  She continues to report that she is having neuropathic type pain.  Gabapentin was decreased at her last hospitalization and continues to not be very effective at controlling her pain.  I we will discontinue the gabapentin today and transition to Cymbalta.  Hopefully this helps to better control her pain.  She will follow-up in 8 weeks or earlier as needed.     Diagnosis Orders   1. Pancreatic cancer metastasized to lung (HCC)        2. Metastasis from pancreatic cancer (HCC)        3. Chemotherapy-induced neuropathy (HCC)        4. Palliative care patient        5. Chemotherapy-induced peripheral neuropathy (HCC)  DULoxetine (CYMBALTA) 30 MG extended release capsule               Continue current plan of care for acute and chronic conditions per primary and specialist teams  Discontinue Gabapentin  Start Cymbalta 30 mg daily for 7 days followed by 60 mg daily for neuropathy  Continue ensure 3 times per day after meals   Please call the office if your symptoms worsen or if you were to develop new symptoms  Please call the palliative care office when you need refills    Return in about 7 weeks (around 3/17/2025) for CIPN.    Medications Prescribed:  Orders Placed This Encounter   Medications    DULoxetine (CYMBALTA) 30 MG extended release capsule     Sig: Take 1 capsule by mouth daily for 7 days, THEN 2 capsules daily.     Dispense:  113 capsule     Refill:  0       Future Appointments   Date Time Provider Department Center   1/28/2025 12:30 PM STR OUT PT ONC [facility]

## 2025-01-27 NOTE — PATIENT INSTRUCTIONS
Continue current plan of care for acute and chronic conditions per primary and specialist teams  Discontinue Gabapentin  Start Cymbalta 30 mg daily for 7 days followed by 60 mg daily for neuropathy  Continue ensure 3 times per day after meals   Please call the office if your symptoms worsen or if you were to develop new symptoms  Please call the palliative care office when you need refills

## 2025-01-28 ENCOUNTER — HOSPITAL ENCOUNTER (OUTPATIENT)
Dept: INFUSION THERAPY | Age: 85
Discharge: HOME | End: 2025-01-28
Payer: MEDICARE

## 2025-01-28 ENCOUNTER — OFFICE VISIT (OUTPATIENT)
Dept: ONCOLOGY | Age: 85
End: 2025-01-28
Payer: MEDICARE

## 2025-01-28 ENCOUNTER — CLINICAL DOCUMENTATION (OUTPATIENT)
Dept: CASE MANAGEMENT | Age: 85
End: 2025-01-28

## 2025-01-28 VITALS
HEART RATE: 85 BPM | DIASTOLIC BLOOD PRESSURE: 58 MMHG | RESPIRATION RATE: 18 BRPM | OXYGEN SATURATION: 98 % | SYSTOLIC BLOOD PRESSURE: 121 MMHG | WEIGHT: 129.2 LBS | BODY MASS INDEX: 21.5 KG/M2 | TEMPERATURE: 98 F

## 2025-01-28 VITALS
DIASTOLIC BLOOD PRESSURE: 58 MMHG | SYSTOLIC BLOOD PRESSURE: 121 MMHG | TEMPERATURE: 98 F | WEIGHT: 129.2 LBS | BODY MASS INDEX: 21.52 KG/M2 | RESPIRATION RATE: 18 BRPM | OXYGEN SATURATION: 98 % | HEART RATE: 85 BPM | HEIGHT: 65 IN

## 2025-01-28 DIAGNOSIS — C79.9 METASTASIS FROM PANCREATIC CANCER (HCC): Primary | ICD-10-CM

## 2025-01-28 DIAGNOSIS — C24.1 PRIMARY ADENOCARCINOMA OF AMPULLA OF VATER (HCC): ICD-10-CM

## 2025-01-28 DIAGNOSIS — R59.1 LYMPHADENOPATHY: ICD-10-CM

## 2025-01-28 DIAGNOSIS — C25.9 METASTASIS FROM PANCREATIC CANCER (HCC): Primary | ICD-10-CM

## 2025-01-28 LAB
ALBUMIN SERPL BCG-MCNC: 3.4 G/DL (ref 3.5–5.1)
ALP SERPL-CCNC: 197 U/L (ref 38–126)
ALT SERPL W/O P-5'-P-CCNC: 21 U/L (ref 11–66)
AST SERPL-CCNC: 31 U/L (ref 5–40)
BASOPHILS ABSOLUTE: 0 THOU/MM3 (ref 0–0.1)
BASOPHILS NFR BLD AUTO: 1 % (ref 0–3)
BILIRUB CONJ SERPL-MCNC: 0.3 MG/DL (ref 0.1–13.8)
BILIRUB SERPL-MCNC: 0.6 MG/DL (ref 0.3–1.2)
BUN BLDP-MCNC: 15 MG/DL (ref 8–26)
CHLORIDE BLD-SCNC: 108 MEQ/L (ref 98–109)
CREAT BLD-MCNC: 1 MG/DL (ref 0.5–1.2)
EOSINOPHIL NFR BLD AUTO: 1 % (ref 0–4)
EOSINOPHILS ABSOLUTE: 0.1 THOU/MM3 (ref 0–0.4)
ERYTHROCYTE [DISTWIDTH] IN BLOOD BY AUTOMATED COUNT: 14.3 % (ref 11.5–14.5)
GFR SERPL CREATININE-BSD FRML MDRD: 56 ML/MIN/1.73M2
GLUCOSE BLD-MCNC: 130 MG/DL (ref 70–108)
HCT VFR BLD AUTO: 33.5 % (ref 37–47)
HGB BLD-MCNC: 10.6 GM/DL (ref 12–16)
IMMATURE GRANULOCYTES %: 0 %
IMMATURE GRANULOCYTES ABSOLUTE: 0.01 THOU/MM3 (ref 0–0.07)
IONIZED CALCIUM, WHOLE BLOOD: 1.15 MMOL/L (ref 1.12–1.32)
LYMPHOCYTES ABSOLUTE: 1.6 THOU/MM3 (ref 1–4.8)
LYMPHOCYTES NFR BLD AUTO: 28 % (ref 15–47)
MCH RBC QN AUTO: 31.9 PG (ref 26–33)
MCHC RBC AUTO-ENTMCNC: 31.6 GM/DL (ref 32.2–35.5)
MCV RBC AUTO: 101 FL (ref 81–99)
MONOCYTES ABSOLUTE: 0.8 THOU/MM3 (ref 0.4–1.3)
MONOCYTES NFR BLD AUTO: 13 % (ref 0–12)
NEUTROPHILS ABSOLUTE: 3.4 THOU/MM3 (ref 1.8–7.7)
NEUTROPHILS NFR BLD AUTO: 57 % (ref 43–75)
PLATELET # BLD AUTO: 251 THOU/MM3 (ref 130–400)
PMV BLD AUTO: 10 FL (ref 9.4–12.4)
POTASSIUM BLD-SCNC: 3.7 MEQ/L (ref 3.5–4.9)
PROT SERPL-MCNC: 6.3 G/DL (ref 6.1–8)
RBC # BLD AUTO: 3.32 MILL/MM3 (ref 4.2–5.4)
SODIUM BLD-SCNC: 141 MEQ/L (ref 138–146)
TOTAL CO2, WHOLE BLOOD: 25 MEQ/L (ref 23–33)
WBC # BLD AUTO: 5.9 THOU/MM3 (ref 4.8–10.8)

## 2025-01-28 PROCEDURE — 6360000002 HC RX W HCPCS: Performed by: INTERNAL MEDICINE

## 2025-01-28 PROCEDURE — 99211 OFF/OP EST MAY X REQ PHY/QHP: CPT

## 2025-01-28 PROCEDURE — 2500000003 HC RX 250 WO HCPCS: Performed by: INTERNAL MEDICINE

## 2025-01-28 PROCEDURE — 99214 OFFICE O/P EST MOD 30 MIN: CPT | Performed by: INTERNAL MEDICINE

## 2025-01-28 PROCEDURE — 85025 COMPLETE CBC W/AUTO DIFF WBC: CPT

## 2025-01-28 PROCEDURE — 3078F DIAST BP <80 MM HG: CPT | Performed by: INTERNAL MEDICINE

## 2025-01-28 PROCEDURE — 1160F RVW MEDS BY RX/DR IN RCRD: CPT | Performed by: INTERNAL MEDICINE

## 2025-01-28 PROCEDURE — 36591 DRAW BLOOD OFF VENOUS DEVICE: CPT

## 2025-01-28 PROCEDURE — 80047 BASIC METABLC PNL IONIZED CA: CPT

## 2025-01-28 PROCEDURE — 1159F MED LIST DOCD IN RCRD: CPT | Performed by: INTERNAL MEDICINE

## 2025-01-28 PROCEDURE — 1123F ACP DISCUSS/DSCN MKR DOCD: CPT | Performed by: INTERNAL MEDICINE

## 2025-01-28 PROCEDURE — 80076 HEPATIC FUNCTION PANEL: CPT

## 2025-01-28 PROCEDURE — 3074F SYST BP LT 130 MM HG: CPT | Performed by: INTERNAL MEDICINE

## 2025-01-28 RX ORDER — SODIUM CHLORIDE 9 MG/ML
25 INJECTION, SOLUTION INTRAVENOUS PRN
OUTPATIENT
Start: 2025-01-28

## 2025-01-28 RX ORDER — HEPARIN 100 UNIT/ML
500 SYRINGE INTRAVENOUS PRN
OUTPATIENT
Start: 2025-01-28

## 2025-01-28 RX ORDER — SODIUM CHLORIDE 0.9 % (FLUSH) 0.9 %
5-40 SYRINGE (ML) INJECTION PRN
Status: DISCONTINUED | OUTPATIENT
Start: 2025-01-28 | End: 2025-01-29 | Stop reason: HOSPADM

## 2025-01-28 RX ORDER — HEPARIN 100 UNIT/ML
500 SYRINGE INTRAVENOUS PRN
Status: DISCONTINUED | OUTPATIENT
Start: 2025-01-28 | End: 2025-01-29 | Stop reason: HOSPADM

## 2025-01-28 RX ORDER — SODIUM CHLORIDE 0.9 % (FLUSH) 0.9 %
5-40 SYRINGE (ML) INJECTION PRN
OUTPATIENT
Start: 2025-01-28

## 2025-01-28 RX ADMIN — SODIUM CHLORIDE, PRESERVATIVE FREE 30 ML: 5 INJECTION INTRAVENOUS at 12:34

## 2025-01-28 RX ADMIN — HEPARIN 500 UNITS: 100 SYRINGE at 13:30

## 2025-01-28 NOTE — PLAN OF CARE
Problem: Discharge Planning  Goal: Discharge to home or other facility with appropriate resources  Outcome: Adequate for Discharge  Flowsheets (Taken 1/28/2025 1403)  Discharge to home or other facility with appropriate resources:   Identify barriers to discharge with patient and caregiver   Identify discharge learning needs (meds, wound care, etc)  Note: Verbalize understanding of discharge instructions, follow up appointments, and when to call Physician.Discuss understanding of discharge instructions, follow up appointments and when to call Physician.        Problem: Safety - Adult  Goal: Free from fall injury  Outcome: Adequate for Discharge  Flowsheets (Taken 1/28/2025 1403)  Free From Fall Injury:   Based on caregiver fall risk screen, instruct family/caregiver to ask for assistance with transferring infant if caregiver noted to have fall risk factors   Instruct family/caregiver on patient safety  Note: Free from falls while in O.P. Oncology.Discussed the need to use the call light for assistance when getting up to ambulate.        Problem: Chronic Conditions and Co-morbidities  Goal: Patient's chronic conditions and co-morbidity symptoms are monitored and maintained or improved  Outcome: Adequate for Discharge  Flowsheets (Taken 1/28/2025 1403)  Care Plan - Patient's Chronic Conditions and Co-Morbidity Symptoms are Monitored and Maintained or Improved:   Collaborate with multidisciplinary team to address chronic and comorbid conditions and prevent exacerbation or deterioration   Monitor and assess patient's chronic conditions and comorbid symptoms for stability, deterioration, or improvement  Note: Patient verbalizes understanding to verbal information given on lab draw from [facility],action and possible side effects. Aware to call MD if develop complications.      Care plan reviewed with patient. Patient verbalizes understanding of the plan of care and contributes to goal setting.

## 2025-01-28 NOTE — PROGRESS NOTES
Patient tolerated lab draw from [facility] without any complications. Discharge instructions given to patient-verbalizes understanding. Ambulated off unit per self with belongings.

## 2025-01-29 NOTE — PROGRESS NOTES
Name: Sol Greer  : 1940  MRN: [id number]    Oncology Navigation Follow-Up Note    Contact Type:  Medical Oncology    Subjective: \"feels pretty good- voices no new issues\"    Objective: MD to discuss tx plan    Oncology Plan of Care:    -pt not interested in [facility] clinical trails. Considering moving with son ( to [place]) then would be following [facility]- clinical trails available.      -if not wanting tx/considering hospice    -hold tx today/ pt in agreement    -return apt -inform of final decision     Assistance Needed: denies any at this time    Receptive to Advanced Care Planning / Palliative Care:  following    Education: mariela reiterated ONC POC    Notes: mariela following to assist & support as needed    Electronically signed by Ysabel Yañez RN on 2025 at 3:32 PM

## 2025-01-30 ENCOUNTER — CARE COORDINATION (OUTPATIENT)
Dept: CARE COORDINATION | Age: 85
End: 2025-01-30

## 2025-01-30 NOTE — CARE COORDINATION
Care Transitions Note    Follow Up Call     Patient Current Location:  Home: [address] [place] [id number]    Care Transition Nurse contacted the patient by telephone. Verified name and  as identifiers.    Additional needs identified to be addressed with provider   No needs identified                 Method of communication with provider: none.    Care Summary Note:     CTN LPN contacted the patient for Care Transition follow-up and spoke to Sol Greer complains of   - left hip pain from sleeping wrong, using a cane while walking and walker in the house with the left hip pain     Sol Greer states   - Since the last CTN call, the patient had an appointment with:   Palliative Care on 2025, Medication Changes start DULoxetine HCl 30 MG Take 1 capsule by mouth daily for 7 days, THEN 2 capsules daily. Stop Gabapentin 300 mg Oral 2 TIMES DAILY (Therapy completed  Tinging in fingers and feet are present, she discontinues the gabapentin today and transition to Cymbalta 30 mg daily for 7 days followed by 60 mg daily for neuropathy. Hopefully this helps to better control her pain.   Appetite is good and will eat anything while out to eat, cooking at home is the hard part, will talk to 2025 meals on wheels, ensure 3 times per day after meals, drinking as much as she can but she is trying to 3 times a day   Cancer Center on 2025.   Will follow up Dr. Weeks with labs in 2-3 weeks with labs. Did not get a treatment, there is talk about sending her to [facility], then her age was brought up so she only gets blood work completed.   PCP on 2025  hosp f/u PT WILL CALL BACK TO RESCHEDULE 25, PCP office canceled the visit since the patient was at the cancer center when they called.     - The patient has an upcoming appointment with Pulmonary on 2025 and Cancer Center on 2025, the patient denies the need for transportation, Melissa

## 2025-01-31 ENCOUNTER — HOSPITAL ENCOUNTER (OUTPATIENT)
Age: 85
Discharge: HOME | End: 2025-01-31
Payer: MEDICARE

## 2025-01-31 ENCOUNTER — HOSPITAL ENCOUNTER (OUTPATIENT)
Dept: GENERAL RADIOLOGY | Age: 85
Discharge: HOME | End: 2025-01-31
Payer: MEDICARE

## 2025-01-31 DIAGNOSIS — J93.9 PNEUMOTHORAX ON RIGHT: ICD-10-CM

## 2025-01-31 DIAGNOSIS — R91.8 LUNG MASS: ICD-10-CM

## 2025-01-31 PROCEDURE — 71046 X-RAY EXAM CHEST 2 VIEWS: CPT

## 2025-02-03 ENCOUNTER — SOCIAL WORK (OUTPATIENT)
Dept: INFUSION THERAPY | Age: 85
End: 2025-02-03

## 2025-02-03 ENCOUNTER — OFFICE VISIT (OUTPATIENT)
Dept: PULMONOLOGY | Age: 85
End: 2025-02-03
Payer: MEDICARE

## 2025-02-03 ENCOUNTER — TELEPHONE (OUTPATIENT)
Dept: PULMONOLOGY | Age: 85
End: 2025-02-03

## 2025-02-03 VITALS
SYSTOLIC BLOOD PRESSURE: 114 MMHG | BODY MASS INDEX: 20.03 KG/M2 | OXYGEN SATURATION: 98 % | DIASTOLIC BLOOD PRESSURE: 68 MMHG | TEMPERATURE: 98.8 F | HEART RATE: 93 BPM | HEIGHT: 65 IN | WEIGHT: 120.2 LBS

## 2025-02-03 DIAGNOSIS — R91.1 LUNG NODULE: ICD-10-CM

## 2025-02-03 DIAGNOSIS — C25.9 PANCREATIC CANCER METASTASIZED TO LUNG (HCC): Primary | ICD-10-CM

## 2025-02-03 DIAGNOSIS — J93.9 PNEUMOTHORAX ON RIGHT: ICD-10-CM

## 2025-02-03 DIAGNOSIS — C78.00 PANCREATIC CANCER METASTASIZED TO LUNG (HCC): Primary | ICD-10-CM

## 2025-02-03 DIAGNOSIS — R06.02 SHORTNESS OF BREATH ON EXERTION: ICD-10-CM

## 2025-02-03 PROCEDURE — 1126F AMNT PAIN NOTED NONE PRSNT: CPT | Performed by: NURSE PRACTITIONER

## 2025-02-03 PROCEDURE — 3078F DIAST BP <80 MM HG: CPT | Performed by: NURSE PRACTITIONER

## 2025-02-03 PROCEDURE — 1159F MED LIST DOCD IN RCRD: CPT | Performed by: NURSE PRACTITIONER

## 2025-02-03 PROCEDURE — 99214 OFFICE O/P EST MOD 30 MIN: CPT | Performed by: NURSE PRACTITIONER

## 2025-02-03 PROCEDURE — 3074F SYST BP LT 130 MM HG: CPT | Performed by: NURSE PRACTITIONER

## 2025-02-03 PROCEDURE — 1123F ACP DISCUSS/DSCN MKR DOCD: CPT | Performed by: NURSE PRACTITIONER

## 2025-02-03 ASSESSMENT — ENCOUNTER SYMPTOMS
COUGH: 0
STRIDOR: 0
SHORTNESS OF BREATH: 1
WHEEZING: 0
DIARRHEA: 0
CHEST TIGHTNESS: 0
NAUSEA: 0
VOMITING: 0

## 2025-02-03 NOTE — PROGRESS NOTES
Oncology Social Work    Date: 2/3/2025  Time: 9:24 AM  Name: Sol Greer  MRN: [id number]     Contact Type: Transportation Request    Note:   Situation: Sol Greer called the Oncology Social Worker to assist with transportation to and from appts.     Background:  Sol Greer is not able to provide their own transportation and has asked the  for assistance.     Assessment:  Sol Greer requested transportation for upcoming appointment(s) located throughout the [facility] network of providers.  - She will be picked up by [place] [facility] and taken to/from scheduled appointment(s) as follows:  3-Feb 11:00a Pulmonology [address] - Seeing J Luis Nails   11-Feb 9:30a Line & Labs [address] - Line & Labs    1000[address] Provider Seeing Dr Mcknight   - Patient has been notified of the arrangements provided.    Recommendation: Appt changes will be initiated by Sol Greer based on need.  provided my contact information and will remain available for support.      YUAN Locke, MEGAN, MONICA  Oncology Social Worker      Electronically signed by YUAN Locke, MEGAN, MONICA on 2/3/2025 at 9:24 AM

## 2025-02-03 NOTE — TELEPHONE ENCOUNTER
Patient arrived for her pulmonary appointment. Patient walked to the scale and fell. Patient was using a walker with wheels. Patient's walker continued to roll towards the scale but patient's feet stopped moving. Patient continued to hold onto her walker while falling. Once patient was FPC to the floor I eased her the rest of the way down. Once sitting on the floor my co-worker Danica Milan went to get our  Miguelinahosea Tatum. I asked patient if she had pain anywhere and she stated \"my lower back\", patient denied any other injury/pain. Miguelian and myself checked the patient's back for any injury, there was a 1 inch long scrape on patient's lower spine. The injury was not bleeding. I asked patient if she is dizzy, patient stated \"not dizzy just weak\". Miguelina asked patient if she would like to go to the ED to be evaluated, patient declined. Once patient felt stable enough to transfer to our wheelchair Miguelina and myself transferred her. Patient sat for a couple minutes and Miguelina and myself transferred patient to our scale to weigh patient, once I wrote down her weight Miguelina and myself transferred patient back to our wheelchair. Once in the room I took patient's vitals (/68 SpO2 98% Pulse 93bpm Temp 98.8 F). I asked patient what her pain level was for her lower back and she stated \"0/10 I cannot feel it\". I notified visit provider FRANCA Bauer of incident and he verbalized understanding. I then submitted a [facility] Incident report per the direction of Miguelina.  -  Incident Details:  [facility] Report ID: [id number]  Date of incident: 02/03/2025  Time of incident: 11:00 A.M.  Cause of incident: patient leg weakness  Assisted device used: walker on wheels (breaks not engaged)  Patient injury: 1 inch long scrape to lower spine (not bleeding)  Patient pain level: 0/10  Type of fall: assisted  Provider notified: FRANCA Bauer

## 2025-02-03 NOTE — PROGRESS NOTES
[facility]    Patient: REINALDO MCCORMICK, 84 y.o.   : 1940  2/3/2025    Pt of Dr. Mendes      Subjective     Chief Complaint   Patient presents with    Follow-up     Hospital follow-up for pneumothorax with cxr (d/c'd 2025)        Shortness of Breath  This is a chronic problem. The current episode started more than 1 year ago. The problem occurs intermittently. The problem has been waxing and waning. Associated symptoms include leg swelling. Pertinent negatives include no chest pain, fever, vomiting or wheezing. Risk factors include smoking. She has tried rest for the symptoms. The treatment provided moderate relief.     Reinaldo is here for follow up from recent Hospitalization. PMH significant for a-fib, metastatic pancreatic cancer EBUS from 2024 positive for adenocarcinoma see Pathology report in EMR,  HTN, and personal history of tobacco use Quit .     1/15/2025-2025 Patient was referred to ED from Pulmonary office for hypotension had fall prior to arriving at clinic and a near fall while at clinic. She was noted to have decreased lung sounds on right and was sent to ED for further evaluation. Of note PET/CT on 2024 was reported to have pneumothorax per Dr. Mendes notation pt. Was asymptomatic. Dr. Mendes evaluated patient in [facility] for further evaluation after patient had pigtail chest tube insertion 1/15/2025 in the ED. By Dr. Pace. Repeat CXR showed significant improvement in PTX. Chest tube was removed on 2025.     Presents today for follow-up with repeat CXR from 2025.       Progress History:    Overall patient reports respiratory symptoms have been fluctuating a bit since last appointment. Has SOB with exertion that resolves quickly upon sitting and resting denies significant cough, wheezing CP, or hemoptysis. Reports this start over 1 year ago and has been slowly progressing associated symptoms include

## 2025-02-06 ENCOUNTER — CARE COORDINATION (OUTPATIENT)
Dept: CARE COORDINATION | Age: 85
End: 2025-02-06

## 2025-02-06 NOTE — CARE COORDINATION
Care Transitions Note    Follow Up Call     Patient Current Location:  Home: [address] [place] [id number]    Care Transition Nurse contacted the patient by telephone. Verified name and  as identifiers.    Additional needs identified to be addressed with provider   No needs identified                 Method of communication with provider: none.    Care Summary Note:     CTN LPN contacted the patient for Care Transition follow-up and spoke to Sol MEGHA Jose M Greer     Sol CLEVELAND Krupakamilah Greer denies pain, chest pain, fever, chills, congestion, fatigue, weakness, swelling, shortness of breath, rash, nausea, vomiting or diarrhea, activity or sleep issues, bowel or urination issues, urinary tract infection signs and symptoms, fluid intake issues, weight gain, weight loss, or any other new symptoms.    Sol C[place] Jose M Greer complains of   - Reports she had a fall Monday at the pulmonary office. The patient did not hit their head, did not lose consciousness, or have any injuries. Noticed she was trying to \"race with her walker\" and got her feet tangled up\". No to her falls and balance issues since.   - diaphoresis once in a while in her hair   - cough, dry     Sol L Jose M Greer states   - Since the last CTN call, the patient had an appointment with:  - XR CHEST (2 VW) on 2025, PCP stated “Please let patient know repeat chest x-ray shows resolution of pneumothorax from hospitalization. Thanks! “   - Pulmonary on 2025, placed an order for Full PFT Study With Bronchodilator which is schedule on 2025, the encounter stated “-CXR reviewed no reoccurrence of PTX   -Discussed SOB significant history of tobacco smoking recommend PFT   -Follow-up with Oncology is considering moving closer to family in [place] due to increased difficulty with ADLs at home this is a logical option to consider at time of encounter was still undecided about transition will plan for follow-up with our practice

## 2025-02-07 ENCOUNTER — CASE MANAGEMENT (OUTPATIENT)
Dept: CASE MANAGEMENT | Age: 85
End: 2025-02-07

## 2025-02-07 NOTE — PROGRESS NOTES
Name: Sol Greer  : 1940  MRN: [id number]    Oncology Navigation     Contact Type:  Telephone    Notes: mariela received call from patient- checking on referral from [facility]. Mariela informed we were waiting for her decision if moving in with her son. She said she forgot to call me.  She is planning on moving. Mariela to speak to Dr. Mcknight on Monday-referral will be placed.      Electronically signed by Ysabel Yañez RN on 2025 at 1:03 PM

## 2025-02-10 ENCOUNTER — CASE MANAGEMENT (OUTPATIENT)
Dept: CASE MANAGEMENT | Age: 85
End: 2025-02-10

## 2025-02-10 DIAGNOSIS — C79.9 METASTASIS FROM PANCREATIC CANCER (HCC): Primary | ICD-10-CM

## 2025-02-10 DIAGNOSIS — C25.9 METASTASIS FROM PANCREATIC CANCER (HCC): Primary | ICD-10-CM

## 2025-02-10 NOTE — PROGRESS NOTES
Patient moved to live with her son in [place] per Navigator.  She will follow-up with [facility].  Referral to invasive surgery per her request was placed.

## 2025-02-10 NOTE — PROGRESS NOTES
Name: Sol Greer  : 1940  MRN: [id number]    Oncology Navigation     Contact Type:  Telephone    Notes: mariela received call from pt-cancelling tomorrow appt-in process with moving in with her son in [place]. Waiting to hear from [facility] for appt- transferring care. Will call if need any assistance.       Electronically signed by Ysabel Yañez RN on 2/10/2025 at 3:38 PM

## 2025-02-11 ENCOUNTER — HOSPITAL ENCOUNTER (OUTPATIENT)
Dept: INFUSION THERAPY | Age: 85
End: 2025-02-11

## 2025-02-12 ENCOUNTER — TELEPHONE (OUTPATIENT)
Dept: PULMONOLOGY | Age: 85
End: 2025-02-12

## 2025-02-12 NOTE — TELEPHONE ENCOUNTER
Patients daughter called in stating that her mother has started to feel sharp pains in her right upper pack, as well as her right chest. These pains have gotten so intense that she cannot move. She informed me that she is taking her mother to the ER but wanted to keep us in the loop as well. Thanks

## 2025-02-13 ENCOUNTER — CARE COORDINATION (OUTPATIENT)
Dept: CASE MANAGEMENT | Age: 85
End: 2025-02-13

## 2025-02-13 NOTE — CARE COORDINATION
Care Transitions Note    Follow Up Call     Patient Current Location:  Home: [address] [place] [address]    Care Transition Nurse contacted the patient by telephone. Verified name and  as identifiers.    Additional needs identified to be addressed with provider   No needs identified                 Method of communication with provider: none.    Care Summary Note:     Noted 2025 encounter \"moving in with her son in [place]\"    Noted 2025 encounter “sharp pains in her right upper pack, as well as her right chest. These pains have gotten so intense that she cannot move. She informed me that she is taking her mother to the ER “    CTN LPN contacted the patient for Care Transition follow-up and spoke to Sol Greer briefly     - Sol Greer states she did not call the [facility] at [phone] yet but it is on her to do list. So they can talk about health insurance.   - Sol Greer went to an emergency department, in [place], they did not admit the patient last night, then she woke up this morning and went back to ED, and is currently waiting in the ED for right should pain.   - Sol Greer was in the waiting room and was unable to answer my question due the noise.   - Sol Greer needs her meals on wheels put on pause noted from Palliative Care note on 2025    Gave Sol Greer the phone number:   [facility] 3  4.6(15) · Disability services and support organization  [facility] · [phone]  The patient agrees to receive a call tomorrow.     16:19 called [employer] and left vm     Future Appointments         Provider Specialty Dept Phone    3/6/2025 10:00 AM [facility] PULMONARY FUNCTION ROOM 1 Pulmonary Function Testing [phone]    3/12/2025 11:00 AM Loida Knight MD Family Medicine [phone]    3/13/2025 10:00 AM Star Mendes MD Pulmonology [phone]    3/19/2025 11:00 AM Neal Rock

## 2025-02-13 NOTE — CARE COORDINATION
Care Transitions Note    Follow Up Call     Attempted to reach patient for transitions of care follow up.  Unable to reach patient.      Outreach Attempts:   Multiple attempts to contact patient at phone numbers on file.   Unable to leave message.     11:15 Care Summary Note: CALLED [phone], unable to leave VM.     Follow Up Appointment:   Future Appointments         Provider Specialty Dept Phone    3/6/2025 10:00 AM [facility] PULMONARY FUNCTION ROOM 1 Pulmonary Function Testing [phone]    3/12/2025 11:00 AM Loida Knight MD Family Medicine [phone]    3/13/2025 10:00 AM Star Mendes MD Pulmonology [phone]    3/19/2025 11:00 AM Neal Rock MD Palliative Care [phone]

## 2025-02-14 ENCOUNTER — CARE COORDINATION (OUTPATIENT)
Dept: CASE MANAGEMENT | Age: 85
End: 2025-02-14

## 2025-02-14 ENCOUNTER — SOCIAL WORK (OUTPATIENT)
Dept: INFUSION THERAPY | Age: 85
End: 2025-02-14

## 2025-02-14 NOTE — CARE COORDINATION
Care Transitions Note    Follow Up Call     Attempted to reach patient for transitions of care follow up.  Unable to reach patient.      Outreach Attempts:   Multiple attempts to contact patient at phone numbers on file.   HIPAA compliant voicemail left for patient.     Care Summary Note: called [phone], left vm    Follow Up Appointment:   Future Appointments         Provider Specialty Dept Phone    3/6/2025 10:00 AM [facility] PULMONARY FUNCTION ROOM 1 Pulmonary Function Testing [phone]    3/12/2025 11:00 AM Loida Knight MD Family Medicine [phone]    3/13/2025 10:00 AM Star Mendes MD Pulmonology [phone]    3/19/2025 11:00 AM Neal Rock MD Palliative Care [phone]            Plan for follow-up on next business day.  based on severity of symptoms and risk factors. Plan for next call:     Alessandra Barry LPN

## 2025-02-14 NOTE — PROGRESS NOTES
Oncology Social Work    Date: 2/14/2025  Time: 1:44 PM  Name: Sol Greer  MRN: [id number]     Contact Type: Transportation Request    Note:   Situation: Sol Greer called the Oncology Social Worker to assist with transportation to and from appts.     Background:  Sol Greer is not able to provide their own transportation and has asked the  for assistance.     Assessment:  Sol Greer requested transportation for upcoming appointment(s) located throughout the [facility] network of providers.  - She will be picked up by [facility] and taken to/from scheduled appointment(s) as follows:  6-Mar 10:00a Pulmonology [address] - [address]   12-Mar 11:00a Provider [address] - Seeing KADI Knight   13-Mar 10:00a Provider [address] - Seeing Dr. Mendes   19-Mar 11:00a Pall Care [address] - Seeing Dr Rock     - Patient has been notified of the arrangements provided.    Recommendation: Appt changes will be initiated by Sol Greer based on need.  provided my contact information and will remain available for support.      YUAN Locke, MEGAN, MONICA  Oncology Social Worker      Electronically signed by YUAN Locke LSW, MONICA on 2/14/2025 at 1:44 PM

## 2025-02-17 ENCOUNTER — CARE COORDINATION (OUTPATIENT)
Dept: CASE MANAGEMENT | Age: 85
End: 2025-02-17

## 2025-02-17 NOTE — CARE COORDINATION
Care Transitions Note  1st attempt Friday, 02/14/2025.  2nd attempt next business day, today, 02/17/2025.    Final Call     Attempted to reach patient for transitions of care follow up.  Unable to reach patient.      Outreach Attempts:   Multiple attempts to contact patient at phone numbers on file.   HIPAA compliant voicemail left for patient.     Care Summary Note: called [phone], left vm    Follow Up Appointment:   Future Appointments         Provider Specialty Dept Phone    3/6/2025 10:00 AM [facility] PULMONARY FUNCTION ROOM 1 Pulmonary Function Testing [phone]    3/12/2025 11:00 AM Loida Knight MD Family Medicine [phone]    3/13/2025 10:00 AM Star Mendes MD Pulmonology [phone]    3/19/2025 11:00 AM Neal Rock MD Palliative Care [phone]            No further follow-up call was indicated on day 2 of no response, closing the program according to protocol.     Alessandra Barry LPN

## 2025-02-24 ENCOUNTER — CASE MANAGEMENT (OUTPATIENT)
Dept: CASE MANAGEMENT | Age: 85
End: 2025-02-24

## 2025-02-24 NOTE — PROGRESS NOTES
Name: Sol Greer  : 1940  MRN: [id number]    Oncology Navigation Follow-Up Note    Contact Type:  Telephone    Notes: mariela received call from pt regarding update- moved in with her son in [place].   Consult Dr. Mcintosh @ [facility] -on preliminary list for clinical trial.     Electronically signed by Ysabel Yañez RN on 2025 at 2:25 PM

## (undated) DEVICE — SYRINGE MED 10ML LUERLOCK TIP W/O SFTY DISP

## (undated) DEVICE — FORCEPS L110MM DIA1.7MM PREMARKED REINF SHFT

## (undated) DEVICE — NEEDLE ASPIR 21GA L700MM US GUID TREAT DST END FOR EFFICIENT

## (undated) DEVICE — KIT PROCEDURE ISSUMISITE CATHETER 90

## (undated) DEVICE — HYPODERMIC SAFETY NEEDLE: Brand: MAGELLAN

## (undated) DEVICE — BRUSH CYTO FLX DISP SUPERDIMENSION

## (undated) DEVICE — SUTURE MCRYL SZ 4-0 L27IN ABSRB UD L19MM PS-2 1/2 CIR PRIM Y426H

## (undated) DEVICE — GLOVE ORTHO 8   MSG9480

## (undated) DEVICE — BREAST HERNIA PACK: Brand: MEDLINE INDUSTRIES, INC.

## (undated) DEVICE — SUTURE VCRL SZ 2-0 L27IN ABSRB UD L26MM SH 1/2 CIR J417H

## (undated) DEVICE — TRIPLE LUMEN NEEDLE KNIFE: Brand: RX NEEDLE KNIFE XL

## (undated) DEVICE — Device: Brand: BALLOON

## (undated) DEVICE — PENCIL SMK EVAC ALL IN 1 DSGN ENH VISIBILITY IMPROVED AIR

## (undated) DEVICE — NEEDLE CYTO 18GA L137MM DIA1.9MM PULM BRAID TAPR SHTH OPT

## (undated) DEVICE — ADHESIVE SKIN CLSR 0.7ML TOP DERMBND ADV

## (undated) DEVICE — SPHINCTEROTOME: Brand: HYDRATOME RX 44

## (undated) DEVICE — BAG,BANDED,W/RUBBERBAND,STERILE,30X36: Brand: MEDLINE

## (undated) DEVICE — INTENDED FOR TISSUE SEPARATION, AND OTHER PROCEDURES THAT REQUIRE A SHARP SURGICAL BLADE TO PUNCTURE OR CUT.: Brand: BARD-PARKER ® CARBON RIB-BACK BLADES

## (undated) DEVICE — GLOVE SURG SZ 7 L12IN FNGR THK94MIL TRNSLUC YEL LTX HYDRGEL

## (undated) DEVICE — SUTURE VCRL + SZ 3-0 L27IN ABSRB UD L26MM SH 1/2 CIR VCP416H